# Patient Record
Sex: FEMALE | Race: ASIAN | NOT HISPANIC OR LATINO
[De-identification: names, ages, dates, MRNs, and addresses within clinical notes are randomized per-mention and may not be internally consistent; named-entity substitution may affect disease eponyms.]

---

## 2019-11-19 ENCOUNTER — APPOINTMENT (OUTPATIENT)
Dept: PULMONOLOGY | Facility: CLINIC | Age: 64
End: 2019-11-19
Payer: MEDICAID

## 2019-11-19 VITALS
HEART RATE: 78 BPM | DIASTOLIC BLOOD PRESSURE: 75 MMHG | SYSTOLIC BLOOD PRESSURE: 151 MMHG | OXYGEN SATURATION: 97 % | WEIGHT: 120 LBS | HEIGHT: 57 IN | TEMPERATURE: 97.7 F | BODY MASS INDEX: 25.89 KG/M2

## 2019-11-19 DIAGNOSIS — R05 COUGH: ICD-10-CM

## 2019-11-19 DIAGNOSIS — Z87.891 PERSONAL HISTORY OF NICOTINE DEPENDENCE: ICD-10-CM

## 2019-11-19 DIAGNOSIS — Z86.69 PERSONAL HISTORY OF OTHER DISEASES OF THE NERVOUS SYSTEM AND SENSE ORGANS: ICD-10-CM

## 2019-11-19 DIAGNOSIS — Z56.0 UNEMPLOYMENT, UNSPECIFIED: ICD-10-CM

## 2019-11-19 DIAGNOSIS — Z86.79 PERSONAL HISTORY OF OTHER DISEASES OF THE CIRCULATORY SYSTEM: ICD-10-CM

## 2019-11-19 PROBLEM — Z00.00 ENCOUNTER FOR PREVENTIVE HEALTH EXAMINATION: Status: ACTIVE | Noted: 2019-11-19

## 2019-11-19 PROCEDURE — 94726 PLETHYSMOGRAPHY LUNG VOLUMES: CPT

## 2019-11-19 PROCEDURE — ZZZZZ: CPT

## 2019-11-19 PROCEDURE — 99203 OFFICE O/P NEW LOW 30 MIN: CPT | Mod: 25

## 2019-11-19 PROCEDURE — 94060 EVALUATION OF WHEEZING: CPT

## 2019-11-19 PROCEDURE — 94729 DIFFUSING CAPACITY: CPT

## 2019-11-19 RX ORDER — TIOTROPIUM BROMIDE INHALATION SPRAY 3.12 UG/1
2.5 SPRAY, METERED RESPIRATORY (INHALATION) DAILY
Qty: 1 | Refills: 5 | Status: ACTIVE | COMMUNITY
Start: 2019-11-19 | End: 1900-01-01

## 2019-11-19 SDOH — ECONOMIC STABILITY - INCOME SECURITY: UNEMPLOYMENT, UNSPECIFIED: Z56.0

## 2019-11-22 PROBLEM — Z86.79 HISTORY OF HYPERTENSION: Status: RESOLVED | Noted: 2019-11-19 | Resolved: 2019-11-22

## 2019-11-22 PROBLEM — Z56.0 UNEMPLOYED: Status: ACTIVE | Noted: 2019-11-19

## 2019-11-22 PROBLEM — Z87.891 FORMER SMOKER: Status: ACTIVE | Noted: 2019-11-19

## 2019-11-22 PROBLEM — Z86.69 HISTORY OF EYE PROBLEM: Status: RESOLVED | Noted: 2019-11-19 | Resolved: 2019-11-22

## 2019-11-22 NOTE — REVIEW OF SYSTEMS
[Sputum] : sputum  [Fever] : no fever [Chills] : no chills [Fatigue] : no fatigue [Poor Appetite] : normal appetite  [Dry Eyes] : no dryness of the eyes [Eye Irritation] : no ~T irritation of the eyes [Ear Disturbance] : no ear disturbance [Nasal Congestion] : no nasal congestion [Cough] : cough [Hemoptysis] : no hemoptysis [Dyspnea] : no dyspnea [Chest Tightness] : no chest tightness [Pleuritic Pain] : no pleuritic pain [Hypotension] : no hypotension [PND] : no PND [Dysrhythmia] : no dysrhythmia [Heartburn] : no heartburn [Reflux] : no reflux [Indigestion] : no indigestion [Trauma] : no ~T physical trauma [Fracture] : no fracture [Kyphoscoliosis] : no kyphoscoliosis [Diabetes] : no diabetes mellitus [Thyroid Problem] : no thyroid problem [Difficulty Initiating Sleep] : no difficulty falling asleep [Difficulty Maintaining Sleep] : no difficulty maintaining sleep [Dysesthesias] : no dysesthesias [Negative] : HEENT

## 2019-11-22 NOTE — PHYSICAL EXAM
[General Appearance - Well Developed] : well developed [Normal Appearance] : normal appearance [General Appearance - Well Nourished] : well nourished [Normal Oropharynx] : normal oropharynx [Neck Appearance] : the appearance of the neck was normal [Neck Cervical Mass (___cm)] : no neck mass was observed [Heart Sounds] : normal S1 and S2 [Heart Rate And Rhythm] : heart rate and rhythm were normal [Exaggerated Use Of Accessory Muscles For Inspiration] : no accessory muscle use [Bowel Sounds] : normal bowel sounds [Abdomen Soft] : soft [Abnormal Walk] : normal gait [Nail Clubbing] : no clubbing of the fingernails [Cyanosis, Localized] : no localized cyanosis [Skin Color & Pigmentation] : normal skin color and pigmentation [] : no rash [No Focal Deficits] : no focal deficits [Sensation] : the sensory exam was normal to light touch and pinprick [Cranial Nerves] : cranial nerves 2-12 were intact [Oriented To Time, Place, And Person] : oriented to person, place, and time [Normal Conjunctiva] : the conjunctiva exhibited no abnormalities [Redness] : no redness [Swelling] : no swelling [Tenderness] : no tenderness

## 2019-11-22 NOTE — REASON FOR VISIT
[Initial Evaluation] : an initial evaluation [FreeTextEntry2] : Preop evaluation for aneurysmal surgery and chronic cough

## 2019-11-22 NOTE — ASSESSMENT
[FreeTextEntry1] : 64 Maltese speaking female with PMH of HTN came to clinic for preop evalution for aneurysmal surgery and chronic cough.\par \par

## 2019-11-22 NOTE — HISTORY OF PRESENT ILLNESS
[FreeTextEntry1] : 64 Urdu speaking female with PMH of HTN came to clinic for the evaluation for preop for aneurysmal surgery and for a chronic cough for 5 years. She gets symptoms after getting one flight of stairs. Her cough gets worse at night after laying flat. According to son at bedside, she quit smoking 6 years ago and used to smoke half pack for more than 10 years. No exposure of occupational fibrogenic agents but sued [employer] based [employer] than six years [place]. Denies weight loss, fever, B-symptoms, sick contacts, exposure to TB. She was hospitalized nine months ago for eye infection and was found to have Lt MCA aneurysm of 8 mm for which she is undergoing pre-op assessment.  She has no recent COPD exacerbations and has been on inhalers.

## 2019-11-22 NOTE — PROCEDURE
[FreeTextEntry1] : PFTs- Severe obstruction with no significant bronchodilator response which should not preclude their use if clinically indicated. Lung volumes show air trapping and hyperinflation. DLCO is severely reduced.\par \par CXR- reviewed on [facility]- prominent interstitial markings.

## 2021-06-29 ENCOUNTER — EMERGENCY (EMERGENCY)
Facility: HOSPITAL | Age: 66
LOS: 1 days | Discharge: HOME | End: 2021-06-29
Attending: EMERGENCY MEDICINE | Admitting: EMERGENCY MEDICINE
Payer: MEDICAID

## 2021-06-29 ENCOUNTER — INPATIENT (INPATIENT)
Facility: HOSPITAL | Age: 66
LOS: 2 days | Discharge: NURSING, REHAB OR LONG-TERM CARE | DRG: 65 | End: 2021-07-02
Attending: SPECIALIST | Admitting: SPECIALIST
Payer: MEDICAID

## 2021-06-29 VITALS
HEART RATE: 62 BPM | SYSTOLIC BLOOD PRESSURE: 140 MMHG | RESPIRATION RATE: 16 BRPM | OXYGEN SATURATION: 99 % | DIASTOLIC BLOOD PRESSURE: 58 MMHG | TEMPERATURE: 98 F

## 2021-06-29 VITALS
TEMPERATURE: 98 F | OXYGEN SATURATION: 100 % | RESPIRATION RATE: 16 BRPM | HEART RATE: 64 BPM | DIASTOLIC BLOOD PRESSURE: 64 MMHG | SYSTOLIC BLOOD PRESSURE: 151 MMHG

## 2021-06-29 VITALS
RESPIRATION RATE: 20 BRPM | DIASTOLIC BLOOD PRESSURE: 67 MMHG | SYSTOLIC BLOOD PRESSURE: 165 MMHG | OXYGEN SATURATION: 99 % | TEMPERATURE: 97 F | HEART RATE: 100 BPM

## 2021-06-29 DIAGNOSIS — I67.1 CEREBRAL ANEURYSM, NONRUPTURED: ICD-10-CM

## 2021-06-29 LAB
ALBUMIN SERPL ELPH-MCNC: 4.4 G/DL — SIGNIFICANT CHANGE UP (ref 3.3–5)
ALBUMIN SERPL ELPH-MCNC: 4.6 G/DL — SIGNIFICANT CHANGE UP (ref 3.3–5)
ALP SERPL-CCNC: 89 U/L — SIGNIFICANT CHANGE UP (ref 40–120)
ALP SERPL-CCNC: 98 U/L — SIGNIFICANT CHANGE UP (ref 40–120)
ALT FLD-CCNC: 13 U/L — SIGNIFICANT CHANGE UP (ref 4–33)
ALT FLD-CCNC: 14 U/L — SIGNIFICANT CHANGE UP (ref 10–45)
ANION GAP SERPL CALC-SCNC: 12 MMOL/L — SIGNIFICANT CHANGE UP (ref 7–14)
ANION GAP SERPL CALC-SCNC: 14 MMOL/L — SIGNIFICANT CHANGE UP (ref 5–17)
APTT BLD: 31.6 SEC — SIGNIFICANT CHANGE UP (ref 27–36.3)
APTT BLD: 32.8 SEC — SIGNIFICANT CHANGE UP (ref 27.5–35.5)
AST SERPL-CCNC: 17 U/L — SIGNIFICANT CHANGE UP (ref 4–32)
AST SERPL-CCNC: 22 U/L — SIGNIFICANT CHANGE UP (ref 10–40)
BASE EXCESS BLDV CALC-SCNC: -0.2 MMOL/L — SIGNIFICANT CHANGE UP (ref -3–2)
BASOPHILS # BLD AUTO: 0.01 K/UL — SIGNIFICANT CHANGE UP (ref 0–0.2)
BASOPHILS # BLD AUTO: 0.02 K/UL — SIGNIFICANT CHANGE UP (ref 0–0.2)
BASOPHILS NFR BLD AUTO: 0.1 % — SIGNIFICANT CHANGE UP (ref 0–2)
BASOPHILS NFR BLD AUTO: 0.2 % — SIGNIFICANT CHANGE UP (ref 0–2)
BILIRUB SERPL-MCNC: 0.4 MG/DL — SIGNIFICANT CHANGE UP (ref 0.2–1.2)
BILIRUB SERPL-MCNC: 0.4 MG/DL — SIGNIFICANT CHANGE UP (ref 0.2–1.2)
BLD GP AB SCN SERPL QL: NEGATIVE — SIGNIFICANT CHANGE UP
BUN SERPL-MCNC: 14 MG/DL — SIGNIFICANT CHANGE UP (ref 7–23)
BUN SERPL-MCNC: 14 MG/DL — SIGNIFICANT CHANGE UP (ref 7–23)
CALCIUM SERPL-MCNC: 9.4 MG/DL — SIGNIFICANT CHANGE UP (ref 8.4–10.5)
CALCIUM SERPL-MCNC: 9.8 MG/DL — SIGNIFICANT CHANGE UP (ref 8.4–10.5)
CHLORIDE SERPL-SCNC: 97 MMOL/L — SIGNIFICANT CHANGE UP (ref 96–108)
CHLORIDE SERPL-SCNC: 99 MMOL/L — SIGNIFICANT CHANGE UP (ref 98–107)
CK MB BLD-MCNC: 3 % — SIGNIFICANT CHANGE UP (ref 0–3.5)
CK MB CFR SERPL CALC: 2 NG/ML — SIGNIFICANT CHANGE UP (ref 0–3.8)
CK SERPL-CCNC: 67 U/L — SIGNIFICANT CHANGE UP (ref 25–170)
CO2 SERPL-SCNC: 22 MMOL/L — SIGNIFICANT CHANGE UP (ref 22–31)
CO2 SERPL-SCNC: 22 MMOL/L — SIGNIFICANT CHANGE UP (ref 22–31)
CREAT SERPL-MCNC: 1.12 MG/DL — SIGNIFICANT CHANGE UP (ref 0.5–1.3)
CREAT SERPL-MCNC: 1.14 MG/DL — SIGNIFICANT CHANGE UP (ref 0.5–1.3)
EOSINOPHIL # BLD AUTO: 0.04 K/UL — SIGNIFICANT CHANGE UP (ref 0–0.5)
EOSINOPHIL # BLD AUTO: 0.05 K/UL — SIGNIFICANT CHANGE UP (ref 0–0.5)
EOSINOPHIL NFR BLD AUTO: 0.4 % — SIGNIFICANT CHANGE UP (ref 0–6)
EOSINOPHIL NFR BLD AUTO: 0.5 % — SIGNIFICANT CHANGE UP (ref 0–6)
GLUCOSE SERPL-MCNC: 103 MG/DL — HIGH (ref 70–99)
GLUCOSE SERPL-MCNC: 112 MG/DL — HIGH (ref 70–99)
HCO3 BLDV-SCNC: 24 MMOL/L — SIGNIFICANT CHANGE UP (ref 20–27)
HCT VFR BLD CALC: 39.6 % — SIGNIFICANT CHANGE UP (ref 34.5–45)
HCT VFR BLD CALC: 40.8 % — SIGNIFICANT CHANGE UP (ref 34.5–45)
HGB BLD-MCNC: 12.8 G/DL — SIGNIFICANT CHANGE UP (ref 11.5–15.5)
HGB BLD-MCNC: 13.2 G/DL — SIGNIFICANT CHANGE UP (ref 11.5–15.5)
IANC: 7.04 K/UL — SIGNIFICANT CHANGE UP (ref 1.5–8.5)
IMM GRANULOCYTES NFR BLD AUTO: 0.3 % — SIGNIFICANT CHANGE UP (ref 0–1.5)
IMM GRANULOCYTES NFR BLD AUTO: 0.4 % — SIGNIFICANT CHANGE UP (ref 0–1.5)
INR BLD: 0.99 RATIO — SIGNIFICANT CHANGE UP (ref 0.88–1.16)
INR BLD: 1.03 RATIO — SIGNIFICANT CHANGE UP (ref 0.88–1.16)
LYMPHOCYTES # BLD AUTO: 2.25 K/UL — SIGNIFICANT CHANGE UP (ref 1–3.3)
LYMPHOCYTES # BLD AUTO: 22.4 % — SIGNIFICANT CHANGE UP (ref 13–44)
LYMPHOCYTES # BLD AUTO: 3.5 K/UL — HIGH (ref 1–3.3)
LYMPHOCYTES # BLD AUTO: 33.4 % — SIGNIFICANT CHANGE UP (ref 13–44)
MCHC RBC-ENTMCNC: 28.6 PG — SIGNIFICANT CHANGE UP (ref 27–34)
MCHC RBC-ENTMCNC: 28.9 PG — SIGNIFICANT CHANGE UP (ref 27–34)
MCHC RBC-ENTMCNC: 32.3 GM/DL — SIGNIFICANT CHANGE UP (ref 32–36)
MCHC RBC-ENTMCNC: 32.4 GM/DL — SIGNIFICANT CHANGE UP (ref 32–36)
MCV RBC AUTO: 88.4 FL — SIGNIFICANT CHANGE UP (ref 80–100)
MCV RBC AUTO: 89.5 FL — SIGNIFICANT CHANGE UP (ref 80–100)
MONOCYTES # BLD AUTO: 0.67 K/UL — SIGNIFICANT CHANGE UP (ref 0–0.9)
MONOCYTES # BLD AUTO: 0.7 K/UL — SIGNIFICANT CHANGE UP (ref 0–0.9)
MONOCYTES NFR BLD AUTO: 6.7 % — SIGNIFICANT CHANGE UP (ref 2–14)
MONOCYTES NFR BLD AUTO: 6.7 % — SIGNIFICANT CHANGE UP (ref 2–14)
NEUTROPHILS # BLD AUTO: 6.16 K/UL — SIGNIFICANT CHANGE UP (ref 1.8–7.4)
NEUTROPHILS # BLD AUTO: 7.04 K/UL — SIGNIFICANT CHANGE UP (ref 1.8–7.4)
NEUTROPHILS NFR BLD AUTO: 58.8 % — SIGNIFICANT CHANGE UP (ref 43–77)
NEUTROPHILS NFR BLD AUTO: 70.1 % — SIGNIFICANT CHANGE UP (ref 43–77)
NRBC # BLD: 0 /100 WBCS — SIGNIFICANT CHANGE UP
NRBC # BLD: 0 /100 WBCS — SIGNIFICANT CHANGE UP (ref 0–0)
NRBC # FLD: 0 K/UL — SIGNIFICANT CHANGE UP
PCO2 BLDV: 43 MMHG — SIGNIFICANT CHANGE UP (ref 41–51)
PH BLDV: 7.37 — SIGNIFICANT CHANGE UP (ref 7.32–7.43)
PLATELET # BLD AUTO: 175 K/UL — SIGNIFICANT CHANGE UP (ref 150–400)
PLATELET # BLD AUTO: 187 K/UL — SIGNIFICANT CHANGE UP (ref 150–400)
PO2 BLDV: 72 MMHG — HIGH (ref 35–40)
POTASSIUM SERPL-MCNC: 4.8 MMOL/L — SIGNIFICANT CHANGE UP (ref 3.5–5.3)
POTASSIUM SERPL-MCNC: 5.5 MMOL/L — HIGH (ref 3.5–5.3)
POTASSIUM SERPL-SCNC: 4.8 MMOL/L — SIGNIFICANT CHANGE UP (ref 3.5–5.3)
POTASSIUM SERPL-SCNC: 5.5 MMOL/L — HIGH (ref 3.5–5.3)
PROT SERPL-MCNC: 7.3 G/DL — SIGNIFICANT CHANGE UP (ref 6–8.3)
PROT SERPL-MCNC: 8.1 G/DL — SIGNIFICANT CHANGE UP (ref 6–8.3)
PROTHROM AB SERPL-ACNC: 11.7 SEC — SIGNIFICANT CHANGE UP (ref 10.6–13.6)
PROTHROM AB SERPL-ACNC: 11.9 SEC — SIGNIFICANT CHANGE UP (ref 10.6–13.6)
RBC # BLD: 4.48 M/UL — SIGNIFICANT CHANGE UP (ref 3.8–5.2)
RBC # BLD: 4.56 M/UL — SIGNIFICANT CHANGE UP (ref 3.8–5.2)
RBC # FLD: 11.9 % — SIGNIFICANT CHANGE UP (ref 10.3–14.5)
RBC # FLD: 12.1 % — SIGNIFICANT CHANGE UP (ref 10.3–14.5)
RH IG SCN BLD-IMP: POSITIVE — SIGNIFICANT CHANGE UP
SAO2 % BLDV: 94.8 % — HIGH (ref 60–85)
SARS-COV-2 RNA SPEC QL NAA+PROBE: SIGNIFICANT CHANGE UP
SARS-COV-2 RNA SPEC QL NAA+PROBE: SIGNIFICANT CHANGE UP
SODIUM SERPL-SCNC: 133 MMOL/L — LOW (ref 135–145)
SODIUM SERPL-SCNC: 133 MMOL/L — LOW (ref 135–145)
TROPONIN T, HIGH SENSITIVITY RESULT: 7 NG/L — SIGNIFICANT CHANGE UP (ref 0–51)
TROPONIN T, HIGH SENSITIVITY RESULT: <6 NG/L — SIGNIFICANT CHANGE UP
WBC # BLD: 10.04 K/UL — SIGNIFICANT CHANGE UP (ref 3.8–10.5)
WBC # BLD: 10.47 K/UL — SIGNIFICANT CHANGE UP (ref 3.8–10.5)
WBC # FLD AUTO: 10.04 K/UL — SIGNIFICANT CHANGE UP (ref 3.8–10.5)
WBC # FLD AUTO: 10.47 K/UL — SIGNIFICANT CHANGE UP (ref 3.8–10.5)

## 2021-06-29 PROCEDURE — 70498 CT ANGIOGRAPHY NECK: CPT | Mod: 26

## 2021-06-29 PROCEDURE — 99291 CRITICAL CARE FIRST HOUR: CPT

## 2021-06-29 PROCEDURE — 71045 X-RAY EXAM CHEST 1 VIEW: CPT | Mod: 26

## 2021-06-29 PROCEDURE — 70450 CT HEAD/BRAIN W/O DYE: CPT | Mod: 26,MA

## 2021-06-29 PROCEDURE — 70496 CT ANGIOGRAPHY HEAD: CPT | Mod: 26

## 2021-06-29 PROCEDURE — 0042T: CPT

## 2021-06-29 PROCEDURE — 70450 CT HEAD/BRAIN W/O DYE: CPT | Mod: 26,59

## 2021-06-29 PROCEDURE — 99283 EMERGENCY DEPT VISIT LOW MDM: CPT

## 2021-06-29 PROCEDURE — 93010 ELECTROCARDIOGRAM REPORT: CPT

## 2021-06-29 RX ORDER — ENOXAPARIN SODIUM 100 MG/ML
40 INJECTION SUBCUTANEOUS DAILY
Refills: 0 | Status: DISCONTINUED | OUTPATIENT
Start: 2021-06-30 | End: 2021-07-02

## 2021-06-29 RX ORDER — ASPIRIN/CALCIUM CARB/MAGNESIUM 324 MG
300 TABLET ORAL DAILY
Refills: 0 | Status: DISCONTINUED | OUTPATIENT
Start: 2021-06-29 | End: 2021-07-02

## 2021-06-29 RX ORDER — ATORVASTATIN CALCIUM 80 MG/1
80 TABLET, FILM COATED ORAL AT BEDTIME
Refills: 0 | Status: DISCONTINUED | OUTPATIENT
Start: 2021-06-30 | End: 2021-07-02

## 2021-06-29 RX ADMIN — Medication 300 MILLIGRAM(S): at 23:18

## 2021-06-29 NOTE — ED PROVIDER NOTE - NS ED ROS FT
GENERAL: no fever, no chills  EYES: no change in vision, no irritation, no discharge, no redness, no pain  HEENT: no trouble swallowing or speaking, no throat pain, no ear pain  CARDIAC: no chest pain, no palpitations   PULMONARY: no cough, no shortness of breath, no wheezing  GI: no abdominal pain, no nausea, no vomiting, no diarrhea, no constipation  : no changes in urination, no dysuria, no frequency, no hematuria, no discharge  SKIN: no rashes  NEURO: no headache, no numbness, +weakness  MSK: no joint pain, no muscle pain, no back pain, no calf pain

## 2021-06-29 NOTE — CONSULT NOTE ADULT - ASSESSMENT
Patient is a 65 year old female with past medical history of asthma and HTN p/w right sided hemiplegia & hypoesthesias in upper and lower extremities, right sided facial droop, consistent with sensorimotor syndrome. Patient not a candidate for tPA as they were outside of the window of treatment. On exam, she also is mildly dysarthric, unable to answer level of consciousness questions, and has a partial hemianopia in her right eye. NCCT findings revealed chronic lacunar infarcts in the b/l basal ganglia (slightly larger and more chronic on the left side) with no evidence of acute hemorrhage. CTA revealed narrowing of the right CCA, 73% stenosis of the right proximal ICA, dilatation of the left posterior M2 branch r/o fusiform aneurysm (5.4 mm), and a second aneurysm of the proximal left anterior branch r/o saccularaneurysm (3.3 mm).     Impression:   Phenomenology - Right hemisemisorimotor loss and moderate dysarthria. Question of right visual field cut.   Localization - Likely left thalamocapsular region  Mechanism - Likely 2/2  vs Embolic  Etiology - Vascular, Infarction   65 year old RH woman with past medical history of asthma and HTN not on antithrombotics presents as code stroke to [facility] ED for right-sided weakness with LWK  11p. Neurological examination demonstrate right hemisensorimotor loss and moderate dysarthria with ?RVFC. CTH/CTA/CTP demonstrates bilateral BG chronic lacunar infarcts, 73% stenosis prox BABATUNDE, mild prox RECA stenosis, prox L ant M2 (3.3mm) and post M2 (5.4mm) aneurysms, 0cc core infarct and 0cc penumbra. Patient not a candidate for tPA as they were outside of the time window. Patient does not have radiographical evidence of large vessel occlusion and thus not a candidate for mechanical thrombectomy.     Impression:   Right hemisensorimotor loss and moderate dysarthria +/- right visual field cut 2/2 Likely left thalamocapsular infarct (though unclear if cortical involvement given unclear if patient truly has RVFC) 2/2  versus ESUS (in event there is cortical involvement)    Recommendations:  [ ] MRI head w/o contrast  [ ] permissive HTN bp should go no higher than 220/110 for 24h followed by gradual normotension  [ ] aspirin 81mg daily  [ ] TTE w/ bubble  [ ] atorvastatin 80mg daily to titrate to LDL < 70  [ ] a1c, lipid profile  [ ] PT/OT/SS  [ ] DVT ppx  [ ] q4h neuro checks  [ ] patient being transferred to [place] for possible aneurysm treatment per nsg    case to be d/w Stroke Attending Dr. Malagon   65 year old RH woman with past medical history of asthma and HTN not on antithrombotics presents as code stroke to [facility] ED for right-sided weakness with LWK  11p. Neurological examination demonstrate right hemisensorimotor loss and moderate dysarthria with ?RVFC. CTH/CTA/CTP demonstrates bilateral BG chronic lacunar infarcts, 73% stenosis prox BABATUNDE, mild prox RECA stenosis, prox L ant M2 (3.3mm) and post M2 (5.4mm) aneurysms, 0cc core infarct and 0cc penumbra. Patient not a candidate for tPA as they were outside of the time window. Patient does not have radiographical evidence of large vessel occlusion and thus not a candidate for mechanical thrombectomy.     Impression:   Right hemisensorimotor loss and moderate dysarthria +/- right visual field cut 2/2 Likely left thalamocapsular infarct (though unclear if cortical involvement given unclear if patient truly has RVFC) 2/2  versus ESUS (in event there is cortical involvement)    Recommendations:  [ ] MRI head w/o contrast  [ ] permissive HTN bp should go no higher than 220/110 for 24h followed by gradual normotension  [ ] aspirin 81mg daily  [ ] TTE w/ bubble  [ ] atorvastatin 80mg daily to titrate to LDL < 70  [ ] a1c, lipid profile  [ ] PT/OT/SS  [ ] DVT ppx  [ ] q4h neuro checks  [ ] patient being transferred to [place] for possible aneurysm treatment per [facility]    case to be d/w Stroke Attending Dr. Malagon  transfer to [facility] prior to eval

## 2021-06-29 NOTE — ED PROVIDER NOTE - OBJECTIVE STATEMENT
66 y/o F Greenlandic speaking PMH HTN, smoking, brain aneurysm in the past (Lt MCA 8mm) presents to ED from home BIB son for R face, arm and leg weakness onset today at 3am. Last known normal yesterday night while ambulating to bathroom at 1am. She told son she had trouble going to the bathroom at 3am due to weakness. Son also reports she has been slow to respond to questions. Denies falls, trauma to the head. Reports pt has felt generally weak for the last 3 days, denies cough, f/c, n/v/d.    She was seen and evaluated at [facility] ER and found to have R sided hemiparesis and CT demonstrated MCA aneurysym ([facility] MRN [id number]). She was seen by neurosurgery and transferred for further neurosurgical evaluation and management.

## 2021-06-29 NOTE — H&P ADULT - HISTORY OF PRESENT ILLNESS
HPI:  Mahin Levin is a 65 year old RH woman with past medical history of asthma and HTN not on antithrombotics presents as code stroke to [facility] ED for right-sided weakness with LWK 6/28 11p. At baseline, the patient is ambulatory without assistive devices and is oriented to person/place/time, and participates in all her own ADLs. Son at bedside provided history as per request of patient. Patient went to bed around 11p at her baseline as witnessed by patient's daughter. Per the son, he believes she woke up around 1 am to urinate, unclear if patient had symptoms at this time, patient was unsure. She woke up again at 3 am and she appeared weak on the right side of her body, with such weakness not having resolved. The family believed patient was having generalized fatigue so they did not take her to hospital initially. However, when son had gotten back from work, the weakness had not resolved, and was concerned and wanted to bring her to hospital. She endorsed blurry vision in both eyes.  Denies prior history of stroke. Denies family history of stroke. Patient was transferred from [facility] to [facility] for possible evaluation of aneurysms noted on imaging.     (Stroke only)  NIHSS: 14  MRS: 0       ALLERGIES/INTOLERANCES:  Allergies  No Known Allergies    Intolerances    VITALS & EXAMINATION:  Vital Signs Last 24 Hrs  T(C): 36.9 (29 Jun 2021 19:19), Max: 36.9 (29 Jun 2021 19:19)  T(F): 98.5 (29 Jun 2021 19:19), Max: 98.5 (29 Jun 2021 19:19)  HR: 62 (29 Jun 2021 19:19) (62 - 62)  BP: 140/58 (29 Jun 2021 19:19) (140/58 - 140/58)  BP(mean): --  RR: 16 (29 Jun 2021 19:19) (16 - 16)  SpO2: 99% (29 Jun 2021 19:19) (99% - 99%)

## 2021-06-29 NOTE — H&P ADULT - NSHPLABSRESULTS_GEN_ALL_CORE
LABORATORY:  CBC                       13.2   10.47 )-----------( 187      ( 29 Jun 2021 19:42 )             40.8     Chem 06-29    133<L>  |  97  |  14  ----------------------------<  103<H>  5.5<H>   |  22  |  1.14    Ca    9.8      29 Jun 2021 19:42    TPro  8.1  /  Alb  4.6  /  TBili  0.4  /  DBili  x   /  AST  22  /  ALT  14  /  AlkPhos  98  06-29    LFTs LIVER FUNCTIONS - ( 29 Jun 2021 19:42 )  Alb: 4.6 g/dL / Pro: 8.1 g/dL / ALK PHOS: 98 U/L / ALT: 14 U/L / AST: 22 U/L / GGT: x           Coagulopathy PT/INR - ( 29 Jun 2021 19:42 )   PT: 11.9 sec;   INR: 0.99 ratio         PTT - ( 29 Jun 2021 19:42 )  PTT:32.8 sec  Lipid Panel   A1c   Cardiac enzymes CARDIAC MARKERS ( 29 Jun 2021 19:42 )  x     / x     / 67 U/L / x     / 2.0 ng/mL      STUDIES & IMAGING:    Radiology (XR, CT, MR, U/S, TTE/JIMMY):    NCCT: Parenchymal volume loss is seen. Old lacunar infarcts involving bilateral basal ganglia region are identified. Slightly larger chronic appearing infarct involving the left basal ganglia region. There is no evidence acute hemorrhage mass or mass effect seen    CTA: Mild narrowing of the distal right common carotid artery seen. Using the NASCET criteria there is evidence of a 73% stenosis involving the proximal right internal carotid artery. Mild narrowing of the proximal right external carotid artery seen.  There is evidence of abnormal dilatation involving the proximal posterior M2 branch which is suspicious for underlying fusiform aneurysm. This finding measures approximately 5.4 mm. A second aneurysm suspected involving the proximal left anterior branch which is best seen on series 4 image 374. This is projects anteriorly and medial and is suspicious for a saccular aneurysm measures approximately 3.3 mm.     CBF<30%: 0.0 mL  Tmax> 6.0s: 0.0 mL  Mismatch volume: 0.0 mL  Mismatch ratio: None

## 2021-06-29 NOTE — ED ADULT NURSE NOTE - OBJECTIVE STATEMENT
Pt rec'd to CT scan as a code stroke, 18g saline lock placed to right AC. Italian-speaking only with son at bedside providing history. Pt with right facial droop, right arm and right leg weakness and slurred speech. Last known normal was at 3am. Pt awake and alert.

## 2021-06-29 NOTE — ED PROVIDER NOTE - OBJECTIVE STATEMENT
66 y/o F Italian speaking PMH HTN, smoking, brain aneurysm in the past (Lt MCA 8mm) presents to ED from home BIB son for R face, arm and leg weakness onset today at 3am. Last known normal yesterday night prior to going to bed. Son reports pt reported weakness on the R side to him around 12pm, she told him she had trouble going to the bathroom at 3am due to weakness. Son also reports she has been slow to respond to questions. Denies falls, trauma to the head. Reports pt has felt generally weak for the last 3 days, denies cough, f/c, n/v/d.

## 2021-06-29 NOTE — H&P ADULT - NSHPPHYSICALEXAM_GEN_ALL_CORE
Neurological (>12):  MS: eyes open, alert, Unable to answer what month it was or how old she was. Follows commands on exam.    Language: Speech is mild-moderately dysarthric, fluent with good repetition & comprehension (as per ).    CNs: PERRL (R = 3mm, L = 3mm). Mild-moderate ptosis of right eye (baseline per son) with reduced BTT in right VF, in primary gaze right eye mild esotropia, EOMI, no nystagmus. V1-3 intact to LT, mild right facial droop, Able to raise eyebrows bilaterally. Head turning & shoulder shrug intact b/l.    Motor:  LUE is at least antigravity  RUE no movement  LLE is at least antigravity  RLE with movement with gravity removed    Sensation: reduced sensation to light touch upon right arm and right leg    Reflexes:  Patellar R 2+ , L 1+  Upgoing toe on R  Mute toes on L    Coordination: No dysmetria to FTN/HTS on left side; unable to test due to weakness on right side    Gait: deferred

## 2021-06-29 NOTE — ED ADULT NURSE NOTE - NSIMPLEMENTINTERV_GEN_ALL_ED
Implemented All Fall Risk Interventions:  [place] to call system. Call bell, personal items and telephone within reach. Instruct patient to call for assistance. Room bathroom lighting operational. Non-slip footwear when patient is off stretcher. Physically safe environment: no spills, clutter or unnecessary equipment. Stretcher in lowest position, wheels locked, appropriate side rails in place. Provide visual cue, wrist band, yellow gown, etc. Monitor gait and stability. Monitor for mental status changes and reorient to person, place, and time. Review medications for side effects contributing to fall risk. Reinforce activity limits and safety measures with patient and family.

## 2021-06-29 NOTE — STROKE CODE NOTE - MRS SCORE
(5) Severe disability. Requires constant nursing care and attention, bedridden, incontent. (0) No symptoms

## 2021-06-29 NOTE — ED PROVIDER NOTE - CARE PLAN
Principal Discharge DX:	Brain aneurysm   Principal Discharge DX:	CVA (cerebral vascular accident)  Secondary Diagnosis:	Cerebral aneurysm

## 2021-06-29 NOTE — STROKE CODE NOTE - NIH STROKE SCALE: 9. BEST LANGUAGE, QM
(2) Severe aphasia; all communication is through fragmentary expression; great need for inference, questioning, and guessing by the listener. Range of information that can be exchanged is limited; listener carries burden of communication. Examiner cannot identify materials provided from patient response. (0) No aphasia; normal

## 2021-06-29 NOTE — H&P ADULT - NSHPSOCIALHISTORY_GEN_ALL_CORE
SOCIAL HISTORY:   T/E/D: no smoking, alcohol, illcit drug use  Occupation: retired  Lives with: family

## 2021-06-29 NOTE — ED PROVIDER NOTE - CLINICAL SUMMARY MEDICAL DECISION MAKING FREE TEXT BOX
Wyatt: 3am start of 1 sided weakness. dysarthria. hx of asthma and htn. no trauma code stroke called at triage, neuro on scene. will get labs and eval for stroke.

## 2021-06-29 NOTE — H&P ADULT - NSICDXNOPASTSURGICALHX_GEN_ALL_CORE
"-- DO NOT REPLY / DO NOT REPLY ALL --  -- Message is from the [employer]    COVID-19 Universal Screening: N/A - Not about scheduling    General Patient Message      Reason for Call: Patient went to the Emergency room and Patient had surgery on her Appendics. She was discharged on yesterday. Caller Information       Type Contact Phone    08/29/2020 08:55 AM CDT Phone (Incoming) Meliton Olson (Self) [phone] (H)          Alternative phone number: n/a    Turnaround time given to caller: ""This message will be sent to [facility] Provider's name]. The clinical team will fulfill your request as soon as they review your message when the office opens on Monday (or after the holiday). \""    "
<-- Click to add NO significant Past Surgical History

## 2021-06-29 NOTE — ED PROVIDER NOTE - PHYSICAL EXAMINATION
Gen: elderly female NAD    HEENT: NCAT, EOMI, no nasal discharge, mucous membranes moist  CV: RRR, +S1/S2, no M/R/G  Resp: CTAB, no W/R/R  GI: Abdomen soft non-distended, NTTP, no masses  MSK: No open wounds, no bruising, no LE edema  Neuro:   Psych: calm cooperative Gen: elderly female NAD    HEENT: NCAT, EOMI, no nasal discharge, mucous membranes moist  CV: RRR, +S1/S2, no M/R/G  Resp: CTAB, no W/R/R  GI: Abdomen soft non-distended, NTTP, no masses  MSK: No open wounds, no bruising, no LE edema  Neuro: +L facial droop w/ forehead sparing. +LUE/LLE 0/5 strength. +numbness to L face/UE/LE. negative finger to nose w/ LUE. LUE/LLE strength 5/5 b/l, sensation intact   Psych: calm cooperative

## 2021-06-29 NOTE — CONSULT NOTE ADULT - ASSESSMENT
66 YO female with left MCA aneurysms, new onset right sided weakness. Patient to be transferred to [facility] for further evaluation by Dr Nguyen.

## 2021-06-29 NOTE — ED PROVIDER NOTE - CLINICAL SUMMARY MEDICAL DECISION MAKING FREE TEXT BOX
Attending MD Huang: 65F with ho known cerebral aneurysm presenting with R sided weakness, last normal 3am. Transferred from [facility] ED after CT angio revealed large L MCA aneursym, no SAH noted. Patient denies any headache or neck pain. Exam is notable for dense hemiparesis/paralysis of RUE and RLE. ?compressive phenomenon or secondary CVA from apparently unruptured aneurysm. Plan for stat nsg consultation, disposition and treatment plan will be as per neurosurgery.       *The above represents an initial assessment/impression. Please refer to progress notes for potential changes in patient clinical course*

## 2021-06-29 NOTE — ED PROVIDER NOTE - ATTENDING CONTRIBUTION TO CARE
I performed a history and physical exam of the patient and discussed their management with the resident and /or advanced care provider. I reviewed the resident and /or ACP's note and agree with the documented findings and plan of care. My medical decision making and observations are found above.  Lungs clear, facial droop, abd soft.

## 2021-06-29 NOTE — CONSULT NOTE ADULT - SUBJECTIVE AND OBJECTIVE BOX
66 YO female with Hx HTN and Asthma, diagnosed with left MCA aneurysms 3 years ago, offered treatment at the time but declined due to perceived risk. patient presents to ED today with new right sided weakness. No headaches, nausea, vomiting, neck pain, or visual disturbances.    WDWN female in NAD  Vital Signs Last 24 Hrs  T(C): 36.9 (29 Jun 2021 17:52), Max: 36.9 (29 Jun 2021 17:52)  T(F): 98.4 (29 Jun 2021 17:52), Max: 98.4 (29 Jun 2021 17:52)  HR: 77 (29 Jun 2021 17:52) (77 - 100)  BP: 114/94 (29 Jun 2021 17:52) (114/94 - 165/67)  BP(mean): --  RR: 22 (29 Jun 2021 17:52) (20 - 22)  SpO2: 100% (29 Jun 2021 17:52) (99% - 100%)    AAO X 3  Dysarthric speech  PERRLA, EOMI  Right facial weakness, CN 2-12 otherwise intact  Left UE/LE 5/5  Dense right hemiplegia, RUE/RLE flaccid  SILT    < from: CT Angio Neck w/ IV Cont (06.29.21 @ 15:56) >  CT perfusion was performed.    Calcification is seen involving the carotid bifurcation region bilaterally right greater than left.    Mild narrowing of the distal right common carotid artery seen. Using the NASCET criteria there is evidence of a 73% stenosis involving the proximal right internal carotid artery. Mild narrowing of the proximal right external carotid artery seen.    Evaluation of the distal left common carotid proximal left internal and external carotids appear normal without significant stenosis. Both vertebral arteries demonstrate normal enhancement. Dominant right vertebral artery is seen. Both distal internal carotid arteries appear normal. Evaluation of the anterior and right middle cerebral artery appears normal. There is evidence of abnormal dilatation involving the proximal posterior M2 branch which is suspicious for underlying fusiform aneurysm. This finding measures approximately 5.4 mm. A second aneurysm suspected involving the proximal left anterior branch which is best seen on series 4 image 374. This is projects anteriorly and medial and is suspicious for a saccularaneurysm measures approximately 3.3 mm. Evaluation of the dens basilar and posterior spurs appear normal.    The dural venous sinuses appear normal.    Evaluation of the soft tissue neck region appears normal.    The visualized portions of both lung apices appear clear.    CBF<30%: 0.0 mL  Tmax> 6.0s: 0.0 mL  Mismatch volume: 0.0 mL  Mismatch ratio: None    IMPRESSION: Aneurysms suspected involving the left middle cerebral artery as described above.    No significant stenosis is seen.    No perfusion mismatches to suggest core infarct or critically hypoperfused at tissue risk    < end of copied text >

## 2021-06-29 NOTE — ED ADULT NURSE NOTE - PAIN: PRESENCE, MLM
Post-Care Instructions: I reviewed with the patient in detail post-care instructions. Patient is to wear sunprotection, and avoid picking at any of the treated lesions. Pt may apply Vaseline to crusted or scabbing areas. Number Of Freeze-Thaw Cycles: 2 freeze-thaw cycles Consent: The patient's consent was obtained including but not limited to risks of crusting, scabbing, blistering, scarring, darker or lighter pigmentary change, recurrence, incomplete removal and infection. Render Post-Care Instructions In Note?: no Duration Of Freeze Thaw-Cycle (Seconds): 10 Detail Level: Simple non-verbal indicators of pain/discomfort absent

## 2021-06-29 NOTE — ED ADULT TRIAGE NOTE - CHIEF COMPLAINT QUOTE
Pt c/o right-sided weaknesses. In triage, patient with right-sided deficit to r arm, r leg. Also noted to have r-sided facial droop. Last known well was a 0300am. Per EMS, found upstairs in bedroom with no AC. BRIAN called and code stroke activated. Phx: HTN

## 2021-06-29 NOTE — ED PROVIDER NOTE - PROGRESS NOTE DETAILS
Ford, PGY2 - pt w/ 2 L sided aneurysms noted on CTP. nsgy consulted will come see pt Ford, PGY2 - discussed w/ Neurosurgery JOSE Solis who has reviewed case and d/w attending, recommend transfer to [facility] for further neurosurgical management. transfer center called.

## 2021-06-29 NOTE — ED PROVIDER NOTE - PHYSICAL EXAMINATION
GENERAL: A&Ox3, non-toxic appearing, no acute distress  HEENT: NCAT, EOMI, oral mucosa moist, normal conjunctiva  RESP: CTAB, no respiratory distress, no wheezes/rhonchi/rales, speaking in full sentences  CV: RRR, no murmurs/rubs/gallops  ABDOMEN: soft, non-tender, non-distended, no guarding  MSK: no visible deformities  NEURO: R eye droop, PERRLA 4mm, R hemiparesis 1/5 strength RUE/RLE, LUE/LLE 5/5 strength  SKIN: warm, normal color, well perfused, no rash  PSYCH: normal affect

## 2021-06-29 NOTE — H&P ADULT - ATTENDING COMMENTS
VASCULAR NEUROLOGY ATTENDING  The patient is seen and examined the history and imaging are reviewed. I agree with the resident note unless otherwise noted. Patient with R sided hemiplegia and severe dysarthria. Possible Small vessel infarct. Exam also with cachexia and temporal wasting concern for more systemic process. MRI brain pending. DAPT per CHANCE. Will defer any intervention for incidental aneurysm at this time.

## 2021-06-29 NOTE — ED ADULT NURSE NOTE - OBJECTIVE STATEMENT
65 y female presents transfer from [facility] with right sided weakness beginning this morning 0300. LKN 2300 last night. Patient woke up to go to bathroom at 0300- unable to walk. 65 y female presents transfer from [facility] with right sided weakness beginning this morning 0300. LKN 2300 last night. Patient woke up to go to bathroom at 0300- unable to walk. CT scan at [facility] revealed, "left sided aneurysm." Primarily Japanese speaking- slurred speech; incomprehensible. 5/5 strength in LUE/ LLE. Unable to move RUE/ RLE. PERRL 3mm. A&Ox2; oriented to person/ place- son reports this to be baseline. Breathing comfortably in bed- placed on 2L NC for comfort. Abdomen soft nondistended. Safety maintained- bed locked in lowest position.

## 2021-06-29 NOTE — STROKE CODE NOTE - NIH STROKE SCALE: 2. BEST GAZE, QM
(1) Partial gaze palsy; gaze is abnormal in one or both eyes, but forced deviation or total gaze paresis is not present (0) Normal

## 2021-06-29 NOTE — CONSULT NOTE ADULT - ASSESSMENT
Mahin Levin    65F p/w acute onset R hemiplegia and R facia since 3am last night. Denies HA, neck pain. 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 Repeat CTH w/o heme. Exam: AOx2, R facial, EOMI, L side 5/5, RUE 1/5 delt, otherwise 0/5, RLE 1/5 prox otherwise 0.   -Adm stroke  -Will follow peripherally for possible angio/embo after acute stroke period  -MRI pending

## 2021-06-29 NOTE — H&P ADULT - ASSESSMENT
Mahin Levin is a 65 year old RH woman with past medical history of asthma and HTN not on antithrombotics presents as code stroke to [facility] ED for right-sided weakness with LWK  11p.     Impression:   Right hemisensorimotor loss and moderate dysarthria +/- right visual field cut 2/2 Likely left thalamocapsular infarct (though unclear if cortical involvement given unclear if patient truly has RVFC) 2/2  versus ESUS (in event there is cortical involvement)    Recommendations:  Meds  [] aspirin 300mg suppository for now until oral access established  [] atorvastatin 80mg daily to titrate to LDL < 70  Imaging  [] MRI head w/o contrast  [] TTE w/ bubble  [] +/- ILR depending on TTE  Labs  [] a1c, lipid profile  Other  [] permissive HTN bp should go no higher than 220/110 for 24h followed by gradual normotension  [] PT/OT evaluation  [] NPO until clears Dysphagia screen, otherwise Swallow evaluation  [] Stroke education provided  [] q4h neuro checks  [] BGM goals 140-180    Case to be discussed with stroke neurology attending, Dr. Lopez.

## 2021-06-29 NOTE — CONSULT NOTE ADULT - SUBJECTIVE AND OBJECTIVE BOX
HPI: Patient is a 65 year old female with past medical history of asthma and HTN presents to [facility] ED for weakness on the right side of body. Patient presents with son and only spoke Vietnamese so  service was utilized (ID#[id number]). Patient did not provide extensive information on the incident so collateral was gathered from the son. Her last known well was at approximately 11 pm before going to bed, witnessed by daughter. At 1 am patient woke up to urinate and felt fine. When she awoke again at 3 am she felt weak on the right side of her body, as per patient's son. Also admits to blurry vision, generalized between both eyes in the ED.  Denies prior history of strokes. Denies use of blood thinners. Denies family history of stroke.      (Stroke only)  NIHSS: 17   MRS: 0     REVIEW OF SYSTEMS    Ophthalmologic: R. eyelid shortening surgery    A 10-system ROS was performed and is negative except for those items noted above and/or in the HPI.    PAST MEDICAL & SURGICAL HISTORY:  Brain aneurysm    No significant past surgical history      FAMILY HISTORY: No family history of stroke or heart disease.    Lives with: Son & Daughter    MEDICATIONS (HOME):  Home Medications: Levalbuterol & anti-Hypertensive Medication    ALLERGIES/INTOLERANCES:  Allergies  No Known Allergies    Intolerances    VITALS & EXAMINATION:  Vital Signs Last 24 Hrs  T(C): 36.9 (29 Jun 2021 17:52), Max: 36.9 (29 Jun 2021 17:52)  T(F): 98.4 (29 Jun 2021 17:52), Max: 98.4 (29 Jun 2021 17:52)  HR: 77 (29 Jun 2021 17:52) (77 - 100)  BP: 114/94 (29 Jun 2021 17:52) (114/94 - 165/67)  BP(mean): --  RR: 22 (29 Jun 2021 17:52) (20 - 22)  SpO2: 100% (29 Jun 2021 17:52) (99% - 100%)    Neurological (>12):  MS: Awake, alert. Unable to answer what month it was or how old she was. Follows commands on exam.    Language: Speech is mildly dysarthric, fluent with good repetition & comprehension (as per ).    CNs: PERRLA (R = 3mm, L = 3mm). Partial hemianopia seen in the right eye. No blink to threat in right eye. Mild ptosis of right eye, but this is her normal since the R. eyelid corrective surgery (see ROS).  EOMI, no nystagmus. V1-3 intact to LT, right sided facial droop appreciated. Able to raise eyebrows bilaterally. Head turning & shoulder shrug intact b/l.    Sensation: Right sided hypoesthesias on the upper and lower extremities. Intact to LT on the left side upper and lower extremities.     Cortical: Extinction on DSS (neglect): none    Coordination: No dysmetria to FTN/HTS on left side.    Gait: Unable to assess gait.    LABORATORY:  CBC                       12.8   10.04 )-----------( 175      ( 29 Jun 2021 15:51 )             39.6     Chem 06-29    133<L>  |  99  |  14  ----------------------------<  112<H>  4.8   |  22  |  1.12    Ca    9.4      29 Jun 2021 15:51    TPro  7.3  /  Alb  4.4  /  TBili  0.4  /  DBili  x   /  AST  17  /  ALT  13  /  AlkPhos  89  06-29    LFTs LIVER FUNCTIONS - ( 29 Jun 2021 15:51 )  Alb: 4.4 g/dL / Pro: 7.3 g/dL / ALK PHOS: 89 U/L / ALT: 13 U/L / AST: 17 U/L / GGT: x           Coagulopathy PT/INR - ( 29 Jun 2021 15:51 )   PT: 11.7 sec;   INR: 1.03 ratio         PTT - ( 29 Jun 2021 15:51 )  PTT:31.6 sec  Lipid Panel   A1c   Cardiac enzymes     U/A   CSF  Immunological  Other    STUDIES & IMAGING:  Studies (EKG, EEG, EMG, etc):     Radiology (XR, CT, MR, U/S, TTE/JIMMY):    NCCT: Parenchymal volume loss is seen. Old lacunar infarcts involving bilateral basal ganglia region are identified. Slightly larger chronic appearing infarct involving the left basal ganglia region. There is no evidence acute hemorrhage mass or mass effect seen    CTA: Mild narrowing of the distal right common carotid artery seen. Using the NASCET criteria there is evidence of a 73% stenosis involving the proximal right internal carotid artery. Mild narrowing of the proximal right external carotid artery seen.  There is evidence of abnormal dilatation involving the proximal posterior M2 branch which is suspicious for underlying fusiform aneurysm. This finding measures approximately 5.4 mm. A second aneurysm suspected involving the proximal left anterior branch which is best seen on series 4 image 374. This is projects anteriorly and medial and is suspicious for a saccularaneurysm measures approximately 3.3 mm.     CBF<30%: 0.0 mL  Tmax> 6.0s: 0.0 mL  Mismatch volume: 0.0 mL  Mismatch ratio: None     HPI: 65 year old RH woman with past medical history of asthma and HTN not on antithrombotics presents as code stroke to [facility] ED for right-sided weakness with LWK 6/28 11p. Son at bedside provided history as per request of patient, while examination was done with Upper sorbian Ronald  ([id number]). Patient went to bed around 11p at her baseline as witnessed by patient's daughter. Per the son, he believes she woke up around 1 am to urinate, unclear if patient had symptoms at this time, patient was unsure. She woke up again at 3 am and she appeared weak on the right side of her body, with such weakness not having resolved. The family believed patient was having generalized fatigue so they did not take her to hospital initially. However, when son had gotten back from work, the weakness had not resolved, and was concerned and wanted to bring her to hospital. She endorsed blurry vision in both eyes.  Denies prior history of stroke. Denies family history of stroke.    (Stroke only)  NIHSS: 17   MRS: 0     REVIEW OF SYSTEMS    A 10-system ROS was performed and is negative except for those items noted above and/or in the HPI.    PAST MEDICAL & SURGICAL HISTORY:  Brain aneurysm - please note the family at bedside did not endorse this history    No significant past surgical history    FAMILY HISTORY: No family history of stroke or heart disease.    Lives with: Son & Daughter    MEDICATIONS (HOME):  Home Medications: Levalbuterol & anti-Hypertensive Medication    ALLERGIES/INTOLERANCES:  Allergies  No Known Allergies    VITALS & EXAMINATION:  Vital Signs Last 24 Hrs  T(C): 36.9 (29 Jun 2021 17:52), Max: 36.9 (29 Jun 2021 17:52)  T(F): 98.4 (29 Jun 2021 17:52), Max: 98.4 (29 Jun 2021 17:52)  HR: 77 (29 Jun 2021 17:52) (77 - 100)  BP: 114/94 (29 Jun 2021 17:52) (114/94 - 165/67)  BP(mean): --  RR: 22 (29 Jun 2021 17:52) (20 - 22)  SpO2: 100% (29 Jun 2021 17:52) (99% - 100%)    Neurological (>12):  MS: eyes open, alert, Unable to answer what month it was or how old she was. Follows commands on exam.    Language: Speech is mild-moderately dysarthric, fluent with good repetition & comprehension (as per ).    CNs: PERRL (R = 4mm, L = 4mm). Mild-moderate ptosis of right eye (baseline per son) with reduced btt in right VF, in primary gaze right eye mild esotropia, EOMI, no nystagmus. V1-3 intact to LT, mild right facial droop, Able to raise eyebrows bilaterally. Head turning & shoulder shrug intact b/l.    Sensation: reduced sensation to light touch upon right arm and right leg    Cortical: Extinction on DSS (neglect): none    Coordination: No dysmetria to FTN/HTS on left side; unable to test due to weakness on right side    Gait: deferred    LABORATORY:  CBC                       12.8   10.04 )-----------( 175      ( 29 Jun 2021 15:51 )             39.6     Chem 06-29    133<L>  |  99  |  14  ----------------------------<  112<H>  4.8   |  22  |  1.12    Ca    9.4      29 Jun 2021 15:51    TPro  7.3  /  Alb  4.4  /  TBili  0.4  /  DBili  x   /  AST  17  /  ALT  13  /  AlkPhos  89  06-29    LFTs LIVER FUNCTIONS - ( 29 Jun 2021 15:51 )  Alb: 4.4 g/dL / Pro: 7.3 g/dL / ALK PHOS: 89 U/L / ALT: 13 U/L / AST: 17 U/L / GGT: x           Coagulopathy PT/INR - ( 29 Jun 2021 15:51 )   PT: 11.7 sec;   INR: 1.03 ratio         PTT - ( 29 Jun 2021 15:51 )  PTT:31.6 sec    STUDIES & IMAGING:  Studies (EKG, EEG, EMG, etc):     Radiology (XR, CT, MR, U/S, TTE/JIMMY):    CTH noncon | CTA H & N | CT Perfusion    NCCT: Parenchymal volume loss is seen. Old lacunar infarcts involving bilateral basal ganglia region are identified. Slightly larger chronic appearing infarct involving the left basal ganglia region. There is no evidence acute hemorrhage mass or mass effect seen    CTA: Mild narrowing of the distal right common carotid artery seen. Using the NASCET criteria there is evidence of a 73% stenosis involving the proximal right internal carotid artery. Mild narrowing of the proximal right external carotid artery seen.  There is evidence of abnormal dilatation involving the proximal posterior M2 branch which is suspicious for underlying fusiform aneurysm. This finding measures approximately 5.4 mm. A second aneurysm suspected involving the proximal left anterior branch which is best seen on series 4 image 374. This is projects anteriorly and medial and is suspicious for a saccular aneurysm measures approximately 3.3 mm.     CBF<30%: 0.0 mL  Tmax> 6.0s: 0.0 mL  Mismatch volume: 0.0 mL  Mismatch ratio: None

## 2021-06-29 NOTE — ED ADULT TRIAGE NOTE - NS ED NURSE BANDS TYPE
Last cycle:   *Letrozole 5mg days 3-7  *Follicle study ultrasound and Ovidrel injection between days 10 and 12  *Timed intercourse to follow trigger shot   *Prometrium 200 mg at HS 2 days after trigger shot     Patient had a 23 mm follicle on left. Started period this morning. Would you like to repeat cycle or make any changes?    Awa Parry RN...................11/20/2018 8:19 AM     Name band;

## 2021-06-29 NOTE — ED ADULT NURSE NOTE - NSIMPLEMENTINTERV_GEN_ALL_ED
Implemented All Fall Risk Interventions:  [place] to call system. Call bell, personal items and telephone within reach. Instruct patient to call for assistance. Room bathroom lighting operational. Non-slip footwear when patient is off stretcher. Physically safe environment: no spills, clutter or unnecessary equipment. Stretcher in lowest position, wheels locked, appropriate side rails in place. Provide visual cue, wrist band, yellow gown, etc. Monitor gait and stability. Monitor for mental status changes and reorient to person, place, and time. Review medications for side effects contributing to fall risk. Reinforce activity limits and safety measures with patient and family.

## 2021-06-29 NOTE — ED PROVIDER NOTE - ATTENDING CONTRIBUTION TO CARE
Attending MD Huang:  I personally have seen and examined this patient.  Resident note reviewed and agree on plan of care and except where noted.  See HPI, PE, and MDM for details.

## 2021-06-30 LAB
A1C WITH ESTIMATED AVERAGE GLUCOSE RESULT: 5.9 % — HIGH (ref 4–5.6)
ANION GAP SERPL CALC-SCNC: 15 MMOL/L — SIGNIFICANT CHANGE UP (ref 5–17)
BUN SERPL-MCNC: 19 MG/DL — SIGNIFICANT CHANGE UP (ref 7–23)
CALCIUM SERPL-MCNC: 9.4 MG/DL — SIGNIFICANT CHANGE UP (ref 8.4–10.5)
CHLORIDE SERPL-SCNC: 99 MMOL/L — SIGNIFICANT CHANGE UP (ref 96–108)
CHOLEST SERPL-MCNC: 184 MG/DL — SIGNIFICANT CHANGE UP
CO2 SERPL-SCNC: 21 MMOL/L — LOW (ref 22–31)
COVID-19 SPIKE DOMAIN AB INTERP: POSITIVE
COVID-19 SPIKE DOMAIN ANTIBODY RESULT: >250 U/ML — HIGH
CREAT SERPL-MCNC: 1.41 MG/DL — HIGH (ref 0.5–1.3)
ESTIMATED AVERAGE GLUCOSE: 123 MG/DL — HIGH (ref 68–114)
GLUCOSE SERPL-MCNC: 92 MG/DL — SIGNIFICANT CHANGE UP (ref 70–99)
HCT VFR BLD CALC: 39.2 % — SIGNIFICANT CHANGE UP (ref 34.5–45)
HCV AB S/CO SERPL IA: 0.24 S/CO — SIGNIFICANT CHANGE UP (ref 0–0.99)
HCV AB SERPL-IMP: SIGNIFICANT CHANGE UP
HDLC SERPL-MCNC: 38 MG/DL — LOW
HGB BLD-MCNC: 12.4 G/DL — SIGNIFICANT CHANGE UP (ref 11.5–15.5)
LIPID PNL WITH DIRECT LDL SERPL: 126 MG/DL — HIGH
MAGNESIUM SERPL-MCNC: 2 MG/DL — SIGNIFICANT CHANGE UP (ref 1.6–2.6)
MCHC RBC-ENTMCNC: 28.3 PG — SIGNIFICANT CHANGE UP (ref 27–34)
MCHC RBC-ENTMCNC: 31.6 GM/DL — LOW (ref 32–36)
MCV RBC AUTO: 89.5 FL — SIGNIFICANT CHANGE UP (ref 80–100)
NON HDL CHOLESTEROL: 146 MG/DL — HIGH
NRBC # BLD: 0 /100 WBCS — SIGNIFICANT CHANGE UP (ref 0–0)
PHOSPHATE SERPL-MCNC: 5.9 MG/DL — HIGH (ref 2.5–4.5)
PLATELET # BLD AUTO: 165 K/UL — SIGNIFICANT CHANGE UP (ref 150–400)
POTASSIUM SERPL-MCNC: 5.1 MMOL/L — SIGNIFICANT CHANGE UP (ref 3.5–5.3)
POTASSIUM SERPL-SCNC: 5.1 MMOL/L — SIGNIFICANT CHANGE UP (ref 3.5–5.3)
RBC # BLD: 4.38 M/UL — SIGNIFICANT CHANGE UP (ref 3.8–5.2)
RBC # FLD: 12.4 % — SIGNIFICANT CHANGE UP (ref 10.3–14.5)
SARS-COV-2 IGG+IGM SERPL QL IA: >250 U/ML — HIGH
SARS-COV-2 IGG+IGM SERPL QL IA: POSITIVE
SODIUM SERPL-SCNC: 135 MMOL/L — SIGNIFICANT CHANGE UP (ref 135–145)
TRIGL SERPL-MCNC: 99 MG/DL — SIGNIFICANT CHANGE UP
WBC # BLD: 8.33 K/UL — SIGNIFICANT CHANGE UP (ref 3.8–10.5)
WBC # FLD AUTO: 8.33 K/UL — SIGNIFICANT CHANGE UP (ref 3.8–10.5)

## 2021-06-30 PROCEDURE — 70544 MR ANGIOGRAPHY HEAD W/O DYE: CPT | Mod: 26,59

## 2021-06-30 PROCEDURE — 70551 MRI BRAIN STEM W/O DYE: CPT | Mod: 26

## 2021-06-30 PROCEDURE — 70547 MR ANGIOGRAPHY NECK W/O DYE: CPT | Mod: 26

## 2021-06-30 PROCEDURE — 99222 1ST HOSP IP/OBS MODERATE 55: CPT

## 2021-06-30 RX ORDER — BUDESONIDE AND FORMOTEROL FUMARATE DIHYDRATE 160; 4.5 UG/1; UG/1
2 AEROSOL RESPIRATORY (INHALATION)
Refills: 0 | Status: DISCONTINUED | OUTPATIENT
Start: 2021-06-30 | End: 2021-07-02

## 2021-06-30 RX ORDER — ALBUTEROL 90 UG/1
1 AEROSOL, METERED ORAL EVERY 6 HOURS
Refills: 0 | Status: DISCONTINUED | OUTPATIENT
Start: 2021-06-30 | End: 2021-07-02

## 2021-06-30 RX ORDER — SODIUM CHLORIDE 9 MG/ML
1000 INJECTION INTRAMUSCULAR; INTRAVENOUS; SUBCUTANEOUS
Refills: 0 | Status: DISCONTINUED | OUTPATIENT
Start: 2021-06-30 | End: 2021-07-02

## 2021-06-30 RX ORDER — MONTELUKAST 4 MG/1
10 TABLET, CHEWABLE ORAL DAILY
Refills: 0 | Status: DISCONTINUED | OUTPATIENT
Start: 2021-06-30 | End: 2021-07-02

## 2021-06-30 RX ORDER — FOLIC ACID 0.8 MG
1 TABLET ORAL DAILY
Refills: 0 | Status: DISCONTINUED | OUTPATIENT
Start: 2021-06-30 | End: 2021-07-02

## 2021-06-30 RX ORDER — FAMOTIDINE 10 MG/ML
20 INJECTION INTRAVENOUS DAILY
Refills: 0 | Status: DISCONTINUED | OUTPATIENT
Start: 2021-06-30 | End: 2021-07-02

## 2021-06-30 RX ADMIN — ENOXAPARIN SODIUM 40 MILLIGRAM(S): 100 INJECTION SUBCUTANEOUS at 11:37

## 2021-06-30 RX ADMIN — SODIUM CHLORIDE 60 MILLILITER(S): 9 INJECTION INTRAMUSCULAR; INTRAVENOUS; SUBCUTANEOUS at 11:37

## 2021-06-30 RX ADMIN — BUDESONIDE AND FORMOTEROL FUMARATE DIHYDRATE 2 PUFF(S): 160; 4.5 AEROSOL RESPIRATORY (INHALATION) at 17:19

## 2021-06-30 RX ADMIN — Medication 300 MILLIGRAM(S): at 11:37

## 2021-06-30 NOTE — OCCUPATIONAL THERAPY INITIAL EVALUATION ADULT - DIAGNOSIS, OT EVAL
Pt p/w decreased speech/language, strength, postural control, standing balance, ROM  and endurance impairing independence with ADLs and functional mobility

## 2021-06-30 NOTE — PHYSICAL THERAPY INITIAL EVALUATION ADULT - PRECAUTIONS/LIMITATIONS, REHAB EVAL
IMAGING: CTH (6/29) No acute intra-cranial hemorrhage, mass effect, or midline shift. There is an 8 x 8 mm partially calcified hyperdensity which may reflect an aneurysm. CTA: Mild narrowing of the distal right common carotid artery seen. Using the NASCET criteria there is evidence of a 73% stenosis involving the proximal right internal carotid artery. Mild narrowing of the proximal right external carotid artery seen.  There is evidence of abnormal dilatation involving the proximal posterior M2 branch which is suspicious for underlying fusiform aneurysm. This finding measures approximately 5.4 mm. A second aneurysm suspected involving the proximal left anterior branch which is best seen on series 4 image 374. This is projects anteriorly and medial and is suspicious for a saccular aneurysm measures approximately 3.3 mm/fall precautions

## 2021-06-30 NOTE — PHYSICAL THERAPY INITIAL EVALUATION ADULT - ACTIVE RANGE OF MOTION EXAMINATION, REHAB EVAL
AROM LUE/LLE unable to perform without assist./Right UE Active ROM was WFL (within functional limits)/Right LE Active ROM was WFL (within functional limits)/deficits as listed below

## 2021-06-30 NOTE — OCCUPATIONAL THERAPY INITIAL EVALUATION ADULT - LIVES WITH, PROFILE
Son at bedside to provide history; pt lives with him and his wife in a PH. 1FOS to pt's bedroom/bathroom. Pt was independent with most ADLs pta with occasional supervision as needed. Pt does not own any DME

## 2021-06-30 NOTE — PHYSICAL THERAPY INITIAL EVALUATION ADULT - ADDITIONAL COMMENTS
Pt lives with son in PH with JONNA and FOS inside home. PTA, pt independent in functional activities without use of AD. Son reports that pt requires occasional assist for ADLs and transfers given from family prn.

## 2021-06-30 NOTE — PHYSICAL THERAPY INITIAL EVALUATION ADULT - PERTINENT HX OF CURRENT PROBLEM, REHAB EVAL
Pt is a 65y RH F with PMH: asthma, HTN not on antithrombotics. Presents as code stroke to [facility] ED for right-sided weakness with LWK 6/28 11p. At baseline, the pt is ambulatory without AD and is oriented to person/place/time, and participates in all her own ADLs. She endorsed blurry vision in both eyes. Denies prior history of stroke. Denies family history of stroke. Pt was transferred from [facility] to [facility] for possible evaluation of aneurysms noted on imaging.

## 2021-06-30 NOTE — OCCUPATIONAL THERAPY INITIAL EVALUATION ADULT - PERTINENT HX OF CURRENT PROBLEM, REHAB EVAL
65 year old RH woman with past medical history of asthma and HTN not on antithrombotics presents as code stroke to [facility] ED for right-sided weakness with LWK 6/28 11p. At baseline, the patient is ambulatory without assistive devices and is oriented to person/place/time, and participates in all her own ADLs.

## 2021-06-30 NOTE — CONSULT NOTE ADULT - SUBJECTIVE AND OBJECTIVE BOX
Mahin Levin is a 65 year old RH woman with past medical history of asthma and HTN not on antithrombotics presents as code stroke to [facility] ED for right-sided weakness with LWK 6/28 11p. At baseline, the patient is ambulatory without assistive devices and is oriented to person/place/time, and participates in all her own ADLs. Son at bedside provided history as per request of patient. Patient went to bed around 11p at her baseline as witnessed by patient's daughter. Per the son, he believes she woke up around 1 am to urinate, unclear if patient had symptoms at this time, patient was unsure. She woke up again at 3 am and she appeared weak on the right side of her body, with such weakness not having resolved. The family believed patient was having generalized fatigue so they did not take her to hospital initially. However, when son had gotten back from work, the weakness had not resolved, and was concerned and wanted to bring her to hospital. She endorsed blurry vision in both eyes.  Denies prior history of stroke. Denies family history of stroke. Patient was transferred from [facility] to [facility] for possible evaluation of aneurysms noted on imaging.     Patient was admitted on 6/29, noted to have right sided weakness, dysarthria. Neurosurgery consulted, possible angio/embo after acute stroke period. Patient seen today, follows commands, no new complaints     REVIEW OF SYSTEMS  Constitutional - No fever, No weight loss, No fatigue  HEENT - No eye pain, No visual disturbances, No difficulty hearing, No tinnitus, No vertigo, No neck pain  Respiratory - No cough, No wheezing, No shortness of breath  Cardiovascular - No chest pain, No palpitations  Gastrointestinal - No abdominal pain, No nausea, No vomiting, No diarrhea, No constipation  Genitourinary - No dysuria, No frequency, No hematuria, No incontinence  Neurological - No headaches, No memory loss, No loss of strength, No numbness, No tremors  Psychiatric - No depression, No anxiety    VITALS  T(C): 36.6 (06-30-21 @ 11:03), Max: 36.9 (06-29-21 @ 17:52)  HR: 57 (06-30-21 @ 11:03) (50 - 100)  BP: 127/64 (06-30-21 @ 11:03) (114/94 - 165/67)  RR: 18 (06-30-21 @ 11:03) (16 - 22)  SpO2: 99% (06-30-21 @ 11:03) (98% - 100%)  Wt(kg): --    PAST MEDICAL & SURGICAL HISTORY  Brain aneurysm    Hypertension    Smoking    No significant past surgical history        SOCIAL HISTORY  Smoking - Denied  EtOH - Denied   Drugs - Denied    FUNCTIONAL HISTORY  Lives with son, private house, +steps to enter, flight inside   Independent without AD, family assists as needed     CURRENT FUNCTIONAL STATUS  6/30 PT  bed mobility mod assist x 2  transfers mod assist x 2  gait mod assist x 2, 3 steps     FAMILY HISTORY   No pertinent family history in first degree relatives        RECENT LABS/IMAGING  CBC Full  -  ( 30 Jun 2021 06:37 )  WBC Count : 8.33 K/uL  RBC Count : 4.38 M/uL  Hemoglobin : 12.4 g/dL  Hematocrit : 39.2 %  Platelet Count - Automated : 165 K/uL  Mean Cell Volume : 89.5 fl  Mean Cell Hemoglobin : 28.3 pg  Mean Cell Hemoglobin Concentration : 31.6 gm/dL  Auto Neutrophil # : x  Auto Lymphocyte # : x  Auto Monocyte # : x  Auto Eosinophil # : x  Auto Basophil # : x  Auto Neutrophil % : x  Auto Lymphocyte % : x  Auto Monocyte % : x  Auto Eosinophil % : x  Auto Basophil % : x    06-30    135  |  99  |  19  ----------------------------<  92  5.1   |  21<L>  |  1.41<H>    Ca    9.4      30 Jun 2021 06:37  Phos  5.9     06-30  Mg     2.0     06-30    TPro  8.1  /  Alb  4.6  /  TBili  0.4  /  DBili  x   /  AST  22  /  ALT  14  /  AlkPhos  98  06-29    < from: CT Head No Cont (06.29.21 @ 20:06) >    IMPRESSION:  No acute intra-cranial hemorrhage, mass effect, or midline shift.  There is an 8 x 8 mm partially calcified hyperdensity which may reflect an aneurysm.  Consider CTA or MRA of the brain for further evaluation.      < end of copied text >      ALLERGIES  No Known Allergies      MEDICATIONS   ALBUTerol    90 MICROgram(s) HFA Inhaler 1 Puff(s) Inhalation every 6 hours PRN  aspirin Suppository 300 milliGRAM(s) Rectal daily  atorvastatin 80 milliGRAM(s) Oral at bedtime  budesonide 160 MICROgram(s)/formoterol 4.5 MICROgram(s) Inhaler 2 Puff(s) Inhalation two times a day  enoxaparin Injectable 40 milliGRAM(s) SubCutaneous daily  famotidine    Tablet 20 milliGRAM(s) Oral daily  folic acid 1 milliGRAM(s) Oral daily  montelukast 10 milliGRAM(s) Oral daily  sodium chloride 0.9%. 1000 milliLiter(s) IV Continuous <Continuous>      ----------------------------------------------------------------------------------------  PHYSICAL EXAM  Constitutional - NAD, Comfortable, in bed   Chest - Breathing comfortably  Cardiovascular - S1S2   Abdomen - Soft   Extremities - No C/C/E, No calf tenderness   Neurologic Exam -   follows commands, able to name pen, count                   Cognitive - Awake, Alert, AAO to self, place     Communication - + dysarthria     Cranial Nerves - right facial droop      Motor -                     LEFT    UE - ShAB 5/5, EF 5/5, EE 5/5, WE 5/5,  5/5                    RIGHT UE -0/5                    LEFT    LE - HF 5/5, KE 5/5, DF 5/5, PF 5/5                    RIGHT LE - 0/5        Sensory - Intact to LT     Psychiatric - Mood stable, Affect WNL  ----------------------------------------------------------------------------------------  ASSESSMENT/PLAN  65yFemale with functional deficits after possible infarct, CTA with LMCA aneurysm, right side weakness, dysarthria    MRI pending   Pain - Tylenol  DVT PPX - SCDs, lovenox   Rehab - Will continue to follow for ongoing rehab needs and recommendations.   continue bedside therapy, PT/OT and slp evals pending    Recommend ACUTE inpatient rehabilitation for the functional deficits consisting of 3 hours of therapy/day & 24 hour RN/daily PMR physician for comorbid medical management. Patient will be able to tolerate 3 hours a day.   
p ([id number])     HPI:  HPI:  Mahin Levin is a 65 year old RH woman with past medical history of asthma and HTN not on antithrombotics presents as code stroke to [facility] ED for right-sided weakness with LWK 6/28 11p. At baseline, the patient is ambulatory without assistive devices and is oriented to person/place/time, and participates in all her own ADLs. Son at bedside provided history as per request of patient. Patient went to bed around 11p at her baseline as witnessed by patient's daughter. Per the son, he believes she woke up around 1 am to urinate, unclear if patient had symptoms at this time, patient was unsure. She woke up again at 3 am and she appeared weak on the right side of her body, with such weakness not having resolved. The family believed patient was having generalized fatigue so they did not take her to hospital initially. However, when son had gotten back from work, the weakness had not resolved, and was concerned and wanted to bring her to hospital. She endorsed blurry vision in both eyes.  Denies prior history of stroke. Denies family history of stroke. Patient was transferred from [facility] to [facility] for possible evaluation of aneurysms noted on imaging.     (Stroke only)  NIHSS: 14  MRS: 0       ALLERGIES/INTOLERANCES:  Allergies  No Known Allergies    Intolerances    VITALS & EXAMINATION:  Vital Signs Last 24 Hrs  T(C): 36.9 (29 Jun 2021 19:19), Max: 36.9 (29 Jun 2021 19:19)  T(F): 98.5 (29 Jun 2021 19:19), Max: 98.5 (29 Jun 2021 19:19)  HR: 62 (29 Jun 2021 19:19) (62 - 62)  BP: 140/58 (29 Jun 2021 19:19) (140/58 - 140/58)  BP(mean): --  RR: 16 (29 Jun 2021 19:19) (16 - 16)  SpO2: 99% (29 Jun 2021 19:19) (99% - 99%)     (29 Jun 2021 20:23)      Imaging: CTH no heme, CTP neg, CTA shows 8mm L MCA aneurysm. Repeat CTH w/o heme.     Exam: AOx2, R facial, EOMI, L side 5/5, RUE 1/5 delt, otherwise 0/5, RLE 1/5 prox otherwise 0.     --Anticoagulation:    =====================  PAST MEDICAL HISTORY   Hypertension    Brain aneurysm    Smoking      PAST SURGICAL HISTORY   No significant past surgical history          MEDICATIONS:  Antibiotics:    Neuro:  aspirin Suppository 300 milliGRAM(s) Rectal daily    Other:      SOCIAL HISTORY:   Occupation:   Marital Status:     FAMILY HISTORY:  No pertinent family history in first degree relatives        ROS: Negative except per HPI    LABS:  PT/INR - ( 29 Jun 2021 19:42 )   PT: 11.9 sec;   INR: 0.99 ratio         PTT - ( 29 Jun 2021 19:42 )  PTT:32.8 sec                        13.2   10.47 )-----------( 187      ( 29 Jun 2021 19:42 )             40.8     06-29    133<L>  |  97  |  14  ----------------------------<  103<H>  5.5<H>   |  22  |  1.14    Ca    9.8      29 Jun 2021 19:42    TPro  8.1  /  Alb  4.6  /  TBili  0.4  /  DBili  x   /  AST  22  /  ALT  14  /  AlkPhos  98  06-29

## 2021-06-30 NOTE — OCCUPATIONAL THERAPY INITIAL EVALUATION ADULT - BALANCE TRAINING, PT EVAL
GOAL: Pt will demonstrate improved static/dynamic balance by 1 grade in order to increase safety and independence in ADLs within 4 weeks

## 2021-06-30 NOTE — OCCUPATIONAL THERAPY INITIAL EVALUATION ADULT - ANTICIPATED DISCHARGE DISPOSITION, OT EVAL
Acute Rehab. Pt was independent PTA and now demonstrates impairments in transfers and ADLs. Son at bedside and in agreement. LAWANDA Concepcion made aware.

## 2021-06-30 NOTE — SPEECH LANGUAGE PATHOLOGY EVALUATION - COMMENTS
Hx cont: 6/29: CT head: No acute intra-cranial hemorrhage, mass effect, or midline shift. There is an 8 x 8 mm partially calcified hyperdensity which may reflect an aneurysm. Consider CTA or MRA of the brain for further evaluation.  *Failed dysphagia screen 6/29: Unable to maintain control of saliva.

## 2021-06-30 NOTE — OCCUPATIONAL THERAPY INITIAL EVALUATION ADULT - PRECAUTIONS/LIMITATIONS, REHAB EVAL
She endorsed blurry vision in both eyes.  Denies prior history of stroke. Denies family history of stroke. Patient was transferred from [facility] to [facility] for possible evaluation of aneurysms noted on imaging. CTH (6/29) No acute intra-cranial hemorrhage, mass effect, or midline shift. There is an 8 x 8 mm partially calcified hyperdensity which may reflect an aneurysm. CTA: Mild narrowing of the distal right common carotid artery seen. Using the NASCET criteria there is evidence of a 73% stenosis involving the proximal right internal carotid artery. Mild narrowing of the proximal right external carotid artery seen.  There is evidence of abnormal dilatation involving the proximal posterior M2 branch which is suspicious for underlying fusiform aneurysm. This finding measures approximately 5.4 mm. A second aneurysm suspected involving the proximal left anterior branch which is best seen on series 4 image 374. This is projects anteriorly and medial and is suspicious for a saccular aneurysm measures approximately 3.3 mm.

## 2021-06-30 NOTE — PHYSICAL THERAPY INITIAL EVALUATION ADULT - STRENGTHENING, PT EVAL
GOAL: Pt will improve MMT grade in RLE/RUE 1 full grade in 4 weeks to increase overall functional mobility and strength.

## 2021-07-01 LAB
ANION GAP SERPL CALC-SCNC: 18 MMOL/L — HIGH (ref 5–17)
BUN SERPL-MCNC: 33 MG/DL — HIGH (ref 7–23)
CALCIUM SERPL-MCNC: 8.8 MG/DL — SIGNIFICANT CHANGE UP (ref 8.4–10.5)
CHLORIDE SERPL-SCNC: 105 MMOL/L — SIGNIFICANT CHANGE UP (ref 96–108)
CO2 SERPL-SCNC: 18 MMOL/L — LOW (ref 22–31)
CREAT SERPL-MCNC: 1.34 MG/DL — HIGH (ref 0.5–1.3)
GLUCOSE SERPL-MCNC: 76 MG/DL — SIGNIFICANT CHANGE UP (ref 70–99)
POTASSIUM SERPL-MCNC: 4.9 MMOL/L — SIGNIFICANT CHANGE UP (ref 3.5–5.3)
POTASSIUM SERPL-SCNC: 4.9 MMOL/L — SIGNIFICANT CHANGE UP (ref 3.5–5.3)
SODIUM SERPL-SCNC: 141 MMOL/L — SIGNIFICANT CHANGE UP (ref 135–145)

## 2021-07-01 RX ADMIN — Medication 300 MILLIGRAM(S): at 12:31

## 2021-07-01 RX ADMIN — BUDESONIDE AND FORMOTEROL FUMARATE DIHYDRATE 2 PUFF(S): 160; 4.5 AEROSOL RESPIRATORY (INHALATION) at 17:14

## 2021-07-01 RX ADMIN — ENOXAPARIN SODIUM 40 MILLIGRAM(S): 100 INJECTION SUBCUTANEOUS at 12:30

## 2021-07-01 RX ADMIN — BUDESONIDE AND FORMOTEROL FUMARATE DIHYDRATE 2 PUFF(S): 160; 4.5 AEROSOL RESPIRATORY (INHALATION) at 05:35

## 2021-07-01 RX ADMIN — SODIUM CHLORIDE 60 MILLILITER(S): 9 INJECTION INTRAMUSCULAR; INTRAVENOUS; SUBCUTANEOUS at 12:31

## 2021-07-01 NOTE — SPEECH LANGUAGE PATHOLOGY EVALUATION - SLP DIAGNOSIS
Attempted to see pt for speech and language evaluation. Transport at the bedside. Per GEOVANI Saenz pt going to MRI. This service to follow up as schedule permits.
65 year old RH woman with past medical history of asthma and HTN not on antithrombotics presented as code stroke to [facility] ED for right-sided weakness with LWK  11p.  Pt found to have Right hemisensorimotor loss and moderate dysarthria +/- right visual field cut 2/2 L BG infarct 2/2 . Moderate-severe dysarthria marked by poor intelligibility with hoarse/harsh vocal quality and imprecise articulatory precision. Pt able to follow simple commands and answer simple biographical yes/no questions only.  reported significant difficulty understanding pt's speech due to dysarthria. Attempted to engage pt in further evaluation tasks, however, pt demonstrated poor joint attention, perseverating on consuming water and poor eye-contact throughout. Ongoing assessment warranted to further assess receptive/expressive language and cognitive skills, pending pt's participation.

## 2021-07-01 NOTE — SWALLOW BEDSIDE ASSESSMENT ADULT - SWALLOW EVAL: DIAGNOSIS
Attempted to see pt for bedside swallow evaluation. Transport at the bedside. Per GEOVANI Saenz pt going to MRI. This service to follow up as schedule permits.
65 year old RH woman with past medical history of asthma and HTN not on antithrombotics presented as code stroke to [facility] ED for right-sided weakness with LWK  11p.  Pt found to have Right hemisensorimotor loss and moderate dysarthria +/- right visual field cut 2/2 L BG infarct 2/2 . Pt now presents with clinical signs of 1) An oropharyngeal dysphagia. Swallow sequence is marked by mildly reduced stripping of bolus from spoon. Mild delay in swallow initiation across consistencies. Slight throat clear/grunt post all trials of puree and honey thickened liquids. These signs may be suggestive of penetration/aspiration. 2)Moderate-severe dysarthria marked by poor intelligibility with hoarse/harsh vocal quality and imprecise articulatory precision.

## 2021-07-01 NOTE — PROGRESS NOTE ADULT - ASSESSMENT
65 year old RH woman with past medical history of asthma and HTN not on antithrombotics presented as code stroke to [facility] ED for right-sided weakness with LWK  11p.     Impression: Right hemisensorimotor loss and moderate dysarthria +/- right visual field cut 2/2 L BG infarct 2/2      NEURO: Continue close monitoring for neurologic deterioration, permissive HTN to gradual normotension, - started on high intensity statin, A1c  5.9, MRI Brain/NOVA pending. NSx following for 8mm L MCA aneurysm- can consider angiogram after acute stroke period. Physical therapy/OT recommended AR.     ANTITHROMBOTIC THERAPY: ASA (plavix once stable enteral access established)    PULMONARY: CXR clear, protecting airway, saturating well     CARDIOVASCULAR: check TTE inpatient vs outpatient, cardiac monitoring without any noted events.                              SBP goal: normotension     GASTROINTESTINAL:  dysphagia screen failed at bedside, awaiting formal S&S consult     Diet: NPO    RENAL: BUN/Cr within normal limits,?? good urine output      Na Goal: Greater than 135     West: no    HEMATOLOGY: no signs of bleeding     DVT ppx: LMWH    ID: afebrile, no sign of infection    OTHER: Plan endorsed to patient and son at bedside, all questions and concerns addressed.     DISPOSITION: Rehab or home depending on PT eval once stable and workup is complete    CORE MEASURES:        Admission NIHSS: 14     TPA: NO      LDL/HDL: 126/38     Depression Screen: p     Statin Therapy: yes     Dysphagia Screen: FAIL     Smoking NO      Afib NO     Stroke Education YES     Obtain screening lower extremity venous ultrasound in patients who meet 1 or more of the following criteria as patient is high risk for DVT/PE on admission:   [] History of DVT/PE  []Hypercoagulable states (Factor V Leiden, Cancer, OCP, etc. )  []Prolonged immobility (hemiplegia/hemiparesis/post operative or any other extended immobilization)  [] Transferred from outside facility (Rehab or Long term care)  [] Age </= to 50 65 year old RH woman with past medical history of asthma and HTN not on antithrombotics presented as code stroke to [facility] ED for right-sided weakness with LWK  11p.     Impression: Right hemisensorimotor loss and moderate dysarthria +/- right visual field cut 2/2 L BG infarct 2/2      NEURO: Neurologically without change, Continue monitoring for neurologic deterioration, permissive HTN to gradual normotension, - started on high intensity statin, A1c  5.9, MRI Brain/NOVA noted above. NSx following for 8mm L MCA aneurysm- can consider angiogram after acute stroke period. Physical therapy/OT recommended AR.     ANTITHROMBOTIC THERAPY: ASA (plavix once stable enteral access established)    PULMONARY: CXR clear, protecting airway, saturating well     CARDIOVASCULAR: check TTE inpatient vs outpatient, cardiac monitoring without any noted events.                              SBP goal: normotension     GASTROINTESTINAL:  dysphagia screen failed at bedside, awaiting formal S&S consult     Diet: NPO    RENAL: BUN/Cr elevated NANCY? continue IVF, good urine output      Na Goal: Greater than 135     West: no    HEMATOLOGY: no signs of bleeding     DVT ppx: LMWH    ID: afebrile, no sign of infection    OTHER: Plan endorsed to patient and son at bedside, all questions and concerns addressed.     DISPOSITION: AR once stable and workup is complete    CORE MEASURES:        Admission NIHSS: 14     TPA: NO      LDL/HDL: 126/38     Depression Screen: p     Statin Therapy: yes     Dysphagia Screen: FAIL     Smoking NO      Afib NO     Stroke Education YES     Obtain screening lower extremity venous ultrasound in patients who meet 1 or more of the following criteria as patient is high risk for DVT/PE on admission:   [] History of DVT/PE  []Hypercoagulable states (Factor V Leiden, Cancer, OCP, etc. )  []Prolonged immobility (hemiplegia/hemiparesis/post operative or any other extended immobilization)  [] Transferred from outside facility (Rehab or Long term care)  [] Age </= to 50

## 2021-07-01 NOTE — SWALLOW BEDSIDE ASSESSMENT ADULT - SLP PERTINENT HISTORY OF CURRENT PROBLEM
Mahin Levin is a 65 year old RH woman with past medical history of asthma and HTN not on antithrombotics presents as code stroke to [facility] ED for right-sided weakness with LWK  11p. 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  Exam also with cachexia and temporal wasting concern for more systemic process. MRI brain pending. Will defer any intervention for incidental aneurysm at this time.
Mahin Levin is a 65 year old RH woman with past medical history of asthma and HTN not on antithrombotics presents as code stroke to [facility] ED for right-sided weakness with LWK  11p. 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  Exam also with cachexia and temporal wasting concern for more systemic process. MRI brain pending. Will defer any intervention for incidental aneurysm at this time.

## 2021-07-01 NOTE — SPEECH LANGUAGE PATHOLOGY EVALUATION - SLP PERTINENT HISTORY OF CURRENT PROBLEM
Mahin Levin is a 65 year old RH woman with past medical history of asthma and HTN not on antithrombotics presents as code stroke to [facility] ED for right-sided weakness with LWK  11p. 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  Exam also with cachexia and temporal wasting concern for more systemic process. MRI brain pending. Will defer any intervention for incidental aneurysm at this time.
Mahin Levin is a 65 year old RH woman with past medical history of asthma and HTN not on antithrombotics presents as code stroke to [facility] ED for right-sided weakness with LWK  11p. 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  Exam also with cachexia and temporal wasting concern for more systemic process. MRI brain pending. Will defer any intervention for incidental aneurysm at this time.

## 2021-07-01 NOTE — PROGRESS NOTE ADULT - SUBJECTIVE AND OBJECTIVE BOX
THE PATIENT WAS SEEN AND EXAMINED BY ME WITH THE HOUSESTAFF AND STROKE TEAM DURING MORNING ROUNDS.   HPI:  HPI:  Mahin Levin is a 65 year old RH woman with past medical history of asthma and HTN not on antithrombotics presented as code stroke to [facility] ED for right-sided weakness with LWK 6/28 11p. At baseline, the patient is ambulatory without assistive devices and is oriented to person/place/time, and participates in all her own ADLs. Son at bedside provided history as per request of patient. Patient went to bed around 11p on 6/28 at her baseline as witnessed by patient's daughter. Per the son, he believes she woke up around 1 am to urinate, unclear if patient had symptoms at this time, patient was unsure. She woke up again at 3 am and she appeared weak on the right side of her body, with such weakness not having resolved. The family believed patient was having generalized fatigue so they did not take her to hospital initially. However, when son had gotten back from work, the weakness had not resolved, and was concerned and wanted to bring her to hospital. She endorsed blurry vision in both eyes.  Denied prior history of stroke. Denied family history of stroke. Patient was transferred from [facility] to [facility] for possible evaluation of aneurysms noted on imaging. On admission: NIHSS: 14, MRS: 0.    SUBJECTIVE: No events overnight.  No new neurologic complaints.  ROS reported negative unless otherwise noted.    ALBUTerol    90 MICROgram(s) HFA Inhaler 1 Puff(s) Inhalation every 6 hours PRN  aspirin Suppository 300 milliGRAM(s) Rectal daily  atorvastatin 80 milliGRAM(s) Oral at bedtime  budesonide 160 MICROgram(s)/formoterol 4.5 MICROgram(s) Inhaler 2 Puff(s) Inhalation two times a day  enoxaparin Injectable 40 milliGRAM(s) SubCutaneous daily  famotidine    Tablet 20 milliGRAM(s) Oral daily  folic acid 1 milliGRAM(s) Oral daily  montelukast 10 milliGRAM(s) Oral daily  sodium chloride 0.9%. 1000 milliLiter(s) IV Continuous <Continuous>      PHYSICAL EXAM:   Vital Signs Last 24 Hrs  T(C): 36.8 (01 Jul 2021 04:51), Max: 36.8 (01 Jul 2021 04:51)  T(F): 98.2 (01 Jul 2021 04:51), Max: 98.2 (01 Jul 2021 04:51)  HR: 76 (01 Jul 2021 04:51) (50 - 76)  BP: 142/77 (01 Jul 2021 04:51) (112/51 - 142/77)  RR: 18 (01 Jul 2021 04:51) (18 - 18)  SpO2: 96% (01 Jul 2021 04:51) (96% - 100%)    General: No acute distress, temporal atrophy, cachexia  HEENT: EOM intact, visual fields full, Mild-moderate ptosis of right eye (baseline per son)  Abdomen: Soft, nontender, nondistended   Extremities: No edema    NEUROLOGICAL EXAM:  Mental status: Awake, alert, oriented x3, speech fluent, follows commands,   Cranial Nerves: mild right facial droop, no nystagmus, moderate dysarthria,  tongue midline  Motor exam: right facial   Sensation: Intact to light touch   Coordination/ Gait: No dysmetria, gait not tested    LABS:                        12.4   8.33  )-----------( 165      ( 30 Jun 2021 06:37 )             39.2    06-30    135  |  99  |  19  ----------------------------<  92  5.1   |  21<L>  |  1.41<H>    Ca    9.4      30 Jun 2021 06:37  Phos  5.9     06-30  Mg     2.0     06-30    TPro  8.1  /  Alb  4.6  /  TBili  0.4  /  DBili  x   /  AST  22  /  ALT  14  /  AlkPhos  98  06-29  PT/INR - ( 29 Jun 2021 19:42 )   PT: 11.9 sec;   INR: 0.99 ratio       PTT - ( 29 Jun 2021 19:42 )  PTT:32.8 sec    IMAGING: Reviewed by me.     MR Head No Cont/MR Angio Head No Cont/MR Angio neck  no Cont (06.30.2021)  Mild age-appropriate involutional and ischemic gliotic changes with old bilateral basal ganglia lacunar infarcts. Small acute infarct left basal ganglia and corona radiata. No evidence of hemorrhage.  Normal intracranial flow using noninvasive flow MR angiography.  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 Also mild aneurysmal dilatation of the origin of the left inferior M2 branch.    CT Head No Cont (06.29.2021)  No acute intra-cranial hemorrhage, mass effect, or midline shift.    CT Angio Head/Neck w/ IV Cont (06.29.2021)  Aneurysms suspected involving the left middle cerebral artery as described above.  No significant stenosis is seen.  No perfusion mismatches to suggest core infarct or critically hypoperfused at tissue risk  There is an 8 x 8 mm partially calcified hyperdensity which may reflect an aneurysm.    CT Head No Cont (06.29.2021)  Old lacunar infarcts involving bilateral basal ganglia region are identified. Slightly larger chronic appearing infarct involving the left basal ganglia region. THE PATIENT WAS SEEN AND EXAMINED BY ME WITH THE HOUSESTAFF AND STROKE TEAM DURING MORNING ROUNDS.   HPI:  HPI:  Mahin Levin is a 65 year old RH woman with past medical history of asthma and HTN not on antithrombotics presented as code stroke to [facility] ED for right-sided weakness with LWK 6/28 11p. At baseline, the patient is ambulatory without assistive devices and is oriented to person/place/time, and participates in all her own ADLs. Son at bedside provided history as per request of patient. Patient went to bed around 11p on 6/28 at her baseline as witnessed by patient's daughter. Per the son, he believes she woke up around 1 am to urinate, unclear if patient had symptoms at this time, patient was unsure. She woke up again at 3 am and she appeared weak on the right side of her body, with such weakness not having resolved. The family believed patient was having generalized fatigue so they did not take her to hospital initially. However, when son had gotten back from work, the weakness had not resolved, and was concerned and wanted to bring her to hospital. She endorsed blurry vision in both eyes.  Denied prior history of stroke. Denied family history of stroke. Patient was transferred from [facility] to [facility] for possible evaluation of aneurysms noted on imaging. On admission: NIHSS: 14, MRS: 0.    SUBJECTIVE: No events overnight.  No new neurologic complaints.  ROS reported negative unless otherwise noted.     [id number]    ALBUTerol    90 MICROgram(s) HFA Inhaler 1 Puff(s) Inhalation every 6 hours PRN  aspirin Suppository 300 milliGRAM(s) Rectal daily  atorvastatin 80 milliGRAM(s) Oral at bedtime  budesonide 160 MICROgram(s)/formoterol 4.5 MICROgram(s) Inhaler 2 Puff(s) Inhalation two times a day  enoxaparin Injectable 40 milliGRAM(s) SubCutaneous daily  famotidine    Tablet 20 milliGRAM(s) Oral daily  folic acid 1 milliGRAM(s) Oral daily  montelukast 10 milliGRAM(s) Oral daily  sodium chloride 0.9%. 1000 milliLiter(s) IV Continuous <Continuous>      PHYSICAL EXAM:   Vital Signs Last 24 Hrs  T(C): 36.8 (01 Jul 2021 04:51), Max: 36.8 (01 Jul 2021 04:51)  T(F): 98.2 (01 Jul 2021 04:51), Max: 98.2 (01 Jul 2021 04:51)  HR: 76 (01 Jul 2021 04:51) (50 - 76)  BP: 142/77 (01 Jul 2021 04:51) (112/51 - 142/77)  RR: 18 (01 Jul 2021 04:51) (18 - 18)  SpO2: 96% (01 Jul 2021 04:51) (96% - 100%)    General: No acute distress, temporal atrophy, cachexia  HEENT: EOM intact, visual fields full, Mild-moderate ptosis of right eye (baseline per son)  Abdomen: Soft, nontender, nondistended   Extremities: No edema    NEUROLOGICAL EXAM:  Mental status: Awake, alert, oriented to name, IDs objects, speech fluent, follows commands,   Cranial Nerves: mild right facial droop, no nystagmus, moderate dysarthria,  tongue midline  Motor exam: right hemiplegia RUE/RLE 0/5, left side moves well against gravity  Sensation: Intact to light touch   Coordination/ Gait: No dysmetria, gait not tested    LABS:                        12.4   8.33  )-----------( 165      ( 30 Jun 2021 06:37 )             39.2    06-30    135  |  99  |  19  ----------------------------<  92  5.1   |  21<L>  |  1.41<H>    Ca    9.4      30 Jun 2021 06:37  Phos  5.9     06-30  Mg     2.0     06-30    TPro  8.1  /  Alb  4.6  /  TBili  0.4  /  DBili  x   /  AST  22  /  ALT  14  /  AlkPhos  98  06-29  PT/INR - ( 29 Jun 2021 19:42 )   PT: 11.9 sec;   INR: 0.99 ratio       PTT - ( 29 Jun 2021 19:42 )  PTT:32.8 sec    IMAGING: Reviewed by me.     MR Head No Cont/MR Angio Head No Cont/MR Angio neck  no Cont (06.30.2021)  Mild age-appropriate involutional and ischemic gliotic changes with old bilateral basal ganglia lacunar infarcts. Small acute infarct left basal ganglia and corona radiata. No evidence of hemorrhage.  Normal intracranial flow using noninvasive flow MR angiography.  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 Also mild aneurysmal dilatation of the origin of the left inferior M2 branch.    CT Head No Cont (06.29.2021)  No acute intra-cranial hemorrhage, mass effect, or midline shift.    CT Angio Head/Neck w/ IV Cont (06.29.2021)  Aneurysms suspected involving the left middle cerebral artery as described above.  No significant stenosis is seen.  No perfusion mismatches to suggest core infarct or critically hypoperfused at tissue risk  There is an 8 x 8 mm partially calcified hyperdensity which may reflect an aneurysm.    CT Head No Cont (06.29.2021)  Old lacunar infarcts involving bilateral basal ganglia region are identified. Slightly larger chronic appearing infarct involving the left basal ganglia region.

## 2021-07-01 NOTE — SPEECH LANGUAGE PATHOLOGY EVALUATION - COMMENTS
Pt does not utilize gestures or AAC boards as primary means of communication. Poor visual attention to items presented at this time. Moderate-severe dysarthria marked by poor intelligibility with hoarse/harsh vocal quality and imprecise articulatory precision.   reported significant difficulty understanding pt's speech due to dysarthria Overall reduced joint attention; pt perseverated on eating/drinking at this time. Ongoing assessment warranted Overall poor participation in simple questions. Deferred - poor joint attention to presented material Deferred at this time Hx cont: 6/29: CT head: No acute intra-cranial hemorrhage, mass effect, or midline shift. There is an 8 x 8 mm partially calcified hyperdensity which may reflect an aneurysm. Consider CTA or MRA of the brain for further evaluation.  *Failed dysphagia screen 6/29: Unable to maintain control of saliva. Verbal expression severely negatively impacted by dysarthria. Language line  reported difficulty understanding pt's speech.

## 2021-07-01 NOTE — SWALLOW BEDSIDE ASSESSMENT ADULT - COMMENTS
Hx cont: 6/29: CT head: No acute intra-cranial hemorrhage, mass effect, or midline shift. There is an 8 x 8 mm partially calcified hyperdensity which may reflect an aneurysm. Consider CTA or MRA of the brain for further evaluation.  *Failed dysphagia screen 6/29: Unable to maintain control of saliva.
Hx cont: 6/29: CT head: No acute intra-cranial hemorrhage, mass effect, or midline shift. There is an 8 x 8 mm partially calcified hyperdensity which may reflect an aneurysm. Consider CTA or MRA of the brain for further evaluation.  *Failed dysphagia screen 6/29: Unable to maintain control of saliva.

## 2021-07-01 NOTE — SWALLOW BEDSIDE ASSESSMENT ADULT - SPECIFY REASON(S)
To subjectively assess the oropharyngeal swallow mechanism and r/o dysphagia
To subjectively assess the oropharyngeal swallow mechanism and r/o dysphagia

## 2021-07-01 NOTE — SWALLOW BEDSIDE ASSESSMENT ADULT - SLP GENERAL OBSERVATIONS
Pt encountered bedside, Language Line  utilized in [facility]; [facility] ID#[id number]. Pt able to follow simple commands only and able to answer simple biographical yes/no questions only. As per , speech intelligibility severely impaired.

## 2021-07-02 ENCOUNTER — TRANSCRIPTION ENCOUNTER (OUTPATIENT)
Age: 66
End: 2021-07-02

## 2021-07-02 ENCOUNTER — INPATIENT (INPATIENT)
Facility: HOSPITAL | Age: 66
LOS: 24 days | Discharge: HOME HEALTH | DRG: 949 | End: 2021-07-27
Attending: STUDENT IN AN ORGANIZED HEALTH CARE EDUCATION/TRAINING PROGRAM | Admitting: SPECIALIST
Payer: MEDICAID

## 2021-07-02 VITALS
SYSTOLIC BLOOD PRESSURE: 155 MMHG | RESPIRATION RATE: 18 BRPM | HEART RATE: 81 BPM | OXYGEN SATURATION: 100 % | TEMPERATURE: 98 F | DIASTOLIC BLOOD PRESSURE: 75 MMHG

## 2021-07-02 VITALS
HEART RATE: 88 BPM | HEIGHT: 63 IN | DIASTOLIC BLOOD PRESSURE: 81 MMHG | SYSTOLIC BLOOD PRESSURE: 174 MMHG | TEMPERATURE: 97 F | OXYGEN SATURATION: 98 % | WEIGHT: 109.35 LBS | RESPIRATION RATE: 16 BRPM

## 2021-07-02 DIAGNOSIS — I63.9 CEREBRAL INFARCTION, UNSPECIFIED: ICD-10-CM

## 2021-07-02 PROBLEM — I10 ESSENTIAL (PRIMARY) HYPERTENSION: Chronic | Status: ACTIVE | Noted: 2021-06-29

## 2021-07-02 PROBLEM — I67.1 CEREBRAL ANEURYSM, NONRUPTURED: Chronic | Status: ACTIVE | Noted: 2021-06-29

## 2021-07-02 PROBLEM — F17.200 NICOTINE DEPENDENCE, UNSPECIFIED, UNCOMPLICATED: Chronic | Status: ACTIVE | Noted: 2021-06-29

## 2021-07-02 PROCEDURE — 97161 PT EVAL LOW COMPLEX 20 MIN: CPT

## 2021-07-02 PROCEDURE — 84484 ASSAY OF TROPONIN QUANT: CPT

## 2021-07-02 PROCEDURE — 83735 ASSAY OF MAGNESIUM: CPT

## 2021-07-02 PROCEDURE — 82550 ASSAY OF CK (CPK): CPT

## 2021-07-02 PROCEDURE — 85610 PROTHROMBIN TIME: CPT

## 2021-07-02 PROCEDURE — 80048 BASIC METABOLIC PNL TOTAL CA: CPT

## 2021-07-02 PROCEDURE — 94640 AIRWAY INHALATION TREATMENT: CPT

## 2021-07-02 PROCEDURE — 80061 LIPID PANEL: CPT

## 2021-07-02 PROCEDURE — 71045 X-RAY EXAM CHEST 1 VIEW: CPT

## 2021-07-02 PROCEDURE — 84100 ASSAY OF PHOSPHORUS: CPT

## 2021-07-02 PROCEDURE — 70450 CT HEAD/BRAIN W/O DYE: CPT

## 2021-07-02 PROCEDURE — 85025 COMPLETE CBC W/AUTO DIFF WBC: CPT

## 2021-07-02 PROCEDURE — 82553 CREATINE MB FRACTION: CPT

## 2021-07-02 PROCEDURE — 85027 COMPLETE CBC AUTOMATED: CPT

## 2021-07-02 PROCEDURE — 86850 RBC ANTIBODY SCREEN: CPT

## 2021-07-02 PROCEDURE — 86901 BLOOD TYPING SEROLOGIC RH(D): CPT

## 2021-07-02 PROCEDURE — 70547 MR ANGIOGRAPHY NECK W/O DYE: CPT

## 2021-07-02 PROCEDURE — 83036 HEMOGLOBIN GLYCOSYLATED A1C: CPT

## 2021-07-02 PROCEDURE — 92610 EVALUATE SWALLOWING FUNCTION: CPT

## 2021-07-02 PROCEDURE — 92523 SPEECH SOUND LANG COMPREHEN: CPT

## 2021-07-02 PROCEDURE — 97166 OT EVAL MOD COMPLEX 45 MIN: CPT

## 2021-07-02 PROCEDURE — U0003: CPT

## 2021-07-02 PROCEDURE — 82962 GLUCOSE BLOOD TEST: CPT

## 2021-07-02 PROCEDURE — 92611 MOTION FLUOROSCOPY/SWALLOW: CPT

## 2021-07-02 PROCEDURE — 70544 MR ANGIOGRAPHY HEAD W/O DYE: CPT

## 2021-07-02 PROCEDURE — 99285 EMERGENCY DEPT VISIT HI MDM: CPT

## 2021-07-02 PROCEDURE — 74230 X-RAY XM SWLNG FUNCJ C+: CPT | Mod: 26

## 2021-07-02 PROCEDURE — 97530 THERAPEUTIC ACTIVITIES: CPT

## 2021-07-02 PROCEDURE — 74230 X-RAY XM SWLNG FUNCJ C+: CPT

## 2021-07-02 PROCEDURE — 86900 BLOOD TYPING SEROLOGIC ABO: CPT

## 2021-07-02 PROCEDURE — 86803 HEPATITIS C AB TEST: CPT

## 2021-07-02 PROCEDURE — 85730 THROMBOPLASTIN TIME PARTIAL: CPT

## 2021-07-02 PROCEDURE — 80053 COMPREHEN METABOLIC PANEL: CPT

## 2021-07-02 PROCEDURE — 70551 MRI BRAIN STEM W/O DYE: CPT

## 2021-07-02 PROCEDURE — 86769 SARS-COV-2 COVID-19 ANTIBODY: CPT

## 2021-07-02 PROCEDURE — 97112 NEUROMUSCULAR REEDUCATION: CPT

## 2021-07-02 RX ORDER — CLOPIDOGREL BISULFATE 75 MG/1
75 TABLET, FILM COATED ORAL DAILY
Refills: 0 | Status: DISCONTINUED | OUTPATIENT
Start: 2021-07-02 | End: 2021-07-27

## 2021-07-02 RX ORDER — ASPIRIN/CALCIUM CARB/MAGNESIUM 324 MG
81 TABLET ORAL DAILY
Refills: 0 | Status: DISCONTINUED | OUTPATIENT
Start: 2021-07-02 | End: 2021-07-27

## 2021-07-02 RX ORDER — ALBUTEROL 90 UG/1
1 AEROSOL, METERED ORAL EVERY 6 HOURS
Refills: 0 | Status: DISCONTINUED | OUTPATIENT
Start: 2021-07-02 | End: 2021-07-27

## 2021-07-02 RX ORDER — FAMOTIDINE 10 MG/ML
20 INJECTION INTRAVENOUS
Refills: 0 | Status: DISCONTINUED | OUTPATIENT
Start: 2021-07-02 | End: 2021-07-14

## 2021-07-02 RX ORDER — SENNA PLUS 8.6 MG/1
2 TABLET ORAL AT BEDTIME
Refills: 0 | Status: DISCONTINUED | OUTPATIENT
Start: 2021-07-02 | End: 2021-07-16

## 2021-07-02 RX ORDER — PANTOPRAZOLE SODIUM 20 MG/1
40 TABLET, DELAYED RELEASE ORAL DAILY
Refills: 0 | Status: DISCONTINUED | OUTPATIENT
Start: 2021-07-02 | End: 2021-07-02

## 2021-07-02 RX ORDER — ATORVASTATIN CALCIUM 80 MG/1
80 TABLET, FILM COATED ORAL AT BEDTIME
Refills: 0 | Status: DISCONTINUED | OUTPATIENT
Start: 2021-07-02 | End: 2021-07-09

## 2021-07-02 RX ORDER — ENOXAPARIN SODIUM 100 MG/ML
40 INJECTION SUBCUTANEOUS
Qty: 0 | Refills: 0 | DISCHARGE
Start: 2021-07-02

## 2021-07-02 RX ORDER — AMLODIPINE BESYLATE 2.5 MG/1
1 TABLET ORAL
Qty: 0 | Refills: 0 | DISCHARGE

## 2021-07-02 RX ORDER — PANTOPRAZOLE SODIUM 20 MG/1
40 TABLET, DELAYED RELEASE ORAL
Refills: 0 | Status: DISCONTINUED | OUTPATIENT
Start: 2021-07-02 | End: 2021-07-27

## 2021-07-02 RX ORDER — BUDESONIDE AND FORMOTEROL FUMARATE DIHYDRATE 160; 4.5 UG/1; UG/1
2 AEROSOL RESPIRATORY (INHALATION)
Refills: 0 | Status: DISCONTINUED | OUTPATIENT
Start: 2021-07-02 | End: 2021-07-27

## 2021-07-02 RX ORDER — ATORVASTATIN CALCIUM 80 MG/1
1 TABLET, FILM COATED ORAL
Qty: 0 | Refills: 0 | DISCHARGE
Start: 2021-07-02

## 2021-07-02 RX ORDER — CLOPIDOGREL BISULFATE 75 MG/1
1 TABLET, FILM COATED ORAL
Qty: 0 | Refills: 0 | DISCHARGE
Start: 2021-07-02

## 2021-07-02 RX ORDER — MONTELUKAST 4 MG/1
10 TABLET, CHEWABLE ORAL DAILY
Refills: 0 | Status: DISCONTINUED | OUTPATIENT
Start: 2021-07-02 | End: 2021-07-27

## 2021-07-02 RX ORDER — ENOXAPARIN SODIUM 100 MG/ML
40 INJECTION SUBCUTANEOUS DAILY
Refills: 0 | Status: DISCONTINUED | OUTPATIENT
Start: 2021-07-02 | End: 2021-07-27

## 2021-07-02 RX ORDER — CLOPIDOGREL BISULFATE 75 MG/1
75 TABLET, FILM COATED ORAL DAILY
Refills: 0 | Status: DISCONTINUED | OUTPATIENT
Start: 2021-07-02 | End: 2021-07-02

## 2021-07-02 RX ORDER — ASPIRIN/CALCIUM CARB/MAGNESIUM 324 MG
81 TABLET ORAL DAILY
Refills: 0 | Status: DISCONTINUED | OUTPATIENT
Start: 2021-07-02 | End: 2021-07-02

## 2021-07-02 RX ORDER — METOPROLOL TARTRATE 50 MG
1 TABLET ORAL
Qty: 0 | Refills: 0 | DISCHARGE

## 2021-07-02 RX ORDER — ASPIRIN/CALCIUM CARB/MAGNESIUM 324 MG
1 TABLET ORAL
Qty: 0 | Refills: 0 | DISCHARGE
Start: 2021-07-02

## 2021-07-02 RX ADMIN — CLOPIDOGREL BISULFATE 75 MILLIGRAM(S): 75 TABLET, FILM COATED ORAL at 11:11

## 2021-07-02 RX ADMIN — ENOXAPARIN SODIUM 40 MILLIGRAM(S): 100 INJECTION SUBCUTANEOUS at 11:07

## 2021-07-02 RX ADMIN — Medication 1 MILLIGRAM(S): at 11:11

## 2021-07-02 RX ADMIN — PANTOPRAZOLE SODIUM 40 MILLIGRAM(S): 20 TABLET, DELAYED RELEASE ORAL at 12:14

## 2021-07-02 RX ADMIN — BUDESONIDE AND FORMOTEROL FUMARATE DIHYDRATE 2 PUFF(S): 160; 4.5 AEROSOL RESPIRATORY (INHALATION) at 17:39

## 2021-07-02 RX ADMIN — FAMOTIDINE 20 MILLIGRAM(S): 10 INJECTION INTRAVENOUS at 11:07

## 2021-07-02 RX ADMIN — Medication 81 MILLIGRAM(S): at 11:07

## 2021-07-02 RX ADMIN — ATORVASTATIN CALCIUM 80 MILLIGRAM(S): 80 TABLET, FILM COATED ORAL at 22:56

## 2021-07-02 RX ADMIN — MONTELUKAST 10 MILLIGRAM(S): 4 TABLET, CHEWABLE ORAL at 11:06

## 2021-07-02 RX ADMIN — BUDESONIDE AND FORMOTEROL FUMARATE DIHYDRATE 2 PUFF(S): 160; 4.5 AEROSOL RESPIRATORY (INHALATION) at 05:37

## 2021-07-02 NOTE — DISCHARGE NOTE PROVIDER - NSDCQMMRS_NEU_ALL_CORE
2 - Slight disability. Able to lookafter own affairs without assistance, but unable to carry out all previous activities 4 - Moderately severe disability. Unable to own bodily needs without assistance and unable to walk unassisted

## 2021-07-02 NOTE — DISCHARGE NOTE PROVIDER - CARE PROVIDER_API CALL
Pawan Lopez (DO)  Neurology; Vascular Neurology  [address], [address]  [place] [address]  Phone: [phone]  Fax: [phone]  Follow Up Time:    Pawan Lopez (DO)  Neurology; Vascular Neurology  [address], [address]  [place] [address]  Phone: [phone]  Fax: [phone]  Follow Up Time:     Cullen Nguyen  Neurological Surgery  [address], [address]  [place] [address]  Phone: [phone]  Fax: [phone]  Follow Up Time:

## 2021-07-02 NOTE — DISCHARGE NOTE PROVIDER - NSDCHC_MEDRECSTATUS_GEN_ALL_CORE
Subjective     Asthma Follow-Up    Was ACT completed today?  Yes    ACT Total Scores 10/18/2019   ACT TOTAL SCORE (Goal Greater than or Equal to 20) 25   In the past 12 months, how many times did you visit the emergency room for your asthma without being admitted to the hospital? 0   In the past 12 months, how many times were you hospitalized overnight because of your asthma? 0       How many days per week do you miss taking your asthma controller medication?  0    Please describe any recent triggers for your asthma: pt is aware of triggers     Have you had any Emergency Room Visits, Urgent Care Visits, or Hospital Admissions since your last office visit?  Keyla      Judd is a 57 year old male who comes to clinic for recheck.  He is lost considerable weight with combination of diet and exercise.  Feels great.  Has not used his asthma medication any further.  No longer short of breath    Review of Systems   ROS COMP: Constitutional, HEENT, cardiovascular, pulmonary, gi and gu systems are negative, except as otherwise noted.      Objective    /68   Pulse 69   Temp 97.6  F (36.4  C) (Temporal)   Resp 18   Wt 133.8 kg (295 lb)   SpO2 97%   BMI 44.72 kg/m       Wt Readings from Last 2 Encounters:   10/18/19 133.8 kg (295 lb)   06/28/19 146.1 kg (322 lb)       Physical Exam   GENERAL: healthy, alert and no distress  NECK: no adenopathy, no asymmetry, masses, or scars and thyroid normal to palpation  RESP: lungs clear to auscultation - no rales, rhonchi or wheezes  CV: regular rate and rhythm, normal S1 S2, no S3 or S4, there is a 2 out of 6 systolic flow murmur with no radiation, heard best at the left upper sternal border, click or rub, no peripheral edema and peripheral pulses strong  ABDOMEN: soft, nontender, no hepatosplenomegaly, no masses and bowel sounds normal  MS: no gross musculoskeletal defects noted, no edema    Diagnostic Test Results:  Labs reviewed in Epic        Assessment & Plan       ICD-10-CM     1. Moderate persistent asthma, unspecified whether complicated J45.40 Asthma Action Plan (AAP)   2. Encounter for screening for malignant neoplasm of colon Z12.11 GASTROENTEROLOGY ADULT REF PROCEDURE ONLY None   3. Heart murmur R01.1 Echocardiogram Limited       Murmur heard today for the first time consistent with a potential aortic etiology, set up with an echo  Due for repeat colonoscopy due to his history of polyps, ordered  Asthma under great control now that his weight is down,  Morbid obesity, doing great with weight loss    Raghu Landin MD  [facility]     Admission Reconciliation is Completed  Discharge Reconciliation is Not Complete Admission Reconciliation is Completed  Discharge Reconciliation is Completed

## 2021-07-02 NOTE — H&P ADULT - HISTORY OF PRESENT ILLNESS
66 yo RH female with PMH of asthma, HTN to [facility] ED 6/29 for right-sided weakness and slurred speech. CTH neg acute cva, old lacunar infarcts b/l BG. Out of window for tPA. CTA 6/29 shows Aneurysms suspected involving the left middle cerebral artery as described above. Transferred from [facility] to [facility] for possible evaluation of aneurysms noted on imaging. MRA 6/30 showed mild age-appropriate involutional and ischemic gliotic changes with old bilateral basal ganglia lacunar infarcts. Small acute infarct left basal ganglia and corona radiata. No evidence of hemorrhage. 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 Also mild aneurysmal dilatation of the origin of the left inferior M2 branch. Hospitalization complicated by NANCY, IVF completed. Failed swallow eval-tolerates Puree honey. TTE as outpatient. HbA1c 5.9 and . Evaluated by PMR and acute rehab recommended. Cleared for dc on 7/2. 64 yo RH female with PMH of asthma, HTN to [facility] ED 6/29 for right-sided weakness and slurred speech. CTH neg acute cva, old lacunar infarcts b/l BG. Out of window for tPA. CTA 6/29 shows Aneurysms suspected involving the left middle cerebral artery as described above. Transferred from [facility] to [facility] for possible evaluation of aneurysms noted on imaging. MRA 6/30 showed mild age-appropriate involutional and ischemic gliotic changes with old bilateral basal ganglia lacunar infarcts. Small acute infarct left basal ganglia and corona radiata. No evidence of hemorrhage. 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 Also mild aneurysmal dilatation of the origin of the left inferior M2 branch. Hospitalization complicated by NANCY, IVF completed. Failed swallow eval-tolerates Puree honey. TTE as outpatient. HbA1c 5.9 and . Evaluated by PMR and acute rehab recommended. Cleared for dc on 7/2.  Patient arrived to [facility] on 7/2/21 for acute inpatient rehab. Celi iterpreter #[id number]. Pt c/o difficulty speaking and still unable to more her right upper and right lower extremities.

## 2021-07-02 NOTE — PROGRESS NOTE ADULT - SUBJECTIVE AND OBJECTIVE BOX
THE PATIENT WAS SEEN AND EXAMINED BY ME WITH THE HOUSESTAFF AND STROKE TEAM DURING MORNING ROUNDS.   HPI:  HPI:  Mahin Levin is a 65 year old RH woman with past medical history of asthma and HTN not on antithrombotics presented as code stroke to [facility] ED for right-sided weakness with LWK 6/28 11p. At baseline, the patient is ambulatory without assistive devices and is oriented to person/place/time, and participates in all her own ADLs. Son at bedside provided history as per request of patient. Patient went to bed around 11p on 6/28 at her baseline as witnessed by patient's daughter. Per the son, he believes she woke up around 1 am to urinate, unclear if patient had symptoms at this time, patient was unsure. She woke up again at 3 am and she appeared weak on the right side of her body, with such weakness not having resolved. The family believed patient was having generalized fatigue so they did not take her to hospital initially. However, when son had gotten back from work, the weakness had not resolved, and was concerned and wanted to bring her to hospital. She endorsed blurry vision in both eyes.  Denied prior history of stroke. Denied family history of stroke. Patient was transferred from [facility] to [facility] for possible evaluation of aneurysms noted on imaging. On admission: NIHSS: 14, MRS: 0.    SUBJECTIVE: No events overnight.  No new neurologic complaints.  ROS reported negative unless otherwise noted.        ALBUTerol    90 MICROgram(s) HFA Inhaler 1 Puff(s) Inhalation every 6 hours PRN  aspirin Suppository 300 milliGRAM(s) Rectal daily  atorvastatin 80 milliGRAM(s) Oral at bedtime  budesonide 160 MICROgram(s)/formoterol 4.5 MICROgram(s) Inhaler 2 Puff(s) Inhalation two times a day  enoxaparin Injectable 40 milliGRAM(s) SubCutaneous daily  famotidine    Tablet 20 milliGRAM(s) Oral daily  folic acid 1 milliGRAM(s) Oral daily  montelukast 10 milliGRAM(s) Oral daily  sodium chloride 0.9%. 1000 milliLiter(s) IV Continuous <Continuous>    PHYSICAL EXAM:   Vital Signs Last 24 Hrs  T(C): 36.5 (02 Jul 2021 07:11), Max: 36.9 (01 Jul 2021 23:17)  T(F): 97.7 (02 Jul 2021 07:11), Max: 98.4 (01 Jul 2021 23:17)  HR: 86 (02 Jul 2021 07:11) (68 - 89)  BP: 134/68 (02 Jul 2021 07:11) (129/74 - 152/72)  BP(mean): --  RR: 18 (02 Jul 2021 07:11) (18 - 20)  SpO2: 96% (02 Jul 2021 07:11) (96% - 99%)    General: No acute distress, temporal atrophy, cachexia  HEENT: EOM intact, visual fields full, Mild-moderate ptosis of right eye (baseline per son)  Abdomen: Soft, nontender, nondistended   Extremities: No edema    NEUROLOGICAL EXAM:  Mental status: Awake, alert, oriented to name, IDs objects, speech fluent, follows commands,   Cranial Nerves: mild right facial droop, no nystagmus, moderate dysarthria,  tongue midline  Motor exam: right hemiplegia RUE/RLE 0/5, left side moves well against gravity  Sensation: Intact to light touch   Coordination/ Gait: No dysmetria, gait not tested    LABS:   07-01    141  |  105  |  33<H>  ----------------------------<  76  4.9   |  18<L>  |  1.34<H>    Ca    8.8      01 Jul 2021 07:22    IMAGING: Reviewed by me.     MR Head No Cont/MR Angio Head No Cont/MR Angio neck  no Cont (06.30.2021)  Mild age-appropriate involutional and ischemic gliotic changes with old bilateral basal ganglia lacunar infarcts. Small acute infarct left basal ganglia and corona radiata. No evidence of hemorrhage.  Normal intracranial flow using noninvasive flow MR angiography.  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 Also mild aneurysmal dilatation of the origin of the left inferior M2 branch.    CT Head No Cont (06.29.2021)  No acute intra-cranial hemorrhage, mass effect, or midline shift.    CT Angio Head/Neck w/ IV Cont (06.29.2021)  Aneurysms suspected involving the left middle cerebral artery as described above.  No significant stenosis is seen.  No perfusion mismatches to suggest core infarct or critically hypoperfused at tissue risk  There is an 8 x 8 mm partially calcified hyperdensity which may reflect an aneurysm.    CT Head No Cont (06.29.2021)  Old lacunar infarcts involving bilateral basal ganglia region are identified. Slightly larger chronic appearing infarct involving the left basal ganglia region.     THE PATIENT WAS SEEN AND EXAMINED BY ME WITH THE HOUSESTAFF AND STROKE TEAM DURING MORNING ROUNDS.   HPI:  HPI:  Mahin Levin is a 65 year old RH woman with past medical history of asthma and HTN not on antithrombotics presented as code stroke to [facility] ED for right-sided weakness with LWK 6/28 11p. At baseline, the patient is ambulatory without assistive devices and is oriented to person/place/time, and participates in all her own ADLs. Son at bedside provided history as per request of patient. Patient went to bed around 11p on 6/28 at her baseline as witnessed by patient's daughter. Per the son, he believes she woke up around 1 am to urinate, unclear if patient had symptoms at this time, patient was unsure. She woke up again at 3 am and she appeared weak on the right side of her body, with such weakness not having resolved. The family believed patient was having generalized fatigue so they did not take her to hospital initially. However, when son had gotten back from work, the weakness had not resolved, and was concerned and wanted to bring her to hospital. She endorsed blurry vision in both eyes.  Denied prior history of stroke. Denied family history of stroke. Patient was transferred from [facility] to [facility] for possible evaluation of aneurysms noted on imaging. On admission: NIHSS: 14, MRS: 0.    SUBJECTIVE: No events overnight.  No new neurologic complaints.  ROS reported negative unless otherwise noted.    Translation provided by  Miriam per patient request     ALBUTerol    90 MICROgram(s) HFA Inhaler 1 Puff(s) Inhalation every 6 hours PRN  aspirin Suppository 300 milliGRAM(s) Rectal daily  atorvastatin 80 milliGRAM(s) Oral at bedtime  budesonide 160 MICROgram(s)/formoterol 4.5 MICROgram(s) Inhaler 2 Puff(s) Inhalation two times a day  enoxaparin Injectable 40 milliGRAM(s) SubCutaneous daily  famotidine    Tablet 20 milliGRAM(s) Oral daily  folic acid 1 milliGRAM(s) Oral daily  montelukast 10 milliGRAM(s) Oral daily  sodium chloride 0.9%. 1000 milliLiter(s) IV Continuous <Continuous>    PHYSICAL EXAM:   Vital Signs Last 24 Hrs  T(C): 36.5 (02 Jul 2021 07:11), Max: 36.9 (01 Jul 2021 23:17)  T(F): 97.7 (02 Jul 2021 07:11), Max: 98.4 (01 Jul 2021 23:17)  HR: 86 (02 Jul 2021 07:11) (68 - 89)  BP: 134/68 (02 Jul 2021 07:11) (129/74 - 152/72)  BP(mean): --  RR: 18 (02 Jul 2021 07:11) (18 - 20)  SpO2: 96% (02 Jul 2021 07:11) (96% - 99%)    General: No acute distress, temporal atrophy, cachexia  HEENT: EOM intact, visual fields full, Mild-moderate ptosis of right eye (baseline per son)  Abdomen: Soft, nontender, nondistended   Extremities: No edema    NEUROLOGICAL EXAM:  Mental status: Awake, alert, oriented to name, IDs objects, speech fluent, follows commands,   Cranial Nerves: mild right facial droop, no nystagmus, moderate dysarthria,  tongue midline  Motor exam: right hemiplegia RUE/RLE 0/5, left side moves well against gravity  Sensation: Intact to light touch   Coordination/ Gait: No dysmetria, gait not tested    LABS:   07-01    141  |  105  |  33<H>  ----------------------------<  76  4.9   |  18<L>  |  1.34<H>    Ca    8.8      01 Jul 2021 07:22    IMAGING: Reviewed by me.     MR Head No Cont/MR Angio Head No Cont/MR Angio neck  no Cont (06.30.2021)  Mild age-appropriate involutional and ischemic gliotic changes with old bilateral basal ganglia lacunar infarcts. Small acute infarct left basal ganglia and corona radiata. No evidence of hemorrhage.  Normal intracranial flow using noninvasive flow MR angiography.  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 Also mild aneurysmal dilatation of the origin of the left inferior M2 branch.    CT Head No Cont (06.29.2021)  No acute intra-cranial hemorrhage, mass effect, or midline shift.    CT Angio Head/Neck w/ IV Cont (06.29.2021)  Aneurysms suspected involving the left middle cerebral artery as described above.  No significant stenosis is seen.  No perfusion mismatches to suggest core infarct or critically hypoperfused at tissue risk  There is an 8 x 8 mm partially calcified hyperdensity which may reflect an aneurysm.    CT Head No Cont (06.29.2021)  Old lacunar infarcts involving bilateral basal ganglia region are identified. Slightly larger chronic appearing infarct involving the left basal ganglia region.

## 2021-07-02 NOTE — SWALLOW VFSS/MBS ASSESSMENT ADULT - SLP GENERAL OBSERVATIONS
Pt encountered in radiology, secured upright in ANKITA chair. +Video language line  utilized in [facility]; Tanna, ID#[id number]. +2LO2NC; Pt able to follow simple directives with consistent support and noted to perseverate on "I need water".

## 2021-07-02 NOTE — PROGRESS NOTE ADULT - ASSESSMENT
65 year old RH woman with past medical history of asthma and HTN not on antithrombotics presented as code stroke to [facility] ED for right-sided weakness with LWK  11p.     Impression: Right hemisensorimotor loss and moderate dysarthria +/- right visual field cut 2/2 L BG infarct 2/2      NEURO: Neurologically without change, Continue monitoring for neurologic deterioration, permissive HTN to gradual normotension, - started on high intensity statin, A1c  5.9, MRI Brain/NOVA noted above. NSx following for 8mm L MCA aneurysm- can consider angiogram after acute stroke period. Physical therapy/OT recommended AR.     ANTITHROMBOTIC THERAPY: ASA (plavix once stable enteral access established)    PULMONARY: CXR clear, protecting airway, saturating well     CARDIOVASCULAR: check TTE inpatient vs outpatient, cardiac monitoring without any noted events.                              SBP goal: normotension     GASTROINTESTINAL:  dysphagia screen failed at bedside, plan for MBS on      Diet: NPO    RENAL: BUN/Cr elevated NANCY? continue IVF, good urine output      Na Goal: Greater than 135     West: no    HEMATOLOGY: no signs of bleeding     DVT ppx: LMWH    ID: afebrile, no sign of infection    OTHER: Plan endorsed to patient and son at bedside, all questions and concerns addressed.     DISPOSITION: AR once passes swallow evaluation     CORE MEASURES:        Admission NIHSS: 14     TPA: NO      LDL/HDL: 126/38     Depression Screen: p     Statin Therapy: yes     Dysphagia Screen: FAIL     Smoking NO      Afib NO     Stroke Education YES     Obtain screening lower extremity venous ultrasound in patients who meet 1 or more of the following criteria as patient is high risk for DVT/PE on admission:   [] History of DVT/PE  []Hypercoagulable states (Factor V Leiden, Cancer, OCP, etc. )  []Prolonged immobility (hemiplegia/hemiparesis/post operative or any other extended immobilization)  [] Transferred from outside facility (Rehab or Long term care)  [] Age </= to 50     65 year old RH woman with past medical history of asthma and HTN not on antithrombotics presented as code stroke to [facility] ED for right-sided weakness with LWK  11p.     Impression: Right hemisensorimotor loss and moderate dysarthria +/- right visual field cut 2/2 L BG infarct 2/2      NEURO: Neurologically without change, Continue monitoring for neurologic deterioration, permissive HTN to gradual normotension, - started on high intensity statin, A1c  5.9, MRI Brain/NOVA noted above. NSx following for 8mm L MCA aneurysm- can consider angiogram after acute stroke period. Physical therapy/OT recommended AR.     ANTITHROMBOTIC THERAPY: ASA (plavix once stable enteral access established)    PULMONARY: CXR clear, protecting airway, saturating well     CARDIOVASCULAR: check TTE inpatient vs outpatient, cardiac monitoring without any noted events.                              SBP goal: normotension     GASTROINTESTINAL:  dysphagia screen failed at bedside, plan for MBS on      Diet: NPO    RENAL: BUN/Cr previously with NANCY? continue IVF, good urine output      Na Goal: Greater than 135     West: no    HEMATOLOGY: no signs of bleeding     DVT ppx: LMWH    ID: afebrile, no sign of infection    OTHER: Plan endorsed to patient and son at bedside, all questions and concerns addressed.     DISPOSITION: AR once passes swallow evaluation     CORE MEASURES:        Admission NIHSS: 14     TPA: NO      LDL/HDL: 126/38     Depression Screen: p     Statin Therapy: yes     Dysphagia Screen: FAIL     Smoking NO      Afib NO     Stroke Education YES     Obtain screening lower extremity venous ultrasound in patients who meet 1 or more of the following criteria as patient is high risk for DVT/PE on admission:   [] History of DVT/PE  []Hypercoagulable states (Factor V Leiden, Cancer, OCP, etc. )  []Prolonged immobility (hemiplegia/hemiparesis/post operative or any other extended immobilization)  [] Transferred from outside facility (Rehab or Long term care)  [] Age </= to 50

## 2021-07-02 NOTE — DISCHARGE NOTE PROVIDER - HOSPITAL COURSE
Mahin Levin is a 65 year old RH woman with past medical history of asthma and HTN not on antithrombotics presented as code stroke to [facility] ED for right-sided weakness with LWK  11p. At baseline, the patient is ambulatory without assistive devices and is oriented to person/place/time, and participates in all her own ADLs. Son at bedside provided history as per request of patient. Patient went to bed around 11p at her baseline as witnessed by patient's daughter. Per the son, he believes she woke up around 1 am to urinate, unclear if patient had symptoms at this time, patient was unsure. She woke up again at 3 am and she appeared weak on the right side of her body, with such weakness not having resolved. The family believed patient was having generalized fatigue so they did not take her to hospital initially. However, when son had gotten back from work, the weakness had not resolved, and was concerned and wanted to bring her to hospital. She endorsed blurry vision in both eyes.  Denies prior history of stroke. Denies family history of stroke. Patient was transferred from [facility] to [facility] for possible evaluation of aneurysms noted on imaging. NIHSS: 14, MRS: 0, on admission.     Impression: Right hemisensorimotor loss and moderate dysarthria +/- right visual field cut 2/2 L BG infarct 2/2      MR Head No Cont/MR Angio Head No Cont/MR Angio neck  no Cont (2021)  Mild age-appropriate involutional and ischemic gliotic changes with old bilateral basal ganglia lacunar infarcts. Small acute infarct left basal ganglia and corona radiata. No evidence of hemorrhage.  Normal intracranial flow using noninvasive flow MR angiography.  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 Also mild aneurysmal dilatation of the origin of the left inferior M2 branch.    CT Head No Cont (2021)  No acute intra-cranial hemorrhage, mass effect, or midline shift.    CT Angio Head/Neck w/ IV Cont (2021)  Aneurysms suspected involving the left middle cerebral artery as described above.  No significant stenosis is seen.  No perfusion mismatches to suggest core infarct or critically hypoperfused at tissue risk  There is an 8 x 8 mm partially calcified hyperdensity which may reflect an aneurysm.    CT Head No Cont (2021)  Old lacunar infarcts involving bilateral basal ganglia region are identified. Slightly larger chronic appearing infarct involving the left basal ganglia region.    TTE can be done as outpatient.      Labs show an HbA1c of 5.9 and an LDL of 126.     PT/OT evaluated the pt and decided their disposition to be acute rehab.      Speech and swallow evaluated the patient and she had an MBS which placed her on a dysphagia 1 pureed honey diet.     Can follow up as outpatient with neurosurgery about aneurysms, no acute treatment.     Patient is neurologically stable for discharge today to [facility].

## 2021-07-02 NOTE — H&P ADULT - NSHPSOCIALHISTORY_GEN_ALL_CORE
FUNCTIONAL HISTORY  Lives with son, private house, +steps to enter, flight inside   Independent without AD, family assists as needed     CURRENT FUNCTIONAL STATUS  6/30 PT  bed mobility mod assist x 2  transfers mod assist x 2  gait mod assist x 2, 3 steps

## 2021-07-02 NOTE — H&P ADULT - ATTENDING COMMENTS
Pt. seen 7/3 AM.  Agree with documentation above as per resident on call with amendments made as appropriate. Patient medically stable. Appropriate for acute rehabilitation.     Pt. reports had abdominal discomfort earlier which is now resolved. Pt. seen 7/3 AM.  Agree with documentation above as per resident on call with amendments made as appropriate. Patient medically stable. Appropriate for acute rehabilitation.     Pt. reports had abdominal discomfort earlier which is now resolved.  No acute events overnight or this AM.      Vital Signs Last 24 Hrs  T(C): 36.9 (03 Jul 2021 09:07), Max: 36.9 (03 Jul 2021 09:07)  T(F): 98.4 (03 Jul 2021 09:07), Max: 98.4 (03 Jul 2021 09:07)  HR: 77 (03 Jul 2021 09:07) (58 - 88)  BP: 156/80 (03 Jul 2021 09:07) (155/75 - 174/81)  BP(mean): --  RR: 14 (03 Jul 2021 09:07) (14 - 18)  SpO2: 98% (03 Jul 2021 09:07) (96% - 100%)    Physical exam as amended above                          12.5   5.51  )-----------( 128      ( 03 Jul 2021 05:00 )             38.8   07-03    143  |  109<H>  |  24<H>  ----------------------------<  129<H>  4.3   |  24  |  1.14    Ca    8.8      03 Jul 2021 05:00    TPro  7.1  /  Alb  3.1<L>  /  TBili  0.6  /  DBili  x   /  AST  13  /  ALT  17  /  AlkPhos  84  07-03    66yo Female with left subcortical CVA  with dysphagia, right Hemiparesis, decreased functional mobility, gait instability and ADL impairments.    HTN--BP elevated-- started on amlodipine by hospitalist-- SBP goal 120-150    Cont. all other medications    sleep log    DVT ppx--cont. Lovenox  Ordered routine LE doppler to r/o DVT given Hemiparesis and immobility

## 2021-07-02 NOTE — SWALLOW VFSS/MBS ASSESSMENT ADULT - ORAL PHASE COMMENTS
+mild lingual dorsum and moderate palatal residue +mild lingual dorsum residue +mild diffuse oral residue max spillover to the oropharynx and hypopharynx; mild lingual dorsum and trace palatal residue Max spillover to the oropharynx; mod spillover to the hypopharynx; mild-mod diffuse oral residue mod spillover to oropharynx; mild spillover to hypopharynx; mod diffuse oral residue

## 2021-07-02 NOTE — H&P ADULT - ASSESSMENT
MARKIE HERNANDEZ is a 64yo Female with CVA  , now with decreased functional mobility, gait instability and ADL impairments.    COMORBIDITES/ACTIVE MEDICAL ISSUES     Gait Instability, ADL impairments and Functional impairments: start Comprehensive Rehab Program of PT/OT     #CVA  -DAPT with Lipitor  -start PT OT SLP evaluations  -fall and aspiration precautions  -TTE outpt  -neurology and cardio follow up outpt    #HLD  -continue Lipitor  -monitor LFTs    #HTN  -BP stable in acute setting  -?start Norvasc if needed  -hospitalist to follow up    #NANCY  s/p IVF in acute setting  -labs to follow am  -encourage PO fluids  -check PVR  -hospitalist follow up    #Asthma  -continue respi tx  -monitor VS    Pain Mgmt - Tylenol PRN  GI/Bowel Mgmt - Senna,  Miralax PRN  /Bladder Mgmt - Voiding independently, PVRx1      Precautions / PROPHYLAXIS:   - Falls, Cardiac, Sternal, Spinal, Seizure   - Ortho: Weight bearing status: WBAT   - Lungs: Aspiration, Incentive Spirometer   - Pressure injury/Skin: Turn Q2hrs while in bed, OOB to Chair, PT/OT    - DVT: Lovenox    MEDICAL PROGNOSIS: GOOD            REHAB POTENTIAL: GOOD             ESTIMATED DISPOSITION: HOME WITH HOME CARE            ELOS: 10-14 Days   EXPECTED THERAPY:     P.T. 1hr/day       O.T. 1hr/day      S.L.P. 1hr/day     P&O Unnecessary     EXP FREQUENCY: 5 days per 7 day period     PRESCREEN COMPARISION:   I have reviewed the prescreen information and I have found no relevant changes between the preadmission screening and my post admission evaluation     RATIONALE FOR INPATIENT ADMISSION - Patient demonstrates the following: (check all that apply)  [X] Medically appropriate for rehabilitation admission  [X] Has attainable rehab goals with an appropriate initial discharge plan  [X] Has rehabilitation potential (expected to make a significant improvement within a reasonable period of time)   [X] Requires close medical management by a rehab physician, rehab nursing care, Hospitalist and comprehensive interdisciplinary team (including PT, OT, & or SLP, Prosthetics and Orthotics)     MARKIE HERNANDEZ is a 64yo Female with left subcortical CVA  with dysphagia, right Hemiparesis, decreased functional mobility, gait instability and ADL impairments.    COMORBIDITES/ACTIVE MEDICAL ISSUES     Gait Instability, ADL impairments and Functional impairments: start Comprehensive Rehab Program of PT/OT and speech therapy    #CVA  -DAPT with Lipitor  -start PT OT SLP evaluations  -fall and aspiration precautions  -TTE outpt  -neurology and cardio follow up outpt    #HLD  -continue Lipitor  -monitor LFTs    #HTN  -BP stable in acute setting  -?start Norvasc if needed  -hospitalist to follow up    #NANCY  s/p IVF in acute setting  -labs to follow am  -encourage PO fluids  -check PVR  -hospitalist follow up    #Asthma  -continue respi tx  -monitor VS    Pain Mgmt - Tylenol PRN  GI/Bowel Mgmt - Senna,  Miralax PRN  /Bladder Mgmt - Voiding independently, PVRx1      Precautions / PROPHYLAXIS:   - Falls,  Seizure   - Ortho: Weight bearing status: WBAT   - Lungs: Aspiration, Incentive Spirometer   - Pressure injury/Skin: Turn Q2hrs while in bed, OOB to Chair, PT/OT    - DVT: Lovenox    MEDICAL PROGNOSIS: GOOD            REHAB POTENTIAL: GOOD             ESTIMATED DISPOSITION: HOME WITH HOME CARE            ELOS: 10-14 Days   EXPECTED THERAPY:     P.T. 1hr/day       O.T. 1hr/day      S.L.P. 1hr/day     P&O Unnecessary     EXP FREQUENCY: 5 days per 7 day period     PRESCREEN COMPARISION:   I have reviewed the prescreen information and I have found no relevant changes between the preadmission screening and my post admission evaluation     RATIONALE FOR INPATIENT ADMISSION - Patient demonstrates the following: (check all that apply)  [X] Medically appropriate for rehabilitation admission  [X] Has attainable rehab goals with an appropriate initial discharge plan  [X] Has rehabilitation potential (expected to make a significant improvement within a reasonable period of time)   [X] Requires close medical management by a rehab physician, rehab nursing care, Hospitalist and comprehensive interdisciplinary team (including PT, OT, & or SLP, Prosthetics and Orthotics)

## 2021-07-02 NOTE — SWALLOW VFSS/MBS ASSESSMENT ADULT - ESOPHAGEAL STAGE
+mild-mod retention in cervical esophagus with mild retrograde flow to hypopharynx +mod retention in visualized cervical esophagus with retrograde flow to hypopharynx mild-mod retention in visualized cervical esophagus

## 2021-07-02 NOTE — H&P ADULT - NSHPLABSRESULTS_GEN_ALL_CORE
EXAM:  CT BRAIN STROKE PROTOCOL        PROCEDURE DATE:  Jun 29 2021         INTERPRETATION:  Clinical indications: Stroke code.    Multiple axial sections were performed from base skull to vertex without contrast enhancement.    Parenchymal volume loss is seen    Old lacunar infarcts involving bilateral basal ganglia region are identified. Slightly larger chronic appearing infarct involving the left basal ganglia region.    There is no evidence acute hemorrhage mass or mass effect seen    Evaluation of the osseous structures with the appropriate window appears normal    The visualized paranasal sinuses mastoid and middle ear regions appear clear.    IMPRESSION: Old infarcts as described above.    Findings discussed with neurology resident on June 29, 2021 at 3:46 PM  with read back.              LAZARUS MCCULLOUGH MD; Attending Radiologist  This document has been electronically signed. Jun 29 2021  3:47PM

## 2021-07-02 NOTE — H&P ADULT - NSHPREVIEWOFSYSTEMS_GEN_ALL_CORE
REVIEW OF SYSTEMS:  CONSTITUTIONAL: No fever, weight loss, or fatigue  EYES: No eye pain, visual disturbances, or discharge  ENMT:  No difficulty hearing, tinnitus, vertigo; No sinus or throat pain  NECK: No pain or stiffness  BREASTS: No pain, masses, or nipple discharge  RESPIRATORY: No cough, wheezing, chills or hemoptysis; No shortness of breath  CARDIOVASCULAR: No chest pain, palpitations, dizziness, or leg swelling  GASTROINTESTINAL: No abdominal or epigastric pain. No nausea, vomiting, or hematemesis; No diarrhea or constipation. No melena or hematochezia.  GENITOURINARY: No dysuria, frequency, hematuria, or incontinence  NEUROLOGICAL: No headaches, memory loss, loss of strength, numbness, or tremors  SKIN: No itching, burning, rashes, or lesions   LYMPH NODES: No enlarged glands  ENDOCRINE: No heat or cold intolerance; No hair loss  MUSCULOSKELETAL: No joint pain or swelling; No muscle, back, or extremity pain  PSYCHIATRIC: No depression, anxiety, mood swings, or difficulty sleeping  HEME/LYMPH: No easy bruising, or bleeding gums  ALLERY AND IMMUNOLOGIC: No hives or eczema REVIEW OF SYSTEMS:  CONSTITUTIONAL: No fever, weight loss, or fatigue  EYES: No eye pain, visual disturbances, or discharge  ENMT:  No difficulty hearing, tinnitus, vertigo; No sinus or throat pain  NECK: No pain or stiffness  BREASTS: No pain, masses, or nipple discharge  RESPIRATORY: No cough, wheezing, chills or hemoptysis; No shortness of breath  CARDIOVASCULAR: No chest pain, palpitations, dizziness, or leg swelling  GASTROINTESTINAL: No abdominal or epigastric pain. No nausea, vomiting, or hematemesis; No diarrhea or constipation. No melena or hematochezia.  GENITOURINARY: No dysuria, frequency, hematuria, or incontinence  NEUROLOGICAL: No headaches, memory loss, +of strength, numbness, or tremors, +slurred speech  SKIN: No itching, burning, rashes, or lesions   LYMPH NODES: No enlarged glands  ENDOCRINE: No heat or cold intolerance; No hair loss  MUSCULOSKELETAL: No joint pain or swelling; No muscle, back, or extremity pain  PSYCHIATRIC: No depression, anxiety, mood swings, or difficulty sleeping  HEME/LYMPH: No easy bruising, or bleeding gums  ALLERY AND IMMUNOLOGIC: No hives or eczema REVIEW OF SYSTEMS:  Limited due to cognitive deficits  CONSTITUTIONAL: No fever, weight loss, or fatigue  EYES: No eye pain, visual disturbances, or discharge  ENMT:  No difficulty hearing, tinnitus, vertigo; No sinus or throat pain  NECK: No pain or stiffness  BREASTS: No pain, masses, or nipple discharge  RESPIRATORY: No cough, wheezing, chills or hemoptysis; No shortness of breath  CARDIOVASCULAR: No chest pain, palpitations, dizziness, or leg swelling  GASTROINTESTINAL: No abdominal or epigastric pain. No nausea, vomiting, or hematemesis; No diarrhea or constipation. No melena or hematochezia.  GENITOURINARY: No dysuria, frequency, hematuria, or incontinence  NEUROLOGICAL: No headaches, memory loss, +of strength, numbness, or tremors, +slurred speech  SKIN: No itching, burning, rashes, or lesions   LYMPH NODES: No enlarged glands  ENDOCRINE: No heat or cold intolerance; No hair loss  MUSCULOSKELETAL: No joint pain or swelling; No muscle, back, or extremity pain  PSYCHIATRIC: No depression, anxiety, mood swings, or difficulty sleeping  HEME/LYMPH: No easy bruising, or bleeding gums  ALLERY AND IMMUNOLOGIC: No hives or eczema

## 2021-07-02 NOTE — DISCHARGE NOTE NURSING/CASE MANAGEMENT/SOCIAL WORK - PATIENT PORTAL LINK FT
You can access the FollowMyHealth Patient Portal offered by [facility] by registering at the following website: [email or web address]. By joining [employer]’s FollowMyHealth portal, you will also be able to view your health information using other applications (apps) compatible with our system.

## 2021-07-02 NOTE — SWALLOW VFSS/MBS ASSESSMENT ADULT - RECOMMENDED CONSISTENCY
1) Dysphagia 1 with honey thickened liquids 2) 1:1 assist with all PO due to ensure slow rate/small bolus size 3) Crush all medications and place in applesauce, when feasible 4) Check oral cavity during and after meals

## 2021-07-02 NOTE — DISCHARGE NOTE PROVIDER - PROVIDER TOKENS
PROVIDER:[TOKEN:[[id number]]] PROVIDER:[TOKEN:[[id number]]],PROVIDER:[TOKEN:[[id number]]]

## 2021-07-02 NOTE — DISCHARGE NOTE PROVIDER - NSDCCPCAREPLAN_GEN_ALL_CORE_FT
PRINCIPAL DISCHARGE DIAGNOSIS  Diagnosis: CVA (cerebral vascular accident)  Assessment and Plan of Treatment: Please follow up with neurologist in 1 week. Continue taking medications as prescribed. Monitor your blood pressure. Reduce fat, cholesterol and salt in your diet. Increase intake of fruits and vegetables. Limit alcohol to minimum and do not smoke. You may be at risk for falling, make changes to your home to help you walk easier. Keep up to date on vaccinations.  If you experience any symptoms of facial drooping, slurred speech, arm or leg weakness, severe headache, vision changes or any worsening symptoms, notify provider immediatley and return to ER.      SECONDARY DISCHARGE DIAGNOSES  Diagnosis: Cerebral aneurysm  Assessment and Plan of Treatment: Follow up as outpatient with neurosugery.

## 2021-07-02 NOTE — SWALLOW VFSS/MBS ASSESSMENT ADULT - ORAL PHASE
Uncontrolled bolus / spillover in truman-pharynx/Uncontrolled bolus / spillover in hypopharynx Delayed oral transit time/Reduced anterior - posterior transport/Residue in oral cavity/Uncontrolled bolus / spillover in truman-pharynx/Uncontrolled bolus / spillover in hypopharynx

## 2021-07-02 NOTE — SWALLOW VFSS/MBS ASSESSMENT ADULT - ADDITIONAL RECOMMENDATIONS
Maintain good oral hygiene  Monitor for s/s aspiration/laryngeal penetration. If noted:  D/C p.o. intake, provide non-oral nutrition/hydration/meds, and contact this service @ [phone]

## 2021-07-02 NOTE — SWALLOW VFSS/MBS ASSESSMENT ADULT - ORAL PREP COMMENTS
Mild anterior and right lateral loss +mild anterior loss of bolus +anterior and right lateral loss of liquids

## 2021-07-02 NOTE — SWALLOW VFSS/MBS ASSESSMENT ADULT - DIAGNOSTIC IMPRESSIONS
Pt presents with an oropharyngeal dysphagia. Swallow sequence is marked by trace anterior and right lateral loss of liquids via cup sips due to reduced labial seal around cup. Prolonged and insufficient mastication of mechanical soft solids likely due to lingual weakness in addition to mostly missing upper/lower dentition. Delayed swallow initiation at the level of the pyriforms with nectar thickened liquids, thin liquids and mechanical soft solids. There is laryngeal penetration during the swallow with thin liquids and nectar thickened liquids with incomplete retrieval. Penetrant contacts vocal cords with thin liquids with absent patient response. Pharyngeal stasis with solids is effectively reduced with a secondary swallow. No penetration or aspiration observed with purees and honey thickened liquids.  Due to pt's impulsivity, would suggest full assist with all PO to ensure slow rate/small bolus size.    Disorders: reduced lingual strength/ROM/Rate of motion, reduced BOT to posterior pharyngeal wall contact, delay in trigger of the swallow reflex, reduced hyo-laryngeal excursion, reduced laryngeal closure, reduced pharyngeal contractility, reduced supraglottic sensation Pt presents with an oropharyngeal dysphagia. Swallow sequence is marked by trace anterior and right lateral loss of liquids via cup sips due to reduced labial seal around cup. Prolonged and insufficient mastication of mechanical soft solids likely due to lingual weakness in addition to mostly missing upper/lower dentition. Delayed swallow initiation at the level of the pyriforms with nectar thickened liquids, thin liquids and mechanical soft solids. There is laryngeal penetration during the swallow with thin liquids and nectar thickened liquids with incomplete retrieval. Penetrant contacts vocal cords with thin liquids with absent patient response. Pharyngeal stasis with solids is effectively reduced with a secondary swallow. No penetration or aspiration observed with purees and honey thickened liquids.  Due to pt's impulsivity, would suggest full assist with all PO to ensure slow rate/small bolus size. Of note, pt with retention in visualized cervical esophagus with mild retrograde flow to the hypopharynx. If team concerned, would suggest GI for further workup.    Disorders: reduced lingual strength/ROM/Rate of motion, reduced BOT to posterior pharyngeal wall contact, delay in trigger of the swallow reflex, reduced hyo-laryngeal excursion, reduced laryngeal closure, reduced pharyngeal contractility, reduced supraglottic sensation

## 2021-07-02 NOTE — SWALLOW VFSS/MBS ASSESSMENT ADULT - SLP PERTINENT HISTORY OF CURRENT PROBLEM
Mahin Levin is a 65 year old RH woman with past medical history of asthma and HTN not on antithrombotics presents as code stroke to [facility] ED for right-sided weakness with LWK  11p. 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  Exam also with cachexia and temporal wasting concern for more systemic process. MRI brain pending. Will defer any intervention for incidental aneurysm at this time.

## 2021-07-02 NOTE — H&P ADULT - NSHPPHYSICALEXAM_GEN_ALL_CORE
Vital Signs  T(C): 36.7 (07-02-21 @ 15:13), Max: 36.9 (07-01-21 @ 23:17)  HR: 81 (07-02-21 @ 15:13) (72 - 95)  BP: 155/75 (07-02-21 @ 15:13) (134/68 - 155/75)  RR: 18 (07-02-21 @ 15:13) (18 - 18)  SpO2: 100% (07-02-21 @ 15:13) (96% - 100%)    Gen - NAD, Comfortable  HEENT - NCAT, EOMI, MMM  Neck - Supple, No limited ROM  Pulm - CTAB, No wheeze, No rhonchi, No crackles  Cardiovascular - RRR, S1S2, No m/r/g  Abdomen - Soft, NT/ND, +BS  Extremities - No C/C/E, No calf tenderness  Neuro-     Cognitive - AAOx2.5     Communication - Fluent, No dysarthria     Attention: Intact      Judgement: Good evidence of judgement     Memory: Recall 3 objects immediate and 3 min later         Cranial Nerves - right facial droop     Motor -                    LEFT    UE - ShAB 5/5, EF 5/5, EE 5/5, WE 5/5,  5/5                    RIGHT UE - ShAB 0/5, EF 0/5, EE 0/5, WE 0/5,  0/5                    LEFT    LE - HF 5/5, KE 5/5, DF 5/5, PF 5/5                    RIGHT LE - HF 1/5, KE 1/5, DF 1/5, PF 1/5        Sensory - Intact to LT     Reflexes - DTR Intact, No primitive reflex     Coordination - FTN intact     Tone - normal  Psychiatric - Mood stable, Affect WNL  Skin: Intact. No sacral decubital ulcers Vital Signs  T(C): 36.7 (07-02-21 @ 15:13), Max: 36.9 (07-01-21 @ 23:17)  HR: 81 (07-02-21 @ 15:13) (72 - 95)  BP: 155/75 (07-02-21 @ 15:13) (134/68 - 155/75)  RR: 18 (07-02-21 @ 15:13) (18 - 18)  SpO2: 100% (07-02-21 @ 15:13) (96% - 100%)    Gen - NAD, Comfortable  HEENT - NCAT,   Neck - Supple,   Pulm - CTAB, No wheeze, No rhonchi, No crackles  Cardiovascular - RRR, S1S2, No m/r/g  Abdomen - Soft, NT/ND, +BS  Extremities - No C/C/E, No calf tenderness  Neuro-     Cognitive - AAOx 2--knew she is in hospital but not date     Attention: makes eye contact and good comprehension      Memory: Recall 3 objects immediate and 3 min later         Cranial Nerves - PERRLA, EOMI, right facial droop, +dysphagia     Motor -                    LEFT    UE - ShAB 5/5, EF 5/5, EE 5/5, WE 5/5,  5/5                    RIGHT UE - ShAB 0/5, EF 0/5, EE 0/5, WE 0/5,  0/5                    LEFT    LE - HF 5/5, KE 5/5, DF 5/5, PF 5/5                    RIGHT LE - Hip ext 2/5, KE 2/5, DF 1/5, PF 1/5        Sensory - Intact to LT     Reflexes - DTR Intact, No primitive reflex     Coordination - impaired on right     Tone - normal  Psychiatric - Mood stable, Affect WNL  Skin: Intact. No sacral decubital ulcers

## 2021-07-02 NOTE — DISCHARGE NOTE PROVIDER - NSDCMRMEDTOKEN_GEN_ALL_CORE_FT
Albuterol (Eqv-ProAir HFA) 90 mcg/inh inhalation aerosol: 1 puff(s) inhaled every 6 hours, As Needed  amLODIPine 10 mg oral tablet: 1 tab(s) orally once a day  aspirin 81 mg oral tablet, chewable: 1 tab(s) orally once a day  atorvastatin 80 mg oral tablet: 1 tab(s) orally once a day (at bedtime)  calcium:   clopidogrel 75 mg oral tablet: 1 tab(s) orally once a day  enoxaparin: 40 milligram(s) subcutaneous once a day  famotidine 20 mg oral tablet: 1 tab(s) orally 2 times a day  folic acid 1 mg oral tablet: 1 tab(s) orally once a day  metoprolol succinate 50 mg oral tablet, extended release: 1 tab(s) orally once a day  montelukast 10 mg oral tablet: 1 tab(s) orally once a day  Symbicort 160 mcg-4.5 mcg/inh inhalation aerosol: 2 puff(s) inhaled 2 times a day  Vitamin D2 50,000 intl units (1.25 mg) oral capsule: 1 cap(s) orally once a week   Albuterol (Eqv-ProAir HFA) 90 mcg/inh inhalation aerosol: 1 puff(s) inhaled every 6 hours, As Needed  amLODIPine 10 mg oral tablet: 1 tab(s) orally once a day start on 7/4/21  aspirin 81 mg oral tablet, chewable: 1 tab(s) orally once a day  atorvastatin 80 mg oral tablet: 1 tab(s) orally once a day (at bedtime)  calcium:   clopidogrel 75 mg oral tablet: 1 tab(s) orally once a day  enoxaparin: 40 milligram(s) subcutaneous once a day  famotidine 20 mg oral tablet: 1 tab(s) orally 2 times a day  folic acid 1 mg oral tablet: 1 tab(s) orally once a day  montelukast 10 mg oral tablet: 1 tab(s) orally once a day  Symbicort 160 mcg-4.5 mcg/inh inhalation aerosol: 2 puff(s) inhaled 2 times a day  Vitamin D2 50,000 intl units (1.25 mg) oral capsule: 1 cap(s) orally once a week

## 2021-07-03 LAB
ALBUMIN SERPL ELPH-MCNC: 3.1 G/DL — LOW (ref 3.3–5)
ALP SERPL-CCNC: 84 U/L — SIGNIFICANT CHANGE UP (ref 40–120)
ALT FLD-CCNC: 17 U/L — SIGNIFICANT CHANGE UP (ref 10–45)
ANION GAP SERPL CALC-SCNC: 10 MMOL/L — SIGNIFICANT CHANGE UP (ref 5–17)
AST SERPL-CCNC: 13 U/L — SIGNIFICANT CHANGE UP (ref 10–40)
BASOPHILS # BLD AUTO: 0 K/UL — SIGNIFICANT CHANGE UP (ref 0–0.2)
BASOPHILS NFR BLD AUTO: 0 % — SIGNIFICANT CHANGE UP (ref 0–2)
BILIRUB SERPL-MCNC: 0.6 MG/DL — SIGNIFICANT CHANGE UP (ref 0.2–1.2)
BUN SERPL-MCNC: 24 MG/DL — HIGH (ref 7–23)
CALCIUM SERPL-MCNC: 8.8 MG/DL — SIGNIFICANT CHANGE UP (ref 8.4–10.5)
CHLORIDE SERPL-SCNC: 109 MMOL/L — HIGH (ref 96–108)
CO2 SERPL-SCNC: 24 MMOL/L — SIGNIFICANT CHANGE UP (ref 22–31)
CREAT SERPL-MCNC: 1.14 MG/DL — SIGNIFICANT CHANGE UP (ref 0.5–1.3)
EOSINOPHIL # BLD AUTO: 0.09 K/UL — SIGNIFICANT CHANGE UP (ref 0–0.5)
EOSINOPHIL NFR BLD AUTO: 1.6 % — SIGNIFICANT CHANGE UP (ref 0–6)
GLUCOSE SERPL-MCNC: 129 MG/DL — HIGH (ref 70–99)
HCT VFR BLD CALC: 38.8 % — SIGNIFICANT CHANGE UP (ref 34.5–45)
HGB BLD-MCNC: 12.5 G/DL — SIGNIFICANT CHANGE UP (ref 11.5–15.5)
IMM GRANULOCYTES NFR BLD AUTO: 0.2 % — SIGNIFICANT CHANGE UP (ref 0–1.5)
LYMPHOCYTES # BLD AUTO: 1.09 K/UL — SIGNIFICANT CHANGE UP (ref 1–3.3)
LYMPHOCYTES # BLD AUTO: 19.8 % — SIGNIFICANT CHANGE UP (ref 13–44)
MCHC RBC-ENTMCNC: 29.6 PG — SIGNIFICANT CHANGE UP (ref 27–34)
MCHC RBC-ENTMCNC: 32.2 GM/DL — SIGNIFICANT CHANGE UP (ref 32–36)
MCV RBC AUTO: 91.7 FL — SIGNIFICANT CHANGE UP (ref 80–100)
MONOCYTES # BLD AUTO: 0.42 K/UL — SIGNIFICANT CHANGE UP (ref 0–0.9)
MONOCYTES NFR BLD AUTO: 7.6 % — SIGNIFICANT CHANGE UP (ref 2–14)
NEUTROPHILS # BLD AUTO: 3.9 K/UL — SIGNIFICANT CHANGE UP (ref 1.8–7.4)
NEUTROPHILS NFR BLD AUTO: 70.8 % — SIGNIFICANT CHANGE UP (ref 43–77)
NRBC # BLD: 0 /100 WBCS — SIGNIFICANT CHANGE UP (ref 0–0)
PLATELET # BLD AUTO: 128 K/UL — LOW (ref 150–400)
POTASSIUM SERPL-MCNC: 4.3 MMOL/L — SIGNIFICANT CHANGE UP (ref 3.5–5.3)
POTASSIUM SERPL-SCNC: 4.3 MMOL/L — SIGNIFICANT CHANGE UP (ref 3.5–5.3)
PROT SERPL-MCNC: 7.1 G/DL — SIGNIFICANT CHANGE UP (ref 6–8.3)
RBC # BLD: 4.23 M/UL — SIGNIFICANT CHANGE UP (ref 3.8–5.2)
RBC # FLD: 12.7 % — SIGNIFICANT CHANGE UP (ref 10.3–14.5)
SODIUM SERPL-SCNC: 143 MMOL/L — SIGNIFICANT CHANGE UP (ref 135–145)
WBC # BLD: 5.51 K/UL — SIGNIFICANT CHANGE UP (ref 3.8–10.5)
WBC # FLD AUTO: 5.51 K/UL — SIGNIFICANT CHANGE UP (ref 3.8–10.5)

## 2021-07-03 PROCEDURE — 99222 1ST HOSP IP/OBS MODERATE 55: CPT

## 2021-07-03 PROCEDURE — 99223 1ST HOSP IP/OBS HIGH 75: CPT

## 2021-07-03 RX ORDER — SODIUM CHLORIDE 9 MG/ML
1000 INJECTION, SOLUTION INTRAVENOUS
Refills: 0 | Status: DISCONTINUED | OUTPATIENT
Start: 2021-07-03 | End: 2021-07-03

## 2021-07-03 RX ORDER — AMLODIPINE BESYLATE 2.5 MG/1
5 TABLET ORAL DAILY
Refills: 0 | Status: DISCONTINUED | OUTPATIENT
Start: 2021-07-03 | End: 2021-07-13

## 2021-07-03 RX ADMIN — BUDESONIDE AND FORMOTEROL FUMARATE DIHYDRATE 2 PUFF(S): 160; 4.5 AEROSOL RESPIRATORY (INHALATION) at 08:02

## 2021-07-03 RX ADMIN — MONTELUKAST 10 MILLIGRAM(S): 4 TABLET, CHEWABLE ORAL at 11:30

## 2021-07-03 RX ADMIN — ATORVASTATIN CALCIUM 80 MILLIGRAM(S): 80 TABLET, FILM COATED ORAL at 21:03

## 2021-07-03 RX ADMIN — SODIUM CHLORIDE 50 MILLILITER(S): 9 INJECTION, SOLUTION INTRAVENOUS at 15:25

## 2021-07-03 RX ADMIN — FAMOTIDINE 20 MILLIGRAM(S): 10 INJECTION INTRAVENOUS at 06:40

## 2021-07-03 RX ADMIN — BUDESONIDE AND FORMOTEROL FUMARATE DIHYDRATE 2 PUFF(S): 160; 4.5 AEROSOL RESPIRATORY (INHALATION) at 21:30

## 2021-07-03 RX ADMIN — Medication 81 MILLIGRAM(S): at 11:30

## 2021-07-03 RX ADMIN — PANTOPRAZOLE SODIUM 40 MILLIGRAM(S): 20 TABLET, DELAYED RELEASE ORAL at 06:40

## 2021-07-03 RX ADMIN — ENOXAPARIN SODIUM 40 MILLIGRAM(S): 100 INJECTION SUBCUTANEOUS at 11:30

## 2021-07-03 RX ADMIN — SENNA PLUS 2 TABLET(S): 8.6 TABLET ORAL at 21:03

## 2021-07-03 RX ADMIN — FAMOTIDINE 20 MILLIGRAM(S): 10 INJECTION INTRAVENOUS at 17:35

## 2021-07-03 RX ADMIN — CLOPIDOGREL BISULFATE 75 MILLIGRAM(S): 75 TABLET, FILM COATED ORAL at 11:30

## 2021-07-03 NOTE — DIETITIAN INITIAL EVALUATION ADULT. - OTHER INFO
66 yo RH female with PMH of asthma, HTN to [facility] ED 6/29 for right-sided weakness and slurred speech. CTH neg acute cva, old lacunar infarcts b/l BG. Out of window for tPA. CTA 6/29 shows Aneurysms suspected involving the left middle cerebral artery.   As per RN. Pt has poor appetite. Observed breakfast tray Pt intake 25%. Pt w/ dysphagia on pureed/honey fluids consistency. Current wt 109.3. Observed pt w/ depleted muscle mass on temporalis, buccal area, and orbitals. No edema reported. Skin WDL. 64 yo RH female with PMH of asthma, HTN to [facility] ED 6/29 for right-sided weakness and slurred speech. CTH neg acute cva, old lacunar infarcts b/l BG. Out of window for tPA. CTA 6/29 shows Aneurysms suspected involving the left middle cerebral artery.   As per RN. Pt has poor appetite. Observed breakfast tray Pt intake 25%. Pt w/ dysphagia on pureed/honey fluids consistency. No c/o N/V/D/C reported. Pt c/o acid reflex, Current wt 109.3. Observed pt w/ depleted muscle mass on temporalis, buccal area, and orbitals. No edema reported. Skin WDL.

## 2021-07-03 NOTE — DIETITIAN INITIAL EVALUATION ADULT. - PERTINENT LABORATORY DATA
Date: 03 Jul 2021 05:00  Hemoglobin 12.5   Hematocrit 38.8     Date: 07-03  Sodium 143  Potassium 4.3  Glucose Serum 129<H>  BUN 24<H>  Creatinine 1.34    ACCUCHECK

## 2021-07-03 NOTE — DIETITIAN INITIAL EVALUATION ADULT. - PERTINENT MEDS FT
MEDICATIONS  (STANDING):  aspirin  chewable 81 milliGRAM(s) Oral daily  atorvastatin 80 milliGRAM(s) Oral at bedtime  budesonide 160 MICROgram(s)/formoterol 4.5 MICROgram(s) Inhaler 2 Puff(s) Inhalation two times a day  clopidogrel Tablet 75 milliGRAM(s) Oral daily  enoxaparin Injectable 40 milliGRAM(s) SubCutaneous daily  famotidine    Tablet 20 milliGRAM(s) Oral two times a day  montelukast 10 milliGRAM(s) Oral daily  pantoprazole    Tablet 40 milliGRAM(s) Oral before breakfast    MEDICATIONS  (PRN):  ALBUTerol    90 MICROgram(s) HFA Inhaler 1 Puff(s) Inhalation every 6 hours PRN Shortness of Breath and/or Wheezing  senna 2 Tablet(s) Oral at bedtime PRN Constipation

## 2021-07-03 NOTE — DIETITIAN INITIAL EVALUATION ADULT. - ORAL NUTRITION SUPPLEMENTS
Ensure Enlive Daily (provide 350 calories, 20 gm protein per 8 oz), Ensure Pudding 4 oz BID (170 calories, 4 gm protein per container)

## 2021-07-03 NOTE — DIETITIAN INITIAL EVALUATION ADULT. - ADD RECOMMEND
Advance diet consistency as per SLP recommendations. Honor pt's food preferences as feasible with prescribed diet. Obtain and record PO intake and weights daily

## 2021-07-03 NOTE — DIETITIAN INITIAL EVALUATION ADULT. - DIET TYPE
add Ensure Enlive Daily (provide 350 calories, 20 gm protein per 8 oz) , Ensure Pudding 4 oz BID (170 calories, 4 gm protein per container)

## 2021-07-03 NOTE — DIETITIAN NUTRITION RISK NOTIFICATION - TREATMENT: THE FOLLOWING DIET HAS BEEN RECOMMENDED
[x] Diet, Dysphagia 1 Pureed-Honey Consistency Fluid:   [x] Supplement Feeding Modality:  Oral  Ensure Enlive Servings Per Day:  1       Frequency:  Daily  Ensure Pudding Cans or Servings Per Day:  1       Frequency:  Two Times a day (07-03-21 @ 13:00) [Pending Verification By Attending]  [x] Assist pt with feedings  [x] Honor pt's food preferences as feasible with prescribed diet.   [x] Obtain and record PO intake and weights daily   [x] Advance diet consistency as per SLP recommendations.

## 2021-07-03 NOTE — DIETITIAN INITIAL EVALUATION ADULT. - SIGNS/SYMPTOMS
Pt has dysphagia on pureed/honey fluids consistency diet PO intake <50%, NFPE show mild and severe muscle and fat loss.

## 2021-07-04 PROCEDURE — 99233 SBSQ HOSP IP/OBS HIGH 50: CPT

## 2021-07-04 PROCEDURE — 93010 ELECTROCARDIOGRAM REPORT: CPT

## 2021-07-04 PROCEDURE — 99232 SBSQ HOSP IP/OBS MODERATE 35: CPT

## 2021-07-04 RX ORDER — LANOLIN ALCOHOL/MO/W.PET/CERES
6 CREAM (GRAM) TOPICAL AT BEDTIME
Refills: 0 | Status: DISCONTINUED | OUTPATIENT
Start: 2021-07-04 | End: 2021-07-27

## 2021-07-04 RX ORDER — AMLODIPINE BESYLATE 2.5 MG/1
1 TABLET ORAL
Qty: 0 | Refills: 0 | DISCHARGE
Start: 2021-07-04

## 2021-07-04 RX ADMIN — Medication 6 MILLIGRAM(S): at 22:13

## 2021-07-04 RX ADMIN — ATORVASTATIN CALCIUM 80 MILLIGRAM(S): 80 TABLET, FILM COATED ORAL at 22:14

## 2021-07-04 RX ADMIN — ENOXAPARIN SODIUM 40 MILLIGRAM(S): 100 INJECTION SUBCUTANEOUS at 11:07

## 2021-07-04 RX ADMIN — AMLODIPINE BESYLATE 5 MILLIGRAM(S): 2.5 TABLET ORAL at 05:40

## 2021-07-04 RX ADMIN — MONTELUKAST 10 MILLIGRAM(S): 4 TABLET, CHEWABLE ORAL at 11:07

## 2021-07-04 RX ADMIN — CLOPIDOGREL BISULFATE 75 MILLIGRAM(S): 75 TABLET, FILM COATED ORAL at 11:07

## 2021-07-04 RX ADMIN — FAMOTIDINE 20 MILLIGRAM(S): 10 INJECTION INTRAVENOUS at 05:40

## 2021-07-04 RX ADMIN — PANTOPRAZOLE SODIUM 40 MILLIGRAM(S): 20 TABLET, DELAYED RELEASE ORAL at 06:41

## 2021-07-04 RX ADMIN — FAMOTIDINE 20 MILLIGRAM(S): 10 INJECTION INTRAVENOUS at 17:28

## 2021-07-04 RX ADMIN — Medication 81 MILLIGRAM(S): at 11:07

## 2021-07-04 RX ADMIN — BUDESONIDE AND FORMOTEROL FUMARATE DIHYDRATE 2 PUFF(S): 160; 4.5 AEROSOL RESPIRATORY (INHALATION) at 09:16

## 2021-07-04 RX ADMIN — BUDESONIDE AND FORMOTEROL FUMARATE DIHYDRATE 2 PUFF(S): 160; 4.5 AEROSOL RESPIRATORY (INHALATION) at 20:55

## 2021-07-04 NOTE — PROGRESS NOTE ADULT - SUBJECTIVE AND OBJECTIVE BOX
HPI:  64 yo RH female with PMH of asthma, HTN to [facility] ED 6/29 for right-sided weakness and slurred speech. CTH neg acute cva, old lacunar infarcts b/l BG. Out of window for tPA. CTA 6/29 shows Aneurysms suspected involving the left middle cerebral artery as described above. Transferred from [facility] to [facility] for possible evaluation of aneurysms noted on imaging. MRA 6/30 showed mild age-appropriate involutional and ischemic gliotic changes with old bilateral basal ganglia lacunar infarcts. Small acute infarct left basal ganglia and corona radiata. No evidence of hemorrhage. 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 Also mild aneurysmal dilatation of the origin of the left inferior M2 branch. Hospitalization complicated by NANCY, IVF completed. Failed swallow eval-tolerates Puree honey. TTE as outpatient. HbA1c 5.9 and . Evaluated by PMR and acute rehab recommended. Cleared for dc on 7/2.  Patient arrived to [facility] on 7/2/21 for acute inpatient rehab. Azeri iterpreter #[id number]. Pt c/o difficulty speaking and still unable to more her right upper and right lower extremities.  (02 Jul 2021 15:21)      Subjective    c/o cp/abd pain.       PAST MEDICAL & SURGICAL HISTORY:  Brain aneurysm    Hypertension    Brain aneurysm    Smoking    No significant past surgical history    No significant past surgical history        MedsMEDICATIONS  (STANDING):  amLODIPine   Tablet 5 milliGRAM(s) Oral daily  aspirin  chewable 81 milliGRAM(s) Oral daily  atorvastatin 80 milliGRAM(s) Oral at bedtime  budesonide 160 MICROgram(s)/formoterol 4.5 MICROgram(s) Inhaler 2 Puff(s) Inhalation two times a day  clopidogrel Tablet 75 milliGRAM(s) Oral daily  enoxaparin Injectable 40 milliGRAM(s) SubCutaneous daily  famotidine    Tablet 20 milliGRAM(s) Oral two times a day  melatonin 6 milliGRAM(s) Oral at bedtime  montelukast 10 milliGRAM(s) Oral daily  pantoprazole    Tablet 40 milliGRAM(s) Oral before breakfast    MEDICATIONS  (PRN):  ALBUTerol    90 MICROgram(s) HFA Inhaler 1 Puff(s) Inhalation every 6 hours PRN Shortness of Breath and/or Wheezing  senna 2 Tablet(s) Oral at bedtime PRN Constipation      Vital Signs Last 24 Hrs  T(C): 36.3 (04 Jul 2021 08:45), Max: 36.6 (03 Jul 2021 21:50)  T(F): 97.3 (04 Jul 2021 08:45), Max: 97.8 (03 Jul 2021 21:50)  HR: 92 (04 Jul 2021 08:45) (74 - 92)  BP: 160/88 (04 Jul 2021 08:45) (136/83 - 161/75)  BP(mean): --  RR: 15 (04 Jul 2021 08:45) (15 - 16)  SpO2: 100% (04 Jul 2021 08:45) (95% - 100%)  I&O's Summary    03 Jul 2021 07:01  -  04 Jul 2021 07:00  --------------------------------------------------------  IN: 200 mL / OUT: 0 mL / NET: 200 mL        PHYSICAL EXAM:  GENERAL: NAD  NECK: Supple  NERVOUS SYSTEM:  awake  HEART: S1s2 NL , RRR  CHEST/LUNG: Clear to percussion bilaterally  ABDOMEN: Soft, Nontender, Nondistended; Bowel sounds present, no rebound/guarding  EXTREMITIES:  No edema      LABS:(07-03 @ 05:00)                      12.5  5.51 )-----------( 128                 38.8    Neutrophils = 3.90 (70.8%)  Lymphocytes = 1.09 (19.8%)  Eosinophils = 0.09 (1.6%)  Basophils = 0.00 (0.0%)  Monocytes = 0.42 (7.6%)  Bands = --%    07-03    143  |  109<H>  |  24<H>  ----------------------------<  129<H>  4.3   |  24  |  1.14    Ca    8.8      03 Jul 2021 05:00    TPro  7.1  /  Alb  3.1<L>  /  TBili  0.6  /  DBili  x   /  AST  13  /  ALT  17  /  AlkPhos  84  07-03          RVP:          Tox:           CAPILLARY BLOOD GLUCOSE          Imaging Personally Reviewed:  [ ] YES  [ ] NO        Care Discussed with Consultants/Other Providers [ x] YES  [ ] NO

## 2021-07-04 NOTE — PROGRESS NOTE ADULT - ASSESSMENT
CVA  PT/OT per rehab  DAPT/statin    HTN  Norvasc      NANCY  resolved    Asthma  Symbicort, alb prn, singulair    Atypical CP with non specific abd pain  EKG-nl  pt already on DAPT/statin  doubt ACS-monitor for now

## 2021-07-04 NOTE — PROGRESS NOTE ADULT - ASSESSMENT
MARKIE HERNANDEZ is a 66yo Female with left subcortical CVA  with dysphagia, right Hemiparesis, decreased functional mobility, gait instability and ADL impairments.    COMORBIDITES/ACTIVE MEDICAL ISSUES     Gait Instability, ADL impairments and Functional impairments: cont Comprehensive Rehab Program of PT/OT and speech therapy    #CVA  -DAPT with Lipitor  -fall and aspiration precautions  -TTE outpt  -neurology and cardio follow up outpt    Chest Pain  --EKG Sinus-- no ischemic changes.    --d/w hospitalist    #HLD  -continue Lipitor  -monitor LFTs    #HTN  -BP slightly elevated  -Norvasc recently started today  --will monitor and adjust PRN  -hospitalist  following    #NANCY  --Cr improved  -monitor BMP    #Asthma  -continue simicort and albuterol  -monitor VS    Pain Mgmt - Tylenol PRN  GI/Bowel Mgmt - Senna,   PRN  /Bladder Mgmt - Voiding      Precautions / PROPHYLAXIS:   - Falls,  Seizure   -- Lungs: Aspiration,   - Pressure injury/Skin: Turn Q2hrs while in bed, OOB to Chair, PT/OT    - DVT: Lovenox     MARKIE HERNANDEZ is a 64yo Female with left subcortical CVA  with dysphagia, right Hemiparesis, decreased functional mobility, gait instability and ADL impairments.    COMORBIDITES/ACTIVE MEDICAL ISSUES     Gait Instability, ADL impairments and Functional impairments: cont Comprehensive Rehab Program of PT/OT and speech therapy    #CVA  -DAPT with Lipitor  -fall and aspiration precautions  -TTE outpt  -neurology and cardio follow up outpt    Chest Pain  --EKG Sinus-- no ischemic changes.    --d/w hospitalist    Insomnia  --Trial melatonin qhs    #HLD  -continue Lipitor  -monitor LFTs    #HTN  -BP slightly elevated  -Norvasc recently started today  --will monitor and adjust PRN  -hospitalist  following    #NANCY  --Cr improved  -monitor BMP    #Asthma  -continue simicort and albuterol  -monitor VS    Pain Mgmt - Tylenol PRN  GI/Bowel Mgmt - Senna,   PRN  /Bladder Mgmt - Voiding      Precautions / PROPHYLAXIS:   - Falls,  Seizure   -- Lungs: Aspiration,   - Pressure injury/Skin: Turn Q2hrs while in bed, OOB to Chair, PT/OT    - DVT: Lovenox

## 2021-07-04 NOTE — PROGRESS NOTE ADULT - SUBJECTIVE AND OBJECTIVE BOX
HPI:  66 yo RH female with PMH of asthma, HTN to [facility] ED 6/29 for right-sided weakness and slurred speech. CTH neg acute cva, old lacunar infarcts b/l BG. Out of window for tPA. CTA 6/29 shows Aneurysms suspected involving the left middle cerebral artery as described above. Transferred from [facility] to [facility] for possible evaluation of aneurysms noted on imaging. MRA 6/30 showed mild age-appropriate involutional and ischemic gliotic changes with old bilateral basal ganglia lacunar infarcts. Small acute infarct left basal ganglia and corona radiata. No evidence of hemorrhage. 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 Also mild aneurysmal dilatation of the origin of the left inferior M2 branch. Hospitalization complicated by NANCY, IVF completed. Failed swallow eval-tolerates Puree honey. TTE as outpatient. HbA1c 5.9 and . Evaluated by PMR and acute rehab recommended. Cleared for dc on 7/2.  Patient arrived to [facility] on 7/2/21 for acute inpatient rehab. [place] iterpreter #[id number]. Pt c/o difficulty speaking and still unable to more her right upper and right lower extremities.  (02 Jul 2021 15:21)      PAST MEDICAL & SURGICAL HISTORY:  Brain aneurysm    Hypertension    Brain aneurysm    Smoking    No significant past surgical history    No significant past surgical history        Subjective:  Difficult to get a [place]  from [employer].  Tried calling pt's son to interpret, but no answer.  I was able to communicate somewhat with pt. in [place] and few [place] words with some limitation.  Pt. reports she did not sleep much at night.  +also appeared to report pain in her chest -- STAT EKG ordered.  Seen by hospitalist as well      VITALS  Vital Signs Last 24 Hrs  T(C): 36.3 (04 Jul 2021 08:45), Max: 36.6 (03 Jul 2021 21:50)  T(F): 97.3 (04 Jul 2021 08:45), Max: 97.8 (03 Jul 2021 21:50)  HR: 92 (04 Jul 2021 08:45) (74 - 92)  BP: 160/88 (04 Jul 2021 08:45) (136/83 - 161/75)  BP(mean): --  RR: 15 (04 Jul 2021 08:45) (15 - 16)  SpO2: 100% (04 Jul 2021 08:45) (95% - 100%)    REVIEW OF SYMPTOMS  Neurological deficits-- dysarthria, right HP, dysphagia      PHYSICAL EXAM  Gen - NAD, Comfortable  HEENT - NCAT,   Pulm - CTAB, No wheeze, No rhonchi, No crackles  Cardiovascular - RRR, S1S2, No m/r/g  Abdomen - Soft, NT/ND, +BS  Extremities - No C/C/E, No calf tenderness  Neuro-     Cognitive - AAOx 2--knew she is in hospital but not date     Attention: makes eye contact and good comprehension      Memory: Recall 3 objects immediate and 3 min later         Cranial Nerves - PERRLA, EOMI, right facial droop, +dysphagia     Motor -                    LEFT    UE - ShAB 5/5, EF 5/5, EE 5/5, WE 5/5,  5/5                    RIGHT UE - ShAB 1-2/5, EF 2/5, EE 1/5, WE 0/5,  0/5                    LEFT    LE - HF 5/5, KE 5/5, DF 5/5, PF 5/5                    RIGHT LE - Hip ext 2/5, KE 2/5, DF 1/5, PF 1/5        Sensory - Intact to LT     Reflexes - DTR Intact, No primitive reflex     Coordination - impaired on right     Tone - normal  Psychiatric - Mood stable, Affect WNL  RECENT LABS                        12.5   5.51  )-----------( 128      ( 03 Jul 2021 05:00 )             38.8     07-03    143  |  109<H>  |  24<H>  ----------------------------<  129<H>  4.3   |  24  |  1.14    Ca    8.8      03 Jul 2021 05:00    TPro  7.1  /  Alb  3.1<L>  /  TBili  0.6  /  DBili  x   /  AST  13  /  ALT  17  /  AlkPhos  84  07-03            RADIOLOGY/OTHER RESULTS      MEDICATIONS  (STANDING):  amLODIPine   Tablet 5 milliGRAM(s) Oral daily  aspirin  chewable 81 milliGRAM(s) Oral daily  atorvastatin 80 milliGRAM(s) Oral at bedtime  budesonide 160 MICROgram(s)/formoterol 4.5 MICROgram(s) Inhaler 2 Puff(s) Inhalation two times a day  clopidogrel Tablet 75 milliGRAM(s) Oral daily  enoxaparin Injectable 40 milliGRAM(s) SubCutaneous daily  famotidine    Tablet 20 milliGRAM(s) Oral two times a day  montelukast 10 milliGRAM(s) Oral daily  pantoprazole    Tablet 40 milliGRAM(s) Oral before breakfast    MEDICATIONS  (PRN):  ALBUTerol    90 MICROgram(s) HFA Inhaler 1 Puff(s) Inhalation every 6 hours PRN Shortness of Breath and/or Wheezing  senna 2 Tablet(s) Oral at bedtime PRN Constipation

## 2021-07-05 PROCEDURE — 99232 SBSQ HOSP IP/OBS MODERATE 35: CPT

## 2021-07-05 PROCEDURE — 93970 EXTREMITY STUDY: CPT | Mod: 26

## 2021-07-05 RX ORDER — SIMETHICONE 80 MG/1
80 TABLET, CHEWABLE ORAL
Refills: 0 | Status: DISCONTINUED | OUTPATIENT
Start: 2021-07-05 | End: 2021-07-27

## 2021-07-05 RX ADMIN — Medication 6 MILLIGRAM(S): at 22:40

## 2021-07-05 RX ADMIN — Medication 81 MILLIGRAM(S): at 11:25

## 2021-07-05 RX ADMIN — SIMETHICONE 80 MILLIGRAM(S): 80 TABLET, CHEWABLE ORAL at 17:26

## 2021-07-05 RX ADMIN — BUDESONIDE AND FORMOTEROL FUMARATE DIHYDRATE 2 PUFF(S): 160; 4.5 AEROSOL RESPIRATORY (INHALATION) at 08:06

## 2021-07-05 RX ADMIN — Medication 30 MILLILITER(S): at 22:40

## 2021-07-05 RX ADMIN — ATORVASTATIN CALCIUM 80 MILLIGRAM(S): 80 TABLET, FILM COATED ORAL at 22:40

## 2021-07-05 RX ADMIN — PANTOPRAZOLE SODIUM 40 MILLIGRAM(S): 20 TABLET, DELAYED RELEASE ORAL at 06:25

## 2021-07-05 RX ADMIN — MONTELUKAST 10 MILLIGRAM(S): 4 TABLET, CHEWABLE ORAL at 11:25

## 2021-07-05 RX ADMIN — BUDESONIDE AND FORMOTEROL FUMARATE DIHYDRATE 2 PUFF(S): 160; 4.5 AEROSOL RESPIRATORY (INHALATION) at 21:35

## 2021-07-05 RX ADMIN — FAMOTIDINE 20 MILLIGRAM(S): 10 INJECTION INTRAVENOUS at 17:26

## 2021-07-05 RX ADMIN — CLOPIDOGREL BISULFATE 75 MILLIGRAM(S): 75 TABLET, FILM COATED ORAL at 11:25

## 2021-07-05 RX ADMIN — AMLODIPINE BESYLATE 5 MILLIGRAM(S): 2.5 TABLET ORAL at 05:34

## 2021-07-05 RX ADMIN — FAMOTIDINE 20 MILLIGRAM(S): 10 INJECTION INTRAVENOUS at 05:34

## 2021-07-05 RX ADMIN — ENOXAPARIN SODIUM 40 MILLIGRAM(S): 100 INJECTION SUBCUTANEOUS at 11:26

## 2021-07-05 NOTE — PROGRESS NOTE ADULT - ASSESSMENT
CVA  PT/OT per rehab  DAPT/statin    HTN  Norvasc      NANCY  resolved    Asthma  Symbicort, alb prn, singulair    Atypical CP on 7/4/21  Resolved  EKG-nl  pt already on DAPT/statin  doubt ACS-monitor for now

## 2021-07-05 NOTE — PROGRESS NOTE ADULT - SUBJECTIVE AND OBJECTIVE BOX
HPI:  64 yo RH female with PMH of asthma, HTN to [facility] ED 6/29 for right-sided weakness and slurred speech. CTH neg acute cva, old lacunar infarcts b/l BG. Out of window for tPA. CTA 6/29 shows Aneurysms suspected involving the left middle cerebral artery as described above. Transferred from [facility] to [facility] for possible evaluation of aneurysms noted on imaging. MRA 6/30 showed mild age-appropriate involutional and ischemic gliotic changes with old bilateral basal ganglia lacunar infarcts. Small acute infarct left basal ganglia and corona radiata. No evidence of hemorrhage. 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 Also mild aneurysmal dilatation of the origin of the left inferior M2 branch. Hospitalization complicated by NANCY, IVF completed. Failed swallow eval-tolerates Puree honey. TTE as outpatient. HbA1c 5.9 and . Evaluated by PMR and acute rehab recommended. Cleared for dc on 7/2.  Patient arrived to [facility] on 7/2/21 for acute inpatient rehab. [employer] iterpreter #[id number]. Pt c/o difficulty speaking and still unable to more her right upper and right lower extremities.  (02 Jul 2021 15:21)      Subjective    Denies CP.         PAST MEDICAL & SURGICAL HISTORY:  Brain aneurysm    Hypertension    Brain aneurysm    Smoking    No significant past surgical history    No significant past surgical history        MedsMEDICATIONS  (STANDING):  amLODIPine   Tablet 5 milliGRAM(s) Oral daily  aspirin  chewable 81 milliGRAM(s) Oral daily  atorvastatin 80 milliGRAM(s) Oral at bedtime  budesonide 160 MICROgram(s)/formoterol 4.5 MICROgram(s) Inhaler 2 Puff(s) Inhalation two times a day  clopidogrel Tablet 75 milliGRAM(s) Oral daily  enoxaparin Injectable 40 milliGRAM(s) SubCutaneous daily  famotidine    Tablet 20 milliGRAM(s) Oral two times a day  melatonin 6 milliGRAM(s) Oral at bedtime  montelukast 10 milliGRAM(s) Oral daily  pantoprazole    Tablet 40 milliGRAM(s) Oral before breakfast    MEDICATIONS  (PRN):  ALBUTerol    90 MICROgram(s) HFA Inhaler 1 Puff(s) Inhalation every 6 hours PRN Shortness of Breath and/or Wheezing  senna 2 Tablet(s) Oral at bedtime PRN Constipation    Vital Signs Last 24 Hrs  T(C): 36.8 (05 Jul 2021 08:51), Max: 36.8 (05 Jul 2021 08:51)  T(F): 98.3 (05 Jul 2021 08:51), Max: 98.3 (05 Jul 2021 08:51)  HR: 99 (05 Jul 2021 08:51) (85 - 99)  BP: 124/82 (05 Jul 2021 08:51) (121/75 - 146/85)  BP(mean): --  RR: 16 (05 Jul 2021 08:51) (16 - 17)  SpO2: 98% (05 Jul 2021 08:51) (98% - 99%)  I&O's Summary      PHYSICAL EXAM:  GENERAL: NAD  NECK: Supple  NERVOUS SYSTEM:  awake and alert  HEART: S1s2 NL , RRR  CHEST/LUNG: Clear to percussion bilaterally  ABDOMEN: Soft, Nontender, Nondistended; Bowel sounds present  EXTREMITIES:  No edema      LABS:              RVP:          Tox:           CAPILLARY BLOOD GLUCOSE          Imaging Personally Reviewed:  [ ] YES  [ ] NO        Care Discussed with Consultants/Other Providers [ x] YES  [ ] NO

## 2021-07-05 NOTE — PROGRESS NOTE ADULT - ASSESSMENT
MARKIE HERNANDEZ is a 64yo Female with left subcortical CVA  with dysphagia, right Hemiparesis, decreased functional mobility, gait instability and ADL impairments.    COMORBIDITES/ACTIVE MEDICAL ISSUES     Gait Instability, ADL impairments and Functional impairments: cont Comprehensive Rehab Program of PT/OT and speech therapy    #CVA  -DAPT with Lipitor  -fall and aspiration precautions  -TTE outpt  -neurology and cardio follow up outpt    Dyspepsia  --Trial simethicone PRN  --cont. protonix    Chest Pain--resolved  --EKG Sinus-- no ischemic changes.    --d/w hospitalist    Insomnia  --cont. melatonin qhs    #HLD  -continue Lipitor  -monitor LFTs    #HTN  -BP improved after increase in Norvasc yesterday  --will monitor and adjust PRN  -hospitalist  following    #NANCY  --Cr improved  -monitor BMP    #Asthma  -continue simicort and albuterol  -monitor VS    Pain Mgmt - Tylenol PRN  GI/Bowel Mgmt - Senna,   PRN  /Bladder Mgmt - Voiding      Precautions / PROPHYLAXIS:   - Falls,  Seizure   -- Lungs: Aspiration,   - Pressure injury/Skin: Turn Q2hrs while in bed, OOB to Chair, PT/OT    - DVT: Lovenox

## 2021-07-05 NOTE — PROGRESS NOTE ADULT - SUBJECTIVE AND OBJECTIVE BOX
HPI:  64 yo RH female with PMH of asthma, HTN to [facility] ED 6/29 for right-sided weakness and slurred speech. CTH neg acute cva, old lacunar infarcts b/l BG. Out of window for tPA. CTA 6/29 shows Aneurysms suspected involving the left middle cerebral artery as described above. Transferred from [facility] to [facility] for possible evaluation of aneurysms noted on imaging. MRA 6/30 showed mild age-appropriate involutional and ischemic gliotic changes with old bilateral basal ganglia lacunar infarcts. Small acute infarct left basal ganglia and corona radiata. No evidence of hemorrhage. 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 Also mild aneurysmal dilatation of the origin of the left inferior M2 branch. Hospitalization complicated by NANCY, IVF completed. Failed swallow eval-tolerates Puree honey. TTE as outpatient. HbA1c 5.9 and . Evaluated by PMR and acute rehab recommended. Cleared for dc on 7/2.  Patient arrived to [facility] on 7/2/21 for acute inpatient rehab. [facility] iterpreter #[id number]. Pt c/o difficulty speaking and still unable to more her right upper and right lower extremities.  (02 Jul 2021 15:21)      PAST MEDICAL & SURGICAL HISTORY:  Brain aneurysm    Hypertension    Brain aneurysm    Smoking    No significant past surgical history    No significant past surgical history        Subjective:  indicates she slept last night.  Slept as per nursing notes.  Reports some abdominal pain-- ?dyspepsia.  Eating 25 % of meals.       VITALS  Vital Signs Last 24 Hrs  T(C): 36.8 (05 Jul 2021 08:51), Max: 36.8 (05 Jul 2021 08:51)  T(F): 98.3 (05 Jul 2021 08:51), Max: 98.3 (05 Jul 2021 08:51)  HR: 99 (05 Jul 2021 08:51) (85 - 99)  BP: 124/82 (05 Jul 2021 08:51) (121/75 - 146/85)  BP(mean): --  RR: 16 (05 Jul 2021 08:51) (16 - 17)  SpO2: 98% (05 Jul 2021 08:51) (98% - 99%)    REVIEW OF SYMPTOMS  [Neurological deficits-- dysarthria, right HP, dysphagia      PHYSICAL EXAM  Gen - NAD, Comfortable  HEENT - NCAT,   Pulm - CTAB, No wheeze, No rhonchi, No crackles  Cardiovascular - RRR, S1S2, No m/r/g  Abdomen - Soft, NT/ND, +BS  Extremities - No C/C/E, No calf tenderness  Neuro-     Cognitive - AAOx 2--knew she is in hospital but not date     Attention: makes eye contact and good comprehension      Memory: Recall 3 objects immediate and 3 min later         Cranial Nerves - PERRLA, EOMI, right facial droop, +dysphagia     Motor -                    LEFT    UE - ShAB 5/5, EF 5/5, EE 5/5, WE 5/5,  5/5                    RIGHT UE - ShAB 1-2/5, EF 2/5, EE 1/5, WE 0/5,  0/5                    LEFT    LE - HF 5/5, KE 5/5, DF 5/5, PF 5/5                    RIGHT LE - Hip ext 2/5, KE 2/5, DF 1/5, PF 1/5        Sensory - Intact to LT     Reflexes - DTR Intact, No primitive reflex     Coordination - impaired on right     Tone - normal  Psychiatric - Mood stable, Affect WNL  RECENT LABS                  RADIOLOGY/OTHER RESULTS      MEDICATIONS  (STANDING):  amLODIPine   Tablet 5 milliGRAM(s) Oral daily  aspirin  chewable 81 milliGRAM(s) Oral daily  atorvastatin 80 milliGRAM(s) Oral at bedtime  budesonide 160 MICROgram(s)/formoterol 4.5 MICROgram(s) Inhaler 2 Puff(s) Inhalation two times a day  clopidogrel Tablet 75 milliGRAM(s) Oral daily  enoxaparin Injectable 40 milliGRAM(s) SubCutaneous daily  famotidine    Tablet 20 milliGRAM(s) Oral two times a day  melatonin 6 milliGRAM(s) Oral at bedtime  montelukast 10 milliGRAM(s) Oral daily  pantoprazole    Tablet 40 milliGRAM(s) Oral before breakfast    MEDICATIONS  (PRN):  ALBUTerol    90 MICROgram(s) HFA Inhaler 1 Puff(s) Inhalation every 6 hours PRN Shortness of Breath and/or Wheezing  senna 2 Tablet(s) Oral at bedtime PRN Constipation

## 2021-07-06 LAB
ANION GAP SERPL CALC-SCNC: 9 MMOL/L — SIGNIFICANT CHANGE UP (ref 5–17)
BUN SERPL-MCNC: 25 MG/DL — HIGH (ref 7–23)
CALCIUM SERPL-MCNC: 9.2 MG/DL — SIGNIFICANT CHANGE UP (ref 8.4–10.5)
CHLORIDE SERPL-SCNC: 105 MMOL/L — SIGNIFICANT CHANGE UP (ref 96–108)
CO2 SERPL-SCNC: 25 MMOL/L — SIGNIFICANT CHANGE UP (ref 22–31)
CREAT SERPL-MCNC: 1.39 MG/DL — HIGH (ref 0.5–1.3)
GLUCOSE SERPL-MCNC: 101 MG/DL — HIGH (ref 70–99)
MAGNESIUM SERPL-MCNC: 2.1 MG/DL — SIGNIFICANT CHANGE UP (ref 1.6–2.6)
POTASSIUM SERPL-MCNC: 4.3 MMOL/L — SIGNIFICANT CHANGE UP (ref 3.5–5.3)
POTASSIUM SERPL-SCNC: 4.3 MMOL/L — SIGNIFICANT CHANGE UP (ref 3.5–5.3)
SODIUM SERPL-SCNC: 139 MMOL/L — SIGNIFICANT CHANGE UP (ref 135–145)

## 2021-07-06 PROCEDURE — 93306 TTE W/DOPPLER COMPLETE: CPT | Mod: 26

## 2021-07-06 PROCEDURE — 99233 SBSQ HOSP IP/OBS HIGH 50: CPT

## 2021-07-06 PROCEDURE — 99232 SBSQ HOSP IP/OBS MODERATE 35: CPT

## 2021-07-06 RX ORDER — SODIUM CHLORIDE 9 MG/ML
1000 INJECTION, SOLUTION INTRAVENOUS
Refills: 0 | Status: DISCONTINUED | OUTPATIENT
Start: 2021-07-06 | End: 2021-07-10

## 2021-07-06 RX ADMIN — FAMOTIDINE 20 MILLIGRAM(S): 10 INJECTION INTRAVENOUS at 17:14

## 2021-07-06 RX ADMIN — AMLODIPINE BESYLATE 5 MILLIGRAM(S): 2.5 TABLET ORAL at 05:36

## 2021-07-06 RX ADMIN — SODIUM CHLORIDE 50 MILLILITER(S): 9 INJECTION, SOLUTION INTRAVENOUS at 17:26

## 2021-07-06 RX ADMIN — SIMETHICONE 80 MILLIGRAM(S): 80 TABLET, CHEWABLE ORAL at 09:34

## 2021-07-06 RX ADMIN — ATORVASTATIN CALCIUM 80 MILLIGRAM(S): 80 TABLET, FILM COATED ORAL at 21:06

## 2021-07-06 RX ADMIN — FAMOTIDINE 20 MILLIGRAM(S): 10 INJECTION INTRAVENOUS at 05:36

## 2021-07-06 RX ADMIN — SODIUM CHLORIDE 50 MILLILITER(S): 9 INJECTION, SOLUTION INTRAVENOUS at 21:06

## 2021-07-06 RX ADMIN — MONTELUKAST 10 MILLIGRAM(S): 4 TABLET, CHEWABLE ORAL at 11:25

## 2021-07-06 RX ADMIN — BUDESONIDE AND FORMOTEROL FUMARATE DIHYDRATE 2 PUFF(S): 160; 4.5 AEROSOL RESPIRATORY (INHALATION) at 20:07

## 2021-07-06 RX ADMIN — PANTOPRAZOLE SODIUM 40 MILLIGRAM(S): 20 TABLET, DELAYED RELEASE ORAL at 06:32

## 2021-07-06 RX ADMIN — Medication 6 MILLIGRAM(S): at 21:06

## 2021-07-06 RX ADMIN — CLOPIDOGREL BISULFATE 75 MILLIGRAM(S): 75 TABLET, FILM COATED ORAL at 11:25

## 2021-07-06 RX ADMIN — ENOXAPARIN SODIUM 40 MILLIGRAM(S): 100 INJECTION SUBCUTANEOUS at 11:25

## 2021-07-06 RX ADMIN — Medication 81 MILLIGRAM(S): at 11:25

## 2021-07-06 NOTE — PROGRESS NOTE ADULT - SUBJECTIVE AND OBJECTIVE BOX
Patient is a 65y old  Female who presents with a chief complaint of s/p left BG CVA with right sided weakness (2021 11:54)    HPI:  66 yo RH female with PMH of asthma, HTN to [facility] ED  for right-sided weakness and slurred speech. CTH neg acute cva, old lacunar infarcts b/l BG. Out of window for tPA. CTA  shows Aneurysms suspected involving the left middle cerebral artery as described above. Transferred from [facility] to [facility] for possible evaluation of aneurysms noted on imaging. MRA  showed mild age-appropriate involutional and ischemic gliotic changes with old bilateral basal ganglia lacunar infarcts. Small acute infarct left basal ganglia and corona radiata. No evidence of hemorrhage. 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 Also mild aneurysmal dilatation of the origin of the left inferior M2 branch. Hospitalization complicated by NANCY, IVF completed. Failed swallow eval-tolerates Puree honey. TTE as outpatient. HbA1c 5.9 and . Evaluated by PMR and acute rehab recommended. Cleared for dc on .  Patient arrived to [facility] on 21 for acute inpatient rehab. Celi iterpreter #[id number]. Pt c/o difficulty speaking and still unable to more her right upper and right lower extremities.  (2021 15:21)    PAST MEDICAL & SURGICAL HISTORY:  Brain aneurysm    Hypertension    Brain aneurysm    Smoking    No significant past surgical history    No significant past surgical history      Allergies    No Known Allergies    Intolerances      ----------------------------------------------------------------------    SUBJECTIVE: Patient seen this morning with the help of Celi  # [id number]. She reports some GI upset with loose stools overnight and associated epigastric pain. She reports difficulty sleeping since she had 2 or 3 bowel movements overnight. Per nursing, this morning, stools are soft. Patient is requesting her home medication pepto-bismol.       ROS:  Positive: Complains of GI upset and loose stools  Denies: headache, lightheadedness, CP, SOB, dysuria, nausea, constipation      ----------------------------------------------------------------------  PHYSICAL EXAM:    Vital Signs Last 24 Hrs  T(C): 36.4 (2021 08:12), Max: 36.6 (2021 19:54)  T(F): 97.6 (2021 08:12), Max: 97.9 (2021 19:54)  HR: 91 (2021 08:12) (84 - 92)  BP: 118/79 (2021 08:12) (118/79 - 132/81)  BP(mean): --  RR: 16 (2021 08:12) (16 - 16)  SpO2: 94% (2021 08:12) (94% - 98%)  Daily     Daily Weight in k.6 (2021 23:13)    Gen - NAD, Comfortable  HEENT - NCAT,   Pulm - CTAB, No wheeze, No rhonchi, No crackles  Cardiovascular - RRR, S1S2, No m/r/g  Abdomen - Soft, NT/ND, +BS  Extremities - No C/C/E, No calf tenderness  Neuro-     Cognitive - AAOx 2--knew she is in hospital but not date     Attention: makes eye contact and good comprehension      Memory: Recall 3 objects immediate and 3 min later         Cranial Nerves - PERRLA, EOMI, right facial droop, +dysphagia     Motor -                    LEFT    UE - ShAB 5/5, EF 5/5, EE 5/5, WE 5/5,  5/5                    RIGHT UE - ShAB 2/5, EF 2/5, EE 1/5, WE 0/5,  0/5                    LEFT    LE - HF 5/5, KE 5/5, DF 5/5, PF 5/5                    RIGHT LE - Hip ext 2/5, KE 2/5, DF 1/5, PF 1/5        Sensory - Intact to LT     Reflexes - DTR Intact, No primitive reflex     Coordination - impaired on right     Tone - normal  Psychiatric - Mood stable, Affect WNL      ----------------------------------------------------------------------  RECENT LABS:  Complete Blood Count + Automated Diff (21 @ 05:00)   WBC Count: 5.51 K/uL   RBC Count: 4.23 M/uL   Hemoglobin: 12.5 g/dL   Hematocrit: 38.8 %   Mean Cell Volume: 91.7 fl   Mean Cell Hemoglobin: 29.6 pg   Mean Cell Hemoglobin Conc: 32.2 gm/dL   Red Cell Distrib Width: 12.7 %   Platelet Count - Automated: 128 K/uL   Auto Neutrophil #: 3.90 K/uL   Auto Lymphocyte #: 1.09 K/uL   Auto Monocyte #: 0.42 K/uL   Auto Eosinophil #: 0.09 K/uL   Auto Basophil #: 0.00 K/uL   Auto Neutrophil %: 70.8: Differential percentages must be correlated with absolute numbers for   clinical significance. %   Auto Lymphocyte %: 19.8 %   Auto Monocyte %: 7.6 %   Auto Eosinophil %: 1.6 %   Auto Basophil %: 0.0 %   Auto Immature Granulocyte %: 0.2: (Includes meta, myelo and promyelocytes) %   Nucleated RBC: 0 /100 WBCs Comprehensive Metabolic Panel (21 @ 05:00)   Sodium, Serum: 143 mmol/L   Potassium, Serum: 4.3 mmol/L   Chloride, Serum: 109 mmol/L   Carbon Dioxide, Serum: 24 mmol/L   Anion Gap, Serum: 10 mmol/L   Blood Urea Nitrogen, Serum: 24 mg/dL   Creatinine, Serum: 1.14 mg/dL   Glucose, Serum: 129 mg/dL   Calcium, Total Serum: 8.8 mg/dL   Protein Total, Serum: 7.1 g/dL   Albumin, Serum: 3.1 g/dL   Bilirubin Total, Serum: 0.6 mg/dL   Alkaline Phosphatase, Serum: 84 U/L   Aspartate Aminotransferase (AST/SGOT): 13 U/L   Alanine Aminotransferase (ALT/SGPT): 17 U/L   eGFR if Non : 50: Interpretative comment   The units for eGFR are mL/min/1.73M2 (normalized body surface area). The   eGFR is calculated from a serum creatinine using the CKD-EPI equation.   Other variables required for calculation are race, age and sex. Among   patients with chronic kidney disease (CKD), the eGFR is useful in   determining the stage of disease according to KDOQI CKD classification.   All eGFR results are reported numerically with the following   interpretation.   GFR With Without   (ml/min/1.73 m2) Kidney Damage Kidney Damage   >= 90 Stage 1 Normal   60-89 Stage 2 Decreased GFR   30-59 Stage 3 Stage 3   15-29 Stage 4 Stage 4   < 15 Stage 5 Stage 5   Each stage of CKD assumes that the associated GFR level has been in   effect for at least 3 months. Determination of stages one and two (with   eGFR > 59 ml/min/m2) requires estimation of kidney damage for at least 3   months as defined by structural or functional abnormalities.   Limitations: All estimates of GFR will be less accurate for patients at   extremes of muscle mass (including but not limited to frail elderly,   critically ill, or cancer patients), those with unusual diets, and those   with conditions associated with reduced secretion or extrarenal   elimination of creatinine. The eGFR equation is not recommended for use   in patients with unstable creatinine levels. mL/min/1.73M2   eGFR if African American: 58 mL/min/1.73M2                     CAPILLARY BLOOD GLUCOSE        ----------------------------------------------------------------------  RECENT IMAGING:    MR Head No Cont (21)  IMPRESSION: Mild age-appropriate involutional and ischemic gliotic changes with old bilateral basal ganglia lacunar infarcts. Small acute infarct left basal ganglia and corona radiata. No evidence of hemorrhage.  Normal intracranial flow using noninvasive flow MR angiography.  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 Also mild aneurysmal dilatation of the origin of the left inferior M2 branch.      ----------------------------------------------------------------------  MEDICATIONS:  MEDICATIONS  (STANDING):  amLODIPine   Tablet 5 milliGRAM(s) Oral daily  aspirin  chewable 81 milliGRAM(s) Oral daily  atorvastatin 80 milliGRAM(s) Oral at bedtime  budesonide 160 MICROgram(s)/formoterol 4.5 MICROgram(s) Inhaler 2 Puff(s) Inhalation two times a day  clopidogrel Tablet 75 milliGRAM(s) Oral daily  enoxaparin Injectable 40 milliGRAM(s) SubCutaneous daily  famotidine    Tablet 20 milliGRAM(s) Oral two times a day  melatonin 6 milliGRAM(s) Oral at bedtime  montelukast 10 milliGRAM(s) Oral daily  pantoprazole    Tablet 40 milliGRAM(s) Oral before breakfast    MEDICATIONS  (PRN):  ALBUTerol    90 MICROgram(s) HFA Inhaler 1 Puff(s) Inhalation every 6 hours PRN Shortness of Breath and/or Wheezing  aluminum hydroxide/magnesium hydroxide/simethicone Suspension 30 milliLiter(s) Oral every 4 hours PRN Dyspepsia  senna 2 Tablet(s) Oral at bedtime PRN Constipation  simethicone 80 milliGRAM(s) Chew four times a day PRN Upset Stomach    ----------------------------------------------------------------------  Assessment and Plan:   · Assessment	  MARKIE HERNANDEZ is a 66yo Female with left subcortical CVA  with dysphagia, right Hemiparesis, decreased functional mobility, gait instability and ADL impairments.    COMORBIDITES/ACTIVE MEDICAL ISSUES     Gait Instability, ADL impairments and Functional impairments: cont Comprehensive Rehab Program of PT/OT and speech therapy    #CVA  -DAPT with Lipitor  -fall and aspiration precautions  -TTE outpt  -neurology and cardio follow up outpt    Dyspepsia  --cont. protonix and pepcid  --add Maalox    Chest Pain--resolved  --EKG Sinus-- no ischemic changes.    --d/w hospitalist    Insomnia  --cont. melatonin qhs    #HLD  -continue Lipitor  -monitor LFTs    #HTN  --will monitor and adjust PRN  -amlodipine 5 mg daily  -hospitalist  following    #NANCY  --Cr improved  -monitor BMP    #Asthma  -continue simicort and albuterol  -monitor VS    Pain Mgmt - Tylenol PRN  GI/Bowel Mgmt - Senna,   PRN  /Bladder Mgmt - Voiding      Precautions / PROPHYLAXIS:   - Falls,  Seizure   -- Lungs: Aspiration,   - Pressure injury/Skin: Turn Q2hrs while in bed, OOB to Chair, PT/OT    - DVT: Lovenox        Nutritional Assessment:  · Nutritional Assessment	This patient has been assessed with a concern for Malnutrition and has been determined to have a diagnosis/diagnoses of Moderate protein-calorie malnutrition.    This patient is being managed with:   Diet Dysphagia 1 Pureed-Honey Consistency Fluid-  Supplement Feeding Modality:  Oral  Ensure Enlive Servings Per Day:  1       Frequency:  Daily  Ensure Pudding Cans or Servings Per Day:  1       Frequency:  Two Times a day  Entered: Jul  3 2021  1:00PM               Patient is a 65y old  Female who presents with a chief complaint of s/p left BG CVA with right sided weakness (2021 11:54)    HPI:  64 yo RH female with PMH of asthma, HTN to [facility] ED  for right-sided weakness and slurred speech. CTH neg acute cva, old lacunar infarcts b/l BG. Out of window for tPA. CTA  shows Aneurysms suspected involving the left middle cerebral artery as described above. Transferred from [facility] to [facility] for possible evaluation of aneurysms noted on imaging. MRA  showed mild age-appropriate involutional and ischemic gliotic changes with old bilateral basal ganglia lacunar infarcts. Small acute infarct left basal ganglia and corona radiata. No evidence of hemorrhage. 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 Also mild aneurysmal dilatation of the origin of the left inferior M2 branch. Hospitalization complicated by NANCY, IVF completed. Failed swallow eval-tolerates Puree honey. TTE as outpatient. HbA1c 5.9 and . Evaluated by PMR and acute rehab recommended. Cleared for dc on .  Patient arrived to [facility] on 21 for acute inpatient rehab. Celi iterpreter #[id number]. Pt c/o difficulty speaking and still unable to more her right upper and right lower extremities.  (2021 15:21)    PAST MEDICAL & SURGICAL HISTORY:  Brain aneurysm    Hypertension    Brain aneurysm    Smoking    No significant past surgical history    No significant past surgical history      Allergies    No Known Allergies    Intolerances      ----------------------------------------------------------------------    SUBJECTIVE: Patient seen this morning with the help of Celi  # [id number]. She reports some GI upset with loose stools overnight and associated epigastric pain. She reports difficulty sleeping since she had 2 or 3 bowel movements overnight. Per nursing, this morning, stools are soft. Patient is requesting her home medication pepto-bismol.       ROS:  Positive: Complains of GI upset and loose stools  Denies: headache, lightheadedness, CP, SOB, dysuria, nausea, constipation      ----------------------------------------------------------------------  PHYSICAL EXAM:    Vital Signs Last 24 Hrs  T(C): 36.4 (2021 08:12), Max: 36.6 (2021 19:54)  T(F): 97.6 (2021 08:12), Max: 97.9 (2021 19:54)  HR: 91 (2021 08:12) (84 - 92)  BP: 118/79 (2021 08:12) (118/79 - 132/81)  BP(mean): --  RR: 16 (2021 08:12) (16 - 16)  SpO2: 94% (2021 08:12) (94% - 98%)  Daily     Daily Weight in k.6 (2021 23:13)    Gen - NAD, Comfortable  HEENT - NCAT,   Pulm - CTAB,   Cardiovascular - RRR,   Abdomen - Soft, NT/ND, +BS  Extremities - No C/C/E, No calf tenderness  Neuro-     Cognitive - AAOx 2--knew she is in hospital but not date     Attention: makes eye contact and good comprehension      Memory: Recall 3 objects immediate and 3 min later         Cranial Nerves - PERRLA, EOMI, right facial droop, +dysphagia     Motor -                    LEFT    UE - ShAB 5/5, EF 5/5, EE 5/5, WE 5/5,  5/5                    RIGHT UE - ShAB 2/5, EF 2/5, EE 1/5, WE 0/5,  0/5                    LEFT    LE - HF 5/5, KE 5/5, DF 5/5, PF 5/5                    RIGHT LE - Hip ext 2/5, KE 2/5, DF 1/5, PF 1/5        Sensory - Intact to LT     Reflexes - DTR Intact, No primitive reflex     Coordination - impaired on right     Tone - normal  Psychiatric - Mood stable, Affect WNL      ----------------------------------------------------------------------  RECENT LABS:  Complete Blood Count + Automated Diff (21 @ 05:00)   WBC Count: 5.51 K/uL   RBC Count: 4.23 M/uL   Hemoglobin: 12.5 g/dL   Hematocrit: 38.8 %   Mean Cell Volume: 91.7 fl   Mean Cell Hemoglobin: 29.6 pg   Mean Cell Hemoglobin Conc: 32.2 gm/dL   Red Cell Distrib Width: 12.7 %   Platelet Count - Automated: 128 K/uL   Auto Neutrophil #: 3.90 K/uL   Auto Lymphocyte #: 1.09 K/uL   Auto Monocyte #: 0.42 K/uL   Auto Eosinophil #: 0.09 K/uL   Auto Basophil #: 0.00 K/uL   Auto Neutrophil %: 70.8: Differential percentages must be correlated with absolute numbers for   clinical significance. %   Auto Lymphocyte %: 19.8 %   Auto Monocyte %: 7.6 %   Auto Eosinophil %: 1.6 %   Auto Basophil %: 0.0 %   Auto Immature Granulocyte %: 0.2: (Includes meta, myelo and promyelocytes) %   Nucleated RBC: 0 /100 WBCs Comprehensive Metabolic Panel (21 @ 05:00)   Sodium, Serum: 143 mmol/L   Potassium, Serum: 4.3 mmol/L   Chloride, Serum: 109 mmol/L   Carbon Dioxide, Serum: 24 mmol/L   Anion Gap, Serum: 10 mmol/L   Blood Urea Nitrogen, Serum: 24 mg/dL   Creatinine, Serum: 1.14 mg/dL   Glucose, Serum: 129 mg/dL   Calcium, Total Serum: 8.8 mg/dL   Protein Total, Serum: 7.1 g/dL   Albumin, Serum: 3.1 g/dL   Bilirubin Total, Serum: 0.6 mg/dL   Alkaline Phosphatase, Serum: 84 U/L   Aspartate Aminotransferase (AST/SGOT): 13 U/L   Alanine Aminotransferase (ALT/SGPT): 17 U/L   eGFR if Non : 50: Interpretative comment   The units for eGFR are mL/min/1.73M2 (normalized body surface area). The   eGFR is calculated from a serum creatinine using the CKD-EPI equation.   Other variables required for calculation are race, age and sex. Among   patients with chronic kidney disease (CKD), the eGFR is useful in   determining the stage of disease according to KDOQI CKD classification.   All eGFR results are reported numerically with the following   interpretation.   GFR With Without   (ml/min/1.73 m2) Kidney Damage Kidney Damage   >= 90 Stage 1 Normal   60-89 Stage 2 Decreased GFR   30-59 Stage 3 Stage 3   15-29 Stage 4 Stage 4   < 15 Stage 5 Stage 5   Each stage of CKD assumes that the associated GFR level has been in   effect for at least 3 months. Determination of stages one and two (with   eGFR > 59 ml/min/m2) requires estimation of kidney damage for at least 3   months as defined by structural or functional abnormalities.   Limitations: All estimates of GFR will be less accurate for patients at   extremes of muscle mass (including but not limited to frail elderly,   critically ill, or cancer patients), those with unusual diets, and those   with conditions associated with reduced secretion or extrarenal   elimination of creatinine. The eGFR equation is not recommended for use   in patients with unstable creatinine levels. mL/min/1.73M2   eGFR if African American: 58 mL/min/1.73M2                     CAPILLARY BLOOD GLUCOSE        ----------------------------------------------------------------------  RECENT IMAGING:    MR Head No Cont (21)  IMPRESSION: Mild age-appropriate involutional and ischemic gliotic changes with old bilateral basal ganglia lacunar infarcts. Small acute infarct left basal ganglia and corona radiata. No evidence of hemorrhage.  Normal intracranial flow using noninvasive flow MR angiography.  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 Also mild aneurysmal dilatation of the origin of the left inferior M2 branch.      ----------------------------------------------------------------------  MEDICATIONS:  MEDICATIONS  (STANDING):  amLODIPine   Tablet 5 milliGRAM(s) Oral daily  aspirin  chewable 81 milliGRAM(s) Oral daily  atorvastatin 80 milliGRAM(s) Oral at bedtime  budesonide 160 MICROgram(s)/formoterol 4.5 MICROgram(s) Inhaler 2 Puff(s) Inhalation two times a day  clopidogrel Tablet 75 milliGRAM(s) Oral daily  enoxaparin Injectable 40 milliGRAM(s) SubCutaneous daily  famotidine    Tablet 20 milliGRAM(s) Oral two times a day  melatonin 6 milliGRAM(s) Oral at bedtime  montelukast 10 milliGRAM(s) Oral daily  pantoprazole    Tablet 40 milliGRAM(s) Oral before breakfast    MEDICATIONS  (PRN):  ALBUTerol    90 MICROgram(s) HFA Inhaler 1 Puff(s) Inhalation every 6 hours PRN Shortness of Breath and/or Wheezing  aluminum hydroxide/magnesium hydroxide/simethicone Suspension 30 milliLiter(s) Oral every 4 hours PRN Dyspepsia  senna 2 Tablet(s) Oral at bedtime PRN Constipation  simethicone 80 milliGRAM(s) Chew four times a day PRN Upset Stomach    ----------------------------------------------------------------------  Assessment and Plan:   · Assessment	  MARKIE HERNANDEZ is a 64yo Female with left subcortical CVA  with dysphagia, right Hemiparesis, decreased functional mobility, gait instability and ADL impairments.    COMORBIDITES/ACTIVE MEDICAL ISSUES     Gait Instability, ADL impairments and Functional impairments: cont Comprehensive Rehab Program of PT/OT and speech therapy    #CVA  -DAPT with Lipitor  -fall and aspiration precautions  -TTE outpt  -neurology and cardio follow up outpt    Dyspepsia  --cont. protonix and pepcid  --add Maalox    Chest Pain--resolved  --EKG Sinus-- no ischemic changes.    --d/w hospitalist    Insomnia  --cont. melatonin qhs    #HLD  -continue Lipitor  -monitor LFTs    #HTN  --will monitor and adjust PRN  -amlodipine 5 mg daily  -hospitalist  following    #NANCY  --Cr improved  -monitor BMP    #Asthma  -continue simicort and albuterol  -monitor VS    Pain Mgmt - Tylenol PRN  GI/Bowel Mgmt - Senna,   PRN  /Bladder Mgmt - Voiding      Precautions / PROPHYLAXIS:   - Falls,  Seizure   -- Lungs: Aspiration,   - Pressure injury/Skin: Turn Q2hrs while in bed, OOB to Chair, PT/OT    - DVT: Lovenox          Nutritional Assessment:  · Nutritional Assessment	This patient has been assessed with a concern for Malnutrition and has been determined to have a diagnosis/diagnoses of Moderate protein-calorie malnutrition.    This patient is being managed with:   Diet Dysphagia 1 Pureed-Honey Consistency Fluid-  Supplement Feeding Modality:  Oral  Ensure Enlive Servings Per Day:  1       Frequency:  Daily  Ensure Pudding Cans or Servings Per Day:  1       Frequency:  Two Times a day  Entered: Jul  3 2021  1:00PM               Patient is a 65y old  Female who presents with a chief complaint of s/p left BG CVA with right sided weakness (2021 11:54)    HPI:  66 yo RH female with PMH of asthma, HTN to [facility] ED  for right-sided weakness and slurred speech. CTH neg acute cva, old lacunar infarcts b/l BG. Out of window for tPA. CTA  shows Aneurysms suspected involving the left middle cerebral artery as described above. Transferred from [facility] to [facility] for possible evaluation of aneurysms noted on imaging. MRA  showed mild age-appropriate involutional and ischemic gliotic changes with old bilateral basal ganglia lacunar infarcts. Small acute infarct left basal ganglia and corona radiata. No evidence of hemorrhage. 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 Also mild aneurysmal dilatation of the origin of the left inferior M2 branch. Hospitalization complicated by NANCY, IVF completed. Failed swallow eval-tolerates Puree honey. TTE as outpatient. HbA1c 5.9 and . Evaluated by PMR and acute rehab recommended. Cleared for dc on .  Patient arrived to [facility] on 21 for acute inpatient rehab. Celi iterpreter #[id number]. Pt c/o difficulty speaking and still unable to more her right upper and right lower extremities.  (2021 15:21)    PAST MEDICAL & SURGICAL HISTORY:  Brain aneurysm    Hypertension    Brain aneurysm    Smoking    No significant past surgical history    No significant past surgical history      Allergies    No Known Allergies    Intolerances      ----------------------------------------------------------------------    SUBJECTIVE: Patient seen this morning with the help of Celi  # [id number]. She reports some GI upset with loose stools overnight and associated epigastric pain. She reports difficulty sleeping since she had 2 or 3 bowel movements overnight. Per nursing, this morning, stools are soft. Patient is requesting her home medication pepto-bismol.       ROS:  Positive: Complains of GI upset and loose stools  Denies: headache, lightheadedness, CP, SOB, dysuria, nausea, constipation      ----------------------------------------------------------------------  PHYSICAL EXAM:    Vital Signs Last 24 Hrs  T(C): 36.4 (2021 08:12), Max: 36.6 (2021 19:54)  T(F): 97.6 (2021 08:12), Max: 97.9 (2021 19:54)  HR: 91 (2021 08:12) (84 - 92)  BP: 118/79 (2021 08:12) (118/79 - 132/81)  BP(mean): --  RR: 16 (2021 08:12) (16 - 16)  SpO2: 94% (2021 08:12) (94% - 98%)  Daily     Daily Weight in k.6 (2021 23:13)    Gen - NAD, Comfortable  HEENT - NCAT,   Pulm - CTAB,   Cardiovascular - RRR,   Abdomen - Soft, NT/ND, +BS  Extremities - No C/C/E, No calf tenderness  Neuro-     Cognitive - AAOx 2--knew she is in hospital but not date     Attention: makes eye contact and good comprehension      Memory: Recall 3 objects immediate and 3 min later         Cranial Nerves - PERRLA, EOMI, right facial droop, +dysphagia     Motor -                    LEFT    UE - ShAB 5/5, EF 5/5, EE 5/5, WE 5/5,  5/5                    RIGHT UE - ShAB 2/5, EF 2/5, EE 1/5, WE 0/5,  0/5                    LEFT    LE - HF 5/5, KE 5/5, DF 5/5, PF 5/5                    RIGHT LE - Hip ext 2/5, KE 2/5, DF 1/5, PF 1/5        Sensory - Intact to LT     Reflexes - DTR Intact, No primitive reflex     Coordination - impaired on right     Tone - normal  Psychiatric - Mood stable, Affect WNL      ----------------------------------------------------------------------  RECENT LABS:  Complete Blood Count + Automated Diff (21 @ 05:00)   WBC Count: 5.51 K/uL   RBC Count: 4.23 M/uL   Hemoglobin: 12.5 g/dL   Hematocrit: 38.8 %   Mean Cell Volume: 91.7 fl   Mean Cell Hemoglobin: 29.6 pg   Mean Cell Hemoglobin Conc: 32.2 gm/dL   Red Cell Distrib Width: 12.7 %   Platelet Count - Automated: 128 K/uL   Auto Neutrophil #: 3.90 K/uL   Auto Lymphocyte #: 1.09 K/uL   Auto Monocyte #: 0.42 K/uL   Auto Eosinophil #: 0.09 K/uL   Auto Basophil #: 0.00 K/uL   Auto Neutrophil %: 70.8: Differential percentages must be correlated with absolute numbers for   clinical significance. %   Auto Lymphocyte %: 19.8 %   Auto Monocyte %: 7.6 %   Auto Eosinophil %: 1.6 %   Auto Basophil %: 0.0 %   Auto Immature Granulocyte %: 0.2: (Includes meta, myelo and promyelocytes) %   Nucleated RBC: 0 /100 WBCs Comprehensive Metabolic Panel (21 @ 05:00)   Sodium, Serum: 143 mmol/L   Potassium, Serum: 4.3 mmol/L   Chloride, Serum: 109 mmol/L   Carbon Dioxide, Serum: 24 mmol/L   Anion Gap, Serum: 10 mmol/L   Blood Urea Nitrogen, Serum: 24 mg/dL   Creatinine, Serum: 1.14 mg/dL   Glucose, Serum: 129 mg/dL   Calcium, Total Serum: 8.8 mg/dL   Protein Total, Serum: 7.1 g/dL   Albumin, Serum: 3.1 g/dL   Bilirubin Total, Serum: 0.6 mg/dL   Alkaline Phosphatase, Serum: 84 U/L   Aspartate Aminotransferase (AST/SGOT): 13 U/L   Alanine Aminotransferase (ALT/SGPT): 17 U/L   eGFR if Non : 50: Interpretative comment   The units for eGFR are mL/min/1.73M2 (normalized body surface area). The   eGFR is calculated from a serum creatinine using the CKD-EPI equation.   Other variables required for calculation are race, age and sex. Among   patients with chronic kidney disease (CKD), the eGFR is useful in   determining the stage of disease according to KDOQI CKD classification.   All eGFR results are reported numerically with the following   interpretation.   GFR With Without   (ml/min/1.73 m2) Kidney Damage Kidney Damage   >= 90 Stage 1 Normal   60-89 Stage 2 Decreased GFR   30-59 Stage 3 Stage 3   15-29 Stage 4 Stage 4   < 15 Stage 5 Stage 5   Each stage of CKD assumes that the associated GFR level has been in   effect for at least 3 months. Determination of stages one and two (with   eGFR > 59 ml/min/m2) requires estimation of kidney damage for at least 3   months as defined by structural or functional abnormalities.   Limitations: All estimates of GFR will be less accurate for patients at   extremes of muscle mass (including but not limited to frail elderly,   critically ill, or cancer patients), those with unusual diets, and those   with conditions associated with reduced secretion or extrarenal   elimination of creatinine. The eGFR equation is not recommended for use   in patients with unstable creatinine levels. mL/min/1.73M2   eGFR if African American: 58 mL/min/1.73M2                     CAPILLARY BLOOD GLUCOSE        ----------------------------------------------------------------------  RECENT IMAGING:    MR Head No Cont (21)  IMPRESSION: Mild age-appropriate involutional and ischemic gliotic changes with old bilateral basal ganglia lacunar infarcts. Small acute infarct left basal ganglia and corona radiata. No evidence of hemorrhage.  Normal intracranial flow using noninvasive flow MR angiography.  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 Also mild aneurysmal dilatation of the origin of the left inferior M2 branch.      ----------------------------------------------------------------------  MEDICATIONS:  MEDICATIONS  (STANDING):  amLODIPine   Tablet 5 milliGRAM(s) Oral daily  aspirin  chewable 81 milliGRAM(s) Oral daily  atorvastatin 80 milliGRAM(s) Oral at bedtime  budesonide 160 MICROgram(s)/formoterol 4.5 MICROgram(s) Inhaler 2 Puff(s) Inhalation two times a day  clopidogrel Tablet 75 milliGRAM(s) Oral daily  enoxaparin Injectable 40 milliGRAM(s) SubCutaneous daily  famotidine    Tablet 20 milliGRAM(s) Oral two times a day  melatonin 6 milliGRAM(s) Oral at bedtime  montelukast 10 milliGRAM(s) Oral daily  pantoprazole    Tablet 40 milliGRAM(s) Oral before breakfast    MEDICATIONS  (PRN):  ALBUTerol    90 MICROgram(s) HFA Inhaler 1 Puff(s) Inhalation every 6 hours PRN Shortness of Breath and/or Wheezing  aluminum hydroxide/magnesium hydroxide/simethicone Suspension 30 milliLiter(s) Oral every 4 hours PRN Dyspepsia  senna 2 Tablet(s) Oral at bedtime PRN Constipation  simethicone 80 milliGRAM(s) Chew four times a day PRN Upset Stomach    ----------------------------------------------------------------------  Assessment and Plan:   · Assessment	  MARKIE HERNANDEZ is a 66yo Female with left subcortical CVA  with dysphagia, right Hemiparesis, decreased functional mobility, gait instability and ADL impairments.    COMORBIDITES/ACTIVE MEDICAL ISSUES     Gait Instability, ADL impairments and Functional impairments: cont Comprehensive Rehab Program of PT/OT and speech therapy    #CVA  -DAPT with Lipitor  -fall and aspiration precautions  -TTE outpt  -neurology and cardio follow up outpt    Dyspepsia  --cont. protonix and pepcid  --add Maalox    Chest Pain--resolved  --EKG Sinus-- no ischemic changes.    --d/w hospitalist    Insomnia  --cont. melatonin qhs    #HLD  -continue Lipitor  -monitor LFTs    #HTN  --will monitor and adjust PRN  -amlodipine 5 mg daily  -hospitalist  following    #NANCY    -monitor BMP    #Asthma  -continue simicort and albuterol  -monitor VS    Pain Mgmt - Tylenol PRN  GI/Bowel Mgmt - Senna,   PRN  /Bladder Mgmt - Voiding      Precautions / PROPHYLAXIS:   - Falls,  Seizure   -- Lungs: Aspiration,   - Pressure injury/Skin: Turn Q2hrs while in bed, OOB to Chair, PT/OT    - DVT: Lovenox          Nutritional Assessment:  · Nutritional Assessment	This patient has been assessed with a concern for Malnutrition and has been determined to have a diagnosis/diagnoses of Moderate protein-calorie malnutrition.    This patient is being managed with:   Diet Dysphagia 1 Pureed-Honey Consistency Fluid-  Supplement Feeding Modality:  Oral  Ensure Enlive Servings Per Day:  1       Frequency:  Daily  Ensure Pudding Cans or Servings Per Day:  1       Frequency:  Two Times a day  Entered: Jul  3 2021  1:00PM

## 2021-07-06 NOTE — PROGRESS NOTE ADULT - ASSESSMENT
65 year old RH woman with past medical history of asthma and HTN not on antithrombotics presented as code stroke to [facility] ED for right-sided weakness with LWK  11p. 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 Also mild aneurysmal dilatation of the origin of the left inferior M2 branch.  - neurosx follow up OP    HTN  - c/w Norvasc      NANCY likely pre-renal  - improving  - encouraged PO hydration    Asthma  Symbicort, alb prn, singular    Atypical CP on 21  Resolved  EKG-nl  pt already on DAPT/statin  doubt ACS-monitor for now  TTE    Nausea, diarrhoea, abd pain  - continue to hold laxatives  - PPI, famotidine  - Maalox PRN  - Zofran PRN for nausea  - check BMP, mg  - monitor electrolytes and hydration and replace PRN    Hypoalbuminemia  Dysphagia  - on dysphagia diet  - on Ensure  - nutrition and SLP eval ongoing    d/w Dr. Sanchez   will follow

## 2021-07-06 NOTE — PROGRESS NOTE ADULT - SUBJECTIVE AND OBJECTIVE BOX
Medicine Progress Note    Patient is a 65y old  Female who presents with a chief complaint of s/p left BG CVA with right sided weakness (06 Jul 2021 12:21)      SUBJECTIVE / OVERNIGHT EVENTS:  seen and examined  Chart reviewed  No overnight events  Limb weakness improving with therapy  BM+    reported cheat and abd pain, diarrhoea 3 times yesterday and once today. as per RN loose stool but formed and not watery. dyspepsia+. had at home takes Pepto-Bismol but does not get diarrhoea at home  laxatives held  no other symptoms    ADDITIONAL REVIEW OF SYSTEMS:  no fever/chills/CP/sob/palpitation/dizziness/ abd pain/nausea/vomiting/constipation/headaches. all other ROS neg    MEDICATIONS  (STANDING):  amLODIPine   Tablet 5 milliGRAM(s) Oral daily  aspirin  chewable 81 milliGRAM(s) Oral daily  atorvastatin 80 milliGRAM(s) Oral at bedtime  budesonide 160 MICROgram(s)/formoterol 4.5 MICROgram(s) Inhaler 2 Puff(s) Inhalation two times a day  clopidogrel Tablet 75 milliGRAM(s) Oral daily  enoxaparin Injectable 40 milliGRAM(s) SubCutaneous daily  famotidine    Tablet 20 milliGRAM(s) Oral two times a day  melatonin 6 milliGRAM(s) Oral at bedtime  montelukast 10 milliGRAM(s) Oral daily  pantoprazole    Tablet 40 milliGRAM(s) Oral before breakfast    MEDICATIONS  (PRN):  ALBUTerol    90 MICROgram(s) HFA Inhaler 1 Puff(s) Inhalation every 6 hours PRN Shortness of Breath and/or Wheezing  aluminum hydroxide/magnesium hydroxide/simethicone Suspension 30 milliLiter(s) Oral every 4 hours PRN Dyspepsia  senna 2 Tablet(s) Oral at bedtime PRN Constipation  simethicone 80 milliGRAM(s) Chew four times a day PRN Upset Stomach    CAPILLARY BLOOD GLUCOSE        I&O's Summary      PHYSICAL EXAM:  Vital Signs Last 24 Hrs  T(C): 36.4 (06 Jul 2021 08:12), Max: 36.6 (05 Jul 2021 19:54)  T(F): 97.6 (06 Jul 2021 08:12), Max: 97.9 (05 Jul 2021 19:54)  HR: 91 (06 Jul 2021 08:12) (84 - 92)  BP: 118/79 (06 Jul 2021 08:12) (118/79 - 132/81)  BP(mean): --  RR: 16 (06 Jul 2021 08:12) (16 - 16)  SpO2: 94% (06 Jul 2021 08:12) (94% - 98%)    GENERAL: Not in distress. Alert    HEENT: clear conjuctiva, MMM. no pallor or icterus  CARDIOVASCULAR: RRR S1, S2. No murmur/rubs/gallop  LUNGS: BLAE+, no rales, no wheezing, no rhonchi.    ABDOMEN: ND. Soft,  NT, no guarding / rebound / rigidity. BS normoactive  BACK: No spine tenderness.  EXTREMITIES: no edema. no leg or calf TP.  SKIN: warm and dry  PSYCHIATRIC: Calm.  No agitation.    LABS:                    COVID-19 PCR: NotDetec (29 Jun 2021 20:04)  COVID-19 PCR: NotDetec (29 Jun 2021 18:53)      RADIOLOGY & ADDITIONAL TESTS:  Imaging from Last 24 Hours:    Electrocardiogram/QTc Interval:    COORDINATION OF CARE:  Care Discussed with Consultants/Other Providers:

## 2021-07-06 NOTE — PROGRESS NOTE ADULT - ATTENDING COMMENTS
Pt. seen with resident and fellow.  Agree with documentation above as per fellow with amendments made as appropriate. Patient medically stable. Making progress towards rehab goals.     Left MCA infarct   GI discomfort and loose stools-- d/w hospitalist-- started on Maalox  BUN/Cr. elevated 25/1.39  Transfers Mod A,    Eating/grooming-- min A  UBD-- Max A  LBD-- tot A Pt. seen with resident and fellow.  Agree with documentation above as per fellow with amendments made as appropriate. Patient medically stable. Making progress towards rehab goals.     Left MCA infarct   GI discomfort and loose stools-- d/w hospitalist-- started on Maalox  BUN/Cr. elevated 25/1.39--gentle IVFs  Transfers Mod A,    Eating/grooming-- min A  UBD-- Max A  LBD-- tot A

## 2021-07-07 LAB
ANION GAP SERPL CALC-SCNC: 9 MMOL/L — SIGNIFICANT CHANGE UP (ref 5–17)
BUN SERPL-MCNC: 23 MG/DL — SIGNIFICANT CHANGE UP (ref 7–23)
CALCIUM SERPL-MCNC: 8.7 MG/DL — SIGNIFICANT CHANGE UP (ref 8.4–10.5)
CHLORIDE SERPL-SCNC: 105 MMOL/L — SIGNIFICANT CHANGE UP (ref 96–108)
CO2 SERPL-SCNC: 25 MMOL/L — SIGNIFICANT CHANGE UP (ref 22–31)
CREAT SERPL-MCNC: 1.23 MG/DL — SIGNIFICANT CHANGE UP (ref 0.5–1.3)
GLUCOSE SERPL-MCNC: 94 MG/DL — SIGNIFICANT CHANGE UP (ref 70–99)
POTASSIUM SERPL-MCNC: 3.9 MMOL/L — SIGNIFICANT CHANGE UP (ref 3.5–5.3)
POTASSIUM SERPL-SCNC: 3.9 MMOL/L — SIGNIFICANT CHANGE UP (ref 3.5–5.3)
SODIUM SERPL-SCNC: 139 MMOL/L — SIGNIFICANT CHANGE UP (ref 135–145)

## 2021-07-07 PROCEDURE — 99232 SBSQ HOSP IP/OBS MODERATE 35: CPT

## 2021-07-07 RX ORDER — GABAPENTIN 400 MG/1
100 CAPSULE ORAL AT BEDTIME
Refills: 0 | Status: DISCONTINUED | OUTPATIENT
Start: 2021-07-07 | End: 2021-07-22

## 2021-07-07 RX ADMIN — Medication 6 MILLIGRAM(S): at 22:06

## 2021-07-07 RX ADMIN — SODIUM CHLORIDE 50 MILLILITER(S): 9 INJECTION, SOLUTION INTRAVENOUS at 22:07

## 2021-07-07 RX ADMIN — BUDESONIDE AND FORMOTEROL FUMARATE DIHYDRATE 2 PUFF(S): 160; 4.5 AEROSOL RESPIRATORY (INHALATION) at 08:17

## 2021-07-07 RX ADMIN — ATORVASTATIN CALCIUM 80 MILLIGRAM(S): 80 TABLET, FILM COATED ORAL at 22:06

## 2021-07-07 RX ADMIN — Medication 81 MILLIGRAM(S): at 11:33

## 2021-07-07 RX ADMIN — FAMOTIDINE 20 MILLIGRAM(S): 10 INJECTION INTRAVENOUS at 05:50

## 2021-07-07 RX ADMIN — CLOPIDOGREL BISULFATE 75 MILLIGRAM(S): 75 TABLET, FILM COATED ORAL at 11:33

## 2021-07-07 RX ADMIN — BUDESONIDE AND FORMOTEROL FUMARATE DIHYDRATE 2 PUFF(S): 160; 4.5 AEROSOL RESPIRATORY (INHALATION) at 21:25

## 2021-07-07 RX ADMIN — FAMOTIDINE 20 MILLIGRAM(S): 10 INJECTION INTRAVENOUS at 18:04

## 2021-07-07 RX ADMIN — ENOXAPARIN SODIUM 40 MILLIGRAM(S): 100 INJECTION SUBCUTANEOUS at 11:33

## 2021-07-07 RX ADMIN — PANTOPRAZOLE SODIUM 40 MILLIGRAM(S): 20 TABLET, DELAYED RELEASE ORAL at 05:50

## 2021-07-07 RX ADMIN — MONTELUKAST 10 MILLIGRAM(S): 4 TABLET, CHEWABLE ORAL at 11:33

## 2021-07-07 RX ADMIN — GABAPENTIN 100 MILLIGRAM(S): 400 CAPSULE ORAL at 22:06

## 2021-07-07 NOTE — PROGRESS NOTE ADULT - SUBJECTIVE AND OBJECTIVE BOX
Patient is a 65y old  Female who presents with a chief complaint of s/p left BG CVA with right sided weakness (06 Jul 2021 12:54)    HPI:  66 yo RH female with PMH of asthma, HTN to [facility] ED 6/29 for right-sided weakness and slurred speech. CTH neg acute cva, old lacunar infarcts b/l BG. Out of window for tPA. CTA 6/29 shows Aneurysms suspected involving the left middle cerebral artery as described above. Transferred from [facility] to [facility] for possible evaluation of aneurysms noted on imaging. MRA 6/30 showed mild age-appropriate involutional and ischemic gliotic changes with old bilateral basal ganglia lacunar infarcts. Small acute infarct left basal ganglia and corona radiata. No evidence of hemorrhage. 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 Also mild aneurysmal dilatation of the origin of the left inferior M2 branch. Hospitalization complicated by NANCY, IVF completed. Failed swallow eval-tolerates Puree honey. TTE as outpatient. HbA1c 5.9 and . Evaluated by PMR and acute rehab recommended. Cleared for dc on 7/2.  Patient arrived to [facility] on 7/2/21 for acute inpatient rehab. Celi iterpreter #[id number]. Pt c/o difficulty speaking and still unable to more her right upper and right lower extremities.  (02 Jul 2021 15:21)    PAST MEDICAL & SURGICAL HISTORY:  Brain aneurysm    Hypertension    Brain aneurysm    Smoking    No significant past surgical history    No significant past surgical history      Allergies    No Known Allergies    Intolerances      ----------------------------------------------------------------------    SUBJECTIVE: Patient seen this morning in occupational therapy. Celi  # [id number] used. Patient complaining of left leg burning paresthesias ongoing for about a year now. She still has abdominal pain but denies any loose stools.      ROS:  Positive: Leg paresthesias  Denies: headache, lightheadedness, CP, SOB, abdominal pain, dysuria, nausea, constipation      ----------------------------------------------------------------------  PHYSICAL EXAM:    Vital Signs Last 24 Hrs  T(C): 36.4 (07 Jul 2021 09:33), Max: 36.6 (06 Jul 2021 19:43)  T(F): 97.5 (07 Jul 2021 09:33), Max: 97.9 (06 Jul 2021 19:43)  HR: 85 (07 Jul 2021 09:33) (72 - 91)  BP: 112/71 (07 Jul 2021 09:33) (112/71 - 143/83)  BP(mean): --  RR: 16 (07 Jul 2021 09:33) (16 - 16)  SpO2: 97% (07 Jul 2021 09:33) (92% - 98%)  Daily     Daily       Gen - NAD, Comfortable  HEENT - NCAT,   Pulm - CTAB,   Cardiovascular - RRR,   Abdomen - Soft, NT/ND, +BS  Extremities - No C/C/E, No calf tenderness  Neuro-     Cognitive - AAOx 2--knew she is in hospital but not date     Attention: makes eye contact and good comprehension      Memory: Recall 3 objects immediate and 3 min later         Cranial Nerves - PERRLA, EOMI, right facial droop, +dysphagia     Motor -                    LEFT    UE - ShAB 5/5, EF 5/5, EE 5/5, WE 5/5,  5/5                    RIGHT UE - ShAB 2/5, EF 2/5, EE 1/5, WE 0/5,  0/5                    LEFT    LE - HF 5/5, KE 5/5, DF 5/5, PF 5/5                    RIGHT LE - Hip ext 2/5, KE 2/5, DF 1/5, PF 1/5        Sensory - Intact to LT     Reflexes - DTR Intact, No primitive reflex     Coordination - impaired on right     Tone - normal  Psychiatric - Mood stable, Affect WNL      ----------------------------------------------------------------------  RECENT LABS:      07-07    139  |  105  |  23  ----------------------------<  94  3.9   |  25  |  1.23    Ca    8.7      07 Jul 2021 05:25  Mg     2.1     07-06            CAPILLARY BLOOD GLUCOSE        ----------------------------------------------------------------------  RECENT IMAGING:    MR Head No Cont (06.30.21)  IMPRESSION: Mild age-appropriate involutional and ischemic gliotic changes with old bilateral basal ganglia lacunar infarcts. Small acute infarct left basal ganglia and corona radiata. No evidence of hemorrhage.  Normal intracranial flow using noninvasive flow MR angiography.  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 Also mild aneurysmal dilatation of the origin of the left inferior M2 branch.  ----------------------------------------------------------------------  MEDICATIONS:  MEDICATIONS  (STANDING):  amLODIPine   Tablet 5 milliGRAM(s) Oral daily  aspirin  chewable 81 milliGRAM(s) Oral daily  atorvastatin 80 milliGRAM(s) Oral at bedtime  budesonide 160 MICROgram(s)/formoterol 4.5 MICROgram(s) Inhaler 2 Puff(s) Inhalation two times a day  clopidogrel Tablet 75 milliGRAM(s) Oral daily  enoxaparin Injectable 40 milliGRAM(s) SubCutaneous daily  famotidine    Tablet 20 milliGRAM(s) Oral two times a day  gabapentin 100 milliGRAM(s) Oral at bedtime  melatonin 6 milliGRAM(s) Oral at bedtime  montelukast 10 milliGRAM(s) Oral daily  pantoprazole    Tablet 40 milliGRAM(s) Oral before breakfast  sodium chloride 0.45%. 1000 milliLiter(s) (50 mL/Hr) IV Continuous <Continuous>    MEDICATIONS  (PRN):  ALBUTerol    90 MICROgram(s) HFA Inhaler 1 Puff(s) Inhalation every 6 hours PRN Shortness of Breath and/or Wheezing  aluminum hydroxide/magnesium hydroxide/simethicone Suspension 30 milliLiter(s) Oral every 4 hours PRN Dyspepsia  senna 2 Tablet(s) Oral at bedtime PRN Constipation  simethicone 80 milliGRAM(s) Chew four times a day PRN Upset Stomach    ----------------------------------------------------------------------    Assessment and Plan:   · Assessment	  MARKIE HERNANDEZ is a 66yo Female with left subcortical CVA  with dysphagia, right Hemiparesis, decreased functional mobility, gait instability and ADL impairments.    COMORBIDITES/ACTIVE MEDICAL ISSUES     Gait Instability, ADL impairments and Functional impairments: cont Comprehensive Rehab Program of PT/OT and speech therapy    #CVA  -DAPT with Lipitor  -fall and aspiration precautions  -TTE outpt  -neurology and cardio follow up outpt    Dyspepsia  --cont. protonix and pepcid  --add Maalox    Chest Pain--resolved  --EKG Sinus-- no ischemic changes.    --d/w hospitalist    Insomnia  --cont. melatonin qhs    #HLD  -continue Lipitor  -monitor LFTs    #HTN  --will monitor and adjust PRN  -amlodipine 5 mg daily  -hospitalist  following    #NANCY    -monitor BMP-improving    #Asthma  -continue simicort and albuterol  -monitor VS    Pain Mgmt - Tylenol PRN. Added gabapentin 100 mg HS for leg paresthesias today  GI/Bowel Mgmt - Senna,   PRN  /Bladder Mgmt - Voiding      Precautions / PROPHYLAXIS:   - Falls,  Seizure   -- Lungs: Aspiration,   - Pressure injury/Skin: Turn Q2hrs while in bed, OOB to Chair, PT/OT    - DVT: Lovenox           Patient is a 65y old  Female who presents with a chief complaint of s/p left BG CVA with right sided weakness (06 Jul 2021 12:54)    HPI:  66 yo RH female with PMH of asthma, HTN to [facility] ED 6/29 for right-sided weakness and slurred speech. CTH neg acute cva, old lacunar infarcts b/l BG. Out of window for tPA. CTA 6/29 shows Aneurysms suspected involving the left middle cerebral artery as described above. Transferred from [facility] to [facility] for possible evaluation of aneurysms noted on imaging. MRA 6/30 showed mild age-appropriate involutional and ischemic gliotic changes with old bilateral basal ganglia lacunar infarcts. Small acute infarct left basal ganglia and corona radiata. No evidence of hemorrhage. 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 Also mild aneurysmal dilatation of the origin of the left inferior M2 branch. Hospitalization complicated by NANCY, IVF completed. Failed swallow eval-tolerates Puree honey. TTE as outpatient. HbA1c 5.9 and . Evaluated by PMR and acute rehab recommended. Cleared for dc on 7/2.  Patient arrived to [facility] on 7/2/21 for acute inpatient rehab. Celi iterpreter #[id number]. Pt c/o difficulty speaking and still unable to more her right upper and right lower extremities.  (02 Jul 2021 15:21)    PAST MEDICAL & SURGICAL HISTORY:  Brain aneurysm    Hypertension    Brain aneurysm    Smoking    No significant past surgical history    No significant past surgical history      Allergies    No Known Allergies    Intolerances      ----------------------------------------------------------------------    SUBJECTIVE: Patient seen this morning in occupational therapy. Celi  # [phone] used. Patient complaining of left leg burning paresthesias ongoing for about a year now.  abdominal pain better today.  denies any loose stools.    Nursing sleep log reports pt. slept from 1-4am.       ROS:  Positive: Leg paresthesias  Denies: headache, lightheadedness, CP, SOB, abdominal pain, dysuria, nausea, constipation      ----------------------------------------------------------------------  PHYSICAL EXAM:    Vital Signs Last 24 Hrs  T(C): 36.4 (07 Jul 2021 09:33), Max: 36.6 (06 Jul 2021 19:43)  T(F): 97.5 (07 Jul 2021 09:33), Max: 97.9 (06 Jul 2021 19:43)  HR: 85 (07 Jul 2021 09:33) (72 - 91)  BP: 112/71 (07 Jul 2021 09:33) (112/71 - 143/83)  BP(mean): --  RR: 16 (07 Jul 2021 09:33) (16 - 16)  SpO2: 97% (07 Jul 2021 09:33) (92% - 98%)  Daily     Daily       Gen - NAD, Comfortable  HEENT - NCAT,   Pulm - CTAB,   Cardiovascular - RRR,   Abdomen - Soft, NT/ND, +BS  Extremities - No C/C/E, No calf tenderness  Neuro-     Cognitive - AAOx 2--knew she is in hospital but not date     Attention: makes eye contact and good comprehension      Memory: Recall 3 objects immediate and 3 min later         Cranial Nerves - PERRLA, EOMI, right facial droop, +dysphagia     Motor -                    LEFT    UE - ShAB 5/5, EF 5/5, EE 5/5, WE 5/5,  5/5                    RIGHT UE - ShAB 2/5, EF 2/5, EE 1/5, WE 0/5,  0/5                    LEFT    LE - HF 5/5, KE 5/5, DF 5/5, PF 5/5                    RIGHT LE - Hip ext 2/5, KE 2/5, DF 1/5, PF 1/5        Sensory - Intact to LT     Reflexes - DTR Intact, No primitive reflex     Coordination - impaired on right     Tone - normal  Psychiatric - Mood stable, Affect WNL      ----------------------------------------------------------------------  RECENT LABS:      07-07    139  |  105  |  23  ----------------------------<  94  3.9   |  25  |  1.23    Ca    8.7      07 Jul 2021 05:25  Mg     2.1     07-06            CAPILLARY BLOOD GLUCOSE        ----------------------------------------------------------------------  RECENT IMAGING:    MR Head No Cont (06.30.21)  IMPRESSION: Mild age-appropriate involutional and ischemic gliotic changes with old bilateral basal ganglia lacunar infarcts. Small acute infarct left basal ganglia and corona radiata. No evidence of hemorrhage.  Normal intracranial flow using noninvasive flow MR angiography.  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 Also mild aneurysmal dilatation of the origin of the left inferior M2 branch.  ----------------------------------------------------------------------  MEDICATIONS:  MEDICATIONS  (STANDING):  amLODIPine   Tablet 5 milliGRAM(s) Oral daily  aspirin  chewable 81 milliGRAM(s) Oral daily  atorvastatin 80 milliGRAM(s) Oral at bedtime  budesonide 160 MICROgram(s)/formoterol 4.5 MICROgram(s) Inhaler 2 Puff(s) Inhalation two times a day  clopidogrel Tablet 75 milliGRAM(s) Oral daily  enoxaparin Injectable 40 milliGRAM(s) SubCutaneous daily  famotidine    Tablet 20 milliGRAM(s) Oral two times a day  gabapentin 100 milliGRAM(s) Oral at bedtime  melatonin 6 milliGRAM(s) Oral at bedtime  montelukast 10 milliGRAM(s) Oral daily  pantoprazole    Tablet 40 milliGRAM(s) Oral before breakfast  sodium chloride 0.45%. 1000 milliLiter(s) (50 mL/Hr) IV Continuous <Continuous>    MEDICATIONS  (PRN):  ALBUTerol    90 MICROgram(s) HFA Inhaler 1 Puff(s) Inhalation every 6 hours PRN Shortness of Breath and/or Wheezing  aluminum hydroxide/magnesium hydroxide/simethicone Suspension 30 milliLiter(s) Oral every 4 hours PRN Dyspepsia  senna 2 Tablet(s) Oral at bedtime PRN Constipation  simethicone 80 milliGRAM(s) Chew four times a day PRN Upset Stomach    ----------------------------------------------------------------------    Assessment and Plan:   · Assessment	  MARKIE HERNANDEZ is a 66yo Female with left subcortical CVA  with dysphagia, right Hemiparesis, decreased functional mobility, gait instability and ADL impairments.    COMORBIDITES/ACTIVE MEDICAL ISSUES     Gait Instability, ADL impairments and Functional impairments: cont Comprehensive Rehab Program of PT/OT and speech therapy    #CVA  -DAPT with Lipitor  -fall and aspiration precautions  -TTE outpt  -neurology and cardio follow up outpt    Dyspepsia  --cont. protonix and pepcid  --cont. Maalox    Chest Pain--resolved  --EKG Sinus-- no ischemic changes.    --d/w hospitalist    Insomnia  --cont. melatonin qhs    #HLD  -continue Lipitor  -monitor LFTs    #HTN  --will monitor and adjust PRN  -amlodipine 5 mg daily  -hospitalist  following    #NANCY    -monitor BMP-improving    #Asthma  -continue simicort and albuterol  -monitor VS    Pain Mgmt - Tylenol PRN. Added gabapentin 100 mg HS for leg paresthesias today  GI/Bowel Mgmt - Senna,   PRN  /Bladder Mgmt - Voiding      Precautions / PROPHYLAXIS:   - Falls,  Seizure   -- Lungs: Aspiration,   - Pressure injury/Skin: Turn Q2hrs while in bed, OOB to Chair, PT/OT    - DVT: Lovenox

## 2021-07-07 NOTE — PROGRESS NOTE ADULT - ASSESSMENT
65 year old RH woman with past medical history of asthma and HTN not on antithrombotics presented as code stroke to [facility] ED for right-sided weakness with LWK  11p. 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 Also mild aneurysmal dilatation of the origin of the left inferior M2 branch.  - neurosx follow up OP    HTN  - c/w Norvasc      NANCY likely pre-renal  - improving  - encouraged PO hydration  - on IVF    Asthma  Symbicort, alb prn, singular    Atypical CP on 21  Resolved  EKG-nl  pt already on DAPT/statin  doubt ACS-monitor for now  TTE done . result pending    Dyspepsia, diarrhoea  - resolved today  - laxatives PRN  - PPI, famotidine  - Maalox PRN  - Zofran PRN for nausea  - monitor electrolytes and hydration and replace PRN    Hypoalbuminemia  Dysphagia  - on dysphagia diet  - on Ensure  - nutrition and SLP eval ongoing    d/w Dr. Sanchez   will follow

## 2021-07-07 NOTE — PROGRESS NOTE ADULT - ATTENDING COMMENTS
Pt. seen with fellow and student.  Agree with documentation above as per fellow with amendments made as appropriate. Patient medically stable. Making progress towards rehab goals.     Left MCA infarct  --bilat. foot neuropathic pain.  --start trial of gabapentin,  will help with sleep as well.

## 2021-07-07 NOTE — PROGRESS NOTE ADULT - SUBJECTIVE AND OBJECTIVE BOX
Medicine Progress Note    Patient is a 65y old  Female who presents with a chief complaint of s/p left BG CVA with right sided weakness (06 Jul 2021 12:21)      SUBJECTIVE / OVERNIGHT EVENTS:  seen and examined  Chart reviewed  No overnight events  Limb weakness improving with therapy  BM+    chest nd abd pain better today. no diarrhoea.   feeling thirsty as she is only drinking thickened water. on IVF    ADDITIONAL REVIEW OF SYSTEMS:  no fever/chills/CP/sob/palpitation/dizziness/ abd pain/nausea/vomiting/constipation/headaches. all other ROS neg    MEDICATIONS  (STANDING):  amLODIPine   Tablet 5 milliGRAM(s) Oral daily  aspirin  chewable 81 milliGRAM(s) Oral daily  atorvastatin 80 milliGRAM(s) Oral at bedtime  budesonide 160 MICROgram(s)/formoterol 4.5 MICROgram(s) Inhaler 2 Puff(s) Inhalation two times a day  clopidogrel Tablet 75 milliGRAM(s) Oral daily  enoxaparin Injectable 40 milliGRAM(s) SubCutaneous daily  famotidine    Tablet 20 milliGRAM(s) Oral two times a day  gabapentin 100 milliGRAM(s) Oral at bedtime  melatonin 6 milliGRAM(s) Oral at bedtime  montelukast 10 milliGRAM(s) Oral daily  pantoprazole    Tablet 40 milliGRAM(s) Oral before breakfast  sodium chloride 0.45%. 1000 milliLiter(s) (50 mL/Hr) IV Continuous <Continuous>    MEDICATIONS  (PRN):  ALBUTerol    90 MICROgram(s) HFA Inhaler 1 Puff(s) Inhalation every 6 hours PRN Shortness of Breath and/or Wheezing  aluminum hydroxide/magnesium hydroxide/simethicone Suspension 30 milliLiter(s) Oral every 4 hours PRN Dyspepsia  senna 2 Tablet(s) Oral at bedtime PRN Constipation  simethicone 80 milliGRAM(s) Chew four times a day PRN Upset Stomach      CAPILLARY BLOOD GLUCOSE        I&O's Summary      PHYSICAL EXAM:    Vital Signs Last 24 Hrs  T(C): 36.4 (07 Jul 2021 09:33), Max: 36.6 (06 Jul 2021 19:43)  T(F): 97.5 (07 Jul 2021 09:33), Max: 97.9 (06 Jul 2021 19:43)  HR: 85 (07 Jul 2021 09:33) (72 - 91)  BP: 112/71 (07 Jul 2021 09:33) (112/71 - 143/83)  BP(mean): --  RR: 16 (07 Jul 2021 09:33) (16 - 16)  SpO2: 97% (07 Jul 2021 09:33) (92% - 98%)    GENERAL: Not in distress. Alert    HEENT: clear conjuctiva, MM dry. no pallor or icterus  CARDIOVASCULAR: RRR S1, S2. No murmur/rubs/gallop  LUNGS: BLAE+, no rales, no wheezing, no rhonchi.    ABDOMEN: ND. Soft,  NT, no guarding / rebound / rigidity. BS normoactive  BACK: No spine tenderness.  EXTREMITIES: no edema. no leg or calf TP.  SKIN: warm and dry  PSYCHIATRIC: Calm.  No agitation.    LABS:                    COVID-19 PCR: NotDetec (29 Jun 2021 20:04)  COVID-19 PCR: NotDetec (29 Jun 2021 18:53)      RADIOLOGY & ADDITIONAL TESTS:  Imaging from Last 24 Hours:    Electrocardiogram/QTc Interval:    COORDINATION OF CARE:  Care Discussed with Consultants/Other Providers:

## 2021-07-08 PROCEDURE — 99232 SBSQ HOSP IP/OBS MODERATE 35: CPT

## 2021-07-08 RX ORDER — ACETAMINOPHEN 500 MG
650 TABLET ORAL EVERY 6 HOURS
Refills: 0 | Status: DISCONTINUED | OUTPATIENT
Start: 2021-07-08 | End: 2021-07-09

## 2021-07-08 RX ADMIN — BUDESONIDE AND FORMOTEROL FUMARATE DIHYDRATE 2 PUFF(S): 160; 4.5 AEROSOL RESPIRATORY (INHALATION) at 20:11

## 2021-07-08 RX ADMIN — AMLODIPINE BESYLATE 5 MILLIGRAM(S): 2.5 TABLET ORAL at 05:44

## 2021-07-08 RX ADMIN — BUDESONIDE AND FORMOTEROL FUMARATE DIHYDRATE 2 PUFF(S): 160; 4.5 AEROSOL RESPIRATORY (INHALATION) at 08:46

## 2021-07-08 RX ADMIN — GABAPENTIN 100 MILLIGRAM(S): 400 CAPSULE ORAL at 22:28

## 2021-07-08 RX ADMIN — Medication 81 MILLIGRAM(S): at 11:20

## 2021-07-08 RX ADMIN — Medication 6 MILLIGRAM(S): at 22:28

## 2021-07-08 RX ADMIN — CLOPIDOGREL BISULFATE 75 MILLIGRAM(S): 75 TABLET, FILM COATED ORAL at 11:20

## 2021-07-08 RX ADMIN — MONTELUKAST 10 MILLIGRAM(S): 4 TABLET, CHEWABLE ORAL at 11:20

## 2021-07-08 RX ADMIN — PANTOPRAZOLE SODIUM 40 MILLIGRAM(S): 20 TABLET, DELAYED RELEASE ORAL at 05:45

## 2021-07-08 RX ADMIN — ATORVASTATIN CALCIUM 80 MILLIGRAM(S): 80 TABLET, FILM COATED ORAL at 22:28

## 2021-07-08 RX ADMIN — SODIUM CHLORIDE 50 MILLILITER(S): 9 INJECTION, SOLUTION INTRAVENOUS at 12:58

## 2021-07-08 RX ADMIN — FAMOTIDINE 20 MILLIGRAM(S): 10 INJECTION INTRAVENOUS at 17:20

## 2021-07-08 RX ADMIN — ENOXAPARIN SODIUM 40 MILLIGRAM(S): 100 INJECTION SUBCUTANEOUS at 11:20

## 2021-07-08 RX ADMIN — FAMOTIDINE 20 MILLIGRAM(S): 10 INJECTION INTRAVENOUS at 05:44

## 2021-07-08 NOTE — CHART NOTE - NSCHARTNOTEFT_GEN_A_CORE
NUTRITION FOLLOW UP    SOURCE: Patient [X)   Family [ ]    Medical Record (X)    Diet, Dysphagia 1 Pureed-Honey Consistency Fluid:   Supplement Feeding Modality:  Oral  Ensure Enlive Servings Per Day:  1       Frequency:  Daily  Ensure Pudding Cans or Servings Per Day:  1       Frequency:  Two Times a day (07-03-21 @ 13:00) [Active]    Speech therapy: recommending continue with puree c honey    Pt is Turkmen speaking; pacific  used for translation, ID # [id number]. Pt tolerating diet, po intake variable (0-60%) per nursing documentation. Pt reports somewhat improving appetite at present. Observed >75% intake at lunch + 3 honey chase consumed. Pt unhappy with diet texture- preferences obtained from pt. Continues with Ensure Enlive QD and Ensure Pudding BID. Last BM 7/6. Encouraged adequate oral hydration while on thickened liquids.             CURRENT WEIGHT:  (7/2) 109.3lbs  (7/7) 108lbs    PERTINENT MEDS:   Pertinent Medications: MEDICATIONS  (STANDING):  amLODIPine   Tablet 5 milliGRAM(s) Oral daily  aspirin  chewable 81 milliGRAM(s) Oral daily  atorvastatin 80 milliGRAM(s) Oral at bedtime  budesonide 160 MICROgram(s)/formoterol 4.5 MICROgram(s) Inhaler 2 Puff(s) Inhalation two times a day  clopidogrel Tablet 75 milliGRAM(s) Oral daily  enoxaparin Injectable 40 milliGRAM(s) SubCutaneous daily  famotidine    Tablet 20 milliGRAM(s) Oral two times a day  gabapentin 100 milliGRAM(s) Oral at bedtime  melatonin 6 milliGRAM(s) Oral at bedtime  montelukast 10 milliGRAM(s) Oral daily  pantoprazole    Tablet 40 milliGRAM(s) Oral before breakfast  sodium chloride 0.45%. 1000 milliLiter(s) (50 mL/Hr) IV Continuous <Continuous>    MEDICATIONS  (PRN):  acetaminophen    Suspension .. 650 milliGRAM(s) Oral every 6 hours PRN Mild Pain (1 - 3), Moderate Pain (4 - 6), Severe Pain (7 - 10)  ALBUTerol    90 MICROgram(s) HFA Inhaler 1 Puff(s) Inhalation every 6 hours PRN Shortness of Breath and/or Wheezing  aluminum hydroxide/magnesium hydroxide/simethicone Suspension 30 milliLiter(s) Oral every 4 hours PRN Dyspepsia  senna 2 Tablet(s) Oral at bedtime PRN Constipation  simethicone 80 milliGRAM(s) Chew four times a day PRN Upset Stomach      PERTINENT LABS:  07-07 Na139 mmol/L Glu 94 mg/dL K+ 3.9 mmol/L Cr  1.23 mg/dL BUN 23 mg/dL 07-03 Alb 3.1 g/dL<L> 06-30 Chol 184 mg/dL LDL --    HDL 38 mg/dL<L> Trig 99 mg/dL    CAPILLARY BLOOD GLUCOSE    SKIN:  none  EDEMA: none  LAST BM: 7/6    ESTIMATED NEEDS:   [X] no change since previous assessment  [ ] recalculated:     PREVIOUS NUTRITION DIAGNOSIS:    1. moderate malnutrition- appetite improving. Continues with Ensure Enlive, Ensure pudding  2. Swallowing difficulty- puree c honey     NUTRITION DIAGNOSIS is :  (X)  Ongoing      NEW NUTRITION DIAGNOSIS: N/A    NUTRITION RECOMMENDATIONS:   1. Continue current nutrition plan of care  2. Diet consistency per speech therapy  3. Obtain and honor food preferences as able. Yogurt TID.     MONITORING AND EVALUATION:   1. Tolerance to diet prescription   2. PO intake  3. Weights  4. Labs  5. Follow Up per protocol     RD to remain available  Lulu Akers RDN   Pager #[phone]

## 2021-07-08 NOTE — PROGRESS NOTE ADULT - ASSESSMENT
· Assessment	  MARKIE HERNANDEZ is a 64yo Female with left subcortical CVA  with dysphagia, right Hemiparesis, decreased functional mobility, gait instability and ADL impairments.    COMORBIDITES/ACTIVE MEDICAL ISSUES     Gait Instability, ADL impairments and Functional impairments: cont Comprehensive Rehab Program of PT/OT and speech therapy    #CVA  -DAPT with Lipitor  -fall and aspiration precautions  -TTE outpt  -neurology and cardio follow up outpt    Dyspepsia  --cont. protonix and pepcid  --cont. Maalox      Insomnia  --cont. melatonin qhs    Dysphagia  --Puree/Honey diet  --MBS 7/12    #HLD  -continue Lipitor  -monitor LFTs    #HTN  --will monitor and adjust PRN  -amlodipine 5 mg daily  -hospitalist  following    #NANCY  -monitor BMP-improving    #Asthma  -continue simicort and albuterol  -monitor VS    Pain Mgmt - Tylenol PRN. Cont. gabapentin 100 mg HS for leg paresthesias today  GI/Bowel Mgmt - Senna,   PRN  /Bladder Mgmt - Voiding      Precautions / PROPHYLAXIS:   - Falls,  Seizure   -- Lungs: Aspiration,   - Pressure injury/Skin: Turn Q2hrs while in bed, OOB to Chair, PT/OT    - DVT: Lovenox    IDT 7/8/21:  SW: Lives in  with son, DIL, daughter w/ 4 JONNA and 10 steps inside-- Needed Assistance PTA with ADLs and Ambulation.  HHA 42h/week  OT: Min A e/g; Max A dressing; tot A toileting, transfers  PT: Transfer Max A.  Has not performed Ambulation or stairs.  Goal Min A  Speech: Puree/honey; MBS planned 7/12,  Good comprehension, Cogn. WFL.  +dysarthria.    ELOS:  7/23/21 home vs MARCELO-- can extend if going home.

## 2021-07-08 NOTE — PROGRESS NOTE ADULT - SUBJECTIVE AND OBJECTIVE BOX
HPI:  64 yo RH female with PMH of asthma, HTN to [facility] ED 6/29 for right-sided weakness and slurred speech. CTH neg acute cva, old lacunar infarcts b/l BG. Out of window for tPA. CTA 6/29 shows Aneurysms suspected involving the left middle cerebral artery as described above. Transferred from [facility] to [facility] for possible evaluation of aneurysms noted on imaging. MRA 6/30 showed mild age-appropriate involutional and ischemic gliotic changes with old bilateral basal ganglia lacunar infarcts. Small acute infarct left basal ganglia and corona radiata. No evidence of hemorrhage. 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 Also mild aneurysmal dilatation of the origin of the left inferior M2 branch. Hospitalization complicated by NANCY, IVF completed. Failed swallow eval-tolerates Puree honey. TTE as outpatient. HbA1c 5.9 and . Evaluated by PMR and acute rehab recommended. Cleared for dc on 7/2.  Patient arrived to [facility] on 7/2/21 for acute inpatient rehab. Celi iterpreter #[id number]. Pt c/o difficulty speaking and still unable to more her right upper and right lower extremities.  (02 Jul 2021 15:21)      PAST MEDICAL & SURGICAL HISTORY:  Brain aneurysm    Hypertension    Brain aneurysm    Smoking    No significant past surgical history    No significant past surgical history        Subjective:  Spoke with pt. via [employer] interpreters Celi  # [id number].  States bilateral foot pain is better.  Pt. denies abdominal pain,.  Asking if she can drink water and family can bring food from home.  Educated pt. regarding her dysphagia and diet modifications necessary at this time to prevent aspiration.  Slept through the night.       VITALS  Vital Signs Last 24 Hrs  T(C): 36.3 (08 Jul 2021 07:29), Max: 36.3 (07 Jul 2021 19:46)  T(F): 97.4 (08 Jul 2021 07:29), Max: 97.4 (07 Jul 2021 19:46)  HR: 79 (08 Jul 2021 08:48) (74 - 83)  BP: 131/83 (08 Jul 2021 07:29) (131/83 - 140/75)  BP(mean): --  RR: 16 (08 Jul 2021 07:29) (16 - 16)  SpO2: 96% (08 Jul 2021 08:48) (96% - 97%)    REVIEW OF SYMPTOMS  Positive: Leg paresthesias, Neurological deficits-- dysphagia, dysarthria, right HP  Denies: headache, lightheadedness, CP, SOB, abdominal pain, dysuria, nausea, constipation      ----------------------------------------------------------------------  PHYSICAL EXAM:        Gen - NAD, Comfortable  HEENT - NCAT,   Pulm - CTAB,   Cardiovascular - RRR,   Abdomen - Soft, NT/ND, +BS  Extremities - No C/C/E, No calf tenderness  Neuro-     Cognitive - AAOx 2--knew she is in hospital but not date     Attention: makes eye contact and good comprehension      Memory: Recall 3 objects immediate and 3 min later         Cranial Nerves - PERRLA, EOMI, right facial droop, +dysphagia     Motor -                    LEFT    UE - ShAB 5/5, EF 5/5, EE 5/5, WE 5/5,  5/5                    RIGHT UE - ShAB 2/5, EF 2/5, EE 1/5, WE 0/5,  0/5                    LEFT    LE - HF 5/5, KE 5/5, DF 5/5, PF 5/5                    RIGHT LE - Hip ext 2/5, KE 2/5, DF 1/5, PF 1/5        Sensory - Intact to LT     Reflexes - DTR Intact, No primitive reflex     Coordination - impaired on right     Tone - normal  Psychiatric - Mood stable, Affect WNL      RECENT LABS    07-07    139  |  105  |  23  ----------------------------<  94  3.9   |  25  |  1.23    Ca    8.7      07 Jul 2021 05:25  Mg     2.1     07-06              RADIOLOGY/OTHER RESULTS      MEDICATIONS  (STANDING):  amLODIPine   Tablet 5 milliGRAM(s) Oral daily  aspirin  chewable 81 milliGRAM(s) Oral daily  atorvastatin 80 milliGRAM(s) Oral at bedtime  budesonide 160 MICROgram(s)/formoterol 4.5 MICROgram(s) Inhaler 2 Puff(s) Inhalation two times a day  clopidogrel Tablet 75 milliGRAM(s) Oral daily  enoxaparin Injectable 40 milliGRAM(s) SubCutaneous daily  famotidine    Tablet 20 milliGRAM(s) Oral two times a day  gabapentin 100 milliGRAM(s) Oral at bedtime  melatonin 6 milliGRAM(s) Oral at bedtime  montelukast 10 milliGRAM(s) Oral daily  pantoprazole    Tablet 40 milliGRAM(s) Oral before breakfast  sodium chloride 0.45%. 1000 milliLiter(s) (50 mL/Hr) IV Continuous <Continuous>    MEDICATIONS  (PRN):  ALBUTerol    90 MICROgram(s) HFA Inhaler 1 Puff(s) Inhalation every 6 hours PRN Shortness of Breath and/or Wheezing  aluminum hydroxide/magnesium hydroxide/simethicone Suspension 30 milliLiter(s) Oral every 4 hours PRN Dyspepsia  senna 2 Tablet(s) Oral at bedtime PRN Constipation  simethicone 80 milliGRAM(s) Chew four times a day PRN Upset Stomach

## 2021-07-08 NOTE — PROGRESS NOTE ADULT - ASSESSMENT
65 year old RH woman with past medical history of asthma and HTN not on antithrombotics presented as code stroke to [facility] ED for right-sided weakness with LWK  11p. 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 Also mild aneurysmal dilatation of the origin of the left inferior M2 branch.  - neurosx follow up OP    HTN  - c/w Norvasc      NANCY likely pre-renal  - improving  - encouraged PO hydration  - on IVF    Asthma  Symbicort, alb prn, singular    Atypical CP on 21  Resolved  EKG-nl  pt already on DAPT/statin  doubt ACS-monitor for now  TTE done . result pending      d/w Dr. Sanchez   will follow

## 2021-07-08 NOTE — PROGRESS NOTE ADULT - SUBJECTIVE AND OBJECTIVE BOX
65y old  Female who presents with a chief complaint of s/p left BG CVA with right sided weakness     seen and examined  No overnight events  no new complaints, no n/v, no sob    Vital Signs Last 24 Hrs  T(C): 36.3 (08 Jul 2021 07:29), Max: 36.3 (07 Jul 2021 19:46)  T(F): 97.4 (08 Jul 2021 07:29), Max: 97.4 (07 Jul 2021 19:46)  HR: 79 (08 Jul 2021 08:48) (74 - 83)  BP: 131/83 (08 Jul 2021 07:29) (131/83 - 140/75)  BP(mean): --  RR: 16 (08 Jul 2021 07:29) (16 - 16)  SpO2: 96% (08 Jul 2021 08:48) (96% - 97%)    GENERAL- NAD  EAR/NOSE/MOUTH/THROAT - no pharyngeal exudates, no oral lesion's  MMM  EYES- RICO, conjunctiva and Sclera clear  NECK- supple  RESPIRATORY-  clear to auscultation bilaterally, non laboured breathing  CARDIOVASCULAR - SIS2, RRR  GI - soft NT BS present  EXTREMITIES- no pedal edema  NEUROLOGY- right sided weakness, slurred speech, facial droop  PSYCHIATRY- AAO X 1                    x                    139  | 25   | 23           x     >-----------< x       ------------------------< 94                    x                    3.9  | 105  | 1.23                                         Ca 8.7   Mg x     Ph x            MEDICATIONS  (STANDING):  amLODIPine   Tablet 5 milliGRAM(s) Oral daily  aspirin  chewable 81 milliGRAM(s) Oral daily  atorvastatin 80 milliGRAM(s) Oral at bedtime  budesonide 160 MICROgram(s)/formoterol 4.5 MICROgram(s) Inhaler 2 Puff(s) Inhalation two times a day  clopidogrel Tablet 75 milliGRAM(s) Oral daily  enoxaparin Injectable 40 milliGRAM(s) SubCutaneous daily  famotidine    Tablet 20 milliGRAM(s) Oral two times a day  gabapentin 100 milliGRAM(s) Oral at bedtime  melatonin 6 milliGRAM(s) Oral at bedtime  montelukast 10 milliGRAM(s) Oral daily  pantoprazole    Tablet 40 milliGRAM(s) Oral before breakfast  sodium chloride 0.45%. 1000 milliLiter(s) (50 mL/Hr) IV Continuous <Continuous>    MEDICATIONS  (PRN):  acetaminophen    Suspension .. 650 milliGRAM(s) Oral every 6 hours PRN Mild Pain (1 - 3), Moderate Pain (4 - 6), Severe Pain (7 - 10)  ALBUTerol    90 MICROgram(s) HFA Inhaler 1 Puff(s) Inhalation every 6 hours PRN Shortness of Breath and/or Wheezing  aluminum hydroxide/magnesium hydroxide/simethicone Suspension 30 milliLiter(s) Oral every 4 hours PRN Dyspepsia  senna 2 Tablet(s) Oral at bedtime PRN Constipation  simethicone 80 milliGRAM(s) Chew four times a day PRN Upset Stomach

## 2021-07-09 LAB
ALBUMIN SERPL ELPH-MCNC: 2.9 G/DL — LOW (ref 3.3–5)
ALP SERPL-CCNC: 89 U/L — SIGNIFICANT CHANGE UP (ref 40–120)
ALT FLD-CCNC: 163 U/L — HIGH (ref 10–45)
ANION GAP SERPL CALC-SCNC: 6 MMOL/L — SIGNIFICANT CHANGE UP (ref 5–17)
AST SERPL-CCNC: 177 U/L — HIGH (ref 10–40)
BILIRUB SERPL-MCNC: 0.5 MG/DL — SIGNIFICANT CHANGE UP (ref 0.2–1.2)
BUN SERPL-MCNC: 18 MG/DL — SIGNIFICANT CHANGE UP (ref 7–23)
CALCIUM SERPL-MCNC: 8.7 MG/DL — SIGNIFICANT CHANGE UP (ref 8.4–10.5)
CHLORIDE SERPL-SCNC: 103 MMOL/L — SIGNIFICANT CHANGE UP (ref 96–108)
CO2 SERPL-SCNC: 28 MMOL/L — SIGNIFICANT CHANGE UP (ref 22–31)
CREAT SERPL-MCNC: 1.1 MG/DL — SIGNIFICANT CHANGE UP (ref 0.5–1.3)
GLUCOSE SERPL-MCNC: 101 MG/DL — HIGH (ref 70–99)
HCT VFR BLD CALC: 36.1 % — SIGNIFICANT CHANGE UP (ref 34.5–45)
HGB BLD-MCNC: 11.8 G/DL — SIGNIFICANT CHANGE UP (ref 11.5–15.5)
MCHC RBC-ENTMCNC: 29.6 PG — SIGNIFICANT CHANGE UP (ref 27–34)
MCHC RBC-ENTMCNC: 32.7 GM/DL — SIGNIFICANT CHANGE UP (ref 32–36)
MCV RBC AUTO: 90.5 FL — SIGNIFICANT CHANGE UP (ref 80–100)
NRBC # BLD: 0 /100 WBCS — SIGNIFICANT CHANGE UP (ref 0–0)
PLATELET # BLD AUTO: 149 K/UL — LOW (ref 150–400)
POTASSIUM SERPL-MCNC: 4.2 MMOL/L — SIGNIFICANT CHANGE UP (ref 3.5–5.3)
POTASSIUM SERPL-SCNC: 4.2 MMOL/L — SIGNIFICANT CHANGE UP (ref 3.5–5.3)
PROT SERPL-MCNC: 6.6 G/DL — SIGNIFICANT CHANGE UP (ref 6–8.3)
RBC # BLD: 3.99 M/UL — SIGNIFICANT CHANGE UP (ref 3.8–5.2)
RBC # FLD: 12.3 % — SIGNIFICANT CHANGE UP (ref 10.3–14.5)
SODIUM SERPL-SCNC: 137 MMOL/L — SIGNIFICANT CHANGE UP (ref 135–145)
WBC # BLD: 6.2 K/UL — SIGNIFICANT CHANGE UP (ref 3.8–10.5)
WBC # FLD AUTO: 6.2 K/UL — SIGNIFICANT CHANGE UP (ref 3.8–10.5)

## 2021-07-09 PROCEDURE — 99232 SBSQ HOSP IP/OBS MODERATE 35: CPT | Mod: GC

## 2021-07-09 PROCEDURE — 99233 SBSQ HOSP IP/OBS HIGH 50: CPT

## 2021-07-09 PROCEDURE — 93010 ELECTROCARDIOGRAM REPORT: CPT

## 2021-07-09 RX ORDER — ATORVASTATIN CALCIUM 80 MG/1
20 TABLET, FILM COATED ORAL AT BEDTIME
Refills: 0 | Status: DISCONTINUED | OUTPATIENT
Start: 2021-07-09 | End: 2021-07-09

## 2021-07-09 RX ADMIN — PANTOPRAZOLE SODIUM 40 MILLIGRAM(S): 20 TABLET, DELAYED RELEASE ORAL at 06:14

## 2021-07-09 RX ADMIN — AMLODIPINE BESYLATE 5 MILLIGRAM(S): 2.5 TABLET ORAL at 06:14

## 2021-07-09 RX ADMIN — FAMOTIDINE 20 MILLIGRAM(S): 10 INJECTION INTRAVENOUS at 06:15

## 2021-07-09 RX ADMIN — FAMOTIDINE 20 MILLIGRAM(S): 10 INJECTION INTRAVENOUS at 17:40

## 2021-07-09 RX ADMIN — Medication 6 MILLIGRAM(S): at 21:56

## 2021-07-09 RX ADMIN — ENOXAPARIN SODIUM 40 MILLIGRAM(S): 100 INJECTION SUBCUTANEOUS at 11:37

## 2021-07-09 RX ADMIN — Medication 81 MILLIGRAM(S): at 11:37

## 2021-07-09 RX ADMIN — CLOPIDOGREL BISULFATE 75 MILLIGRAM(S): 75 TABLET, FILM COATED ORAL at 11:37

## 2021-07-09 RX ADMIN — GABAPENTIN 100 MILLIGRAM(S): 400 CAPSULE ORAL at 21:56

## 2021-07-09 RX ADMIN — MONTELUKAST 10 MILLIGRAM(S): 4 TABLET, CHEWABLE ORAL at 11:37

## 2021-07-09 NOTE — PROGRESS NOTE ADULT - ASSESSMENT
· Assessment	  MARKIE HERNANDEZ is a 66yo Female with left subcortical CVA  with dysphagia, right Hemiparesis, decreased functional mobility, gait instability and ADL impairments.    COMORBIDITES/ACTIVE MEDICAL ISSUES     Gait Instability, ADL impairments and Functional impairments: cont Comprehensive Rehab Program of PT/OT and speech therapy    #CVA  -DAPT with aspirin & plavix.   -fall and aspiration precautions  -TTE outpt  -neurology and cardio follow up outpt  - stopped the Lipitor due to elevated LFT     #Transaminitis  - stopped the Lipitor due to elevated LFT   - Ordered RUQ US for tomorrow  - NPO after midnight.   - hospitalist is following.     Dyspepsia  --cont. protonix and pepcid  --cont. Maalox    Insomnia  --cont. melatonin qhs    Dysphagia  --Puree/Honey diet  --MBS 7/12    #HLD  -continue Lipitor  -monitor LFTs    #HTN  --will monitor and adjust PRN  -amlodipine 5 mg daily  -hospitalist  following    #NANCY  -monitor BMP-improving  - creatinine back to baseline.     #Asthma  -continue simicort and albuterol  -monitor VS    Pain Mgmt - Tylenol PRN.   Cont. gabapentin 100 mg HS for leg paresthesias. Consider increasing dose next week if the symptoms persist   GI/Bowel Mgmt - Senna,   PRN  /Bladder Mgmt - Voiding      Precautions / PROPHYLAXIS:   - Falls,  Seizure   -- Lungs: Aspiration,   - Pressure injury/Skin: Turn Q2hrs while in bed, OOB to Chair, PT/OT    - DVT: Lovenox    IDT 7/8/21:  SW: Lives in  with son, DIL, daughter w/ 4 JONNA and 10 steps inside-- Needed Assistance PTA with ADLs and Ambulation.  HHA 42h/week  OT: Min A e/g; Max A dressing; tot A toileting, transfers  PT: Transfer Max A.  Has not performed Ambulation or stairs.  Goal Min A  Speech: Puree/honey; MBS planned 7/12,  Good comprehension, Cogn. WFL.  +dysarthria.    ELOS:  7/23/21 home vs MARCELO-- can extend if going home.

## 2021-07-09 NOTE — PROGRESS NOTE ADULT - ASSESSMENT
65 year old RH woman with past medical history of asthma and HTN not on antithrombotics presented as code stroke to [facility] ED for right-sided weakness with LWK  11p. found to have Right yaneth sensorimotor loss and moderate dysarthria +/- right visual field cut 2/2 L BG infarct 2/2  , admitted for rehab- pt/ot/dvt ppx    intermittent chronic chest pain- fabioley related to acid reflux c/w ppi, simethicone, aspiration precautions, labs negative, no sob, ekg reviewed. monitor  soar throat- tessalon pears, cough syrup prn.     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 Also mild aneurysmal dilatation of the origin of the left inferior M2 branch.  - neurosx follow up OP    HTN  - c/w Norvasc      NANCY likely pre-renal  - improving  - encouraged PO hydration  - on IVF    Asthma  Symbicort, alb prn, singular    Atypical CP on 21  Resolved  EKG-nl  pt already on DAPT/statin  doubt ACS-monitor for now  TTE done . result pending      d/w Dr. Sanchez   will follow    65 year old RH woman with past medical history of asthma and HTN not on antithrombotics presented as code stroke to [facility] ED for right-sided weakness with LWK  11p. found to have Right yaneth sensorimotor loss and moderate dysarthria +/- right visual field cut 2/2 L BG infarct 2/2  , admitted for rehab- pt/ot/dvt ppx    intermittent chronic chest pain- likley related to acid reflux c/w ppi, simethicone, aspiration precautions, labs negative, no sob, ekg reviewed. monitor  < from: TTE Echo Complete w/o Contrast w/ Doppler (21 @ 16:11) >   Left ventricular ejection fraction, by visual estimation, is 50 to 55%.    soar throat- tessalon pears, cough syrup prn.     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 Also mild aneurysmal dilatation of the origin of the left inferior M2 branch.  - neurosx follow up OP    HTN  - c/w Norvasc      NANCY likely pre-renal  - improving  - encouraged PO hydration  - on IVF    Asthma  Symbicort, alb prn, singular    d/w Dr. Sanchez   will follow

## 2021-07-09 NOTE — PROGRESS NOTE ADULT - SUBJECTIVE AND OBJECTIVE BOX
65y old  Female who presents with a chief complaint of s/p left BG CVA with right sided weakness     seen and examined at the bedside, c/o intermittent chest and epigastric discomfort last evening all w/u negative, says she has chronic intermittent chest discomfort, resolved spontaneously, this am c/o soar throat, no n/v, no sob    Vital Signs Last 24 Hrs  T(C): 36.5 (09 Jul 2021 08:51), Max: 36.5 (09 Jul 2021 08:51)  T(F): 97.7 (09 Jul 2021 08:51), Max: 97.7 (09 Jul 2021 08:51)  HR: 87 (09 Jul 2021 08:51) (74 - 87)  BP: 118/72 (09 Jul 2021 08:51) (118/72 - 142/72)  BP(mean): --  RR: 16 (09 Jul 2021 08:51) (15 - 16)  SpO2: 94% (09 Jul 2021 08:51) (94% - 94%)    GENERAL- NAD  EAR/NOSE/MOUTH/THROAT - no pharyngeal exudates, no oral lesion's  MMM  EYES- RICO, conjunctiva and Sclera clear  NECK- supple  RESPIRATORY-  clear to auscultation bilaterally, non laboured breathing  CARDIOVASCULAR - SIS2, RRR  GI - soft NT BS present  EXTREMITIES- no pedal edema  NEUROLOGY- right sided weakness, slurred speech, facial droop  PSYCHIATRY- AAO X 1                  11.8                 137  | 28   | 18           6.20  >-----------< 149     ------------------------< 101                   36.1                 4.2  | 103  | 1.10                                         Ca 8.7   Mg x     Ph x      Labs reviewed:       ECG reviewed and interpreted: sinus at 83-  7/9/21    MEDICATIONS  (STANDING):  amLODIPine   Tablet 5 milliGRAM(s) Oral daily  aspirin  chewable 81 milliGRAM(s) Oral daily  atorvastatin 80 milliGRAM(s) Oral at bedtime  budesonide 160 MICROgram(s)/formoterol 4.5 MICROgram(s) Inhaler 2 Puff(s) Inhalation two times a day  clopidogrel Tablet 75 milliGRAM(s) Oral daily  enoxaparin Injectable 40 milliGRAM(s) SubCutaneous daily  famotidine    Tablet 20 milliGRAM(s) Oral two times a day  gabapentin 100 milliGRAM(s) Oral at bedtime  melatonin 6 milliGRAM(s) Oral at bedtime  montelukast 10 milliGRAM(s) Oral daily  pantoprazole    Tablet 40 milliGRAM(s) Oral before breakfast  sodium chloride 0.45%. 1000 milliLiter(s) (50 mL/Hr) IV Continuous <Continuous>    MEDICATIONS  (PRN):  acetaminophen    Suspension .. 650 milliGRAM(s) Oral every 6 hours PRN Mild Pain (1 - 3), Moderate Pain (4 - 6), Severe Pain (7 - 10)  ALBUTerol    90 MICROgram(s) HFA Inhaler 1 Puff(s) Inhalation every 6 hours PRN Shortness of Breath and/or Wheezing  aluminum hydroxide/magnesium hydroxide/simethicone Suspension 30 milliLiter(s) Oral every 4 hours PRN Dyspepsia  senna 2 Tablet(s) Oral at bedtime PRN Constipation  simethicone 80 milliGRAM(s) Chew four times a day PRN Upset Stomach

## 2021-07-09 NOTE — PROGRESS NOTE ADULT - SUBJECTIVE AND OBJECTIVE BOX
HPI:  64 yo RH female with PMH of asthma, HTN to [facility] ED 6/29 for right-sided weakness and slurred speech. CTH neg acute cva, old lacunar infarcts b/l BG. Out of window for tPA. CTA 6/29 shows Aneurysms suspected involving the left middle cerebral artery as described above. Transferred from [facility] to [facility] for possible evaluation of aneurysms noted on imaging. MRA 6/30 showed mild age-appropriate involutional and ischemic gliotic changes with old bilateral basal ganglia lacunar infarcts. Small acute infarct left basal ganglia and corona radiata. No evidence of hemorrhage. 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 Also mild aneurysmal dilatation of the origin of the left inferior M2 branch. Hospitalization complicated by NANCY, IVF completed. Failed swallow eval-tolerates Puree honey. TTE as outpatient. HbA1c 5.9 and . Evaluated by PMR and acute rehab recommended. Cleared for dc on 7/2. Patient arrived to [facility] on 7/2/21 for acute inpatient rehab. [employer] iterpreter #[id number]. Pt c/o difficulty speaking and still unable to more her right upper and right lower extremities.  (02 Jul 2021 15:21)      PAST MEDICAL & SURGICAL HISTORY:  Brain aneurysm    Hypertension    Brain aneurysm    Smoking    No significant past surgical history    No significant past surgical history      Subjective:    Patient seen and examined at bedside. Spoke via [employer] interpreters [employer]  # [id number]  No events overnight.   This morning Pt was complaining of L sided chest pain radiating to the back. EKG was ordered.   Pt also stated she was having soar throat. Denied any associated cough.   Denies any pain at this time. Continue to complain of paraesthesia in the RUE & RLE. States the gabapentin helps a little.   Denies any-other complaints at this time.   Participating in therapy       ICU Vital Signs Last 24 Hrs  T(C): 36.5 (09 Jul 2021 08:51), Max: 36.5 (09 Jul 2021 08:51)  T(F): 97.7 (09 Jul 2021 08:51), Max: 97.7 (09 Jul 2021 08:51)  HR: 87 (09 Jul 2021 08:51) (74 - 87)  BP: 118/72 (09 Jul 2021 08:51) (118/72 - 142/72)  BP(mean): --  ABP: --  ABP(mean): --  RR: 16 (09 Jul 2021 08:51) (15 - 16)  SpO2: 94% (09 Jul 2021 08:51) (94% - 94%)      REVIEW OF SYMPTOMS  Positive: Leg paresthesias, Neurological deficits-- dysphagia, dysarthria, right HP  Denies: headache, lightheadedness, CP, SOB, abdominal pain, dysuria, nausea, constipation      ----------------------------------------------------------------------  PHYSICAL EXAM:      Gen - NAD, Comfortable  HEENT - NCAT,   Pulm - CTAB,   Cardiovascular - RRR,   Abdomen - Soft, NT/ND, +BS  Extremities - No C/C/E, No calf tenderness  Neuro-     Cognitive - AAOx 2--knew she is in hospital but not date     Attention: makes eye contact and good comprehension      Memory: Recall 3 objects immediate and 3 min later         Cranial Nerves - PERRLA, EOMI, right facial droop, +dysphagia     Motor -                    LEFT    UE - ShAB 5/5, EF 5/5, EE 5/5, WE 5/5,  5/5                    RIGHT UE - ShAB 2/5, EF 2/5, EE 1/5, WE 0/5,  0/5                    LEFT    LE - HF 5/5, KE 5/5, DF 5/5, PF 5/5                    RIGHT LE - Hip ext 2/5, KE 2/5, DF 1/5, PF 1/5        Sensory - Intact to LT     Reflexes - DTR Intact, No primitive reflex     Coordination - impaired on right     Tone - normal  Psychiatric - Mood stable, Affect WNL      RECENT LABS                          11.8   6.20  )-----------( 149      ( 09 Jul 2021 05:30 )             36.1     07-09    137  |  103  |  18  ----------------------------<  101<H>  4.2   |  28  |  1.10    Ca    8.7      09 Jul 2021 05:30    TPro  6.6  /  Alb  2.9<L>  /  TBili  0.5  /  DBili  x   /  AST  177<H>  /  ALT  163<H>  /  AlkPhos  89  07-09      RADIOLOGY/OTHER RESULTS      MEDICATIONS  (STANDING):  amLODIPine   Tablet 5 milliGRAM(s) Oral daily  aspirin  chewable 81 milliGRAM(s) Oral daily  budesonide 160 MICROgram(s)/formoterol 4.5 MICROgram(s) Inhaler 2 Puff(s) Inhalation two times a day  clopidogrel Tablet 75 milliGRAM(s) Oral daily  enoxaparin Injectable 40 milliGRAM(s) SubCutaneous daily  famotidine    Tablet 20 milliGRAM(s) Oral two times a day  gabapentin 100 milliGRAM(s) Oral at bedtime  melatonin 6 milliGRAM(s) Oral at bedtime  montelukast 10 milliGRAM(s) Oral daily  pantoprazole    Tablet 40 milliGRAM(s) Oral before breakfast  sodium chloride 0.45%. 1000 milliLiter(s) (50 mL/Hr) IV Continuous <Continuous>    MEDICATIONS  (PRN):  ALBUTerol    90 MICROgram(s) HFA Inhaler 1 Puff(s) Inhalation every 6 hours PRN Shortness of Breath and/or Wheezing  aluminum hydroxide/magnesium hydroxide/simethicone Suspension 30 milliLiter(s) Oral every 4 hours PRN Dyspepsia  senna 2 Tablet(s) Oral at bedtime PRN Constipation  simethicone 80 milliGRAM(s) Chew four times a day PRN Upset Stomach

## 2021-07-10 PROCEDURE — 99232 SBSQ HOSP IP/OBS MODERATE 35: CPT

## 2021-07-10 PROCEDURE — 76705 ECHO EXAM OF ABDOMEN: CPT | Mod: 26

## 2021-07-10 RX ADMIN — CLOPIDOGREL BISULFATE 75 MILLIGRAM(S): 75 TABLET, FILM COATED ORAL at 12:02

## 2021-07-10 RX ADMIN — ENOXAPARIN SODIUM 40 MILLIGRAM(S): 100 INJECTION SUBCUTANEOUS at 12:02

## 2021-07-10 RX ADMIN — MONTELUKAST 10 MILLIGRAM(S): 4 TABLET, CHEWABLE ORAL at 12:02

## 2021-07-10 RX ADMIN — BUDESONIDE AND FORMOTEROL FUMARATE DIHYDRATE 2 PUFF(S): 160; 4.5 AEROSOL RESPIRATORY (INHALATION) at 08:03

## 2021-07-10 RX ADMIN — FAMOTIDINE 20 MILLIGRAM(S): 10 INJECTION INTRAVENOUS at 18:10

## 2021-07-10 RX ADMIN — PANTOPRAZOLE SODIUM 40 MILLIGRAM(S): 20 TABLET, DELAYED RELEASE ORAL at 05:53

## 2021-07-10 RX ADMIN — GABAPENTIN 100 MILLIGRAM(S): 400 CAPSULE ORAL at 21:31

## 2021-07-10 RX ADMIN — AMLODIPINE BESYLATE 5 MILLIGRAM(S): 2.5 TABLET ORAL at 05:53

## 2021-07-10 RX ADMIN — Medication 6 MILLIGRAM(S): at 21:31

## 2021-07-10 RX ADMIN — FAMOTIDINE 20 MILLIGRAM(S): 10 INJECTION INTRAVENOUS at 05:53

## 2021-07-10 RX ADMIN — Medication 81 MILLIGRAM(S): at 12:02

## 2021-07-10 RX ADMIN — BUDESONIDE AND FORMOTEROL FUMARATE DIHYDRATE 2 PUFF(S): 160; 4.5 AEROSOL RESPIRATORY (INHALATION) at 20:40

## 2021-07-10 NOTE — PROGRESS NOTE ADULT - SUBJECTIVE AND OBJECTIVE BOX
Patient is a 65y old  Female who presents with a chief complaint of s/p left BG CVA with right sided weakness (10 Jul 2021 12:46)    Patient seen and examined at bedside. appears comfortable. NAD.    ALLERGIES:  No Known Allergies    MEDICATIONS  (STANDING):  amLODIPine   Tablet 5 milliGRAM(s) Oral daily  aspirin  chewable 81 milliGRAM(s) Oral daily  budesonide 160 MICROgram(s)/formoterol 4.5 MICROgram(s) Inhaler 2 Puff(s) Inhalation two times a day  clopidogrel Tablet 75 milliGRAM(s) Oral daily  enoxaparin Injectable 40 milliGRAM(s) SubCutaneous daily  famotidine    Tablet 20 milliGRAM(s) Oral two times a day  gabapentin 100 milliGRAM(s) Oral at bedtime  melatonin 6 milliGRAM(s) Oral at bedtime  montelukast 10 milliGRAM(s) Oral daily  pantoprazole    Tablet 40 milliGRAM(s) Oral before breakfast    MEDICATIONS  (PRN):  ALBUTerol    90 MICROgram(s) HFA Inhaler 1 Puff(s) Inhalation every 6 hours PRN Shortness of Breath and/or Wheezing  aluminum hydroxide/magnesium hydroxide/simethicone Suspension 30 milliLiter(s) Oral every 4 hours PRN Dyspepsia  senna 2 Tablet(s) Oral at bedtime PRN Constipation  simethicone 80 milliGRAM(s) Chew four times a day PRN Upset Stomach    Vital Signs Last 24 Hrs  T(F): 97.7 (10 Jul 2021 09:13), Max: 97.7 (10 Jul 2021 09:13)  HR: 76 (10 Jul 2021 09:13) (71 - 76)  BP: 112/65 (10 Jul 2021 09:13) (112/65 - 130/70)  RR: 15 (10 Jul 2021 09:13) (15 - 15)  SpO2: 97% (10 Jul 2021 09:13) (97% - 100%)  I&O's Summary    PHYSICAL EXAM:  General: NAD, awake, alert  ENT: MMM, no scleral icterus  Neck: Supple, No JVD  Lungs: Respirations unlabored. Clear to auscultation bilaterally, no wheezes, rales, rhonchi  Cardio: RRR, S1/S2  Abdomen: Soft, Nontender, Nondistended; Bowel sounds present  Extremities: No calf tenderness, No pitting edema LE b/l    LABS:                        11.8   6.20  )-----------( 149      ( 09 Jul 2021 05:30 )             36.1       07-09    137  |  103  |  18  ----------------------------<  101  4.2   |  28  |  1.10    Ca    8.7      09 Jul 2021 05:30    TPro  6.6  /  Alb  2.9  /  TBili  0.5  /  DBili  x   /  AST  177  /  ALT  163  /  AlkPhos  89  07-09     eGFR if Non African American: 53 mL/min/1.73M2 (07-09-21 @ 05:30)  eGFR if African American: 61 mL/min/1.73M2 (07-09-21 @ 05:30)    06-30 Chol 184 mg/dL LDL -- HDL 38 mg/dL Trig 99 mg/dL    COVID-19 PCR: NotDetec (06-29-21 @ 20:04)  COVID-19 PCR: NotDetec (06-29-21 @ 18:53)    RADIOLOGY & ADDITIONAL TESTS: reviewed  < from: US Gallbladder (US Gallbladder .) (07.10.21 @ 09:30) >  IMPRESSION:    Cholelithiasis without definite secondary signs of acute cholecystitis    < end of copied text >    Care Discussed with Consultants/Other Providers: yes - rehab

## 2021-07-10 NOTE — PROGRESS NOTE ADULT - ASSESSMENT
66 y/o RH female with PMH asthma and HTN not on antithrombotics presented as code stroke to [facility] ED for right-sided weakness with LWK  11p. 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 Also mild aneurysmal dilatation of the origin of the left inferior M2 branch.  -Cont DAPT  -Statin held due to transaminitis   -Neurosx follow up outpatient  -Continue comprehensive rehab program -PT/OT/SLP per rehab team  -Pain management, bowel regimen per rehab    Transaminitis   -Hold statin, continue to monitor  -Avoid hepatotoxics  -RUQ sono - cholelithiasis without acute cholecystitis     Intermittent chronic chest pain- likely 2/2 GERD   -Cont PPI, simethicone, aspiration precautions  -Workup unrevealing    HTN  -Norvasc      Asthma - chronic, stable  -Symbicort, albuterol prn, singular    Hypoalbuminemia  -Nutrition/dietary    DVT ppx - lovenox

## 2021-07-10 NOTE — PROGRESS NOTE ADULT - SUBJECTIVE AND OBJECTIVE BOX
Chief complaint: no new complaints     Patient is a 65y old  Female who presents with a chief complaint of s/p left BG CVA with right sided weakness (09 Jul 2021 13:30)    PAST MEDICAL & SURGICAL HISTORY:  Brain aneurysm    Hypertension    Brain aneurysm    Smoking    No significant past surgical history    No significant past surgical history         VITALS  Vital Signs Last 24 Hrs  T(C): 36.5 (10 Jul 2021 09:13), Max: 36.5 (10 Jul 2021 09:13)  T(F): 97.7 (10 Jul 2021 09:13), Max: 97.7 (10 Jul 2021 09:13)  HR: 76 (10 Jul 2021 09:13) (71 - 76)  BP: 112/65 (10 Jul 2021 09:13) (112/65 - 130/70)  BP(mean): --  RR: 15 (10 Jul 2021 09:13) (15 - 15)  SpO2: 97% (10 Jul 2021 09:13) (97% - 100%)      PHYSICAL EXAM  Constitutional - NAD, Comfortable  HEENT - NCAT, EOMI  Neck - Supple, No limited ROM  Chest - CTA bilaterally  Cardiovascular - RRR,  Abdomen -Soft, NTND  Extremities - No C/C/E,  Neurologic Exam -                    Cognitive - Awake, Alert     No new focal deficits       RECENT LABS                        11.8   6.20  )-----------( 149      ( 09 Jul 2021 05:30 )             36.1     07-09    137  |  103  |  18  ----------------------------<  101<H>  4.2   |  28  |  1.10    Ca    8.7      09 Jul 2021 05:30    TPro  6.6  /  Alb  2.9<L>  /  TBili  0.5  /  DBili  x   /  AST  177<H>  /  ALT  163<H>  /  AlkPhos  89  07-09                  CURRENT MEDICATIONS    MEDICATIONS  (STANDING):  amLODIPine   Tablet 5 milliGRAM(s) Oral daily  aspirin  chewable 81 milliGRAM(s) Oral daily  budesonide 160 MICROgram(s)/formoterol 4.5 MICROgram(s) Inhaler 2 Puff(s) Inhalation two times a day  clopidogrel Tablet 75 milliGRAM(s) Oral daily  enoxaparin Injectable 40 milliGRAM(s) SubCutaneous daily  famotidine    Tablet 20 milliGRAM(s) Oral two times a day  gabapentin 100 milliGRAM(s) Oral at bedtime  melatonin 6 milliGRAM(s) Oral at bedtime  montelukast 10 milliGRAM(s) Oral daily  pantoprazole    Tablet 40 milliGRAM(s) Oral before breakfast    MEDICATIONS  (PRN):  ALBUTerol    90 MICROgram(s) HFA Inhaler 1 Puff(s) Inhalation every 6 hours PRN Shortness of Breath and/or Wheezing  aluminum hydroxide/magnesium hydroxide/simethicone Suspension 30 milliLiter(s) Oral every 4 hours PRN Dyspepsia  senna 2 Tablet(s) Oral at bedtime PRN Constipation  simethicone 80 milliGRAM(s) Chew four times a day PRN Upset Stomach    ASSESSMENT & PLAN          GI/Bowel Management - Dulcolax PRN, Fleet PRN   Management - Toilet Q2  Skin - Turn Q2  Pain - Tylenol PRN  DVT PPX - Lovenox      Continue comprehensive acute rehab program consisting of 3hrs/day of OT/PT and SLP.

## 2021-07-11 PROCEDURE — 93010 ELECTROCARDIOGRAM REPORT: CPT

## 2021-07-11 PROCEDURE — 99232 SBSQ HOSP IP/OBS MODERATE 35: CPT

## 2021-07-11 RX ADMIN — SIMETHICONE 80 MILLIGRAM(S): 80 TABLET, CHEWABLE ORAL at 13:54

## 2021-07-11 RX ADMIN — BUDESONIDE AND FORMOTEROL FUMARATE DIHYDRATE 2 PUFF(S): 160; 4.5 AEROSOL RESPIRATORY (INHALATION) at 09:02

## 2021-07-11 RX ADMIN — ALBUTEROL 1 PUFF(S): 90 AEROSOL, METERED ORAL at 20:53

## 2021-07-11 RX ADMIN — Medication 6 MILLIGRAM(S): at 21:16

## 2021-07-11 RX ADMIN — BUDESONIDE AND FORMOTEROL FUMARATE DIHYDRATE 2 PUFF(S): 160; 4.5 AEROSOL RESPIRATORY (INHALATION) at 20:54

## 2021-07-11 RX ADMIN — GABAPENTIN 100 MILLIGRAM(S): 400 CAPSULE ORAL at 21:16

## 2021-07-11 RX ADMIN — FAMOTIDINE 20 MILLIGRAM(S): 10 INJECTION INTRAVENOUS at 06:05

## 2021-07-11 RX ADMIN — CLOPIDOGREL BISULFATE 75 MILLIGRAM(S): 75 TABLET, FILM COATED ORAL at 11:54

## 2021-07-11 RX ADMIN — FAMOTIDINE 20 MILLIGRAM(S): 10 INJECTION INTRAVENOUS at 17:43

## 2021-07-11 RX ADMIN — ALBUTEROL 1 PUFF(S): 90 AEROSOL, METERED ORAL at 14:13

## 2021-07-11 RX ADMIN — AMLODIPINE BESYLATE 5 MILLIGRAM(S): 2.5 TABLET ORAL at 06:55

## 2021-07-11 RX ADMIN — MONTELUKAST 10 MILLIGRAM(S): 4 TABLET, CHEWABLE ORAL at 11:54

## 2021-07-11 RX ADMIN — Medication 30 MILLILITER(S): at 14:42

## 2021-07-11 RX ADMIN — ENOXAPARIN SODIUM 40 MILLIGRAM(S): 100 INJECTION SUBCUTANEOUS at 11:55

## 2021-07-11 RX ADMIN — Medication 81 MILLIGRAM(S): at 11:54

## 2021-07-11 RX ADMIN — PANTOPRAZOLE SODIUM 40 MILLIGRAM(S): 20 TABLET, DELAYED RELEASE ORAL at 06:05

## 2021-07-11 NOTE — PROVIDER CONTACT NOTE (CHANGE IN STATUS NOTIFICATION) - SITUATION
patient with c/o stomach upset per her son (patient speaks Chinese) and wants to drink regular water

## 2021-07-11 NOTE — PROVIDER CONTACT NOTE (CHANGE IN STATUS NOTIFICATION) - ASSESSMENT
patient alert skin warm and dry, vital signs as documented; given simethicone; had BM earlier today  patient is belching

## 2021-07-11 NOTE — CHART NOTE - NSCHARTNOTEFT_GEN_A_CORE
HPI:  Patient complaining of feeling nauseous with epigastric pain. Also complaining of left shoulder pain.  Patient states she thinks she will feel better if she vomits.  Patient's son at bedside requests her diet have no beef, pork, chocolate or caffeine to prevent acid reflux.  Patient denies SOB, lightheadedness, palpitations, jaw pain, diaphoresis.      Vital Signs Last 24 Hrs  T(C): 36.6 (11 Jul 2021 07:50), Max: 36.6 (11 Jul 2021 07:50)  T(F): 97.8 (11 Jul 2021 07:50), Max: 97.8 (11 Jul 2021 07:50)  HR: 100 (11 Jul 2021 14:19) (72 - 103)  BP: 124/78 (11 Jul 2021 14:19) (105/68 - 134/80)  RR: 18 (11 Jul 2021 14:19) (12 - 18)  SpO2: 97% (11 Jul 2021 14:19) (95% - 98%)    PHYSICAL EXAM  Constitutional - NAD, Comfortable  HEENT - NCAT, EOMI  Neck - Supple, No limited ROM  Chest - CTA bilaterally  Cardiovascular -S1S2, no m/r/g  Abdomen -  Soft, NTND +tender over epigastric area  Extremities -, No calf tenderness . +TTP over left biceps tendon  Neurologic Exam -                    Cognitive - Awake, Alert     No new focal deficits    MEDICATIONS  (STANDING):  amLODIPine   Tablet 5 milliGRAM(s) Oral daily  aspirin  chewable 81 milliGRAM(s) Oral daily  budesonide 160 MICROgram(s)/formoterol 4.5 MICROgram(s) Inhaler 2 Puff(s) Inhalation two times a day  clopidogrel Tablet 75 milliGRAM(s) Oral daily  enoxaparin Injectable 40 milliGRAM(s) SubCutaneous daily  famotidine    Tablet 20 milliGRAM(s) Oral two times a day  gabapentin 100 milliGRAM(s) Oral at bedtime  melatonin 6 milliGRAM(s) Oral at bedtime  montelukast 10 milliGRAM(s) Oral daily  pantoprazole    Tablet 40 milliGRAM(s) Oral before breakfast    MEDICATIONS  (PRN):  ALBUTerol    90 MICROgram(s) HFA Inhaler 1 Puff(s) Inhalation every 6 hours PRN Shortness of Breath and/or Wheezing  aluminum hydroxide/magnesium hydroxide/simethicone Suspension 30 milliLiter(s) Oral every 4 hours PRN Dyspepsia  senna 2 Tablet(s) Oral at bedtime PRN Constipation  simethicone 80 milliGRAM(s) Chew four times a day PRN Upset Stomach    A/P  MARKIE HERNANDEZ is a 64yo Female with left subcortical CVA  with dysphagia, right Hemiparesis, decreased functional mobility, gait instability and ADL impairments.    VSS   On exam epigastric and point of left shoulder pain were reproducible with palpation.  + nausea, no emesis    - EKG ordered. Low suspicion for cardiac etiology for pain.  - prn mylanta  - already on pepcid bid, protonix  - diet changes ordered  - cont to monitor    Plan discussed with patient's son and RN HPI:  Patient complaining of feeling nauseous with epigastric pain since last night. Also complaining of left shoulder pain.  Patient states she thinks she will feel better if she vomits.  Patient's son at bedside requests her diet have no beef, pork, chocolate or caffeine to prevent acid reflux.  Patient denies SOB, lightheadedness, palpitations, jaw pain, diaphoresis.      Vital Signs Last 24 Hrs  T(C): 36.6 (11 Jul 2021 07:50), Max: 36.6 (11 Jul 2021 07:50)  T(F): 97.8 (11 Jul 2021 07:50), Max: 97.8 (11 Jul 2021 07:50)  HR: 100 (11 Jul 2021 14:19) (72 - 103)  BP: 124/78 (11 Jul 2021 14:19) (105/68 - 134/80)  RR: 18 (11 Jul 2021 14:19) (12 - 18)  SpO2: 97% (11 Jul 2021 14:19) (95% - 98%)    PHYSICAL EXAM  Constitutional - NAD, Comfortable  HEENT - NCAT, EOMI  Neck - Supple, No limited ROM  Chest - CTA bilaterally  Cardiovascular -S1S2, no m/r/g  Abdomen -  Soft, NTND +tender over epigastric area  Extremities -, No calf tenderness . +TTP over left biceps tendon  Neurologic Exam -                    Cognitive - Awake, Alert     No new focal deficits    MEDICATIONS  (STANDING):  amLODIPine   Tablet 5 milliGRAM(s) Oral daily  aspirin  chewable 81 milliGRAM(s) Oral daily  budesonide 160 MICROgram(s)/formoterol 4.5 MICROgram(s) Inhaler 2 Puff(s) Inhalation two times a day  clopidogrel Tablet 75 milliGRAM(s) Oral daily  enoxaparin Injectable 40 milliGRAM(s) SubCutaneous daily  famotidine    Tablet 20 milliGRAM(s) Oral two times a day  gabapentin 100 milliGRAM(s) Oral at bedtime  melatonin 6 milliGRAM(s) Oral at bedtime  montelukast 10 milliGRAM(s) Oral daily  pantoprazole    Tablet 40 milliGRAM(s) Oral before breakfast    MEDICATIONS  (PRN):  ALBUTerol    90 MICROgram(s) HFA Inhaler 1 Puff(s) Inhalation every 6 hours PRN Shortness of Breath and/or Wheezing  aluminum hydroxide/magnesium hydroxide/simethicone Suspension 30 milliLiter(s) Oral every 4 hours PRN Dyspepsia  senna 2 Tablet(s) Oral at bedtime PRN Constipation  simethicone 80 milliGRAM(s) Chew four times a day PRN Upset Stomach    A/P  MARKIE HERNANDEZ is a 64yo Female with left subcortical CVA  with dysphagia, right Hemiparesis, decreased functional mobility, gait instability and ADL impairments.    VSS   On exam epigastric and point of left shoulder pain were reproducible with palpation.  + nausea, no emesis    - EKG ordered. Low suspicion for cardiac etiology for pain.  - prn mylanta  - already on pepcid bid, protonix  - diet changes ordered  - cont to monitor    Plan discussed with patient's son and RN

## 2021-07-11 NOTE — PROGRESS NOTE ADULT - SUBJECTIVE AND OBJECTIVE BOX
Chief complaint: no new complaints     Patient is a 65y old  Female who presents with a chief complaint of s/p left BG CVA with right sided weakness (11 Jul 2021 09:49)    PAST MEDICAL & SURGICAL HISTORY:  Brain aneurysm    Hypertension    Brain aneurysm    Smoking    No significant past surgical history    No significant past surgical history    VITALS  Vital Signs Last 24 Hrs  T(C): 36.6 (11 Jul 2021 07:50), Max: 36.6 (11 Jul 2021 07:50)  T(F): 97.8 (11 Jul 2021 07:50), Max: 97.8 (11 Jul 2021 07:50)  HR: 90 (11 Jul 2021 09:04) (72 - 90)  BP: 112/73 (11 Jul 2021 07:50) (105/68 - 127/73)  BP(mean): --  RR: 12 (11 Jul 2021 07:50) (12 - 15)  SpO2: 95% (11 Jul 2021 09:04) (95% - 97%)      PHYSICAL EXAM  Constitutional - NAD, Comfortable  HEENT - NCAT, EOMI  Neck - Supple, No limited ROM  Chest - CTA bilaterally  Cardiovascular -S1S2  Abdomen -  Soft, NTND  Extremities -, No calf tenderness   Neurologic Exam -                    Cognitive - Awake, Alert     No new focal deficits                    RADIOLOGY/OTHER RESULTS  EXAM:  US GALLBLADDER      PROCEDURE DATE:  07/10/2021        INTERPRETATION:  HISTORY: Elevated liver function test    TECHNIQUE: Right upper quadrant ultrasound was performed    FINDINGS:    Mild hepatic steatosis. Posterior right hepatic cyst.    Small gallstones. No gallbladder wall thickening or pericholecystic fluid. Common bile duct measures 0.8 cm. No intrahepatic biliary dilatation.    Visualized aspects of the pancreatic head and body are within normal limits.    Right kidney measures8.6 cm. Findings consistent with medical renal disease. Subcentimeter right renal cyst.    Partially visualized proximal aorta and IVC demonstrate calcific plaque.    IMPRESSION:    Cholelithiasis without definite secondary signs of acute cholecystitis    --- End of Report ---              GARETT MAN MD; Attending Radiologist  This document has been electronically signed. Jul 10 2021 10:22AM    CURRENT MEDICATIONS    MEDICATIONS  (STANDING):  amLODIPine   Tablet 5 milliGRAM(s) Oral daily  aspirin  chewable 81 milliGRAM(s) Oral daily  budesonide 160 MICROgram(s)/formoterol 4.5 MICROgram(s) Inhaler 2 Puff(s) Inhalation two times a day  clopidogrel Tablet 75 milliGRAM(s) Oral daily  enoxaparin Injectable 40 milliGRAM(s) SubCutaneous daily  famotidine    Tablet 20 milliGRAM(s) Oral two times a day  gabapentin 100 milliGRAM(s) Oral at bedtime  melatonin 6 milliGRAM(s) Oral at bedtime  montelukast 10 milliGRAM(s) Oral daily  pantoprazole    Tablet 40 milliGRAM(s) Oral before breakfast    MEDICATIONS  (PRN):  ALBUTerol    90 MICROgram(s) HFA Inhaler 1 Puff(s) Inhalation every 6 hours PRN Shortness of Breath and/or Wheezing  aluminum hydroxide/magnesium hydroxide/simethicone Suspension 30 milliLiter(s) Oral every 4 hours PRN Dyspepsia  senna 2 Tablet(s) Oral at bedtime PRN Constipation  simethicone 80 milliGRAM(s) Chew four times a day PRN Upset Stomach    ASSESSMENT & PLAN          GI/Bowel Management - Dulcolax PRN, Fleet PRN  Skin - Turn Q2  Pain - Tylenol PRN  DVT PPX - Lovenox      Continue comprehensive acute rehab program consisting of 3hrs/day of OT/PT and SLP.

## 2021-07-11 NOTE — PROVIDER CONTACT NOTE (CHANGE IN STATUS NOTIFICATION) - SITUATION
patient received prn albuterol requested by her son; now states stomach and upper left chest/shoulder pain

## 2021-07-11 NOTE — PROGRESS NOTE ADULT - ASSESSMENT
64 y/o RH female with PMH asthma and HTN not on antithrombotics presented as code stroke to [facility] ED for right-sided weakness with LWK  11p. 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 Also mild aneurysmal dilatation of the origin of the left inferior M2 branch.  -Cont DAPT  -Statin held due to transaminitis   -Neurosx follow up outpatient  -Continue comprehensive rehab program -PT/OT/SLP per rehab team  -Pain management, bowel regimen per rehab  -Recommend discontinuing nasal canula as tolerated, O2 sat 96-97% on RA    Transaminitis   -Hold statin, continue to monitor  -Avoid hepatotoxics  -RUQ sono - cholelithiasis without acute cholecystitis     Intermittent chronic chest pain- likely 2/2 GERD   -Cont PPI, simethicone, aspiration precautions  -Workup unrevealing    HTN  -Norvasc      Asthma - chronic, stable  -Symbicort, albuterol prn, singular  -Recommend discontinuing nasal canula as tolerated, O2 sat 96-97% on RA    Hypoalbuminemia  -Nutrition/dietary    DVT ppx - lovenox

## 2021-07-11 NOTE — PROGRESS NOTE ADULT - SUBJECTIVE AND OBJECTIVE BOX
Patient is a 65y old  Female who presents with a chief complaint of s/p left BG CVA with right sided weakness (10 Jul 2021 12:46)    Patient seen and examined at bedside. appears comfortable. NAD. not wearing NC, O2 sat 96-97% on RA.    ALLERGIES:  No Known Allergies    MEDICATIONS  (STANDING):  amLODIPine   Tablet 5 milliGRAM(s) Oral daily  aspirin  chewable 81 milliGRAM(s) Oral daily  budesonide 160 MICROgram(s)/formoterol 4.5 MICROgram(s) Inhaler 2 Puff(s) Inhalation two times a day  clopidogrel Tablet 75 milliGRAM(s) Oral daily  enoxaparin Injectable 40 milliGRAM(s) SubCutaneous daily  famotidine    Tablet 20 milliGRAM(s) Oral two times a day  gabapentin 100 milliGRAM(s) Oral at bedtime  melatonin 6 milliGRAM(s) Oral at bedtime  montelukast 10 milliGRAM(s) Oral daily  pantoprazole    Tablet 40 milliGRAM(s) Oral before breakfast    MEDICATIONS  (PRN):  ALBUTerol    90 MICROgram(s) HFA Inhaler 1 Puff(s) Inhalation every 6 hours PRN Shortness of Breath and/or Wheezing  aluminum hydroxide/magnesium hydroxide/simethicone Suspension 30 milliLiter(s) Oral every 4 hours PRN Dyspepsia  senna 2 Tablet(s) Oral at bedtime PRN Constipation  simethicone 80 milliGRAM(s) Chew four times a day PRN Upset Stomach    Vital Signs Last 24 Hrs  T(C): 36.6 (11 Jul 2021 07:50), Max: 36.6 (11 Jul 2021 07:50)  T(F): 97.8 (11 Jul 2021 07:50), Max: 97.8 (11 Jul 2021 07:50)  HR: 90 (11 Jul 2021 09:04) (72 - 90)  BP: 112/73 (11 Jul 2021 07:50) (105/68 - 127/73)  BP(mean): --  RR: 12 (11 Jul 2021 07:50) (12 - 15)  SpO2: 95% (11 Jul 2021 09:04) (95% - 97%)  I&O's Summary    PHYSICAL EXAM:  General: NAD, awake, alert  ENT: MMM, no scleral icterus  Neck: Supple, No JVD  Lungs: Respirations unlabored. Clear to auscultation bilaterally, no wheezes, rales, rhonchi  Cardio: RRR, S1/S2  Abdomen: Soft, Nontender, Nondistended; Bowel sounds present  Extremities: No calf tenderness, No pitting edema LE b/l    LABS:                        11.8   6.20  )-----------( 149      ( 09 Jul 2021 05:30 )             36.1       07-09    137  |  103  |  18  ----------------------------<  101  4.2   |  28  |  1.10    Ca    8.7      09 Jul 2021 05:30    TPro  6.6  /  Alb  2.9  /  TBili  0.5  /  DBili  x   /  AST  177  /  ALT  163  /  AlkPhos  89  07-09     eGFR if Non African American: 53 mL/min/1.73M2 (07-09-21 @ 05:30)  eGFR if African American: 61 mL/min/1.73M2 (07-09-21 @ 05:30)    06-30 Chol 184 mg/dL LDL -- HDL 38 mg/dL Trig 99 mg/dL    COVID-19 PCR: NotDetec (06-29-21 @ 20:04)  COVID-19 PCR: NotDetec (06-29-21 @ 18:53)    RADIOLOGY & ADDITIONAL TESTS: reviewed  < from: US Gallbladder (US Gallbladder .) (07.10.21 @ 09:30) >  IMPRESSION:    Cholelithiasis without definite secondary signs of acute cholecystitis    < end of copied text >    Care Discussed with Consultants/Other Providers: yes - rehab

## 2021-07-12 LAB
ALBUMIN SERPL ELPH-MCNC: 3 G/DL — LOW (ref 3.3–5)
ALP SERPL-CCNC: 89 U/L — SIGNIFICANT CHANGE UP (ref 40–120)
ALT FLD-CCNC: 205 U/L — HIGH (ref 10–45)
ANION GAP SERPL CALC-SCNC: 10 MMOL/L — SIGNIFICANT CHANGE UP (ref 5–17)
AST SERPL-CCNC: 107 U/L — HIGH (ref 10–40)
BILIRUB SERPL-MCNC: 0.4 MG/DL — SIGNIFICANT CHANGE UP (ref 0.2–1.2)
BUN SERPL-MCNC: 25 MG/DL — HIGH (ref 7–23)
CALCIUM SERPL-MCNC: 9.2 MG/DL — SIGNIFICANT CHANGE UP (ref 8.4–10.5)
CHLORIDE SERPL-SCNC: 102 MMOL/L — SIGNIFICANT CHANGE UP (ref 96–108)
CO2 SERPL-SCNC: 24 MMOL/L — SIGNIFICANT CHANGE UP (ref 22–31)
CREAT SERPL-MCNC: 1.17 MG/DL — SIGNIFICANT CHANGE UP (ref 0.5–1.3)
GLUCOSE SERPL-MCNC: 103 MG/DL — HIGH (ref 70–99)
POTASSIUM SERPL-MCNC: 4.9 MMOL/L — SIGNIFICANT CHANGE UP (ref 3.5–5.3)
POTASSIUM SERPL-SCNC: 4.9 MMOL/L — SIGNIFICANT CHANGE UP (ref 3.5–5.3)
PROT SERPL-MCNC: 7.3 G/DL — SIGNIFICANT CHANGE UP (ref 6–8.3)
SODIUM SERPL-SCNC: 136 MMOL/L — SIGNIFICANT CHANGE UP (ref 135–145)

## 2021-07-12 PROCEDURE — 71045 X-RAY EXAM CHEST 1 VIEW: CPT | Mod: 26

## 2021-07-12 PROCEDURE — 99233 SBSQ HOSP IP/OBS HIGH 50: CPT

## 2021-07-12 PROCEDURE — 74230 X-RAY XM SWLNG FUNCJ C+: CPT | Mod: 26

## 2021-07-12 RX ADMIN — BUDESONIDE AND FORMOTEROL FUMARATE DIHYDRATE 2 PUFF(S): 160; 4.5 AEROSOL RESPIRATORY (INHALATION) at 08:02

## 2021-07-12 RX ADMIN — AMLODIPINE BESYLATE 5 MILLIGRAM(S): 2.5 TABLET ORAL at 06:21

## 2021-07-12 RX ADMIN — Medication 81 MILLIGRAM(S): at 13:01

## 2021-07-12 RX ADMIN — ENOXAPARIN SODIUM 40 MILLIGRAM(S): 100 INJECTION SUBCUTANEOUS at 13:01

## 2021-07-12 RX ADMIN — GABAPENTIN 100 MILLIGRAM(S): 400 CAPSULE ORAL at 21:14

## 2021-07-12 RX ADMIN — CLOPIDOGREL BISULFATE 75 MILLIGRAM(S): 75 TABLET, FILM COATED ORAL at 13:01

## 2021-07-12 RX ADMIN — FAMOTIDINE 20 MILLIGRAM(S): 10 INJECTION INTRAVENOUS at 06:21

## 2021-07-12 RX ADMIN — BUDESONIDE AND FORMOTEROL FUMARATE DIHYDRATE 2 PUFF(S): 160; 4.5 AEROSOL RESPIRATORY (INHALATION) at 22:02

## 2021-07-12 RX ADMIN — PANTOPRAZOLE SODIUM 40 MILLIGRAM(S): 20 TABLET, DELAYED RELEASE ORAL at 06:21

## 2021-07-12 RX ADMIN — MONTELUKAST 10 MILLIGRAM(S): 4 TABLET, CHEWABLE ORAL at 13:01

## 2021-07-12 RX ADMIN — FAMOTIDINE 20 MILLIGRAM(S): 10 INJECTION INTRAVENOUS at 17:34

## 2021-07-12 RX ADMIN — Medication 6 MILLIGRAM(S): at 21:14

## 2021-07-12 NOTE — PROGRESS NOTE ADULT - SUBJECTIVE AND OBJECTIVE BOX
HPI:  66 yo RH female with PMH of asthma, HTN to [facility] ED 6/29 for right-sided weakness and slurred speech. CTH neg acute cva, old lacunar infarcts b/l BG. Out of window for tPA. CTA 6/29 shows Aneurysms suspected involving the left middle cerebral artery as described above. Transferred from [facility] to [facility] for possible evaluation of aneurysms noted on imaging. MRA 6/30 showed mild age-appropriate involutional and ischemic gliotic changes with old bilateral basal ganglia lacunar infarcts. Small acute infarct left basal ganglia and corona radiata. No evidence of hemorrhage. 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 Also mild aneurysmal dilatation of the origin of the left inferior M2 branch. Hospitalization complicated by NANCY, IVF completed. Failed swallow eval-tolerates Puree honey. TTE as outpatient. HbA1c 5.9 and . Evaluated by PMR and acute rehab recommended. Cleared for dc on 7/2. Patient arrived to [facility] on 7/2/21 for acute inpatient rehab. Celi iterpreter #[id number]. Pt c/o difficulty speaking and still unable to more her right upper and right lower extremities.  (02 Jul 2021 15:21)      PAST MEDICAL & SURGICAL HISTORY:  Brain aneurysm    Hypertension    Brain aneurysm    Smoking    No significant past surgical history    No significant past surgical history      Subjective:    Patient seen and examined at bedside. Spoke with son & patient at bedside.   No events overnight.   Patient continue to complain of epigastric pain.   Diet was upgraded to mechanical soft with thin liquids.   Denies any-other complaints at this time.   Participating in therapy       ICU Vital Signs Last 24 Hrs  T(C): 36.7 (12 Jul 2021 09:05), Max: 36.7 (11 Jul 2021 19:53)  T(F): 98 (12 Jul 2021 09:05), Max: 98 (11 Jul 2021 19:53)  HR: 85 (12 Jul 2021 09:05) (80 - 103)  BP: 113/61 (12 Jul 2021 09:05) (113/61 - 134/80)  BP(mean): --  ABP: --  ABP(mean): --  RR: 16 (12 Jul 2021 09:05) (16 - 18)  SpO2: 97% (12 Jul 2021 09:05) (97% - 99%)      REVIEW OF SYMPTOMS  Positive: Leg paresthesias, Neurological deficits-- dysphagia, dysarthria, right HP  Denies: headache, lightheadedness, CP, SOB, abdominal pain, dysuria, nausea, constipation      ----------------------------------------------------------------------  PHYSICAL EXAM:      Gen - NAD, Comfortable  HEENT - NCAT,   Pulm - CTAB,   Cardiovascular - RRR,   Abdomen - Soft, NT/ND, +BS  Extremities - No C/C/E, No calf tenderness  Neuro-     Cognitive - AAOx 2--knew she is in hospital but not date     Attention: makes eye contact and good comprehension      Memory: Recall 3 objects immediate and 3 min later         Cranial Nerves - PERRLA, EOMI, right facial droop, +dysphagia     Motor -                    LEFT    UE - ShAB 5/5, EF 5/5, EE 5/5, WE 5/5,  5/5                    RIGHT UE - ShAB 2/5, EF 2/5, EE 1/5, WE 0/5,  0/5                    LEFT    LE - HF 5/5, KE 5/5, DF 5/5, PF 5/5                    RIGHT LE - Hip ext 2/5, KE 2/5, DF 1/5, PF 1/5        Sensory - Intact to LT     Reflexes - DTR Intact, No primitive reflex     Coordination - impaired on right     Tone - normal  Psychiatric - Mood stable, Affect WNL      RECENT LABS                 07-12    136  |  102  |  25<H>  ----------------------------<  103<H>  4.9   |  24  |  1.17    Ca    9.2      12 Jul 2021 06:15    TPro  7.3  /  Alb  3.0<L>  /  TBili  0.4  /  DBili  x   /  AST  107<H>  /  ALT  205<H>  /  AlkPhos  89  07-12          RADIOLOGY/OTHER RESULTS      US Gallbladder (US Gallbladder0    EXAM:  US GALLBLADDER      PROCEDURE DATE:  07/10/2021      IMPRESSION:    Cholelithiasis without definite secondary signs of acute cholecystitis    --- End of Report ---    MEDICATIONS  (STANDING):  amLODIPine   Tablet 5 milliGRAM(s) Oral daily  aspirin  chewable 81 milliGRAM(s) Oral daily  budesonide 160 MICROgram(s)/formoterol 4.5 MICROgram(s) Inhaler 2 Puff(s) Inhalation two times a day  clopidogrel Tablet 75 milliGRAM(s) Oral daily  enoxaparin Injectable 40 milliGRAM(s) SubCutaneous daily  famotidine    Tablet 20 milliGRAM(s) Oral two times a day  gabapentin 100 milliGRAM(s) Oral at bedtime  melatonin 6 milliGRAM(s) Oral at bedtime  montelukast 10 milliGRAM(s) Oral daily  pantoprazole    Tablet 40 milliGRAM(s) Oral before breakfast    MEDICATIONS  (PRN):  ALBUTerol    90 MICROgram(s) HFA Inhaler 1 Puff(s) Inhalation every 6 hours PRN Shortness of Breath and/or Wheezing  aluminum hydroxide/magnesium hydroxide/simethicone Suspension 30 milliLiter(s) Oral every 4 hours PRN Dyspepsia  senna 2 Tablet(s) Oral at bedtime PRN Constipation  simethicone 80 milliGRAM(s) Chew four times a day PRN Upset Stomach   HPI:  66 yo RH female with PMH of asthma, HTN to [facility] ED 6/29 for right-sided weakness and slurred speech. CTH neg acute cva, old lacunar infarcts b/l BG. Out of window for tPA. CTA 6/29 shows Aneurysms suspected involving the left middle cerebral artery as described above. Transferred from [facility] to [facility] for possible evaluation of aneurysms noted on imaging. MRA 6/30 showed mild age-appropriate involutional and ischemic gliotic changes with old bilateral basal ganglia lacunar infarcts. Small acute infarct left basal ganglia and corona radiata. No evidence of hemorrhage. 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 Also mild aneurysmal dilatation of the origin of the left inferior M2 branch. Hospitalization complicated by NANCY, IVF completed. Failed swallow eval-tolerates Puree honey. TTE as outpatient. HbA1c 5.9 and . Evaluated by PMR and acute rehab recommended. Cleared for dc on 7/2. Patient arrived to [facility] on 7/2/21 for acute inpatient rehab. Celi iterpreter #[id number]. Pt c/o difficulty speaking and still unable to more her right upper and right lower extremities.  (02 Jul 2021 15:21)      PAST MEDICAL & SURGICAL HISTORY:  Brain aneurysm    Hypertension    Brain aneurysm    Smoking    No significant past surgical history    No significant past surgical history      Subjective:    Patient seen and examined at bedside. Spoke with son & patient at bedside.   No events overnight.   Patient continue to complain of epigastric pain and vomited after lunch today.   Diet was upgraded to mechanical soft with thin liquids.   son reports pt. has been having chronic coughing for some time-- worse at night.     Participating in therapy       ICU Vital Signs Last 24 Hrs  T(C): 36.7 (12 Jul 2021 09:05), Max: 36.7 (11 Jul 2021 19:53)  T(F): 98 (12 Jul 2021 09:05), Max: 98 (11 Jul 2021 19:53)  HR: 85 (12 Jul 2021 09:05) (80 - 103)  BP: 113/61 (12 Jul 2021 09:05) (113/61 - 134/80)  BP(mean): --  ABP: --  ABP(mean): --  RR: 16 (12 Jul 2021 09:05) (16 - 18)  SpO2: 97% (12 Jul 2021 09:05) (97% - 99%)      REVIEW OF SYMPTOMS  Positive: Leg paresthesias, Neurological deficits-- dysphagia, dysarthria, right HP  Denies: headache, lightheadedness, CP, SOB, abdominal pain, dysuria, nausea, constipation      ----------------------------------------------------------------------  PHYSICAL EXAM:      Gen - NAD, Comfortable  HEENT - NCAT,   Pulm - CTAB,   Cardiovascular - RRR,   Abdomen - Soft, NT/ND, +BS  Extremities - No Edema, No calf tenderness  Neuro-     Cognitive - AAOx 2--knew she is in hospital but not date     Attention: makes eye contact and good comprehension      Memory: Recall 3 objects immediate and 3 min later         Cranial Nerves - PERRLA, EOMI, right facial droop, +dysphagia     Motor -                    LEFT    UE - ShAB 5/5, EF 5/5, EE 5/5, WE 5/5,  5/5                    RIGHT UE - ShAB 2/5, EF 2/5, EE 1/5, WE 0/5,  0/5                    LEFT    LE - HF 5/5, KE 5/5, DF 5/5, PF 5/5                    RIGHT LE - Hip ext 2/5, KE 2/5, DF 1/5, PF 1/5        Sensory - Intact to LT     Reflexes - DTR Intact, No primitive reflex     Coordination - impaired on right     Tone - normal  Psychiatric - Mood stable, Affect WNL      RECENT LABS                 07-12    136  |  102  |  25<H>  ----------------------------<  103<H>  4.9   |  24  |  1.17    Ca    9.2      12 Jul 2021 06:15    TPro  7.3  /  Alb  3.0<L>  /  TBili  0.4  /  DBili  x   /  AST  107<H>  /  ALT  205<H>  /  AlkPhos  89  07-12          RADIOLOGY/OTHER RESULTS      US Gallbladder (US Gallbladder0    EXAM:  US GALLBLADDER      PROCEDURE DATE:  07/10/2021      IMPRESSION:    Cholelithiasis without definite secondary signs of acute cholecystitis    --- End of Report ---    MEDICATIONS  (STANDING):  amLODIPine   Tablet 5 milliGRAM(s) Oral daily  aspirin  chewable 81 milliGRAM(s) Oral daily  budesonide 160 MICROgram(s)/formoterol 4.5 MICROgram(s) Inhaler 2 Puff(s) Inhalation two times a day  clopidogrel Tablet 75 milliGRAM(s) Oral daily  enoxaparin Injectable 40 milliGRAM(s) SubCutaneous daily  famotidine    Tablet 20 milliGRAM(s) Oral two times a day  gabapentin 100 milliGRAM(s) Oral at bedtime  melatonin 6 milliGRAM(s) Oral at bedtime  montelukast 10 milliGRAM(s) Oral daily  pantoprazole    Tablet 40 milliGRAM(s) Oral before breakfast    MEDICATIONS  (PRN):  ALBUTerol    90 MICROgram(s) HFA Inhaler 1 Puff(s) Inhalation every 6 hours PRN Shortness of Breath and/or Wheezing  aluminum hydroxide/magnesium hydroxide/simethicone Suspension 30 milliLiter(s) Oral every 4 hours PRN Dyspepsia  senna 2 Tablet(s) Oral at bedtime PRN Constipation  simethicone 80 milliGRAM(s) Chew four times a day PRN Upset Stomach

## 2021-07-12 NOTE — PROGRESS NOTE ADULT - ASSESSMENT
65 year old RH woman with past medical history of asthma and HTN not on antithrombotics presented as code stroke to [facility] ED for right-sided weakness with LWK  11p. found to have Right yaneth sensorimotor loss and moderate dysarthria +/- right visual field cut 2/2 L BG infarct 2/2  , admitted for rehab- pt/ot/dvt ppx    intermittent chronic epigastric and chest pain and elevated LFT'S-  secondary to GERD vs gall stones, vs statins  < from: US Gallbladder (US Gallbladder .) (07.10.21 @ 09:30) >  Cholelithiasis without definite secondary signs of acute cholecystitis  c/w ppi, simethicone, aspiration precautions,  ECG 21 Normal sinus rhythm 87  < from: TTE Echo Complete w/o Contrast w/ Doppler (21 @ 16:11) >   Left ventricular ejection fraction, by visual estimation, is 50 to 55%.   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 Also mild aneurysmal dilatation of the origin of the left inferior M2 branch.  - neurosx follow up OP    HTN  - c/w Norvasc      NANCY likely pre-renal  - improving  - encouraged PO hydration  - on IVF    Asthma  Symbicort, alb prn, singular    d/w Dr. Sanchez   will follow    65 year old RH woman with past medical history of asthma and HTN not on antithrombotics presented as code stroke to [facility] ED for right-sided weakness with LWK  11p. found to have Right yaneth sensorimotor loss and moderate dysarthria +/- right visual field cut 2/2 L BG infarct 2/2  , admitted for rehab- pt/ot/dvt ppx    c/o vomiting x 1 episode this afternoon,   change diet to liquids  elevated LFT'S-  secondary to GERD vs gall stones vs statins  < from: US Gallbladder (US Gallbladder .) (07.10.21 @ 09:30) >  Cholelithiasis without definite secondary signs of acute cholecystitis  c/w ppi, simethicone, aspiration precautions  am labs, monitor lft's, GI consult pending    ECG 21 Normal sinus rhythm 87  < from: TTE Echo Complete w/o Contrast w/ Doppler (21 @ 16:11) >   Left ventricular ejection fraction, by visual estimation, is 50 to 55%.   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 Also mild aneurysmal dilatation of the origin of the left inferior M2 branch.  - neurosx follow up OP    HTN  - c/w Norvasc      NANCY likely pre-renal  - improving  - encouraged PO hydration  - on IVF    Asthma  Symbicort, alb prn, singular    d/w Dr. Sanchez   will follow

## 2021-07-12 NOTE — PROGRESS NOTE ADULT - SUBJECTIVE AND OBJECTIVE BOX
65y old  Female who presents with a chief complaint of s/p left BG CVA with right sided weakness     seen and examined at the bedside, c/o intermittent chest and epigastric discomfort last evening all w/u negative, says she has chronic intermittent chest discomfort, resolved spontaneously, this am c/o soar throat, no n/v, no sob      Vital Signs Last 24 Hrs  T(C): 36.7 (12 Jul 2021 09:05), Max: 36.7 (11 Jul 2021 19:53)  T(F): 98 (12 Jul 2021 09:05), Max: 98 (11 Jul 2021 19:53)  HR: 85 (12 Jul 2021 09:05) (80 - 103)  BP: 113/61 (12 Jul 2021 09:05) (113/61 - 134/80)  BP(mean): --  RR: 16 (12 Jul 2021 09:05) (16 - 18)  SpO2: 97% (12 Jul 2021 09:05) (97% - 99%)      GENERAL- NAD  EAR/NOSE/MOUTH/THROAT - no pharyngeal exudates, no oral lesion's  MMM  EYES- RICO, conjunctiva and Sclera clear  NECK- supple  RESPIRATORY-  clear to auscultation bilaterally, non laboured breathing  CARDIOVASCULAR - SIS2, RRR  GI - soft NT BS present  EXTREMITIES- no pedal edema  NEUROLOGY- right sided weakness, slurred speech, facial droop  PSYCHIATRY- AAO X 1                x                    136  | 24   | 25           x     >-----------< x       ------------------------< 103                   x                    4.9  | 102  | 1.17                                         Ca 9.2   Mg x     Ph x          MEDICATIONS  (STANDING):  amLODIPine   Tablet 5 milliGRAM(s) Oral daily  aspirin  chewable 81 milliGRAM(s) Oral daily  budesonide 160 MICROgram(s)/formoterol 4.5 MICROgram(s) Inhaler 2 Puff(s) Inhalation two times a day  clopidogrel Tablet 75 milliGRAM(s) Oral daily  enoxaparin Injectable 40 milliGRAM(s) SubCutaneous daily  famotidine    Tablet 20 milliGRAM(s) Oral two times a day  gabapentin 100 milliGRAM(s) Oral at bedtime  melatonin 6 milliGRAM(s) Oral at bedtime  montelukast 10 milliGRAM(s) Oral daily  pantoprazole    Tablet 40 milliGRAM(s) Oral before breakfast    MEDICATIONS  (PRN):  ALBUTerol    90 MICROgram(s) HFA Inhaler 1 Puff(s) Inhalation every 6 hours PRN Shortness of Breath and/or Wheezing  aluminum hydroxide/magnesium hydroxide/simethicone Suspension 30 milliLiter(s) Oral every 4 hours PRN Dyspepsia  senna 2 Tablet(s) Oral at bedtime PRN Constipation  simethicone 80 milliGRAM(s) Chew four times a day PRN Upset Stomach   65y old  Female who presents with a chief complaint of s/p left BG CVA with right sided weakness     seen and examined at the bedside, c/o vomiting x 1 episode this afternoon, no sob      Vital Signs Last 24 Hrs  T(C): 36.7 (12 Jul 2021 09:05), Max: 36.7 (11 Jul 2021 19:53)  T(F): 98 (12 Jul 2021 09:05), Max: 98 (11 Jul 2021 19:53)  HR: 85 (12 Jul 2021 09:05) (80 - 103)  BP: 113/61 (12 Jul 2021 09:05) (113/61 - 134/80)  BP(mean): --  RR: 16 (12 Jul 2021 09:05) (16 - 18)  SpO2: 97% (12 Jul 2021 09:05) (97% - 99%)      GENERAL- NAD  EAR/NOSE/MOUTH/THROAT - no pharyngeal exudates, no oral lesion's  MMM  EYES- RICO, conjunctiva and Sclera clear  NECK- supple  RESPIRATORY-  clear to auscultation bilaterally, non laboured breathing  CARDIOVASCULAR - SIS2, RRR  GI - soft NT BS present  EXTREMITIES- no pedal edema  NEUROLOGY- right sided weakness, slurred speech, facial droop  PSYCHIATRY- AAO X 1                x                    136  | 24   | 25           x     >-----------< x       ------------------------< 103                   x                    4.9  | 102  | 1.17                                         Ca 9.2   Mg x     Ph x          MEDICATIONS  (STANDING):  amLODIPine   Tablet 5 milliGRAM(s) Oral daily  aspirin  chewable 81 milliGRAM(s) Oral daily  budesonide 160 MICROgram(s)/formoterol 4.5 MICROgram(s) Inhaler 2 Puff(s) Inhalation two times a day  clopidogrel Tablet 75 milliGRAM(s) Oral daily  enoxaparin Injectable 40 milliGRAM(s) SubCutaneous daily  famotidine    Tablet 20 milliGRAM(s) Oral two times a day  gabapentin 100 milliGRAM(s) Oral at bedtime  melatonin 6 milliGRAM(s) Oral at bedtime  montelukast 10 milliGRAM(s) Oral daily  pantoprazole    Tablet 40 milliGRAM(s) Oral before breakfast    MEDICATIONS  (PRN):  ALBUTerol    90 MICROgram(s) HFA Inhaler 1 Puff(s) Inhalation every 6 hours PRN Shortness of Breath and/or Wheezing  aluminum hydroxide/magnesium hydroxide/simethicone Suspension 30 milliLiter(s) Oral every 4 hours PRN Dyspepsia  senna 2 Tablet(s) Oral at bedtime PRN Constipation  simethicone 80 milliGRAM(s) Chew four times a day PRN Upset Stomach

## 2021-07-12 NOTE — PROGRESS NOTE ADULT - ASSESSMENT
· Assessment	  MARKIE HERNANDEZ is a 66yo Female with left subcortical CVA  with dysphagia, right Hemiparesis, decreased functional mobility, gait instability and ADL impairments.    COMORBIDITES/ACTIVE MEDICAL ISSUES     Gait Instability, ADL impairments and Functional impairments: cont Comprehensive Rehab Program of PT/OT and speech therapy    #CVA  -DAPT with aspirin & plavix.   -fall and aspiration precautions  -TTE outpt  -neurology and cardio follow up outpt  - stopped the Lipitor due to elevated LFT     #Transaminitis  - stopped the Lipitor due to elevated LFT   - Cholelithiasis without definite secondary signs of acute cholecystitis  - hospitalist is following.   - Consulted G.I, would appreciate recommendations.     Dyspepsia  --cont. protonix and pepcid  --cont. Maalox    Insomnia  --cont. melatonin qhs    Dysphagia  --Upgraded diet to mechanical soft with thin liquids       #HLD  -monitor LFTs    #HTN  --will monitor and adjust PRN  -amlodipine 5 mg daily  -hospitalist  following    #NANCY  -monitor BMP-improving  - creatinine back to baseline.     #Asthma  -continue simicort and albuterol  -monitor VS    Pain Mgmt - Tylenol PRN.   Cont. gabapentin 100 mg HS for leg paresthesias. Consider increasing dose next week if the symptoms persist   GI/Bowel Mgmt - Senna,   PRN  /Bladder Mgmt - Voiding      Precautions / PROPHYLAXIS:   - Falls,  Seizure   -- Lungs: Aspiration,   - Pressure injury/Skin: Turn Q2hrs while in bed, OOB to Chair, PT/OT    - DVT: Lovenox    IDT 7/8/21:  SW: Lives in  with son, DIL, daughter w/ 4 JONNA and 10 steps inside-- Needed Assistance PTA with ADLs and Ambulation.  HHA 42h/week  OT: Min A e/g; Max A dressing; tot A toileting, transfers  PT: Transfer Max A.  Has not performed Ambulation or stairs.  Goal Min A  Speech: Puree/honey; MBS planned 7/12,  Good comprehension, Cogn. WFL.  +dysarthria.    ELOS:  7/23/21 home vs [facility]-- can extend if going home.      · Assessment	  MARKIE HERNANDEZ is a 66yo Female with left subcortical CVA  with dysphagia, right Hemiparesis, decreased functional mobility, gait instability and ADL impairments.    COMORBIDITES/ACTIVE MEDICAL ISSUES     Gait Instability, ADL impairments and Functional impairments: cont Comprehensive Rehab Program of PT/OT and speech therapy    #CVA  -DAPT with aspirin & plavix.   -fall and aspiration precautions  -TTE outpt  -neurology and cardio follow up outpt  - stopped the Lipitor due to elevated LFT     #Transaminitis  - stopped the Lipitor due to elevated LFT   - Cholelithiasis without definite secondary signs of acute cholecystitis  - hospitalist is following.   - Consulted GT[facility], would appreciate recommendations.   --changed to liquid diet as pt. had vomited after eating solid food    Dyspepsia  --cont. protonix and pepcid  --cont. Maalox    Insomnia  --cont. melatonin qhs    Dysphagia  --Upgraded diet to mechanical soft with thin liquids       #HLD  -monitor LFTs    #HTN  --will monitor and adjust PRN  -amlodipine 5 mg daily  -hospitalist  following    #NANCY  -monitor BMP-improving  - creatinine back to baseline.     #Asthma  -continue simicort and albuterol  -monitor VS  --Chronic coughing-- d/w hospitalist to assess    Pain Mgmt - Tylenol PRN.   Cont. gabapentin 100 mg HS for leg paresthesias. Consider increasing dose next week if the symptoms persist   GI/Bowel Mgmt - Senna,   PRN  /Bladder Mgmt - Voiding      Precautions / PROPHYLAXIS:   - Falls,  Seizure   -- Lungs: Aspiration,   - Pressure injury/Skin: Turn Q2hrs while in bed, OOB to Chair, PT/OT    - DVT: Lovenox    IDT 7/8/21:  SW: Lives in  with son, DIL, daughter w/ 4 JONNA and 10 steps inside-- Needed Assistance PTA with ADLs and Ambulation.  HHA 42h/week  OT: Min A e/g; Max A dressing; tot A toileting, transfers  PT: Transfer Max A.  Has not performed Ambulation or stairs.  Goal Min A  Speech: Puree/honey; MBS planned 7/12,  Good comprehension, Cogn. WFL.  +dysarthria.    ELOS:  7/23/21 home vs [facility]-- can extend if going home.

## 2021-07-12 NOTE — PROGRESS NOTE ADULT - ATTENDING COMMENTS
Pt. seen with resident.  Agree with documentation above as per resident with amendments made as appropriate. Patient medically stable. Making progress towards rehab goals.     Left MCA infarct  Transaminitis,  +epigastric pain,  +n/v,  + choliathiasis on U/S.  No acute tenderness on abdominal exam.  d/w Hospitalist--  GI consulted for evaluation.    --Chronic cough-- d/w hospitalist -- CXR ordered.

## 2021-07-13 DIAGNOSIS — R79.89 OTHER SPECIFIED ABNORMAL FINDINGS OF BLOOD CHEMISTRY: ICD-10-CM

## 2021-07-13 DIAGNOSIS — R11.0 NAUSEA: ICD-10-CM

## 2021-07-13 LAB
ALBUMIN SERPL ELPH-MCNC: 3 G/DL — LOW (ref 3.3–5)
ALP SERPL-CCNC: 90 U/L — SIGNIFICANT CHANGE UP (ref 40–120)
ALT FLD-CCNC: 148 U/L — HIGH (ref 10–45)
ANION GAP SERPL CALC-SCNC: 7 MMOL/L — SIGNIFICANT CHANGE UP (ref 5–17)
AST SERPL-CCNC: 50 U/L — HIGH (ref 10–40)
BASOPHILS # BLD AUTO: 0.01 K/UL — SIGNIFICANT CHANGE UP (ref 0–0.2)
BASOPHILS NFR BLD AUTO: 0.2 % — SIGNIFICANT CHANGE UP (ref 0–2)
BILIRUB SERPL-MCNC: 0.3 MG/DL — SIGNIFICANT CHANGE UP (ref 0.2–1.2)
BUN SERPL-MCNC: 31 MG/DL — HIGH (ref 7–23)
CALCIUM SERPL-MCNC: 9.1 MG/DL — SIGNIFICANT CHANGE UP (ref 8.4–10.5)
CHLORIDE SERPL-SCNC: 101 MMOL/L — SIGNIFICANT CHANGE UP (ref 96–108)
CO2 SERPL-SCNC: 28 MMOL/L — SIGNIFICANT CHANGE UP (ref 22–31)
CREAT SERPL-MCNC: 1.23 MG/DL — SIGNIFICANT CHANGE UP (ref 0.5–1.3)
EOSINOPHIL # BLD AUTO: 0.1 K/UL — SIGNIFICANT CHANGE UP (ref 0–0.5)
EOSINOPHIL NFR BLD AUTO: 1.7 % — SIGNIFICANT CHANGE UP (ref 0–6)
GLUCOSE SERPL-MCNC: 102 MG/DL — HIGH (ref 70–99)
HCT VFR BLD CALC: 36.5 % — SIGNIFICANT CHANGE UP (ref 34.5–45)
HGB BLD-MCNC: 11.6 G/DL — SIGNIFICANT CHANGE UP (ref 11.5–15.5)
IMM GRANULOCYTES NFR BLD AUTO: 0.2 % — SIGNIFICANT CHANGE UP (ref 0–1.5)
LYMPHOCYTES # BLD AUTO: 2.33 K/UL — SIGNIFICANT CHANGE UP (ref 1–3.3)
LYMPHOCYTES # BLD AUTO: 38.7 % — SIGNIFICANT CHANGE UP (ref 13–44)
MCHC RBC-ENTMCNC: 28.9 PG — SIGNIFICANT CHANGE UP (ref 27–34)
MCHC RBC-ENTMCNC: 31.8 GM/DL — LOW (ref 32–36)
MCV RBC AUTO: 90.8 FL — SIGNIFICANT CHANGE UP (ref 80–100)
MONOCYTES # BLD AUTO: 0.74 K/UL — SIGNIFICANT CHANGE UP (ref 0–0.9)
MONOCYTES NFR BLD AUTO: 12.3 % — SIGNIFICANT CHANGE UP (ref 2–14)
NEUTROPHILS # BLD AUTO: 2.83 K/UL — SIGNIFICANT CHANGE UP (ref 1.8–7.4)
NEUTROPHILS NFR BLD AUTO: 46.9 % — SIGNIFICANT CHANGE UP (ref 43–77)
NRBC # BLD: 0 /100 WBCS — SIGNIFICANT CHANGE UP (ref 0–0)
PLATELET # BLD AUTO: 199 K/UL — SIGNIFICANT CHANGE UP (ref 150–400)
POTASSIUM SERPL-MCNC: 4.5 MMOL/L — SIGNIFICANT CHANGE UP (ref 3.5–5.3)
POTASSIUM SERPL-SCNC: 4.5 MMOL/L — SIGNIFICANT CHANGE UP (ref 3.5–5.3)
PROT SERPL-MCNC: 7.1 G/DL — SIGNIFICANT CHANGE UP (ref 6–8.3)
RBC # BLD: 4.02 M/UL — SIGNIFICANT CHANGE UP (ref 3.8–5.2)
RBC # FLD: 12.6 % — SIGNIFICANT CHANGE UP (ref 10.3–14.5)
SODIUM SERPL-SCNC: 136 MMOL/L — SIGNIFICANT CHANGE UP (ref 135–145)
WBC # BLD: 6.02 K/UL — SIGNIFICANT CHANGE UP (ref 3.8–10.5)
WBC # FLD AUTO: 6.02 K/UL — SIGNIFICANT CHANGE UP (ref 3.8–10.5)

## 2021-07-13 PROCEDURE — 99233 SBSQ HOSP IP/OBS HIGH 50: CPT

## 2021-07-13 PROCEDURE — 99232 SBSQ HOSP IP/OBS MODERATE 35: CPT

## 2021-07-13 RX ORDER — LIDOCAINE 4 G/100G
1 CREAM TOPICAL DAILY
Refills: 0 | Status: DISCONTINUED | OUTPATIENT
Start: 2021-07-13 | End: 2021-07-27

## 2021-07-13 RX ORDER — SODIUM CHLORIDE 9 MG/ML
1000 INJECTION, SOLUTION INTRAVENOUS
Refills: 0 | Status: DISCONTINUED | OUTPATIENT
Start: 2021-07-13 | End: 2021-07-15

## 2021-07-13 RX ADMIN — SODIUM CHLORIDE 50 MILLILITER(S): 9 INJECTION, SOLUTION INTRAVENOUS at 19:44

## 2021-07-13 RX ADMIN — Medication 81 MILLIGRAM(S): at 11:21

## 2021-07-13 RX ADMIN — FAMOTIDINE 20 MILLIGRAM(S): 10 INJECTION INTRAVENOUS at 06:18

## 2021-07-13 RX ADMIN — GABAPENTIN 100 MILLIGRAM(S): 400 CAPSULE ORAL at 21:24

## 2021-07-13 RX ADMIN — FAMOTIDINE 20 MILLIGRAM(S): 10 INJECTION INTRAVENOUS at 17:32

## 2021-07-13 RX ADMIN — LIDOCAINE 1 PATCH: 4 CREAM TOPICAL at 18:59

## 2021-07-13 RX ADMIN — LIDOCAINE 1 PATCH: 4 CREAM TOPICAL at 13:32

## 2021-07-13 RX ADMIN — BUDESONIDE AND FORMOTEROL FUMARATE DIHYDRATE 2 PUFF(S): 160; 4.5 AEROSOL RESPIRATORY (INHALATION) at 08:01

## 2021-07-13 RX ADMIN — BUDESONIDE AND FORMOTEROL FUMARATE DIHYDRATE 2 PUFF(S): 160; 4.5 AEROSOL RESPIRATORY (INHALATION) at 21:21

## 2021-07-13 RX ADMIN — CLOPIDOGREL BISULFATE 75 MILLIGRAM(S): 75 TABLET, FILM COATED ORAL at 11:21

## 2021-07-13 RX ADMIN — ENOXAPARIN SODIUM 40 MILLIGRAM(S): 100 INJECTION SUBCUTANEOUS at 11:21

## 2021-07-13 RX ADMIN — MONTELUKAST 10 MILLIGRAM(S): 4 TABLET, CHEWABLE ORAL at 11:21

## 2021-07-13 RX ADMIN — Medication 6 MILLIGRAM(S): at 21:24

## 2021-07-13 RX ADMIN — PANTOPRAZOLE SODIUM 40 MILLIGRAM(S): 20 TABLET, DELAYED RELEASE ORAL at 06:18

## 2021-07-13 NOTE — CONSULT NOTE ADULT - PROBLEM SELECTOR RECOMMENDATION 9
Most likely medication induced given recent initiation of statin  Continue to hold statin now and monitor  Avoid additional hepatotoxic meds  Will check hepatitis panel

## 2021-07-13 NOTE — PROGRESS NOTE ADULT - SUBJECTIVE AND OBJECTIVE BOX
65y old  Female who presents with a chief complaint of s/p left BG CVA with right sided weakness     seen and examined at the bedside, c/o epigastric pain, intermittent and intermittent nausea, no vomiting today,  no sob, no fever    Vital Signs Last 24 Hrs  T(C): 36.7 (13 Jul 2021 08:12), Max: 37.3 (12 Jul 2021 14:38)  T(F): 98.1 (13 Jul 2021 08:12), Max: 99.1 (12 Jul 2021 14:38)  HR: 76 (13 Jul 2021 08:12) (74 - 108)  BP: 123/79 (13 Jul 2021 08:12) (109/70 - 126/82)  BP(mean): --  RR: 16 (13 Jul 2021 08:12) (16 - 18)  SpO2: 98% (13 Jul 2021 08:12) (95% - 98%)    GENERAL- NAD  EAR/NOSE/MOUTH/THROAT - no pharyngeal exudates, no oral lesion's  MMM  EYES- RICO, conjunctiva and Sclera clear  NECK- supple  RESPIRATORY-  clear to auscultation bilaterally, non laboured breathing  CARDIOVASCULAR - SIS2, RRR  GI - soft NT BS present  EXTREMITIES- no pedal edema  NEUROLOGY- right sided weakness, slurred speech, facial droop                11.6                 136  | 28   | 31           6.02  >-----------< 199     ------------------------< 102                   36.5                 4.5  | 101  | 1.23                                         Ca 9.1   Mg x     Ph x            MEDICATIONS  (STANDING):  amLODIPine   Tablet 5 milliGRAM(s) Oral daily  aspirin  chewable 81 milliGRAM(s) Oral daily  budesonide 160 MICROgram(s)/formoterol 4.5 MICROgram(s) Inhaler 2 Puff(s) Inhalation two times a day  clopidogrel Tablet 75 milliGRAM(s) Oral daily  enoxaparin Injectable 40 milliGRAM(s) SubCutaneous daily  famotidine    Tablet 20 milliGRAM(s) Oral two times a day  gabapentin 100 milliGRAM(s) Oral at bedtime  melatonin 6 milliGRAM(s) Oral at bedtime  montelukast 10 milliGRAM(s) Oral daily  pantoprazole    Tablet 40 milliGRAM(s) Oral before breakfast

## 2021-07-13 NOTE — PROGRESS NOTE ADULT - ASSESSMENT
65 year old RH woman with past medical history of asthma and HTN not on antithrombotics presented as code stroke to [facility] ED for right-sided weakness with LWK  11p. found to have Right yaneth sensorimotor loss and moderate dysarthria +/- right visual field cut 2/2 L BG infarct 2/2  , admitted for rehab- pt/ot/dvt ppx    intermittent epigastric pain/ c/o vomiting x 1 episode this afternoon, 21  c/w liquid diet untill seen by GI,    elevated LFT'S-  trending down, 2/2 gall stones vs statins  < from: US Gallbladder (US Gallbladder .) (07.10.21 @ 09:30) >  Cholelithiasis without definite secondary signs of acute cholecystitis  c/w ppi, simethicone, aspiration precautions  monitor lft's, GI consult pending    ECG 21 Normal sinus rhythm 87  < from: TTE Echo Complete w/o Contrast w/ Doppler (21 @ 16:11) >   Left ventricular ejection fraction, by visual estimation, is 50 to 55%.   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 Also mild aneurysmal dilatation of the origin of the left inferior M2 branch.  - neurosx follow up OP    HTN  - c/w Norvasc      NANCY likely pre-renal  - improving  - encouraged PO hydration  - on IVF    Asthma  Symbicort, alb prn, singular    d/w Dr. Sanchez   will follow

## 2021-07-13 NOTE — CONSULT NOTE ADULT - ASSESSMENT
64 y/o female admitted to U s/p stroke, now with elevated LFTs, RUQ and nausea. LFTs downtrending. No further episodes of nausea/vomiting reported. RUQ present but improved. 
CVA  PT/OT per rehab  DAPT/statin    HTN  Resume Norvasc, start at 5mg     NANCY  resolved    Asthma  Symbicort, alb prn, singulair

## 2021-07-13 NOTE — CONSULT NOTE ADULT - SUBJECTIVE AND OBJECTIVE BOX
HPI:  64 yo RH female with PMH of asthma, HTN to [facility] ED 6/29 for right-sided weakness and slurred speech. CTH neg acute cva, old lacunar infarcts b/l BG. Out of window for tPA. CTA 6/29 shows Aneurysms suspected involving the left middle cerebral artery as described above. Transferred from [facility] to [facility] for possible evaluation of aneurysms noted on imaging. MRA 6/30 showed mild age-appropriate involutional and ischemic gliotic changes with old bilateral basal ganglia lacunar infarcts. Small acute infarct left basal ganglia and corona radiata. No evidence of hemorrhage. 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 Also mild aneurysmal dilatation of the origin of the left inferior M2 branch. Hospitalization complicated by NANCY, IVF completed. Failed swallow eval-tolerates Puree honey. TTE as outpatient. HbA1c 5.9 and . Evaluated by PMR and acute rehab recommended. Cleared for dc on 7/2.  Patient arrived to [facility] on 7/2/21 for acute inpatient rehab. Mongolian iterpreter #[id number]. Pt c/o difficulty speaking and still unable to more her right upper and right lower extremities.  (02 Jul 2021 15:21)    CC:  No event overnight    PAST MEDICAL & SURGICAL HISTORY:  Brain aneurysm    Hypertension    Brain aneurysm    Smoking    No significant past surgical history    No significant past surgical history        FAMILY HISTORY:  No pertinent family history in first degree relatives        Social History:  non smoker    MEDICATIONS  (STANDING):  aspirin  chewable 81 milliGRAM(s) Oral daily  atorvastatin 80 milliGRAM(s) Oral at bedtime  budesonide 160 MICROgram(s)/formoterol 4.5 MICROgram(s) Inhaler 2 Puff(s) Inhalation two times a day  clopidogrel Tablet 75 milliGRAM(s) Oral daily  enoxaparin Injectable 40 milliGRAM(s) SubCutaneous daily  famotidine    Tablet 20 milliGRAM(s) Oral two times a day  montelukast 10 milliGRAM(s) Oral daily  pantoprazole    Tablet 40 milliGRAM(s) Oral before breakfast    MEDICATIONS  (PRN):  ALBUTerol    90 MICROgram(s) HFA Inhaler 1 Puff(s) Inhalation every 6 hours PRN Shortness of Breath and/or Wheezing  senna 2 Tablet(s) Oral at bedtime PRN Constipation      REVIEW OF SYSTEMS:  CONSTITUTIONAL: No fevers or chills  EYES/ENT: No visual changes;  No vertigo or throat pain   NECK: No pain or stiffness  RESPIRATORY: No cough, wheezing, hemoptysis; No shortness of breath  CARDIOVASCULAR: No chest pain or palpitations  GASTROINTESTINAL: No abdominal or epigastric pain. No nausea, vomiting, or hematemesis; No diarrhea or constipation. No melena or hematochezia.  GENITOURINARY: No dysuria, frequency or hematuria  NEUROLOGICAL: pos weakness  SKIN: No itching, burning, rashes, or lesions   All other review of systems is negative unless indicated above.    Vital Signs Last 24 Hrs  T(C): 36.9 (03 Jul 2021 09:07), Max: 36.9 (03 Jul 2021 09:07)  T(F): 98.4 (03 Jul 2021 09:07), Max: 98.4 (03 Jul 2021 09:07)  HR: 77 (03 Jul 2021 09:07) (58 - 88)  BP: 156/80 (03 Jul 2021 09:07) (155/75 - 174/81)  BP(mean): --  RR: 14 (03 Jul 2021 09:07) (14 - 18)  SpO2: 98% (03 Jul 2021 09:07) (96% - 100%)    PHYSICAL EXAM:  GENERAL: NAD  NECK: Supple  NERVOUS SYSTEM:  awake  HEART: S1s2 NL , RRR  CHEST/LUNG: Clear to percussion bilaterally  ABDOMEN: Soft, Nontender, Nondistended; Bowel sounds present  EXTREMITIES:  No edema    CBC Full  -  ( 03 Jul 2021 05:00 )  WBC Count : 5.51 K/uL  RBC Count : 4.23 M/uL  Hemoglobin : 12.5 g/dL  Hematocrit : 38.8 %  Platelet Count - Automated : 128 K/uL  Mean Cell Volume : 91.7 fl  Mean Cell Hemoglobin : 29.6 pg  Mean Cell Hemoglobin Concentration : 32.2 gm/dL  Auto Neutrophil # : 3.90 K/uL  Auto Lymphocyte # : 1.09 K/uL  Auto Monocyte # : 0.42 K/uL  Auto Eosinophil # : 0.09 K/uL  Auto Basophil # : 0.00 K/uL  Auto Neutrophil % : 70.8 %  Auto Lymphocyte % : 19.8 %  Auto Monocyte % : 7.6 %  Auto Eosinophil % : 1.6 %  Auto Basophil % : 0.0 %    07-03    143  |  109<H>  |  24<H>  ----------------------------<  129<H>  4.3   |  24  |  1.14    Ca    8.8      03 Jul 2021 05:00    TPro  7.1  /  Alb  3.1<L>  /  TBili  0.6  /  DBili  x   /  AST  13  /  ALT  17  /  AlkPhos  84  07-03    CAPILLARY BLOOD GLUCOSE        LIVER FUNCTIONS - ( 03 Jul 2021 05:00 )  Alb: 3.1 g/dL / Pro: 7.1 g/dL / ALK PHOS: 84 U/L / ALT: 17 U/L / AST: 13 U/L / GGT: x                 HEALTH ISSUES - PROBLEM Dx:      
INTERVAL HPI/OVERNIGHT EVENTS:  HPI:  64 yo RH female with PMH of asthma, HTN to [facility] ED  for right-sided weakness and slurred speech. CTH neg acute cva, old lacunar infarcts b/l BG. Out of window for tPA. CTA  shows Aneurysms suspected involving the left middle cerebral artery as described above. Transferred from [facility] to [facility] for possible evaluation of aneurysms noted on imaging. MRA  showed mild age-appropriate involutional and ischemic gliotic changes with old bilateral basal ganglia lacunar infarcts. Small acute infarct left basal ganglia and corona radiata. No evidence of hemorrhage. 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 Also mild aneurysmal dilatation of the origin of the left inferior M2 branch. Hospitalization complicated by NANCY, IVF completed. Failed swallow eval-tolerates Puree honey. TTE as outpatient. HbA1c 5.9 and . Evaluated by PMR and acute rehab recommended. Cleared for dc on .  Patient arrived to [facility] on 21 for acute inpatient rehab. Celi iterpreter #[id number]. Pt c/o difficulty speaking and still unable to more her right upper and right lower extremities.  (2021 15:21)    GI consulted to see patient due to elevated LFTs, RUQ pain and nausea. Patient seen and examined in rehab. Celi  used during exam. Patient states she has some RUQ pain but not as much. She did have a bowel movement yesterday. No nausea/vomiting today. Tolerated diet this morning without difficulty. No previous history of symptoms.      MEDICATIONS  (STANDING):  amLODIPine   Tablet 5 milliGRAM(s) Oral daily  aspirin  chewable 81 milliGRAM(s) Oral daily  budesonide 160 MICROgram(s)/formoterol 4.5 MICROgram(s) Inhaler 2 Puff(s) Inhalation two times a day  clopidogrel Tablet 75 milliGRAM(s) Oral daily  enoxaparin Injectable 40 milliGRAM(s) SubCutaneous daily  famotidine    Tablet 20 milliGRAM(s) Oral two times a day  gabapentin 100 milliGRAM(s) Oral at bedtime  melatonin 6 milliGRAM(s) Oral at bedtime  montelukast 10 milliGRAM(s) Oral daily  pantoprazole    Tablet 40 milliGRAM(s) Oral before breakfast    MEDICATIONS  (PRN):  ALBUTerol    90 MICROgram(s) HFA Inhaler 1 Puff(s) Inhalation every 6 hours PRN Shortness of Breath and/or Wheezing  aluminum hydroxide/magnesium hydroxide/simethicone Suspension 30 milliLiter(s) Oral every 4 hours PRN Dyspepsia  senna 2 Tablet(s) Oral at bedtime PRN Constipation  simethicone 80 milliGRAM(s) Chew four times a day PRN Upset Stomach      Allergies    No Known Allergies    Intolerances        PAST MEDICAL & SURGICAL HISTORY:  Brain aneurysm    Hypertension    Brain aneurysm    Smoking    No significant past surgical history    No significant past surgical history        REVIEW OF SYSTEMS: negative unless indicated in HPI    Non smoker  No ETOH abuse      PHYSICAL EXAM:   Vital Signs:  Vital Signs Last 24 Hrs  T(C): 36.7 (2021 08:12), Max: 37.3 (2021 14:38)  T(F): 98.1 (2021 08:12), Max: 99.1 (2021 14:38)  HR: 76 (2021 08:12) (74 - 108)  BP: 123/79 (2021 08:12) (109/70 - 126/82)  BP(mean): --  RR: 16 (2021 08:12) (16 - 18)  SpO2: 98% (2021 08:12) (95% - 98%)  Daily     Daily Weight in k.4 (2021 22:34)I&O's Summary      GENERAL:  Appears stated age,   HEENT:  NC/AT,  conjunctivae clear and pink, sclera -anicteric  CHEST:  Full & symmetric excursion, no increased effort, breath sounds clear  HEART:  Regular rhythm, S1, S2, no murmur  ABDOMEN:  Soft, non-tender, non-distended, normoactive bowel sounds  EXTEREMITIES:  no edema, RUE weakness, + pulses   SKIN:  No rash/warm/dry  NEURO:  Alert, oriented, no asterixis, no tremor, no encephalopathy      LABS:                        11.6   6.02  )-----------( 199      ( 2021 05:30 )             36.5     07-13    136  |  101  |  31<H>  ----------------------------<  102<H>  4.5   |  28  |  1.23    Ca    9.1      2021 05:30    TPro  7.1  /  Alb  3.0<L>  /  TBili  0.3  /  DBili  x   /  AST  50<H>  /  ALT  148<H>  /  AlkPhos  90  07-13        amylase   lipase  RADIOLOGY & ADDITIONAL TESTS:

## 2021-07-13 NOTE — CHART NOTE - NSCHARTNOTEFT_GEN_A_CORE
Nutrition Follow Up Note  Hospital Course (Per Electronic Medical Record): 64yo Female with left subcortical CVA  with dysphagia, right Hemiparesis, decreased functional mobility, gait instability and ADL impairments.  Source: Medical Record [X] Patient [X] Family [ ]         Diet: Consistent carbohydrate full liquids  Pt's diet was changed by medical team due to pt vomiting solids and pt's complaints of nausea. Prior to this, pt was on dysphagia 2 mechanial soft, thins, no beef, no caffeine, no chocolate, no pork, Ensure Enlive 1x/day, Ensure Pudding 1x/day. No caffeine, no chocolate restriction was added per pt's son request in an attempt to help acid reflux. Spoke with pt using [employer]  (ID# [id number]). Pt reports she does not have much of an appetite and dislikes hospital food. Pt reports that when her son visits he will bring her food. Pt seen by GI. Pt denies vomiting today, abdominal pain has improved today per documentation.    Enteral/Parenteral Nutrition: N/A    Current Weight: 111.1 lbs (7/12)  (7/7) 108lbs  (7/2) 109.3lbs    Pertinent Medications: MEDICATIONS  (STANDING):  amLODIPine   Tablet 5 milliGRAM(s) Oral daily  aspirin  chewable 81 milliGRAM(s) Oral daily  budesonide 160 MICROgram(s)/formoterol 4.5 MICROgram(s) Inhaler 2 Puff(s) Inhalation two times a day  clopidogrel Tablet 75 milliGRAM(s) Oral daily  enoxaparin Injectable 40 milliGRAM(s) SubCutaneous daily  famotidine    Tablet 20 milliGRAM(s) Oral two times a day  gabapentin 100 milliGRAM(s) Oral at bedtime  lidocaine   Patch 1 Patch Transdermal daily  melatonin 6 milliGRAM(s) Oral at bedtime  montelukast 10 milliGRAM(s) Oral daily  pantoprazole    Tablet 40 milliGRAM(s) Oral before breakfast    MEDICATIONS  (PRN):  ALBUTerol    90 MICROgram(s) HFA Inhaler 1 Puff(s) Inhalation every 6 hours PRN Shortness of Breath and/or Wheezing  aluminum hydroxide/magnesium hydroxide/simethicone Suspension 30 milliLiter(s) Oral every 4 hours PRN Dyspepsia  senna 2 Tablet(s) Oral at bedtime PRN Constipation  simethicone 80 milliGRAM(s) Chew four times a day PRN Upset Stomach      Pertinent Labs:  07-13 Na136 mmol/L Glu 102 mg/dL<H> K+ 4.5 mmol/L Cr  1.23 mg/dL BUN 31 mg/dL<H> 07-13 Alb 3.0 g/dL<L> 06-30 Chol 184 mg/dL LDL --    HDL 38 mg/dL<L> Trig 99 mg/dL        Skin: Skin intact per nursing flow sheets     Edema: No edema per nursing flow sheets     Last BM: on 7/12 Per nursing flowsheets     Estimated Needs:   [X] No Change since Previous Assessment  [ ] Recalculated:     Previous Nutrition Diagnosis:   1. moderate malnutrition  2. Swallowing difficulty    Nutrition Diagnosis is [X] Ongoing    [ ] Resolved   [ ] Not Applicable      New Nutrition Diagnosis: [X] Not Applicable  [ ] Inadequate Protein Energy Intake   [ ] Inadequate Oral Intake   [ ] Excessive Energy Intake   [ ] Increased Nutrient Needs   [ ] Obesity   [ ] Altered GI Function   [ ] Unintended Weight Loss   [ ] Food & Nutrition Related Knowledge Deficit  [ ] Limited Adherence to nutrition related recommendations   [ ] Malnutrition      Interventions:   1. Recommend resuming dysphagia 2 mechanical soft, thins, no beef, no caffeine, no chocolate, no pork, Ensure Enlive 1x/day, Ensure Pudding 1x/day as soon as feasible as pt denies nausea/vomiting today  2. Monitor tolerance of diet  3. Obtain and honor food preferences as able    Monitoring & Evaluation:   [X] Weights   [X] PO Intake   [X] Follow Up (Per Protocol)  [X] Tolerance to Diet Prescription   [X] Other: Labs     RD Remains Available.  Suzanne Ogden RD

## 2021-07-13 NOTE — CONSULT NOTE ADULT - ATTENDING COMMENTS
Patient seen and examined with Nadja Horta NP.  Agree with assessment and plan as above.    Middle aged female s/p CVA now admitted to acute rehab and found to have elevated liver tests.  She admits to some RUQ pain.  No nausea or vomiting at this time.    VSS.  Abdomen soft, nontender to palpation    Possible passed stone vs. medication related\  Monitor liver tests for now  Diet as tolerated

## 2021-07-13 NOTE — PROGRESS NOTE ADULT - ATTENDING COMMENTS
Pt. seen with resident.  Agree with documentation above as per resident with amendments made as appropriate. Patient medically stable. Making progress towards rehab goals.     Left BG and CR infarct.   GI consult reviewed and appreciated. decreased statin.  Monitor    Pt. orthostatic-- d/c amlodipine.   IVFs for dehydration.

## 2021-07-13 NOTE — PROGRESS NOTE ADULT - SUBJECTIVE AND OBJECTIVE BOX
HPI:  66 yo RH female with PMH of asthma, HTN to [facility] ED 6/29 for right-sided weakness and slurred speech. CTH neg acute cva, old lacunar infarcts b/l BG. Out of window for tPA. CTA 6/29 shows Aneurysms suspected involving the left middle cerebral artery as described above. Transferred from [facility] to [facility] for possible evaluation of aneurysms noted on imaging. MRA 6/30 showed mild age-appropriate involutional and ischemic gliotic changes with old bilateral basal ganglia lacunar infarcts. Small acute infarct left basal ganglia and corona radiata. No evidence of hemorrhage. 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 Also mild aneurysmal dilatation of the origin of the left inferior M2 branch. Hospitalization complicated by NANCY, IVF completed. Failed swallow eval-tolerates Puree honey. TTE as outpatient. HbA1c 5.9 and . Evaluated by PMR and acute rehab recommended. Cleared for dc on 7/2. Patient arrived to [facility] on 7/2/21 for acute inpatient rehab. [facility] iterpreter #[id number]. Pt c/o difficulty speaking and still unable to more her right upper and right lower extremities.  (02 Jul 2021 15:21)      PAST MEDICAL & SURGICAL HISTORY:  Brain aneurysm    Hypertension    Brain aneurysm    Smoking    No significant past surgical history    No significant past surgical history      Subjective:    Patient seen and examined. Talking to the patient using an [facility]  #[id number]  No events overnight.   Denies any nausea, but does complain of being dizzy this morning.   Currently tolerating full liquid diet well.   Complains of new R sided knee pain & midline neck pain.   Participating in therapy       ICU Vital Signs Last 24 Hrs  T(C): 36.7 (13 Jul 2021 08:12), Max: 37.3 (12 Jul 2021 14:38)  T(F): 98.1 (13 Jul 2021 08:12), Max: 99.1 (12 Jul 2021 14:38)  HR: 76 (13 Jul 2021 08:12) (74 - 108)  BP: 123/79 (13 Jul 2021 08:12) (109/70 - 126/82)  BP(mean): --  ABP: --  ABP(mean): --  RR: 16 (13 Jul 2021 08:12) (16 - 18)  SpO2: 98% (13 Jul 2021 08:12) (95% - 98%)      REVIEW OF SYMPTOMS  Positive: Leg paresthesias, Neurological deficits-- dysphagia, dysarthria, right HP  Denies: headache, lightheadedness, CP, SOB, abdominal pain, dysuria, nausea, constipation      ----------------------------------------------------------------------  PHYSICAL EXAM:      Gen - NAD, Comfortable  HEENT - NCAT,   Pulm - CTAB,   Cardiovascular - RRR,   Abdomen - Soft, NT/ND, +BS  Extremities - No Edema, No calf tenderness, R knee pain.   Neuro-     Cognitive - AAOx 2--knew she is in hospital but not date     Attention: makes eye contact and good comprehension      Memory: Recall 3 objects immediate and 3 min later         Cranial Nerves - PERRLA, EOMI, right facial droop, +dysphagia     Motor -                    LEFT    UE - ShAB 5/5, EF 5/5, EE 5/5, WE 5/5,  5/5                    RIGHT UE - ShAB 2/5, EF 2/5, EE 1/5, WE 0/5,  0/5                    LEFT    LE - HF 5/5, KE 5/5, DF 5/5, PF 5/5                    RIGHT LE - Hip ext 2/5, KE 2/5, DF 1/5, PF 1/5        Sensory - Intact to LT     Reflexes - DTR Intact, No primitive reflex     Coordination - impaired on right     Tone - normal  Psychiatric - Mood stable, Affect WNL      RECENT LABS                          11.6   6.02  )-----------( 199      ( 13 Jul 2021 05:30 )             36.5     07-13    136  |  101  |  31<H>  ----------------------------<  102<H>  4.5   |  28  |  1.23    Ca    9.1      13 Jul 2021 05:30    TPro  7.1  /  Alb  3.0<L>  /  TBili  0.3  /  DBili  x   /  AST  50<H>  /  ALT  148<H>  /  AlkPhos  90  07-13      RADIOLOGY/OTHER RESULTS      US Gallbladder (US Gallbladder0    EXAM:  US GALLBLADDER      PROCEDURE DATE:  07/10/2021      IMPRESSION:    Cholelithiasis without definite secondary signs of acute cholecystitis    --- End of Report ---      EXAM:  XR CHEST PORTABLE IMMED 1V      IMPRESSION:    Slightly increased interstitialmarkings at left base again noted.    --- End of Report ---    MEDICATIONS  (STANDING):  amLODIPine   Tablet 5 milliGRAM(s) Oral daily  aspirin  chewable 81 milliGRAM(s) Oral daily  budesonide 160 MICROgram(s)/formoterol 4.5 MICROgram(s) Inhaler 2 Puff(s) Inhalation two times a day  clopidogrel Tablet 75 milliGRAM(s) Oral daily  enoxaparin Injectable 40 milliGRAM(s) SubCutaneous daily  famotidine    Tablet 20 milliGRAM(s) Oral two times a day  gabapentin 100 milliGRAM(s) Oral at bedtime  lidocaine   Patch 1 Patch Transdermal daily  melatonin 6 milliGRAM(s) Oral at bedtime  montelukast 10 milliGRAM(s) Oral daily  pantoprazole    Tablet 40 milliGRAM(s) Oral before breakfast    MEDICATIONS  (PRN):  ALBUTerol    90 MICROgram(s) HFA Inhaler 1 Puff(s) Inhalation every 6 hours PRN Shortness of Breath and/or Wheezing  aluminum hydroxide/magnesium hydroxide/simethicone Suspension 30 milliLiter(s) Oral every 4 hours PRN Dyspepsia  senna 2 Tablet(s) Oral at bedtime PRN Constipation  simethicone 80 milliGRAM(s) Chew four times a day PRN Upset Stomach   HPI:  66 yo RH female with PMH of asthma, HTN to [facility] ED 6/29 for right-sided weakness and slurred speech. CTH neg acute cva, old lacunar infarcts b/l BG. Out of window for tPA. CTA 6/29 shows Aneurysms suspected involving the left middle cerebral artery as described above. Transferred from [facility] to [facility] for possible evaluation of aneurysms noted on imaging. MRA 6/30 showed mild age-appropriate involutional and ischemic gliotic changes with old bilateral basal ganglia lacunar infarcts. Small acute infarct left basal ganglia and corona radiata. No evidence of hemorrhage. 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 Also mild aneurysmal dilatation of the origin of the left inferior M2 branch. Hospitalization complicated by NANCY, IVF completed. Failed swallow eval-tolerates Puree honey. TTE as outpatient. HbA1c 5.9 and . Evaluated by PMR and acute rehab recommended. Cleared for dc on 7/2. Patient arrived to [facility] on 7/2/21 for acute inpatient rehab. [facility] iterpreter #[id number]. Pt c/o difficulty speaking and still unable to more her right upper and right lower extremities.  (02 Jul 2021 15:21)      PAST MEDICAL & SURGICAL HISTORY:  Brain aneurysm    Hypertension    Brain aneurysm    Smoking    No significant past surgical history    No significant past surgical history      Subjective:    Patient seen and examined. Talking to the patient using an [facility]  #[id number]  No events overnight.   Denies any nausea, but does complain of being dizzy this morning.   Pt. was orthostatic during PT today-- Initially 150 SBP in WC and after Stand pivot transfer to [facility]-- SBP dropped to 123 with significant c/o dizziness.    Currently tolerating full liquid diet well.   Complains of new R sided knee pain & midline neck pain.   Participating in therapy       ICU Vital Signs Last 24 Hrs  T(C): 36.7 (13 Jul 2021 08:12), Max: 37.3 (12 Jul 2021 14:38)  T(F): 98.1 (13 Jul 2021 08:12), Max: 99.1 (12 Jul 2021 14:38)  HR: 76 (13 Jul 2021 08:12) (74 - 108)  BP: 123/79 (13 Jul 2021 08:12) (109/70 - 126/82)  BP(mean): --  ABP: --  ABP(mean): --  RR: 16 (13 Jul 2021 08:12) (16 - 18)  SpO2: 98% (13 Jul 2021 08:12) (95% - 98%)      REVIEW OF SYMPTOMS  Positive: Leg paresthesias, Neurological deficits-- dysphagia, dysarthria, right HP  Denies: headache, lightheadedness, CP, SOB, abdominal pain, dysuria, nausea, constipation      ----------------------------------------------------------------------  PHYSICAL EXAM:      Gen - NAD, Comfortable  HEENT - NCAT,   Pulm - CTAB,   Cardiovascular - RRR,   Abdomen - Soft, NT/ND, +BS  Extremities - No Edema, No calf tenderness, R knee pain.   Neuro-     Cognitive - AAOx 2--knew she is in hospital but not date     Attention: makes eye contact and good comprehension      Memory: Recall 3 objects immediate and 3 min later         Cranial Nerves - PERRLA, EOMI, right facial droop, +dysphagia     Motor -                    LEFT    UE - ShAB 5/5, EF 5/5, EE 5/5, WE 5/5,  5/5                    RIGHT UE - ShAB 2/5, EF 2/5, EE 1/5, WE 0/5,  0/5                    LEFT    LE - HF 5/5, KE 5/5, DF 5/5, PF 5/5                    RIGHT LE - Hip ext 2/5, KE 2/5, DF 1/5, PF 1/5        Sensory - Intact to LT     Reflexes - DTR Intact, No primitive reflex     Coordination - impaired on right     Tone - normal  Psychiatric - Mood stable, Affect WNL      RECENT LABS                          11.6   6.02  )-----------( 199      ( 13 Jul 2021 05:30 )             36.5     07-13    136  |  101  |  31<H>  ----------------------------<  102<H>  4.5   |  28  |  1.23    Ca    9.1      13 Jul 2021 05:30    TPro  7.1  /  Alb  3.0<L>  /  TBili  0.3  /  DBili  x   /  AST  50<H>  /  ALT  148<H>  /  AlkPhos  90  07-13      RADIOLOGY/OTHER RESULTS      US Gallbladder (US Gallbladder0    EXAM:  US GALLBLADDER      PROCEDURE DATE:  07/10/2021      IMPRESSION:    Cholelithiasis without definite secondary signs of acute cholecystitis    --- End of Report ---      EXAM:  XR CHEST PORTABLE IMMED 1V      IMPRESSION:    Slightly increased interstitialmarkings at left base again noted.    --- End of Report ---    MEDICATIONS  (STANDING):  amLODIPine   Tablet 5 milliGRAM(s) Oral daily  aspirin  chewable 81 milliGRAM(s) Oral daily  budesonide 160 MICROgram(s)/formoterol 4.5 MICROgram(s) Inhaler 2 Puff(s) Inhalation two times a day  clopidogrel Tablet 75 milliGRAM(s) Oral daily  enoxaparin Injectable 40 milliGRAM(s) SubCutaneous daily  famotidine    Tablet 20 milliGRAM(s) Oral two times a day  gabapentin 100 milliGRAM(s) Oral at bedtime  lidocaine   Patch 1 Patch Transdermal daily  melatonin 6 milliGRAM(s) Oral at bedtime  montelukast 10 milliGRAM(s) Oral daily  pantoprazole    Tablet 40 milliGRAM(s) Oral before breakfast    MEDICATIONS  (PRN):  ALBUTerol    90 MICROgram(s) HFA Inhaler 1 Puff(s) Inhalation every 6 hours PRN Shortness of Breath and/or Wheezing  aluminum hydroxide/magnesium hydroxide/simethicone Suspension 30 milliLiter(s) Oral every 4 hours PRN Dyspepsia  senna 2 Tablet(s) Oral at bedtime PRN Constipation  simethicone 80 milliGRAM(s) Chew four times a day PRN Upset Stomach

## 2021-07-13 NOTE — PROGRESS NOTE ADULT - ASSESSMENT
· Assessment	  MARKIE HERNANDEZ is a 66yo Female with left subcortical CVA  with dysphagia, right Hemiparesis, decreased functional mobility, gait instability and ADL impairments.    COMORBIDITES/ACTIVE MEDICAL ISSUES     Gait Instability, ADL impairments and Functional impairments: cont Comprehensive Rehab Program of PT/OT and speech therapy    #CVA  -DAPT with aspirin & plavix.   -fall and aspiration precautions  -TTE outpt  -neurology and cardio follow up outpt  - stopped the Lipitor due to elevated LFT     #Transaminitis  - stopped the Lipitor due to elevated LFT   - Cholelithiasis without definite secondary signs of acute cholecystitis  - hospitalist is following.   - Consulted [facility], would appreciate recommendations -  Continue to hold statin now and monitor   & Avoid additional hepatotoxic meds. nausea has improved might have passed the small stone.   - f/u Hepatitis panel   --changed to full liquid diet as pt. had vomited after eating solid food yestarday.     Dyspepsia  --cont. protonix and pepcid  --cont. Maalox    Insomnia  --cont. melatonin qhs    Dysphagia  --full liquid diet as pt. had vomited after eating solid food yestarday.     #HLD  -monitor LFTs    #HTN  --will monitor and adjust PRN  -amlodipine 5 mg daily  -hospitalist  following    #NANCY  -monitor BMP-improving  - creatinine back to baseline.     #Asthma  -continue simicort and albuterol  -monitor VS  --Chronic coughing-- d/w hospitalist to assess    Pain Mgmt - Tylenol PRN.   Cont. gabapentin 100 mg HS for leg paresthesias. Consider increasing dose next week if the symptoms persist   GI/Bowel Mgmt - Senna,   PRN  /Bladder Mgmt - Voiding      Precautions / PROPHYLAXIS:   - Falls,  Seizure   -- Lungs: Aspiration,   - Pressure injury/Skin: Turn Q2hrs while in bed, OOB to Chair, PT/OT    - DVT: Lovenox    IDT 7/8/21:  SW: Lives in  with son, DIL, daughter w/ 4 JONNA and 10 steps inside-- Needed Assistance PTA with ADLs and Ambulation.  HHA 42h/week  OT: Min A e/g; Max A dressing; tot A toileting, transfers  PT: Transfer Max A.  Has not performed Ambulation or stairs.  Goal Min A  Speech: Puree/honey; MBS planned 7/12,  Good comprehension, Cogn. WFL.  +dysarthria.    ELOS:  7/23/21 home vs [facility]-- can extend if going home.      · Assessment	  MARKIE HERNANDEZ is a 64yo Female with left subcortical CVA  with dysphagia, right Hemiparesis, decreased functional mobility, gait instability and ADL impairments.    COMORBIDITES/ACTIVE MEDICAL ISSUES     Gait Instability, ADL impairments and Functional impairments: cont Comprehensive Rehab Program of PT/OT and speech therapy    #CVA  -DAPT with aspirin & plavix.   -fall and aspiration precautions  -TTE outpt  -neurology and cardio follow up outpt  - stopped the Lipitor due to elevated LFT     #Transaminitis  - stopped the Lipitor due to elevated LFT   - Cholelithiasis without definite secondary signs of acute cholecystitis  - hospitalist is following.   - Consulted [facility], would appreciate recommendations -  Continue to hold statin now and monitor   & Avoid additional hepatotoxic meds. nausea has improved might have passed the small stone.   - f/u Hepatitis panel   --changed to full liquid diet as pt. had vomited after eating solid food yestarday.     Dyspepsia  --cont. protonix and pepcid  --cont. Maalox    Insomnia  --cont. melatonin qhs    Dysphagia  --full liquid diet as pt. had vomited after eating solid food yestarday.     #HLD  -monitor LFTs    #HTN--Pt. orthostatic  -d/c amlodipine  -hospitalist  following    #NANCY  -monitor BMP  --BUN elevated-- dehydration likely contributing to orthostasis  --IVF with 1/2 NS ordered.    - creatinine back to baseline.     #Asthma  -continue simicort and albuterol  -monitor VS  --Chronic coughing-- d/w hospitalist to assess    Pain Mgmt - Tylenol PRN.   Cont. gabapentin 100 mg HS for leg paresthesias. Consider increasing dose next week if the symptoms persist   GI/Bowel Mgmt - Senna,   PRN  /Bladder Mgmt - Voiding      Precautions / PROPHYLAXIS:   - Falls,  Seizure   -- Lungs: Aspiration,   - Pressure injury/Skin: Turn Q2hrs while in bed, OOB to Chair, PT/OT    - DVT: Lovenox    IDT 7/8/21:  SW: Lives in  with son, DIL, daughter w/ 4 JONNA and 10 steps inside-- Needed Assistance PTA with ADLs and Ambulation.  HHA 42h/week  OT: Min A e/g; Max A dressing; tot A toileting, transfers  PT: Transfer Max A.  Has not performed Ambulation or stairs.  Goal Min A  Speech: Puree/honey; MBS planned 7/12,  Good comprehension, Cogn. WFL.  +dysarthria.    ELOS:  7/23/21 home vs [facility]-- can extend if going home.

## 2021-07-14 LAB
ALBUMIN SERPL ELPH-MCNC: 2.9 G/DL — LOW (ref 3.3–5)
ALP SERPL-CCNC: 81 U/L — SIGNIFICANT CHANGE UP (ref 40–120)
ALT FLD-CCNC: 95 U/L — HIGH (ref 10–45)
ANION GAP SERPL CALC-SCNC: 5 MMOL/L — SIGNIFICANT CHANGE UP (ref 5–17)
AST SERPL-CCNC: 28 U/L — SIGNIFICANT CHANGE UP (ref 10–40)
BILIRUB SERPL-MCNC: 0.2 MG/DL — SIGNIFICANT CHANGE UP (ref 0.2–1.2)
BUN SERPL-MCNC: 18 MG/DL — SIGNIFICANT CHANGE UP (ref 7–23)
CALCIUM SERPL-MCNC: 8.5 MG/DL — SIGNIFICANT CHANGE UP (ref 8.4–10.5)
CHLORIDE SERPL-SCNC: 99 MMOL/L — SIGNIFICANT CHANGE UP (ref 96–108)
CO2 SERPL-SCNC: 29 MMOL/L — SIGNIFICANT CHANGE UP (ref 22–31)
CREAT SERPL-MCNC: 1.1 MG/DL — SIGNIFICANT CHANGE UP (ref 0.5–1.3)
GLUCOSE SERPL-MCNC: 102 MG/DL — HIGH (ref 70–99)
LIDOCAIN IGE QN: 365 U/L — SIGNIFICANT CHANGE UP (ref 73–393)
POTASSIUM SERPL-MCNC: 4.3 MMOL/L — SIGNIFICANT CHANGE UP (ref 3.5–5.3)
POTASSIUM SERPL-SCNC: 4.3 MMOL/L — SIGNIFICANT CHANGE UP (ref 3.5–5.3)
PROT SERPL-MCNC: 6.8 G/DL — SIGNIFICANT CHANGE UP (ref 6–8.3)
SODIUM SERPL-SCNC: 133 MMOL/L — LOW (ref 135–145)
TROPONIN I SERPL-MCNC: <.017 NG/ML — LOW (ref 0.02–0.06)

## 2021-07-14 PROCEDURE — 99232 SBSQ HOSP IP/OBS MODERATE 35: CPT

## 2021-07-14 PROCEDURE — 93010 ELECTROCARDIOGRAM REPORT: CPT

## 2021-07-14 PROCEDURE — 99233 SBSQ HOSP IP/OBS HIGH 50: CPT

## 2021-07-14 RX ORDER — SUCRALFATE 1 G
1 TABLET ORAL
Refills: 0 | Status: DISCONTINUED | OUTPATIENT
Start: 2021-07-14 | End: 2021-07-27

## 2021-07-14 RX ADMIN — CLOPIDOGREL BISULFATE 75 MILLIGRAM(S): 75 TABLET, FILM COATED ORAL at 11:18

## 2021-07-14 RX ADMIN — ENOXAPARIN SODIUM 40 MILLIGRAM(S): 100 INJECTION SUBCUTANEOUS at 11:18

## 2021-07-14 RX ADMIN — GABAPENTIN 100 MILLIGRAM(S): 400 CAPSULE ORAL at 22:13

## 2021-07-14 RX ADMIN — BUDESONIDE AND FORMOTEROL FUMARATE DIHYDRATE 2 PUFF(S): 160; 4.5 AEROSOL RESPIRATORY (INHALATION) at 21:28

## 2021-07-14 RX ADMIN — Medication 1 GRAM(S): at 11:18

## 2021-07-14 RX ADMIN — SODIUM CHLORIDE 50 MILLILITER(S): 9 INJECTION, SOLUTION INTRAVENOUS at 06:09

## 2021-07-14 RX ADMIN — Medication 6 MILLIGRAM(S): at 22:13

## 2021-07-14 RX ADMIN — Medication 81 MILLIGRAM(S): at 11:18

## 2021-07-14 RX ADMIN — Medication 1 GRAM(S): at 22:13

## 2021-07-14 RX ADMIN — LIDOCAINE 1 PATCH: 4 CREAM TOPICAL at 21:09

## 2021-07-14 RX ADMIN — Medication 1 GRAM(S): at 17:50

## 2021-07-14 RX ADMIN — ALBUTEROL 1 PUFF(S): 90 AEROSOL, METERED ORAL at 19:03

## 2021-07-14 RX ADMIN — FAMOTIDINE 20 MILLIGRAM(S): 10 INJECTION INTRAVENOUS at 06:09

## 2021-07-14 RX ADMIN — LIDOCAINE 1 PATCH: 4 CREAM TOPICAL at 01:17

## 2021-07-14 RX ADMIN — LIDOCAINE 1 PATCH: 4 CREAM TOPICAL at 23:37

## 2021-07-14 RX ADMIN — SODIUM CHLORIDE 50 MILLILITER(S): 9 INJECTION, SOLUTION INTRAVENOUS at 17:51

## 2021-07-14 RX ADMIN — MONTELUKAST 10 MILLIGRAM(S): 4 TABLET, CHEWABLE ORAL at 11:18

## 2021-07-14 RX ADMIN — PANTOPRAZOLE SODIUM 40 MILLIGRAM(S): 20 TABLET, DELAYED RELEASE ORAL at 06:09

## 2021-07-14 RX ADMIN — LIDOCAINE 1 PATCH: 4 CREAM TOPICAL at 11:18

## 2021-07-14 RX ADMIN — BUDESONIDE AND FORMOTEROL FUMARATE DIHYDRATE 2 PUFF(S): 160; 4.5 AEROSOL RESPIRATORY (INHALATION) at 08:08

## 2021-07-14 NOTE — PROGRESS NOTE ADULT - ATTENDING COMMENTS
Pt. seen with fellow.  Agree with documentation above as per fellow with amendments made as appropriate. Patient medically stable. Making progress towards rehab goals.     Left MCA infarct  chest pain? this AM-- w/u neg for cardiac source.  Carafate added, pepcid stopped.    -overnight gentle hydration with half NS--BUN/cr improved  LFTs improving.

## 2021-07-14 NOTE — PROGRESS NOTE ADULT - ASSESSMENT
65 year old RH woman with past medical history of asthma and HTN not on antithrombotics presented as code stroke to [facility] ED for right-sided weakness with LWK  11p. found to have Right yaneth sensorimotor loss and moderate dysarthria +/- right visual field cut 2/2 L BG infarct 2/2  , admitted for rehab- pt/ot/dvt ppx    intermittent epigastric pain,   tolerating diet- can change back to original diet, no n/v now  elevated LFT'S-  trending down, 2/2 gall stones vs statins  < from: US Gallbladder (US Gallbladder .) (07.10.21 @ 09:30) >  Cholelithiasis without definite secondary signs of acute cholecystitis  c/w ppi, will add carafate qid, simethicone, aspiration precautions  monitor lft's, GI consult recc noted  stat trop and ekg ordered- ekg nsr at 70    ECG 21 Normal sinus rhythm 87  < from: TTE Echo Complete w/o Contrast w/ Doppler (21 @ 16:11) >   Left ventricular ejection fraction, by visual estimation, is 50 to 55%.   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 Also mild aneurysmal dilatation of the origin of the left inferior M2 branch.  - neurosx follow up OP    HTN  - c/w Norvasc      NANCY likely pre-renal  - improving  - encouraged PO hydration  - on IVF    Asthma  Symbicort, alb prn, singular    d/w Dr. Sanchez   will follow    65 year old RH woman with past medical history of asthma and HTN not on antithrombotics presented as code stroke to [facility] ED for right-sided weakness with LWK  11p. found to have Right yaneth sensorimotor loss and moderate dysarthria +/- right visual field cut 2/2 L BG infarct 2/2  , admitted for rehab- pt/ot/dvt ppx    intermittent epigastric pain,   tolerating diet- can change back to original diet, no n/v now  elevated LFT'S-  trending down, 2/2 gall stones vs statins  < from: US Gallbladder (US Gallbladder .) (07.10.21 @ 09:30) >  Cholelithiasis without definite secondary signs of acute cholecystitis  c/w ppi, will add carafate qid, simethicone, aspiration precautions  monitor lft's, GI consult recc noted  stat trop and ekg ordered- ekg nsr at 70    ECG 21 Normal sinus rhythm 87  < from: TTE Echo Complete w/o Contrast w/ Doppler (21 @ 16:11) >   Left ventricular ejection fraction, by visual estimation, is 50 to 55%.   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 Also mild aneurysmal dilatation of the origin of the left inferior M2 branch.  - neurosx follow up OP    HTN   Norvasc d/c due to orthostasis  monitor off meds    NANCY likely pre-renal  - improving  - encouraged PO hydration  - on IVF    Asthma  Symbicort, alb prn, singular    d/w Dr. Sanchez   will follow

## 2021-07-14 NOTE — PROGRESS NOTE ADULT - SUBJECTIVE AND OBJECTIVE BOX
Patient is a 65y old  Female who presents with a chief complaint of s/p left BG CVA with right sided weakness (14 Jul 2021 11:34)    HPI:  64 yo RH female with PMH of asthma, HTN to [facility] ED 6/29 for right-sided weakness and slurred speech. CTH neg acute cva, old lacunar infarcts b/l BG. Out of window for tPA. CTA 6/29 shows Aneurysms suspected involving the left middle cerebral artery as described above. Transferred from [facility] to [facility] for possible evaluation of aneurysms noted on imaging. MRA 6/30 showed mild age-appropriate involutional and ischemic gliotic changes with old bilateral basal ganglia lacunar infarcts. Small acute infarct left basal ganglia and corona radiata. No evidence of hemorrhage. 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 Also mild aneurysmal dilatation of the origin of the left inferior M2 branch. Hospitalization complicated by NANCY, IVF completed. Failed swallow eval-tolerates Puree honey. TTE as outpatient. HbA1c 5.9 and . Evaluated by PMR and acute rehab recommended. Cleared for dc on 7/2.  Patient arrived to [facility] on 7/2/21 for acute inpatient rehab. Celi iterpreter #[id number]. Pt c/o difficulty speaking and still unable to more her right upper and right lower extremities.  (02 Jul 2021 15:21)    PAST MEDICAL & SURGICAL HISTORY:  Brain aneurysm    Hypertension    Brain aneurysm    Smoking    No significant past surgical history    No significant past surgical history      Allergies    No Known Allergies    Intolerances      ----------------------------------------------------------------------    SUBJECTIVE: Patient seen this morning during therapy. Celi  # [id number] used during encounter. Patient reports substernal chest pain this morning 8/10. It is described as a pressure and has improved to 5/10 since then. She is also complaining of GI upset.      ROS:  Positive: chest pain, GI upset  Denies: headache, lightheadedness, SOB, abdominal pain, dysuria, nausea, constipation      ----------------------------------------------------------------------  PHYSICAL EXAM:    Vital Signs Last 24 Hrs  T(C): 36.4 (14 Jul 2021 08:27), Max: 36.4 (13 Jul 2021 19:40)  T(F): 97.5 (14 Jul 2021 08:27), Max: 97.5 (13 Jul 2021 19:40)  HR: 76 (14 Jul 2021 08:27) (75 - 76)  BP: 123/73 (14 Jul 2021 08:27) (123/73 - 138/78)  BP(mean): --  RR: 16 (14 Jul 2021 08:27) (16 - 16)  SpO2: 97% (14 Jul 2021 08:27) (97% - 99%)  Daily     Daily       Gen - NAD, Comfortable  HEENT - NCAT,   Pulm - CTAB,   Cardiovascular - RRR,   Abdomen - Soft, NT/ND, +BS  Extremities - No Edema, No calf tenderness, R knee pain.   Neuro-     Cognitive - AAOx 2--knew she is in hospital but not date     Attention: makes eye contact and good comprehension      Memory: Recall 3 objects immediate and 3 min later         Cranial Nerves - PERRLA, EOMI, right facial droop, +dysphagia     Motor -                    LEFT    UE - ShAB 5/5, EF 5/5, EE 5/5, WE 5/5,  5/5                    RIGHT UE - ShAB 2/5, EF 2/5, EE 1/5, WE 0/5,  0/5                    LEFT    LE - HF 5/5, KE 5/5, DF 5/5, PF 5/5                    RIGHT LE - Hip ext 2/5, KE 2/5, DF 1/5, PF 1/5        Sensory - Intact to LT     Reflexes - DTR Intact, No primitive reflex     Coordination - impaired on right     Tone - normal  Psychiatric - Mood stable, Affect WNL      ----------------------------------------------------------------------  RECENT LABS:                          11.6   6.02  )-----------( 199      ( 13 Jul 2021 05:30 )             36.5     07-14    133<L>  |  99  |  18  ----------------------------<  102<H>  4.3   |  29  |  1.10    Ca    8.5      14 Jul 2021 10:53    TPro  6.8  /  Alb  2.9<L>  /  TBili  0.2  /  DBili  x   /  AST  28  /  ALT  95<H>  /  AlkPhos  81  07-14    LIVER FUNCTIONS - ( 14 Jul 2021 10:53 )  Alb: 2.9 g/dL / Pro: 6.8 g/dL / ALK PHOS: 81 U/L / ALT: 95 U/L / AST: 28 U/L / GGT: x               CAPILLARY BLOOD GLUCOSE        ----------------------------------------------------------------------  RECENT IMAGING:    Xray Chest 1 View-PORTABLE IMMEDIATE 07.12.21  IMPRESSION: Unchanged chest as above.    US Gallbladder 07.10.21  IMPRESSION:  Cholelithiasis without definite secondary signs of acute cholecystitis      ----------------------------------------------------------------------  MEDICATIONS:  MEDICATIONS  (STANDING):  aspirin  chewable 81 milliGRAM(s) Oral daily  budesonide 160 MICROgram(s)/formoterol 4.5 MICROgram(s) Inhaler 2 Puff(s) Inhalation two times a day  clopidogrel Tablet 75 milliGRAM(s) Oral daily  enoxaparin Injectable 40 milliGRAM(s) SubCutaneous daily  gabapentin 100 milliGRAM(s) Oral at bedtime  lidocaine   Patch 1 Patch Transdermal daily  melatonin 6 milliGRAM(s) Oral at bedtime  montelukast 10 milliGRAM(s) Oral daily  pantoprazole    Tablet 40 milliGRAM(s) Oral before breakfast  sodium chloride 0.45%. 1000 milliLiter(s) (50 mL/Hr) IV Continuous <Continuous>  sucralfate 1 Gram(s) Oral four times a day    MEDICATIONS  (PRN):  ALBUTerol    90 MICROgram(s) HFA Inhaler 1 Puff(s) Inhalation every 6 hours PRN Shortness of Breath and/or Wheezing  aluminum hydroxide/magnesium hydroxide/simethicone Suspension 30 milliLiter(s) Oral every 4 hours PRN Dyspepsia  senna 2 Tablet(s) Oral at bedtime PRN Constipation  simethicone 80 milliGRAM(s) Chew four times a day PRN Upset Stomach    ----------------------------------------------------------------------  Assessment and Plan:   · Assessment	  · Assessment	  MARKIE HERNANDEZ is a 66yo Female with left subcortical CVA  with dysphagia, right Hemiparesis, decreased functional mobility, gait instability and ADL impairments.    COMORBIDITES/ACTIVE MEDICAL ISSUES     Gait Instability, ADL impairments and Functional impairments: cont Comprehensive Rehab Program of PT/OT and speech therapy    #CVA  -DAPT with aspirin & plavix.   -fall and aspiration precautions  -TTE outpt  -neurology and cardio follow up outpt  - stopped the Lipitor due to elevated LFT     #Transaminitis  - stopped the Lipitor due to elevated LFT   - Cholelithiasis without definite secondary signs of acute cholecystitis  - hospitalist is following.   - Consulted G.I-  Continue to hold statin now and monitor   & Avoid additional hepatotoxic meds.    - f/u Hepatitis panel   --previously was on liquid diet, now trialing mechanical soft diet     Chest pain  --EKG ordered--reviewed with hospitalist  --troponin not elevated  --adjusted dyspepsia medications    Dyspepsia  --cont. protonix  --cont. Maalox  --Pepcid d/c'ed and Carafate added on 7/14    Insomnia  --cont. melatonin qhs    Dysphagia  --Trialing mechanical soft diet today.     #HLD  -monitor LFTs    #HTN--Pt. orthostatic  -d/c amlodipine  -overnight gentle hydration with half NS  -hospitalist  following    #NANCY  -monitor BMP  --BUN elevated-- dehydration likely contributing to orthostasis  --IVF with 1/2 NS ordered.    - creatinine back to baseline.     #Asthma  -continue simicort and albuterol  -monitor VS  --Chronic coughing-- d/w hospitalist to assess    Pain Mgmt - Tylenol PRN.   Cont. gabapentin 100 mg HS for leg paresthesias. Consider increasing dose next week if the symptoms persist   GI/Bowel Mgmt - Senna,   PRN  /Bladder Mgmt - Voiding      Precautions / PROPHYLAXIS:   - Falls,  Seizure   -- Lungs: Aspiration,   - Pressure injury/Skin: Turn Q2hrs while in bed, OOB to Chair, PT/OT    - DVT: Lovenox    IDT 7/8/21:  SW: Lives in  with son, DIL, daughter w/ 4 JONNA and 10 steps inside-- Needed Assistance PTA with ADLs and Ambulation.  HHA 42h/week  OT: Min A e/g; Max A dressing; tot A toileting, transfers  PT: Transfer Max A.  Has not performed Ambulation or stairs.  Goal Min A  Speech: Puree/honey; MBS planned 7/12,  Good comprehension, Cogn. WFL.  +dysarthria.    ELOS:  7/23/21 home vs M[facility]ELO-- can extend if going home.              Patient is a 65y old  Female who presents with a chief complaint of s/p left BG CVA with right sided weakness (14 Jul 2021 11:34)    HPI:  64 yo RH female with PMH of asthma, HTN to [facility] ED 6/29 for right-sided weakness and slurred speech. CTH neg acute cva, old lacunar infarcts b/l BG. Out of window for tPA. CTA 6/29 shows Aneurysms suspected involving the left middle cerebral artery as described above. Transferred from [facility] to [facility] for possible evaluation of aneurysms noted on imaging. MRA 6/30 showed mild age-appropriate involutional and ischemic gliotic changes with old bilateral basal ganglia lacunar infarcts. Small acute infarct left basal ganglia and corona radiata. No evidence of hemorrhage. 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 Also mild aneurysmal dilatation of the origin of the left inferior M2 branch. Hospitalization complicated by NANCY, IVF completed. Failed swallow eval-tolerates Puree honey. TTE as outpatient. HbA1c 5.9 and . Evaluated by PMR and acute rehab recommended. Cleared for dc on 7/2.  Patient arrived to [facility] on 7/2/21 for acute inpatient rehab. Celi iterpreter #[id number]. Pt c/o difficulty speaking and still unable to more her right upper and right lower extremities.  (02 Jul 2021 15:21)    PAST MEDICAL & SURGICAL HISTORY:  Brain aneurysm    Hypertension    Brain aneurysm    Smoking    No significant past surgical history    No significant past surgical history      Allergies    No Known Allergies    Intolerances      ----------------------------------------------------------------------    SUBJECTIVE: Patient seen this morning during therapy. Celi  # [id number] used during encounter. Patient reports substernal chest pain this morning 8/10. It is described as a pressure and has improved to 5/10 since then. She is also complaining of GI upset.      ROS:  Positive: chest pain, GI upset  Denies: headache, lightheadedness, SOB, abdominal pain, dysuria, nausea, constipation      ----------------------------------------------------------------------  PHYSICAL EXAM:    Vital Signs Last 24 Hrs  T(C): 36.4 (14 Jul 2021 08:27), Max: 36.4 (13 Jul 2021 19:40)  T(F): 97.5 (14 Jul 2021 08:27), Max: 97.5 (13 Jul 2021 19:40)  HR: 76 (14 Jul 2021 08:27) (75 - 76)  BP: 123/73 (14 Jul 2021 08:27) (123/73 - 138/78)  BP(mean): --  RR: 16 (14 Jul 2021 08:27) (16 - 16)  SpO2: 97% (14 Jul 2021 08:27) (97% - 99%)  Daily     Daily       Gen - NAD, Comfortable  HEENT - NCAT,   Pulm - CTAB,   Cardiovascular - RRR,   Abdomen - Soft, NT/ND, +BS  Extremities - No Edema, No calf tenderness, R knee pain.   Neuro-     Cognitive - AAOx 2--knew she is in hospital but not date     Attention: makes eye contact and good comprehension      Memory: Recall 3 objects immediate and 3 min later         Cranial Nerves - PERRLA, EOMI, right facial droop, +dysphagia     Motor -                    LEFT    UE - ShAB 5/5, EF 5/5, EE 5/5, WE 5/5,  5/5                    RIGHT UE - ShAB 2/5, EF 2/5, EE 1/5, WE 0/5,  0/5                    LEFT    LE - HF 5/5, KE 5/5, DF 5/5, PF 5/5                    RIGHT LE - Hip ext 2/5, KE 2/5, DF 1/5, PF 1/5        Sensory - Intact to LT     Reflexes - DTR Intact, No primitive reflex     Coordination - impaired on right     Tone - normal  Psychiatric - Mood stable, Affect WNL      ----------------------------------------------------------------------  RECENT LABS:                          11.6   6.02  )-----------( 199      ( 13 Jul 2021 05:30 )             36.5     07-14    133<L>  |  99  |  18  ----------------------------<  102<H>  4.3   |  29  |  1.10    Ca    8.5      14 Jul 2021 10:53    TPro  6.8  /  Alb  2.9<L>  /  TBili  0.2  /  DBili  x   /  AST  28  /  ALT  95<H>  /  AlkPhos  81  07-14    LIVER FUNCTIONS - ( 14 Jul 2021 10:53 )  Alb: 2.9 g/dL / Pro: 6.8 g/dL / ALK PHOS: 81 U/L / ALT: 95 U/L / AST: 28 U/L / GGT: x               CAPILLARY BLOOD GLUCOSE        ----------------------------------------------------------------------  RECENT IMAGING:    Xray Chest 1 View-PORTABLE IMMEDIATE 07.12.21  IMPRESSION: Unchanged chest as above.    US Gallbladder 07.10.21  IMPRESSION:  Cholelithiasis without definite secondary signs of acute cholecystitis      ----------------------------------------------------------------------  MEDICATIONS:  MEDICATIONS  (STANDING):  aspirin  chewable 81 milliGRAM(s) Oral daily  budesonide 160 MICROgram(s)/formoterol 4.5 MICROgram(s) Inhaler 2 Puff(s) Inhalation two times a day  clopidogrel Tablet 75 milliGRAM(s) Oral daily  enoxaparin Injectable 40 milliGRAM(s) SubCutaneous daily  gabapentin 100 milliGRAM(s) Oral at bedtime  lidocaine   Patch 1 Patch Transdermal daily  melatonin 6 milliGRAM(s) Oral at bedtime  montelukast 10 milliGRAM(s) Oral daily  pantoprazole    Tablet 40 milliGRAM(s) Oral before breakfast  sodium chloride 0.45%. 1000 milliLiter(s) (50 mL/Hr) IV Continuous <Continuous>  sucralfate 1 Gram(s) Oral four times a day    MEDICATIONS  (PRN):  ALBUTerol    90 MICROgram(s) HFA Inhaler 1 Puff(s) Inhalation every 6 hours PRN Shortness of Breath and/or Wheezing  aluminum hydroxide/magnesium hydroxide/simethicone Suspension 30 milliLiter(s) Oral every 4 hours PRN Dyspepsia  senna 2 Tablet(s) Oral at bedtime PRN Constipation  simethicone 80 milliGRAM(s) Chew four times a day PRN Upset Stomach    ----------------------------------------------------------------------  Assessment and Plan:   · Assessment	  · Assessment	  MARKIE HERNANDEZ is a 66yo Female with left subcortical CVA  with dysphagia, right Hemiparesis, decreased functional mobility, gait instability and ADL impairments.    COMORBIDITES/ACTIVE MEDICAL ISSUES     Gait Instability, ADL impairments and Functional impairments: cont Comprehensive Rehab Program of PT/OT and speech therapy    #CVA  -DAPT with aspirin & plavix.   -fall and aspiration precautions  -TTE outpt  -neurology and cardio follow up outpt  - stopped the Lipitor due to elevated LFT     #Transaminitis  - stopped the Lipitor due to elevated LFT   - Cholelithiasis without definite secondary signs of acute cholecystitis  - hospitalist is following.   - Consulted G.I-  Continue to hold statin now and monitor   & Avoid additional hepatotoxic meds.    - f/u Hepatitis panel   --previously was on liquid diet, now trialing mechanical soft diet     Chest pain  --EKG ordered--reviewed with hospitalist  --troponin not elevated  --adjusted dyspepsia medications    Dyspepsia  --cont. protonix  --cont. Maalox  --Pepcid d/c'ed and Carafate added on 7/14    Insomnia  --cont. melatonin qhs    Dysphagia  -d/w speech therapy-- diet upgraded to soft with thins.  Monitor PO tolerance     #HLD  -monitor LFTs    #HTN--Pt. orthostatic  -d/c amlodipine  -overnight gentle hydration with half NS--BUN/cr improved  -hospitalist  following    #NANCY  -monitor BMP  --BUN elevated-- dehydration likely contributing to orthostasis  --IVF with 1/2 NS ordered.    - creatinine back to baseline.     #Asthma  -continue simicort and albuterol  -monitor VS  --Chronic coughing-- d/w hospitalist to assess    Pain Mgmt - Tylenol PRN.   Cont. gabapentin 100 mg HS for leg paresthesias. Consider increasing dose next week if the symptoms persist   GI/Bowel Mgmt - Senna,   PRN  /Bladder Mgmt - Voiding      Precautions / PROPHYLAXIS:   - Falls,  Seizure   -- Lungs: Aspiration,   - Pressure injury/Skin: Turn Q2hrs while in bed, OOB to Chair, PT/OT    - DVT: Lovenox    IDT 7/8/21:  SW: Lives in  with son, DIL, daughter w/ 4 JONNA and 10 steps inside-- Needed Assistance PTA with ADLs and Ambulation.  HHA 42h/week  OT: Min A e/g; Max A dressing; tot A toileting, transfers  PT: Transfer Max A.  Has not performed Ambulation or stairs.  Goal Min A  Speech: Puree/honey; MBS planned 7/12,  Good comprehension, Cogn. WFL.  +dysarthria.    ELOS:  7/23/21 home vs [facility]-- can extend if going home.

## 2021-07-14 NOTE — PROGRESS NOTE ADULT - SUBJECTIVE AND OBJECTIVE BOX
65y old  Female who presents with a chief complaint of s/p left BG CVA with right sided weakness     seen and examined at the bedside, c/o retrosternal and epigastric pain, intermittent, no nausea, no vomiting today,  no sob, no fever      Vital Signs Last 24 Hrs  T(C): 36.4 (14 Jul 2021 08:27), Max: 36.4 (13 Jul 2021 19:40)  T(F): 97.5 (14 Jul 2021 08:27), Max: 97.5 (13 Jul 2021 19:40)  HR: 76 (14 Jul 2021 08:27) (75 - 76)  BP: 123/73 (14 Jul 2021 08:27) (123/73 - 138/78)  BP(mean): --  RR: 16 (14 Jul 2021 08:27) (16 - 16)  SpO2: 97% (14 Jul 2021 08:27) (97% - 99%)    GENERAL- NAD  EAR/NOSE/MOUTH/THROAT - no pharyngeal exudates, no oral lesion's  MMM  EYES- RICO, conjunctiva and Sclera clear  NECK- supple  RESPIRATORY-  clear to auscultation bilaterally, non laboured breathing  CARDIOVASCULAR - SIS2, RRR  GI - soft NT BS present  EXTREMITIES- no pedal edema  NEUROLOGY- right sided weakness, slurred speech, facial droop                   x                    133  | 29   | 18           x     >-----------< x       ------------------------< 102                   x                    4.3  | 99   | 1.10                                         Ca 8.5   Mg x     Ph x      Labs reviewed:         ECG reviewed and interpreted: sinus at 70 7/14/21    MEDICATIONS  (STANDING):  aspirin  chewable 81 milliGRAM(s) Oral daily  budesonide 160 MICROgram(s)/formoterol 4.5 MICROgram(s) Inhaler 2 Puff(s) Inhalation two times a day  clopidogrel Tablet 75 milliGRAM(s) Oral daily  enoxaparin Injectable 40 milliGRAM(s) SubCutaneous daily  famotidine    Tablet 20 milliGRAM(s) Oral two times a day  gabapentin 100 milliGRAM(s) Oral at bedtime  lidocaine   Patch 1 Patch Transdermal daily  melatonin 6 milliGRAM(s) Oral at bedtime  montelukast 10 milliGRAM(s) Oral daily  pantoprazole    Tablet 40 milliGRAM(s) Oral before breakfast  sodium chloride 0.45%. 1000 milliLiter(s) (50 mL/Hr) IV Continuous <Continuous>  sucralfate 1 Gram(s) Oral four times a day    MEDICATIONS  (PRN):  ALBUTerol    90 MICROgram(s) HFA Inhaler 1 Puff(s) Inhalation every 6 hours PRN Shortness of Breath and/or Wheezing  aluminum hydroxide/magnesium hydroxide/simethicone Suspension 30 milliLiter(s) Oral every 4 hours PRN Dyspepsia  senna 2 Tablet(s) Oral at bedtime PRN Constipation  simethicone 80 milliGRAM(s) Chew four times a day PRN Upset Stomach

## 2021-07-15 LAB
ALBUMIN SERPL ELPH-MCNC: 2.8 G/DL — LOW (ref 3.3–5)
ALP SERPL-CCNC: 75 U/L — SIGNIFICANT CHANGE UP (ref 40–120)
ALT FLD-CCNC: 77 U/L — HIGH (ref 10–45)
ANION GAP SERPL CALC-SCNC: 8 MMOL/L — SIGNIFICANT CHANGE UP (ref 5–17)
AST SERPL-CCNC: 24 U/L — SIGNIFICANT CHANGE UP (ref 10–40)
BILIRUB SERPL-MCNC: 0.3 MG/DL — SIGNIFICANT CHANGE UP (ref 0.2–1.2)
BUN SERPL-MCNC: 23 MG/DL — SIGNIFICANT CHANGE UP (ref 7–23)
CALCIUM SERPL-MCNC: 8.8 MG/DL — SIGNIFICANT CHANGE UP (ref 8.4–10.5)
CHLORIDE SERPL-SCNC: 102 MMOL/L — SIGNIFICANT CHANGE UP (ref 96–108)
CO2 SERPL-SCNC: 26 MMOL/L — SIGNIFICANT CHANGE UP (ref 22–31)
CREAT SERPL-MCNC: 1.02 MG/DL — SIGNIFICANT CHANGE UP (ref 0.5–1.3)
GLUCOSE SERPL-MCNC: 104 MG/DL — HIGH (ref 70–99)
HAV IGM SER-ACNC: SIGNIFICANT CHANGE UP
HBV CORE IGM SER-ACNC: SIGNIFICANT CHANGE UP
HBV SURFACE AG SER-ACNC: SIGNIFICANT CHANGE UP
HCT VFR BLD CALC: 34.2 % — LOW (ref 34.5–45)
HCV AB S/CO SERPL IA: 0.2 S/CO — SIGNIFICANT CHANGE UP (ref 0–0.99)
HCV AB SERPL-IMP: SIGNIFICANT CHANGE UP
HGB BLD-MCNC: 11 G/DL — LOW (ref 11.5–15.5)
MCHC RBC-ENTMCNC: 29.6 PG — SIGNIFICANT CHANGE UP (ref 27–34)
MCHC RBC-ENTMCNC: 32.2 GM/DL — SIGNIFICANT CHANGE UP (ref 32–36)
MCV RBC AUTO: 92.2 FL — SIGNIFICANT CHANGE UP (ref 80–100)
NRBC # BLD: 0 /100 WBCS — SIGNIFICANT CHANGE UP (ref 0–0)
PLATELET # BLD AUTO: 186 K/UL — SIGNIFICANT CHANGE UP (ref 150–400)
POTASSIUM SERPL-MCNC: 4.7 MMOL/L — SIGNIFICANT CHANGE UP (ref 3.5–5.3)
POTASSIUM SERPL-SCNC: 4.7 MMOL/L — SIGNIFICANT CHANGE UP (ref 3.5–5.3)
PROT SERPL-MCNC: 6.5 G/DL — SIGNIFICANT CHANGE UP (ref 6–8.3)
RBC # BLD: 3.71 M/UL — LOW (ref 3.8–5.2)
RBC # FLD: 12.4 % — SIGNIFICANT CHANGE UP (ref 10.3–14.5)
SODIUM SERPL-SCNC: 136 MMOL/L — SIGNIFICANT CHANGE UP (ref 135–145)
WBC # BLD: 5.42 K/UL — SIGNIFICANT CHANGE UP (ref 3.8–10.5)
WBC # FLD AUTO: 5.42 K/UL — SIGNIFICANT CHANGE UP (ref 3.8–10.5)

## 2021-07-15 PROCEDURE — 99232 SBSQ HOSP IP/OBS MODERATE 35: CPT

## 2021-07-15 RX ADMIN — LIDOCAINE 1 PATCH: 4 CREAM TOPICAL at 20:24

## 2021-07-15 RX ADMIN — CLOPIDOGREL BISULFATE 75 MILLIGRAM(S): 75 TABLET, FILM COATED ORAL at 13:44

## 2021-07-15 RX ADMIN — Medication 1 GRAM(S): at 13:44

## 2021-07-15 RX ADMIN — MONTELUKAST 10 MILLIGRAM(S): 4 TABLET, CHEWABLE ORAL at 13:44

## 2021-07-15 RX ADMIN — Medication 1 GRAM(S): at 05:03

## 2021-07-15 RX ADMIN — Medication 1 GRAM(S): at 21:44

## 2021-07-15 RX ADMIN — ENOXAPARIN SODIUM 40 MILLIGRAM(S): 100 INJECTION SUBCUTANEOUS at 13:45

## 2021-07-15 RX ADMIN — BUDESONIDE AND FORMOTEROL FUMARATE DIHYDRATE 2 PUFF(S): 160; 4.5 AEROSOL RESPIRATORY (INHALATION) at 08:18

## 2021-07-15 RX ADMIN — LIDOCAINE 1 PATCH: 4 CREAM TOPICAL at 13:45

## 2021-07-15 RX ADMIN — BUDESONIDE AND FORMOTEROL FUMARATE DIHYDRATE 2 PUFF(S): 160; 4.5 AEROSOL RESPIRATORY (INHALATION) at 20:57

## 2021-07-15 RX ADMIN — GABAPENTIN 100 MILLIGRAM(S): 400 CAPSULE ORAL at 21:44

## 2021-07-15 RX ADMIN — PANTOPRAZOLE SODIUM 40 MILLIGRAM(S): 20 TABLET, DELAYED RELEASE ORAL at 05:03

## 2021-07-15 RX ADMIN — Medication 6 MILLIGRAM(S): at 21:43

## 2021-07-15 RX ADMIN — Medication 1 GRAM(S): at 18:43

## 2021-07-15 RX ADMIN — SIMETHICONE 80 MILLIGRAM(S): 80 TABLET, CHEWABLE ORAL at 14:33

## 2021-07-15 RX ADMIN — Medication 81 MILLIGRAM(S): at 13:44

## 2021-07-15 NOTE — PROGRESS NOTE ADULT - ATTENDING COMMENTS
Pt. seen with resident.  Agree with documentation above as per resident with amendments made as appropriate. Patient medically stable. Making progress towards rehab goals.     CVA  GI symptoms-- cont. carafate, and protonix

## 2021-07-15 NOTE — PROGRESS NOTE ADULT - ASSESSMENT
65 year old RH woman with past medical history of asthma and HTN not on antithrombotics presented as code stroke to [facility] ED for right-sided weakness with LWK  11p. found to have Right yaneth sensorimotor loss and moderate dysarthria +/- right visual field cut 2/2 L BG infarct 2/2  , admitted for rehab- pt/ot/dvt ppx    intermittent epigastric pain-   tolerating diet-  no n/v now  elevated LFT'S-  trending down, likely 2/2 statins, restart statin at lower dose once lft normalise  < from: US Gallbladder (US Gallbladder ) (07.10.21 @ 09:30) >  Cholelithiasis without definite secondary signs of acute cholecystitis  c/w ppi,  Carafate  simethicone, aspiration precautions  monitor lft's, GI consult recc noted   ekg nsr at 70 21  < from: TTE Echo Complete w/o Contrast w/ Doppler (21 @ 16:11) >   Left ventricular ejection fraction, by visual estimation, is 50 to 55%.   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 Also mild aneurysmal dilatation of the origin of the left inferior M2 branch.  - neurosx follow up OP    HTN  - c/w Norvasc      NANCY likely pre-renal  - improving  - encouraged PO hydration  - on IVF    Asthma  Symbicort, alb prn, singular    d/w Dr. Sanchez   will follow    65 year old RH woman with past medical history of asthma and HTN not on antithrombotics presented as code stroke to [facility] ED for right-sided weakness with LWK  11p. found to have Right yaneth sensorimotor loss and moderate dysarthria +/- right visual field cut 2/2 L BG infarct 2/2  , admitted for rehab- pt/ot/dvt ppx    intermittent epigastric pain-   tolerating diet-  no n/v now  elevated LFT'S-  trending down, likely 2/2 statins, restart statin at lower dose once lft normalise  < from: US Gallbladder (US Gallbladder ) (07.10.21 @ 09:30) >  Cholelithiasis without definite secondary signs of acute cholecystitis  c/w ppi,  Carafate  simethicone, aspiration precautions  monitor lft's, GI consult recc noted   ekg nsr at 70 21  < from: TTE Echo Complete w/o Contrast w/ Doppler (21 @ 16:11) >   Left ventricular ejection fraction, by visual estimation, is 50 to 55%.   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 Also mild aneurysmal dilatation of the origin of the left inferior M2 branch.  - neurosx follow up OP    HTN   Norvasc d/c due to orthostasis    NANCY likely pre-renal  - improving  - encouraged PO hydration  - on IVF    Asthma  Symbicort, alb prn, singular    d/w Dr. Sanchez   will follow

## 2021-07-15 NOTE — PROGRESS NOTE ADULT - SUBJECTIVE AND OBJECTIVE BOX
HPI:    64 yo RH female with PMH of asthma, HTN to [facility] ED 6/29 for right-sided weakness and slurred speech. CTH neg acute cva, old lacunar infarcts b/l BG. Out of window for tPA. CTA 6/29 shows Aneurysms suspected involving the left middle cerebral artery as described above. Transferred from [facility] to [facility] for possible evaluation of aneurysms noted on imaging. MRA 6/30 showed mild age-appropriate involutional and ischemic gliotic changes with old bilateral basal ganglia lacunar infarcts. Small acute infarct left basal ganglia and corona radiata. No evidence of hemorrhage. 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 Also mild aneurysmal dilatation of the origin of the left inferior M2 branch. Hospitalization complicated by NANCY, IVF completed. Failed swallow eval-tolerates Puree honey. TTE as outpatient. HbA1c 5.9 and . Evaluated by PMR and acute rehab recommended. Cleared for dc on 7/2.  Patient arrived to [facility] on 7/2/21 for acute inpatient rehab. Celi iterpreter #[id number]. Pt c/o difficulty speaking and still unable to more her right upper and right lower extremities.  (02 Jul 2021 15:21)    PAST MEDICAL & SURGICAL HISTORY:  Brain aneurysm    Hypertension    Brain aneurysm    Smoking    No significant past surgical history    No significant past surgical history      Allergies    No Known Allergies    Intolerances      ----------------------------------------------------------------------    Subjective:    Patient seen and examined at bedside. Celi  #[id number].  Pt was SOB overnight. Pt was given albuterol.   Pt slept well overnight as per nursing note.   Denies any pain at this time. Continue to complain of RUE & RLE weakness.   Continue to complain of Epi-gastric pain.   Pt states her nausea has improved.   Denies any-other complaints at this time.   Participating in therapy     ROS:  Positive: chest pain, GI upset  Denies: headache, lightheadedness, SOB, abdominal pain, dysuria, nausea, constipation    ----------------------------------------------------------------------  PHYSICAL EXAM:    ICU Vital Signs Last 24 Hrs  T(C): 36.7 (15 Jul 2021 09:04), Max: 36.7 (15 Jul 2021 09:04)  T(F): 98.1 (15 Jul 2021 09:04), Max: 98.1 (15 Jul 2021 09:04)  HR: 68 (15 Jul 2021 09:04) (68 - 90)  BP: 109/60 (15 Jul 2021 09:04) (109/60 - 151/74)  BP(mean): --  ABP: --  ABP(mean): --  RR: 15 (15 Jul 2021 09:04) (15 - 15)  SpO2: 100% (15 Jul 2021 09:04) (95% - 100%)        Gen - NAD, Comfortable  HEENT - NCAT,   Pulm - CTAB,   Cardiovascular - RRR,   Abdomen - Soft, NT/ND, +BS.   Extremities - No Edema, No calf tenderness, R knee pain.   Neuro-     Cognitive - AAOx 2--knew she is in hospital but not date     Attention: makes eye contact and good comprehension      Memory: Recall 3 objects immediate and 3 min later         Cranial Nerves - PERRLA, EOMI, right facial droop, +dysphagia     Motor -                    LEFT    UE - ShAB 5/5, EF 5/5, EE 5/5, WE 5/5,  5/5                    RIGHT UE - ShAB 2/5, EF 2/5, EE 1/5, WE 0/5,  0/5                    LEFT    LE - HF 5/5, KE 5/5, DF 5/5, PF 5/5                    RIGHT LE - Hip ext 2/5, KE 2/5, DF 1/5, PF 1/5        Sensory - Intact to LT     Reflexes - DTR Intact, No primitive reflex     Coordination - impaired on right     Tone - normal  Psychiatric - Mood stable, Affect WNL      ----------------------------------------------------------------------  RECENT LABS:                                   11.0   5.42  )-----------( 186      ( 15 Jul 2021 05:00 )             34.2     07-15    136  |  102  |  23  ----------------------------<  104<H>  4.7   |  26  |  1.02    Ca    8.8      15 Jul 2021 05:00    TPro  6.5  /  Alb  2.8<L>  /  TBili  0.3  /  DBili  x   /  AST  24  /  ALT  77<H>  /  AlkPhos  75  07-15      CAPILLARY BLOOD GLUCOSE      ----------------------------------------------------------------------  RECENT IMAGING:    Xray Chest 1 View-PORTABLE IMMEDIATE 07.12.21  IMPRESSION: Unchanged chest as above.    US Gallbladder 07.10.21  IMPRESSION:  Cholelithiasis without definite secondary signs of acute cholecystitis    ----------------------------------------------------------------------  MEDICATIONS  (STANDING):  aspirin  chewable 81 milliGRAM(s) Oral daily  budesonide 160 MICROgram(s)/formoterol 4.5 MICROgram(s) Inhaler 2 Puff(s) Inhalation two times a day  clopidogrel Tablet 75 milliGRAM(s) Oral daily  enoxaparin Injectable 40 milliGRAM(s) SubCutaneous daily  gabapentin 100 milliGRAM(s) Oral at bedtime  lidocaine   Patch 1 Patch Transdermal daily  melatonin 6 milliGRAM(s) Oral at bedtime  montelukast 10 milliGRAM(s) Oral daily  pantoprazole    Tablet 40 milliGRAM(s) Oral before breakfast  sucralfate 1 Gram(s) Oral four times a day    MEDICATIONS  (PRN):  ALBUTerol    90 MICROgram(s) HFA Inhaler 1 Puff(s) Inhalation every 6 hours PRN Shortness of Breath and/or Wheezing  aluminum hydroxide/magnesium hydroxide/simethicone Suspension 30 milliLiter(s) Oral every 4 hours PRN Dyspepsia  senna 2 Tablet(s) Oral at bedtime PRN Constipation  simethicone 80 milliGRAM(s) Chew four times a day PRN Upset Stomach

## 2021-07-15 NOTE — PROGRESS NOTE ADULT - SUBJECTIVE AND OBJECTIVE BOX
65y old  Female who presents with a chief complaint of s/p left BG CVA with right sided weakness     seen and examined at the bedside, no new complaints,  no nausea, no vomiting today,  no sob, no fever    Vital Signs Last 24 Hrs  T(C): 36.7 (15 Jul 2021 09:04), Max: 36.7 (15 Jul 2021 09:04)  T(F): 98.1 (15 Jul 2021 09:04), Max: 98.1 (15 Jul 2021 09:04)  HR: 68 (15 Jul 2021 09:04) (68 - 90)  BP: 109/60 (15 Jul 2021 09:04) (109/60 - 151/74)  BP(mean): --  RR: 15 (15 Jul 2021 09:04) (15 - 15)  SpO2: 100% (15 Jul 2021 09:04) (95% - 100%)    GENERAL- NAD  EAR/NOSE/MOUTH/THROAT - no pharyngeal exudates, no oral lesion's  MMM  EYES- RICO, conjunctiva and Sclera clear  NECK- supple  RESPIRATORY-  clear to auscultation bilaterally, non laboured breathing  CARDIOVASCULAR - SIS2, RRR  GI - soft NT BS present  EXTREMITIES- no pedal edema  NEUROLOGY- right sided weakness, slurred speech, facial droop                        11.0                 136  | 26   | 23           5.42  >-----------< 186     ------------------------< 104                   34.2                 4.7  | 102  | 1.02                                         Ca 8.8   Mg x     Ph x      Lipase, Serum: 365 U/L (07.14.21 @ 10:53)      MEDICATIONS  (STANDING):  aspirin  chewable 81 milliGRAM(s) Oral daily  budesonide 160 MICROgram(s)/formoterol 4.5 MICROgram(s) Inhaler 2 Puff(s) Inhalation two times a day  clopidogrel Tablet 75 milliGRAM(s) Oral daily  enoxaparin Injectable 40 milliGRAM(s) SubCutaneous daily  gabapentin 100 milliGRAM(s) Oral at bedtime  lidocaine   Patch 1 Patch Transdermal daily  melatonin 6 milliGRAM(s) Oral at bedtime  montelukast 10 milliGRAM(s) Oral daily  pantoprazole    Tablet 40 milliGRAM(s) Oral before breakfast  sucralfate 1 Gram(s) Oral four times a day    MEDICATIONS  (PRN):  ALBUTerol    90 MICROgram(s) HFA Inhaler 1 Puff(s) Inhalation every 6 hours PRN Shortness of Breath and/or Wheezing  aluminum hydroxide/magnesium hydroxide/simethicone Suspension 30 milliLiter(s) Oral every 4 hours PRN Dyspepsia  senna 2 Tablet(s) Oral at bedtime PRN Constipation  simethicone 80 milliGRAM(s) Chew four times a day PRN Upset Stomach

## 2021-07-15 NOTE — PROGRESS NOTE ADULT - ASSESSMENT
MARKIE HERNANDEZ is a 66yo Female with left subcortical CVA  with dysphagia, right Hemiparesis, decreased functional mobility, gait instability and ADL impairments.    #CVA  -DAPT with aspirin & plavix.   -fall and aspiration precautions  -TTE outpt  -neurology and cardio follow up outpt  -stopped the Lipitor due to elevated LFT     #Transaminitis  - stopped the Lipitor due to elevated LFT   - Cholelithiasis without definite secondary signs of acute cholecystitis  - hospitalist is following.   - Consulted G.I-  Continue to hold statin now and monitor   & Avoid additional hepatotoxic meds.    - f/u Hepatitis panel   --previously was on liquid diet, now trialing mechanical soft diet & thin liquids     Chest pain  --EKG ordered--reviewed with hospitalist  --troponin not elevated  --adjusted dyspepsia medications    Dyspepsia  --cont. protonix  --cont. Maalox  --Pepcid d/c'ed and Carafate added on 7/14    Insomnia  --cont. melatonin qhs    Dysphagia  -d/w speech therapy-- diet upgraded to Mechinical-soft with thins.  Monitor PO tolerance     #HLD  -monitor LFTs    #HTN--Pt. orthostatic  -d/c amlodipine  -overnight gentle hydration with half NS--BUN/cr improved  -hospitalist  following    #NANCY  -monitor BMP  --BUN elevated-- dehydration likely contributing to orthostasis  --IVF with 1/2 NS ordered.    - creatinine back to baseline.     #Asthma  -continue simicort and albuterol  -monitor VS  --Chronic coughing-- d/w hospitalist to assess    Pain Mgmt - Tylenol PRN.   Cont. gabapentin 100 mg HS for leg paresthesias. Consider increasing dose next week if the symptoms persist   GI/Bowel Mgmt - Senna,   PRN  /Bladder Mgmt - Voiding      Precautions / PROPHYLAXIS:   - Falls,  Seizure   -- Lungs: Aspiration,   - Pressure injury/Skin: Turn Q2hrs while in bed, OOB to Chair, PT/OT    - DVT: Lovenox    IDT 7/8/21:  SW: Lives in  with son, DIL, daughter w/ 4 JONNA and 10 steps inside-- Needed Assistance PTA with ADLs and Ambulation.  HHA 42h/week  OT: Min A e/g; Min A dressing; Max A toileting, transfers - Total & Max A   PT: Transfer Mod A.  Has not performed Ambulation or stairs.  Goal Min A.  Speech: Thin liquids with mechanical soft; Good comprehension, Cogn. WFL.  +dysarthria.    ELOS:  7/23/21 home with home care-- can extend if going home.  MARKIE HERNANDEZ is a 66yo Female with left subcortical CVA  with dysphagia, right Hemiparesis, decreased functional mobility, gait instability and ADL impairments.    #CVA  -DAPT with aspirin & plavix.   -fall and aspiration precautions  -TTE outpt  -neurology and cardio follow up outpt  -stopped the Lipitor due to elevated LFT     #Transaminitis  - stopped the Lipitor due to elevated LFT   - Cholelithiasis without definite secondary signs of acute cholecystitis  - hospitalist is following.   - Consulted G.I-  Continue to hold statin now and monitor   & Avoid additional hepatotoxic meds.    - f/u Hepatitis panel   --tolerating soft diet & thin liquids     Chest pain  --EKG ordered--reviewed with hospitalist  --troponin not elevated  --adjusted dyspepsia medications    Dyspepsia  --cont. protonix  --cont. Maalox  --Pepcid d/c'ed and Carafate added on 7/14    Insomnia  --cont. melatonin qhs    Dysphagia  -d/w speech therapy-- diet upgraded to -soft with thins.  Monitor PO tolerance     #HLD  -monitor LFTs    #HTN--Pt. orthostatic  --Orthostatic hypotension improved off amlodipine.   -d/c amlodipine  -overnight gentle hydration with half NS--BUN/cr improved--d/c IVFs.   -hospitalist  following    #NANCY  -monitor BMP  --BUN elevated-- dehydration likely contributing to orthostasis  --IVF with 1/2 NS ordered.    - creatinine back to baseline.     #Asthma  -continue simicort and albuterol  -monitor VS  --Chronic coughing-- d/w hospitalist to assess    Pain Mgmt - Tylenol PRN.   Cont. gabapentin 100 mg HS for leg paresthesias.   GI/Bowel Mgmt - Senna,   PRN  /Bladder Mgmt - Voiding      Precautions / PROPHYLAXIS:   - Falls,  Seizure   -- Lungs: Aspiration,   - Pressure injury/Skin: Turn Q2hrs while in bed, OOB to Chair, PT/OT    - DVT: Lovenox    IDT 7/8/21:  SW: Lives in  with son, DIL, daughter w/ 4 JONNA and 10 steps inside-- Needed Assistance PTA with ADLs and Ambulation.  HHA 42h/week  OT: Min A e/g; Min A dressing; Max A toileting, transfers - Total & Max A   PT: Transfer Mod A.  Has not performed Ambulation or stairs.  Goal Min A.  Speech: Thin liquids with mechanical soft; Good comprehension, Cogn. WFL.  +dysarthria.    ELOS:  7/23/21 home with home care-- can extend if going home.

## 2021-07-16 ENCOUNTER — TRANSCRIPTION ENCOUNTER (OUTPATIENT)
Age: 66
End: 2021-07-16

## 2021-07-16 PROCEDURE — 99232 SBSQ HOSP IP/OBS MODERATE 35: CPT

## 2021-07-16 RX ORDER — POLYETHYLENE GLYCOL 3350 17 G/17G
17 POWDER, FOR SOLUTION ORAL DAILY
Refills: 0 | Status: DISCONTINUED | OUTPATIENT
Start: 2021-07-16 | End: 2021-07-27

## 2021-07-16 RX ADMIN — LIDOCAINE 1 PATCH: 4 CREAM TOPICAL at 00:03

## 2021-07-16 RX ADMIN — CLOPIDOGREL BISULFATE 75 MILLIGRAM(S): 75 TABLET, FILM COATED ORAL at 12:33

## 2021-07-16 RX ADMIN — Medication 1 GRAM(S): at 17:41

## 2021-07-16 RX ADMIN — LIDOCAINE 1 PATCH: 4 CREAM TOPICAL at 19:43

## 2021-07-16 RX ADMIN — Medication 81 MILLIGRAM(S): at 12:33

## 2021-07-16 RX ADMIN — PANTOPRAZOLE SODIUM 40 MILLIGRAM(S): 20 TABLET, DELAYED RELEASE ORAL at 05:27

## 2021-07-16 RX ADMIN — ENOXAPARIN SODIUM 40 MILLIGRAM(S): 100 INJECTION SUBCUTANEOUS at 12:33

## 2021-07-16 RX ADMIN — LIDOCAINE 1 PATCH: 4 CREAM TOPICAL at 12:34

## 2021-07-16 RX ADMIN — BUDESONIDE AND FORMOTEROL FUMARATE DIHYDRATE 2 PUFF(S): 160; 4.5 AEROSOL RESPIRATORY (INHALATION) at 08:09

## 2021-07-16 RX ADMIN — GABAPENTIN 100 MILLIGRAM(S): 400 CAPSULE ORAL at 21:47

## 2021-07-16 RX ADMIN — MONTELUKAST 10 MILLIGRAM(S): 4 TABLET, CHEWABLE ORAL at 12:34

## 2021-07-16 RX ADMIN — POLYETHYLENE GLYCOL 3350 17 GRAM(S): 17 POWDER, FOR SOLUTION ORAL at 12:32

## 2021-07-16 RX ADMIN — Medication 6 MILLIGRAM(S): at 21:47

## 2021-07-16 RX ADMIN — Medication 1 GRAM(S): at 05:26

## 2021-07-16 RX ADMIN — SIMETHICONE 80 MILLIGRAM(S): 80 TABLET, CHEWABLE ORAL at 17:46

## 2021-07-16 RX ADMIN — Medication 1 GRAM(S): at 12:34

## 2021-07-16 RX ADMIN — BUDESONIDE AND FORMOTEROL FUMARATE DIHYDRATE 2 PUFF(S): 160; 4.5 AEROSOL RESPIRATORY (INHALATION) at 21:42

## 2021-07-16 NOTE — PROGRESS NOTE ADULT - ASSESSMENT
MARKIE HERNANDEZ is a 66yo Female with left subcortical CVA  with dysphagia, right Hemiparesis, decreased functional mobility, gait instability and ADL impairments.    #CVA  -DAPT with aspirin & plavix.   -fall and aspiration precautions  -TTE outpt  -neurology and cardio follow up outpt  -stopped the Lipitor due to elevated LFT     #Transaminitis  - stopped the Lipitor due to elevated LFT   - Cholelithiasis without definite secondary signs of acute cholecystitis  - hospitalist is following.   - Consulted G.I-  Continue to hold statin now and monitor   & Avoid additional hepatotoxic meds.    - Pt nausea has improved.   - f/u Hepatitis panel is non reactive.  --tolerating soft diet & thin liquids     Chest pain  --EKG ordered--reviewed with hospitalist  --troponin not elevated  --adjusted dyspepsia medications      Dyspepsia & Constipation   --cont. protonix  --cont. Maalox  - Added Glycolax   - Added Bisacodyl PRN constipation.   --Pepcid d/c'ed and Carafate added on 7/14    Insomnia  --cont. melatonin qhs    Dysphagia  -d/w speech therapy-- diet upgraded to -soft with thins.  Monitor PO tolerance     #HLD  -monitor LFTs    #HTN-  -Orthostatic hypotension improved off amlodipine.   -d/c amlodipine  -d/c IVFs.   -hospitalist  following    #NANCY  -monitor BMP  --BUN elevated-- dehydration likely contributing to orthostasis  --IVF with 1/2 NS ordered.    - creatinine back to baseline.     #Asthma  -continue simicort and albuterol  -monitor VS  --Chronic coughing-- d/w hospitalist to assess    Pain Mgmt - Tylenol PRN.   Cont. gabapentin 100 mg HS for leg paresthesias.   GI/Bowel Mgmt - Senna,   PRN  /Bladder Mgmt - Voiding      Precautions / PROPHYLAXIS:   - Falls,  Seizure   -- Lungs: Aspiration,   - Pressure injury/Skin: Turn Q2hrs while in bed, OOB to Chair, PT/OT    - DVT: Lovenox    IDT 7/8/21:  SW: Lives in  with son, DIL, daughter w/ 4 JONNA and 10 steps inside-- Needed Assistance PTA with ADLs and Ambulation.  HHA 42h/week  OT: Min A e/g; Min A dressing; Max A toileting, transfers - Total & Max A   PT: Transfer Mod A.  Has not performed Ambulation or stairs.  Goal Min A.  Speech: Thin liquids with mechanical soft; Good comprehension, Cogn. WFL.  +dysarthria.    ELOS:  7/23/21 home with home care-- can extend if going home.  MARKIE HERNANDEZ is a 64yo Female with left subcortical CVA  with dysphagia, right Hemiparesis, decreased functional mobility, gait instability and ADL impairments.    #CVA  -DAPT with aspirin & plavix.   -fall and aspiration precautions  -TTE outpt  -neurology and cardio follow up outpt  -stopped the Lipitor due to elevated LFT     #Transaminitis  - stopped the Lipitor due to elevated LFT   - Cholelithiasis without definite secondary signs of acute cholecystitis  - hospitalist is following.   - Consulted G.I-  Continue to hold statin now and monitor   & Avoid additional hepatotoxic meds.    --LFTs improved   -  nausea has resolved-- eating % of meals  -  Hepatitis panel is non reactive.  --tolerating soft diet & thin liquids     Chest pain--  --EKG ordered--reviewed with hospitalist  --troponin not elevated  --adjusted dyspepsia medications      Dyspepsia & Constipation   --cont. protonix  --cont. Maalox  - Added Glycolax   - Added Bisacodyl PRN constipation.   --Pepcid d/c'ed and Carafate added on 7/14    Insomnia  --cont. melatonin qhs    Dysphagia  -d/w speech therapy-- diet upgraded to -soft with thins.  Monitor PO tolerance     #HLD  -monitor LFTs    #HTN-  -Orthostatic hypotension improved off amlodipine.   -d/c amlodipine  -d/c IVFs.   -hospitalist  following    #NANCY--resolving  -monitor BMP  - creatinine back to baseline.     #Asthma  -continue simicort and albuterol  --singulair  -monitor VS      Pain Mgmt - Tylenol PRN.   Cont. gabapentin 100 mg HS for leg paresthesias.   GI/Bowel Mgmt - Senna,   PRN  /Bladder Mgmt - Voiding      Precautions / PROPHYLAXIS:   - Falls,  Seizure   -- Lungs: Aspiration,   - Pressure injury/Skin: Turn Q2hrs while in bed, OOB to Chair, PT/OT    - DVT: Lovenox    IDT 7/15/21:  --FT 7/19  SW: Lives in  with son, DIL, daughter w/ 4 JONNA and 10 steps inside-- Needed Assistance PTA with ADLs and Ambulation.  HHA 42h/week  OT: Min A e/g; Max A dressing; Tot A toileting, transfers, LBD.   PT: Transfer Mod A.  Has not performed Ambulation or stairs.  Goal Min A.  Speech: Thin liquids with mechanical soft; Short-term memory deficits, decreased attention.  +dysarthria.    ELOS:  7/23/21 home with home care

## 2021-07-16 NOTE — DISCHARGE NOTE PROVIDER - NSDCMRMEDTOKEN_GEN_ALL_CORE_FT
Albuterol (Eqv-ProAir HFA) 90 mcg/inh inhalation aerosol: 1 puff(s) inhaled every 6 hours, As Needed  amLODIPine 10 mg oral tablet: 1 tab(s) orally once a day start on 7/4/21  aspirin 81 mg oral tablet, chewable: 1 tab(s) orally once a day  atorvastatin 80 mg oral tablet: 1 tab(s) orally once a day (at bedtime)  calcium:   clopidogrel 75 mg oral tablet: 1 tab(s) orally once a day  enoxaparin: 40 milligram(s) subcutaneous once a day  famotidine 20 mg oral tablet: 1 tab(s) orally 2 times a day  folic acid 1 mg oral tablet: 1 tab(s) orally once a day  montelukast 10 mg oral tablet: 1 tab(s) orally once a day  Symbicort 160 mcg-4.5 mcg/inh inhalation aerosol: 2 puff(s) inhaled 2 times a day  Vitamin D2 50,000 intl units (1.25 mg) oral capsule: 1 cap(s) orally once a week   Albuterol (Eqv-ProAir HFA) 90 mcg/inh inhalation aerosol: 1 puff(s) inhaled every 6 hours, As Needed  aluminum hydroxide-magnesium hydroxide 200 mg-200 mg/5 mL oral suspension: 30 milliliter(s) orally every 4 hours, As needed, Dyspepsia  amLODIPine 10 mg oral tablet: 1 tab(s) orally once a day start on 7/4/21  aspirin 81 mg oral tablet, chewable: 1 tab(s) orally once a day  atorvastatin 80 mg oral tablet: 1 tab(s) orally once a day (at bedtime)  calcium:   clopidogrel 75 mg oral tablet: 1 tab(s) orally once a day  enoxaparin: 40 milligram(s) subcutaneous once a day  famotidine 20 mg oral tablet: 1 tab(s) orally 2 times a day  folic acid 1 mg oral tablet: 1 tab(s) orally once a day  gabapentin 100 mg oral capsule: 1 cap(s) orally once a day (at bedtime)  montelukast 10 mg oral tablet: 1 tab(s) orally once a day  Symbicort 160 mcg-4.5 mcg/inh inhalation aerosol: 2 puff(s) inhaled 2 times a day  Vitamin D2 50,000 intl units (1.25 mg) oral capsule: 1 cap(s) orally once a week   albuterol 90 mcg/inh inhalation aerosol: 1 puff(s) inhaled every 6 hours, As needed, Shortness of Breath and/or Wheezing  aluminum hydroxide-magnesium hydroxide 200 mg-200 mg/5 mL oral suspension: 30 milliliter(s) orally every 4 hours, As needed, Dyspepsia  aspirin 81 mg oral tablet, chewable: 1 tab(s) orally once a day  atorvastatin 80 mg oral tablet: 1 tab(s) orally once a day (at bedtime)  budesonide-formoterol 160 mcg-4.5 mcg/inh inhalation aerosol: 1 application inhaled 2 times a day   clopidogrel 75 mg oral tablet: 1 tab(s) orally once a day  gabapentin 100 mg oral capsule: 1 cap(s) orally once a day (at bedtime)  montelukast 10 mg oral tablet: 1 tab(s) orally once a day  pantoprazole 40 mg oral delayed release tablet: 1 tab(s) orally once a day (before a meal)  polyethylene glycol 3350 oral powder for reconstitution: 17 gram(s) orally once a day  simethicone 80 mg oral tablet, chewable: 1 tab(s) orally 4 times a day, As needed, Upset Stomach  sucralfate 1 g oral tablet: 1 tab(s) orally 4 times a day   albuterol 90 mcg/inh inhalation aerosol: 1 puff(s) inhaled every 6 hours, As needed, Shortness of Breath and/or Wheezing  aluminum hydroxide-magnesium hydroxide 200 mg-200 mg/5 mL oral suspension: 30 milliliter(s) orally every 4 hours, As needed, Dyspepsia  aspirin 81 mg oral tablet, chewable: 1 tab(s) orally once a day  atorvastatin 20 mg oral tablet: 1 tab(s) orally once a day (at bedtime)  budesonide-formoterol 160 mcg-4.5 mcg/inh inhalation aerosol: 1 application inhaled 2 times a day   clopidogrel 75 mg oral tablet: 1 tab(s) orally once a day  gabapentin 100 mg oral capsule: 1 cap(s) orally once a day (at bedtime)  montelukast 10 mg oral tablet: 1 tab(s) orally once a day  pantoprazole 40 mg oral delayed release tablet: 1 tab(s) orally once a day (before a meal)  polyethylene glycol 3350 oral powder for reconstitution: 17 gram(s) orally once a day  simethicone 80 mg oral tablet, chewable: 1 tab(s) orally 4 times a day, As needed, Upset Stomach  sucralfate 1 g oral tablet: 1 tab(s) orally 4 times a day   acetaminophen 325 mg oral tablet: 2 tab(s) orally every 6 hours, As needed, Mild Pain (1 - 3), Moderate Pain (4 - 6), Severe Pain (7 - 10)  albuterol 90 mcg/inh inhalation aerosol: 1 puff(s) inhaled every 6 hours, As needed, Shortness of Breath and/or Wheezing  aluminum hydroxide-magnesium hydroxide 200 mg-200 mg/5 mL oral suspension: 30 milliliter(s) orally every 4 hours, As needed, Dyspepsia  aspirin 81 mg oral tablet, chewable: 1 tab(s) orally once a day  atorvastatin 20 mg oral tablet: 1 tab(s) orally once a day (at bedtime)  budesonide-formoterol 160 mcg-4.5 mcg/inh inhalation aerosol: 1 application inhaled 2 times a day   clopidogrel 75 mg oral tablet: 1 tab(s) orally once a day  gabapentin 100 mg oral capsule: 1 cap(s) orally once a day (at bedtime)  montelukast 10 mg oral tablet: 1 tab(s) orally once a day  pantoprazole 40 mg oral delayed release tablet: 1 tab(s) orally once a day (before a meal)  polyethylene glycol 3350 oral powder for reconstitution: 17 gram(s) orally once a day  simethicone 80 mg oral tablet, chewable: 1 tab(s) orally 4 times a day, As needed, Upset Stomach  sucralfate 1 g oral tablet: 1 tab(s) orally 4 times a day  sulfamethoxazole-trimethoprim 800 mg-160 mg oral tablet: 1 tab(s) orally 2 times a day

## 2021-07-16 NOTE — PROGRESS NOTE ADULT - SUBJECTIVE AND OBJECTIVE BOX
HPI:    66 yo RH female with PMH of asthma, HTN to [facility] ED 6/29 for right-sided weakness and slurred speech. CTH neg acute cva, old lacunar infarcts b/l BG. Out of window for tPA. CTA 6/29 shows Aneurysms suspected involving the left middle cerebral artery as described above. Transferred from [facility] to [facility] for possible evaluation of aneurysms noted on imaging. MRA 6/30 showed mild age-appropriate involutional and ischemic gliotic changes with old bilateral basal ganglia lacunar infarcts. Small acute infarct left basal ganglia and corona radiata. No evidence of hemorrhage. 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 Also mild aneurysmal dilatation of the origin of the left inferior M2 branch. Hospitalization complicated by NANCY, IVF completed. Failed swallow eval-tolerates Puree honey. TTE as outpatient. HbA1c 5.9 and . Evaluated by PMR and acute rehab recommended. Cleared for dc on 7/2.  Patient arrived to [facility]U on 7/2/21 for acute inpatient rehab. Celi iterpreter #[id number]. Pt c/o difficulty speaking and still unable to more her right upper and right lower extremities.  (02 Jul 2021 15:21)    PAST MEDICAL & SURGICAL HISTORY:  Brain aneurysm    Hypertension    Brain aneurysm    Smoking    No significant past surgical history    No significant past surgical history      Allergies    No Known Allergies    Intolerances      ----------------------------------------------------------------------    Subjective:    Patient seen and examined at bedside. Celi  #[id number].  Pt slept well overnight as per nursing note.   Complains of constipation.   Denies any pain at this time.   Denies any-other complaints at this time.   Participating in therapy. Talked to the patient about Participating in therapy more, states she is motivated & wants to participate so she can go home asap.    ROS:  Positive: GI upset  Denies: headache, lightheadedness, SOB, abdominal pain, dysuria, nausea, constipation    ----------------------------------------------------------------------  PHYSICAL EXAM:    ICU Vital Signs Last 24 Hrs  T(C): 36.7 (15 Jul 2021 20:25), Max: 36.7 (15 Jul 2021 20:25)  T(F): 98 (15 Jul 2021 20:25), Max: 98 (15 Jul 2021 20:25)  HR: 78 (16 Jul 2021 08:09) (78 - 78)  BP: 152/92 (15 Jul 2021 20:25) (152/92 - 152/92)  BP(mean): --  ABP: --  ABP(mean): --  RR: 15 (15 Jul 2021 20:25) (15 - 15)  SpO2: 98% (16 Jul 2021 08:09) (98% - 99%)    Gen - NAD, Comfortable  HEENT - NCAT,   Pulm - CTAB,   Cardiovascular - RRR,   Abdomen - Soft, NT/ND, +BS.   Extremities - No Edema, No calf tenderness, R knee pain.   Neuro-     Cognitive - AAOx 2--knew she is in hospital but not date     Attention: makes eye contact and good comprehension      Memory: Recall 3 objects immediate and 3 min later         Cranial Nerves - PERRLA, EOMI, right facial droop, +dysphagia     Motor -                    LEFT    UE - ShAB 5/5, EF 5/5, EE 5/5, WE 5/5,  5/5                    RIGHT UE - ShAB 2/5, EF 2/5, EE 1/5, WE 0/5,  0/5                    LEFT    LE - HF 5/5, KE 5/5, DF 5/5, PF 5/5                    RIGHT LE - Hip ext 2/5, KE 2/5, DF 1/5, PF 1/5        Sensory - Intact to LT     Reflexes - DTR Intact, No primitive reflex     Coordination - impaired on right     Tone - normal  Psychiatric - Mood stable, Affect WNL      ----------------------------------------------------------------------  RECENT LABS:                                   11.0   5.42  )-----------( 186      ( 15 Jul 2021 05:00 )             34.2     07-15    136  |  102  |  23  ----------------------------<  104<H>  4.7   |  26  |  1.02    Ca    8.8      15 Jul 2021 05:00    TPro  6.5  /  Alb  2.8<L>  /  TBili  0.3  /  DBili  x   /  AST  24  /  ALT  77<H>  /  AlkPhos  75  07-15      CAPILLARY BLOOD GLUCOSE      ----------------------------------------------------------------------  RECENT IMAGING:    Xray Chest 1 View-PORTABLE IMMEDIATE 07.12.21  IMPRESSION: Unchanged chest as above.    US Gallbladder 07.10.21  IMPRESSION:  Cholelithiasis without definite secondary signs of acute cholecystitis    ----------------------------------------------------------------------  MEDICATIONS  (STANDING):  aspirin  chewable 81 milliGRAM(s) Oral daily  budesonide 160 MICROgram(s)/formoterol 4.5 MICROgram(s) Inhaler 2 Puff(s) Inhalation two times a day  clopidogrel Tablet 75 milliGRAM(s) Oral daily  enoxaparin Injectable 40 milliGRAM(s) SubCutaneous daily  gabapentin 100 milliGRAM(s) Oral at bedtime  lidocaine   Patch 1 Patch Transdermal daily  melatonin 6 milliGRAM(s) Oral at bedtime  montelukast 10 milliGRAM(s) Oral daily  pantoprazole    Tablet 40 milliGRAM(s) Oral before breakfast  polyethylene glycol 3350 17 Gram(s) Oral daily  sucralfate 1 Gram(s) Oral four times a day     MEDICATIONS  (PRN):  ALBUTerol    90 MICROgram(s) HFA Inhaler 1 Puff(s) Inhalation every 6 hours PRN Shortness of Breath and/or Wheezing  aluminum hydroxide/magnesium hydroxide/simethicone Suspension 30 milliLiter(s) Oral every 4 hours PRN Dyspepsia  bisacodyl Suppository 10 milliGRAM(s) Rectal daily PRN Constipation  simethicone 80 milliGRAM(s) Chew four times a day PRN Upset Stomach

## 2021-07-16 NOTE — DISCHARGE NOTE PROVIDER - CARE PROVIDERS DIRECT ADDRESSES
,DirectAddress_Unknown,[email or web address],DirectAddress_Unknown

## 2021-07-16 NOTE — DISCHARGE NOTE PROVIDER - CARE PROVIDER_API CALL
Pawan Lopez (DO)  Neurology; Vascular Neurology  [address], [address]  [place] [address]  Phone: [phone]  Fax: [phone]  Follow Up Time: 1 week    Cullen Nguyen  Neurological Surgery  [address], [address]  [place] [address]  Phone: [phone]  Fax: [phone]  Follow Up Time: 1 week    Maye Sanchez (DO)  Brain Injury Medicine; PhysicalRehab Medicine  [address] [place] [address]  Phone: [phone]  Fax: [phone]  Follow Up Time: 1 month    primary, care  Phone: (   )    -  Fax: (   )    -  Follow Up Time:

## 2021-07-16 NOTE — PROGRESS NOTE ADULT - ASSESSMENT
65 year old RH woman with past medical history of asthma and HTN not on antithrombotics presented as code stroke to [facility] ED for right-sided weakness with LWK  11p. found to have Right yaneth sensorimotor loss and moderate dysarthria +/- right visual field cut 2/2 L BG infarct 2/2  , admitted for rehab- pt/ot/dvt ppx    intermittent epigastric pain-   tolerating diet-  no n/v now  elevated LFT'S-  trending down, likely 2/2 statins, restart statin at lower dose once lft normalise  < from: US Gallbladder (US Gallbladder ) (07.10.21 @ 09:30) >  Cholelithiasis without definite secondary signs of acute cholecystitis  c/w ppi,  Carafate  simethicone, aspiration precautions  monitor lft's, GI consult recc noted   ekg nsr at 70 21  < from: TTE Echo Complete w/o Contrast w/ Doppler (21 @ 16:11) >   Left ventricular ejection fraction, by visual estimation, is 50 to 55%.   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 Also mild aneurysmal dilatation of the origin of the left inferior M2 branch.  - neurosx follow up OP    HTN- Norvasc d/c due to orthostasis   monitor off meds      NANCY likely pre-renal  - improving  - encouraged PO hydration  - on IVF    Asthma  Symbicort, alb prn, singular    d/w Dr. Sanchez   will follow

## 2021-07-16 NOTE — DISCHARGE NOTE PROVIDER - DETAILS OF MALNUTRITION DIAGNOSIS/DIAGNOSES
This patient has been assessed with a concern for Malnutrition and was treated during this hospitalization for the following Nutrition diagnosis/diagnoses:     -  07/03/2021: Moderate protein-calorie malnutrition

## 2021-07-16 NOTE — PROGRESS NOTE ADULT - ATTENDING COMMENTS
Pt. seen with resident.  Agree with documentation above as per resident with amendments made as appropriate. Patient medically stable. Making progress towards rehab goals.     Left Basal ganglia and corona radiata infarct  --c/o constipation-- miralax and bisacodyl added  --Family training 7/19

## 2021-07-16 NOTE — DISCHARGE NOTE PROVIDER - PROVIDER TOKENS
PROVIDER:[TOKEN:[[id number]],FOLLOWUP:[1 week]],PROVIDER:[TOKEN:[[id number]],FOLLOWUP:[1 week]],PROVIDER:[TOKEN:[[id number]],FOLLOWUP:[1 month]],FREE:[LAST:[primary],FIRST:[care],PHONE:[(   )    -],FAX:[(   )    -]]

## 2021-07-16 NOTE — DISCHARGE NOTE PROVIDER - HOSPITAL COURSE
64 yo RH female with PMH of asthma, HTN to [facility] ED 6/29 for right-sided weakness and slurred speech. CTH neg acute cva, old lacunar infarcts b/l BG. Out of window for tPA. CTA 6/29 shows Aneurysms suspected involving the left middle cerebral artery as described above. Transferred from [facility] to [facility] for possible evaluation of aneurysms noted on imaging. MRA 6/30 showed mild age-appropriate involutional and ischemic gliotic changes with old bilateral basal ganglia lacunar infarcts. Small acute infarct left basal ganglia and corona radiata. No evidence of hemorrhage. 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 Also mild aneurysmal dilatation of the origin of the left inferior M2 branch. Hospitalization complicated by NANCY, IVF completed. Failed swallow eval-tolerates Puree honey. TTE as outpatient. HbA1c 5.9 and . Evaluated by PMR and acute rehab recommended. Cleared for dc on 7/2.  Patient arrived to [facility] on 7/2/21 for acute inpatient rehab.Pt was spoken to via [employer] Bikepreter #[id number].    Pt was stable upon rehab admission to  [facility]patient Rehabilitation Facility. Admitted with gait instabilty, ADL, and functional impairments.    Rehab Course significant for Multiple Chest Pain & Epigastric pain through out her stay. Multiple EKG were ordered which showed Normal sinus rhythm. For patient's epigastric pain & RUQ pain. RUQ sono was ordered which was significant for cholelithiasis, for which she was seen by JAYLON. Pt also had elevated LFT so her statin was discontinued.  Pt continued her home dose of proton pump inhibitor & was started on carafate & simethicone. Pt also had significant event of orthostatic hypotension during therapy, Pt Norvasc was discontinued.     All other medical co-morbidities were stable.     Pt tolerated course of inpatient PT/OT/SLP rehab with significant functional improvements and met rehab goals prior to discharge.    Pt was medically cleared on ___  for discharged to ___     Pt will follow up with the following:   PCP   PM&R  Neurology   Neurological Surgery     64 yo RH female with PMH of asthma, HTN to [facility] ED 6/29 for right-sided weakness and slurred speech. CTH neg acute cva, old lacunar infarcts b/l BG. Out of window for tPA. CTA 6/29 shows Aneurysms suspected involving the left middle cerebral artery as described above. Transferred from [facility] to [facility] for possible evaluation of aneurysms noted on imaging. MRA 6/30 showed mild age-appropriate involutional and ischemic gliotic changes with old bilateral basal ganglia lacunar infarcts. Small acute infarct left basal ganglia and corona radiata. No evidence of hemorrhage. 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 Also mild aneurysmal dilatation of the origin of the left inferior M2 branch. Hospitalization complicated by NANCY, IVF completed. Failed swallow eval-tolerates Puree honey. TTE as outpatient. HbA1c 5.9 and . Evaluated by PMR and acute rehab recommended. Cleared for dc on 7/2.  Patient arrived to [facility] on 7/2/21 for acute inpatient rehab.Pt was spoken to via AppInstitutepreter #[id number].    Pt was stable upon rehab admission to  Inpatient Rehabilitation Facility. Admitted with gait instabilty, ADL, and functional impairments.    Rehab Course significant for Multiple Chest Pain & Epigastric pain through out her stay. Multiple EKG were ordered which showed Normal sinus rhythm. For patient's epigastric pain & RUQ pain. RUQ sono was ordered which was significant for cholelithiasis, for which she was seen by JAYLON. Pt also had elevated LFT so her statin was discontinued.  Pt continued her home dose of proton pump inhibitor & was started on carafate & simethicone. Pt also had significant event of orthostatic hypotension during therapy, Pt Norvasc was discontinued. Through out her stay the LFT improved & the nausea resolved pt was restarted on her Atorvastatin.     All other medical co-morbidities were stable.     Pt tolerated course of inpatient PT/OT/SLP rehab with significant functional improvements and met rehab goals prior to discharge.    Pt was medically cleared on 7/21/2021  for discharged to home with homecare.     Pt will follow up with the following:   PCP   PM&R  Neurology   Neurological Surgery     64 yo RH female with PMH of asthma, HTN to [facility] ED 6/29 for right-sided weakness and slurred speech. CTH neg acute cva, old lacunar infarcts b/l BG. Out of window for tPA. CTA 6/29 shows Aneurysms suspected involving the left middle cerebral artery as described above. Transferred from [facility] to [facility] for possible evaluation of aneurysms noted on imaging. MRA 6/30 showed mild age-appropriate involutional and ischemic gliotic changes with old bilateral basal ganglia lacunar infarcts. Small acute infarct left basal ganglia and corona radiata. No evidence of hemorrhage. 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 Also mild aneurysmal dilatation of the origin of the left inferior M2 branch. Hospitalization complicated by NANCY, IVF completed. Failed swallow eval-tolerates Puree honey. TTE as outpatient. HbA1c 5.9 and . Evaluated by PMR and acute rehab recommended. Cleared for dc on 7/2.  Patient arrived to [facility] BIU on 7/2/21 for acute inpatient rehab.Pt was spoken to via [employer] iterpreter #[id number].    Pt was stable upon rehab admission to  Inpatient Rehabilitation Facility. Admitted with gait instabilty, ADL, and functional impairments.    Rehab Course significant for Multiple Chest Pain & Epigastric pain through out her stay. Multiple EKG were ordered which showed Normal sinus rhythm. For patient's epigastric pain & RUQ pain. RUQ sono was ordered which was significant for cholelithiasis, for which she was seen by JAYLON. Pt also had elevated LFT so her statin was discontinued.  Pt continued her home dose of proton pump inhibitor & was started on carafate & simethicone. Pt also had significant event of orthostatic hypotension during therapy, Pt Norvasc was discontinued. Through out her stay the LFT improved & the nausea resolved pt was restarted on her Atorvastatin. Pt also had multiple complaints of chest & epigastric pain. Pt had multiple EKG's done through out her stay, which were NSR. Pt also had troponin done, which were negative x2. Pt was started on multiple medication to better control her GERD symptoms. Pt also was found to have dysuria & a positive UTI. Pt vitals signs were stable & afebrile. Pt was started on bactrim. Pt is to continue taking bactrim for 2 more days after discharge. Pt is hemodynamically stable for discharge.      All other medical co-morbidities were stable.     Pt tolerated course of inpatient PT/OT/SLP rehab with significant functional improvements and met rehab goals prior to discharge.    Pt was medically cleared on 7/21/2021  for discharged to home with homecare.     Pt will follow up with the following:   PCP   PM&R  Neurology   Neurological Surgery     64 yo RH female with PMH of asthma, HTN to [facility] ED 6/29 for right-sided weakness and slurred speech. CTH neg acute cva, old lacunar infarcts b/l BG. Out of window for tPA. CTA 6/29 shows Aneurysms suspected involving the left middle cerebral artery as described above. Transferred from [facility] to [facility] for possible evaluation of aneurysms noted on imaging. MRA 6/30 showed mild age-appropriate involutional and ischemic gliotic changes with old bilateral basal ganglia lacunar infarcts. Small acute infarct left basal ganglia and corona radiata. No evidence of hemorrhage. 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 Also mild aneurysmal dilatation of the origin of the left inferior M2 branch. Hospitalization complicated by NANCY, IVF completed. Failed swallow eval-tolerates Puree honey. TTE as outpatient. HbA1c 5.9 and . Evaluated by PMR and acute rehab recommended. Cleared for dc on 7/2.  Patient arrived to [facility] BIU on 7/2/21 for acute inpatient rehab.Pt was spoken to via [employer] iterpreter #[id number].    Pt was stable upon rehab admission to  Inpatient Rehabilitation Facility. Admitted with gait instabilty, ADL, and functional impairments.    Rehab Course significant for Multiple Chest Pain & Epigastric pain through out her stay. Multiple EKG were ordered which showed Normal sinus rhythm. For patient's epigastric pain & RUQ pain. RUQ sono was ordered which was significant for cholelithiasis, for which she was seen by JAYLON. Pt also had elevated LFT so her statin was discontinued.  Pt continued her home dose of proton pump inhibitor & was started on carafate & simethicone. Pt also had significant event of orthostatic hypotension during therapy, Pt Norvasc was discontinued. Through out her stay the LFT improved & the nausea resolved pt was restarted on her Atorvastatin. Pt also had multiple complaints of chest & epigastric pain. Pt had multiple EKG's done through out her stay, which were NSR. Pt also had troponin done, which were negative x2. Pt was started on multiple medication to better control her GERD symptoms. Pt also was found to have dysuria & a positive UTI. Pt vitals signs were stable & afebrile. Pt was started on bactrim. Pt is to continue taking bactrim for 2 more days after discharge. Pt is hemodynamically stable for discharge.      All other medical co-morbidities were stable.     Pt tolerated course of inpatient PT/OT/SLP rehab with significant functional improvements and met rehab goals prior to discharge.    Pt was medically cleared on 7/21/2021  for discharged to home with homecare.     Discharge Functional status:     OT: Min A e/g; Max A dressing; Tot A toileting, transfers, LBD.   PT: Transfer Mod A.  Ambulates 20ft HR Max A.  Goal Min A.  Speech: Thin liquids with mechanical soft--tolerating diet better; Short-term memory deficits, decreased attention.  +dysarthria.      Pt will follow up with the following:   PCP   PM&R  Neurology   Neurological Surgery

## 2021-07-16 NOTE — DISCHARGE NOTE PROVIDER - NSDCCPCAREPLAN_GEN_ALL_CORE_FT
PRINCIPAL DISCHARGE DIAGNOSIS  Diagnosis: Cerebrovascular accident (CVA)  Assessment and Plan of Treatment: You were admitted to [facility] after having a stroke. After being discharged, please have close follow up with your PCP, neurosurgeon, and rehab doctors. Continue with all medications as prescribed.      SECONDARY DISCHARGE DIAGNOSES  Diagnosis: Benign essential HTN  Assessment and Plan of Treatment: We adjusted some of your Blood pressure medication. Please continue to take as indiciated. follow up with your PCP for further adjustments as needed.

## 2021-07-16 NOTE — PROGRESS NOTE ADULT - SUBJECTIVE AND OBJECTIVE BOX
65y old  Female who presents with a chief complaint of s/p left BG CVA with right sided weakness     seen and examined at the bedside, no new complaints,  no nausea, no vomiting today,  no sob, no fever    Vital Signs Last 24 Hrs  T(C): 36.7 (15 Jul 2021 20:25), Max: 36.7 (15 Jul 2021 20:25)  T(F): 98 (15 Jul 2021 20:25), Max: 98 (15 Jul 2021 20:25)  HR: 78 (16 Jul 2021 08:09) (78 - 78)  BP: 152/92 (15 Jul 2021 20:25) (152/92 - 152/92)  BP(mean): --  RR: 15 (15 Jul 2021 20:25) (15 - 15)  SpO2: 98% (16 Jul 2021 08:09) (98% - 99%)      GENERAL- NAD  EAR/NOSE/MOUTH/THROAT - no pharyngeal exudates, no oral lesion's  MMM  EYES- RICO, conjunctiva and Sclera clear  NECK- supple  RESPIRATORY-  clear to auscultation bilaterally, non laboured breathing  CARDIOVASCULAR - SIS2, RRR  GI - soft NT BS present  EXTREMITIES- no pedal edema  NEUROLOGY- right sided weakness, slurred speech, facial droop                 11.0                 136  | 26   | 23           5.42  >-----------< 186     ------------------------< 104                   34.2                 4.7  | 102  | 1.02                                         Ca 8.8   Mg x     Ph x          MEDICATIONS  (STANDING):  aspirin  chewable 81 milliGRAM(s) Oral daily  budesonide 160 MICROgram(s)/formoterol 4.5 MICROgram(s) Inhaler 2 Puff(s) Inhalation two times a day  clopidogrel Tablet 75 milliGRAM(s) Oral daily  enoxaparin Injectable 40 milliGRAM(s) SubCutaneous daily  gabapentin 100 milliGRAM(s) Oral at bedtime  lidocaine   Patch 1 Patch Transdermal daily  melatonin 6 milliGRAM(s) Oral at bedtime  montelukast 10 milliGRAM(s) Oral daily  pantoprazole    Tablet 40 milliGRAM(s) Oral before breakfast  polyethylene glycol 3350 17 Gram(s) Oral daily  sucralfate 1 Gram(s) Oral four times a day    MEDICATIONS  (PRN):  ALBUTerol    90 MICROgram(s) HFA Inhaler 1 Puff(s) Inhalation every 6 hours PRN Shortness of Breath and/or Wheezing  aluminum hydroxide/magnesium hydroxide/simethicone Suspension 30 milliLiter(s) Oral every 4 hours PRN Dyspepsia  bisacodyl Suppository 10 milliGRAM(s) Rectal daily PRN Constipation  simethicone 80 milliGRAM(s) Chew four times a day PRN Upset Stomach

## 2021-07-17 PROCEDURE — 99232 SBSQ HOSP IP/OBS MODERATE 35: CPT

## 2021-07-17 RX ADMIN — BUDESONIDE AND FORMOTEROL FUMARATE DIHYDRATE 2 PUFF(S): 160; 4.5 AEROSOL RESPIRATORY (INHALATION) at 21:51

## 2021-07-17 RX ADMIN — CLOPIDOGREL BISULFATE 75 MILLIGRAM(S): 75 TABLET, FILM COATED ORAL at 12:26

## 2021-07-17 RX ADMIN — LIDOCAINE 1 PATCH: 4 CREAM TOPICAL at 23:21

## 2021-07-17 RX ADMIN — GABAPENTIN 100 MILLIGRAM(S): 400 CAPSULE ORAL at 21:20

## 2021-07-17 RX ADMIN — Medication 1 GRAM(S): at 00:07

## 2021-07-17 RX ADMIN — PANTOPRAZOLE SODIUM 40 MILLIGRAM(S): 20 TABLET, DELAYED RELEASE ORAL at 05:21

## 2021-07-17 RX ADMIN — LIDOCAINE 1 PATCH: 4 CREAM TOPICAL at 12:26

## 2021-07-17 RX ADMIN — MONTELUKAST 10 MILLIGRAM(S): 4 TABLET, CHEWABLE ORAL at 12:26

## 2021-07-17 RX ADMIN — Medication 81 MILLIGRAM(S): at 12:26

## 2021-07-17 RX ADMIN — Medication 1 GRAM(S): at 17:05

## 2021-07-17 RX ADMIN — Medication 1 GRAM(S): at 23:21

## 2021-07-17 RX ADMIN — BUDESONIDE AND FORMOTEROL FUMARATE DIHYDRATE 2 PUFF(S): 160; 4.5 AEROSOL RESPIRATORY (INHALATION) at 08:21

## 2021-07-17 RX ADMIN — Medication 1 GRAM(S): at 12:26

## 2021-07-17 RX ADMIN — Medication 1 GRAM(S): at 05:20

## 2021-07-17 RX ADMIN — LIDOCAINE 1 PATCH: 4 CREAM TOPICAL at 19:40

## 2021-07-17 RX ADMIN — Medication 6 MILLIGRAM(S): at 21:20

## 2021-07-17 RX ADMIN — LIDOCAINE 1 PATCH: 4 CREAM TOPICAL at 00:09

## 2021-07-17 RX ADMIN — ENOXAPARIN SODIUM 40 MILLIGRAM(S): 100 INJECTION SUBCUTANEOUS at 12:26

## 2021-07-17 NOTE — PROGRESS NOTE ADULT - ASSESSMENT
MARKIE HERNANDEZ is a 66yo Female with left subcortical CVA  with dysphagia, right Hemiparesis, decreased functional mobility, gait instability and ADL impairments.    #CVA  -DAPT with aspirin & plavix.   -fall and aspiration precautions  -TTE outpt  -neurology and cardio follow up outpt  -stopped the Lipitor due to elevated LFT     #Transaminitis  - stopped the Lipitor due to elevated LFT   - Cholelithiasis without definite secondary signs of acute cholecystitis  - hospitalist note appreciated.   - Consulted G.I-  Continue to hold statin now and monitor   & Avoid additional hepatotoxic meds.    --LFTs improved   -  nausea has resolved-- eating % of meals  -  Hepatitis panel is non reactive.  --tolerating soft diet & thin liquids   - CMP 7/19      Dyspepsia & Constipation   --cont. protonix  --cont. Maalox  - Added Glycolax   - Added Bisacodyl PRN constipation.   --Pepcid d/c'ed and Carafate added on 7/14    Insomnia  --cont. melatonin qhs    Dysphagia  -d/w speech therapy-- diet upgraded to -soft with thins.  Monitor PO tolerance     #HLD  -monitor LFTs    #HTN-  -Orthostatic hypotension improved off amlodipine.   -d/c amlodipine  -d/c IVFs.   -hospitalist  following    #NANCY--resolving  -monitor BMP  - creatinine back to baseline.     #Asthma  -continue symbicort and albuterol  --singulair  -monitor VS      Pain Mgmt - Tylenol PRN.   Cont. gabapentin 100 mg HS for leg paresthesias.   GI/Bowel Mgmt - Senna,   PRN  /Bladder Mgmt - Voiding        - DVT:  - Lovenox    Labs:  CBC, CMP 7/19

## 2021-07-17 NOTE — PROGRESS NOTE ADULT - SUBJECTIVE AND OBJECTIVE BOX
HPI:    64 yo RH female with PMH of asthma, HTN to [facility] ED 6/29 for right-sided weakness and slurred speech. CTH neg acute cva, old lacunar infarcts b/l BG. Out of window for tPA. CTA 6/29 shows Aneurysms suspected involving the left middle cerebral artery as described above. Transferred from [facility] to [facility] for possible evaluation of aneurysms noted on imaging. MRA 6/30 showed mild age-appropriate involutional and ischemic gliotic changes with old bilateral basal ganglia lacunar infarcts. Small acute infarct left basal ganglia and corona radiata. No evidence of hemorrhage. 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 Also mild aneurysmal dilatation of the origin of the left inferior M2 branch. Hospitalization complicated by NANCY, IVF completed. Failed swallow eval-tolerates Puree honey. TTE as outpatient. HbA1c 5.9 and . Evaluated by PMR and acute rehab recommended. Cleared for dc on 7/2.  Patient arrived to [facility] on 7/2/21 for acute inpatient rehab. Celi iterpreter #[id number]. Pt c/o difficulty speaking and still unable to more her right upper and right lower extremities.  (02 Jul 2021 15:21)    PAST MEDICAL & SURGICAL HISTORY:  Brain aneurysm    Hypertension    Brain aneurysm    Smoking    No significant past surgical history    No significant past surgical history      Allergies    No Known Allergies    Intolerances      ----------------------------------------------------------------------    Subjective:    Patient seen and examined at bedside this morning. Patient in bed in NAD.  Pt slept well overnight     ROS:  UTO    ----------------------------------------------------------------------  PHYSICAL EXAM:    Vital Signs Last 24 Hrs  T(C): 36.9 (17 Jul 2021 07:55), Max: 36.9 (17 Jul 2021 07:55)  T(F): 98.4 (17 Jul 2021 07:55), Max: 98.4 (17 Jul 2021 07:55)  HR: 76 (17 Jul 2021 08:22) (64 - 89)  BP: 137/68 (17 Jul 2021 07:55) (131/73 - 138/74)  BP(mean): --  RR: 14 (17 Jul 2021 07:55) (14 - 16)  SpO2: 99% (17 Jul 2021 08:22) (98% - 99%)      Gen - NAD, Comfortable  HEENT - NCAT, MMM  Pulm - CTAB,   Cardiovascular - RRR,   Abdomen - Soft, NT/ND, +BS.   Extremities - No Edema, No calf tenderness  Neuro-       Motor -                    LEFT    UE - ShAB 5/5, EF 5/5, EE 5/5, WE 5/5,  5/5                    RIGHT UE - ShAB 2/5, EF 2/5, EE 1/5, WE 0/5,  0/5                    LEFT    LE - HF 5/5, KE 5/5, DF 5/5, PF 5/5                    RIGHT LE - Hip ext 2/5, KE 2/5, DF 1/5, PF 1/5        Sensory - Intact to tactile stim BLE           ----------------------------------------------------------------------  RECENT LABS:  LABS:                                             11.0   5.42  )-----------( 186      ( 15 Jul 2021 05:00 )             34.2     07-15    136  |  102  |  23  ----------------------------<  104<H>  4.7   |  26  |  1.02    Ca    8.8      15 Jul 2021 05:00    TPro  6.5  /  Alb  2.8<L>  /  TBili  0.3  /  DBili  x   /  AST  24  /  ALT  77<H>  /  AlkPhos  75  07-15      CAPILLARY BLOOD GLUCOSE      ----------------------------------------------------------------------  RECENT IMAGING:    Xray Chest 1 View-PORTABLE IMMEDIATE 07.12.21  IMPRESSION: Unchanged chest as above.    US Gallbladder 07.10.21  IMPRESSION:  Cholelithiasis without definite secondary signs of acute cholecystitis    ----------------------------------------------------------------------  MEDICATIONS  (STANDING):  aspirin  chewable 81 milliGRAM(s) Oral daily  budesonide 160 MICROgram(s)/formoterol 4.5 MICROgram(s) Inhaler 2 Puff(s) Inhalation two times a day  clopidogrel Tablet 75 milliGRAM(s) Oral daily  enoxaparin Injectable 40 milliGRAM(s) SubCutaneous daily  gabapentin 100 milliGRAM(s) Oral at bedtime  lidocaine   Patch 1 Patch Transdermal daily  melatonin 6 milliGRAM(s) Oral at bedtime  montelukast 10 milliGRAM(s) Oral daily  pantoprazole    Tablet 40 milliGRAM(s) Oral before breakfast  polyethylene glycol 3350 17 Gram(s) Oral daily  sucralfate 1 Gram(s) Oral four times a day     MEDICATIONS  (PRN):  ALBUTerol    90 MICROgram(s) HFA Inhaler 1 Puff(s) Inhalation every 6 hours PRN Shortness of Breath and/or Wheezing  aluminum hydroxide/magnesium hydroxide/simethicone Suspension 30 milliLiter(s) Oral every 4 hours PRN Dyspepsia  bisacodyl Suppository 10 milliGRAM(s) Rectal daily PRN Constipation  simethicone 80 milliGRAM(s) Chew four times a day PRN Upset Stomach

## 2021-07-17 NOTE — PROGRESS NOTE ADULT - SUBJECTIVE AND OBJECTIVE BOX
Patient is a 65y old  Female who presents with a chief complaint of s/p left BG CVA with right sided weakness (16 Jul 2021 13:44)      SUBJECTIVE / OVERNIGHT EVENTS:  Pt seen and examined at bedside. No acute events overnight.  Pt denies cp, palpitations, sob, abd pain, N/V, fever, chills.    ROS:  All other review of systems negative    Allergies    No Known Allergies    Intolerances        MEDICATIONS  (STANDING):  aspirin  chewable 81 milliGRAM(s) Oral daily  budesonide 160 MICROgram(s)/formoterol 4.5 MICROgram(s) Inhaler 2 Puff(s) Inhalation two times a day  clopidogrel Tablet 75 milliGRAM(s) Oral daily  enoxaparin Injectable 40 milliGRAM(s) SubCutaneous daily  gabapentin 100 milliGRAM(s) Oral at bedtime  lidocaine   Patch 1 Patch Transdermal daily  melatonin 6 milliGRAM(s) Oral at bedtime  montelukast 10 milliGRAM(s) Oral daily  pantoprazole    Tablet 40 milliGRAM(s) Oral before breakfast  polyethylene glycol 3350 17 Gram(s) Oral daily  sucralfate 1 Gram(s) Oral four times a day    MEDICATIONS  (PRN):  ALBUTerol    90 MICROgram(s) HFA Inhaler 1 Puff(s) Inhalation every 6 hours PRN Shortness of Breath and/or Wheezing  aluminum hydroxide/magnesium hydroxide/simethicone Suspension 30 milliLiter(s) Oral every 4 hours PRN Dyspepsia  bisacodyl Suppository 10 milliGRAM(s) Rectal daily PRN Constipation  simethicone 80 milliGRAM(s) Chew four times a day PRN Upset Stomach      Vital Signs Last 24 Hrs  T(C): 36.9 (17 Jul 2021 07:55), Max: 36.9 (17 Jul 2021 07:55)  T(F): 98.4 (17 Jul 2021 07:55), Max: 98.4 (17 Jul 2021 07:55)  HR: 76 (17 Jul 2021 08:22) (64 - 89)  BP: 137/68 (17 Jul 2021 07:55) (131/73 - 138/74)  BP(mean): --  RR: 14 (17 Jul 2021 07:55) (14 - 16)  SpO2: 99% (17 Jul 2021 08:22) (98% - 99%)  CAPILLARY BLOOD GLUCOSE        I&O's Summary      PHYSICAL EXAM:  GENERAL: NAD, elderly female  HEAD:  Atraumatic, Normocephalic  EYES: EOMI, PERRLA, conjunctiva and sclera clear  NECK: Supple, No JVD  CHEST/LUNG: Clear to auscultation bilaterally; No wheeze, nonlabored breathing  HEART: Regular rate and rhythm; No murmurs, rubs, or gallops  ABDOMEN: Soft, Nontender, Nondistended; Bowel sounds present  EXTREMITIES:  2+ Peripheral Pulses, No clubbing, cyanosis, or edema  NEURO: Alert and awake, right sided paresis   PSYCH: calm, appropriate mood    LABS:                    RADIOLOGY & ADDITIONAL TESTS:  Results Reviewed:   Imaging Personally Reviewed:  Electrocardiogram Personally Reviewed:    COORDINATION OF CARE:  Care Discussed with Consultants/Other Providers [Y/N]:  Prior or Outpatient Records Reviewed [Y/N]:

## 2021-07-17 NOTE — PROGRESS NOTE ADULT - ASSESSMENT
65 year old RH woman with past medical history of asthma and HTN not on antithrombotics presented as code stroke to [facility] ED for right-sided weakness with LWK  11p. 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 Also mild aneurysmal dilatation of the origin of the left inferior M2 branch.  -neurosx follow up OP    #HTN  -Normotensive while off medications  -monitor off meds    #NANCY  -likely pre-renal  -improving  -encouraged PO hydration    #Asthma  -Symbicort, alb prn, singular

## 2021-07-18 PROCEDURE — 99232 SBSQ HOSP IP/OBS MODERATE 35: CPT

## 2021-07-18 RX ADMIN — Medication 6 MILLIGRAM(S): at 21:28

## 2021-07-18 RX ADMIN — MONTELUKAST 10 MILLIGRAM(S): 4 TABLET, CHEWABLE ORAL at 12:06

## 2021-07-18 RX ADMIN — Medication 1 GRAM(S): at 12:06

## 2021-07-18 RX ADMIN — ENOXAPARIN SODIUM 40 MILLIGRAM(S): 100 INJECTION SUBCUTANEOUS at 12:06

## 2021-07-18 RX ADMIN — BUDESONIDE AND FORMOTEROL FUMARATE DIHYDRATE 2 PUFF(S): 160; 4.5 AEROSOL RESPIRATORY (INHALATION) at 21:33

## 2021-07-18 RX ADMIN — CLOPIDOGREL BISULFATE 75 MILLIGRAM(S): 75 TABLET, FILM COATED ORAL at 12:06

## 2021-07-18 RX ADMIN — BUDESONIDE AND FORMOTEROL FUMARATE DIHYDRATE 2 PUFF(S): 160; 4.5 AEROSOL RESPIRATORY (INHALATION) at 08:16

## 2021-07-18 RX ADMIN — LIDOCAINE 1 PATCH: 4 CREAM TOPICAL at 20:18

## 2021-07-18 RX ADMIN — Medication 81 MILLIGRAM(S): at 12:06

## 2021-07-18 RX ADMIN — LIDOCAINE 1 PATCH: 4 CREAM TOPICAL at 12:08

## 2021-07-18 RX ADMIN — Medication 1 GRAM(S): at 21:28

## 2021-07-18 RX ADMIN — PANTOPRAZOLE SODIUM 40 MILLIGRAM(S): 20 TABLET, DELAYED RELEASE ORAL at 06:36

## 2021-07-18 RX ADMIN — Medication 1 GRAM(S): at 18:04

## 2021-07-18 RX ADMIN — Medication 1 GRAM(S): at 05:06

## 2021-07-18 RX ADMIN — LIDOCAINE 1 PATCH: 4 CREAM TOPICAL at 23:15

## 2021-07-18 RX ADMIN — GABAPENTIN 100 MILLIGRAM(S): 400 CAPSULE ORAL at 21:28

## 2021-07-18 NOTE — PROGRESS NOTE ADULT - SUBJECTIVE AND OBJECTIVE BOX
HPI:    64 yo RH female with PMH of asthma, HTN to [facility] ED 6/29 for right-sided weakness and slurred speech. CTH neg acute cva, old lacunar infarcts b/l BG. Out of window for tPA. CTA 6/29 shows Aneurysms suspected involving the left middle cerebral artery as described above. Transferred from [facility] to [facility] for possible evaluation of aneurysms noted on imaging. MRA 6/30 showed mild age-appropriate involutional and ischemic gliotic changes with old bilateral basal ganglia lacunar infarcts. Small acute infarct left basal ganglia and corona radiata. No evidence of hemorrhage. 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 Also mild aneurysmal dilatation of the origin of the left inferior M2 branch. Hospitalization complicated by NANCY, IVF completed. Failed swallow eval-tolerates Puree honey. TTE as outpatient. HbA1c 5.9 and . Evaluated by PMR and acute rehab recommended. Cleared for dc on 7/2.  Patient arrived to [facility] on 7/2/21 for acute inpatient rehab. Celi iterpreter #[id number]. Pt c/o difficulty speaking and still unable to more her right upper and right lower extremities.  (02 Jul 2021 15:21)    PAST MEDICAL & SURGICAL HISTORY:  Brain aneurysm    Hypertension    Brain aneurysm    Smoking    No significant past surgical history    No significant past surgical history      Allergies    No Known Allergies    Intolerances      ----------------------------------------------------------------------    Subjective:    Patient seen and examined at bedside this morning. Patient in bed in NAD.  Pt slept well overnight     ROS:  UTO    ----------------------------------------------------------------------  PHYSICAL EXAM:  Vital Signs Last 24 Hrs  T(C): 36.6 (18 Jul 2021 08:47), Max: 36.6 (17 Jul 2021 20:50)  T(F): 97.8 (18 Jul 2021 08:47), Max: 97.8 (17 Jul 2021 20:50)  HR: 71 (18 Jul 2021 08:47) (71 - 76)  BP: 138/80 (18 Jul 2021 08:47) (138/80 - 141/73)  BP(mean): --  RR: 15 (18 Jul 2021 08:47) (15 - 16)  SpO2: 95% (18 Jul 2021 08:47) (94% - 97%)      Gen - NAD, Comfortable  HEENT - NCAT, MMM  Pulm - CTAB,   Cardiovascular - RRR,   Abdomen - Soft, NT/ND, +BS.   Extremities - No Edema, No calf tenderness  Neuro-       Motor -                    LEFT    UE - ShAB 5/5, EF 5/5, EE 5/5, WE 5/5,  5/5                    RIGHT UE - ShAB 2/5, EF 2/5, EE 1/5, WE 0/5,  0/5                    LEFT    LE - HF 5/5, KE 5/5, DF 5/5, PF 5/5                    RIGHT LE - Hip ext 2/5, KE 2/5, DF 1/5, PF 1/5        Sensory - Intact to tactile stim BLE           ----------------------------------------------------------------------  RECENT LABS:  LABS:                                             11.0   5.42  )-----------( 186      ( 15 Jul 2021 05:00 )             34.2     07-15    136  |  102  |  23  ----------------------------<  104<H>  4.7   |  26  |  1.02    Ca    8.8      15 Jul 2021 05:00    TPro  6.5  /  Alb  2.8<L>  /  TBili  0.3  /  DBili  x   /  AST  24  /  ALT  77<H>  /  AlkPhos  75  07-15      CAPILLARY BLOOD GLUCOSE      ----------------------------------------------------------------------  RECENT IMAGING:    Xray Chest 1 View-PORTABLE IMMEDIATE 07.12.21  IMPRESSION: Unchanged chest as above.    US Gallbladder 07.10.21  IMPRESSION:  Cholelithiasis without definite secondary signs of acute cholecystitis    ----------------------------------------------------------------------  MEDICATIONS  (STANDING):  aspirin  chewable 81 milliGRAM(s) Oral daily  budesonide 160 MICROgram(s)/formoterol 4.5 MICROgram(s) Inhaler 2 Puff(s) Inhalation two times a day  clopidogrel Tablet 75 milliGRAM(s) Oral daily  enoxaparin Injectable 40 milliGRAM(s) SubCutaneous daily  gabapentin 100 milliGRAM(s) Oral at bedtime  lidocaine   Patch 1 Patch Transdermal daily  melatonin 6 milliGRAM(s) Oral at bedtime  montelukast 10 milliGRAM(s) Oral daily  pantoprazole    Tablet 40 milliGRAM(s) Oral before breakfast  polyethylene glycol 3350 17 Gram(s) Oral daily  sucralfate 1 Gram(s) Oral four times a day     MEDICATIONS  (PRN):  ALBUTerol    90 MICROgram(s) HFA Inhaler 1 Puff(s) Inhalation every 6 hours PRN Shortness of Breath and/or Wheezing  aluminum hydroxide/magnesium hydroxide/simethicone Suspension 30 milliLiter(s) Oral every 4 hours PRN Dyspepsia  bisacodyl Suppository 10 milliGRAM(s) Rectal daily PRN Constipation  simethicone 80 milliGRAM(s) Chew four times a day PRN Upset Stomach

## 2021-07-18 NOTE — PROGRESS NOTE ADULT - ASSESSMENT
MARKIE HERNANDEZ is a 66yo Female with left subcortical CVA  with dysphagia, right Hemiparesis, decreased functional mobility, gait instability and ADL impairments.    #CVA  -DAPT with aspirin & plavix.   -fall and aspiration precautions  -TTE outpt  -neurology and cardio follow up outpt  -stopped the Lipitor due to elevated LFT     #Transaminitis  - stopped the Lipitor due to elevated LFT   - Cholelithiasis without definite secondary signs of acute cholecystitis  - hospitalist note appreciated.   - Consulted G.I-  Continue to hold statin now and monitor   & Avoid additional hepatotoxic meds.    --LFTs improved   -  nausea has resolved-- eating % of meals  -  Hepatitis panel is non reactive.  --tolerating soft diet & thin liquids   - CMP 7/19      Dyspepsia & Constipation   --cont. protonix  --cont. Maalox  - Glycolax   - Bisacodyl PRN constipation.   --Pepcid d/c'ed and Carafate added on 7/14    Insomnia  --cont. melatonin qhs    Dysphagia  -d/w speech therapy-- diet upgraded to -soft with thins.  Monitor PO tolerance     #HLD  -monitor LFTs    #HTN-  -Orthostatic hypotension improved off amlodipine.   -d/c amlodipine  -d/c IVFs.   -hospitalist  following    #NANCY--resolving  -monitor BMP  - creatinine back to baseline.     #Asthma  -continue symbicort and albuterol  --singulair  -monitor VS      Pain Mgmt - Tylenol PRN.   Cont. gabapentin 100 mg HS for leg paresthesias.   GI/Bowel Mgmt - Senna,   PRN  /Bladder Mgmt - Voiding        - DVT:  - Lovenox    Labs:  CBC, CMP 7/19

## 2021-07-18 NOTE — PROGRESS NOTE ADULT - SUBJECTIVE AND OBJECTIVE BOX
Patient is a 65y old  Female who presents with a chief complaint of s/p left BG CVA with right sided weakness (18 Jul 2021 08:55)      SUBJECTIVE / OVERNIGHT EVENTS:  Pt seen and examined at bedside. No acute events overnight.  Pt denies cp, palpitations, sob, abd pain, N/V, fever, chills.    ROS:  All other review of systems negative    Allergies    No Known Allergies    Intolerances        MEDICATIONS  (STANDING):  aspirin  chewable 81 milliGRAM(s) Oral daily  budesonide 160 MICROgram(s)/formoterol 4.5 MICROgram(s) Inhaler 2 Puff(s) Inhalation two times a day  clopidogrel Tablet 75 milliGRAM(s) Oral daily  enoxaparin Injectable 40 milliGRAM(s) SubCutaneous daily  gabapentin 100 milliGRAM(s) Oral at bedtime  lidocaine   Patch 1 Patch Transdermal daily  melatonin 6 milliGRAM(s) Oral at bedtime  montelukast 10 milliGRAM(s) Oral daily  pantoprazole    Tablet 40 milliGRAM(s) Oral before breakfast  polyethylene glycol 3350 17 Gram(s) Oral daily  sucralfate 1 Gram(s) Oral four times a day    MEDICATIONS  (PRN):  ALBUTerol    90 MICROgram(s) HFA Inhaler 1 Puff(s) Inhalation every 6 hours PRN Shortness of Breath and/or Wheezing  aluminum hydroxide/magnesium hydroxide/simethicone Suspension 30 milliLiter(s) Oral every 4 hours PRN Dyspepsia  bisacodyl Suppository 10 milliGRAM(s) Rectal daily PRN Constipation  simethicone 80 milliGRAM(s) Chew four times a day PRN Upset Stomach      Vital Signs Last 24 Hrs  T(C): 36.6 (18 Jul 2021 08:47), Max: 36.6 (17 Jul 2021 20:50)  T(F): 97.8 (18 Jul 2021 08:47), Max: 97.8 (17 Jul 2021 20:50)  HR: 71 (18 Jul 2021 08:47) (71 - 76)  BP: 138/80 (18 Jul 2021 08:47) (138/80 - 141/73)  BP(mean): --  RR: 15 (18 Jul 2021 08:47) (15 - 16)  SpO2: 95% (18 Jul 2021 08:47) (94% - 97%)  CAPILLARY BLOOD GLUCOSE        I&O's Summary      PHYSICAL EXAM:  GENERAL: NAD, elderly female  HEAD:  Atraumatic, Normocephalic  EYES: EOMI, PERRLA, conjunctiva and sclera clear  NECK: Supple, No JVD  CHEST/LUNG: Clear to auscultation bilaterally; No wheeze, nonlabored breathing  HEART: Regular rate and rhythm; No murmurs, rubs, or gallops  ABDOMEN: Soft, Nontender, Nondistended; Bowel sounds present  EXTREMITIES:  2+ Peripheral Pulses, No clubbing, cyanosis, or edema  NEURO: Alert and awake, right sided paresis   PSYCH: calm, appropriate mood    LABS:                    RADIOLOGY & ADDITIONAL TESTS:  Results Reviewed:   Imaging Personally Reviewed:  Electrocardiogram Personally Reviewed:    COORDINATION OF CARE:  Care Discussed with Consultants/Other Providers [Y/N]:  Prior or Outpatient Records Reviewed [Y/N]:

## 2021-07-18 NOTE — PROGRESS NOTE ADULT - ASSESSMENT
65 year old RH woman with past medical history of asthma and HTN not on antithrombotics presented as code stroke to [facility] ED for right-sided weakness with LWK  11p. 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 Also mild aneurysmal dilatation of the origin of the left inferior M2 branch.  -neurosx follow up OP    #HTN  -Normotensive while off medications  -monitor off meds    #NANCY  -likely pre-renal  -improving  -encouraged PO hydration    #Asthma  -Symbicort, alb prn, singular    #DVT ppx  Lovenox

## 2021-07-19 LAB
ALBUMIN SERPL ELPH-MCNC: 2.9 G/DL — LOW (ref 3.3–5)
ALP SERPL-CCNC: 78 U/L — SIGNIFICANT CHANGE UP (ref 40–120)
ALT FLD-CCNC: 50 U/L — HIGH (ref 10–45)
ANION GAP SERPL CALC-SCNC: 8 MMOL/L — SIGNIFICANT CHANGE UP (ref 5–17)
AST SERPL-CCNC: 22 U/L — SIGNIFICANT CHANGE UP (ref 10–40)
BILIRUB SERPL-MCNC: 0.2 MG/DL — SIGNIFICANT CHANGE UP (ref 0.2–1.2)
BUN SERPL-MCNC: 26 MG/DL — HIGH (ref 7–23)
CALCIUM SERPL-MCNC: 9 MG/DL — SIGNIFICANT CHANGE UP (ref 8.4–10.5)
CHLORIDE SERPL-SCNC: 103 MMOL/L — SIGNIFICANT CHANGE UP (ref 96–108)
CO2 SERPL-SCNC: 25 MMOL/L — SIGNIFICANT CHANGE UP (ref 22–31)
CREAT SERPL-MCNC: 1.28 MG/DL — SIGNIFICANT CHANGE UP (ref 0.5–1.3)
GLUCOSE SERPL-MCNC: 92 MG/DL — SIGNIFICANT CHANGE UP (ref 70–99)
HCT VFR BLD CALC: 35.4 % — SIGNIFICANT CHANGE UP (ref 34.5–45)
HGB BLD-MCNC: 11.3 G/DL — LOW (ref 11.5–15.5)
MCHC RBC-ENTMCNC: 29.4 PG — SIGNIFICANT CHANGE UP (ref 27–34)
MCHC RBC-ENTMCNC: 31.9 GM/DL — LOW (ref 32–36)
MCV RBC AUTO: 91.9 FL — SIGNIFICANT CHANGE UP (ref 80–100)
NRBC # BLD: 0 /100 WBCS — SIGNIFICANT CHANGE UP (ref 0–0)
PLATELET # BLD AUTO: 203 K/UL — SIGNIFICANT CHANGE UP (ref 150–400)
POTASSIUM SERPL-MCNC: 4.9 MMOL/L — SIGNIFICANT CHANGE UP (ref 3.5–5.3)
POTASSIUM SERPL-SCNC: 4.9 MMOL/L — SIGNIFICANT CHANGE UP (ref 3.5–5.3)
PROT SERPL-MCNC: 6.7 G/DL — SIGNIFICANT CHANGE UP (ref 6–8.3)
RBC # BLD: 3.85 M/UL — SIGNIFICANT CHANGE UP (ref 3.8–5.2)
RBC # FLD: 12.5 % — SIGNIFICANT CHANGE UP (ref 10.3–14.5)
SODIUM SERPL-SCNC: 136 MMOL/L — SIGNIFICANT CHANGE UP (ref 135–145)
WBC # BLD: 5.93 K/UL — SIGNIFICANT CHANGE UP (ref 3.8–10.5)
WBC # FLD AUTO: 5.93 K/UL — SIGNIFICANT CHANGE UP (ref 3.8–10.5)

## 2021-07-19 PROCEDURE — 99233 SBSQ HOSP IP/OBS HIGH 50: CPT

## 2021-07-19 PROCEDURE — 99232 SBSQ HOSP IP/OBS MODERATE 35: CPT

## 2021-07-19 RX ADMIN — Medication 1 GRAM(S): at 17:32

## 2021-07-19 RX ADMIN — Medication 1 GRAM(S): at 21:34

## 2021-07-19 RX ADMIN — ENOXAPARIN SODIUM 40 MILLIGRAM(S): 100 INJECTION SUBCUTANEOUS at 11:20

## 2021-07-19 RX ADMIN — PANTOPRAZOLE SODIUM 40 MILLIGRAM(S): 20 TABLET, DELAYED RELEASE ORAL at 05:41

## 2021-07-19 RX ADMIN — Medication 6 MILLIGRAM(S): at 21:34

## 2021-07-19 RX ADMIN — Medication 1 GRAM(S): at 11:21

## 2021-07-19 RX ADMIN — CLOPIDOGREL BISULFATE 75 MILLIGRAM(S): 75 TABLET, FILM COATED ORAL at 11:20

## 2021-07-19 RX ADMIN — Medication 1 GRAM(S): at 05:41

## 2021-07-19 RX ADMIN — GABAPENTIN 100 MILLIGRAM(S): 400 CAPSULE ORAL at 21:34

## 2021-07-19 RX ADMIN — BUDESONIDE AND FORMOTEROL FUMARATE DIHYDRATE 2 PUFF(S): 160; 4.5 AEROSOL RESPIRATORY (INHALATION) at 21:41

## 2021-07-19 RX ADMIN — MONTELUKAST 10 MILLIGRAM(S): 4 TABLET, CHEWABLE ORAL at 11:20

## 2021-07-19 RX ADMIN — BUDESONIDE AND FORMOTEROL FUMARATE DIHYDRATE 2 PUFF(S): 160; 4.5 AEROSOL RESPIRATORY (INHALATION) at 08:09

## 2021-07-19 RX ADMIN — LIDOCAINE 1 PATCH: 4 CREAM TOPICAL at 11:20

## 2021-07-19 RX ADMIN — LIDOCAINE 1 PATCH: 4 CREAM TOPICAL at 23:41

## 2021-07-19 RX ADMIN — Medication 81 MILLIGRAM(S): at 11:20

## 2021-07-19 RX ADMIN — LIDOCAINE 1 PATCH: 4 CREAM TOPICAL at 19:48

## 2021-07-19 NOTE — PROGRESS NOTE ADULT - TIME BILLING
** Spoke with pt's  _son after family training___, to provide update on pt's status,  medical update, medications, and discharge needs and planning.  Pt. will have assistance at home from family.  Son feels comfortable with caring for his mother and states he will teach his wife and sister and does not feel they need to come in for training. Discharge home planning 7/21 or 7/22.  All questions answered.
** Spoke with pt's  _son, at bedside___, to provide update on pt's status,  medical update, progress in therapy, ELOS and discharge needs/expectations.    Pt. will have  assistance at home from family.  Son states he would like to take pt. home soon.  Discussed that medical issues of elevated LFTs and gallstones need to be addressed.  Explained level of care pt. needs and that son should come in for family training to plan for discharge as patient will need a lot of help at home.  He expressed understanding.  All questions answered.

## 2021-07-19 NOTE — PROGRESS NOTE ADULT - SUBJECTIVE AND OBJECTIVE BOX
HPI:    66 yo RH female with PMH of asthma, HTN to [facility] ED 6/29 for right-sided weakness and slurred speech. CTH neg acute cva, old lacunar infarcts b/l BG. Out of window for tPA. CTA 6/29 shows Aneurysms suspected involving the left middle cerebral artery as described above. Transferred from [facility] to [facility] for possible evaluation of aneurysms noted on imaging. MRA 6/30 showed mild age-appropriate involutional and ischemic gliotic changes with old bilateral basal ganglia lacunar infarcts. Small acute infarct left basal ganglia and corona radiata. No evidence of hemorrhage. 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 Also mild aneurysmal dilatation of the origin of the left inferior M2 branch. Hospitalization complicated by NANCY, IVF completed. Failed swallow eval-tolerates Puree honey. TTE as outpatient. HbA1c 5.9 and . Evaluated by PMR and acute rehab recommended. Cleared for dc on 7/2.  Patient arrived to [facility] on 7/2/21 for acute inpatient rehab. Celi iterpreter #[id number]. Pt c/o difficulty speaking and still unable to more her right upper and right lower extremities.  (02 Jul 2021 15:21)    PAST MEDICAL & SURGICAL HISTORY:  Brain aneurysm    Hypertension    Brain aneurysm    Smoking    No significant past surgical history    No significant past surgical history    Allergies    No Known Allergies    Intolerances      ----------------------------------------------------------------------    Subjective:    Patient seen and examined at bedside. Using a Celi  #[id number]   No events overnight.   Pt slept well overnight as per nursing note.   Complains of cough & associated acid-reflux.   Also states Numbness in the RUE.    Denies any pain at this time.  Denies any-other complaints at this time.   Participating in therapy   Pt has family training today.     ROS:  SHARMILA    ----------------------------------------------------------------------  PHYSICAL EXAM:    ICU Vital Signs Last 24 Hrs  T(C): 36.4 (19 Jul 2021 08:49), Max: 36.6 (18 Jul 2021 20:28)  T(F): 97.6 (19 Jul 2021 08:49), Max: 97.8 (18 Jul 2021 20:28)  HR: 80 (19 Jul 2021 08:49) (72 - 80)  BP: 150/82 (19 Jul 2021 08:49) (147/82 - 150/82)  BP(mean): --  ABP: --  ABP(mean): --  RR: 14 (19 Jul 2021 08:49) (14 - 15)  SpO2: 95% (19 Jul 2021 08:49) (95% - 100%)    Gen - NAD, Comfortable  HEENT - NCAT, MMM  Pulm - CTAB,   Cardiovascular - RRR,   Abdomen - Soft, NT/ND, +BS.   Extremities - No Edema, No calf tenderness  Neuro-       Motor -                    LEFT    UE - ShAB 5/5, EF 5/5, EE 5/5, WE 5/5,  5/5                    RIGHT UE - ShAB 2/5, EF 2/5, EE 1/5, WE 0/5,  0/5                    LEFT    LE - HF 5/5, KE 5/5, DF 5/5, PF 5/5                    RIGHT LE - Hip ext 2/5, KE 2/5, DF 1/5, PF 1/5        Sensory - Intact to tactile stim BLE           ----------------------------------------------------------------------  RECENT LABS:  LABS:                          11.3   5.93  )-----------( 203      ( 19 Jul 2021 06:13 )             35.4     07-19    136  |  103  |  26<H>  ----------------------------<  92  4.9   |  25  |  1.28    Ca    9.0      19 Jul 2021 06:13    TPro  6.7  /  Alb  2.9<L>  /  TBili  0.2  /  DBili  x   /  AST  22  /  ALT  50<H>  /  AlkPhos  78  07-19      ----------------------------------------------------------------------  RECENT IMAGING:    Xray Chest 1 View-PORTABLE IMMEDIATE 07.12.21  IMPRESSION: Unchanged chest as above.    US Gallbladder 07.10.21  IMPRESSION:  Cholelithiasis without definite secondary signs of acute cholecystitis    ----------------------------------------------------------------------  MEDICATIONS  (STANDING):  aspirin  chewable 81 milliGRAM(s) Oral daily  budesonide 160 MICROgram(s)/formoterol 4.5 MICROgram(s) Inhaler 2 Puff(s) Inhalation two times a day  clopidogrel Tablet 75 milliGRAM(s) Oral daily  enoxaparin Injectable 40 milliGRAM(s) SubCutaneous daily  gabapentin 100 milliGRAM(s) Oral at bedtime  lidocaine   Patch 1 Patch Transdermal daily  melatonin 6 milliGRAM(s) Oral at bedtime  montelukast 10 milliGRAM(s) Oral daily  pantoprazole    Tablet 40 milliGRAM(s) Oral before breakfast  polyethylene glycol 3350 17 Gram(s) Oral daily  sucralfate 1 Gram(s) Oral four times a day    MEDICATIONS  (PRN):  ALBUTerol    90 MICROgram(s) HFA Inhaler 1 Puff(s) Inhalation every 6 hours PRN Shortness of Breath and/or Wheezing  aluminum hydroxide/magnesium hydroxide/simethicone Suspension 30 milliLiter(s) Oral every 4 hours PRN Dyspepsia  bisacodyl Suppository 10 milliGRAM(s) Rectal daily PRN Constipation  simethicone 80 milliGRAM(s) Chew four times a day PRN Upset Stomach

## 2021-07-19 NOTE — PROGRESS NOTE ADULT - SUBJECTIVE AND OBJECTIVE BOX
Patient is a 65y old  Female who presents with a chief complaint of s/p left BG CVA with right sided weakness (18 Jul 2021 10:28)      SUBJECTIVE / OVERNIGHT EVENTS:  Pt seen and examined at bedside. No acute events overnight.  Pt denies cp, palpitations, sob, abd pain, N/V, fever, chills.    ROS:  All other review of systems negative    Allergies    No Known Allergies    Intolerances        MEDICATIONS  (STANDING):  aspirin  chewable 81 milliGRAM(s) Oral daily  budesonide 160 MICROgram(s)/formoterol 4.5 MICROgram(s) Inhaler 2 Puff(s) Inhalation two times a day  clopidogrel Tablet 75 milliGRAM(s) Oral daily  enoxaparin Injectable 40 milliGRAM(s) SubCutaneous daily  gabapentin 100 milliGRAM(s) Oral at bedtime  lidocaine   Patch 1 Patch Transdermal daily  melatonin 6 milliGRAM(s) Oral at bedtime  montelukast 10 milliGRAM(s) Oral daily  pantoprazole    Tablet 40 milliGRAM(s) Oral before breakfast  polyethylene glycol 3350 17 Gram(s) Oral daily  sucralfate 1 Gram(s) Oral four times a day    MEDICATIONS  (PRN):  ALBUTerol    90 MICROgram(s) HFA Inhaler 1 Puff(s) Inhalation every 6 hours PRN Shortness of Breath and/or Wheezing  aluminum hydroxide/magnesium hydroxide/simethicone Suspension 30 milliLiter(s) Oral every 4 hours PRN Dyspepsia  bisacodyl Suppository 10 milliGRAM(s) Rectal daily PRN Constipation  simethicone 80 milliGRAM(s) Chew four times a day PRN Upset Stomach      Vital Signs Last 24 Hrs  T(C): 36.4 (19 Jul 2021 08:49), Max: 36.6 (18 Jul 2021 20:28)  T(F): 97.6 (19 Jul 2021 08:49), Max: 97.8 (18 Jul 2021 20:28)  HR: 80 (19 Jul 2021 08:49) (72 - 80)  BP: 150/82 (19 Jul 2021 08:49) (147/82 - 150/82)  BP(mean): --  RR: 14 (19 Jul 2021 08:49) (14 - 15)  SpO2: 95% (19 Jul 2021 08:49) (95% - 100%)  CAPILLARY BLOOD GLUCOSE        I&O's Summary      PHYSICAL EXAM:  GENERAL: NAD, elderly female  HEAD:  Atraumatic, Normocephalic  EYES: EOMI, PERRLA, conjunctiva and sclera clear  NECK: Supple, No JVD  CHEST/LUNG: Clear to auscultation bilaterally; No wheeze, nonlabored breathing  HEART: Regular rate and rhythm; No murmurs, rubs, or gallops  ABDOMEN: Soft, Nontender, Nondistended; Bowel sounds present  EXTREMITIES:  2+ Peripheral Pulses, No clubbing, cyanosis, or edema  NEURO: Alert and awake, right sided paresis   PSYCH: calm, appropriate mood    LABS:                        11.3   5.93  )-----------( 203      ( 19 Jul 2021 06:13 )             35.4     07-19    136  |  103  |  26<H>  ----------------------------<  92  4.9   |  25  |  1.28    Ca    9.0      19 Jul 2021 06:13    TPro  6.7  /  Alb  2.9<L>  /  TBili  0.2  /  DBili  x   /  AST  22  /  ALT  50<H>  /  AlkPhos  78  07-19              RADIOLOGY & ADDITIONAL TESTS:  Results Reviewed:   Imaging Personally Reviewed:  Electrocardiogram Personally Reviewed:    COORDINATION OF CARE:  Care Discussed with Consultants/Other Providers [Y/N]:  Prior or Outpatient Records Reviewed [Y/N]:

## 2021-07-19 NOTE — PROGRESS NOTE ADULT - ASSESSMENT
65 year old RH woman with past medical history of asthma and HTN not on antithrombotics presented as code stroke to [facility] ED for right-sided weakness with LWK  11p. 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 Also mild aneurysmal dilatation of the origin of the left inferior M2 branch.  -neurosx follow up OP    #HTN  -Normotensive while off medications  -monitor off meds    #NANCY on CKD stage III  -Resolved  -encouraged PO hydration    #Asthma  -Symbicort, alb prn, singular    #DVT ppx  Lovenox

## 2021-07-19 NOTE — CHART NOTE - NSCHARTNOTEFT_GEN_A_CORE
Nutrition Follow Up Note  Hospital Course (Per Electronic Medical Record): 66yo Female with left subcortical CVA  with dysphagia, right Hemiparesis, decreased functional mobility, gait instability and ADL impairments.  Source: Medical Record [X] Patient [ ] Family [X]- pt's son provided information       Diet: dysphagia 3 soft, thin liquids, no beef, no caffeine, no chocolate, no pork, Ensure Pudding daily  Pt tolerating diet with fair PO intake; pt consuming 50-75% of meals per nursing flow sheets. Per discussion w/ pt's son, pt prefers food from home but will eat vegetables, fish, yogurt from the hospital. Will provide requested foods as able.     Enteral/Parenteral Nutrition: N/A    Current Weight: 108 lbs (7/19)  106.2 lbs (7.18)  121.2 lbs (7/17)  112.6 lbs (7/14)  111.1 lbs (7/12)  (7/7) 108lbs  (7/2) 109.3lbs    Pertinent Medications: MEDICATIONS  (STANDING):  aspirin  chewable 81 milliGRAM(s) Oral daily  budesonide 160 MICROgram(s)/formoterol 4.5 MICROgram(s) Inhaler 2 Puff(s) Inhalation two times a day  clopidogrel Tablet 75 milliGRAM(s) Oral daily  enoxaparin Injectable 40 milliGRAM(s) SubCutaneous daily  gabapentin 100 milliGRAM(s) Oral at bedtime  lidocaine   Patch 1 Patch Transdermal daily  melatonin 6 milliGRAM(s) Oral at bedtime  montelukast 10 milliGRAM(s) Oral daily  pantoprazole    Tablet 40 milliGRAM(s) Oral before breakfast  polyethylene glycol 3350 17 Gram(s) Oral daily  sucralfate 1 Gram(s) Oral four times a day    MEDICATIONS  (PRN):  ALBUTerol    90 MICROgram(s) HFA Inhaler 1 Puff(s) Inhalation every 6 hours PRN Shortness of Breath and/or Wheezing  aluminum hydroxide/magnesium hydroxide/simethicone Suspension 30 milliLiter(s) Oral every 4 hours PRN Dyspepsia  bisacodyl Suppository 10 milliGRAM(s) Rectal daily PRN Constipation  simethicone 80 milliGRAM(s) Chew four times a day PRN Upset Stomach      Pertinent Labs:  07-19 Na136 mmol/L Glu 92 mg/dL K+ 4.9 mmol/L Cr  1.28 mg/dL BUN 26 mg/dL<H> 07-19 Alb 2.9 g/dL<L> 06-30 Chol 184 mg/dL LDL --    HDL 38 mg/dL<L> Trig 99 mg/dL        Skin: Skin intact per nursing flow sheets     Edema: No edema per nursing flow sheets     Last BM: on 7/18 Per nursing flowsheets     Estimated Needs:   [X] No Change since Previous Assessment  [ ] Recalculated:     Previous Nutrition Diagnosis:   Moderate malnutrition    Nutrition Diagnosis is [X] Ongoing    [ ] Resolved   [ ] Not Applicable      New Nutrition Diagnosis: [X] Not Applicable  [ ] Inadequate Protein Energy Intake   [ ] Inadequate Oral Intake   [ ] Excessive Energy Intake   [ ] Increased Nutrient Needs   [ ] Obesity   [ ] Altered GI Function   [ ] Unintended Weight Loss   [ ] Food & Nutrition Related Knowledge Deficit  [ ] Limited Adherence to nutrition related recommendations   [ ] Malnutrition      Interventions:   1. Recommend continuing with current diet, pt on Ensure Pudding. Order for Ensure Enlive was d/c when diet was upgraded. Recommend adding Ensure Enlive 1x/day  2. Honor food preferences as able  3. Encourage PO intake    Monitoring & Evaluation:   [X] Weights   [X] PO Intake   [X] Follow Up (Per Protocol)  [X] Tolerance to Diet Prescription   [X] Other: Labs     RD Remains Available.  Suzanne Ogden RD

## 2021-07-20 PROCEDURE — 99232 SBSQ HOSP IP/OBS MODERATE 35: CPT

## 2021-07-20 RX ORDER — CLOPIDOGREL BISULFATE 75 MG/1
1 TABLET, FILM COATED ORAL
Qty: 30 | Refills: 0
Start: 2021-07-20 | End: 2021-08-18

## 2021-07-20 RX ORDER — POLYETHYLENE GLYCOL 3350 17 G/17G
17 POWDER, FOR SOLUTION ORAL
Qty: 510 | Refills: 0
Start: 2021-07-20 | End: 2021-08-25

## 2021-07-20 RX ORDER — POLYETHYLENE GLYCOL 3350 17 G/17G
17 POWDER, FOR SOLUTION ORAL
Qty: 510 | Refills: 0
Start: 2021-07-20 | End: 2021-08-18

## 2021-07-20 RX ORDER — SIMETHICONE 80 MG/1
1 TABLET, CHEWABLE ORAL
Qty: 120 | Refills: 0
Start: 2021-07-20 | End: 2021-08-18

## 2021-07-20 RX ORDER — GABAPENTIN 400 MG/1
1 CAPSULE ORAL
Qty: 30 | Refills: 0
Start: 2021-07-20 | End: 2021-08-18

## 2021-07-20 RX ORDER — PANTOPRAZOLE SODIUM 20 MG/1
1 TABLET, DELAYED RELEASE ORAL
Qty: 30 | Refills: 0
Start: 2021-07-20 | End: 2021-08-25

## 2021-07-20 RX ORDER — BUDESONIDE AND FORMOTEROL FUMARATE DIHYDRATE 160; 4.5 UG/1; UG/1
2 AEROSOL RESPIRATORY (INHALATION)
Qty: 0 | Refills: 0 | DISCHARGE

## 2021-07-20 RX ORDER — FOLIC ACID 0.8 MG
1 TABLET ORAL
Qty: 0 | Refills: 0 | DISCHARGE

## 2021-07-20 RX ORDER — FAMOTIDINE 10 MG/ML
1 INJECTION INTRAVENOUS
Qty: 0 | Refills: 0 | DISCHARGE

## 2021-07-20 RX ORDER — SUCRALFATE 1 G
1 TABLET ORAL
Qty: 120 | Refills: 0
Start: 2021-07-20 | End: 2021-08-18

## 2021-07-20 RX ORDER — ATORVASTATIN CALCIUM 80 MG/1
1 TABLET, FILM COATED ORAL
Qty: 30 | Refills: 0
Start: 2021-07-20 | End: 2021-08-18

## 2021-07-20 RX ORDER — BUDESONIDE AND FORMOTEROL FUMARATE DIHYDRATE 160; 4.5 UG/1; UG/1
1 AEROSOL RESPIRATORY (INHALATION)
Qty: 60 | Refills: 0
Start: 2021-07-20 | End: 2021-08-18

## 2021-07-20 RX ORDER — ALBUTEROL 90 UG/1
1 AEROSOL, METERED ORAL
Qty: 1 | Refills: 0
Start: 2021-07-20 | End: 2021-08-25

## 2021-07-20 RX ORDER — ATORVASTATIN CALCIUM 80 MG/1
1 TABLET, FILM COATED ORAL
Qty: 30 | Refills: 0
Start: 2021-07-20 | End: 2021-08-25

## 2021-07-20 RX ORDER — CLOPIDOGREL BISULFATE 75 MG/1
1 TABLET, FILM COATED ORAL
Qty: 30 | Refills: 0
Start: 2021-07-20 | End: 2021-08-25

## 2021-07-20 RX ORDER — MONTELUKAST 4 MG/1
1 TABLET, CHEWABLE ORAL
Qty: 30 | Refills: 0
Start: 2021-07-20 | End: 2021-08-25

## 2021-07-20 RX ORDER — PANTOPRAZOLE SODIUM 20 MG/1
1 TABLET, DELAYED RELEASE ORAL
Qty: 30 | Refills: 0
Start: 2021-07-20 | End: 2021-08-18

## 2021-07-20 RX ORDER — ASPIRIN/CALCIUM CARB/MAGNESIUM 324 MG
1 TABLET ORAL
Qty: 30 | Refills: 0
Start: 2021-07-20 | End: 2021-08-18

## 2021-07-20 RX ORDER — MONTELUKAST 4 MG/1
1 TABLET, CHEWABLE ORAL
Qty: 0 | Refills: 0 | DISCHARGE

## 2021-07-20 RX ORDER — ALBUTEROL 90 UG/1
1 AEROSOL, METERED ORAL
Qty: 1 | Refills: 0
Start: 2021-07-20 | End: 2021-08-18

## 2021-07-20 RX ORDER — ASPIRIN/CALCIUM CARB/MAGNESIUM 324 MG
1 TABLET ORAL
Qty: 30 | Refills: 0
Start: 2021-07-20 | End: 2021-08-25

## 2021-07-20 RX ORDER — GABAPENTIN 400 MG/1
1 CAPSULE ORAL
Qty: 0 | Refills: 0 | DISCHARGE
Start: 2021-07-20

## 2021-07-20 RX ORDER — BUDESONIDE AND FORMOTEROL FUMARATE DIHYDRATE 160; 4.5 UG/1; UG/1
1 AEROSOL RESPIRATORY (INHALATION)
Qty: 60 | Refills: 0
Start: 2021-07-20 | End: 2021-08-25

## 2021-07-20 RX ORDER — MONTELUKAST 4 MG/1
1 TABLET, CHEWABLE ORAL
Qty: 30 | Refills: 0
Start: 2021-07-20 | End: 2021-08-18

## 2021-07-20 RX ORDER — ERGOCALCIFEROL 1.25 MG/1
1 CAPSULE ORAL
Qty: 0 | Refills: 0 | DISCHARGE

## 2021-07-20 RX ORDER — SIMETHICONE 80 MG/1
1 TABLET, CHEWABLE ORAL
Qty: 120 | Refills: 0
Start: 2021-07-20 | End: 2021-08-25

## 2021-07-20 RX ORDER — ALBUTEROL 90 UG/1
1 AEROSOL, METERED ORAL
Qty: 0 | Refills: 0 | DISCHARGE

## 2021-07-20 RX ORDER — ATORVASTATIN CALCIUM 80 MG/1
20 TABLET, FILM COATED ORAL AT BEDTIME
Refills: 0 | Status: DISCONTINUED | OUTPATIENT
Start: 2021-07-20 | End: 2021-07-23

## 2021-07-20 RX ORDER — SUCRALFATE 1 G
1 TABLET ORAL
Qty: 120 | Refills: 0
Start: 2021-07-20 | End: 2021-08-25

## 2021-07-20 RX ADMIN — Medication 1 GRAM(S): at 18:09

## 2021-07-20 RX ADMIN — Medication 6 MILLIGRAM(S): at 21:25

## 2021-07-20 RX ADMIN — LIDOCAINE 1 PATCH: 4 CREAM TOPICAL at 11:30

## 2021-07-20 RX ADMIN — ATORVASTATIN CALCIUM 20 MILLIGRAM(S): 80 TABLET, FILM COATED ORAL at 21:25

## 2021-07-20 RX ADMIN — BUDESONIDE AND FORMOTEROL FUMARATE DIHYDRATE 2 PUFF(S): 160; 4.5 AEROSOL RESPIRATORY (INHALATION) at 08:04

## 2021-07-20 RX ADMIN — CLOPIDOGREL BISULFATE 75 MILLIGRAM(S): 75 TABLET, FILM COATED ORAL at 11:29

## 2021-07-20 RX ADMIN — LIDOCAINE 1 PATCH: 4 CREAM TOPICAL at 20:06

## 2021-07-20 RX ADMIN — MONTELUKAST 10 MILLIGRAM(S): 4 TABLET, CHEWABLE ORAL at 11:32

## 2021-07-20 RX ADMIN — PANTOPRAZOLE SODIUM 40 MILLIGRAM(S): 20 TABLET, DELAYED RELEASE ORAL at 06:11

## 2021-07-20 RX ADMIN — Medication 1 GRAM(S): at 11:32

## 2021-07-20 RX ADMIN — Medication 81 MILLIGRAM(S): at 11:28

## 2021-07-20 RX ADMIN — BUDESONIDE AND FORMOTEROL FUMARATE DIHYDRATE 2 PUFF(S): 160; 4.5 AEROSOL RESPIRATORY (INHALATION) at 22:48

## 2021-07-20 RX ADMIN — Medication 1 GRAM(S): at 06:11

## 2021-07-20 RX ADMIN — ENOXAPARIN SODIUM 40 MILLIGRAM(S): 100 INJECTION SUBCUTANEOUS at 11:29

## 2021-07-20 RX ADMIN — GABAPENTIN 100 MILLIGRAM(S): 400 CAPSULE ORAL at 21:25

## 2021-07-20 RX ADMIN — Medication 1 GRAM(S): at 21:25

## 2021-07-20 NOTE — PROGRESS NOTE ADULT - SUBJECTIVE AND OBJECTIVE BOX
HPI:    64 yo RH female with PMH of asthma, HTN to [facility] ED 6/29 for right-sided weakness and slurred speech. CTH neg acute cva, old lacunar infarcts b/l BG. Out of window for tPA. CTA 6/29 shows Aneurysms suspected involving the left middle cerebral artery as described above. Transferred from [facility] to [facility] for possible evaluation of aneurysms noted on imaging. MRA 6/30 showed mild age-appropriate involutional and ischemic gliotic changes with old bilateral basal ganglia lacunar infarcts. Small acute infarct left basal ganglia and corona radiata. No evidence of hemorrhage. 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 Also mild aneurysmal dilatation of the origin of the left inferior M2 branch. Hospitalization complicated by NANCY, IVF completed. Failed swallow eval-tolerates Puree honey. TTE as outpatient. HbA1c 5.9 and . Evaluated by PMR and acute rehab recommended. Cleared for dc on 7/2.  Patient arrived to [facility] on 7/2/21 for acute inpatient rehab. Celi iterpreter #[id number]. Pt c/o difficulty speaking and still unable to more her right upper and right lower extremities.  (02 Jul 2021 15:21)    PAST MEDICAL & SURGICAL HISTORY:  Brain aneurysm    Hypertension    Brain aneurysm    Smoking    No significant past surgical history    No significant past surgical history    Allergies    No Known Allergies    Intolerances      ----------------------------------------------------------------------    Subjective:    Patient seen and examined at bedside. Using a Celi  #[id number]  No events overnight.   Pt is excited to go home tomorrow.   Pt slept well overnight as per nursing note.   Denies any pain at this time.    Denies any-other complaints at this time.   Participating in therapy   Pt has family training today.     ROS:  SHARMILA    ----------------------------------------------------------------------  PHYSICAL EXAM:    ICU Vital Signs Last 24 Hrs  T(C): 36.6 (20 Jul 2021 09:40), Max: 36.7 (19 Jul 2021 19:44)  T(F): 97.9 (20 Jul 2021 09:40), Max: 98 (19 Jul 2021 19:44)  HR: 86 (20 Jul 2021 09:40) (86 - 97)  BP: 131/79 (20 Jul 2021 09:40) (131/79 - 147/81)  BP(mean): --  ABP: --  ABP(mean): --  RR: 16 (20 Jul 2021 09:40) (15 - 16)  SpO2: 95% (20 Jul 2021 09:40) (95% - 97%)      Gen - NAD, Comfortable  HEENT - NCAT, MMM  Pulm - CTAB,   Cardiovascular - RRR,   Abdomen - Soft, NT/ND, +BS.   Extremities - No Edema, No calf tenderness  Neuro-       Motor -                    LEFT    UE - ShAB 5/5, EF 5/5, EE 5/5, WE 5/5,  5/5                    RIGHT UE - ShAB 2/5, EF 2/5, EE 1/5, WE 0/5,  0/5                    LEFT    LE - HF 5/5, KE 5/5, DF 5/5, PF 5/5                    RIGHT LE - Hip ext 2/5, KE 2/5, DF 1/5, PF 1/5        Sensory - Intact to tactile stim BLE           ----------------------------------------------------------------------  RECENT LABS:  LABS:                          11.3   5.93  )-----------( 203      ( 19 Jul 2021 06:13 )             35.4     07-19    136  |  103  |  26<H>  ----------------------------<  92  4.9   |  25  |  1.28    Ca    9.0      19 Jul 2021 06:13    TPro  6.7  /  Alb  2.9<L>  /  TBili  0.2  /  DBili  x   /  AST  22  /  ALT  50<H>  /  AlkPhos  78  07-19      ----------------------------------------------------------------------  RECENT IMAGING:    Xray Chest 1 View-PORTABLE IMMEDIATE 07.12.21  IMPRESSION: Unchanged chest as above.    US Gallbladder 07.10.21  IMPRESSION:  Cholelithiasis without definite secondary signs of acute cholecystitis    ----------------------------------------------------------------------  MEDICATIONS  (STANDING):  aspirin  chewable 81 milliGRAM(s) Oral daily  budesonide 160 MICROgram(s)/formoterol 4.5 MICROgram(s) Inhaler 2 Puff(s) Inhalation two times a day  clopidogrel Tablet 75 milliGRAM(s) Oral daily  enoxaparin Injectable 40 milliGRAM(s) SubCutaneous daily  gabapentin 100 milliGRAM(s) Oral at bedtime  lidocaine   Patch 1 Patch Transdermal daily  melatonin 6 milliGRAM(s) Oral at bedtime  montelukast 10 milliGRAM(s) Oral daily  pantoprazole    Tablet 40 milliGRAM(s) Oral before breakfast  polyethylene glycol 3350 17 Gram(s) Oral daily  sucralfate 1 Gram(s) Oral four times a day    MEDICATIONS  (PRN):  ALBUTerol    90 MICROgram(s) HFA Inhaler 1 Puff(s) Inhalation every 6 hours PRN Shortness of Breath and/or Wheezing  aluminum hydroxide/magnesium hydroxide/simethicone Suspension 30 milliLiter(s) Oral every 4 hours PRN Dyspepsia  bisacodyl Suppository 10 milliGRAM(s) Rectal daily PRN Constipation  simethicone 80 milliGRAM(s) Chew four times a day PRN Upset Stomach       HPI:    66 yo RH female with PMH of asthma, HTN to [facility] ED 6/29 for right-sided weakness and slurred speech. CTH neg acute cva, old lacunar infarcts b/l BG. Out of window for tPA. CTA 6/29 shows Aneurysms suspected involving the left middle cerebral artery as described above. Transferred from [facility] to [facility] for possible evaluation of aneurysms noted on imaging. MRA 6/30 showed mild age-appropriate involutional and ischemic gliotic changes with old bilateral basal ganglia lacunar infarcts. Small acute infarct left basal ganglia and corona radiata. No evidence of hemorrhage. 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 Also mild aneurysmal dilatation of the origin of the left inferior M2 branch. Hospitalization complicated by NANCY, IVF completed. Failed swallow eval-tolerates Puree honey. TTE as outpatient. HbA1c 5.9 and . Evaluated by PMR and acute rehab recommended. Cleared for dc on 7/2.  Patient arrived to [facility] on 7/2/21 for acute inpatient rehab. Celi iterpreter #[id number]. Pt c/o difficulty speaking and still unable to more her right upper and right lower extremities.  (02 Jul 2021 15:21)    PAST MEDICAL & SURGICAL HISTORY:  Brain aneurysm    Hypertension    Brain aneurysm    Smoking    No significant past surgical history    No significant past surgical history    Allergies    No Known Allergies    Intolerances      ----------------------------------------------------------------------    Subjective:    Patient seen and examined at bedside. Using a Celi  #[id number]  No events overnight.   Pt is excited to go home tomorrow.   Pt slept well overnight as per nursing note.   Denies any pain at this time.    Denies any-other complaints at this time.   Participating in therapy       ROS:  SHARMILA    ----------------------------------------------------------------------  PHYSICAL EXAM:    ICU Vital Signs Last 24 Hrs  T(C): 36.6 (20 Jul 2021 09:40), Max: 36.7 (19 Jul 2021 19:44)  T(F): 97.9 (20 Jul 2021 09:40), Max: 98 (19 Jul 2021 19:44)  HR: 86 (20 Jul 2021 09:40) (86 - 97)  BP: 131/79 (20 Jul 2021 09:40) (131/79 - 147/81)  BP(mean): --  ABP: --  ABP(mean): --  RR: 16 (20 Jul 2021 09:40) (15 - 16)  SpO2: 95% (20 Jul 2021 09:40) (95% - 97%)      Gen - NAD, Comfortable  HEENT - NCAT, MMM  Pulm - CTAB,   Cardiovascular - RRR,   Abdomen - Soft, NT/ND, +BS.   Extremities - No Edema, No calf tenderness  Neuro-       Motor -                    LEFT    UE - ShAB 5/5, EF 5/5, EE 5/5, WE 5/5,  5/5                    RIGHT UE - ShAB 2/5, EF 2/5, EE 1/5, WE 0/5,  0/5                    LEFT    LE - HF 5/5, KE 5/5, DF 5/5, PF 5/5                    RIGHT LE - Hip ext 2/5, KE 2/5, DF 1/5, PF 1/5        Sensory - Intact to tactile stim BLE           ----------------------------------------------------------------------  RECENT LABS:  LABS:                          11.3   5.93  )-----------( 203      ( 19 Jul 2021 06:13 )             35.4     07-19    136  |  103  |  26<H>  ----------------------------<  92  4.9   |  25  |  1.28    Ca    9.0      19 Jul 2021 06:13    TPro  6.7  /  Alb  2.9<L>  /  TBili  0.2  /  DBili  x   /  AST  22  /  ALT  50<H>  /  AlkPhos  78  07-19      ----------------------------------------------------------------------  RECENT IMAGING:    Xray Chest 1 View-PORTABLE IMMEDIATE 07.12.21  IMPRESSION: Unchanged chest as above.    US Gallbladder 07.10.21  IMPRESSION:  Cholelithiasis without definite secondary signs of acute cholecystitis    ----------------------------------------------------------------------  MEDICATIONS  (STANDING):  aspirin  chewable 81 milliGRAM(s) Oral daily  budesonide 160 MICROgram(s)/formoterol 4.5 MICROgram(s) Inhaler 2 Puff(s) Inhalation two times a day  clopidogrel Tablet 75 milliGRAM(s) Oral daily  enoxaparin Injectable 40 milliGRAM(s) SubCutaneous daily  gabapentin 100 milliGRAM(s) Oral at bedtime  lidocaine   Patch 1 Patch Transdermal daily  melatonin 6 milliGRAM(s) Oral at bedtime  montelukast 10 milliGRAM(s) Oral daily  pantoprazole    Tablet 40 milliGRAM(s) Oral before breakfast  polyethylene glycol 3350 17 Gram(s) Oral daily  sucralfate 1 Gram(s) Oral four times a day    MEDICATIONS  (PRN):  ALBUTerol    90 MICROgram(s) HFA Inhaler 1 Puff(s) Inhalation every 6 hours PRN Shortness of Breath and/or Wheezing  aluminum hydroxide/magnesium hydroxide/simethicone Suspension 30 milliLiter(s) Oral every 4 hours PRN Dyspepsia  bisacodyl Suppository 10 milliGRAM(s) Rectal daily PRN Constipation  simethicone 80 milliGRAM(s) Chew four times a day PRN Upset Stomach

## 2021-07-20 NOTE — PROGRESS NOTE ADULT - ASSESSMENT
MARKIE HERNANDEZ is a 66yo Female with left subcortical CVA  with dysphagia, right Hemiparesis, decreased functional mobility, gait instability and ADL impairments.    #CVA  -DAPT with aspirin & plavix.   -fall and aspiration precautions  -TTE outpt  -neurology and cardio follow up outpt  -Stopped the Lipitor due to elevated LFT. Now the Transaminitis has resolved.   - Ordered a brace for the R Wrist.   - Pt is excited to go home tomorrow.     #Transaminitis  - stopped the Lipitor due to elevated LFT. Now resolved.   - Cholelithiasis without definite secondary signs of acute cholecystitis  - hospitalist note appreciated.   - Consulted G.I-  Continue to hold statin now and monitor & Avoid additional hepatotoxic meds.    --LFTs improved   -  nausea has resolved-- eating % of meals  - Hepatitis panel is non reactive.  --tolerating soft diet & thin liquids       Dyspepsia & Constipation   --cont. protonix  --cont. Maalox  - Glycolax   - Bisacodyl PRN constipation.   --Pepcid d/c'ed and Carafate added on 7/14    Insomnia  --cont. melatonin qhs    Dysphagia  -Diet upgraded to -Mechanical soft with thins.  Monitor PO tolerance     #HTN-  -Orthostatic hypotension improved off amlodipine.   -d/c amlodipine  -d/c IVFs.   -hospitalist  following    #NANCY--resolving  -monitor BMP  - creatinine back to baseline.     #Asthma  -continue symbicort and albuterol  - singulair  - monitor VS      Pain Mgmt - Tylenol PRN.   Cont. gabapentin 100 mg HS for leg paresthesias.   GI/Bowel Mgmt - Senna,   PRN  /Bladder Mgmt - Voiding        - DVT:  - Lovenox    IDT 7/15/21:    --FT 7/19  SW: Lives in  with son, DIL, daughter w/ 4 JONNA and 10 steps inside-- Needed Assistance PTA with ADLs and Ambulation.  HHA 42h/week  OT: Min A e/g; Max A dressing; Tot A toileting, transfers, LBD.   PT: Transfer Mod A.  Has not performed Ambulation or stairs.  Goal Min A.  Speech: Thin liquids with mechanical soft; Short-term memory deficits, decreased attention.  +dysarthria.    ELOS:  7/21 or 22/21 home with home care    ** Patient needs an Semi-Electric Hospital Bed  as he requires frequent changes in body positioning to alleviate pain, prevent contractures, reduce risk of pressure ulcers, and avoid respiratory infections in ways not feasible in an ordinary bed.  She requires the head of the bed to be elevated at least 30degrees to avoid aspiration and to treat GERD.  Pt. requires hospital bed to position her to allow her caregivers to safely transfer her as she has right hemiparesis and requires maximum assistance to transfer out of bed.     MARKIE HERNANDEZ is a 64yo Female with left subcortical CVA  with dysphagia, right Hemiparesis, decreased functional mobility, gait instability and ADL impairments.    #CVA  -DAPT with aspirin & plavix.   -fall and aspiration precautions  -TTE outpt  -neurology and cardio follow up outpt  -Stopped the Lipitor due to elevated LFT. Now the Transaminitis has resolved.   - Ordered a brace for the R Wrist.   -  #Transaminitis  - stopped the Lipitor due to elevated LFT. Now resolved.   --resumed lipitor  - Cholelithiasis without definite secondary signs of acute cholecystitis  - hospitalist note appreciated.   - Consulted G.I-  held statin temporarily  & Avoid additional hepatotoxic meds.    --LFTs improved   -  nausea has resolved-- eating % of meals  - Hepatitis panel is non reactive.  --tolerating soft diet & thin liquids       Dyspepsia & Constipation   --cont. protonix  --cont. Maalox  - Glycolax   - Bisacodyl PRN constipation.   --Pepcid d/c'ed and Carafate added on 7/14    Insomnia  --cont. melatonin qhs    Dysphagia  -Diet upgraded to -Mechanical soft with thins.  Monitor PO tolerance     #HTN-  -Orthostatic hypotension improved off amlodipine.   -hospitalist  following    #NANCY--resolving  -monitor BMP  - creatinine back to baseline.     #Asthma  -continue symbicort and albuterol  - singulair  - monitor VS      Pain Mgmt - Tylenol PRN.   Cont. gabapentin 100 mg HS for leg paresthesias.   GI/Bowel Mgmt - Senna,   PRN  /Bladder Mgmt - Voiding        - DVT:  - Lovenox    IDT 7/15/21:    --FT 7/19  SW: Lives in  with son, DIL, daughter w/ 4 JONNA and 10 steps inside-- Needed Assistance PTA with ADLs and Ambulation.  HHA 42h/week  OT: Min A e/g; Max A dressing; Tot A toileting, transfers, LBD.   PT: Transfer Mod A.  Has not performed Ambulation or stairs.  Goal Min A.  Speech: Thin liquids with mechanical soft; Short-term memory deficits, decreased attention.  +dysarthria.    ELOS:  7/21 or 22/21 home with home care    ** Patient needs an Semi-Electric Hospital Bed  as he requires frequent changes in body positioning to alleviate pain, prevent contractures, reduce risk of pressure ulcers, and avoid respiratory infections in ways not feasible in an ordinary bed.  She requires the head of the bed to be elevated at least 30degrees to avoid aspiration and to treat GERD.  Pt. requires hospital bed to position her to allow her caregivers to safely transfer her as she has right hemiparesis and requires maximum assistance to transfer out of bed.

## 2021-07-20 NOTE — PROGRESS NOTE ADULT - ATTENDING COMMENTS
Pt. seen with resident.  Agree with documentation above as per resident with amendments made as appropriate. Patient medically stable. Making progress towards rehab goals.     left MCA infarct

## 2021-07-21 PROCEDURE — 99232 SBSQ HOSP IP/OBS MODERATE 35: CPT

## 2021-07-21 RX ADMIN — PANTOPRAZOLE SODIUM 40 MILLIGRAM(S): 20 TABLET, DELAYED RELEASE ORAL at 05:21

## 2021-07-21 RX ADMIN — Medication 1 GRAM(S): at 05:21

## 2021-07-21 RX ADMIN — LIDOCAINE 1 PATCH: 4 CREAM TOPICAL at 11:11

## 2021-07-21 RX ADMIN — LIDOCAINE 1 PATCH: 4 CREAM TOPICAL at 00:17

## 2021-07-21 RX ADMIN — Medication 1 GRAM(S): at 21:28

## 2021-07-21 RX ADMIN — LIDOCAINE 1 PATCH: 4 CREAM TOPICAL at 23:17

## 2021-07-21 RX ADMIN — Medication 81 MILLIGRAM(S): at 11:11

## 2021-07-21 RX ADMIN — GABAPENTIN 100 MILLIGRAM(S): 400 CAPSULE ORAL at 21:29

## 2021-07-21 RX ADMIN — MONTELUKAST 10 MILLIGRAM(S): 4 TABLET, CHEWABLE ORAL at 11:11

## 2021-07-21 RX ADMIN — BUDESONIDE AND FORMOTEROL FUMARATE DIHYDRATE 2 PUFF(S): 160; 4.5 AEROSOL RESPIRATORY (INHALATION) at 09:11

## 2021-07-21 RX ADMIN — Medication 1 GRAM(S): at 17:07

## 2021-07-21 RX ADMIN — Medication 1 GRAM(S): at 11:11

## 2021-07-21 RX ADMIN — POLYETHYLENE GLYCOL 3350 17 GRAM(S): 17 POWDER, FOR SOLUTION ORAL at 11:11

## 2021-07-21 RX ADMIN — CLOPIDOGREL BISULFATE 75 MILLIGRAM(S): 75 TABLET, FILM COATED ORAL at 11:11

## 2021-07-21 RX ADMIN — LIDOCAINE 1 PATCH: 4 CREAM TOPICAL at 18:28

## 2021-07-21 RX ADMIN — ENOXAPARIN SODIUM 40 MILLIGRAM(S): 100 INJECTION SUBCUTANEOUS at 11:11

## 2021-07-21 RX ADMIN — BUDESONIDE AND FORMOTEROL FUMARATE DIHYDRATE 2 PUFF(S): 160; 4.5 AEROSOL RESPIRATORY (INHALATION) at 22:39

## 2021-07-21 RX ADMIN — ALBUTEROL 1 PUFF(S): 90 AEROSOL, METERED ORAL at 09:11

## 2021-07-21 RX ADMIN — Medication 6 MILLIGRAM(S): at 21:29

## 2021-07-21 RX ADMIN — ATORVASTATIN CALCIUM 20 MILLIGRAM(S): 80 TABLET, FILM COATED ORAL at 21:29

## 2021-07-21 NOTE — PROGRESS NOTE ADULT - SUBJECTIVE AND OBJECTIVE BOX
Patient is a 66y old  Female who presents with a chief complaint of s/p left BG CVA with right sided weakness (2021 13:30)    HPI:  66 yo RH female with PMH of asthma, HTN to [facility] ED  for right-sided weakness and slurred speech. CTH neg acute cva, old lacunar infarcts b/l BG. Out of window for tPA. CTA  shows Aneurysms suspected involving the left middle cerebral artery as described above. Transferred from [facility] to [facility] for possible evaluation of aneurysms noted on imaging. MRA  showed mild age-appropriate involutional and ischemic gliotic changes with old bilateral basal ganglia lacunar infarcts. Small acute infarct left basal ganglia and corona radiata. No evidence of hemorrhage. 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 Also mild aneurysmal dilatation of the origin of the left inferior M2 branch. Hospitalization complicated by NANCY, IVF completed. Failed swallow eval-tolerates Puree honey. TTE as outpatient. HbA1c 5.9 and . Evaluated by PMR and acute rehab recommended. Cleared for dc on .  Patient arrived to [facility] on 21 for acute inpatient rehab. Celi iterpreter #[id number]. Pt c/o difficulty speaking and still unable to more her right upper and right lower extremities.  (2021 15:21)    PAST MEDICAL & SURGICAL HISTORY:  Brain aneurysm    Hypertension    Brain aneurysm    Smoking    No significant past surgical history    No significant past surgical history      Allergies    No Known Allergies    Intolerances      ----------------------------------------------------------------------    SUBJECTIVE: Patient seen this morning with the aid of Celi  # [id number]. Patient was complaining of dyspepsia with associated cough. It is improved with elevating the head of the bed.      ROS:  Positive: dyspepsia with cough  Denies: headache, lightheadedness, CP, SOB, abdominal pain, dysuria, nausea, constipation      ----------------------------------------------------------------------  PHYSICAL EXAM:    Vital Signs Last 24 Hrs  T(C): 36.6 (2021 20:10), Max: 36.6 (2021 20:10)  T(F): 97.9 (2021 20:10), Max: 97.9 (2021 20:10)  HR: 84 (2021 09:30) (74 - 88)  BP: 127/77 (2021 07:59) (127/77 - 158/77)  BP(mean): --  RR: 16 (2021 07:59) (15 - 16)  SpO2: 98% (2021 09:30) (94% - 98%)  Daily     Daily Weight in k.4 (2021 00:00)      Gen - NAD, Comfortable  HEENT - NCAT, MMM  Pulm - CTAB,   Cardiovascular - RRR,   Abdomen - Soft, NT/ND, +BS.   Extremities - No Edema, No calf tenderness  Neuro-       Motor -                    LEFT    UE - ShAB 5/5, EF 5/5, EE 5/5, WE 5/5,  5/5                    RIGHT UE - ShAB 2/5, EF 2/5, EE 1/5, WE 0/5,  0/5                    LEFT    LE - HF 5/5, KE 5/5, DF 5/5, PF 5/5                    RIGHT LE - Hip ext 2/5, KE 2/5, DF 1/5, PF 1/5        Sensory - Intact to tactile stim BLE      ----------------------------------------------------------------------  RECENT LABS:            11.3   5.93  )-----------( 203      ( 2021 06:13 )             35.4     07-19    136  |  103  |  26<H>  ----------------------------<  92  4.9   |  25  |  1.28    Ca    9.0      2021 06:13    TPro  6.7  /  Alb  2.9<L>  /  TBili  0.2  /  DBili  x   /  AST  22  /  ALT  50<H>  /  AlkPhos  78  07-19                CAPILLARY BLOOD GLUCOSE        ----------------------------------------------------------------------  RECENT IMAGING:    US Gallbladder 07.10.21  IMPRESSION:  Cholelithiasis without definite secondary signs of acute cholecystitis    ----------------------------------------------------------------------  MEDICATIONS:  MEDICATIONS  (STANDING):  aspirin  chewable 81 milliGRAM(s) Oral daily  atorvastatin 20 milliGRAM(s) Oral at bedtime  budesonide 160 MICROgram(s)/formoterol 4.5 MICROgram(s) Inhaler 2 Puff(s) Inhalation two times a day  clopidogrel Tablet 75 milliGRAM(s) Oral daily  enoxaparin Injectable 40 milliGRAM(s) SubCutaneous daily  gabapentin 100 milliGRAM(s) Oral at bedtime  lidocaine   Patch 1 Patch Transdermal daily  melatonin 6 milliGRAM(s) Oral at bedtime  montelukast 10 milliGRAM(s) Oral daily  pantoprazole    Tablet 40 milliGRAM(s) Oral before breakfast  polyethylene glycol 3350 17 Gram(s) Oral daily  sucralfate 1 Gram(s) Oral four times a day    MEDICATIONS  (PRN):  ALBUTerol    90 MICROgram(s) HFA Inhaler 1 Puff(s) Inhalation every 6 hours PRN Shortness of Breath and/or Wheezing  aluminum hydroxide/magnesium hydroxide/simethicone Suspension 30 milliLiter(s) Oral every 4 hours PRN Dyspepsia  bisacodyl Suppository 10 milliGRAM(s) Rectal daily PRN Constipation  simethicone 80 milliGRAM(s) Chew four times a day PRN Upset Stomach    ----------------------------------------------------------------------    Assessment and Plan:   · Assessment	  MARKIE HERNANDEZ is a 64yo Female with left subcortical CVA  with dysphagia, right Hemiparesis, decreased functional mobility, gait instability and ADL impairments.    #CVA  -DAPT with aspirin & plavix.   -fall and aspiration precautions  -TTE outpt  -neurology and cardio follow up outpt  -Lipitor had previously been discontinued due to elevated LFTs. Resumed on . Will maintain on 20 mg HS for discharge.     #Transaminitis  - stopped the Lipitor due to elevated LFTs. Now resolved.   --resumed lipitor at 20 mg HS on   - Cholelithiasis without definite secondary signs of acute cholecystitis  - hospitalist note appreciated.   - Consulted G.I-  held statin temporarily  & Avoid additional hepatotoxic meds.    --LFTs improved   -  nausea has resolved-- eating % of meals  - Hepatitis panel is non reactive.  --tolerating soft diet & thin liquids       Dyspepsia & Constipation   --cont. protonix  --cont. Maalox  - Glycolax   - Bisacodyl PRN constipation.   --Pepcid d/c'ed and Carafate added on     Insomnia  --cont. melatonin qhs    Dysphagia  -Diet upgraded to -Mechanical soft with thins.  Monitor PO tolerance     #HTN-  -Orthostatic hypotension improved off amlodipine.   -hospitalist  following    #NANCY--resolving  -monitor BMP  - creatinine back to baseline.     #Asthma  -continue symbicort and albuterol  - singulair  - monitor VS    # Pain Mgmt - Tylenol PRN.   Cont. gabapentin 100 mg HS for leg paresthesias.   GI/Bowel Mgmt - Senna,   PRN  /Bladder Mgmt - Voiding    # DVT ppx:  - Lovenox    IDT 7/15/21:    --FT   SW: Lives in  with son, DIL, daughter w/ 4 JONNA and 10 steps inside-- Needed Assistance PTA with ADLs and Ambulation.  HHA 42h/week  OT: Min A e/g; Max A dressing; Tot A toileting, transfers, LBD.   PT: Transfer Mod A.  Has not performed Ambulation or stairs.  Goal Min A.  Speech: Thin liquids with mechanical soft; Short-term memory deficits, decreased attention.  +dysarthria.    ELOS: Home with home care as soon as equipment is delivered        Nutritional Assessment:  · Nutritional Assessment	This patient has been assessed with a concern for Malnutrition and has been determined to have a diagnosis/diagnoses of Moderate protein-calorie malnutrition.      The following pending diet order is being considered for treatment of Moderate protein-calorie malnutrition:  Diet Dysphagia 3 Soft-Thin Liquids-  No Beef  No Caffeine  No Chocolate  No Pork  Supplement Feeding Modality:  Oral  Ensure Enlive Servings Per Day:  1       Frequency:  Daily  Ensure Pudding Cans or Servings Per Day:  1       Frequency:  Daily  Entered: 2021  1:52PM

## 2021-07-21 NOTE — PROGRESS NOTE ADULT - ATTENDING COMMENTS
Pt. seen with fellow.  Agree with documentation above as per fellow with amendments made as appropriate. Patient medically stable. Making progress towards rehab goals.     Left basal ganglia and corona radiata infarct  Stable.

## 2021-07-21 NOTE — PROGRESS NOTE ADULT - ASSESSMENT
65F with asthma and HTN found to have acute onset right-sided weakness / right visual field cut deficit, dx with acute CVA, now at acute rehab    #Left basal ganglia infarct  -PT/OT/SLP  -BP control  -ASA, plavix, statin - would increase to 40 mg if okay with primary team because  and recent CVA (transaminitis is improved significantly)  -outpatient neuroophthalmology follow-up    #Left MCA aneurysm: outpatient neurosurgery follow-up, BP control    #Essential HTN: stable off medications    #CKD3: Mildly increased Cr, not technically NANCY currently, repeat BMP and encourage PO intake     #Asthma without exacerbation  -Symbicort, albuterol prn, singular    #DVT ppx: Lovenox

## 2021-07-21 NOTE — CHART NOTE - NSCHARTNOTEFT_GEN_A_CORE
65 y/o female admitted to BIU. GI following patient due to remote episode of nausea/vomiting and elevated LFTs. LFTs almost normal, most likely medication induced. Will sign off. Please reconsult as needed.

## 2021-07-21 NOTE — PROGRESS NOTE ADULT - SUBJECTIVE AND OBJECTIVE BOX
Patient is a 66y old  Female who presents with a chief complaint of s/p left BG CVA with right sided weakness (20 Jul 2021 13:45)    Patient seen and examined at bedside.  [facility]  ID # [id number] utilized    ALLERGIES:  No Known Allergies    MEDICATIONS  (STANDING):  aspirin  chewable 81 milliGRAM(s) Oral daily  atorvastatin 20 milliGRAM(s) Oral at bedtime  budesonide 160 MICROgram(s)/formoterol 4.5 MICROgram(s) Inhaler 2 Puff(s) Inhalation two times a day  clopidogrel Tablet 75 milliGRAM(s) Oral daily  enoxaparin Injectable 40 milliGRAM(s) SubCutaneous daily  gabapentin 100 milliGRAM(s) Oral at bedtime  lidocaine   Patch 1 Patch Transdermal daily  melatonin 6 milliGRAM(s) Oral at bedtime  montelukast 10 milliGRAM(s) Oral daily  pantoprazole    Tablet 40 milliGRAM(s) Oral before breakfast  polyethylene glycol 3350 17 Gram(s) Oral daily  sucralfate 1 Gram(s) Oral four times a day    MEDICATIONS  (PRN):  ALBUTerol    90 MICROgram(s) HFA Inhaler 1 Puff(s) Inhalation every 6 hours PRN Shortness of Breath and/or Wheezing  aluminum hydroxide/magnesium hydroxide/simethicone Suspension 30 milliLiter(s) Oral every 4 hours PRN Dyspepsia  bisacodyl Suppository 10 milliGRAM(s) Rectal daily PRN Constipation  simethicone 80 milliGRAM(s) Chew four times a day PRN Upset Stomach    Vital Signs Last 24 Hrs  T(F): 97.9 (20 Jul 2021 20:10), Max: 97.9 (20 Jul 2021 20:10)  HR: 84 (21 Jul 2021 09:30) (74 - 88)  BP: 127/77 (21 Jul 2021 07:59) (127/77 - 158/77)  RR: 16 (21 Jul 2021 07:59) (15 - 16)  SpO2: 98% (21 Jul 2021 09:30) (94% - 98%)  I&O's Summary    PHYSICAL EXAM:  General: NAD, A/O x 2-3 (July, but year uknown)  ENT: No gross hearing impairment, Moist mucous membranes, no thrush, temporal muscle wasting noted   Neck: Supple, No JVD  Lungs: Clear to auscultation bilaterally, good air entry, non-labored breathing  Cardio: RRR, S1/S2, No murmur  Abdomen: Soft, Nontender, Nondistended; Bowel sounds present  Extremities: No calf tenderness, No cyanosis, No pitting edema  Psych: Appropriate mood and affect  Neuro: right sided weakness, but moves all extremities     LABS:                        11.3   5.93  )-----------( 203      ( 19 Jul 2021 06:13 )             35.4     07-19    136  |  103  |  26  ----------------------------<  92  4.9   |  25  |  1.28    Ca    9.0      19 Jul 2021 06:13    TPro  6.7  /  Alb  2.9  /  TBili  0.2  /  DBili  x   /  AST  22  /  ALT  50  /  AlkPhos  78  07-19        eGFR if Non African American: 43 mL/min/1.73M2 (07-19-21 @ 06:13)      06-30 Chol 184 mg/dL LDL -- HDL 38 mg/dL Trig 99 mg/dL    COVID-19 PCR: NotDetec (06-29-21 @ 20:04)  COVID-19 PCR: NotDetec (06-29-21 @ 18:53)

## 2021-07-22 LAB
ALBUMIN SERPL ELPH-MCNC: 3.3 G/DL — SIGNIFICANT CHANGE UP (ref 3.3–5)
ALP SERPL-CCNC: 94 U/L — SIGNIFICANT CHANGE UP (ref 40–120)
ALT FLD-CCNC: 42 U/L — SIGNIFICANT CHANGE UP (ref 10–45)
ANION GAP SERPL CALC-SCNC: 7 MMOL/L — SIGNIFICANT CHANGE UP (ref 5–17)
AST SERPL-CCNC: 26 U/L — SIGNIFICANT CHANGE UP (ref 10–40)
BILIRUB SERPL-MCNC: 0.4 MG/DL — SIGNIFICANT CHANGE UP (ref 0.2–1.2)
BUN SERPL-MCNC: 23 MG/DL — SIGNIFICANT CHANGE UP (ref 7–23)
CALCIUM SERPL-MCNC: 9.5 MG/DL — SIGNIFICANT CHANGE UP (ref 8.4–10.5)
CHLORIDE SERPL-SCNC: 101 MMOL/L — SIGNIFICANT CHANGE UP (ref 96–108)
CO2 SERPL-SCNC: 28 MMOL/L — SIGNIFICANT CHANGE UP (ref 22–31)
CREAT SERPL-MCNC: 1.18 MG/DL — SIGNIFICANT CHANGE UP (ref 0.5–1.3)
GLUCOSE SERPL-MCNC: 99 MG/DL — SIGNIFICANT CHANGE UP (ref 70–99)
HCT VFR BLD CALC: 38.6 % — SIGNIFICANT CHANGE UP (ref 34.5–45)
HGB BLD-MCNC: 12.2 G/DL — SIGNIFICANT CHANGE UP (ref 11.5–15.5)
MCHC RBC-ENTMCNC: 29.5 PG — SIGNIFICANT CHANGE UP (ref 27–34)
MCHC RBC-ENTMCNC: 31.6 GM/DL — LOW (ref 32–36)
MCV RBC AUTO: 93.2 FL — SIGNIFICANT CHANGE UP (ref 80–100)
NRBC # BLD: 0 /100 WBCS — SIGNIFICANT CHANGE UP (ref 0–0)
PLATELET # BLD AUTO: 200 K/UL — SIGNIFICANT CHANGE UP (ref 150–400)
POTASSIUM SERPL-MCNC: 5 MMOL/L — SIGNIFICANT CHANGE UP (ref 3.5–5.3)
POTASSIUM SERPL-SCNC: 5 MMOL/L — SIGNIFICANT CHANGE UP (ref 3.5–5.3)
PROT SERPL-MCNC: 7.5 G/DL — SIGNIFICANT CHANGE UP (ref 6–8.3)
RBC # BLD: 4.14 M/UL — SIGNIFICANT CHANGE UP (ref 3.8–5.2)
RBC # FLD: 12.4 % — SIGNIFICANT CHANGE UP (ref 10.3–14.5)
SODIUM SERPL-SCNC: 136 MMOL/L — SIGNIFICANT CHANGE UP (ref 135–145)
TROPONIN I SERPL-MCNC: <.017 NG/ML — LOW (ref 0.02–0.06)
WBC # BLD: 5.33 K/UL — SIGNIFICANT CHANGE UP (ref 3.8–10.5)
WBC # FLD AUTO: 5.33 K/UL — SIGNIFICANT CHANGE UP (ref 3.8–10.5)

## 2021-07-22 PROCEDURE — 99232 SBSQ HOSP IP/OBS MODERATE 35: CPT

## 2021-07-22 PROCEDURE — 93010 ELECTROCARDIOGRAM REPORT: CPT

## 2021-07-22 RX ORDER — GABAPENTIN 400 MG/1
1 CAPSULE ORAL
Qty: 30 | Refills: 0
Start: 2021-07-22 | End: 2021-08-25

## 2021-07-22 RX ORDER — GABAPENTIN 400 MG/1
1 CAPSULE ORAL
Qty: 30 | Refills: 0
Start: 2021-07-22 | End: 2021-08-20

## 2021-07-22 RX ORDER — GABAPENTIN 400 MG/1
200 CAPSULE ORAL AT BEDTIME
Refills: 0 | Status: DISCONTINUED | OUTPATIENT
Start: 2021-07-22 | End: 2021-07-27

## 2021-07-22 RX ADMIN — BUDESONIDE AND FORMOTEROL FUMARATE DIHYDRATE 2 PUFF(S): 160; 4.5 AEROSOL RESPIRATORY (INHALATION) at 09:14

## 2021-07-22 RX ADMIN — Medication 6 MILLIGRAM(S): at 21:24

## 2021-07-22 RX ADMIN — ATORVASTATIN CALCIUM 20 MILLIGRAM(S): 80 TABLET, FILM COATED ORAL at 21:25

## 2021-07-22 RX ADMIN — ALBUTEROL 1 PUFF(S): 90 AEROSOL, METERED ORAL at 09:17

## 2021-07-22 RX ADMIN — LIDOCAINE 1 PATCH: 4 CREAM TOPICAL at 12:16

## 2021-07-22 RX ADMIN — BUDESONIDE AND FORMOTEROL FUMARATE DIHYDRATE 2 PUFF(S): 160; 4.5 AEROSOL RESPIRATORY (INHALATION) at 21:17

## 2021-07-22 RX ADMIN — CLOPIDOGREL BISULFATE 75 MILLIGRAM(S): 75 TABLET, FILM COATED ORAL at 12:16

## 2021-07-22 RX ADMIN — Medication 1 GRAM(S): at 18:24

## 2021-07-22 RX ADMIN — Medication 30 MILLILITER(S): at 11:16

## 2021-07-22 RX ADMIN — Medication 1 GRAM(S): at 12:16

## 2021-07-22 RX ADMIN — LIDOCAINE 1 PATCH: 4 CREAM TOPICAL at 18:21

## 2021-07-22 RX ADMIN — ENOXAPARIN SODIUM 40 MILLIGRAM(S): 100 INJECTION SUBCUTANEOUS at 12:16

## 2021-07-22 RX ADMIN — Medication 1 GRAM(S): at 05:51

## 2021-07-22 RX ADMIN — PANTOPRAZOLE SODIUM 40 MILLIGRAM(S): 20 TABLET, DELAYED RELEASE ORAL at 05:51

## 2021-07-22 RX ADMIN — Medication 81 MILLIGRAM(S): at 12:16

## 2021-07-22 RX ADMIN — MONTELUKAST 10 MILLIGRAM(S): 4 TABLET, CHEWABLE ORAL at 12:16

## 2021-07-22 RX ADMIN — GABAPENTIN 200 MILLIGRAM(S): 400 CAPSULE ORAL at 21:24

## 2021-07-22 NOTE — PROVIDER CONTACT NOTE (OTHER) - ASSESSMENT
Patient complaining of burning in chest and explains she has difficulty breathing. Vitals:/77, HR 89, temp 98.2, SP02 98%, RR 16, no SOB.  BP Retaken 168/78 HR 69 Sp02 99% RR 16. Pt on 1L Nasal Cannula O2, no s/s of distress/ shortness of breath.

## 2021-07-22 NOTE — PROVIDER CONTACT NOTE (OTHER) - BACKGROUND
Patient had EKG done after complaining of chest pain during therapy earlier in day. EKG showed no significant changes from previous EKG, Doctors aware. PT to be monitored.

## 2021-07-22 NOTE — PROGRESS NOTE ADULT - ASSESSMENT
MARKIE HERNANDEZ is a 66yo Female with left subcortical CVA  with dysphagia, right Hemiparesis, decreased functional mobility, gait instability and ADL impairments.    #CVA  -DAPT with aspirin & plavix.   -fall and aspiration precautions  -TTE outpt  -neurology and cardio follow up outpt  -Lipitor had previously been discontinued due to elevated LFTs. Resumed on 7/20. Will maintain on 20 mg HS for discharge.     #Transaminitis  - Resolved.   --Resumed lipitor at 20 mg HS on 7/20  - Cholelithiasis without definite secondary signs of acute cholecystitis  - hospitalist note appreciated.   - Consulted G.I-  held statin temporarily  & Avoid additional hepatotoxic meds.    --LFTs improved   -  nausea has resolved-- eating % of meals  - Hepatitis panel is non reactive.  --tolerating soft diet & thin liquids       Dyspepsia & Constipation   --cont. protonix  --cont. Maalox  - Glycolax   - Bisacodyl PRN constipation.   --Pepcid d/c'ed and Carafate added on 7/14    Insomnia  --cont. melatonin qhs    Dysphagia  -Diet upgraded to -Mechanical soft with thins.  Monitor PO tolerance     #HTN-  -Orthostatic hypotension improved off amlodipine.   -hospitalist  following    #NANCY--resolving  -monitor BMP  - creatinine back to baseline.     #Asthma  -continue symbicort and albuterol  - singulair  - monitor VS    # Pain Mgmt - Tylenol PRN.   Increased. gabapentin to 200 mg HS for leg paresthesias.   GI/Bowel Mgmt - Senna,   PRN  /Bladder Mgmt - Voiding    # DVT ppx:  - Lovenox    IDT 7/15/21:    --FT 7/19  SW: Lives in  with son, DIL, daughter w/ 4 JONNA and 10 steps inside-- Needed Assistance PTA with ADLs and Ambulation.  HHA 42h/week  OT: Min A e/g; Max A dressing; Tot A toileting, transfers, LBD.   PT: Transfer Mod A.  Has not performed Ambulation or stairs.  Goal Min A.  Speech: Thin liquids with mechanical soft; Short-term memory deficits, decreased attention.  +dysarthria.    ELOS: Home with home care as soon as equipment is delivered     MARKIE HERNANDEZ is a 66yo Female with left subcortical CVA  with dysphagia, right Hemiparesis, decreased functional mobility, gait instability and ADL impairments.    #CVA  -DAPT with aspirin & plavix.   -fall and aspiration precautions  -TTE outpt  -neurology and cardio follow up outpt  -Lipitor had previously been discontinued due to elevated LFTs. Resumed on 7/20. Will maintain on 20 mg HS for discharge.     #Transaminitis  - Resolved.   --Resumed lipitor at 20 mg HS on 7/20  - Cholelithiasis without definite secondary signs of acute cholecystitis  - hospitalist note appreciated.   - Consulted G.I-  held statin temporarily  & Avoid additional hepatotoxic meds.    --LFTs improved   -  nausea has resolved-- eating % of meals  - Hepatitis panel is non reactive.  --tolerating soft diet & thin liquids       Dyspepsia & Constipation   --cont. protonix  --cont. Maalox  - Glycolax   - Bisacodyl PRN constipation.   --Pepcid d/c'ed and Carafate added on 7/14    Insomnia  --cont. melatonin qhs    Dysphagia  -Diet upgraded to -Mechanical soft with thins.  Monitor PO tolerance     #HTN-  -Orthostatic hypotension improved off amlodipine.   -hospitalist  following    #NANCY--resolving  -monitor BMP  - creatinine back to baseline.     #Asthma  -continue symbicort and albuterol  - singulair  - monitor VS    # Pain Mgmt - Tylenol PRN.   Increased. gabapentin to 200 mg HS for leg paresthesias.   GI/Bowel Mgmt - Senna,   PRN  /Bladder Mgmt - Voiding    # DVT ppx:  - Lovenox        --FT 7/22  SW: Lives in  with son, DIL, daughter w/ 4 JONNA and 10 steps inside-- Needed Assistance PTA with ADLs and Ambulation.  HHA 42h/week  OT: Min A e/g; Max A dressing; Tot A toileting, transfers, LBD.   PT: Transfer Mod A.  Ambulates 20ft HR Max A.  Goal Min A.  Speech: Thin liquids with mechanical soft--tolerating diet better; Short-term memory deficits, decreased attention.  +dysarthria.    ELOS: Home with home care as soon as equipment is delivered

## 2021-07-22 NOTE — PROGRESS NOTE ADULT - ATTENDING COMMENTS
Pt. seen with resident.  Agree with documentation above as per resident with amendments made as appropriate. Patient medically stable. Making progress towards rehab goals.     Left MCA infarct  increased gabapentin to 200mg for neuropathy  d/c planning to home

## 2021-07-22 NOTE — PROVIDER CONTACT NOTE (OTHER) - SITUATION
Patient complaining of burning in chest and explains she has difficulty breathing. Vitals:/77, HR 89, temp 98.2, SP02 98%, RR 16, no SOB.  BP Retaken 168/78 HR 69 Sp02 99% RR 16. MD aware

## 2021-07-22 NOTE — PROGRESS NOTE ADULT - SUBJECTIVE AND OBJECTIVE BOX
Patient is a 66y old  Female who presents with a chief complaint of s/p left BG CVA with right sided weakness (21 Jul 2021 13:30)    HPI:  64 yo RH female with PMH of asthma, HTN to [facility] ED 6/29 for right-sided weakness and slurred speech. CTH neg acute cva, old lacunar infarcts b/l BG. Out of window for tPA. CTA 6/29 shows Aneurysms suspected involving the left middle cerebral artery as described above. Transferred from [facility] to [facility] for possible evaluation of aneurysms noted on imaging. MRA 6/30 showed mild age-appropriate involutional and ischemic gliotic changes with old bilateral basal ganglia lacunar infarcts. Small acute infarct left basal ganglia and corona radiata. No evidence of hemorrhage. 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 Also mild aneurysmal dilatation of the origin of the left inferior M2 branch. Hospitalization complicated by NANCY, IVF completed. Failed swallow eval-tolerates Puree honey. TTE as outpatient. HbA1c 5.9 and . Evaluated by PMR and acute rehab recommended. Cleared for dc on 7/2.  Patient arrived to [facility] on 7/2/21 for acute inpatient rehab. Celi iterpreter #[id number]. Pt c/o difficulty speaking and still unable to more her right upper and right lower extremities.  (02 Jul 2021 15:21)    PAST MEDICAL & SURGICAL HISTORY:  Brain aneurysm    Hypertension    Brain aneurysm    Smoking    No significant past surgical history    No significant past surgical history      Allergies    No Known Allergies    Intolerances      ----------------------------------------------------------------------    Subjective:    Patient seen and examined at bedside.  No events overnight.   Pt is complaining of worsen R leg neuropathy.   Pt slept well overnight as per nursing note.   Denies any pain at this time.   Denies any-other complaints at this time.   Participating in therapy       ROS:  Positive: dyspepsia with cough  Denies: headache, lightheadedness, CP, SOB, abdominal pain, dysuria, nausea, constipation      ----------------------------------------------------------------------  PHYSICAL EXAM:    ICU Vital Signs Last 24 Hrs  T(C): 36.4 (22 Jul 2021 07:56), Max: 36.6 (21 Jul 2021 20:24)  T(F): 97.6 (22 Jul 2021 07:56), Max: 97.8 (21 Jul 2021 20:24)  HR: 77 (22 Jul 2021 07:56) (73 - 77)  BP: 145/80 (22 Jul 2021 07:56) (136/79 - 145/80)  BP(mean): --  ABP: --  ABP(mean): --  RR: 16 (22 Jul 2021 07:56) (15 - 16)  SpO2: 96% (22 Jul 2021 07:56) (96% - 99%)      Gen - NAD, Comfortable  HEENT - NCAT, MMM  Pulm - CTAB,   Cardiovascular - RRR,   Abdomen - Soft, NT/ND, +BS.   Extremities - No Edema, No calf tenderness  Neuro-       Motor -                    LEFT    UE - ShAB 5/5, EF 5/5, EE 5/5, WE 5/5,  5/5                    RIGHT UE - ShAB 2/5, EF 2/5, EE 1/5, WE 0/5,  0/5                    LEFT    LE - HF 5/5, KE 5/5, DF 5/5, PF 5/5                    RIGHT LE - Hip ext 2/5, KE 2/5, DF 1/5, PF 1/5        Sensory - Intact to tactile stim BLE      ----------------------------------------------------------------------  RECENT LABS:                          12.2   5.33  )-----------( 200      ( 22 Jul 2021 05:30 )             38.6     07-22    136  |  101  |  23  ----------------------------<  99  5.0   |  28  |  1.18    Ca    9.5      22 Jul 2021 05:30    TPro  7.5  /  Alb  3.3  /  TBili  0.4  /  DBili  x   /  AST  26  /  ALT  42  /  AlkPhos  94  07-22        CAPILLARY BLOOD GLUCOSE        ----------------------------------------------------------------------  RECENT IMAGING:    US Gallbladder 07.10.21  IMPRESSION:  Cholelithiasis without definite secondary signs of acute cholecystitis    ----------------------------------------------------------------------  MEDICATIONS  (STANDING):  aspirin  chewable 81 milliGRAM(s) Oral daily  atorvastatin 20 milliGRAM(s) Oral at bedtime  budesonide 160 MICROgram(s)/formoterol 4.5 MICROgram(s) Inhaler 2 Puff(s) Inhalation two times a day  clopidogrel Tablet 75 milliGRAM(s) Oral daily  enoxaparin Injectable 40 milliGRAM(s) SubCutaneous daily  gabapentin 100 milliGRAM(s) Oral at bedtime  lidocaine   Patch 1 Patch Transdermal daily  melatonin 6 milliGRAM(s) Oral at bedtime  montelukast 10 milliGRAM(s) Oral daily  pantoprazole    Tablet 40 milliGRAM(s) Oral before breakfast  polyethylene glycol 3350 17 Gram(s) Oral daily  sucralfate 1 Gram(s) Oral four times a day    MEDICATIONS  (PRN):  ALBUTerol    90 MICROgram(s) HFA Inhaler 1 Puff(s) Inhalation every 6 hours PRN Shortness of Breath and/or Wheezing  aluminum hydroxide/magnesium hydroxide/simethicone Suspension 30 milliLiter(s) Oral every 4 hours PRN Dyspepsia  bisacodyl Suppository 10 milliGRAM(s) Rectal daily PRN Constipation  simethicone 80 milliGRAM(s) Chew four times a day PRN Upset Stomach    ----------------------------------------------------------------------             Patient is a 66y old  Female who presents with a chief complaint of s/p left BG CVA with right sided weakness (21 Jul 2021 13:30)    HPI:  64 yo RH female with PMH of asthma, HTN to [facility] ED 6/29 for right-sided weakness and slurred speech. CTH neg acute cva, old lacunar infarcts b/l BG. Out of window for tPA. CTA 6/29 shows Aneurysms suspected involving the left middle cerebral artery as described above. Transferred from [facility] to [facility] for possible evaluation of aneurysms noted on imaging. MRA 6/30 showed mild age-appropriate involutional and ischemic gliotic changes with old bilateral basal ganglia lacunar infarcts. Small acute infarct left basal ganglia and corona radiata. No evidence of hemorrhage. 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 Also mild aneurysmal dilatation of the origin of the left inferior M2 branch. Hospitalization complicated by NANCY, IVF completed. Failed swallow eval-tolerates Puree honey. TTE as outpatient. HbA1c 5.9 and . Evaluated by PMR and acute rehab recommended. Cleared for dc on 7/2.  Patient arrived to [facility] on 7/2/21 for acute inpatient rehab. Celi iterpreter #[id number]. Pt c/o difficulty speaking and still unable to more her right upper and right lower extremities.  (02 Jul 2021 15:21)    PAST MEDICAL & SURGICAL HISTORY:  Brain aneurysm    Hypertension    Brain aneurysm    Smoking    No significant past surgical history    No significant past surgical history      Allergies    No Known Allergies    Intolerances      ----------------------------------------------------------------------    Subjective:    Patient seen and examined at bedside. Using celi  #[id number]   No events overnight.   Pt is complaining of worsen R leg neuropathy.   Pt slept well overnight as per nursing note.   Denies any pain at this time.   Denies any-other complaints at this time.   Participating in therapy       ROS:  Positive: dyspepsia with cough  Denies: headache, lightheadedness, CP, SOB, abdominal pain, dysuria, nausea, constipation      ----------------------------------------------------------------------  PHYSICAL EXAM:    ICU Vital Signs Last 24 Hrs  T(C): 36.4 (22 Jul 2021 07:56), Max: 36.6 (21 Jul 2021 20:24)  T(F): 97.6 (22 Jul 2021 07:56), Max: 97.8 (21 Jul 2021 20:24)  HR: 77 (22 Jul 2021 07:56) (73 - 77)  BP: 145/80 (22 Jul 2021 07:56) (136/79 - 145/80)  BP(mean): --  ABP: --  ABP(mean): --  RR: 16 (22 Jul 2021 07:56) (15 - 16)  SpO2: 96% (22 Jul 2021 07:56) (96% - 99%)      Gen - NAD, Comfortable  HEENT - NCAT, MMM  Pulm - CTAB,   Cardiovascular - RRR,   Abdomen - Soft, NT/ND, +BS.   Extremities - No Edema, No calf tenderness  Neuro-       Motor -                    LEFT    UE - ShAB 5/5, EF 5/5, EE 5/5, WE 5/5,  5/5                    RIGHT UE - ShAB 2/5, EF 2/5, EE 1/5, WE 0/5,  0/5                    LEFT    LE - HF 5/5, KE 5/5, DF 5/5, PF 5/5                    RIGHT LE - Hip ext 2/5, KE 2/5, DF 1/5, PF 1/5        Sensory - Intact to tactile stim BLE      ----------------------------------------------------------------------  RECENT LABS:                          12.2   5.33  )-----------( 200      ( 22 Jul 2021 05:30 )             38.6     07-22    136  |  101  |  23  ----------------------------<  99  5.0   |  28  |  1.18    Ca    9.5      22 Jul 2021 05:30    TPro  7.5  /  Alb  3.3  /  TBili  0.4  /  DBili  x   /  AST  26  /  ALT  42  /  AlkPhos  94  07-22        CAPILLARY BLOOD GLUCOSE        ----------------------------------------------------------------------  RECENT IMAGING:    US Gallbladder 07.10.21  IMPRESSION:  Cholelithiasis without definite secondary signs of acute cholecystitis    ----------------------------------------------------------------------  MEDICATIONS  (STANDING):  aspirin  chewable 81 milliGRAM(s) Oral daily  atorvastatin 20 milliGRAM(s) Oral at bedtime  budesonide 160 MICROgram(s)/formoterol 4.5 MICROgram(s) Inhaler 2 Puff(s) Inhalation two times a day  clopidogrel Tablet 75 milliGRAM(s) Oral daily  enoxaparin Injectable 40 milliGRAM(s) SubCutaneous daily  gabapentin 100 milliGRAM(s) Oral at bedtime  lidocaine   Patch 1 Patch Transdermal daily  melatonin 6 milliGRAM(s) Oral at bedtime  montelukast 10 milliGRAM(s) Oral daily  pantoprazole    Tablet 40 milliGRAM(s) Oral before breakfast  polyethylene glycol 3350 17 Gram(s) Oral daily  sucralfate 1 Gram(s) Oral four times a day    MEDICATIONS  (PRN):  ALBUTerol    90 MICROgram(s) HFA Inhaler 1 Puff(s) Inhalation every 6 hours PRN Shortness of Breath and/or Wheezing  aluminum hydroxide/magnesium hydroxide/simethicone Suspension 30 milliLiter(s) Oral every 4 hours PRN Dyspepsia  bisacodyl Suppository 10 milliGRAM(s) Rectal daily PRN Constipation  simethicone 80 milliGRAM(s) Chew four times a day PRN Upset Stomach    ----------------------------------------------------------------------

## 2021-07-23 PROCEDURE — 99232 SBSQ HOSP IP/OBS MODERATE 35: CPT

## 2021-07-23 RX ORDER — ATORVASTATIN CALCIUM 80 MG/1
40 TABLET, FILM COATED ORAL AT BEDTIME
Refills: 0 | Status: DISCONTINUED | OUTPATIENT
Start: 2021-07-23 | End: 2021-07-27

## 2021-07-23 RX ORDER — ACETAMINOPHEN 500 MG
650 TABLET ORAL EVERY 6 HOURS
Refills: 0 | Status: DISCONTINUED | OUTPATIENT
Start: 2021-07-23 | End: 2021-07-27

## 2021-07-23 RX ADMIN — ENOXAPARIN SODIUM 40 MILLIGRAM(S): 100 INJECTION SUBCUTANEOUS at 12:28

## 2021-07-23 RX ADMIN — Medication 1 GRAM(S): at 12:29

## 2021-07-23 RX ADMIN — LIDOCAINE 1 PATCH: 4 CREAM TOPICAL at 19:14

## 2021-07-23 RX ADMIN — BUDESONIDE AND FORMOTEROL FUMARATE DIHYDRATE 2 PUFF(S): 160; 4.5 AEROSOL RESPIRATORY (INHALATION) at 21:08

## 2021-07-23 RX ADMIN — Medication 6 MILLIGRAM(S): at 21:16

## 2021-07-23 RX ADMIN — LIDOCAINE 1 PATCH: 4 CREAM TOPICAL at 12:28

## 2021-07-23 RX ADMIN — Medication 1 GRAM(S): at 05:25

## 2021-07-23 RX ADMIN — Medication 1 GRAM(S): at 00:25

## 2021-07-23 RX ADMIN — Medication 1 GRAM(S): at 17:24

## 2021-07-23 RX ADMIN — MONTELUKAST 10 MILLIGRAM(S): 4 TABLET, CHEWABLE ORAL at 12:28

## 2021-07-23 RX ADMIN — ATORVASTATIN CALCIUM 40 MILLIGRAM(S): 80 TABLET, FILM COATED ORAL at 21:16

## 2021-07-23 RX ADMIN — LIDOCAINE 1 PATCH: 4 CREAM TOPICAL at 00:24

## 2021-07-23 RX ADMIN — Medication 81 MILLIGRAM(S): at 12:28

## 2021-07-23 RX ADMIN — BUDESONIDE AND FORMOTEROL FUMARATE DIHYDRATE 2 PUFF(S): 160; 4.5 AEROSOL RESPIRATORY (INHALATION) at 08:26

## 2021-07-23 RX ADMIN — PANTOPRAZOLE SODIUM 40 MILLIGRAM(S): 20 TABLET, DELAYED RELEASE ORAL at 05:25

## 2021-07-23 RX ADMIN — GABAPENTIN 200 MILLIGRAM(S): 400 CAPSULE ORAL at 21:15

## 2021-07-23 RX ADMIN — LIDOCAINE 1 PATCH: 4 CREAM TOPICAL at 23:31

## 2021-07-23 RX ADMIN — Medication 1 GRAM(S): at 23:32

## 2021-07-23 RX ADMIN — CLOPIDOGREL BISULFATE 75 MILLIGRAM(S): 75 TABLET, FILM COATED ORAL at 12:28

## 2021-07-23 NOTE — PROGRESS NOTE ADULT - SUBJECTIVE AND OBJECTIVE BOX
Patient is a 66y old  Female who presents with a chief complaint of s/p left BG CVA with right sided weakness (21 Jul 2021 13:30)    HPI:  64 yo RH female with PMH of asthma, HTN to [facility] ED 6/29 for right-sided weakness and slurred speech. CTH neg acute cva, old lacunar infarcts b/l BG. Out of window for tPA. CTA 6/29 shows Aneurysms suspected involving the left middle cerebral artery as described above. Transferred from [facility] to [facility] for possible evaluation of aneurysms noted on imaging. MRA 6/30 showed mild age-appropriate involutional and ischemic gliotic changes with old bilateral basal ganglia lacunar infarcts. Small acute infarct left basal ganglia and corona radiata. No evidence of hemorrhage. 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 Also mild aneurysmal dilatation of the origin of the left inferior M2 branch. Hospitalization complicated by NANCY, IVF completed. Failed swallow eval-tolerates Puree honey. TTE as outpatient. HbA1c 5.9 and . Evaluated by PMR and acute rehab recommended. Cleared for dc on 7/2.  Patient arrived to [facility] on 7/2/21 for acute inpatient rehab. Celi iterpreter #[id number]. Pt c/o difficulty speaking and still unable to more her right upper and right lower extremities.  (02 Jul 2021 15:21)    PAST MEDICAL & SURGICAL HISTORY:  Brain aneurysm    Hypertension    Brain aneurysm    Smoking    No significant past surgical history    No significant past surgical history      Allergies    No Known Allergies    Intolerances      ----------------------------------------------------------------------    Subjective:    Patient seen and examined at bedside.   Pt was complaining of chest pain last evening. EKG was done which showed PVC's. Pt also had troponin done, which were negative x1.   This morning the patient complains of dizziness during therapy. VSS. Encourage patient to drink more fluids.   Pt slept well overnight as per nursing note.   Denies any pain at this time.   Denies any-other complaints at this time.   Participating in therapy       ROS:  Positive: dyspepsia with cough  Denies: headache, lightheadedness, CP, SOB, abdominal pain, dysuria, nausea, constipation    ----------------------------------------------------------------------  PHYSICAL EXAM:    ICU Vital Signs Last 24 Hrs  T(C): 36.3 (23 Jul 2021 09:02), Max: 36.8 (22 Jul 2021 19:25)  T(F): 97.4 (23 Jul 2021 09:02), Max: 98.2 (22 Jul 2021 19:25)  HR: 62 (23 Jul 2021 09:02) (62 - 89)  BP: 126/79 (23 Jul 2021 09:02) (126/79 - 168/78)  BP(mean): --  ABP: --  ABP(mean): --  RR: 14 (23 Jul 2021 09:02) (14 - 16)  SpO2: 100% (23 Jul 2021 09:02) (98% - 100%)      Gen - NAD, Comfortable  HEENT - NCAT, MMM  Pulm - CTAB,   Cardiovascular - RRR,   Abdomen - Soft, NT/ND, +BS.   Extremities - No Edema, No calf tenderness  Neuro-       Motor -                    LEFT    UE - ShAB 5/5, EF 5/5, EE 5/5, WE 5/5,  5/5                    RIGHT UE - ShAB 2/5, EF 2/5, EE 1/5, WE 0/5,  0/5                    LEFT    LE - HF 5/5, KE 5/5, DF 5/5, PF 5/5                    RIGHT LE - Hip ext 2/5, KE 2/5, DF 1/5, PF 1/5        Sensory - Intact to tactile stim BLE      ----------------------------------------------------------------------  [facility] LABS:                          12.2   5.33  )-----------( 200      ( 22 Jul 2021 05:30 )             38.6     07-22    136  |  101  |  23  ----------------------------<  99  5.0   |  28  |  1.18    Ca    9.5      22 Jul 2021 05:30    TPro  7.5  /  Alb  3.3  /  TBili  0.4  /  DBili  x   /  AST  26  /  ALT  42  /  AlkPhos  94  07-22        CAPILLARY BLOOD GLUCOSE    ----------------------------------------------------------------------  RECENT IMAGING:    US Gallbladder 07.10.21  IMPRESSION:  Cholelithiasis without definite secondary signs of acute cholecystitis    ----------------------------------------------------------------------  MEDICATIONS  (STANDING):  aspirin  chewable 81 milliGRAM(s) Oral daily  atorvastatin 40 milliGRAM(s) Oral at bedtime  budesonide 160 MICROgram(s)/formoterol 4.5 MICROgram(s) Inhaler 2 Puff(s) Inhalation two times a day  clopidogrel Tablet 75 milliGRAM(s) Oral daily  enoxaparin Injectable 40 milliGRAM(s) SubCutaneous daily  gabapentin 200 milliGRAM(s) Oral at bedtime  lidocaine   Patch 1 Patch Transdermal daily  melatonin 6 milliGRAM(s) Oral at bedtime  montelukast 10 milliGRAM(s) Oral daily  pantoprazole    Tablet 40 milliGRAM(s) Oral before breakfast  polyethylene glycol 3350 17 Gram(s) Oral daily  sucralfate 1 Gram(s) Oral four times a day    MEDICATIONS  (PRN):  ALBUTerol    90 MICROgram(s) HFA Inhaler 1 Puff(s) Inhalation every 6 hours PRN Shortness of Breath and/or Wheezing  aluminum hydroxide/magnesium hydroxide/simethicone Suspension 30 milliLiter(s) Oral every 4 hours PRN Dyspepsia  bisacodyl Suppository 10 milliGRAM(s) Rectal daily PRN Constipation  simethicone 80 milliGRAM(s) Chew four times a day PRN Upset Stomach    ----------------------------------------------------------------------

## 2021-07-23 NOTE — PROGRESS NOTE ADULT - SUBJECTIVE AND OBJECTIVE BOX
Patient is a 66y old  Female who presents with a chief complaint of s/p left BG CVA with right sided weakness (22 Jul 2021 13:21)      SUBJECTIVE / OVERNIGHT EVENTS:  Pt seen and examined at bedside. No acute events overnight. Yesterday afternoon pt with complaints of chest pain during therapy. Pt states chronic pain, which is reproducible when ribs are palpated.   Pt denies cp, palpitations, sob, abd pain, N/V, fever, chills.    ROS:  All other review of systems negative    Allergies    No Known Allergies    Intolerances        MEDICATIONS  (STANDING):  aspirin  chewable 81 milliGRAM(s) Oral daily  atorvastatin 20 milliGRAM(s) Oral at bedtime  budesonide 160 MICROgram(s)/formoterol 4.5 MICROgram(s) Inhaler 2 Puff(s) Inhalation two times a day  clopidogrel Tablet 75 milliGRAM(s) Oral daily  enoxaparin Injectable 40 milliGRAM(s) SubCutaneous daily  gabapentin 200 milliGRAM(s) Oral at bedtime  lidocaine   Patch 1 Patch Transdermal daily  melatonin 6 milliGRAM(s) Oral at bedtime  montelukast 10 milliGRAM(s) Oral daily  pantoprazole    Tablet 40 milliGRAM(s) Oral before breakfast  polyethylene glycol 3350 17 Gram(s) Oral daily  sucralfate 1 Gram(s) Oral four times a day    MEDICATIONS  (PRN):  ALBUTerol    90 MICROgram(s) HFA Inhaler 1 Puff(s) Inhalation every 6 hours PRN Shortness of Breath and/or Wheezing  aluminum hydroxide/magnesium hydroxide/simethicone Suspension 30 milliLiter(s) Oral every 4 hours PRN Dyspepsia  bisacodyl Suppository 10 milliGRAM(s) Rectal daily PRN Constipation  simethicone 80 milliGRAM(s) Chew four times a day PRN Upset Stomach      Vital Signs Last 24 Hrs  T(C): 36.3 (23 Jul 2021 09:02), Max: 36.8 (22 Jul 2021 19:25)  T(F): 97.4 (23 Jul 2021 09:02), Max: 98.2 (22 Jul 2021 19:25)  HR: 62 (23 Jul 2021 09:02) (62 - 89)  BP: 126/79 (23 Jul 2021 09:02) (126/79 - 168/78)  BP(mean): --  RR: 14 (23 Jul 2021 09:02) (14 - 16)  SpO2: 100% (23 Jul 2021 09:02) (98% - 100%)  CAPILLARY BLOOD GLUCOSE        I&O's Summary      PHYSICAL EXAM:  GENERAL: NAD, elderly female  HEAD:  Atraumatic, Normocephalic  EYES: EOMI, PERRLA, conjunctiva and sclera clear  NECK: Supple, No JVD  CHEST/LUNG: Clear to auscultation bilaterally; No wheeze, nonlabored breathing  HEART: Regular rate and rhythm; No murmurs, rubs, or gallops  ABDOMEN: Soft, Nontender, Nondistended; Bowel sounds present  EXTREMITIES:  2+ Peripheral Pulses, No clubbing, cyanosis, or edema  NEURO: Alert and awake, right sided paresis   PSYCH: calm, appropriate mood    LABS:                        12.2   5.33  )-----------( 200      ( 22 Jul 2021 05:30 )             38.6     07-22    136  |  101  |  23  ----------------------------<  99  5.0   |  28  |  1.18    Ca    9.5      22 Jul 2021 05:30    TPro  7.5  /  Alb  3.3  /  TBili  0.4  /  DBili  x   /  AST  26  /  ALT  42  /  AlkPhos  94  07-22      CARDIAC MARKERS ( 22 Jul 2021 19:52 )  <.017 ng/mL / x     / x     / x     / x          EKG 7/22:  Sinus rhythm  Abnormal ECG  When compared with ECG of 14-JUL-2021 10:29,  Criteria for Septal infarct are no longer present  T wave inversion now evident in Inferior leads  T wave amplitude has increased in Lateral leads  QT has lengthened      RADIOLOGY & ADDITIONAL TESTS:  Results Reviewed:   Electrocardiogram Personally Reviewed: NSR     COORDINATION OF CARE:  Care Discussed with Consultants/Other Providers [Y/N]:  Prior or Outpatient Records Reviewed [Y/N]:

## 2021-07-23 NOTE — PROGRESS NOTE ADULT - ASSESSMENT
65F with asthma and HTN found to have acute onset right-sided weakness / right visual field cut deficit, dx with acute CVA, now at acute rehab    #Left basal ganglia infarct  -PT/OT/SLP  -BP control  -ASA, plavix, statin. Increased dose of Atorvastatin to 40mg qhs  -outpatient neuroophthalmology follow-up    #Chest pain  -Musculoskeletal etiology  -EKG without any changes when compared to prior EKG  -Trop negative  -Monitor  -Pain control    #Left MCA aneurysm  -outpatient neurosurgery follow-up    #Essential HTN  -stable off medications    #CKD3  -Mildly increased Cr  -encourage PO intake     #Asthma without exacerbation  -Symbicort, albuterol prn, singular    #DVT ppx: Lovenox

## 2021-07-23 NOTE — PROGRESS NOTE ADULT - ASSESSMENT
MARKIE HERNANDEZ is a 64yo Female with left subcortical CVA  with dysphagia, right Hemiparesis, decreased functional mobility, gait instability and ADL impairments.    #CVA  -DAPT with aspirin & plavix.   -fall and aspiration precautions  -TTE outpt  -neurology and cardio follow up outpt  -Lipitor had previously been discontinued due to elevated LFTs. Resumed on 7/20. Will maintain on 20 mg HS for discharge.     #Transaminitis  - Resolved.   --Resumed lipitor at 20 mg HS on 7/20  - Cholelithiasis without definite secondary signs of acute cholecystitis  - hospitalist note appreciated.   - Consulted G.I-  held statin temporarily  & Avoid additional hepatotoxic meds.    --LFTs improved   -  nausea has resolved-- eating % of meals  - Hepatitis panel is non reactive.  --tolerating soft diet & thin liquids       Dyspepsia & Constipation   --cont. protonix  --cont. Maalox  - Glycolax   - Bisacodyl PRN constipation.   --Pepcid d/c'ed and Carafate added on 7/14    Insomnia  --cont. melatonin qhs    Dysphagia  -Diet upgraded to -Mechanical soft with thins.  Monitor PO tolerance     #HTN-  -Orthostatic hypotension improved off amlodipine.   -Encourage P.O intake.   -hospitalist  following    #NANCY--resolving  -monitor BMP  -creatinine back to baseline.   -Encourage P.O intake.     #Asthma  -continue symbicort and albuterol  - singulair  - monitor VS    # Pain Mgmt - Tylenol PRN.   Continue gabapentin to 200 mg HS for leg paresthesias.   GI/Bowel Mgmt - Senna,   PRN  /Bladder Mgmt - Voiding    # DVT ppx:  - Lovenox      --FT 7/22  SW: Lives in  with son, DIL, daughter w/ 4 JONNA and 10 steps inside-- Needed Assistance PTA with ADLs and Ambulation.  HHA 42h/week  OT: Min A e/g; Max A dressing; Tot A toileting, transfers, LBD.   PT: Transfer Mod A.  Ambulates 20ft HR Max A.  Goal Min A.  Speech: Thin liquids with mechanical soft--tolerating diet better; Short-term memory deficits, decreased attention.  +dysarthria.    ELOS: Home with home care as soon as equipment is delivered     MARKIE HERNANDEZ is a 66yo Female with left subcortical CVA  with dysphagia, right Hemiparesis, decreased functional mobility, gait instability and ADL impairments.    #CVA  -DAPT with aspirin & plavix.   -fall and aspiration precautions  -TTE outpt  -neurology and cardio follow up outpt  -Lipitor had previously been discontinued due to elevated LFTs. Resumed on 7/20. Will maintain on 20 mg HS for discharge.     #Transaminitis  - Resolved.   --Resumed lipitor at 20 mg HS on 7/20  - Cholelithiasis without definite secondary signs of acute cholecystitis  - hospitalist note appreciated.   - Consulted G.I-  held statin temporarily  & Avoid additional hepatotoxic meds.    --LFTs improved   -  nausea has resolved-- eating % of meals  - Hepatitis panel is non reactive.  --tolerating soft diet & thin liquids     Chest pain  --w/u neg for cardiac source  --seen by hospitalist      Dyspepsia & Constipation   --cont. protonix  --cont. Maalox  - Glycolax   - Bisacodyl PRN constipation.   --Pepcid d/c'ed and Carafate added on 7/14    Insomnia  --cont. melatonin qhs    Dysphagia  -Diet upgraded to -Mechanical soft with thins.  Monitor PO tolerance     #HTN-  -Orthostatic hypotension improved off amlodipine.   -Encourage P.O intake.   -hospitalist  following    #NANCY--resolving  -monitor BMP  -creatinine back to baseline.   -Encourage P.O intake.     #Asthma  -continue symbicort and albuterol  - singulair  - monitor VS    # Pain Mgmt - Tylenol PRN.   Continue gabapentin to 200 mg HS for leg paresthesias.   GI/Bowel Mgmt - Senna,   PRN  /Bladder Mgmt - Voiding    # DVT ppx:  - Lovenox      --FT 7/22  SW: Lives in  with son, DIL, daughter w/ 4 JONNA and 10 steps inside-- Needed Assistance PTA with ADLs and Ambulation.  HHA 42h/week  OT: Min A e/g; Max A dressing; Tot A toileting, transfers, LBD.   PT: Transfer Mod A.  Ambulates 20ft HR Max A.  Goal Min A.  Speech: Thin liquids with mechanical soft--tolerating diet better; Short-term memory deficits, decreased attention.  +dysarthria.    ELOS: Home with home care as soon as equipment is delivered

## 2021-07-23 NOTE — PROGRESS NOTE ADULT - ATTENDING COMMENTS
Pt. seen with resident.  Agree with documentation above as per resident with amendments made as appropriate. Patient medically stable. Making progress towards rehab goals.     Left MCA infarct    Chest pain last night  --w/u neg for cardiac source  --seen by hospitalist    --will need hospital bed and WC for safe discharge home.

## 2021-07-24 PROCEDURE — 99232 SBSQ HOSP IP/OBS MODERATE 35: CPT

## 2021-07-24 RX ADMIN — ATORVASTATIN CALCIUM 40 MILLIGRAM(S): 80 TABLET, FILM COATED ORAL at 22:03

## 2021-07-24 RX ADMIN — MONTELUKAST 10 MILLIGRAM(S): 4 TABLET, CHEWABLE ORAL at 12:21

## 2021-07-24 RX ADMIN — LIDOCAINE 1 PATCH: 4 CREAM TOPICAL at 12:20

## 2021-07-24 RX ADMIN — Medication 6 MILLIGRAM(S): at 22:03

## 2021-07-24 RX ADMIN — BUDESONIDE AND FORMOTEROL FUMARATE DIHYDRATE 2 PUFF(S): 160; 4.5 AEROSOL RESPIRATORY (INHALATION) at 09:53

## 2021-07-24 RX ADMIN — LIDOCAINE 1 PATCH: 4 CREAM TOPICAL at 23:50

## 2021-07-24 RX ADMIN — GABAPENTIN 200 MILLIGRAM(S): 400 CAPSULE ORAL at 22:03

## 2021-07-24 RX ADMIN — Medication 1 GRAM(S): at 06:00

## 2021-07-24 RX ADMIN — BUDESONIDE AND FORMOTEROL FUMARATE DIHYDRATE 2 PUFF(S): 160; 4.5 AEROSOL RESPIRATORY (INHALATION) at 21:08

## 2021-07-24 RX ADMIN — CLOPIDOGREL BISULFATE 75 MILLIGRAM(S): 75 TABLET, FILM COATED ORAL at 12:20

## 2021-07-24 RX ADMIN — Medication 1 GRAM(S): at 23:50

## 2021-07-24 RX ADMIN — ENOXAPARIN SODIUM 40 MILLIGRAM(S): 100 INJECTION SUBCUTANEOUS at 12:21

## 2021-07-24 RX ADMIN — LIDOCAINE 1 PATCH: 4 CREAM TOPICAL at 19:56

## 2021-07-24 RX ADMIN — Medication 81 MILLIGRAM(S): at 12:21

## 2021-07-24 RX ADMIN — Medication 1 GRAM(S): at 12:21

## 2021-07-24 RX ADMIN — PANTOPRAZOLE SODIUM 40 MILLIGRAM(S): 20 TABLET, DELAYED RELEASE ORAL at 06:00

## 2021-07-24 NOTE — PROGRESS NOTE ADULT - SUBJECTIVE AND OBJECTIVE BOX
No overnight events.  eating breakfast     REVIEW OF SYSTEMS  Constitutional - No fever,  No fatigue  Neurological - No headaches, No loss of strength  Musculoskeletal - No joint pain, No joint swelling, No muscle pain    VITALS  T(C): 36.3 (07-24-21 @ 08:00), Max: 36.5 (07-23-21 @ 19:13)  HR: 68 (07-24-21 @ 09:54) (68 - 85)  BP: 147/80 (07-24-21 @ 08:00) (134/78 - 147/80)  RR: 15 (07-24-21 @ 08:00) (14 - 15)  SpO2: 98% (07-24-21 @ 09:54) (98% - 100%)  Wt(kg): --       MEDICATIONS   acetaminophen   Tablet .. 650 milliGRAM(s) every 6 hours PRN  ALBUTerol    90 MICROgram(s) HFA Inhaler 1 Puff(s) every 6 hours PRN  aluminum hydroxide/magnesium hydroxide/simethicone Suspension 30 milliLiter(s) every 4 hours PRN  aspirin  chewable 81 milliGRAM(s) daily  atorvastatin 40 milliGRAM(s) at bedtime  bisacodyl Suppository 10 milliGRAM(s) daily PRN  budesonide 160 MICROgram(s)/formoterol 4.5 MICROgram(s) Inhaler 2 Puff(s) two times a day  clopidogrel Tablet 75 milliGRAM(s) daily  enoxaparin Injectable 40 milliGRAM(s) daily  gabapentin 200 milliGRAM(s) at bedtime  lidocaine   Patch 1 Patch daily  melatonin 6 milliGRAM(s) at bedtime  montelukast 10 milliGRAM(s) daily  pantoprazole    Tablet 40 milliGRAM(s) before breakfast  polyethylene glycol 3350 17 Gram(s) daily  simethicone 80 milliGRAM(s) four times a day PRN  sucralfate 1 Gram(s) four times a day      RECENT LABS/IMAGING              physical exam   Gen - NAD, Comfortable  HEENT - NCAT, MMM  Pulm - CTAB,   Cardiovascular - RRR,   Abdomen - Soft, NT/ND, +BS.   Extremities - No Edema, No calf tenderness  Neuro-       Motor -                    LEFT    UE - ShAB 5/5, EF 5/5, EE 5/5, WE 5/5,  5/5                    RIGHT UE - ShAB 2/5, EF 2/5, EE 1/5, WE 0/5,  0/5                    LEFT    LE - HF 5/5, KE 5/5, DF 5/5, PF 5/5                    RIGHT LE - Hip ext 2/5, KE 2/5, DF 1/5, PF 1/5        Sensory - Intact to tactile stim BLE      ASSESSMENT/PLAN  66y Female left subcortical CVA  with dysphagia, right Hemiparesis, decreased functional mobility, gait instability and ADL impairments  to continue DAPT with asa, plavix, lipitor resumed, TTE as outpatient  left MCA aneurysm, outpatient neurosurgery follow up   episode of chest pain, trop negative, no changes on EKG   Continue current medical management  Pain - Tylenol PRN, gabapentin   DVT PPX - lovenox     Continue 3hrs a day of comprehensive rehab program.

## 2021-07-24 NOTE — PROGRESS NOTE ADULT - ASSESSMENT
65F with asthma and HTN found to have acute onset right-sided weakness / right visual field cut deficit, dx with acute CVA, now at acute rehab    #Left basal ganglia infarct  -PT/OT/SLP  -BP control  -ASA, plavix, statin  -outpatient neuroophthalmology follow-up    #Chest pain  -Musculoskeletal etiology  -EKG without any changes when compared to prior EKG  -Trop negative  -Monitor  -Pain control    #Left MCA aneurysm  -outpatient neurosurgery follow-up    #Essential HTN  -stable off medications    #CKD3  -Stable renal function  -encourage PO intake     #Asthma without exacerbation  -Symbicort, albuterol prn, singular    #DVT ppx: Lovenox

## 2021-07-24 NOTE — PROGRESS NOTE ADULT - SUBJECTIVE AND OBJECTIVE BOX
Patient is a 66y old  Female who presents with a chief complaint of s/p left BG CVA with right sided weakness (23 Jul 2021 11:12)      SUBJECTIVE / OVERNIGHT EVENTS:  Pt seen and examined at bedside. No acute events overnight.  Pt denies cp, palpitations, sob, abd pain, N/V, fever, chills.    ROS:  All other review of systems negative    Allergies    No Known Allergies    Intolerances        MEDICATIONS  (STANDING):  aspirin  chewable 81 milliGRAM(s) Oral daily  atorvastatin 40 milliGRAM(s) Oral at bedtime  budesonide 160 MICROgram(s)/formoterol 4.5 MICROgram(s) Inhaler 2 Puff(s) Inhalation two times a day  clopidogrel Tablet 75 milliGRAM(s) Oral daily  enoxaparin Injectable 40 milliGRAM(s) SubCutaneous daily  gabapentin 200 milliGRAM(s) Oral at bedtime  lidocaine   Patch 1 Patch Transdermal daily  melatonin 6 milliGRAM(s) Oral at bedtime  montelukast 10 milliGRAM(s) Oral daily  pantoprazole    Tablet 40 milliGRAM(s) Oral before breakfast  polyethylene glycol 3350 17 Gram(s) Oral daily  sucralfate 1 Gram(s) Oral four times a day    MEDICATIONS  (PRN):  acetaminophen   Tablet .. 650 milliGRAM(s) Oral every 6 hours PRN Mild Pain (1 - 3), Moderate Pain (4 - 6), Severe Pain (7 - 10)  ALBUTerol    90 MICROgram(s) HFA Inhaler 1 Puff(s) Inhalation every 6 hours PRN Shortness of Breath and/or Wheezing  aluminum hydroxide/magnesium hydroxide/simethicone Suspension 30 milliLiter(s) Oral every 4 hours PRN Dyspepsia  bisacodyl Suppository 10 milliGRAM(s) Rectal daily PRN Constipation  simethicone 80 milliGRAM(s) Chew four times a day PRN Upset Stomach      Vital Signs Last 24 Hrs  T(C): 36.3 (24 Jul 2021 08:00), Max: 36.5 (23 Jul 2021 19:13)  T(F): 97.4 (24 Jul 2021 08:00), Max: 97.7 (23 Jul 2021 19:13)  HR: 68 (24 Jul 2021 08:00) (68 - 85)  BP: 147/80 (24 Jul 2021 08:00) (134/78 - 147/80)  BP(mean): --  RR: 15 (24 Jul 2021 08:00) (14 - 15)  SpO2: 98% (24 Jul 2021 08:00) (98% - 100%)  CAPILLARY BLOOD GLUCOSE        I&O's Summary      PHYSICAL EXAM:  GENERAL: NAD, elderly female  HEAD:  Atraumatic, Normocephalic  EYES: EOMI, PERRLA, conjunctiva and sclera clear  NECK: Supple, No JVD  CHEST/LUNG: Clear to auscultation bilaterally; No wheeze, nonlabored breathing  HEART: Regular rate and rhythm; No murmurs, rubs, or gallops  ABDOMEN: Soft, Nontender, Nondistended; Bowel sounds present  EXTREMITIES:  2+ Peripheral Pulses, No clubbing, cyanosis, or edema  NEURO: Alert and awake, right sided paresis   PSYCH: calm, appropriate mood    LABS:            CARDIAC MARKERS ( 22 Jul 2021 19:52 )  <.017 ng/mL / x     / x     / x     / x              RADIOLOGY & ADDITIONAL TESTS:  Results Reviewed:   Imaging Personally Reviewed:  Electrocardiogram Personally Reviewed:    COORDINATION OF CARE:  Care Discussed with Consultants/Other Providers [Y/N]:  Prior or Outpatient Records Reviewed [Y/N]:

## 2021-07-25 PROCEDURE — 99232 SBSQ HOSP IP/OBS MODERATE 35: CPT

## 2021-07-25 RX ORDER — NYSTATIN CREAM 100000 [USP'U]/G
1 CREAM TOPICAL THREE TIMES A DAY
Refills: 0 | Status: DISCONTINUED | OUTPATIENT
Start: 2021-07-25 | End: 2021-07-27

## 2021-07-25 RX ADMIN — ATORVASTATIN CALCIUM 40 MILLIGRAM(S): 80 TABLET, FILM COATED ORAL at 21:33

## 2021-07-25 RX ADMIN — GABAPENTIN 200 MILLIGRAM(S): 400 CAPSULE ORAL at 21:33

## 2021-07-25 RX ADMIN — ENOXAPARIN SODIUM 40 MILLIGRAM(S): 100 INJECTION SUBCUTANEOUS at 11:53

## 2021-07-25 RX ADMIN — LIDOCAINE 1 PATCH: 4 CREAM TOPICAL at 20:26

## 2021-07-25 RX ADMIN — Medication 1 GRAM(S): at 06:28

## 2021-07-25 RX ADMIN — BUDESONIDE AND FORMOTEROL FUMARATE DIHYDRATE 2 PUFF(S): 160; 4.5 AEROSOL RESPIRATORY (INHALATION) at 09:39

## 2021-07-25 RX ADMIN — LIDOCAINE 1 PATCH: 4 CREAM TOPICAL at 11:52

## 2021-07-25 RX ADMIN — Medication 6 MILLIGRAM(S): at 21:33

## 2021-07-25 RX ADMIN — CLOPIDOGREL BISULFATE 75 MILLIGRAM(S): 75 TABLET, FILM COATED ORAL at 11:51

## 2021-07-25 RX ADMIN — BUDESONIDE AND FORMOTEROL FUMARATE DIHYDRATE 2 PUFF(S): 160; 4.5 AEROSOL RESPIRATORY (INHALATION) at 22:02

## 2021-07-25 RX ADMIN — NYSTATIN CREAM 1 APPLICATION(S): 100000 CREAM TOPICAL at 21:36

## 2021-07-25 RX ADMIN — LIDOCAINE 1 PATCH: 4 CREAM TOPICAL at 23:06

## 2021-07-25 RX ADMIN — Medication 1 GRAM(S): at 11:52

## 2021-07-25 RX ADMIN — Medication 81 MILLIGRAM(S): at 11:51

## 2021-07-25 RX ADMIN — MONTELUKAST 10 MILLIGRAM(S): 4 TABLET, CHEWABLE ORAL at 11:52

## 2021-07-25 RX ADMIN — Medication 1 GRAM(S): at 21:33

## 2021-07-25 RX ADMIN — PANTOPRAZOLE SODIUM 40 MILLIGRAM(S): 20 TABLET, DELAYED RELEASE ORAL at 06:28

## 2021-07-25 RX ADMIN — Medication 1 GRAM(S): at 17:16

## 2021-07-25 NOTE — PROGRESS NOTE ADULT - SUBJECTIVE AND OBJECTIVE BOX
No overnight events.  patient sleeping    REVIEW OF SYSTEMS  unable to obtain     VITALS  T(C): 36.6 (07-25-21 @ 07:44), Max: 36.6 (07-25-21 @ 07:44)  HR: 72 (07-25-21 @ 07:44) (68 - 81)  BP: 127/69 (07-25-21 @ 07:44) (127/69 - 154/81)  RR: 15 (07-25-21 @ 07:44) (15 - 16)  SpO2: 95% (07-25-21 @ 07:44) (95% - 99%)  Wt(kg): --       MEDICATIONS   acetaminophen   Tablet .. 650 milliGRAM(s) every 6 hours PRN  ALBUTerol    90 MICROgram(s) HFA Inhaler 1 Puff(s) every 6 hours PRN  aluminum hydroxide/magnesium hydroxide/simethicone Suspension 30 milliLiter(s) every 4 hours PRN  aspirin  chewable 81 milliGRAM(s) daily  atorvastatin 40 milliGRAM(s) at bedtime  bisacodyl Suppository 10 milliGRAM(s) daily PRN  budesonide 160 MICROgram(s)/formoterol 4.5 MICROgram(s) Inhaler 2 Puff(s) two times a day  clopidogrel Tablet 75 milliGRAM(s) daily  enoxaparin Injectable 40 milliGRAM(s) daily  gabapentin 200 milliGRAM(s) at bedtime  lidocaine   Patch 1 Patch daily  melatonin 6 milliGRAM(s) at bedtime  montelukast 10 milliGRAM(s) daily  pantoprazole    Tablet 40 milliGRAM(s) before breakfast  polyethylene glycol 3350 17 Gram(s) daily  simethicone 80 milliGRAM(s) four times a day PRN  sucralfate 1 Gram(s) four times a day      RECENT LABS/IMAGING            PHYSICAL EXAM  Gen - NAD, in bed   HEENT - NCAT, MMM  Pulm - breathing comfortably   Cardiovascular - warm, well perfused   Abdomen - Soft,   Extremities - No Edema, No calf tenderness    ASSESSMENT/PLAN  66y Female left subcortical CVA  with dysphagia, right Hemiparesis, decreased functional mobility, gait instability and ADL impairments  to continue DAPT with asa, plavix, lipitor resumed, TTE as outpatient  left MCA aneurysm, outpatient neurosurgery follow up   episode of chest pain, trop negative, no changes on EKG   Continue current medical management  Pain - Tylenol PRN, gabapentin   DVT PPX - lovenox     Continue 3hrs a day of comprehensive rehab program.

## 2021-07-26 LAB
ALBUMIN SERPL ELPH-MCNC: 3 G/DL — LOW (ref 3.3–5)
ALP SERPL-CCNC: 88 U/L — SIGNIFICANT CHANGE UP (ref 40–120)
ALT FLD-CCNC: 89 U/L — HIGH (ref 10–45)
ANION GAP SERPL CALC-SCNC: 9 MMOL/L — SIGNIFICANT CHANGE UP (ref 5–17)
APPEARANCE UR: ABNORMAL
AST SERPL-CCNC: 58 U/L — HIGH (ref 10–40)
BILIRUB SERPL-MCNC: 0.4 MG/DL — SIGNIFICANT CHANGE UP (ref 0.2–1.2)
BILIRUB UR-MCNC: NEGATIVE — SIGNIFICANT CHANGE UP
BUN SERPL-MCNC: 26 MG/DL — HIGH (ref 7–23)
CALCIUM SERPL-MCNC: 9.3 MG/DL — SIGNIFICANT CHANGE UP (ref 8.4–10.5)
CHLORIDE SERPL-SCNC: 100 MMOL/L — SIGNIFICANT CHANGE UP (ref 96–108)
CO2 SERPL-SCNC: 23 MMOL/L — SIGNIFICANT CHANGE UP (ref 22–31)
COLOR SPEC: YELLOW — SIGNIFICANT CHANGE UP
CREAT SERPL-MCNC: 1.08 MG/DL — SIGNIFICANT CHANGE UP (ref 0.5–1.3)
DIFF PNL FLD: ABNORMAL
GLUCOSE SERPL-MCNC: 97 MG/DL — SIGNIFICANT CHANGE UP (ref 70–99)
GLUCOSE UR QL: NEGATIVE — SIGNIFICANT CHANGE UP
HCT VFR BLD CALC: 34.4 % — LOW (ref 34.5–45)
HGB BLD-MCNC: 11.1 G/DL — LOW (ref 11.5–15.5)
KETONES UR-MCNC: NEGATIVE — SIGNIFICANT CHANGE UP
LEUKOCYTE ESTERASE UR-ACNC: ABNORMAL
MCHC RBC-ENTMCNC: 29.6 PG — SIGNIFICANT CHANGE UP (ref 27–34)
MCHC RBC-ENTMCNC: 32.3 GM/DL — SIGNIFICANT CHANGE UP (ref 32–36)
MCV RBC AUTO: 91.7 FL — SIGNIFICANT CHANGE UP (ref 80–100)
NITRITE UR-MCNC: POSITIVE
NRBC # BLD: 0 /100 WBCS — SIGNIFICANT CHANGE UP (ref 0–0)
PH UR: 6 — SIGNIFICANT CHANGE UP (ref 5–8)
PLATELET # BLD AUTO: 167 K/UL — SIGNIFICANT CHANGE UP (ref 150–400)
POTASSIUM SERPL-MCNC: 4.8 MMOL/L — SIGNIFICANT CHANGE UP (ref 3.5–5.3)
POTASSIUM SERPL-SCNC: 4.8 MMOL/L — SIGNIFICANT CHANGE UP (ref 3.5–5.3)
PROT SERPL-MCNC: 6.8 G/DL — SIGNIFICANT CHANGE UP (ref 6–8.3)
PROT UR-MCNC: 500 MG/DL
RBC # BLD: 3.75 M/UL — LOW (ref 3.8–5.2)
RBC # FLD: 12.5 % — SIGNIFICANT CHANGE UP (ref 10.3–14.5)
SODIUM SERPL-SCNC: 132 MMOL/L — LOW (ref 135–145)
SP GR SPEC: 1.01 — SIGNIFICANT CHANGE UP (ref 1.01–1.02)
UROBILINOGEN FLD QL: NEGATIVE — SIGNIFICANT CHANGE UP
WBC # BLD: 10.8 K/UL — HIGH (ref 3.8–10.5)
WBC # FLD AUTO: 10.8 K/UL — HIGH (ref 3.8–10.5)

## 2021-07-26 PROCEDURE — 99232 SBSQ HOSP IP/OBS MODERATE 35: CPT | Mod: GC

## 2021-07-26 PROCEDURE — 99232 SBSQ HOSP IP/OBS MODERATE 35: CPT

## 2021-07-26 RX ORDER — ZINC OXIDE 200 MG/G
1 OINTMENT TOPICAL
Refills: 0 | Status: DISCONTINUED | OUTPATIENT
Start: 2021-07-26 | End: 2021-07-27

## 2021-07-26 RX ADMIN — Medication 1 GRAM(S): at 05:35

## 2021-07-26 RX ADMIN — LIDOCAINE 1 PATCH: 4 CREAM TOPICAL at 19:13

## 2021-07-26 RX ADMIN — ENOXAPARIN SODIUM 40 MILLIGRAM(S): 100 INJECTION SUBCUTANEOUS at 11:22

## 2021-07-26 RX ADMIN — CLOPIDOGREL BISULFATE 75 MILLIGRAM(S): 75 TABLET, FILM COATED ORAL at 11:22

## 2021-07-26 RX ADMIN — NYSTATIN CREAM 1 APPLICATION(S): 100000 CREAM TOPICAL at 05:35

## 2021-07-26 RX ADMIN — Medication 1 GRAM(S): at 17:24

## 2021-07-26 RX ADMIN — LIDOCAINE 1 PATCH: 4 CREAM TOPICAL at 23:27

## 2021-07-26 RX ADMIN — MONTELUKAST 10 MILLIGRAM(S): 4 TABLET, CHEWABLE ORAL at 11:22

## 2021-07-26 RX ADMIN — ZINC OXIDE 1 APPLICATION(S): 200 OINTMENT TOPICAL at 17:24

## 2021-07-26 RX ADMIN — Medication 81 MILLIGRAM(S): at 11:22

## 2021-07-26 RX ADMIN — LIDOCAINE 1 PATCH: 4 CREAM TOPICAL at 11:22

## 2021-07-26 RX ADMIN — BUDESONIDE AND FORMOTEROL FUMARATE DIHYDRATE 2 PUFF(S): 160; 4.5 AEROSOL RESPIRATORY (INHALATION) at 22:38

## 2021-07-26 RX ADMIN — Medication 1 GRAM(S): at 21:26

## 2021-07-26 RX ADMIN — POLYETHYLENE GLYCOL 3350 17 GRAM(S): 17 POWDER, FOR SOLUTION ORAL at 11:22

## 2021-07-26 RX ADMIN — Medication 6 MILLIGRAM(S): at 21:26

## 2021-07-26 RX ADMIN — PANTOPRAZOLE SODIUM 40 MILLIGRAM(S): 20 TABLET, DELAYED RELEASE ORAL at 05:35

## 2021-07-26 RX ADMIN — NYSTATIN CREAM 1 APPLICATION(S): 100000 CREAM TOPICAL at 11:27

## 2021-07-26 RX ADMIN — BUDESONIDE AND FORMOTEROL FUMARATE DIHYDRATE 2 PUFF(S): 160; 4.5 AEROSOL RESPIRATORY (INHALATION) at 07:56

## 2021-07-26 RX ADMIN — Medication 1 GRAM(S): at 11:22

## 2021-07-26 RX ADMIN — NYSTATIN CREAM 1 APPLICATION(S): 100000 CREAM TOPICAL at 21:26

## 2021-07-26 RX ADMIN — GABAPENTIN 200 MILLIGRAM(S): 400 CAPSULE ORAL at 21:26

## 2021-07-26 RX ADMIN — Medication 1 TABLET(S): at 21:26

## 2021-07-26 RX ADMIN — Medication 1 TABLET(S): at 12:37

## 2021-07-26 RX ADMIN — ATORVASTATIN CALCIUM 40 MILLIGRAM(S): 80 TABLET, FILM COATED ORAL at 21:26

## 2021-07-26 NOTE — PROGRESS NOTE ADULT - ASSESSMENT
MARKIE HERNANDEZ is a 66yo Female with left subcortical CVA  with dysphagia, right Hemiparesis, decreased functional mobility, gait instability and ADL impairments.    #CVA  -DAPT with aspirin & plavix.   -fall and aspiration precautions  -TTE outpt  -neurology and cardio follow up outpt  -Lipitor had previously been discontinued due to elevated LFTs. Resumed on 7/20. Will maintain on 20 mg HS for discharge.     #Transaminitis  - Resolved.   --Resumed lipitor at 20 mg HS on 7/20  - Cholelithiasis without definite secondary signs of acute cholecystitis  - hospitalist note appreciated.   - Consulted G.I-  held statin temporarily  & Avoid additional hepatotoxic meds.    --LFTs improved   -  nausea has resolved-- eating % of meals  - Hepatitis panel is non reactive.  --tolerating soft diet & thin liquids     Chest pain  --w/u neg for cardiac source  --seen by hospitalist    UTI   - UA is positive for leukocyte Esterase & Nitrate. Complains of dysuria.   - Started on bactrim BID x3 days   - f/u UCx  - Hospitalist is following       Dyspepsia & Constipation   --cont. protonix  --cont. Maalox  - Glycolax   - Bisacodyl PRN constipation.   --Pepcid d/c'ed and Carafate added on 7/14    Insomnia  --cont. melatonin qhs    Dysphagia  -Diet upgraded to -Mechanical soft with thins.  Monitor PO tolerance     #HTN-  -Orthostatic hypotension improved off amlodipine.   -Encourage P.O intake.   -hospitalist  following    #NANCY--resolving  -monitor BMP  -creatinine back to baseline.   -Encourage P.O intake.     #Asthma  -continue symbicort and albuterol  - singulair  - monitor VS    # Pain Mgmt - Tylenol PRN.   Continue gabapentin to 200 mg HS for leg paresthesias.   GI/Bowel Mgmt - Senna,   PRN  /Bladder Mgmt - Voiding    # DVT ppx:  - Lovenox      --FT 7/22  SW: Lives in  with son, DIL, daughter w/ 4 JONNA and 10 steps inside-- Needed Assistance PTA with ADLs and Ambulation.  HHA 42h/week  OT: Min A e/g; Max A dressing; Tot A toileting, transfers, LBD.   PT: Transfer Mod A.  Ambulates 20ft HR Max A.  Goal Min A.  Speech: Thin liquids with mechanical soft--tolerating diet better; Short-term memory deficits, decreased attention.  +dysarthria.    ELOS: Home with home care as soon as equipment is delivered

## 2021-07-26 NOTE — PROGRESS NOTE ADULT - SUBJECTIVE AND OBJECTIVE BOX
Patient is a 66y old  Female who presents with a chief complaint of s/p left BG CVA with right sided weakness (2021 11:21)    Patient seen and examined at bedside. No acute overnight events. Complains of dysuria today. Denies fever, chills, nausea, vomiting.     ALLERGIES:  No Known Allergies    MEDICATIONS  (STANDING):  aspirin  chewable 81 milliGRAM(s) Oral daily  atorvastatin 40 milliGRAM(s) Oral at bedtime  budesonide 160 MICROgram(s)/formoterol 4.5 MICROgram(s) Inhaler 2 Puff(s) Inhalation two times a day  clopidogrel Tablet 75 milliGRAM(s) Oral daily  enoxaparin Injectable 40 milliGRAM(s) SubCutaneous daily  gabapentin 200 milliGRAM(s) Oral at bedtime  lidocaine   Patch 1 Patch Transdermal daily  melatonin 6 milliGRAM(s) Oral at bedtime  montelukast 10 milliGRAM(s) Oral daily  nystatin Powder 1 Application(s) Topical three times a day  pantoprazole    Tablet 40 milliGRAM(s) Oral before breakfast  polyethylene glycol 3350 17 Gram(s) Oral daily  sucralfate 1 Gram(s) Oral four times a day  trimethoprim  160 mG/sulfamethoxazole 800 mG 1 Tablet(s) Oral two times a day    MEDICATIONS  (PRN):  acetaminophen   Tablet .. 650 milliGRAM(s) Oral every 6 hours PRN Mild Pain (1 - 3), Moderate Pain (4 - 6), Severe Pain (7 - 10)  ALBUTerol    90 MICROgram(s) HFA Inhaler 1 Puff(s) Inhalation every 6 hours PRN Shortness of Breath and/or Wheezing  aluminum hydroxide/magnesium hydroxide/simethicone Suspension 30 milliLiter(s) Oral every 4 hours PRN Dyspepsia  bisacodyl Suppository 10 milliGRAM(s) Rectal daily PRN Constipation  simethicone 80 milliGRAM(s) Chew four times a day PRN Upset Stomach    Vital Signs Last 24 Hrs  T(F): 98 (2021 07:20), Max: 98 (2021 20:36)  HR: 87 (2021 07:57) (85 - 87)  BP: 110/63 (2021 07:20) (110/63 - 133/78)  RR: 15 (2021 07:20) (15 - 15)  SpO2: 96% (2021 07:57) (96% - 97%)  I&O's Summary    PHYSICAL EXAM:  General: NAD  ENT: MMM, no tonsilar exudate  Neck: Supple, No JVD  Lungs: Clear to auscultation bilaterally, no wheezes. Good air entry bilaterally   Cardio: RRR, S1/S2, No murmurs  Abdomen: Soft, Nontender, Nondistended; Bowel sounds present  Extremities: No calf tenderness, No pitting edema    LABS:                        11.1   10.80 )-----------( 167      ( 2021 06:30 )             34.4           132  |  100  |  26  ----------------------------<  97  4.8   |  23  |  1.08    Ca    9.3      2021 06:30    TPro  6.8  /  Alb  3.0  /  TBili  0.4  /  DBili  x   /  AST  58  /  ALT  89  /  AlkPhos  88       eGFR if Non African American: 53 mL/min/1.73M2 (21 @ 06:30)  eGFR if : 62 mL/min/1.73M2 (21 @ 06:30)     Chol 184 mg/dL LDL -- HDL 38 mg/dL Trig 99 mg/dL    Urinalysis Basic - ( 2021 11:07 )    Color: Yellow / Appearance: very cloudy / S.010 / pH: x  Gluc: x / Ketone: Negative  / Bili: Negative / Urobili: Negative   Blood: x / Protein: 500 mg/dL / Nitrite: Positive   Leuk Esterase: Moderate / RBC: 5-10 /HPF / WBC >50 /HPF   Sq Epi: x / Non Sq Epi: Neg.-Few / Bacteria: Moderate /HPF    COVID-19 PCR: NotDetec (21 @ 20:04)  COVID-19 PCR: NotDetec (21 @ 18:53)    RADIOLOGY & ADDITIONAL TESTS:     Care Discussed with Consultants/Other Providers: d/w Dr. Hernandez, PMR resident

## 2021-07-26 NOTE — CHART NOTE - NSCHARTNOTEFT_GEN_A_CORE
Nutrition Follow Up Note  Hospital Course (Per Electronic Medical Record): 65F with asthma and HTN found to have acute onset right-sided weakness / right visual field cut deficit, dx with acute CVA, now at acute rehab  Source: Medical Record [X] Patient [X] Family [ ]         Diet: dysphagia 2 mechanical soft, thin liquids  Pt tolerating diet; diet was downgraded last week; however, SLP recommends dysphagia 3 + thins, discussed with resident. Recommend dysphagia 3, soft, thins, pt also does not consume beef, caffeine, pork, or chocolate. Recommend resuming Ensure Enlive 1x/day, and Ensure pudding 1x/day to supplement diet.     Enteral/Parenteral Nutrition: N/A    Current Weight: 112.8 lbs (7/25)  113.7 lbs (7/23)  111.1 lbs (7/210  108 lbs (7/19)  106.2 lbs (7.18)  121.2 lbs (7/17)  112.6 lbs (7/14)  111.1 lbs (7/12)  (7/7) 108lbs  (7/2) 109.3lbs      Pertinent Medications: MEDICATIONS  (STANDING):  aspirin  chewable 81 milliGRAM(s) Oral daily  atorvastatin 40 milliGRAM(s) Oral at bedtime  budesonide 160 MICROgram(s)/formoterol 4.5 MICROgram(s) Inhaler 2 Puff(s) Inhalation two times a day  clopidogrel Tablet 75 milliGRAM(s) Oral daily  enoxaparin Injectable 40 milliGRAM(s) SubCutaneous daily  gabapentin 200 milliGRAM(s) Oral at bedtime  lidocaine   Patch 1 Patch Transdermal daily  melatonin 6 milliGRAM(s) Oral at bedtime  montelukast 10 milliGRAM(s) Oral daily  nystatin Powder 1 Application(s) Topical three times a day  pantoprazole    Tablet 40 milliGRAM(s) Oral before breakfast  polyethylene glycol 3350 17 Gram(s) Oral daily  sucralfate 1 Gram(s) Oral four times a day  trimethoprim  160 mG/sulfamethoxazole 800 mG 1 Tablet(s) Oral two times a day  zinc oxide 40% Ointment 1 Application(s) Topical two times a day    MEDICATIONS  (PRN):  acetaminophen   Tablet .. 650 milliGRAM(s) Oral every 6 hours PRN Mild Pain (1 - 3), Moderate Pain (4 - 6), Severe Pain (7 - 10)  ALBUTerol    90 MICROgram(s) HFA Inhaler 1 Puff(s) Inhalation every 6 hours PRN Shortness of Breath and/or Wheezing  aluminum hydroxide/magnesium hydroxide/simethicone Suspension 30 milliLiter(s) Oral every 4 hours PRN Dyspepsia  bisacodyl Suppository 10 milliGRAM(s) Rectal daily PRN Constipation  simethicone 80 milliGRAM(s) Chew four times a day PRN Upset Stomach      Pertinent Labs:  07-26 Na132 mmol/L<L> Glu 97 mg/dL K+ 4.8 mmol/L Cr  1.08 mg/dL BUN 26 mg/dL<H> 07-26 Alb 3.0 g/dL<L> 06-30 Chol 184 mg/dL LDL --    HDL 38 mg/dL<L> Trig 99 mg/dL        Skin: Skin intact per nursing flow sheets     Edema: No edema per nursing flow sheets     Last BM: on 7/25 per nursing flow sheets    Estimated Needs:   [X] No Change since Previous Assessment  [ ] Recalculated:     Previous Nutrition Diagnosis:   Moderate malnutrition    Nutrition Diagnosis is [X] Ongoing    [ ] Resolved   [ ] Not Applicable      New Nutrition Diagnosis: [X] Not Applicable  [ ] Inadequate Protein Energy Intake   [ ] Inadequate Oral Intake   [ ] Excessive Energy Intake   [ ] Increased Nutrient Needs   [ ] Obesity   [ ] Altered GI Function   [ ] Unintended Weight Loss   [ ] Food & Nutrition Related Knowledge Deficit  [ ] Limited Adherence to nutrition related recommendations   [ ] Malnutrition      Interventions:   1. Recommend following SLP recommendations for dysphagia 3 soft, thin liquids, resume no pork, no beef, no chocolate, no caffeine (per pt request), supplement diet w/ Ensure Enlive 1x/day, Ensure Pudding 1x/day  2. Encourage PO intake  3. Honor food preferences as able    Monitoring & Evaluation:   [X] Weights   [X] PO Intake   [X] Follow Up (Per Protocol)  [X] Tolerance to Diet Prescription   [X] Other: Labs     RD Remains Available.  Suzanne Ogden RD

## 2021-07-26 NOTE — PROGRESS NOTE ADULT - SUBJECTIVE AND OBJECTIVE BOX
Patient is a 66y old  Female who presents with a chief complaint of s/p left BG CVA with right sided weakness (2021 13:30)    HPI:  64 yo RH female with PMH of asthma, HTN to [facility] ED  for right-sided weakness and slurred speech. CTH neg acute cva, old lacunar infarcts b/l BG. Out of window for tPA. CTA  shows Aneurysms suspected involving the left middle cerebral artery as described above. Transferred from [facility] to [facility] for possible evaluation of aneurysms noted on imaging. MRA  showed mild age-appropriate involutional and ischemic gliotic changes with old bilateral basal ganglia lacunar infarcts. Small acute infarct left basal ganglia and corona radiata. No evidence of hemorrhage. 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 Also mild aneurysmal dilatation of the origin of the left inferior M2 branch. Hospitalization complicated by NANCY, IVF completed. Failed swallow eval-tolerates Puree honey. TTE as outpatient. HbA1c 5.9 and . Evaluated by PMR and acute rehab recommended. Cleared for dc on .  Patient arrived to [facility] on 21 for acute inpatient rehab. Celi iterpreter #[id number]. Pt c/o difficulty speaking and still unable to more her right upper and right lower extremities.  (2021 15:21)    PAST MEDICAL & SURGICAL HISTORY:  Brain aneurysm    Hypertension    Brain aneurysm    Smoking    No significant past surgical history    No significant past surgical history      Allergies    No Known Allergies    Intolerances      ----------------------------------------------------------------------    Subjective:    Patient seen and examined at bedside. Pt spoke to via an  # [id number]  Pt does complain of dysuria. Pt had elevated WBC this am. Denies any fever or chills. VSS.   Encouraged patient to drink more fluids.   Pt slept well overnight as per nursing note.   Denies any pain at this time.   Denies any-other complaints at this time.   Participating in therapy       ROS:  Positive: dyspepsia with cough  Denies: headache, lightheadedness, CP, SOB, abdominal pain, dysuria, nausea, constipation    ----------------------------------------------------------------------  PHYSICAL EXAM:    ICU Vital Signs Last 24 Hrs  T(C): 36.7 (2021 07:20), Max: 36.7 (2021 20:36)  T(F): 98 (2021 07:20), Max: 98 (2021 20:36)  HR: 87 (2021 07:57) (85 - 87)  BP: 110/63 (2021 07:20) (110/63 - 133/78)  BP(mean): --  ABP: --  ABP(mean): --  RR: 15 (2021 07:20) (15 - 15)  SpO2: 96% (2021 07:57) (96% - 97%)        Gen - NAD, Comfortable  HEENT - NCAT, MMM  Pulm - CTAB,   Cardiovascular - RRR,   Abdomen - Soft, NT/ND, +BS.   Extremities - No Edema, No calf tenderness  Neuro-       Motor -                    LEFT    UE - ShAB 5/5, EF 5/5, EE 5/5, WE 5/5,  5/5                    RIGHT UE - ShAB 2/5, EF 2/5, EE 1/5, WE 0/5,  0/5                    LEFT    LE - HF 5/5, KE 5/5, DF 5/5, PF 5/5                    RIGHT LE - Hip ext 2/5, KE 2/5, DF 1/5, PF 1/5        Sensory - Intact to tactile stim BLE      ----------------------------------------------------------------------  RECENT LABS:                                     11.1   10.80 )-----------( 167      ( 2021 06:30 )             34.4         132<L>  |  100  |  26<H>  ----------------------------<  97  4.8   |  23  |  1.08    Ca    9.3      2021 06:30    TPro  6.8  /  Alb  3.0<L>  /  TBili  0.4  /  DBili  x   /  AST  58<H>  /  ALT  89<H>  /  AlkPhos  88        Urinalysis Basic - ( 2021 11:07 )    Color: Yellow / Appearance: very cloudy / S.010 / pH: x  Gluc: x / Ketone: Negative  / Bili: Negative / Urobili: Negative   Blood: x / Protein: 500 mg/dL / Nitrite: Positive   Leuk Esterase: Moderate / RBC: x / WBC x   Sq Epi: x / Non Sq Epi: x / Bacteria: x      CAPILLARY BLOOD GLUCOSE    ----------------------------------------------------------------------  RECENT IMAGING:    US Gallbladder 07.10.21  IMPRESSION:  Cholelithiasis without definite secondary signs of acute cholecystitis    ----------------------------------------------------------------------  MEDICATIONS  (STANDING):  aspirin  chewable 81 milliGRAM(s) Oral daily  atorvastatin 40 milliGRAM(s) Oral at bedtime  budesonide 160 MICROgram(s)/formoterol 4.5 MICROgram(s) Inhaler 2 Puff(s) Inhalation two times a day  clopidogrel Tablet 75 milliGRAM(s) Oral daily  enoxaparin Injectable 40 milliGRAM(s) SubCutaneous daily  gabapentin 200 milliGRAM(s) Oral at bedtime  lidocaine   Patch 1 Patch Transdermal daily  melatonin 6 milliGRAM(s) Oral at bedtime  montelukast 10 milliGRAM(s) Oral daily  nystatin Powder 1 Application(s) Topical three times a day  pantoprazole    Tablet 40 milliGRAM(s) Oral before breakfast  polyethylene glycol 3350 17 Gram(s) Oral daily  sucralfate 1 Gram(s) Oral four times a day    MEDICATIONS  (PRN):  acetaminophen   Tablet .. 650 milliGRAM(s) Oral every 6 hours PRN Mild Pain (1 - 3), Moderate Pain (4 - 6), Severe Pain (7 - 10)  ALBUTerol    90 MICROgram(s) HFA Inhaler 1 Puff(s) Inhalation every 6 hours PRN Shortness of Breath and/or Wheezing  aluminum hydroxide/magnesium hydroxide/simethicone Suspension 30 milliLiter(s) Oral every 4 hours PRN Dyspepsia  bisacodyl Suppository 10 milliGRAM(s) Rectal daily PRN Constipation  simethicone 80 milliGRAM(s) Chew four times a day PRN Upset Stomach      ----------------------------------------------------------------------

## 2021-07-26 NOTE — PROGRESS NOTE ADULT - ATTENDING COMMENTS
Rehab Attending- Patient seen and examined by me- Case discussed with resident Dr Hernandez, above note reviewed by me with modifications made    + dysuria on Bactrim- C&S pending  WBC 10.8- to repeat in AM  Awaiting approval for hospital bed  to continue intensive rehab program

## 2021-07-26 NOTE — PROGRESS NOTE ADULT - ASSESSMENT
65F with asthma and HTN found to have acute onset right-sided weakness / right visual field cut deficit, dx with acute CVA, now at acute rehab    #Left basal ganglia infarct  -PT/OT/SLP  -BP control  -ASA, plavix, statin  -outpatient neuroophthalmology follow-up    #Dysuria, +UA - UTI  - mild leukocytosis   - Starting Bactrim bid x 3days  - Follow Cultures    #Chest pain  -Musculoskeletal etiology  -EKG without any changes when compared to prior EKG  -Trop negative  -Monitor  -Pain control    #Left MCA aneurysm  -outpatient neurosurgery follow-up    #Essential HTN  -stable off medications    #CKD3  -Stable renal function  -encourage PO intake     #Asthma without exacerbation  -Symbicort, albuterol prn, singular    #DVT ppx: Lovenox

## 2021-07-27 ENCOUNTER — TRANSCRIPTION ENCOUNTER (OUTPATIENT)
Age: 66
End: 2021-07-27

## 2021-07-27 VITALS
HEART RATE: 88 BPM | SYSTOLIC BLOOD PRESSURE: 120 MMHG | OXYGEN SATURATION: 97 % | TEMPERATURE: 98 F | RESPIRATION RATE: 15 BRPM | DIASTOLIC BLOOD PRESSURE: 75 MMHG

## 2021-07-27 LAB
HCT VFR BLD CALC: 33.2 % — LOW (ref 34.5–45)
HGB BLD-MCNC: 10.8 G/DL — LOW (ref 11.5–15.5)
MCHC RBC-ENTMCNC: 29.7 PG — SIGNIFICANT CHANGE UP (ref 27–34)
MCHC RBC-ENTMCNC: 32.5 GM/DL — SIGNIFICANT CHANGE UP (ref 32–36)
MCV RBC AUTO: 91.2 FL — SIGNIFICANT CHANGE UP (ref 80–100)
NRBC # BLD: 0 /100 WBCS — SIGNIFICANT CHANGE UP (ref 0–0)
PLATELET # BLD AUTO: 160 K/UL — SIGNIFICANT CHANGE UP (ref 150–400)
RBC # BLD: 3.64 M/UL — LOW (ref 3.8–5.2)
RBC # FLD: 12.4 % — SIGNIFICANT CHANGE UP (ref 10.3–14.5)
WBC # BLD: 8.37 K/UL — SIGNIFICANT CHANGE UP (ref 3.8–10.5)
WBC # FLD AUTO: 8.37 K/UL — SIGNIFICANT CHANGE UP (ref 3.8–10.5)

## 2021-07-27 PROCEDURE — 83690 ASSAY OF LIPASE: CPT

## 2021-07-27 PROCEDURE — 87186 SC STD MICRODIL/AGAR DIL: CPT

## 2021-07-27 PROCEDURE — 80074 ACUTE HEPATITIS PANEL: CPT

## 2021-07-27 PROCEDURE — 97535 SELF CARE MNGMENT TRAINING: CPT

## 2021-07-27 PROCEDURE — 93005 ELECTROCARDIOGRAM TRACING: CPT

## 2021-07-27 PROCEDURE — 92610 EVALUATE SWALLOWING FUNCTION: CPT

## 2021-07-27 PROCEDURE — 83735 ASSAY OF MAGNESIUM: CPT

## 2021-07-27 PROCEDURE — 81001 URINALYSIS AUTO W/SCOPE: CPT

## 2021-07-27 PROCEDURE — 85025 COMPLETE CBC W/AUTO DIFF WBC: CPT

## 2021-07-27 PROCEDURE — 87086 URINE CULTURE/COLONY COUNT: CPT

## 2021-07-27 PROCEDURE — 97140 MANUAL THERAPY 1/> REGIONS: CPT

## 2021-07-27 PROCEDURE — 92507 TX SP LANG VOICE COMM INDIV: CPT

## 2021-07-27 PROCEDURE — 76705 ECHO EXAM OF ABDOMEN: CPT

## 2021-07-27 PROCEDURE — 80048 BASIC METABOLIC PNL TOTAL CA: CPT

## 2021-07-27 PROCEDURE — 85027 COMPLETE CBC AUTOMATED: CPT

## 2021-07-27 PROCEDURE — 71045 X-RAY EXAM CHEST 1 VIEW: CPT

## 2021-07-27 PROCEDURE — 99238 HOSP IP/OBS DSCHRG MGMT 30/<: CPT | Mod: GC

## 2021-07-27 PROCEDURE — 84484 ASSAY OF TROPONIN QUANT: CPT

## 2021-07-27 PROCEDURE — 97116 GAIT TRAINING THERAPY: CPT

## 2021-07-27 PROCEDURE — 74230 X-RAY XM SWLNG FUNCJ C+: CPT

## 2021-07-27 PROCEDURE — 97112 NEUROMUSCULAR REEDUCATION: CPT

## 2021-07-27 PROCEDURE — 93970 EXTREMITY STUDY: CPT

## 2021-07-27 PROCEDURE — 36415 COLL VENOUS BLD VENIPUNCTURE: CPT

## 2021-07-27 PROCEDURE — 97163 PT EVAL HIGH COMPLEX 45 MIN: CPT

## 2021-07-27 PROCEDURE — 92611 MOTION FLUOROSCOPY/SWALLOW: CPT

## 2021-07-27 PROCEDURE — 93306 TTE W/DOPPLER COMPLETE: CPT

## 2021-07-27 PROCEDURE — 97530 THERAPEUTIC ACTIVITIES: CPT

## 2021-07-27 PROCEDURE — 80053 COMPREHEN METABOLIC PANEL: CPT

## 2021-07-27 PROCEDURE — 92526 ORAL FUNCTION THERAPY: CPT

## 2021-07-27 PROCEDURE — 94640 AIRWAY INHALATION TREATMENT: CPT

## 2021-07-27 PROCEDURE — 92523 SPEECH SOUND LANG COMPREHEN: CPT

## 2021-07-27 PROCEDURE — 97110 THERAPEUTIC EXERCISES: CPT

## 2021-07-27 RX ORDER — ACETAMINOPHEN 500 MG
2 TABLET ORAL
Qty: 0 | Refills: 0 | DISCHARGE
Start: 2021-07-27

## 2021-07-27 RX ORDER — GABAPENTIN 400 MG/1
2 CAPSULE ORAL
Qty: 0 | Refills: 0 | DISCHARGE
Start: 2021-07-27

## 2021-07-27 RX ADMIN — MONTELUKAST 10 MILLIGRAM(S): 4 TABLET, CHEWABLE ORAL at 11:43

## 2021-07-27 RX ADMIN — NYSTATIN CREAM 1 APPLICATION(S): 100000 CREAM TOPICAL at 06:15

## 2021-07-27 RX ADMIN — ZINC OXIDE 1 APPLICATION(S): 200 OINTMENT TOPICAL at 06:15

## 2021-07-27 RX ADMIN — Medication 81 MILLIGRAM(S): at 11:43

## 2021-07-27 RX ADMIN — Medication 1 GRAM(S): at 06:15

## 2021-07-27 RX ADMIN — LIDOCAINE 1 PATCH: 4 CREAM TOPICAL at 11:44

## 2021-07-27 RX ADMIN — Medication 1 GRAM(S): at 11:43

## 2021-07-27 RX ADMIN — BUDESONIDE AND FORMOTEROL FUMARATE DIHYDRATE 2 PUFF(S): 160; 4.5 AEROSOL RESPIRATORY (INHALATION) at 10:01

## 2021-07-27 RX ADMIN — ENOXAPARIN SODIUM 40 MILLIGRAM(S): 100 INJECTION SUBCUTANEOUS at 11:43

## 2021-07-27 RX ADMIN — PANTOPRAZOLE SODIUM 40 MILLIGRAM(S): 20 TABLET, DELAYED RELEASE ORAL at 06:15

## 2021-07-27 RX ADMIN — Medication 1 TABLET(S): at 06:15

## 2021-07-27 RX ADMIN — CLOPIDOGREL BISULFATE 75 MILLIGRAM(S): 75 TABLET, FILM COATED ORAL at 11:43

## 2021-07-27 NOTE — DISCHARGE NOTE NURSING/CASE MANAGEMENT/SOCIAL WORK - PATIENT PORTAL LINK FT
You can access the FollowMyHealth Patient Portal offered by [facility] by registering at the following website: [email or web address]. By joining [employer]’s FollowMyHealth portal, you will also be able to view your health information using other applications (apps) compatible with our system.

## 2021-07-27 NOTE — PROGRESS NOTE ADULT - SUBJECTIVE AND OBJECTIVE BOX
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 CTH neg acute cva, old lacunar infarcts b/l BG. Out of window for tPA. CTA  shows Aneurysms suspected involving the left middle cerebral artery as described above. Transferred from [facility] to [facility] for possible evaluation of aneurysms noted on imaging. MRA  showed mild age-appropriate involutional and ischemic gliotic changes with old bilateral basal ganglia lacunar infarcts. Small acute infarct left basal ganglia and corona radiata. No evidence of hemorrhage. 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 Also mild aneurysmal dilatation of the origin of the left inferior M2 branch. Hospitalization complicated by NANCY, IVF completed. Failed swallow eval-tolerates Puree honey. TTE as outpatient. HbA1c 5.9 and . Evaluated by PMR and acute rehab recommended. Cleared for dc on .  Patient arrived to [facility] on 21 for acute inpatient rehab. Celi iterpreter #[id number]. Pt c/o difficulty speaking and still unable to more her right upper and right lower extremities.  (2021 15:21)    PAST MEDICAL & SURGICAL HISTORY:  Brain aneurysm    Hypertension    Brain aneurysm    Smoking    No significant past surgical history    No significant past surgical history      Allergies    No Known Allergies    Intolerances      ----------------------------------------------------------------------    Subjective:    Patient seen and examined at bedside. Pt spoke to via an  # [id number]  Pt does complain of dysuria. Pt had + UTI yesterday Started on bactrim x3 days. Leukocytosis has improved today.   Pt slept well overnight as per nursing note.   Denies any pain at this time. Denies any-other complaints at this time.   Pt is being d/c today to home.       ROS:  Positive: dyspepsia with cough  Denies: headache, lightheadedness, CP, SOB, abdominal pain, dysuria, nausea, constipation    ----------------------------------------------------------------------  PHYSICAL EXAM:    ICU Vital Signs Last 24 Hrs  T(C): 36.9 (2021 07:43), Max: 36.9 (2021 07:43)  T(F): 98.4 (2021 07:43), Max: 98.4 (2021 07:43)  HR: 74 (2021 10:02) (74 - 77)  BP: 125/86 (2021 07:43) (120/73 - 125/86)  BP(mean): --  ABP: --  ABP(mean): --  RR: 15 (2021 07:43) (15 - 15)  SpO2: 94% (2021 10:02) (94% - 99%)      Gen - NAD, Comfortable  HEENT - NCAT, MMM  Pulm - CTAB,   Cardiovascular - RRR,   Abdomen - Soft, NT/ND, +BS.   Extremities - No Edema, No calf tenderness  Neuro-       Motor -                    LEFT    UE - ShAB 5/5, EF 5/5, EE 5/5, WE 5/5,  5/5                    RIGHT UE - ShAB 2/5, EF 2/5, EE 1/5, WE 0/5,  0/5                    LEFT    LE - HF 5/5, KE 5/5, DF 5/5, PF 5/5                    RIGHT LE - Hip ext 2/5, KE 2/5, DF 1/5, PF 1/5        Sensory - Intact to tactile stim BLE      ----------------------------------------------------------------------  RECENT LABS:                                              10.8   8.37  )-----------( 160      ( 2021 05:10 )             33.2     07-    132<L>  |  100  |  26<H>  ----------------------------<  97  4.8   |  23  |  1.08    Ca    9.3      2021 06:30    TPro  6.8  /  Alb  3.0<L>  /  TBili  0.4  /  DBili  x   /  AST  58<H>  /  ALT  89<H>  /  AlkPhos  88        Urinalysis Basic - ( 2021 11:07 )    Color: Yellow / Appearance: very cloudy / S.010 / pH: x  Gluc: x / Ketone: Negative  / Bili: Negative / Urobili: Negative   Blood: x / Protein: 500 mg/dL / Nitrite: Positive   Leuk Esterase: Moderate / RBC: 5-10 /HPF / WBC >50 /HPF   Sq Epi: x / Non Sq Epi: Neg.-Few / Bacteria: Moderate /HPF        CAPILLARY BLOOD GLUCOSE    ----------------------------------------------------------------------  RECENT IMAGING:    US Gallbladder 07.10.21  IMPRESSION:  Cholelithiasis without definite secondary signs of acute cholecystitis    ----------------------------------------------------------------------  MEDICATIONS  (STANDING):  aspirin  chewable 81 milliGRAM(s) Oral daily  atorvastatin 40 milliGRAM(s) Oral at bedtime  budesonide 160 MICROgram(s)/formoterol 4.5 MICROgram(s) Inhaler 2 Puff(s) Inhalation two times a day  clopidogrel Tablet 75 milliGRAM(s) Oral daily  enoxaparin Injectable 40 milliGRAM(s) SubCutaneous daily  gabapentin 200 milliGRAM(s) Oral at bedtime  lidocaine   Patch 1 Patch Transdermal daily  melatonin 6 milliGRAM(s) Oral at bedtime  montelukast 10 milliGRAM(s) Oral daily  nystatin Powder 1 Application(s) Topical three times a day  pantoprazole    Tablet 40 milliGRAM(s) Oral before breakfast  polyethylene glycol 3350 17 Gram(s) Oral daily  sucralfate 1 Gram(s) Oral four times a day  trimethoprim  160 mG/sulfamethoxazole 800 mG 1 Tablet(s) Oral two times a day  zinc oxide 40% Ointment 1 Application(s) Topical two times a day    MEDICATIONS  (PRN):  acetaminophen   Tablet .. 650 milliGRAM(s) Oral every 6 hours PRN Mild Pain (1 - 3), Moderate Pain (4 - 6), Severe Pain (7 - 10)  ALBUTerol    90 MICROgram(s) HFA Inhaler 1 Puff(s) Inhalation every 6 hours PRN Shortness of Breath and/or Wheezing  aluminum hydroxide/magnesium hydroxide/simethicone Suspension 30 milliLiter(s) Oral every 4 hours PRN Dyspepsia  bisacodyl Suppository 10 milliGRAM(s) Rectal daily PRN Constipation  simethicone 80 milliGRAM(s) Chew four times a day PRN Upset Stomach      ----------------------------------------------------------------------

## 2021-07-27 NOTE — PROGRESS NOTE ADULT - NSICDXPILOT_GEN_ALL_CORE
[place]

## 2021-07-27 NOTE — PROGRESS NOTE ADULT - ATTENDING COMMENTS
Rehab Attending- Patient seen and examined by me- Case discussed with resident Dr Hernandez, above note reviewed by me with modifications made    + dysuria on Bactrim- C&S pending  burning however is better this AM  Anxiously awaiting Dc to Home  WBC 8k-   hospitalist to follow urine C&S   hospital bed approved  To DC home with VN services, HHA  FU PCP 1 week  FU neurology Dr Lopez 1-2 weeks  FU NS Dr Nguyen 1-2 weeks  FU PMR Dr Sanchez

## 2021-07-27 NOTE — PROGRESS NOTE ADULT - REASON FOR ADMISSION
s/p left BG CVA with right sided weakness

## 2021-07-27 NOTE — PROGRESS NOTE ADULT - ATTENDING SUPERVISION STATEMENT
Fellow
Resident

## 2021-07-27 NOTE — PROGRESS NOTE ADULT - ASSESSMENT
MARKIE HERNANDEZ is a 64yo Female with left subcortical CVA  with dysphagia, right Hemiparesis, decreased functional mobility, gait instability and ADL impairments.    #CVA  -DAPT with aspirin & plavix.   -fall and aspiration precautions  -TTE outpt  -neurology and cardio follow up outpt  -Lipitor had previously been discontinued due to elevated LFTs. Resumed on 7/20. Will maintain on 20 mg HS for discharge.     #Transaminitis  - Resolved.   --Resumed lipitor at 20 mg HS on 7/20  - Cholelithiasis without definite secondary signs of acute cholecystitis  - hospitalist note appreciated.   - Consulted G.I-  held statin temporarily  & Avoid additional hepatotoxic meds.    --LFTs improved   -  nausea has resolved-- eating % of meals  - Hepatitis panel is non reactive.  --tolerating soft diet & thin liquids     Chest pain  --w/u neg for cardiac source  --seen by hospitalist    UTI   - UA is positive for leukocyte Esterase & Nitrate. Complains of dysuria. Leukocytosis has improved. VSS & afebrile.   - Continue bactrim BID x2 days at home.  - Hospitalist is following       Dyspepsia & Constipation   --cont. protonix  --cont. Maalox  - Glycolax   - Bisacodyl PRN constipation.   --Pepcid d/c'ed and Carafate added on 7/14    Insomnia  --cont. melatonin qhs    Dysphagia  -Diet upgraded to -Mechanical soft with thins.  Monitor PO tolerance     #HTN-  -Orthostatic hypotension improved off amlodipine.   -Encourage P.O intake.   -hospitalist  following    #NANCY--resolving  -monitor BMP  -creatinine back to baseline.   -Encourage P.O intake.     #Asthma  -continue symbicort and albuterol  - singulair      # Pain Mgmt - Tylenol PRN.   Continue gabapentin to 200 mg HS for leg paresthesias.   GI/Bowel Mgmt - Senna,   PRN      # DVT ppx:  - Lovenox      --FT 7/22  SW: Lives in  with son, DIL, daughter w/ 4 JONNA and 10 steps inside-- Needed Assistance PTA with ADLs and Ambulation.  HHA 42h/week  OT: Min A e/g; Max A dressing; Tot A toileting, transfers, LBD.   PT: Transfer Mod A.  Ambulates 20ft HR Max A.  Goal Min A.  Speech: Thin liquids with mechanical soft--tolerating diet better; Short-term memory deficits, decreased attention.  +dysarthria.    ELOS: Going home today.

## 2021-07-27 NOTE — PROGRESS NOTE ADULT - PROVIDER SPECIALTY LIST ADULT
Hospitalist
Rehab Medicine
Rehab Medicine
Hospitalist
Neurology
Rehab Medicine
Hospitalist
Rehab Medicine
Hospitalist
Hospitalist
Neurology
Rehab Medicine
Hospitalist
Hospitalist
Rehab Medicine
Rehab Medicine
Hospitalist
Rehab Medicine

## 2021-07-27 NOTE — PROGRESS NOTE ADULT - NUTRITIONAL ASSESSMENT
This patient has been assessed with a concern for Malnutrition and has been determined to have a diagnosis/diagnoses of Moderate protein-calorie malnutrition.    This patient is being managed with:   Diet Dysphagia 1 Pureed-Honey Consistency Fluid-  Supplement Feeding Modality:  Oral  Ensure Enlive Servings Per Day:  1       Frequency:  Daily  Ensure Pudding Cans or Servings Per Day:  1       Frequency:  Two Times a day  Entered: Jul  3 2021  1:00PM    
This patient has been assessed with a concern for Malnutrition and has been determined to have a diagnosis/diagnoses of Moderate protein-calorie malnutrition.    This patient is being managed with:   Diet Dysphagia 2 Mechanical Soft-Thin Liquids-  Entered: Jul 21 2021  5:52AM    The following pending diet order is being considered for treatment of Moderate protein-calorie malnutrition:  Diet Dysphagia 3 Soft-Thin Liquids-  No Beef  No Caffeine  No Chocolate  No Pork  Supplement Feeding Modality:  Oral  Ensure Enlive Servings Per Day:  1       Frequency:  Daily  Ensure Pudding Cans or Servings Per Day:  1       Frequency:  Daily  Entered: Jul 19 2021  1:52PM  
This patient has been assessed with a concern for Malnutrition and has been determined to have a diagnosis/diagnoses of Moderate protein-calorie malnutrition.    This patient is being managed with:   Diet Dysphagia 3 Soft-Thin Liquids-  No Beef  No Caffeine  No Chocolate  No Pork  Supplement Feeding Modality:  Oral  Ensure Pudding Cans or Servings Per Day:  1       Frequency:  Daily  Entered: Jul 14 2021  1:26PM    
This patient has been assessed with a concern for Malnutrition and has been determined to have a diagnosis/diagnoses of Moderate protein-calorie malnutrition.    This patient is being managed with:   Diet Dysphagia 1 Pureed-Honey Consistency Fluid-  Supplement Feeding Modality:  Oral  Ensure Enlive Servings Per Day:  1       Frequency:  Daily  Ensure Pudding Cans or Servings Per Day:  1       Frequency:  Two Times a day  Entered: Jul  3 2021  1:00PM    
This patient has been assessed with a concern for Malnutrition and has been determined to have a diagnosis/diagnoses of Moderate protein-calorie malnutrition.    This patient is being managed with:   Diet Dysphagia 2 Mechanical Soft-Thin Liquids-  Entered: Jul 21 2021  5:52AM    The following pending diet order is being considered for treatment of Moderate protein-calorie malnutrition:  Diet Dysphagia 3 Soft-Thin Liquids-  No Beef  No Caffeine  No Chocolate  No Pork  Supplement Feeding Modality:  Oral  Ensure Enlive Servings Per Day:  1       Frequency:  Daily  Ensure Pudding Cans or Servings Per Day:  1       Frequency:  Daily  Entered: Jul 19 2021  1:52PM  
This patient has been assessed with a concern for Malnutrition and has been determined to have a diagnosis/diagnoses of Moderate protein-calorie malnutrition.    This patient is being managed with:   Diet Dysphagia 3 Soft-Thin Liquids-  No Beef  No Caffeine  No Chocolate  No Pork  Supplement Feeding Modality:  Oral  Ensure Enlive Servings Per Day:  1       Frequency:  Daily  Ensure Pudding Cans or Servings Per Day:  1       Frequency:  Daily  Entered: Jul 19 2021  1:52PM    
This patient has been assessed with a concern for Malnutrition and has been determined to have a diagnosis/diagnoses of Moderate protein-calorie malnutrition.    This patient is being managed with:   Diet Dysphagia 1 Pureed-Honey Consistency Fluid-  Supplement Feeding Modality:  Oral  Ensure Enlive Servings Per Day:  1       Frequency:  Daily  Ensure Pudding Cans or Servings Per Day:  1       Frequency:  Two Times a day  Entered: Jul  3 2021  1:00PM    
This patient has been assessed with a concern for Malnutrition and has been determined to have a diagnosis/diagnoses of Moderate protein-calorie malnutrition.    This patient is being managed with:   Diet Dysphagia 2 Mechanical Soft-Thin Liquids-  Entered: Jul 21 2021  5:52AM    The following pending diet order is being considered for treatment of Moderate protein-calorie malnutrition:  Diet Dysphagia 3 Soft-Thin Liquids-  No Beef  No Caffeine  No Chocolate  No Pork  Supplement Feeding Modality:  Oral  Ensure Enlive Servings Per Day:  1       Frequency:  Daily  Ensure Pudding Cans or Servings Per Day:  1       Frequency:  Daily  Entered: Jul 19 2021  1:52PM  
This patient has been assessed with a concern for Malnutrition and has been determined to have a diagnosis/diagnoses of Moderate protein-calorie malnutrition.    This patient is being managed with:   Diet Dysphagia 1 Pureed-Honey Consistency Fluid-  Supplement Feeding Modality:  Oral  Ensure Enlive Servings Per Day:  1       Frequency:  Daily  Ensure Pudding Cans or Servings Per Day:  1       Frequency:  Two Times a day  Entered: Jul  3 2021  1:00PM    
This patient has been assessed with a concern for Malnutrition and has been determined to have a diagnosis/diagnoses of Moderate protein-calorie malnutrition.    This patient is being managed with:   Diet Dysphagia 1 Pureed-Honey Consistency Fluid-  Supplement Feeding Modality:  Oral  Ensure Enlive Servings Per Day:  1       Frequency:  Daily  Ensure Pudding Cans or Servings Per Day:  1       Frequency:  Two Times a day  Entered: Jul  3 2021  1:00PM    
This patient has been assessed with a concern for Malnutrition and has been determined to have a diagnosis/diagnoses of Moderate protein-calorie malnutrition.    This patient is being managed with:   Diet Dysphagia 2 Mechanical Soft-Thin Liquids-  Entered: Jul 21 2021  5:52AM    The following pending diet order is being considered for treatment of Moderate protein-calorie malnutrition:  Diet Dysphagia 3 Soft-Thin Liquids-  No Beef  No Caffeine  No Chocolate  No Pork  Supplement Feeding Modality:  Oral  Ensure Enlive Servings Per Day:  1       Frequency:  Daily  Ensure Pudding Cans or Servings Per Day:  1       Frequency:  Daily  Entered: Jul 19 2021  1:52PM  
This patient has been assessed with a concern for Malnutrition and has been determined to have a diagnosis/diagnoses of Moderate protein-calorie malnutrition.    This patient is being managed with:   Diet Dysphagia 2 Mechanical Soft-Thin Liquids-  No Beef  No Caffeine  No Chocolate  No Pork  Supplement Feeding Modality:  Oral  Ensure Enlive Servings Per Day:  1       Frequency:  Daily  Ensure Pudding Cans or Servings Per Day:  1       Frequency:  Daily  Entered: Jul 12 2021  1:06PM    
This patient has been assessed with a concern for Malnutrition and has been determined to have a diagnosis/diagnoses of Moderate protein-calorie malnutrition.    This patient is being managed with:   Diet Dysphagia 3 Soft-Thin Liquids-  No Beef  No Caffeine  No Chocolate  No Pork  Supplement Feeding Modality:  Oral  Ensure Pudding Cans or Servings Per Day:  1       Frequency:  Daily  Entered: Jul 14 2021  1:26PM    
This patient has been assessed with a concern for Malnutrition and has been determined to have a diagnosis/diagnoses of Moderate protein-calorie malnutrition.    This patient is being managed with:   Diet NPO after Midnight-     NPO Start Date: 09-Jul-2021   NPO Start Time: 23:59  Entered: Jul 9 2021  1:28PM    Diet Dysphagia 1 Pureed-Honey Consistency Fluid-  Supplement Feeding Modality:  Oral  Ensure Enlive Servings Per Day:  1       Frequency:  Daily  Ensure Pudding Cans or Servings Per Day:  1       Frequency:  Two Times a day  Entered: Jul  3 2021  1:00PM    
This patient has been assessed with a concern for Malnutrition and has been determined to have a diagnosis/diagnoses of Moderate protein-calorie malnutrition.    This patient is being managed with:   Diet Dysphagia 1 Pureed-Honey Consistency Fluid-  Supplement Feeding Modality:  Oral  Ensure Enlive Servings Per Day:  1       Frequency:  Daily  Ensure Pudding Cans or Servings Per Day:  1       Frequency:  Two Times a day  Entered: Jul  3 2021  1:00PM    
This patient has been assessed with a concern for Malnutrition and has been determined to have a diagnosis/diagnoses of Moderate protein-calorie malnutrition.    This patient is being managed with:   Diet Dysphagia 1 Pureed-Honey Consistency Fluid-  Supplement Feeding Modality:  Oral  Ensure Enlive Servings Per Day:  1       Frequency:  Daily  Ensure Pudding Cans or Servings Per Day:  1       Frequency:  Two Times a day  Entered: Jul  3 2021  1:00PM    
This patient has been assessed with a concern for Malnutrition and has been determined to have a diagnosis/diagnoses of Moderate protein-calorie malnutrition.      The following pending diet order is being considered for treatment of Moderate protein-calorie malnutrition:  Diet Dysphagia 3 Soft-Thin Liquids-  No Beef  No Caffeine  No Chocolate  No Pork  Supplement Feeding Modality:  Oral  Ensure Enlive Servings Per Day:  1       Frequency:  Daily  Ensure Pudding Cans or Servings Per Day:  1       Frequency:  Daily  Entered: Jul 19 2021  1:52PM  
This patient has been assessed with a concern for Malnutrition and has been determined to have a diagnosis/diagnoses of Moderate protein-calorie malnutrition.    This patient is being managed with:   Diet Consistent Carbohydrate Full Liquid-  Entered: Jul 12 2021  3:30PM    
This patient has been assessed with a concern for Malnutrition and has been determined to have a diagnosis/diagnoses of Moderate protein-calorie malnutrition.    This patient is being managed with:   Diet Dysphagia 2 Mechanical Soft-Thin Liquids-  Entered: Jul 21 2021  5:52AM    The following pending diet order is being considered for treatment of Moderate protein-calorie malnutrition:  Diet Dysphagia 3 Soft-Thin Liquids-  No Beef  No Caffeine  No Chocolate  No Pork  Supplement Feeding Modality:  Oral  Ensure Enlive Servings Per Day:  1       Frequency:  Daily  Ensure Pudding Cans or Servings Per Day:  1       Frequency:  Daily  Entered: Jul 19 2021  1:52PM  
This patient has been assessed with a concern for Malnutrition and has been determined to have a diagnosis/diagnoses of Moderate protein-calorie malnutrition.    This patient is being managed with:   Diet Dysphagia 3 Soft-Thin Liquids-  No Beef  No Caffeine  No Chocolate  No Pork  Supplement Feeding Modality:  Oral  Ensure Pudding Cans or Servings Per Day:  1       Frequency:  Daily  Entered: Jul 14 2021  1:26PM

## 2021-08-26 ENCOUNTER — APPOINTMENT (OUTPATIENT)
Dept: PHYSICAL MEDICINE AND REHAB | Facility: CLINIC | Age: 66
End: 2021-08-26

## 2021-08-26 ENCOUNTER — APPOINTMENT (OUTPATIENT)
Dept: PHYSICAL MEDICINE AND REHAB | Facility: CLINIC | Age: 66
End: 2021-08-26
Payer: MEDICAID

## 2021-08-26 DIAGNOSIS — R26.9 UNSPECIFIED ABNORMALITIES OF GAIT AND MOBILITY: ICD-10-CM

## 2021-08-26 DIAGNOSIS — G81.91 HEMIPLEGIA, UNSPECIFIED AFFECTING RIGHT DOMINANT SIDE: ICD-10-CM

## 2021-08-26 DIAGNOSIS — M79.2 NEURALGIA AND NEURITIS, UNSPECIFIED: ICD-10-CM

## 2021-08-26 DIAGNOSIS — I63.512 CEREBRAL INFARCTION DUE TO UNSPECIFIED OCCLUSION OR STENOSIS OF LEFT MIDDLE CEREBRAL ARTERY: ICD-10-CM

## 2021-08-26 PROCEDURE — 99213 OFFICE O/P EST LOW 20 MIN: CPT | Mod: 95

## 2021-08-26 NOTE — REASON FOR VISIT
[Home] : at home, [unfilled] , at the time of the visit. [Medical Office: ([facility])___] : at the medical office located in  [Post Hospitalization] : a post hospitalization visit [Other:____] : [unfilled] [FreeTextEntry3] : MD Gutierrez--Pt's Son [FreeTextEntry1] : left Subcortical infarct

## 2021-08-26 NOTE — HISTORY OF PRESENT ILLNESS
[FreeTextEntry1] : Left MCA infarct\par \par Admission Date	02-Jul-2021 to 27-Jul-2021 \par Reason for Admission	s/p left BG CVA with right sided weakness \par Hospital Course	 \par 64 yo RH female with PMH of asthma, HTN to [facility] ED 6/29 for right-sided weakness and slurred speech. CTH neg acute cva, old lacunar infarcts b/l BG. Out of window for tPA. CTA 6/29 shows Aneurysms suspected involving the left middle cerebral artery as described above. \par Transferred from [facility] to [facility] for possible evaluation of aneurysms noted on imaging. MRA 6/30 showed mild age-appropriate involutional and ischemic gliotic changes with old bilateral basal ganglia lacunar infarcts. Small acute infarct left basal ganglia and corona radiata. No evidence of hemorrhage. 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 Also mild aneurysmal dilatation of the origin of the left inferior M2 branch.\par  Hospitalization complicated by NANCY, IVF completed. Failed swallow eval-tolerates Puree honey. TTE as outpatient. HbA1c 5.9 and . Evaluated by PMR and acute rehab recommended. Cleared for dc on 7/2. \par Patient arrived to [facility] on 7/2/21 for acute inpatient rehab.Pt was spoken to via Armenian iterpreter #[id number].  \par \par Rehab Course significant for Multiple Chest Pain & Epigastric pain through out her stay. Multiple EKG were ordered which showed Normal sinus rhythm. For patient's epigastric pain & RUQ pain. RUQ sono was ordered which was \par significant for cholelithiasis, for which she was seen by JAYLON. Pt also had elevated LFT so her statinwas discontinued.  Pt continued her home dose of proton pump inhibitor & was started on carafate & simethicone. Pt also had significant event of orthostatic hypotension during therapy.  Pt Norvasc was discontinued. Through out her stay the LFT improved & the nausea resolved pt was restarted on her Atorvastatin. Pt also had multiple complaints of chest & epigastric pain. Pt had multiple EKG's done through out her stay, which were NSR. Pt also had troponin done, which were negative x2. Pt was started on multiple medication to better control her GERD symptoms. Pt also was found to have dysuria & a positive UTI. Pt vitals signs were stable & afebrile. Pt was \par started on bactrim. Pt is to continue taking bactrim for 2 more days after discharge. Pt is hemodynamically stable for discharge. \par \par All other medical co-morbidities were stable. \par \par \par Pt was medically cleared on 7/21/2021  for discharged to home with homecare. \par \par Discharge Functional status: \par \par OT: Min A e/g; Max A dressing; Tot A toileting, transfers, LBD. \par PT: Transfer Mod A.  Ambulates 20ft HR Max A.  Goal Min A. \par Speech: Thin liquids with mechanical soft--tolerating diet better; Short-term memory deficits, decreased attention.  +dysarthria. \par \par Son states pt. is doing better. Getting stronger on right side\par Gets Home therapy [facility] or [facility]-- PT, OT, or speech-- 1 to 3 times based on approval. \par \par Eating and grooming with supervision.\par \par Ambulating 18-20 ft-- full assistance of son, or needs help of both daughter and daughter in law. \par Dependent for bathing/dressing and Toileting.   \par \par No falls.  \par \par Sits in WC at least 3 hours or more /day\par \par Dysarthria resolved. Cognitively at baseline.  \par \par son has questions about her medications and neurology follow up.  States Dr. Hook's office does not take pt's insurance.  \par \par Pt. on gabapentin-- c/o burning pain in feet which is controlled. \par \par

## 2021-08-26 NOTE — REVIEW OF SYSTEMS
[Muscle Weakness] : muscle weakness [Difficulty Walking] : difficulty walking [Negative] : Heme/Lymph [FreeTextEntry7] : acid reflux

## 2021-08-26 NOTE — ASSESSMENT
[FreeTextEntry1] : Cont. home care therapy-- family doing home exercises -- pt's son states pt. does better with family than therapists.  \par \par Rec. that when pt. is strong enough to f/u regarding outpatient therapy if interested.  \par --monitor for spascity-- explained to son to f/u if feels pt. developing spasticity\par \par --PCP to manage BP and medications-- pt. has had f/u.  advised to f/u with PCP if has questions about BP meds.\par \par --f/u with neurology--can ask PCP for recommendation for neurologist who takes medicaid in their area. \par \par --cont. gabapentin\par \par All questions answered.\par \par f/u PRN

## 2021-08-26 NOTE — PHYSICAL EXAM
[FreeTextEntry1] : limited due to telehealth visit\par \par Awake, alert\par \par can partially raise right leg and right arm.  no motor control in wrist or fingers.\par \par Sensation impaired on right

## 2023-07-13 NOTE — SWALLOW VFSS/MBS ASSESSMENT ADULT - RESIDUE IN LARYNGEAL VESTIBULE / VENTRICLE
Patient called this morning and was asking if he needed to have labs drawn before his appointment on Monday the 17th? He had labs in march but I want to make sure that he needs them or not?      Patient would like a call back at [phone] trace residue remains shallow within laryngeal vestibule as well as trace upon vocal cords

## 2023-08-09 ENCOUNTER — APPOINTMENT (OUTPATIENT)
Dept: PULMONOLOGY | Facility: CLINIC | Age: 68
End: 2023-08-09
Payer: MEDICAID

## 2023-08-09 VITALS
BODY MASS INDEX: 26.97 KG/M2 | RESPIRATION RATE: 17 BRPM | DIASTOLIC BLOOD PRESSURE: 68 MMHG | HEART RATE: 75 BPM | OXYGEN SATURATION: 90 % | WEIGHT: 125 LBS | HEIGHT: 57 IN | SYSTOLIC BLOOD PRESSURE: 121 MMHG | TEMPERATURE: 97.8 F

## 2023-08-09 DIAGNOSIS — J44.9 CHRONIC OBSTRUCTIVE PULMONARY DISEASE, UNSPECIFIED: ICD-10-CM

## 2023-08-09 PROCEDURE — 99203 OFFICE O/P NEW LOW 30 MIN: CPT

## 2023-08-09 RX ORDER — IPRATROPIUM BROMIDE AND ALBUTEROL SULFATE 2.5; .5 MG/3ML; MG/3ML
0.5-2.5 (3) SOLUTION RESPIRATORY (INHALATION) 4 TIMES DAILY
Qty: 2 | Refills: 3 | Status: ACTIVE | COMMUNITY
Start: 2023-08-09 | End: 1900-01-01

## 2023-08-09 NOTE — REVIEW OF SYSTEMS
[Poor Appetite] : poor appetite [Cough] : cough [Sputum] : sputum [Dyspnea] : dyspnea [SOB on Exertion] : sob on exertion [Negative] : Gastrointestinal [Fever] : no fever [Chills] : no chills [Wheezing] : no wheezing

## 2023-08-09 NOTE — ASSESSMENT
[FreeTextEntry1] : 68F with severe COPD (FEV1 38, DLCO 25), former moderate smoker, multiple strokes presenting for graudal worsening of dyspnea on exertion and cough. She is not currently in exacerbation. Despite repeat PFTs showing possible restriction this is likely due to poor effort rather than a true test. She has poor air movement bilaterally due to the same. She remained with normal saturation at rest and did not really tolerate trying to ambulate to assess for desaturation.   - Would stop symbicort and start duoneb 4 times daily. She does not appear to be taking deep enough breaths for her MDIs to be effective. - It is unlikely that montelukast is effective for her either and would consider de-prescribing.  - It might have been beneficial to check for alpha 1 antitrypsin levels back when her symptoms began due to relatively low amount of smoking but it is unlikely that this would be beneficial at this point - can consider addition of nebulized budesonide if mininal improvement with duonebs - she does not desaturate at rest and due to inability to ambulate she is unlikely to benefit from home oxygen therapy  RTC 3  Months   Discussed with Dr. Karley Alonzo MD PGY4 Fellow Pulmonary/Critical Care Medicine

## 2023-08-09 NOTE — PHYSICAL EXAM
[No Acute Distress] : no acute distress [Normal Oropharynx] : normal oropharynx [I] : Mallampati Class: I [Normal Appearance] : normal appearance [No Neck Mass] : no neck mass [Normal Rate/Rhythm] : normal rate/rhythm [Normal S1, S2] : normal s1, s2 [No Abnormalities] : no abnormalities [Benign] : benign [No Clubbing] : no clubbing [No Edema] : no edema [Normal Color/ Pigmentation] : normal color/ pigmentation [TextBox_2] : frail appearing  [TextBox_68] : decreased air movement bilaterally, rhonci, no wheezing [TextBox_99] : unsteady, requires assistance

## 2023-08-09 NOTE — HISTORY OF PRESENT ILLNESS
[Former] : former [TextBox_4] : 68F PMH severe COPD, former smoker, MCA aneurysm, multiple strokes presenting for follow up for COPD. She is accompanied by her son who provided interpretation and declined official  use. He reports gradual worsening of her exertion dyspnea as well as chronic cough which is mostly nonproductive. She has not had any recent hospitalizations, infections, or acute worsening of her dyspnea,  sputum production, sputum consistency, or any wheezing.   Her prior PFTs (2019) showed a severe obstructive pattern without bronchodilator response with decreased DLCO and signs of air trapping and hyperinflation. She had repeat PFTs done in 05/2023 which showed similar spirometry but new decreased lung volumes and persistently low DLCO. She has not had any recent chest imaging.  She is primarily wheelchair bound due to dyspnea and weakness. She is able to walk a few steps to the bathroom but is otherwise reliant on the wheelchair.  She is currently taking symbicort bid and prn albuterol but without much improvement. [TextBox_11] : 0.5 [TextBox_13] : 10 [YearQuit] : 2013

## 2023-10-07 NOTE — ED ADULT NURSE NOTE - EXTENSIONS OF SELF_ADULT
History  Chief Complaint   Patient presents with   • Wrist Injury     Pt's mother reports pt was playing with cousin and fell backwards on right wrist. Pt reports pain in right inner forearm. Pt able to move wrist and has full sensation. 5year-old male presents for evaluation of right wrist injury. Patient was playing with his cousin when a fell backwards and landed on his right wrist.  Able to move fingers, wrist, elbow without significant difficulty. Denies any numbness or other symptoms. Prior to Admission Medications   Prescriptions Last Dose Informant Patient Reported? Taking?   famotidine (PEPCID) 20 mg/2.5 mL oral suspension   Yes No   Sig: Take 20 mg by mouth 2 (two) times a day   Patient not taking: Reported on 12/20/2021    ibuprofen (MOTRIN) 100 mg/5 mL suspension   No No   Sig: Take 6.6 mL by mouth every 6 (six) hours as needed for mild pain or moderate pain   Patient not taking: Reported on 12/20/2021    melatonin 3 mg   Yes No   Sig: Take 3 mg by mouth   neomycin-polymyxin-hydrocortisone (CORTISPORIN) 0.35%-10,000 units/mL-1% otic suspension   No No   Sig: Administer 4 drops to the right ear every 6 (six) hours for 7 days      Facility-Administered Medications: None       No past medical history on file. No past surgical history on file. No family history on file. I have reviewed and agree with the history as documented. E-Cigarette/Vaping     E-Cigarette/Vaping Substances     Social History     Tobacco Use   • Smoking status: Never   • Smokeless tobacco: Never       Review of Systems   Constitutional: Negative for chills and fever. HENT: Negative for ear pain and sore throat. Eyes: Negative for pain and visual disturbance. Respiratory: Negative for cough and shortness of breath. Cardiovascular: Negative for chest pain and palpitations. Gastrointestinal: Negative for abdominal pain and vomiting. Genitourinary: Negative for dysuria and hematuria. Musculoskeletal: Positive for arthralgias. Negative for back pain and gait problem. Skin: Negative for color change and rash. Neurological: Negative for seizures and syncope. All other systems reviewed and are negative. Physical Exam  Physical Exam  Vitals and nursing note reviewed. Constitutional:       General: He is active. He is not in acute distress. Appearance: Normal appearance. He is well-developed and well-groomed. He is not ill-appearing. HENT:      Right Ear: Tympanic membrane normal.      Left Ear: Tympanic membrane normal.      Mouth/Throat:      Mouth: Mucous membranes are moist.   Eyes:      General:         Right eye: No discharge. Left eye: No discharge. Conjunctiva/sclera: Conjunctivae normal.   Cardiovascular:      Rate and Rhythm: Normal rate and regular rhythm. Heart sounds: S1 normal and S2 normal. No murmur heard. Pulmonary:      Effort: Pulmonary effort is normal. No respiratory distress. Breath sounds: Normal breath sounds. No wheezing, rhonchi or rales. Abdominal:      General: Bowel sounds are normal.      Palpations: Abdomen is soft. Tenderness: There is no abdominal tenderness. Genitourinary:     Penis: Normal.    Musculoskeletal:         General: No swelling. Right wrist: Tenderness and bony tenderness present. No swelling, deformity, lacerations or snuff box tenderness. Decreased range of motion. Normal pulse. Left wrist: Normal.        Arms:       Cervical back: Neck supple. Lymphadenopathy:      Cervical: No cervical adenopathy. Skin:     General: Skin is warm and dry. Capillary Refill: Capillary refill takes less than 2 seconds. Findings: No rash. Neurological:      Mental Status: He is alert. Psychiatric:         Mood and Affect: Mood normal.         Behavior: Behavior is cooperative.          Vital Signs  ED Triage Vitals   Temperature Pulse Respirations Blood Pressure SpO2   10/07/23 0050 10/07/23 8711 10/07/23 0048 10/07/23 0048 10/07/23 0048   98.7 °F (37.1 °C) 89 20 (!) 111/76 100 %      Temp src Heart Rate Source Patient Position - Orthostatic VS BP Location FiO2 (%)   10/07/23 0050 10/07/23 0048 10/07/23 0048 10/07/23 0048 --   Oral Monitor Sitting Left arm       Pain Score       --                  Vitals:    10/07/23 0048   BP: (!) 111/76   Pulse: 89   Patient Position - Orthostatic VS: Sitting         Visual Acuity      ED Medications  Medications - No data to display    Diagnostic Studies  Results Reviewed     None                 XR wrist 3+ views RIGHT   Final Result by Harlan Lyles MD (10/07 9186)      Acute distal radius Salter-Vernon II fracture in near-anatomic alignment. The study was marked in [facility] for immediate notification. Workstation performed: [id number]                    Procedures  Procedures         ED Course                                             Medical Decision Making  5year-old male presents for evaluation of right wrist injury. Exam: Patient in NAD; tender to palpation of right distal forearm on radial side; full active ROM of fingers, wrist, elbow; normal  strength bilaterally but does have some pain when squeezing with his right hand. Neurovascularly intact. No deformity or skin changes. Work-up: X-ray right wrist.    X-ray demonstrated an acute fracture of distal radius. No significant displacement. Educated patient and his mother on the injury seen on x-ray. Will order a wrist brace for patient to keep the injury immobilized. Neurovascularly intact before and after application of wrist brace by RN. Patient tolerated well and is still in NAD after application. Recommended prompt follow-up with pediatric orthopedics, given information and submit ambulatory referral.  Recommend Tylenol and ibuprofen as needed for pain relief.   Patient/mother express understanding of the condition, treatment plan, follow-up instructions, and return precautions. Amount and/or Complexity of Data Reviewed  Radiology: ordered. Disposition  Final diagnoses:   Wrist fracture     Time reflects when diagnosis was documented in both MDM as applicable and the Disposition within this note     Time User Action Codes Description Comment    10/7/2023  1:41 AM Eva Ramirez Add [S62.101A] Closed fracture of right wrist, initial encounter     10/7/2023  1:42 AM Eva Ramirez Remove [S62.101A] Closed fracture of right wrist, initial encounter     10/7/2023  1:42 AM Eva Ramirez Add [C28.796J] Wrist fracture       ED Disposition     ED Disposition   Discharge    Condition   Stable    Date/Time   Sat Oct 7, 2023  1:41 AM    Comment   Yasmany Ugalde discharge to home/self care.                Follow-up Information     Follow up With Specialties Details Why [address] [place] [address], DO Orthopedic Surgery, Pediatric Orthopedic Surgery Schedule an appointment as soon as possible for a visit   [address] [facility]  [phone]            Discharge Medication List as of 10/7/2023  1:44 AM      CONTINUE these medications which have NOT CHANGED    Details   famotidine (PEPCID) 20 mg/2.5 mL oral suspension Take 20 mg by mouth 2 (two) times a day, Starting Thu 12/9/2021, Until Thu 1/6/2022, Historical Med      ibuprofen (MOTRIN) 100 mg/5 mL suspension Take 6.6 mL by mouth every 6 (six) hours as needed for mild pain or moderate pain, Starting Sun 7/23/2017, Print      melatonin 3 mg Take 3 mg by mouth, Historical Med      neomycin-polymyxin-hydrocortisone (CORTISPORIN) 0.35%-10,000 units/mL-1% otic suspension Administer 4 drops to the right ear every 6 (six) hours for 7 days, Starting Sun 7/23/2017, Until Sun 7/30/2017, Print                 PDMP Review     None          ED Provider  Electronically Signed by           Garry Blair PA-C  10/10/23 7716 None

## 2024-01-20 ENCOUNTER — INPATIENT (INPATIENT)
Facility: HOSPITAL | Age: 69
LOS: 7 days | Discharge: HOME | End: 2024-01-28
Attending: HOSPITALIST | Admitting: HOSPITALIST
Payer: MEDICAID

## 2024-01-20 VITALS
OXYGEN SATURATION: 97 % | DIASTOLIC BLOOD PRESSURE: 78 MMHG | HEART RATE: 90 BPM | SYSTOLIC BLOOD PRESSURE: 142 MMHG | RESPIRATION RATE: 36 BRPM | TEMPERATURE: 99 F

## 2024-01-20 LAB
ALBUMIN SERPL ELPH-MCNC: 3.5 G/DL — SIGNIFICANT CHANGE UP (ref 3.3–5)
ALP SERPL-CCNC: 114 U/L — SIGNIFICANT CHANGE UP (ref 40–120)
ALT FLD-CCNC: 10 U/L — SIGNIFICANT CHANGE UP (ref 4–33)
ANION GAP SERPL CALC-SCNC: 14 MMOL/L — SIGNIFICANT CHANGE UP (ref 7–14)
APTT BLD: 28.4 SEC — SIGNIFICANT CHANGE UP (ref 24.5–35.6)
AST SERPL-CCNC: 15 U/L — SIGNIFICANT CHANGE UP (ref 4–32)
B PERT DNA SPEC QL NAA+PROBE: SIGNIFICANT CHANGE UP
B PERT+PARAPERT DNA PNL SPEC NAA+PROBE: SIGNIFICANT CHANGE UP
BASE EXCESS BLDV CALC-SCNC: -6 MMOL/L — LOW (ref -2–3)
BASOPHILS # BLD AUTO: 0.02 K/UL — SIGNIFICANT CHANGE UP (ref 0–0.2)
BASOPHILS NFR BLD AUTO: 0.1 % — SIGNIFICANT CHANGE UP (ref 0–2)
BILIRUB SERPL-MCNC: 0.6 MG/DL — SIGNIFICANT CHANGE UP (ref 0.2–1.2)
BLOOD GAS VENOUS COMPREHENSIVE RESULT: SIGNIFICANT CHANGE UP
BORDETELLA PARAPERTUSSIS (RAPRVP): SIGNIFICANT CHANGE UP
BUN SERPL-MCNC: 21 MG/DL — SIGNIFICANT CHANGE UP (ref 7–23)
C PNEUM DNA SPEC QL NAA+PROBE: SIGNIFICANT CHANGE UP
CALCIUM SERPL-MCNC: 9.5 MG/DL — SIGNIFICANT CHANGE UP (ref 8.4–10.5)
CHLORIDE BLDV-SCNC: 97 MMOL/L — SIGNIFICANT CHANGE UP (ref 96–108)
CHLORIDE SERPL-SCNC: 96 MMOL/L — LOW (ref 98–107)
CK MB BLD-MCNC: 6.2 % — HIGH (ref 0–2.5)
CK MB CFR SERPL CALC: 5.9 NG/ML — HIGH
CK SERPL-CCNC: 95 U/L — SIGNIFICANT CHANGE UP (ref 25–170)
CO2 BLDV-SCNC: 22.1 MMOL/L — SIGNIFICANT CHANGE UP (ref 22–26)
CO2 SERPL-SCNC: 21 MMOL/L — LOW (ref 22–31)
CREAT SERPL-MCNC: 1.35 MG/DL — HIGH (ref 0.5–1.3)
EGFR: 43 ML/MIN/1.73M2 — LOW
EOSINOPHIL # BLD AUTO: 0 K/UL — SIGNIFICANT CHANGE UP (ref 0–0.5)
EOSINOPHIL NFR BLD AUTO: 0 % — SIGNIFICANT CHANGE UP (ref 0–6)
FLUAV SUBTYP SPEC NAA+PROBE: SIGNIFICANT CHANGE UP
FLUBV RNA SPEC QL NAA+PROBE: SIGNIFICANT CHANGE UP
GAS PNL BLDV: 127 MMOL/L — LOW (ref 136–145)
GAS PNL BLDV: SIGNIFICANT CHANGE UP
GLUCOSE BLDV-MCNC: 132 MG/DL — HIGH (ref 70–99)
GLUCOSE SERPL-MCNC: 130 MG/DL — HIGH (ref 70–99)
HADV DNA SPEC QL NAA+PROBE: SIGNIFICANT CHANGE UP
HCO3 BLDV-SCNC: 21 MMOL/L — LOW (ref 22–29)
HCOV 229E RNA SPEC QL NAA+PROBE: SIGNIFICANT CHANGE UP
HCOV HKU1 RNA SPEC QL NAA+PROBE: SIGNIFICANT CHANGE UP
HCOV NL63 RNA SPEC QL NAA+PROBE: SIGNIFICANT CHANGE UP
HCOV OC43 RNA SPEC QL NAA+PROBE: SIGNIFICANT CHANGE UP
HCT VFR BLD CALC: 31.9 % — LOW (ref 34.5–45)
HCT VFR BLDA CALC: 31 % — LOW (ref 34.5–46.5)
HGB BLD CALC-MCNC: 10.3 G/DL — LOW (ref 11.7–16.1)
HGB BLD-MCNC: 10.4 G/DL — LOW (ref 11.5–15.5)
HMPV RNA SPEC QL NAA+PROBE: SIGNIFICANT CHANGE UP
HPIV1 RNA SPEC QL NAA+PROBE: SIGNIFICANT CHANGE UP
HPIV2 RNA SPEC QL NAA+PROBE: SIGNIFICANT CHANGE UP
HPIV3 RNA SPEC QL NAA+PROBE: SIGNIFICANT CHANGE UP
HPIV4 RNA SPEC QL NAA+PROBE: SIGNIFICANT CHANGE UP
IANC: 12.16 K/UL — HIGH (ref 1.8–7.4)
IMM GRANULOCYTES NFR BLD AUTO: 0.5 % — SIGNIFICANT CHANGE UP (ref 0–0.9)
INR BLD: 1.05 RATIO — SIGNIFICANT CHANGE UP (ref 0.85–1.18)
LACTATE BLDV-MCNC: 1.4 MMOL/L — SIGNIFICANT CHANGE UP (ref 0.5–2)
LYMPHOCYTES # BLD AUTO: 1.11 K/UL — SIGNIFICANT CHANGE UP (ref 1–3.3)
LYMPHOCYTES # BLD AUTO: 7.8 % — LOW (ref 13–44)
M PNEUMO DNA SPEC QL NAA+PROBE: SIGNIFICANT CHANGE UP
MCHC RBC-ENTMCNC: 27.9 PG — SIGNIFICANT CHANGE UP (ref 27–34)
MCHC RBC-ENTMCNC: 32.6 GM/DL — SIGNIFICANT CHANGE UP (ref 32–36)
MCV RBC AUTO: 85.5 FL — SIGNIFICANT CHANGE UP (ref 80–100)
MONOCYTES # BLD AUTO: 0.81 K/UL — SIGNIFICANT CHANGE UP (ref 0–0.9)
MONOCYTES NFR BLD AUTO: 5.7 % — SIGNIFICANT CHANGE UP (ref 2–14)
NEUTROPHILS # BLD AUTO: 12.16 K/UL — HIGH (ref 1.8–7.4)
NEUTROPHILS NFR BLD AUTO: 85.9 % — HIGH (ref 43–77)
NRBC # BLD: 0 /100 WBCS — SIGNIFICANT CHANGE UP (ref 0–0)
NRBC # FLD: 0 K/UL — SIGNIFICANT CHANGE UP (ref 0–0)
NT-PROBNP SERPL-SCNC: 2926 PG/ML — HIGH
PCO2 BLDV: 45 MMHG — SIGNIFICANT CHANGE UP (ref 39–52)
PH BLDV: 7.27 — LOW (ref 7.32–7.43)
PLATELET # BLD AUTO: 243 K/UL — SIGNIFICANT CHANGE UP (ref 150–400)
PO2 BLDV: 50 MMHG — HIGH (ref 25–45)
POTASSIUM BLDV-SCNC: 4.5 MMOL/L — SIGNIFICANT CHANGE UP (ref 3.5–5.1)
POTASSIUM SERPL-MCNC: 4.4 MMOL/L — SIGNIFICANT CHANGE UP (ref 3.5–5.3)
POTASSIUM SERPL-SCNC: 4.4 MMOL/L — SIGNIFICANT CHANGE UP (ref 3.5–5.3)
PROT SERPL-MCNC: 8.1 G/DL — SIGNIFICANT CHANGE UP (ref 6–8.3)
PROTHROM AB SERPL-ACNC: 11.7 SEC — SIGNIFICANT CHANGE UP (ref 9.5–13)
RAPID RVP RESULT: SIGNIFICANT CHANGE UP
RBC # BLD: 3.73 M/UL — LOW (ref 3.8–5.2)
RBC # FLD: 14.1 % — SIGNIFICANT CHANGE UP (ref 10.3–14.5)
RSV RNA SPEC QL NAA+PROBE: SIGNIFICANT CHANGE UP
RV+EV RNA SPEC QL NAA+PROBE: SIGNIFICANT CHANGE UP
SAO2 % BLDV: 68.8 % — SIGNIFICANT CHANGE UP (ref 67–88)
SARS-COV-2 RNA SPEC QL NAA+PROBE: SIGNIFICANT CHANGE UP
SODIUM SERPL-SCNC: 131 MMOL/L — LOW (ref 135–145)
TROPONIN T, HIGH SENSITIVITY RESULT: 14 NG/L — SIGNIFICANT CHANGE UP
TROPONIN T, HIGH SENSITIVITY RESULT: 16 NG/L — SIGNIFICANT CHANGE UP
WBC # BLD: 14.17 K/UL — HIGH (ref 3.8–10.5)
WBC # FLD AUTO: 14.17 K/UL — HIGH (ref 3.8–10.5)

## 2024-01-20 PROCEDURE — 71045 X-RAY EXAM CHEST 1 VIEW: CPT | Mod: 26

## 2024-01-20 PROCEDURE — 99291 CRITICAL CARE FIRST HOUR: CPT

## 2024-01-20 RX ORDER — MAGNESIUM SULFATE 500 MG/ML
2 VIAL (ML) INJECTION ONCE
Refills: 0 | Status: COMPLETED | OUTPATIENT
Start: 2024-01-20 | End: 2024-01-20

## 2024-01-20 RX ORDER — SODIUM CHLORIDE 9 MG/ML
500 INJECTION INTRAMUSCULAR; INTRAVENOUS; SUBCUTANEOUS ONCE
Refills: 0 | Status: COMPLETED | OUTPATIENT
Start: 2024-01-20 | End: 2024-01-20

## 2024-01-20 RX ORDER — FUROSEMIDE 40 MG
20 TABLET ORAL ONCE
Refills: 0 | Status: COMPLETED | OUTPATIENT
Start: 2024-01-20 | End: 2024-01-20

## 2024-01-20 RX ORDER — IPRATROPIUM/ALBUTEROL SULFATE 18-103MCG
3 AEROSOL WITH ADAPTER (GRAM) INHALATION
Refills: 0 | Status: COMPLETED | OUTPATIENT
Start: 2024-01-20 | End: 2024-01-20

## 2024-01-20 RX ORDER — VANCOMYCIN HCL 1 G
1000 VIAL (EA) INTRAVENOUS ONCE
Refills: 0 | Status: COMPLETED | OUTPATIENT
Start: 2024-01-20 | End: 2024-01-20

## 2024-01-20 RX ORDER — PIPERACILLIN AND TAZOBACTAM 4; .5 G/20ML; G/20ML
3.38 INJECTION, POWDER, LYOPHILIZED, FOR SOLUTION INTRAVENOUS ONCE
Refills: 0 | Status: COMPLETED | OUTPATIENT
Start: 2024-01-20 | End: 2024-01-20

## 2024-01-20 RX ADMIN — Medication 3 MILLILITER(S): at 20:59

## 2024-01-20 RX ADMIN — Medication 125 MILLIGRAM(S): at 20:59

## 2024-01-20 RX ADMIN — Medication 150 GRAM(S): at 20:59

## 2024-01-20 RX ADMIN — PIPERACILLIN AND TAZOBACTAM 200 GRAM(S): 4; .5 INJECTION, POWDER, LYOPHILIZED, FOR SOLUTION INTRAVENOUS at 21:46

## 2024-01-20 RX ADMIN — SODIUM CHLORIDE 500 MILLILITER(S): 9 INJECTION INTRAMUSCULAR; INTRAVENOUS; SUBCUTANEOUS at 20:59

## 2024-01-20 RX ADMIN — Medication 250 MILLIGRAM(S): at 23:50

## 2024-01-20 RX ADMIN — Medication 20 MILLIGRAM(S): at 21:46

## 2024-01-20 NOTE — ED ADULT TRIAGE NOTE - CHIEF COMPLAINT QUOTE
difficulty breathing x 5 days, cough x 2 wks, fever x 3 days. sent from md low pulse ox- 97 on nasal cannula 4 liters with ems- given  1 albuterol  nebulizer by md. ch pains  x 3 wks- bengla speaking difficulty breathing x 5 days, cough x 2 wks, fever x 3 days. sent from md low pulse ox- 97 on nasal cannula 4 liters with ems- given  1 albuterol  nebulizer by md. ch pains  x 3 wks- bengla speaking. pt noted eith  tachypnea at triage

## 2024-01-20 NOTE — ED PROVIDER NOTE - HIV OFFER
Pt had problems with her meter last week. However, the home health nurse was able to order the correct supplies for her to check her blood sugar. She had no questions/concerns at this time.     Pt is seeing Dr. Rodriguez today. /ADEN Parra     Opt out

## 2024-01-20 NOTE — ED PROVIDER NOTE - ATTENDING CONTRIBUTION TO CARE
Attending Statement: I have personally seen and examined this patient. I have fully participated in the care of this patient. I have reviewed all pertinent clinical information, including history physical exam, plan and the Resident's note and agree except as noted  68-year-old female history of asthma, hypertension, prior CVA with right-sided residual weakness and slurred speech, from home with family at bedside chief complaint of shortness of breath and cough.  Endorsing 4 to 5 days of feeling short of breath and dyspnea on exertion, with a nonproductive cough.  Subjective fever and chills.  Overall malaise and bodyaches.  Chest discomfort midsternum difficult to ascertain if it is pleuritic, worse with exertion.  Poor p.o. intake nausea but no vomiting.  No recent travel.  On arrival patient tachycardic and tachypneic and hypoxic, placed on 4 L nasal cannula responded well.  Moderate work of breathing with bilateral wheezing, but patient talking in full sentences with family.  Soft nontender abdomen.  No pedal edema appreciated bilaterally no calf tenderness bilaterally patient noted to have right-sided weakness.  Gait not tested.  plan EKG, labs, cardiac monitor, steroids, duoneb, antibiotics, tba

## 2024-01-20 NOTE — ED ADULT NURSE REASSESSMENT NOTE - NS ED NURSE REASSESS COMMENT FT1
Facilitator RN: Patient awake and resting in stretcher; vital signs stable, respirations even and unlabored, no signs/symptoms of acute distress. Patient tolerating bipap at this time. Safety measures in place and call bell within reach, family at bedside. Facilitator RN: Patient awake and resting in stretcher; vital signs stable, respirations even and unlabored, no signs/symptoms of acute distress. Patient tolerating bipap at this time. Assisted RN Lisandra with cordova placement, patient cleaned and changed. Safety measures in place and call bell within reach, family at bedside. Facilitator RN: Patient awake and resting in stretcher; vital signs stable, respirations even and unlabored, no signs/symptoms of acute distress. Patient tolerating bipap at this time. Assisted GEOVANI Fry with cordova placement, patient cleaned and changed. Medications administered per eMAR. Safety measures in place and call bell within reach, family at bedside. Primary RN Maryse made aware.

## 2024-01-20 NOTE — ED PROVIDER NOTE - CLINICAL SUMMARY MEDICAL DECISION MAKING FREE TEXT BOX
69 y/o female w/ PMH of asthma, HTN to [facility] ED 6/29 for right-sided weakness and slurred speech found to have old lacunar infarcts b/l BG c/o 5 day history of difficulty breathing, 2 weeks of intermittent cough w/ hemoptysis, and 3 day history of subjective fever. Pt also admits to 3 week history of localized midsternal intermittent chest pain. Pt was send in by PCP 2/2 hypoxia on room air, requiring 4L NC. Otherwise asymptomatic. Denies chills, nausea, vomiting, dizziness, abdominal pain, dysuria, hematuria. Mildly tachypneic, hemoptysis, hypoxic on room air, concerning for infection vs pulmonary embolism vs asthma exacerbation 2/2 infection. Sepsis labs. Will screen for ACS (troponin), anemia/electrolytes, and chest x ray to screen for cardiopulmonary pathology. BNP for heart failure. CT angio for PE and reassess w/ admission for hypoxia on room air likely 2/2 infection.

## 2024-01-20 NOTE — ED PROVIDER NOTE - OBJECTIVE STATEMENT
69 y/o female w/ PMH of asthma, HTN to [facility] ED 6/29 for right-sided weakness and slurred speech found to have old lacunar infarcts b/l BG c/o 5 day history of difficulty breathing, 2 weeks of intermittent cough w/ hemoptysis, and 3 day history of subjective fever. Pt also admits to 3 week history of localized midsternal intermittent chest pain. Pt was send in by PCP 2/2 hypoxia on room air, requiring 4L NC. Otherwise asymptomatic. Denies chills, nausea, vomiting, dizziness, abdominal pain, dysuria, hematuria.  [id number] Shon: 69 y/o female w/ PMH of asthma, HTN to [facility] ED 6/29 for right-sided weakness and slurred speech found to have old lacunar infarcts b/l BG c/o 5 day history of difficulty breathing, 2 weeks of intermittent cough w/ hemoptysis, and 3 day history of subjective fever. Pt also admits to 3 week history of localized midsternal intermittent chest pain. Pt was send in by PCP 2/2 hypoxia on room air, requiring 4L NC. Otherwise asymptomatic. Denies chills, nausea, vomiting, dizziness, abdominal pain, dysuria, hematuria.

## 2024-01-20 NOTE — ED PROVIDER NOTE - PHYSICAL EXAMINATION
GENERAL: NAD  HEENT:  Atraumatic  CHEST/LUNG: Chest rise equal bilaterally, diffuse crackles, mildly tachypneic   HEART: Regular rate and rhythm  ABDOMEN: Soft, Nontender, Nondistended  EXTREMITIES:  Extremities warm  PSYCH: A&Ox3  SKIN: No obvious rashes or lesions  NEUROLOGY: strength and sensation intact in all extremities

## 2024-01-20 NOTE — ED ADULT NURSE NOTE - OBJECTIVE STATEMENT
Patient c/o cough for " weeks,", fever, difficulty breathing. Malay speaking with her daughter at the bedside.  phone used for history.

## 2024-01-20 NOTE — ED PROVIDER NOTE - PROGRESS NOTE DETAILS
Shant HARDIN: Reassessed patient at bedside at this time.  Patient is more tachypneic, crackles are present in bilateral lung fields.  Potentially fluid overload at this time.  Will initiate BiPAP and obtain urgent chest x-ray. pt placed on BIPAP for WOB, order a low dose of lasix. cxr +pulm congestion. pending cta and repeat trop to be done. pt tolerating BIPAP, looks more comfortable Shant HARDIN: BETTY consulted for first-time BIPAP Patient stable pending CT to be done and admission Shant HARDIN: Pt is breathing comfortably, able to wean BIPAP after CT scan. Hospitalist aware of multifocal PNA and remote pulmonary embolism. Hospitalist recommended one time dose of lovenox and admission to medicine on tele.

## 2024-01-20 NOTE — ED ADULT NURSE NOTE - CHIEF COMPLAINT QUOTE
difficulty breathing x 5 days, cough x 2 wks, fever x 3 days. sent from md low pulse ox- 97 on nasal cannula 4 liters with ems- given  1 albuterol  nebulizer by md. ch pains  x 3 wks- bengla speaking. pt noted eith  tachypnea at triage

## 2024-01-21 DIAGNOSIS — J18.9 PNEUMONIA, UNSPECIFIED ORGANISM: ICD-10-CM

## 2024-01-21 DIAGNOSIS — Z86.73 PERSONAL HISTORY OF TRANSIENT ISCHEMIC ATTACK (TIA), AND CEREBRAL INFARCTION WITHOUT RESIDUAL DEFICITS: ICD-10-CM

## 2024-01-21 DIAGNOSIS — I50.9 HEART FAILURE, UNSPECIFIED: ICD-10-CM

## 2024-01-21 DIAGNOSIS — E87.1 HYPO-OSMOLALITY AND HYPONATREMIA: ICD-10-CM

## 2024-01-21 DIAGNOSIS — J96.01 ACUTE RESPIRATORY FAILURE WITH HYPOXIA: ICD-10-CM

## 2024-01-21 DIAGNOSIS — K80.50 CALCULUS OF BILE DUCT WITHOUT CHOLANGITIS OR CHOLECYSTITIS WITHOUT OBSTRUCTION: ICD-10-CM

## 2024-01-21 DIAGNOSIS — I26.99 OTHER PULMONARY EMBOLISM WITHOUT ACUTE COR PULMONALE: ICD-10-CM

## 2024-01-21 DIAGNOSIS — I10 ESSENTIAL (PRIMARY) HYPERTENSION: ICD-10-CM

## 2024-01-21 DIAGNOSIS — N17.9 ACUTE KIDNEY FAILURE, UNSPECIFIED: ICD-10-CM

## 2024-01-21 DIAGNOSIS — F32.9 MAJOR DEPRESSIVE DISORDER, SINGLE EPISODE, UNSPECIFIED: ICD-10-CM

## 2024-01-21 DIAGNOSIS — D64.9 ANEMIA, UNSPECIFIED: ICD-10-CM

## 2024-01-21 DIAGNOSIS — Z29.9 ENCOUNTER FOR PROPHYLACTIC MEASURES, UNSPECIFIED: ICD-10-CM

## 2024-01-21 DIAGNOSIS — J45.901 UNSPECIFIED ASTHMA WITH (ACUTE) EXACERBATION: ICD-10-CM

## 2024-01-21 DIAGNOSIS — R09.89 OTHER SPECIFIED SYMPTOMS AND SIGNS INVOLVING THE CIRCULATORY AND RESPIRATORY SYSTEMS: ICD-10-CM

## 2024-01-21 LAB
ALBUMIN SERPL ELPH-MCNC: 3.5 G/DL — SIGNIFICANT CHANGE UP (ref 3.3–5)
ALP SERPL-CCNC: 116 U/L — SIGNIFICANT CHANGE UP (ref 40–120)
ALT FLD-CCNC: 12 U/L — SIGNIFICANT CHANGE UP (ref 4–33)
ANION GAP SERPL CALC-SCNC: 14 MMOL/L — SIGNIFICANT CHANGE UP (ref 7–14)
APPEARANCE UR: CLEAR — SIGNIFICANT CHANGE UP
AST SERPL-CCNC: 15 U/L — SIGNIFICANT CHANGE UP (ref 4–32)
BACTERIA # UR AUTO: ABNORMAL /HPF
BASE EXCESS BLDV CALC-SCNC: -5.7 MMOL/L — LOW (ref -2–3)
BASE EXCESS BLDV CALC-SCNC: -6.8 MMOL/L — LOW (ref -2–3)
BILIRUB SERPL-MCNC: 0.5 MG/DL — SIGNIFICANT CHANGE UP (ref 0.2–1.2)
BILIRUB UR-MCNC: NEGATIVE — SIGNIFICANT CHANGE UP
BUN SERPL-MCNC: 22 MG/DL — SIGNIFICANT CHANGE UP (ref 7–23)
CA-I SERPL-SCNC: 1.25 MMOL/L — SIGNIFICANT CHANGE UP (ref 1.15–1.33)
CA-I SERPL-SCNC: 1.29 MMOL/L — SIGNIFICANT CHANGE UP (ref 1.15–1.33)
CALCIUM SERPL-MCNC: 9.3 MG/DL — SIGNIFICANT CHANGE UP (ref 8.4–10.5)
CAST: 2 /LPF — SIGNIFICANT CHANGE UP (ref 0–4)
CHLORIDE BLDV-SCNC: 100 MMOL/L — SIGNIFICANT CHANGE UP (ref 96–108)
CHLORIDE BLDV-SCNC: 101 MMOL/L — SIGNIFICANT CHANGE UP (ref 96–108)
CHLORIDE SERPL-SCNC: 98 MMOL/L — SIGNIFICANT CHANGE UP (ref 98–107)
CO2 BLDV-SCNC: 23.4 MMOL/L — SIGNIFICANT CHANGE UP (ref 22–26)
CO2 BLDV-SCNC: 23.8 MMOL/L — SIGNIFICANT CHANGE UP (ref 22–26)
CO2 SERPL-SCNC: 20 MMOL/L — LOW (ref 22–31)
COLOR SPEC: YELLOW — SIGNIFICANT CHANGE UP
CREAT SERPL-MCNC: 1.32 MG/DL — HIGH (ref 0.5–1.3)
DIFF PNL FLD: ABNORMAL
EGFR: 44 ML/MIN/1.73M2 — LOW
GAS PNL BLDV: 131 MMOL/L — LOW (ref 136–145)
GAS PNL BLDV: 131 MMOL/L — LOW (ref 136–145)
GAS PNL BLDV: SIGNIFICANT CHANGE UP
GLUCOSE BLDV-MCNC: 163 MG/DL — HIGH (ref 70–99)
GLUCOSE BLDV-MCNC: 163 MG/DL — HIGH (ref 70–99)
GLUCOSE SERPL-MCNC: 156 MG/DL — HIGH (ref 70–99)
GLUCOSE UR QL: NEGATIVE MG/DL — SIGNIFICANT CHANGE UP
HCO3 BLDV-SCNC: 22 MMOL/L — SIGNIFICANT CHANGE UP (ref 22–29)
HCO3 BLDV-SCNC: 22 MMOL/L — SIGNIFICANT CHANGE UP (ref 22–29)
HCT VFR BLD CALC: 33.9 % — LOW (ref 34.5–45)
HCT VFR BLDA CALC: 26 % — LOW (ref 34.5–46.5)
HCT VFR BLDA CALC: 34 % — LOW (ref 34.5–46.5)
HGB BLD CALC-MCNC: 11.2 G/DL — LOW (ref 11.7–16.1)
HGB BLD CALC-MCNC: 8.8 G/DL — LOW (ref 11.7–16.1)
HGB BLD-MCNC: 10.6 G/DL — LOW (ref 11.5–15.5)
KETONES UR-MCNC: NEGATIVE MG/DL — SIGNIFICANT CHANGE UP
LACTATE BLDV-MCNC: 0.7 MMOL/L — SIGNIFICANT CHANGE UP (ref 0.5–2)
LACTATE BLDV-MCNC: 0.9 MMOL/L — SIGNIFICANT CHANGE UP (ref 0.5–2)
LEUKOCYTE ESTERASE UR-ACNC: ABNORMAL
MAGNESIUM SERPL-MCNC: 2.7 MG/DL — HIGH (ref 1.6–2.6)
MCHC RBC-ENTMCNC: 26.8 PG — LOW (ref 27–34)
MCHC RBC-ENTMCNC: 31.3 GM/DL — LOW (ref 32–36)
MCV RBC AUTO: 85.8 FL — SIGNIFICANT CHANGE UP (ref 80–100)
NITRITE UR-MCNC: NEGATIVE — SIGNIFICANT CHANGE UP
NRBC # BLD: 0 /100 WBCS — SIGNIFICANT CHANGE UP (ref 0–0)
NRBC # FLD: 0 K/UL — SIGNIFICANT CHANGE UP (ref 0–0)
PCO2 BLDV: 52 MMHG — SIGNIFICANT CHANGE UP (ref 39–52)
PCO2 BLDV: 59 MMHG — HIGH (ref 39–52)
PH BLDV: 7.18 — LOW (ref 7.32–7.43)
PH BLDV: 7.23 — LOW (ref 7.32–7.43)
PH UR: 6 — SIGNIFICANT CHANGE UP (ref 5–8)
PHOSPHATE SERPL-MCNC: 6 MG/DL — HIGH (ref 2.5–4.5)
PLATELET # BLD AUTO: 267 K/UL — SIGNIFICANT CHANGE UP (ref 150–400)
PO2 BLDV: 53 MMHG — HIGH (ref 25–45)
PO2 BLDV: 73 MMHG — HIGH (ref 25–45)
POTASSIUM BLDV-SCNC: 4.9 MMOL/L — SIGNIFICANT CHANGE UP (ref 3.5–5.1)
POTASSIUM BLDV-SCNC: 4.9 MMOL/L — SIGNIFICANT CHANGE UP (ref 3.5–5.1)
POTASSIUM SERPL-MCNC: 5 MMOL/L — SIGNIFICANT CHANGE UP (ref 3.5–5.3)
POTASSIUM SERPL-SCNC: 5 MMOL/L — SIGNIFICANT CHANGE UP (ref 3.5–5.3)
PROT SERPL-MCNC: 8.5 G/DL — HIGH (ref 6–8.3)
PROT UR-MCNC: NEGATIVE MG/DL — SIGNIFICANT CHANGE UP
RBC # BLD: 3.95 M/UL — SIGNIFICANT CHANGE UP (ref 3.8–5.2)
RBC # FLD: 14.1 % — SIGNIFICANT CHANGE UP (ref 10.3–14.5)
RBC CASTS # UR COMP ASSIST: 1 /HPF — SIGNIFICANT CHANGE UP (ref 0–4)
S PNEUM AG UR QL: NEGATIVE — SIGNIFICANT CHANGE UP
SAO2 % BLDV: 78 % — SIGNIFICANT CHANGE UP (ref 67–88)
SAO2 % BLDV: 92.8 % — HIGH (ref 67–88)
SODIUM SERPL-SCNC: 132 MMOL/L — LOW (ref 135–145)
SP GR SPEC: 1.01 — SIGNIFICANT CHANGE UP (ref 1–1.03)
SQUAMOUS # UR AUTO: 0 /HPF — SIGNIFICANT CHANGE UP (ref 0–5)
UROBILINOGEN FLD QL: 0.2 MG/DL — SIGNIFICANT CHANGE UP (ref 0.2–1)
WBC # BLD: 10.74 K/UL — HIGH (ref 3.8–10.5)
WBC # FLD AUTO: 10.74 K/UL — HIGH (ref 3.8–10.5)
WBC UR QL: 3 /HPF — SIGNIFICANT CHANGE UP (ref 0–5)

## 2024-01-21 PROCEDURE — 99223 1ST HOSP IP/OBS HIGH 75: CPT

## 2024-01-21 PROCEDURE — 71275 CT ANGIOGRAPHY CHEST: CPT | Mod: 26,MA

## 2024-01-21 PROCEDURE — 99223 1ST HOSP IP/OBS HIGH 75: CPT | Mod: GC

## 2024-01-21 PROCEDURE — 76770 US EXAM ABDO BACK WALL COMP: CPT | Mod: 26

## 2024-01-21 RX ORDER — PANTOPRAZOLE SODIUM 20 MG/1
40 TABLET, DELAYED RELEASE ORAL
Refills: 0 | Status: DISCONTINUED | OUTPATIENT
Start: 2024-01-21 | End: 2024-01-28

## 2024-01-21 RX ORDER — AZITHROMYCIN 500 MG/1
500 TABLET, FILM COATED ORAL EVERY 24 HOURS
Refills: 0 | Status: DISCONTINUED | OUTPATIENT
Start: 2024-01-21 | End: 2024-01-22

## 2024-01-21 RX ORDER — ACETAMINOPHEN 500 MG
650 TABLET ORAL EVERY 6 HOURS
Refills: 0 | Status: DISCONTINUED | OUTPATIENT
Start: 2024-01-21 | End: 2024-01-28

## 2024-01-21 RX ORDER — IPRATROPIUM/ALBUTEROL SULFATE 18-103MCG
3 AEROSOL WITH ADAPTER (GRAM) INHALATION
Refills: 0 | DISCHARGE

## 2024-01-21 RX ORDER — MONTELUKAST 4 MG/1
1 TABLET, CHEWABLE ORAL
Refills: 0 | DISCHARGE

## 2024-01-21 RX ORDER — CEFTRIAXONE 500 MG/1
1000 INJECTION, POWDER, FOR SOLUTION INTRAMUSCULAR; INTRAVENOUS EVERY 24 HOURS
Refills: 0 | Status: COMPLETED | OUTPATIENT
Start: 2024-01-21 | End: 2024-01-25

## 2024-01-21 RX ORDER — AMLODIPINE BESYLATE 2.5 MG/1
10 TABLET ORAL DAILY
Refills: 0 | Status: DISCONTINUED | OUTPATIENT
Start: 2024-01-21 | End: 2024-01-28

## 2024-01-21 RX ORDER — ALBUTEROL 90 UG/1
1 AEROSOL, METERED ORAL EVERY 6 HOURS
Refills: 0 | Status: DISCONTINUED | OUTPATIENT
Start: 2024-01-21 | End: 2024-01-28

## 2024-01-21 RX ORDER — ASPIRIN/CALCIUM CARB/MAGNESIUM 324 MG
81 TABLET ORAL DAILY
Refills: 0 | Status: DISCONTINUED | OUTPATIENT
Start: 2024-01-21 | End: 2024-01-28

## 2024-01-21 RX ORDER — AMLODIPINE BESYLATE 2.5 MG/1
1 TABLET ORAL
Refills: 0 | DISCHARGE

## 2024-01-21 RX ORDER — MONTELUKAST 4 MG/1
10 TABLET, CHEWABLE ORAL DAILY
Refills: 0 | Status: DISCONTINUED | OUTPATIENT
Start: 2024-01-21 | End: 2024-01-28

## 2024-01-21 RX ORDER — ENOXAPARIN SODIUM 100 MG/ML
40 INJECTION SUBCUTANEOUS ONCE
Refills: 0 | Status: COMPLETED | OUTPATIENT
Start: 2024-01-21 | End: 2024-01-21

## 2024-01-21 RX ORDER — IPRATROPIUM/ALBUTEROL SULFATE 18-103MCG
3 AEROSOL WITH ADAPTER (GRAM) INHALATION EVERY 6 HOURS
Refills: 0 | Status: DISCONTINUED | OUTPATIENT
Start: 2024-01-21 | End: 2024-01-28

## 2024-01-21 RX ORDER — CYPROHEPTADINE HYDROCHLORIDE 4 MG/1
1 TABLET ORAL
Refills: 0 | DISCHARGE

## 2024-01-21 RX ORDER — SERTRALINE 25 MG/1
1 TABLET, FILM COATED ORAL
Refills: 0 | DISCHARGE

## 2024-01-21 RX ORDER — APIXABAN 2.5 MG/1
10 TABLET, FILM COATED ORAL EVERY 12 HOURS
Refills: 0 | Status: DISCONTINUED | OUTPATIENT
Start: 2024-01-21 | End: 2024-01-21

## 2024-01-21 RX ORDER — SODIUM CHLORIDE 9 MG/ML
4 INJECTION INTRAMUSCULAR; INTRAVENOUS; SUBCUTANEOUS EVERY 6 HOURS
Refills: 0 | Status: DISCONTINUED | OUTPATIENT
Start: 2024-01-21 | End: 2024-01-28

## 2024-01-21 RX ORDER — ATORVASTATIN CALCIUM 80 MG/1
20 TABLET, FILM COATED ORAL AT BEDTIME
Refills: 0 | Status: DISCONTINUED | OUTPATIENT
Start: 2024-01-21 | End: 2024-01-28

## 2024-01-21 RX ORDER — ATORVASTATIN CALCIUM 80 MG/1
1 TABLET, FILM COATED ORAL
Refills: 0 | DISCHARGE

## 2024-01-21 RX ORDER — SERTRALINE 25 MG/1
25 TABLET, FILM COATED ORAL DAILY
Refills: 0 | Status: DISCONTINUED | OUTPATIENT
Start: 2024-01-21 | End: 2024-01-28

## 2024-01-21 RX ORDER — ALBUTEROL 90 UG/1
1 AEROSOL, METERED ORAL
Refills: 0 | DISCHARGE

## 2024-01-21 RX ORDER — APIXABAN 2.5 MG/1
5 TABLET, FILM COATED ORAL EVERY 12 HOURS
Refills: 0 | Status: DISCONTINUED | OUTPATIENT
Start: 2024-01-21 | End: 2024-01-28

## 2024-01-21 RX ORDER — ASPIRIN/CALCIUM CARB/MAGNESIUM 324 MG
1 TABLET ORAL
Refills: 0 | DISCHARGE

## 2024-01-21 RX ORDER — ATENOLOL 25 MG/1
50 TABLET ORAL DAILY
Refills: 0 | Status: DISCONTINUED | OUTPATIENT
Start: 2024-01-21 | End: 2024-01-28

## 2024-01-21 RX ORDER — ATENOLOL 25 MG/1
1 TABLET ORAL
Refills: 0 | DISCHARGE

## 2024-01-21 RX ADMIN — ENOXAPARIN SODIUM 40 MILLIGRAM(S): 100 INJECTION SUBCUTANEOUS at 07:10

## 2024-01-21 RX ADMIN — Medication 3 MILLILITER(S): at 17:58

## 2024-01-21 RX ADMIN — Medication 3 MILLILITER(S): at 00:18

## 2024-01-21 RX ADMIN — Medication 40 MILLIGRAM(S): at 12:44

## 2024-01-21 RX ADMIN — CEFTRIAXONE 100 MILLIGRAM(S): 500 INJECTION, POWDER, FOR SOLUTION INTRAMUSCULAR; INTRAVENOUS at 10:00

## 2024-01-21 RX ADMIN — Medication 3 MILLILITER(S): at 00:19

## 2024-01-21 RX ADMIN — Medication 3 MILLILITER(S): at 12:44

## 2024-01-21 RX ADMIN — AZITHROMYCIN 255 MILLIGRAM(S): 500 TABLET, FILM COATED ORAL at 10:40

## 2024-01-21 RX ADMIN — Medication 40 MILLIGRAM(S): at 22:37

## 2024-01-21 RX ADMIN — Medication 3 MILLILITER(S): at 22:37

## 2024-01-21 NOTE — CONSULT NOTE ADULT - ASSESSMENT
68F Romans[place]-speaking with PMH of asthma/COPD/Emphysema, severe not on home O2, (FEV1 38, DLCO 25) HTN, HFpEF, CVA with right-sided deficits p/w SOB and cough for 2 weeks. Per daughter at bedside, patient has been having increased SOB, orthopnea, PND, and LE edema for the past few months. However, over the past 2 weeks, she noticed her mom was more exhausted, dyspneic, and having a cough productive of yellow sputum. She did notice 3 episodes of blood-tinged sputum and subjective fevers over the past 3 days. Otherwise, denies CP, palpitations, n/v/d, abdominal pain, dysuria, melena/hematochezia. Pt went to her PCP's office yesterday and was noted to be hypoxic. She was placed on 4L NC and sent to ED.     In the ED, HR , /78, RR 36 (satting 97% on 4L NC). Labs notable for WBC 14.17K, Na 131, BUN/Cr 21/1.35, proBNP 2926, VBG 7.27/45/50/21. CTA PE shows multifocal PNA, remote PE, and dilated CBD with cholelithiasis, GB distension. Pt had a cordova placed for urinary retention in ED. S/p vanc/zosyn, 500cc IVF bolus, IV lasix 20mg, lovenox 40mg, duonebs, solu-medrol 125, and IV mag in ED. She briefly required BiPAP. MICU evaluated pt, not a candidate. Weaned to 4L NC.  (21 Jan 2024 10:31)    patient is a former smoker with asthma problems,     RVP negative    #recommendatiosn   patient readily arousable with improved gases after NIV  patient likely with COPD exacerbation with multifocal PNA and PE  continue steroids  airway clearance while on highflo: humidified oxygen, standing guaifenesin, duonebs q6h, hyper sal q6h, chest PT q6 h, aerobika/acapella q6 h,, chest vest q12h  needs to be back on bipap overnight  please obtain morning ABG/VBG  please obtain if not already done  sputum culture, blood cultures, U/A, urine legionella, nasal MRSA  pro BNP, ECHO with bubble study  bilateral lower extremity DVT syl Olguin PGY5 PCCM Fellow

## 2024-01-21 NOTE — H&P ADULT - PROBLEM SELECTOR PLAN 2
Pt with acute asthma exacerbtaion on presentation likely 2/2 CAP.   -S/p solu-medrol 125mg, Mg 2mg, duonebs in ED   -VBG 7.25/45/50/21  -Pt briefly on BiPAP, weaned to 4L NC   -Repeat VBG 7.18/59/53/22  -Pulm consulted - will place back on BiPAP and reassess   -Solumedrol 40 q8 for now   -Duonebs q6H

## 2024-01-21 NOTE — ED ADULT NURSE REASSESSMENT NOTE - NS ED NURSE REASSESS COMMENT FT1
pt resting in stretcher. pt trailed off BIPAP and placed on 5L NC. pt work of breathing improved since arriving to ED. pt remains on monitor. approx 500 cc of urine drained into cordova bag. Safety maintained. Plan of care ongoing.

## 2024-01-21 NOTE — H&P ADULT - NSHPLABSRESULTS_GEN_ALL_CORE
10.6   10.74 )-----------( 267      ( 2024 10:29 )             33.9       20    131<L>  |  96<L>  |  21  ----------------------------<  130<H>  4.4   |  21<L>  |  1.35<H>    Ca    9.5      2024 20:30    TPro  8.1  /  Alb  3.5  /  TBili  0.6  /  DBili  x   /  AST  15  /  ALT  10  /  AlkPhos  114                Urinalysis Basic - ( 2024 10:00 )    Color: Yellow / Appearance: Clear / S.012 / pH: x  Gluc: x / Ketone: Negative mg/dL  / Bili: Negative / Urobili: 0.2 mg/dL   Blood: x / Protein: Negative mg/dL / Nitrite: Negative   Leuk Esterase: Trace / RBC: x / WBC x   Sq Epi: x / Non Sq Epi: x / Bacteria: x        PT/INR - ( 2024 20:30 )   PT: 11.7 sec;   INR: 1.05 ratio         PTT - ( 2024 20:30 )  PTT:28.4 sec    Lactate Trend      CARDIAC MARKERS ( 2024 20:30 )  x     / x     / 95 U/L / x     / 5.9 ng/mL        CAPILLARY BLOOD GLUCOSE

## 2024-01-21 NOTE — H&P ADULT - PROBLEM SELECTOR PLAN 8
Pt h/o cholelithiasis on imaging from 2021. No intervention at that time. Now with incidentally found cholelithiasis, GB distension, and dilated CBD. C/f possible choledocholithiasis. LFTs wnl.   -CTA Chest: "dilated common bile duct, measuring 1.0 cm. No obstructing stone is visualized. Cholelithiasis with gallbladder distention. Hypodense lesion in the medial right hepatic lobe, indeterminate on this study."  -Pt with need nonurgent hepatic protocol MRI abdomen with and without intravenous contrast and MRCP. Not ordered today as patient appears to be not optimized from a respiratory standpoint, can order once more stable.

## 2024-01-21 NOTE — ED ADULT NURSE REASSESSMENT NOTE - NS ED NURSE REASSESS COMMENT FT1
Patient remains at baseline mental status. Appears to be resting comfortably in stretcher, daughter at bedside. Vital signs as charted. Pt placed on BiPAP by respiratory. NAD noted at this time. NSR on monitor. Medications administered as ordered as per eMAR. IV site clean dry and intact.  Safety maintained, stretcher locked in lowest position with siderails up x2, call bell and personal items within reach. Admitted to tele, awaiting bed placement.

## 2024-01-21 NOTE — CONSULT NOTE ADULT - SUBJECTIVE AND OBJECTIVE BOX
Patient:  MARKIE HERNANDEZ  [id number]    CHIEF COMPLAINT:    HPI: 68F PMH of asthma, HTN to [facility] ED 6/29 for right-sided weakness and slurred speech found to have old lacunar infarcts b/l BG p/w 5 day history of difficulty breathing, 2 weeks of intermittent cough, and 3 day history of subjective fever. Pt also admits to 3 week history of localized midsternal intermittent chest pain. Pt was send in by PCP 2/2 hypoxia on room air, requiring 4L NC. Otherwise asymptomatic. Denies chills, nausea, vomiting, dizziness, abdominal pain, dysuria, hematuria.     PAST MEDICAL & SURGICAL HISTORY:  Brain aneurysm      Hypertension      Brain aneurysm      Smoking      No significant past surgical history      No significant past surgical history          FAMILY HISTORY:  No pertinent family history in first degree relatives        SOCIAL HISTORY:    Allergies    No Known Allergies    Intolerances        HOME MEDICATIONS:    REVIEW OF SYSTEMS:  [ ] Unable to assess ROS because ______  [ ] Negative except as stated in HPI  CONSTITUTIONAL: No fever, chills, night sweats, or fatigue  EYES: No eye pain, visual disturbances, or discharge  ENMT:  No difficulty hearing, tinnitus, vertigo; No sinus or throat pain  NECK: No pain or stiffness  BREASTS: No pain, masses, or nipple discharge  RESPIRATORY: No cough, wheezing, or hemoptysis; No shortness of breath  CARDIOVASCULAR: No chest pain, palpitations, dizziness, or leg swelling  GASTROINTESTINAL: No abdominal or epigastric pain. No nausea, vomiting, or hematemesis; No diarrhea or constipation. No melena or hematochezia.  GENITOURINARY: No dysuria, frequency, hematuria, or incontinence  NEUROLOGICAL: No headaches, memory loss, loss of strength, numbness, or tremors  SKIN: No itching, burning, rashes, or lesions   LYMPH NODES: No enlarged glands  ENDOCRINE: No heat or cold intolerance; No hair loss  MUSCULOSKELETAL: No joint pain or swelling; No muscle, back, or extremity pain  PSYCHIATRIC: No depression, anxiety, mood swings, or difficulty sleeping  HEME/LYMPH: No easy bruising, or bleeding gums  ALLERGY AND IMMUNOLOGIC: No hives or eczema    OBJECTIVE:  T(F): 97.4 (01-21-24 @ 00:35), Max: 98.8 (01-20-24 @ 23:50)  HR: 100 (01-21-24 @ 00:35) (90 - 105)  BP: 120/60 (01-21-24 @ 00:35) (120/60 - 142/78)  BP(mean): 79 (01-20-24 @ 23:50) (79 - 79)  ABP: --  ABP(mean): --  RR: 30 (01-21-24 @ 00:35) (30 - 36)  SpO2: 100% (01-21-24 @ 00:35) (97% - 100%)    I/O Summary 24H    CAPILLARY BLOOD GLUCOSE          PHYSICAL EXAM:  GENERAL: NAD  HEAD:  Atraumatic, Normocephalic  EYES: EOMI, PERRLA, conjunctiva and sclera clear  ENT: Moist mucous membranes  NECK: Supple, No JVD  CHEST/LUNG: decreased breath sounds; No rales, rhonchi, wheezing, or rubs. Unlabored respirations  HEART: Regular rate and rhythm; No murmurs, rubs, or gallops  ABDOMEN: Bowel sounds present; Soft, Nontender, Nondistended. No hepatomegaly  EXTREMITIES:  2+ Peripheral Pulses, brisk capillary refill. No clubbing, cyanosis, or edema  NERVOUS SYSTEM:  Alert & Oriented X3, speech clear. No deficits   MSK: FROM all 4 extremities, full and equal strength  SKIN: No rashes or lesions    HOSPITAL MEDICATIONS:  MEDICATIONS  (STANDING):    MEDICATIONS  (PRN):      LABS:  CBC 01-20-24 @ 20:30                        10.4   14.17 )-----------( 243                   31.9       Hgb trend: 10.4 <--   WBC trend: 14.17 <--     CMP 01-20-24 @ 20:30    131<L>  |  96<L>  |  21  ----------------------------<  130<H>  4.4   |  21<L>  |  1.35<H>    Ca    9.5      01-20-24 @ 20:30    TPro  8.1  /  Alb  3.5  /  TBili  0.6  /  DBili  x   /  AST  15  /  ALT  10  /  AlkPhos  114  01-20      Serum Cr trend: 1.35 <--   PT/INR - ( 20 Jan 2024 20:30 )   PT: 11.7 sec;   INR: 1.05 ratio         PTT - ( 20 Jan 2024 20:30 ):28.4 sec    ABG Trend:     VBG Trend:   01-20-24 @ 20:30 - pH: 7.27  | pCO2: 45    | pO2: 50    | HCO3: 21    | Lactate: 1.4        MICROBIOLOGY:       RADIOLOGY:  [ ] Reviewed and interpreted by me    EKG
  CHIEF COMPLAINT:    HPI:  HPI:  68F Spanish-speaking with PMH of asthma, HTN, HFpEF, CVA with right-sided deficits p/w SOB and cough for 2 weeks. Per daughter at bedside, patient has been having increased SOB, orthopnea, PND, and LE edema for the past few months. However, over the past 2 weeks, she noticed her mom was more exhausted, dyspneic, and having a cough productive of yellow sputum. She did notice 3 episodes of blood-tinged sputum and subjective fevers over the past 3 days. Otherwise, denies CP, palpitations, n/v/d, abdominal pain, dysuria, melena/hematochezia. Pt went to her PCP's office yesterday and was noted to be hypoxic. She was placed on 4L NC and sent to ED.     In the ED, HR , /78, RR 36 (satting 97% on 4L NC). Labs notable for WBC 14.17K, Na 131, BUN/Cr 21/1.35, proBNP 2926, VBG 7.27/45/50/21. CTA PE shows multifocal PNA, remote PE, and dilated CBD with cholelithiasis, GB distension. Pt had a cordova placed for urinary retention in ED. S/p vanc/zosyn, 500cc IVF bolus, IV lasix 20mg, lovenox 40mg, duonebs, solu-medrol 125, and IV mag in ED. She briefly required BiPAP. MICU evaluated pt, not a candidate. Weaned to 4L NC.  (21 Jan 2024 10:31)    patient is a former smoker with asthma problems,     follows Karley Bynum  68F with severe COPD (FEV1 38, DLCO 25), former moderate smoker, multiple strokes presenting for graudal worsening of dyspnea on exertion and cough. She is not currently in exacerbation. Despite repeat PFTs showing possible restriction this is likely due to poor effort rather than a true test. She has poor air movement bilaterally due to the same. She remained with normal saturation at rest and did not really tolerate trying to ambulate to assess for desaturation.  ?  - Would stop symbicort and start duoneb 4 times daily. She does not appear to be taking deep enough breaths for her MDIs to be effective.  - It is unlikely that montelukast is effective for her either and would consider de-prescribing.  - It might have been beneficial to check for alpha 1 antitrypsin levels back when her symptoms began due to relatively low amount of smoking but it is unlikely that this would be beneficial at this point  - can consider addition of nebulized budesonide if mininal improvement with duonebs  - she does not desaturate at rest and due to inability to ambulate she is unlikely to benefit from home oxygen therapy  ?  RTC 3 Months  ?    PAST MEDICAL & SURGICAL HISTORY:  Brain aneurysm      Hypertension      Brain aneurysm      Smoking      No significant past surgical history      No significant past surgical history          FAMILY HISTORY:  No pertinent family history in first degree relatives        SOCIAL HISTORY:  former smoker    Allergies    No Known Allergies    Intolerances        HOME MEDICATIONS:  Home Medications:  Albuterol (Eqv-ProAir HFA) 90 mcg/inh inhalation aerosol: 1 puff(s) inhaled every 6 hours as needed for  shortness of breath and/or wheezing (21 Jan 2024 10:26)  amLODIPine 10 mg oral tablet: 1 tab(s) orally once a day (21 Jan 2024 10:26)  aspirin 81 mg oral tablet: 1 tab(s) orally once a day (21 Jan 2024 10:26)  atenolol 50 mg oral tablet: 1 tab(s) orally once a day (21 Jan 2024 10:26)  atorvastatin 20 mg oral tablet: 1 tab(s) orally once a day (at bedtime) (21 Jan 2024 10:26)  calcium (as carbonate)-vitamin D 600 mg-3.125 mcg (125 intl units) oral tablet: 1 tab(s) orally once a day (21 Jan 2024 10:26)  cyproheptadine 4 mg oral tablet: 1 tab(s) orally 3 times a day (21 Jan 2024 10:26)  famotidine 20 mg oral tablet: 1 tab(s) orally once a day (21 Jan 2024 10:26)  ipratropium-albuterol 0.5 mg-2.5 mg/3 mL inhalation solution: 3 milliliter(s) by nebulizer every 4 hours as needed for  shortness of breath and/or wheezing (21 Jan 2024 10:26)  montelukast 10 mg oral tablet: 1 tab(s) orally once a day (21 Jan 2024 10:26)  Multiple Vitamins oral tablet: 1 tab(s) orally once a day (21 Jan 2024 10:26)  sertraline 25 mg oral tablet: 1 tab(s) orally once a day (21 Jan 2024 10:26)      REVIEW OF SYSTEMS:  Constitutional: [ ] negative [ ] fevers [ ] chills [ ] weight loss [ ] weight gain  HEENT: [ ] negative [ ] dry eyes [ ] eye irritation [ ] postnasal drip [ ] nasal congestion  CV: [ ] negative  [ ] chest pain [ ] orthopnea [ ] palpitations [ ] murmur  Resp: [ ] negative [ ] cough [ ] shortness of breath [ ] dyspnea [ ] wheezing [ ] sputum [ ] hemoptysis  GI: [ ] negative [ ] nausea [ ] vomiting [ ] diarrhea [ ] constipation [ ] abd pain [ ] dysphagia   : [ ] negative [ ] dysuria [ ] nocturia [ ] hematuria [ ] increased urinary frequency  Musculoskeletal: [ ] negative [ ] back pain [ ] myalgias [ ] arthralgias [ ] fracture  Skin: [ ] negative [ ] rash [ ] itch  Neurological: [ ] negative [ ] headache [ ] dizziness [ ] syncope [ ] weakness [ ] numbness  Psychiatric: [ ] negative [ ] anxiety [ ] depression  Endocrine: [ ] negative [ ] diabetes [ ] thyroid problem  Hematologic/Lymphatic: [ ] negative [ ] anemia [ ] bleeding problem  Allergic/Immunologic: [ ] negative [ ] itchy eyes [ ] nasal discharge [ ] hives [ ] angioedema  [ ] All other systems negative  [ ] Unable to assess ROS because ________    OBJECTIVE:  ICU Vital Signs Last 24 Hrs  T(C): 36.6 (21 Jan 2024 16:30), Max: 37.1 (20 Jan 2024 18:31)  T(F): 97.9 (21 Jan 2024 16:30), Max: 98.8 (20 Jan 2024 23:50)  HR: 79 (21 Jan 2024 16:30) (79 - 105)  BP: 132/60 (21 Jan 2024 16:30) (115/50 - 142/78)  BP(mean): 79 (20 Jan 2024 23:50) (79 - 79)  ABP: --  ABP(mean): --  RR: 22 (21 Jan 2024 16:30) (21 - 36)  SpO2: 100% (21 Jan 2024 16:30) (97% - 100%)    O2 Parameters below as of 21 Jan 2024 16:30  Patient On (Oxygen Delivery Method): BiPAP/CPAP              01-21 @ 07:01  -  01-21 @ 17:18  --------------------------------------------------------  IN: 0 mL / OUT: 1500 mL / NET: -1500 mL      CAPILLARY BLOOD GLUCOSE          PHYSICAL EXAM:  General: awake and alert, nontoxic appearing *** lying in bed  HEENT: NC/AT, EOMI b/l, conjunctiva normal, MMM  Lymph Nodes: no cervical LAD  Neck: supple. full range of motion  Respiratory: CTA b/l, no w/r/c, appears comfortable on ***, no conversational dyspnea or accessory muscle use  Cardiovascular: S1 S2 present, RRR, no m/r/g  Abdomen: soft, NT/ND, +BS  Extremities: no c/c/e  Skin: no rashes or lesions noted  Neurological: AAOx3, no focal deficits  Psychiatry: calm, cooperative    LINES:     HOSPITAL MEDICATIONS:  Standing Meds:  albuterol    90 MICROgram(s) HFA Inhaler 1 Puff(s) Inhalation every 6 hours  albuterol/ipratropium for Nebulization 3 milliLiter(s) Nebulizer every 6 hours  amLODIPine   Tablet 10 milliGRAM(s) Oral daily  apixaban 5 milliGRAM(s) Oral every 12 hours  aspirin enteric coated 81 milliGRAM(s) Oral daily  atenolol  Tablet 50 milliGRAM(s) Oral daily  atorvastatin 20 milliGRAM(s) Oral at bedtime  azithromycin  IVPB 500 milliGRAM(s) IV Intermittent every 24 hours  cefTRIAXone   IVPB 1000 milliGRAM(s) IV Intermittent every 24 hours  methylPREDNISolone sodium succinate Injectable 40 milliGRAM(s) IV Push every 8 hours  montelukast 10 milliGRAM(s) Oral daily  multivitamin 1 Tablet(s) Oral daily  pantoprazole    Tablet 40 milliGRAM(s) Oral before breakfast  sertraline 25 milliGRAM(s) Oral daily      PRN Meds:  acetaminophen     Tablet .. 650 milliGRAM(s) Oral every 6 hours PRN      LABS:                        10.6   10.74 )-----------( 267      ( 21 Jan 2024 10:29 )             33.9     Hgb Trend: 10.6<--, 10.4<--  01-21    132<L>  |  98  |  22  ----------------------------<  156<H>  5.0   |  20<L>  |  1.32<H>    Ca    9.3      21 Jan 2024 10:29  Phos  6.0     01-21  Mg     2.70     01-21    TPro  8.5<H>  /  Alb  3.5  /  TBili  0.5  /  DBili  x   /  AST  15  /  ALT  12  /  AlkPhos  116  01-21    Creatinine Trend: 1.32<--, 1.35<--  PT/INR - ( 20 Jan 2024 20:30 )   PT: 11.7 sec;   INR: 1.05 ratio         PTT - ( 20 Jan 2024 20:30 )  PTT:28.4 sec  Urinalysis Basic - ( 21 Jan 2024 10:29 )    Color: x / Appearance: x / SG: x / pH: x  Gluc: 156 mg/dL / Ketone: x  / Bili: x / Urobili: x   Blood: x / Protein: x / Nitrite: x   Leuk Esterase: x / RBC: x / WBC x   Sq Epi: x / Non Sq Epi: x / Bacteria: x        Venous Blood Gas:  01-21 @ 14:55  7.23/52/73/22/92.8  VBG Lactate: 0.7  Venous Blood Gas:  01-21 @ 09:55  7.18/59/53/22/78.0  VBG Lactate: 0.9  Venous Blood Gas:  01-20 @ 20:30  7.27/45/50/21/68.8  VBG Lactate: 1.4      MICROBIOLOGY:       RADIOLOGY:  [ ] Reviewed and interpreted by me    PULMONARY FUNCTION TESTS:    EKG:

## 2024-01-21 NOTE — H&P ADULT - ASSESSMENT
68F Lao-speaking with PMH of asthma, HTN, HFpEF, CVA with right-sided deficits p/w SOB and cough for 2 weeks. A/w AHRF 2/2 CAP and acute asthma exacerbation.

## 2024-01-21 NOTE — H&P ADULT - PROBLEM SELECTOR PLAN 5
Pt p/w acute hypoxia and cough/fevers/SOB for a few weeks.   -CTA PE shows "Small nonocclusive eccentric filling defect right upper lobe apical segmental pulmonary artery most likely represents chronic sequela of remote pulmonary embolus. No other pulmonary embolus."  -In the setting of likely provoked (pt mostly nonambulatory) incidentally found small segmental nonacute PE, pt would still benefit from AC as she has no h/o bleeding.   -VA duplex BLE ordered to r/o LE DVTs, would create a more compelling argument for anticoagulating patient  -Will start Eliquis 5mg BID   -CTM

## 2024-01-21 NOTE — H&P ADULT - PROBLEM SELECTOR PLAN 3
Pt with h/o mild diastolic heart failure on TTE from 2021, well-compensated. Not on diuretics. Now with progressive PETER, orthopnea, PND, LE edema over past few months. A/w AHRF.   -proBNP 2926  -S/p IV lasix 20mg x 1 in ED with significant UOP  -Exam difficult, limited air entry due to asthma exacerbation and trace LE edema. No crackles appreciated.   -Will hold off on additional diuresis today,   -TTE ordered  -Spot diurese PRN   -Strict I/Os

## 2024-01-21 NOTE — H&P ADULT - PROBLEM SELECTOR PLAN 1
Likely multifactorial 2/2 multifocal PNA, asthma exacerbation, +/- component of CHF.   -Briefly on BiPAP, weaned to 4L NC  -Worsening blood gas, will placed back on BiPAP   -Pulm consulted   -Mgmt of asthma, CAP, and ADHF as below   -

## 2024-01-21 NOTE — H&P ADULT - PROBLEM SELECTOR PLAN 12
DVT ppx: Eliquis  Diet: NPO while on BiPAP   Dispo: Pending clinical improvement     Daughter updated at bedside 1/21

## 2024-01-21 NOTE — H&P ADULT - PROBLEM SELECTOR PLAN 6
BUN/Cr 21/1.35 on presentation. B/l Cr ~1. Likely 2/2 urinary retention.   -West placed in ED, 1500cc drained   -Kidney/bladder sono ordered to r/o hydronephrosis   -Strict I/Os  -Avoid nephrotoxic agents   -CTM closely, if not improving - will need urine studies

## 2024-01-21 NOTE — ED ADULT NURSE REASSESSMENT NOTE - NS ED NURSE REASSESS COMMENT FT1
Patient remains at baseline mental status. Appears to be resting comfortably in stretcher, breathing even and unlabored on 3L nc. Vital signs as charted. NAD noted at this time. NSR on monitor. Medications administered as ordered as per eMAR. 20G IV placed in left ac, IV site clean dry and intact. Labs drawn and sent. Safety maintained, stretcher locked in lowest position with siderails up x2, call bell and personal items within reach. Admitted to tele, awaiting bed placement.

## 2024-01-21 NOTE — H&P ADULT - PROBLEM SELECTOR PLAN 4
Pt with subjective fevers, SOB, cough productive of yellow sputum.   -CTA Chest with "Consolidative opacities in the left lower lobe and lingula, concerning for multifocal pneumonia."  -S/p Vanc/zosyn in ED   -C/w CTX/azithro for CAP coverage  -F/u infectious workup including MRSA PCR, Bcx, UA/Ucx, urine legionella, urine strep ag, and sputum cx

## 2024-01-21 NOTE — CONSULT NOTE ADULT - ASSESSMENT
68F PMH of asthma, HTN to [facility] ED 6/29 for right-sided weakness and slurred speech found to have old lacunar infarcts b/l BG p/w 5 day history of difficulty breathing, 2 weeks of intermittent cough, and 3 day history of subjective fever.    RECOMMENDATIONS  #AHRF 2/2 asthma exacerbation  -pt w/ history of asthma   -denies history of prior intubations or hospitalizations due to asthma exacerbation   -no fever noted on rectal temp   -endorses improved symptoms on Bipap 12/6 50%   -would repeat blood gas on bipap   -wean as tolerated   -s/p mag, methylpred, duonebs  -would c/w duonebs, steroids for asthma exacerbation     Pt not a MICU candidate at this time. Please reconsult as needed.  68F PMH of asthma, HTN to [facility] ED 6/29 for right-sided weakness and slurred speech found to have old lacunar infarcts b/l BG p/w 5 day history of difficulty breathing, 2 weeks of intermittent cough, and 3 day history of subjective fever.    RECOMMENDATIONS  #AHRF 2/2 asthma exacerbation  #Sepsis  -pt w/ history of asthma   -denies history of prior intubations or hospitalizations due to asthma exacerbation   -no fever noted on rectal temp   -endorses improved symptoms on Bipap 12/6 50%   -would repeat blood gas on bipap   -wean as tolerated   -s/p mag, methylpred, duonebs  -would c/w duonebs, steroids for asthma exacerbation   -s/p zosyn+vanc   -CXR w/o focal consolidation  -f/u CT chest     Pt not a MICU candidate at this time. Please reconsult as needed.  68F PMH of asthma, HTN to [facility] ED 6/29 for right-sided weakness and slurred speech found to have old lacunar infarcts b/l BG p/w 5 day history of difficulty breathing, 2 weeks of intermittent cough, and 3 day history of subjective fever.    RECOMMENDATIONS  #AHRF 2/2 asthma exacerbation  #Sepsis  -pt w/ history of asthma   -denies history of prior intubations or hospitalizations due to asthma exacerbation   -no fever noted on rectal temp   -endorses improved symptoms on Bipap 12/6 50%   -would repeat blood gas on bipap   -wean as tolerated   -s/p mag, methylpred, duonebs  -would c/w duonebs, steroids for asthma exacerbation   -s/p zosyn+vanc   -CXR w/o focal consolidation  -f/u CT chest   -f/u blood cx     Pt not a MICU candidate at this time. Please reconsult as needed.

## 2024-01-21 NOTE — ED ADULT NURSE REASSESSMENT NOTE - NS ED NURSE REASSESS COMMENT FT1
Patient received from night RN Maryse at 0820. Pt remains at baseline mental status, appears to be resting comfortably in stretcher. Daughter at bedside. No acute distress noted. Respirations even and unlabored on 3L oxygen via nc. VS as charted. NSR on Monitor. Pt received w/ 14 fr cordova catheter in place, placed by night RN, w/ 1500 ml of clear yellow colored urine noted in drainage bag. Patient admitted to [facility], awaiting bed placement.

## 2024-01-21 NOTE — CONSULT NOTE ADULT - ATTENDING COMMENTS
Patient is a 67 yo F w/ asthma/COPD/Emphysema, HTN, HFpEF, CVA w/ R deficits who p/w SOB and cough for 2 weeks. Pulmonary consulted for acute hypoxemic and hypercapnic respiratory failure.     Labs notable for WBC 10.7, Bicarb 20, Creatinine 1.32, BNP 2926, Trop 14, initialy VBG 7.18/59/53 Lactate 0.9, UA -, RVP -.    CTA chest notable for RUL segmental PE, Lingular/LLL consolidation, also with CBD dilated 1cm, and hypodense liver lesion    #Acute hypoxemic/hypercapnic respiratory failure - Multifactorial in setting of multifocal PNA, PE, Asthma exacerbation  #PE  #Multifocal PNA  #Asthma Exacerbation  - Agree with empiric abx with Ceft/Azithro for now  - f/u cultures, MRSA PCR, urine legionella  - Agree with full AC, currently on Eliquis  - Check LE duplex  - Check TTE  - c/w Duonebs q6h for now  - c/w Solumedrol 40mg q8h for now  - c/w BIPAP PRN and QHS for now. Would increased to BIPAP 14/5  - Please repeat AM ABG    Tarun Bloom MD  Pulmonary & Critical Care
68F PMH of asthma, HTN to [facility] ED 6/29 for right-sided weakness and slurred speech found to have old lacunar infarcts now p/w fever, cough, and shortness of breath. In the ED, patient tachypneic and wheezing, responded to steroids, nebs, and mag. MICU consulted for new bipap.  patient appears comfortable on bipap, repeat blood gas on bipap.  steroids, nebs  pancx, Abx, MRSA swab, legionella an, strep an  reconsult as needed

## 2024-01-21 NOTE — H&P ADULT - NSHPPHYSICALEXAM_GEN_ALL_CORE
Vital Signs Last 24 Hrs  T(C): 36.8 (21 Jan 2024 09:00), Max: 37.1 (20 Jan 2024 18:31)  T(F): 98.3 (21 Jan 2024 09:00), Max: 98.8 (20 Jan 2024 23:50)  HR: 81 (21 Jan 2024 09:00) (81 - 105)  BP: 115/50 (21 Jan 2024 09:00) (115/50 - 142/78)  BP(mean): 79 (20 Jan 2024 23:50) (79 - 79)  RR: 22 (21 Jan 2024 09:00) (21 - 36)  SpO2: 100% (21 Jan 2024 09:00) (97% - 100%)    Parameters below as of 21 Jan 2024 09:00  Patient On (Oxygen Delivery Method): nasal cannula  O2 Flow (L/min): 3    Physical Exam:     General: elderly female, appears uncomfortable     Eyes: PERRL, EOMI     ENT: MMM, no oropharyngeal lesions or erythema appreciated     Pulm: Increased WOB, poor air entry     CV: RRR. S1&S2+. No M/R/G appreciated.     Abdomen: +BS. Soft, NTND. No organomegaly.     MSK: Nml ROM    Extremities: trace edema    Neuro: A&Ox2, no focal deficits     Skin: Warm and dry. No visible rash.

## 2024-01-21 NOTE — ED ADULT NURSE REASSESSMENT NOTE - NS ED NURSE REASSESS COMMENT FT1
Break covering RN: patient received at from GEOVANI Pruett. patient at baseline mental status, appears to be resting comfortably in bed, no complaints noted at this time. Breathing even and unlabored, pallor/diaphoresis not noted. IV site clean dry and intact.

## 2024-01-21 NOTE — H&P ADULT - NSHPREVIEWOFSYSTEMS_GEN_ALL_CORE
Review of Systems:     Constitutional: No weakness, +f/c     Eyes: No blindness, no eye pain    ENT: No throat pain, no rhinorrhea     Neck: No pain or stiffness     Respiratory: +cough +sob +hemoptysis     Cardiovascular: No chest pain, no palpitations     Gastrointestinal: No abdominal or epigastric pain. No nausea, vomiting, or diarrhea     Genitourinary: No dysuria, no change in frequency, no hematuria     Neurological: No numbness or weakness      Skin: No itching, burning, rashes, or lesions     Psych: No depression or anxiety

## 2024-01-21 NOTE — ED ADULT NURSE REASSESSMENT NOTE - NS ED NURSE REASSESS COMMENT FT1
Pt is A&Ox3, respirations are even but labored. ACP Mikey stated not to remove Bipap for oral meds and to collect sputum culture in morning. IV is patent and intact, bed in lowest position. Safety maintained.

## 2024-01-21 NOTE — H&P ADULT - PROBLEM SELECTOR PLAN 9
H/H 10.4/31.9, normocytic. No e/o bleeding other than blood-tinged sputum x 3 in setting of PNA and PE.   -Iron studies, TSH, B12/folate in AM   -CTM

## 2024-01-21 NOTE — H&P ADULT - NSHPSOCIALHISTORY_GEN_ALL_CORE
Lives with daughter. Primarily bedbound but limited ambulation with walker. No tobacco, EtOH, or illicit drug use.

## 2024-01-22 LAB
ANION GAP SERPL CALC-SCNC: 12 MMOL/L — SIGNIFICANT CHANGE UP (ref 7–14)
BASE EXCESS BLDV CALC-SCNC: -2.8 MMOL/L — LOW (ref -2–3)
BASE EXCESS BLDV CALC-SCNC: -4.6 MMOL/L — LOW (ref -2–3)
BLOOD GAS ARTERIAL COMPREHENSIVE RESULT: SIGNIFICANT CHANGE UP
BUN SERPL-MCNC: 30 MG/DL — HIGH (ref 7–23)
CA-I SERPL-SCNC: 1.23 MMOL/L — SIGNIFICANT CHANGE UP (ref 1.15–1.33)
CA-I SERPL-SCNC: 1.27 MMOL/L — SIGNIFICANT CHANGE UP (ref 1.15–1.33)
CALCIUM SERPL-MCNC: 8.9 MG/DL — SIGNIFICANT CHANGE UP (ref 8.4–10.5)
CHLORIDE BLDV-SCNC: 103 MMOL/L — SIGNIFICANT CHANGE UP (ref 96–108)
CHLORIDE BLDV-SCNC: 104 MMOL/L — SIGNIFICANT CHANGE UP (ref 96–108)
CHLORIDE SERPL-SCNC: 101 MMOL/L — SIGNIFICANT CHANGE UP (ref 98–107)
CO2 BLDV-SCNC: 25.7 MMOL/L — SIGNIFICANT CHANGE UP (ref 22–26)
CO2 BLDV-SCNC: 25.9 MMOL/L — SIGNIFICANT CHANGE UP (ref 22–26)
CO2 SERPL-SCNC: 21 MMOL/L — LOW (ref 22–31)
CREAT SERPL-MCNC: 1.44 MG/DL — HIGH (ref 0.5–1.3)
EGFR: 39 ML/MIN/1.73M2 — LOW
FERRITIN SERPL-MCNC: 164 NG/ML — SIGNIFICANT CHANGE UP (ref 13–330)
FOLATE SERPL-MCNC: 6.2 NG/ML — SIGNIFICANT CHANGE UP (ref 3.1–17.5)
GAS PNL BLDV: 132 MMOL/L — LOW (ref 136–145)
GAS PNL BLDV: 134 MMOL/L — LOW (ref 136–145)
GAS PNL BLDV: SIGNIFICANT CHANGE UP
GLUCOSE BLDC GLUCOMTR-MCNC: 133 MG/DL — HIGH (ref 70–99)
GLUCOSE BLDC GLUCOMTR-MCNC: 134 MG/DL — HIGH (ref 70–99)
GLUCOSE BLDC GLUCOMTR-MCNC: 149 MG/DL — HIGH (ref 70–99)
GLUCOSE BLDV-MCNC: 120 MG/DL — HIGH (ref 70–99)
GLUCOSE BLDV-MCNC: 159 MG/DL — HIGH (ref 70–99)
GLUCOSE SERPL-MCNC: 145 MG/DL — HIGH (ref 70–99)
HCO3 BLDV-SCNC: 24 MMOL/L — SIGNIFICANT CHANGE UP (ref 22–29)
HCO3 BLDV-SCNC: 24 MMOL/L — SIGNIFICANT CHANGE UP (ref 22–29)
HCT VFR BLD CALC: 29.8 % — LOW (ref 34.5–45)
HCT VFR BLDA CALC: 29 % — LOW (ref 34.5–46.5)
HCT VFR BLDA CALC: 29 % — LOW (ref 34.5–46.5)
HGB BLD CALC-MCNC: 9.6 G/DL — LOW (ref 11.7–16.1)
HGB BLD CALC-MCNC: 9.7 G/DL — LOW (ref 11.7–16.1)
HGB BLD-MCNC: 9.1 G/DL — LOW (ref 11.5–15.5)
IRON SATN MFR SERPL: 22 % — SIGNIFICANT CHANGE UP (ref 14–50)
IRON SATN MFR SERPL: 55 UG/DL — SIGNIFICANT CHANGE UP (ref 30–160)
LACTATE BLDV-MCNC: 0.7 MMOL/L — SIGNIFICANT CHANGE UP (ref 0.5–2)
LACTATE BLDV-MCNC: 0.8 MMOL/L — SIGNIFICANT CHANGE UP (ref 0.5–2)
LEGIONELLA AG UR QL: NEGATIVE — SIGNIFICANT CHANGE UP
MAGNESIUM SERPL-MCNC: 2.8 MG/DL — HIGH (ref 1.6–2.6)
MCHC RBC-ENTMCNC: 26.9 PG — LOW (ref 27–34)
MCHC RBC-ENTMCNC: 30.5 GM/DL — LOW (ref 32–36)
MCV RBC AUTO: 88.2 FL — SIGNIFICANT CHANGE UP (ref 80–100)
MRSA PCR RESULT.: SIGNIFICANT CHANGE UP
NRBC # BLD: 0 /100 WBCS — SIGNIFICANT CHANGE UP (ref 0–0)
NRBC # FLD: 0 K/UL — SIGNIFICANT CHANGE UP (ref 0–0)
PCO2 BLDV: 53 MMHG — HIGH (ref 39–52)
PCO2 BLDV: 61 MMHG — HIGH (ref 39–52)
PH BLDV: 7.2 — LOW (ref 7.32–7.43)
PH BLDV: 7.27 — LOW (ref 7.32–7.43)
PHOSPHATE SERPL-MCNC: 7 MG/DL — HIGH (ref 2.5–4.5)
PLATELET # BLD AUTO: 237 K/UL — SIGNIFICANT CHANGE UP (ref 150–400)
PO2 BLDV: 49 MMHG — HIGH (ref 25–45)
PO2 BLDV: 61 MMHG — HIGH (ref 25–45)
POTASSIUM BLDV-SCNC: 4.9 MMOL/L — SIGNIFICANT CHANGE UP (ref 3.5–5.1)
POTASSIUM BLDV-SCNC: 5.4 MMOL/L — HIGH (ref 3.5–5.1)
POTASSIUM SERPL-MCNC: 5.2 MMOL/L — SIGNIFICANT CHANGE UP (ref 3.5–5.3)
POTASSIUM SERPL-SCNC: 5.2 MMOL/L — SIGNIFICANT CHANGE UP (ref 3.5–5.3)
RBC # BLD: 3.38 M/UL — LOW (ref 3.8–5.2)
RBC # FLD: 13.9 % — SIGNIFICANT CHANGE UP (ref 10.3–14.5)
S AUREUS DNA NOSE QL NAA+PROBE: SIGNIFICANT CHANGE UP
SAO2 % BLDV: 78.9 % — SIGNIFICANT CHANGE UP (ref 67–88)
SAO2 % BLDV: 90 % — HIGH (ref 67–88)
SODIUM SERPL-SCNC: 134 MMOL/L — LOW (ref 135–145)
TIBC SERPL-MCNC: 253 UG/DL — SIGNIFICANT CHANGE UP (ref 220–430)
TSH SERPL-MCNC: 0.31 UIU/ML — SIGNIFICANT CHANGE UP (ref 0.27–4.2)
UIBC SERPL-MCNC: 198 UG/DL — SIGNIFICANT CHANGE UP (ref 110–370)
VIT B12 SERPL-MCNC: 957 PG/ML — HIGH (ref 200–900)
WBC # BLD: 9.94 K/UL — SIGNIFICANT CHANGE UP (ref 3.8–10.5)
WBC # FLD AUTO: 9.94 K/UL — SIGNIFICANT CHANGE UP (ref 3.8–10.5)

## 2024-01-22 PROCEDURE — 99232 SBSQ HOSP IP/OBS MODERATE 35: CPT

## 2024-01-22 PROCEDURE — 99233 SBSQ HOSP IP/OBS HIGH 50: CPT | Mod: GC

## 2024-01-22 RX ORDER — INFLUENZA VIRUS VACCINE 15; 15; 15; 15 UG/.5ML; UG/.5ML; UG/.5ML; UG/.5ML
0.7 SUSPENSION INTRAMUSCULAR ONCE
Refills: 0 | Status: DISCONTINUED | OUTPATIENT
Start: 2024-01-22 | End: 2024-01-28

## 2024-01-22 RX ADMIN — Medication 3 MILLILITER(S): at 04:07

## 2024-01-22 RX ADMIN — SODIUM CHLORIDE 4 MILLILITER(S): 9 INJECTION INTRAMUSCULAR; INTRAVENOUS; SUBCUTANEOUS at 04:24

## 2024-01-22 RX ADMIN — Medication 3 MILLILITER(S): at 15:13

## 2024-01-22 RX ADMIN — Medication 3 MILLILITER(S): at 09:12

## 2024-01-22 RX ADMIN — SODIUM CHLORIDE 4 MILLILITER(S): 9 INJECTION INTRAMUSCULAR; INTRAVENOUS; SUBCUTANEOUS at 18:37

## 2024-01-22 RX ADMIN — SODIUM CHLORIDE 4 MILLILITER(S): 9 INJECTION INTRAMUSCULAR; INTRAVENOUS; SUBCUTANEOUS at 09:14

## 2024-01-22 RX ADMIN — Medication 40 MILLIGRAM(S): at 07:19

## 2024-01-22 RX ADMIN — CEFTRIAXONE 100 MILLIGRAM(S): 500 INJECTION, POWDER, FOR SOLUTION INTRAMUSCULAR; INTRAVENOUS at 08:56

## 2024-01-22 RX ADMIN — AZITHROMYCIN 255 MILLIGRAM(S): 500 TABLET, FILM COATED ORAL at 09:37

## 2024-01-22 RX ADMIN — Medication 40 MILLIGRAM(S): at 21:38

## 2024-01-22 RX ADMIN — Medication 3 MILLILITER(S): at 21:39

## 2024-01-22 RX ADMIN — SODIUM CHLORIDE 4 MILLILITER(S): 9 INJECTION INTRAMUSCULAR; INTRAVENOUS; SUBCUTANEOUS at 21:39

## 2024-01-22 RX ADMIN — Medication 40 MILLIGRAM(S): at 15:10

## 2024-01-22 NOTE — ED ADULT NURSE REASSESSMENT NOTE - NS ED NURSE REASSESS COMMENT FT1
Pt received from day RN. Pt Celi speaking. Pt resting in stretcher. Pt tolerating BiPAP well. Family at bedside, instructed to not remove mask. VS as per flowsheet. No acute distress noted. Safety maintained. Pending bed placement.

## 2024-01-22 NOTE — ED ADULT NURSE REASSESSMENT NOTE - NS ED NURSE REASSESS COMMENT FT1
[facility] [address]  Precepting Note    Subjective:   visit  Had high T bili- was not repeated  Had frenulectomy   Has a failed hearing screen- sees them next month  Working to supplement with Similac   1 oz q 1.5 hrs   Stools multiple times daily  No issues from parents   No babyblues    ROS otherwise negative    Past medical, surgical, family and social history were reviewed, non-contributory, and unchanged unless otherwise stated. Objective:    Pulse 103   Resp 20   Ht 19.5\" (49.5 cm)   Wt 5 lb 8 oz (2.495 kg)   HC 34.5 cm (13.58\")   SpO2 99%   BMI 10.17 kg/m²   Wt Readings from Last 3 Encounters:   20 5 lb 8 oz (2.495 kg) (2 %, Z= -2.13)*   20 5 lb 10 oz (2.551 kg) (4 %, Z= -1.77)*     * Growth percentiles are based on WHO (Boys, 0-2 years) data. Ht Readings from Last 3 Encounters:   20 19.5\" (49.5 cm) (33 %, Z= -0.44)*   20 19.5\" (49.5 cm) (43 %, Z= -0.19)*     * Growth percentiles are based on WHO (Boys, 0-2 years) data. Body mass index is 10.17 kg/m². <1 %ile (Z= -3.14) based on WHO (Boys, 0-2 years) BMI-for-age based on BMI available as of 2020.  2 %ile (Z= -2.13) based on WHO (Boys, 0-2 years) weight-for-age data using vitals from 2020.  33 %ile (Z= -0.44) based on WHO (Boys, 0-2 years) Length-for-age data based on Length recorded on 2020. Exam is as noted by resident with the following changes, additions or corrections:    Exam overall unremarkable per resident note       Assessment/Plan:       Check T bili   Weight check next week     Attending Physician Statement  I have reviewed the chart, including any radiology or labs, and have seen the patient with the resident(s). I personally reviewed and performed key elements of the history and exam.  I agree with the assessment, plan and orders as documented by the resident. Please refer to the resident note for additional information.       Electronically signed by Nereyda Ott MD on 2020 at 11:36 AM report received from previous shift.  v/s as noted.  pt on bipap 14/ report received from previous shift.  v/s as noted.  pt on bipap 14/5.  respiratory at bedside.  pt appears lethargic, pt is not awake enough to safely swallow morning PO meds.  pt has been cleaned, dried and repositioned.  awaiting bed assignment, will monitor

## 2024-01-22 NOTE — ED ADULT NURSE REASSESSMENT NOTE - NS ED NURSE REASSESS COMMENT FT1
Pt is A&Ox4, respirations are even and unlabored. As per night shift MD Mcclain, PO meds are to be held until repat VBG results. Respiratory adjusted BiPAP settings, morning PO medications held, MD Elias made aware. ABG supplies at bedside. Bed in lowest position, safety maintained.

## 2024-01-22 NOTE — ED ADULT NURSE REASSESSMENT NOTE - NS ED NURSE REASSESS COMMENT FT1
family member interfering with patient care, removing pt from bipap, adjusting IV pump.  pt is unable to tolerate PO, admitting team has been made aware when at bedside.  pt has been placed back on bipap 14/5.  management nancy made aware of situation

## 2024-01-22 NOTE — ED ADULT NURSE REASSESSMENT NOTE - NS ED NURSE REASSESS COMMENT FT1
Pt is A&Ox4, family at bedside. Pt is tolerating Bipap well, respirations are even and unlabored. NSR on monitor. Pt denies pain or SOB at this time. MD Jensen made aware of BP, no further orders at this time. MD Jensen states it is ok to remove BIPAP mask to give oral medications. Requested a provider to RN. Bed in lowest position, safety maintained. Pt is A&Ox4, family at bedside. Pt is tolerating Bipap well, respirations are even and unlabored. NSR on monitor. Pt denies pain or SOB at this time. MD Jensen made aware of BP, no further orders at this time. Pt turned and repositioned in bed. Bed in lowest position, safety maintained.

## 2024-01-22 NOTE — PROGRESS NOTE ADULT - SUBJECTIVE AND OBJECTIVE BOX
Medicine Progress Note    Patient is a 68y old  Female who presents with a chief complaint of AHRF (21 Jan 2024 17:17)      SUBJECTIVE / OVERNIGHT EVENTS: patient failed dysphagia screen per RN   Daughter is taking off BIPAP per nurse     ADDITIONAL REVIEW OF SYSTEMS: negative     MEDICATIONS  (STANDING):  albuterol    90 MICROgram(s) HFA Inhaler 1 Puff(s) Inhalation every 6 hours  albuterol/ipratropium for Nebulization 3 milliLiter(s) Nebulizer every 6 hours  amLODIPine   Tablet 10 milliGRAM(s) Oral daily  apixaban 5 milliGRAM(s) Oral every 12 hours  aspirin enteric coated 81 milliGRAM(s) Oral daily  atenolol  Tablet 50 milliGRAM(s) Oral daily  atorvastatin 20 milliGRAM(s) Oral at bedtime  azithromycin  IVPB 500 milliGRAM(s) IV Intermittent every 24 hours  cefTRIAXone   IVPB 1000 milliGRAM(s) IV Intermittent every 24 hours  guaiFENesin  milliGRAM(s) Oral every 12 hours  methylPREDNISolone sodium succinate Injectable 40 milliGRAM(s) IV Push every 8 hours  montelukast 10 milliGRAM(s) Oral daily  multivitamin 1 Tablet(s) Oral daily  pantoprazole    Tablet 40 milliGRAM(s) Oral before breakfast  sertraline 25 milliGRAM(s) Oral daily  sodium chloride 3%  Inhalation 4 milliLiter(s) Inhalation every 6 hours    MEDICATIONS  (PRN):  acetaminophen     Tablet .. 650 milliGRAM(s) Oral every 6 hours PRN Temp greater or equal to 38C (100.4F), Mild Pain (1 - 3)    CAPILLARY BLOOD GLUCOSE      POCT Blood Glucose.: 133 mg/dL (22 Jan 2024 12:28)    I&O's Summary    21 Jan 2024 07:01  -  22 Jan 2024 07:00  --------------------------------------------------------  IN: 0 mL / OUT: 3000 mL / NET: -3000 mL    22 Jan 2024 07:01  -  22 Jan 2024 12:57  --------------------------------------------------------  IN: 0 mL / OUT: 225 mL / NET: -225 mL        PHYSICAL EXAM:  Vital Signs Last 24 Hrs  T(C): 36.6 (22 Jan 2024 12:49), Max: 36.8 (21 Jan 2024 21:00)  T(F): 97.9 (22 Jan 2024 12:49), Max: 98.3 (21 Jan 2024 21:00)  HR: 72 (22 Jan 2024 12:49) (66 - 84)  BP: 118/65 (22 Jan 2024 12:49) (103/47 - 132/60)  BP(mean): 71 (22 Jan 2024 07:21) (63 - 74)  RR: 16 (22 Jan 2024 12:49) (16 - 22)  SpO2: 100% (22 Jan 2024 12:49) (99% - 100%)    Parameters below as of 22 Jan 2024 12:49  Patient On (Oxygen Delivery Method): BiPAP/CPAP      CONSTITUTIONAL: NAD,  ENMT: Moist oral mucosa, no pharyngeal injection or exudates;  RESPIRATORY: Normal respiratory effort; lungs are dim  to auscultation bilaterally  CARDIOVASCULAR: Regular rate and rhythm, normal S1 and S2, ; No lower extremity edema;  ABDOMEN: Nontender to palpation, normoactive bowel sounds, distended   PSYCH: A+O to person; affect appropriate  NEUROLOGY:  Rt side deficits   SKIN: No rashes; no palpable lesions , limited exam on the stretcher     LABS:                        9.1    9.94  )-----------( 237      ( 22 Jan 2024 04:57 )             29.8     01-22    134<L>  |  101  |  30<H>  ----------------------------<  145<H>  5.2   |  21<L>  |  1.44<H>    Ca    8.9      22 Jan 2024 04:57  Phos  7.0     01-22  Mg     2.80     01-22    TPro  8.5<H>  /  Alb  3.5  /  TBili  0.5  /  DBili  x   /  AST  15  /  ALT  12  /  AlkPhos  116  01-21    PT/INR - ( 20 Jan 2024 20:30 )   PT: 11.7 sec;   INR: 1.05 ratio         PTT - ( 20 Jan 2024 20:30 )  PTT:28.4 sec  CARDIAC MARKERS ( 20 Jan 2024 20:30 )  x     / x     / 95 U/L / x     / 5.9 ng/mL      Urinalysis Basic - ( 22 Jan 2024 04:57 )    Color: x / Appearance: x / SG: x / pH: x  Gluc: 145 mg/dL / Ketone: x  / Bili: x / Urobili: x   Blood: x / Protein: x / Nitrite: x   Leuk Esterase: x / RBC: x / WBC x   Sq Epi: x / Non Sq Epi: x / Bacteria: x        Culture - Blood (collected 20 Jan 2024 20:40)  Source: .Blood Blood-Peripheral  Preliminary Report (22 Jan 2024 01:03):    No growth at 24 hours    Culture - Blood (collected 20 Jan 2024 20:30)  Source: .Blood Blood-Peripheral  Preliminary Report (22 Jan 2024 01:03):    No growth at 24 hours      SARS-CoV-2: NotDetec (20 Jan 2024 20:30)      RADIOLOGY & ADDITIONAL TESTS:  Imaging from Last 24 Hours:    Electrocardiogram/QTc Interval:    COORDINATION OF CARE:  Care Discussed with Consultants/Other Providers: WILLY

## 2024-01-22 NOTE — PATIENT PROFILE ADULT - FALL HARM RISK - HARM RISK INTERVENTIONS

## 2024-01-22 NOTE — PROGRESS NOTE ADULT - SUBJECTIVE AND OBJECTIVE BOX
CHIEF COMPLAINT:    Interval Events:    NAEON. Symptomatically improved.    REVIEW OF SYSTEMS:  unchanged except when noted above    OBJECTIVE:  ICU Vital Signs Last 24 Hrs  T(C): 36.6 (22 Jan 2024 12:49), Max: 36.8 (21 Jan 2024 21:00)  T(F): 97.9 (22 Jan 2024 12:49), Max: 98.3 (21 Jan 2024 21:00)  HR: 72 (22 Jan 2024 12:49) (66 - 84)  BP: 118/65 (22 Jan 2024 12:49) (103/47 - 132/60)  BP(mean): 71 (22 Jan 2024 07:21) (63 - 74)  ABP: --  ABP(mean): --  RR: 16 (22 Jan 2024 12:49) (16 - 22)  SpO2: 100% (22 Jan 2024 12:49) (99% - 100%)    O2 Parameters below as of 22 Jan 2024 12:49  Patient On (Oxygen Delivery Method): BiPAP/CPAP          Mode: NIV (Noninvasive Ventilation), TV (machine): 400, FiO2: 40, PEEP: 5, ITime: 1, P-High: 20, P-Low: 15    01-21 @ 07:01 - 01-22 @ 07:00  --------------------------------------------------------  IN: 0 mL / OUT: 3000 mL / NET: -3000 mL    01-22 @ 07:01 - 01-22 @ 15:19  --------------------------------------------------------  IN: 0 mL / OUT: 225 mL / NET: -225 mL      CAPILLARY BLOOD GLUCOSE      POCT Blood Glucose.: 133 mg/dL (22 Jan 2024 12:28)      PHYSICAL EXAM:  General: awake and alert  HEENT: NC/AT, EOMI b/l, conjunctiva normal, MMM  Lymph Nodes: no cervical LAD  Neck: supple. full range of motion  Respiratory: diffuse mild wheezing  Cardiovascular: S1 S2 present, RRR, no m/r/g  Abdomen: soft, NT/ND, +BS  Extremities: no c/c/e  Skin: no rashes or lesions noted  Neurological: AAOx3, no focal deficits  Psychiatry: [facility] MEDICATIONS:  MEDICATIONS  (STANDING):  albuterol    90 MICROgram(s) HFA Inhaler 1 Puff(s) Inhalation every 6 hours  albuterol/ipratropium for Nebulization 3 milliLiter(s) Nebulizer every 6 hours  amLODIPine   Tablet 10 milliGRAM(s) Oral daily  apixaban 5 milliGRAM(s) Oral every 12 hours  aspirin enteric coated 81 milliGRAM(s) Oral daily  atenolol  Tablet 50 milliGRAM(s) Oral daily  atorvastatin 20 milliGRAM(s) Oral at bedtime  cefTRIAXone   IVPB 1000 milliGRAM(s) IV Intermittent every 24 hours  guaiFENesin  milliGRAM(s) Oral every 12 hours  methylPREDNISolone sodium succinate Injectable 40 milliGRAM(s) IV Push every 8 hours  montelukast 10 milliGRAM(s) Oral daily  multivitamin 1 Tablet(s) Oral daily  pantoprazole    Tablet 40 milliGRAM(s) Oral before breakfast  sertraline 25 milliGRAM(s) Oral daily  sodium chloride 3%  Inhalation 4 milliLiter(s) Inhalation every 6 hours    MEDICATIONS  (PRN):  acetaminophen     Tablet .. 650 milliGRAM(s) Oral every 6 hours PRN Temp greater or equal to 38C (100.4F), Mild Pain (1 - 3)      LABS:                        9.1    9.94  )-----------( 237      ( 22 Jan 2024 04:57 )             29.8     Hgb Trend: 9.1<--, 10.6<--, 10.4<--  01-22    134<L>  |  101  |  30<H>  ----------------------------<  145<H>  5.2   |  21<L>  |  1.44<H>    Ca    8.9      22 Jan 2024 04:57  Phos  7.0     01-22  Mg     2.80     01-22    TPro  8.5<H>  /  Alb  3.5  /  TBili  0.5  /  DBili  x   /  AST  15  /  ALT  12  /  AlkPhos  116  01-21    Creatinine Trend: 1.44<--, 1.32<--, 1.35<--  PT/INR - ( 20 Jan 2024 20:30 )   PT: 11.7 sec;   INR: 1.05 ratio         PTT - ( 20 Jan 2024 20:30 )  PTT:28.4 sec  Urinalysis Basic - ( 22 Jan 2024 04:57 )    Color: x / Appearance: x / SG: x / pH: x  Gluc: 145 mg/dL / Ketone: x  / Bili: x / Urobili: x   Blood: x / Protein: x / Nitrite: x   Leuk Esterase: x / RBC: x / WBC x   Sq Epi: x / Non Sq Epi: x / Bacteria: x      Arterial Blood Gas:  01-22 @ 10:05  7.28/48/136/23/98.4/-4.1  ABG lactate: --    Venous Blood Gas:  01-22 @ 04:57  7.20/61/61/24/90.0  VBG Lactate: 0.8  Venous Blood Gas:  01-21 @ 14:55  7.23/52/73/22/92.8  VBG Lactate: 0.7  Venous Blood Gas:  01-21 @ 09:55  7.18/59/53/22/78.0  VBG Lactate: 0.9  Venous Blood Gas:  01-20 @ 20:30  7.27/45/50/21/68.8  VBG Lactate: 1.4      MICROBIOLOGY:     Culture - Blood (collected 20 Jan 2024 20:40)  Source: .Blood Blood-Peripheral  Preliminary Report (22 Jan 2024 01:03):    No growth at 24 hours    Culture - Blood (collected 20 Jan 2024 20:30)  Source: .Blood Blood-Peripheral  Preliminary Report (22 Jan 2024 01:03):    No growth at 24 hours        RADIOLOGY:  [ x ] Reviewed and interpreted by me    PULMONARY FUNCTION TESTS:    EKG:

## 2024-01-22 NOTE — PROGRESS NOTE ADULT - ASSESSMENT
68F Kazakh-speaking with PMH of asthma, HTN, HFpEF, CVA with right-sided deficits p/w SOB and cough for 2 weeks. A/w AHRF 2/2 CAP and acute asthma exacerbation.

## 2024-01-22 NOTE — PROGRESS NOTE ADULT - ATTENDING COMMENTS
68F with asthma/COPD, HTN, HFpEF, presents with progressive cough and poor intake. Pulmonary consulted for acute hypoxemic and hypercapnic respiratory failure.   Patient taken off bilevel and oxygen and doing well.  Acidosis on blood gas seems to be mostly metabolic.  Repeat ABG in AM.   Continue antibiotics for CAP. 68F with asthma/COPD, HTN, HFpEF, presents with progressive cough and poor intake. Pulmonary consulted for acute hypoxemic and hypercapnic respiratory failure.   Patient taken off bilevel and oxygen and doing well.  Acidosis on blood gas seems to be mostly metabolic.  Repeat ABG in AM.   Continue antibiotics for CAP.  Airway clearance.  Continue AC for PE. No significant hemoptysis reported.

## 2024-01-22 NOTE — ED ADULT NURSE REASSESSMENT NOTE - NS ED NURSE REASSESS COMMENT FT1
BREAK RN: pt at baseline mentation, 20g to the left AC in tact with no redness or swelling. pt normal sinus on monitor. pt appears in NAD. pt pending bed assignment. BREAK RN: pt at baseline mentation, 20g to the left AC in tact with no redness or swelling. pt normal sinus on monitor. pt appears in NAD. pt turned and repositioned. pt pending bed assignment.

## 2024-01-22 NOTE — PROGRESS NOTE ADULT - ASSESSMENT
68F Urdu-speaking with PMH of asthma/COPD/Emphysema, severe not on home O2, (FEV1 38, DLCO 25) HTN, HFpEF, CVA with right-sided deficits p/w SOB and cough for 2 weeks. Per daughter at bedside, patient has been having increased SOB, orthopnea, PND, and LE edema for the past few months. However, over the past 2 weeks, she noticed her mom was more exhausted, dyspneic, and having a cough productive of yellow sputum. She did notice 3 episodes of blood-tinged sputum and subjective fevers over the past 3 days. Otherwise, denies CP, palpitations, n/v/d, abdominal pain, dysuria, melena/hematochezia. Pt went to her PCP's office yesterday and was noted to be hypoxic. She was placed on 4L NC and sent to ED.     In the ED, HR , /78, RR 36 (satting 97% on 4L NC). Labs notable for WBC 14.17K, Na 131, BUN/Cr 21/1.35, proBNP 2926, VBG 7.27/45/50/21. CTA PE shows multifocal PNA, remote PE, and dilated CBD with cholelithiasis, GB distension. Pt had a cordova placed for urinary retention in ED. S/p vanc/zosyn, 500cc IVF bolus, IV lasix 20mg, lovenox 40mg, duonebs, solu-medrol 125, and IV mag in ED. She briefly required BiPAP. MICU evaluated pt, not a candidate. Weaned to 4L NC.  (21 Jan 2024 10:31)    patient is a former smoker with asthma problems,     RVP negative    #recommendations  patient readily arousable with improved gases after NIV  patient likely with COPD exacerbation with multifocal PNA and PE  continue steroids  airway clearance while on highflow/NC: humidified oxygen, standing guaifenesin, duonebs q6h, hyper sal q6h, chest PT q6 h, aerobika/acapella q6 h,, chest vest q12h  needs to be back on bipap overnight  please obtain morning ABG/VBG  please obtain if not already done  pro BNP, ECHO with bubble study  bilateral lower extremity DVT study   continue steroids  needs aggressive airway clearance (aerobika flutter valve, hypertonic saline, continue duonebs    - her degree of acidosis is not explained by her relatively minor elevation in pCO2, consider superimposed metabolic acidosis

## 2024-01-23 LAB
ANION GAP SERPL CALC-SCNC: 14 MMOL/L — SIGNIFICANT CHANGE UP (ref 7–14)
BASE EXCESS BLDV CALC-SCNC: -3.5 MMOL/L — LOW (ref -2–3)
BUN SERPL-MCNC: 48 MG/DL — HIGH (ref 7–23)
CA-I SERPL-SCNC: 1.26 MMOL/L — SIGNIFICANT CHANGE UP (ref 1.15–1.33)
CALCIUM SERPL-MCNC: 9.1 MG/DL — SIGNIFICANT CHANGE UP (ref 8.4–10.5)
CHLORIDE BLDV-SCNC: 104 MMOL/L — SIGNIFICANT CHANGE UP (ref 96–108)
CHLORIDE SERPL-SCNC: 105 MMOL/L — SIGNIFICANT CHANGE UP (ref 98–107)
CO2 BLDV-SCNC: 26.9 MMOL/L — HIGH (ref 22–26)
CO2 SERPL-SCNC: 21 MMOL/L — LOW (ref 22–31)
CREAT SERPL-MCNC: 1.48 MG/DL — HIGH (ref 0.5–1.3)
EGFR: 38 ML/MIN/1.73M2 — LOW
GAS PNL BLDV: 135 MMOL/L — LOW (ref 136–145)
GAS PNL BLDV: SIGNIFICANT CHANGE UP
GLUCOSE BLDC GLUCOMTR-MCNC: 139 MG/DL — HIGH (ref 70–99)
GLUCOSE BLDV-MCNC: 130 MG/DL — HIGH (ref 70–99)
GLUCOSE SERPL-MCNC: 132 MG/DL — HIGH (ref 70–99)
HCO3 BLDV-SCNC: 25 MMOL/L — SIGNIFICANT CHANGE UP (ref 22–29)
HCT VFR BLD CALC: 32.6 % — LOW (ref 34.5–45)
HCT VFR BLDA CALC: 40 % — SIGNIFICANT CHANGE UP (ref 34.5–46.5)
HGB BLD CALC-MCNC: 13.4 G/DL — SIGNIFICANT CHANGE UP (ref 11.7–16.1)
HGB BLD-MCNC: 9.9 G/DL — LOW (ref 11.5–15.5)
LACTATE BLDV-MCNC: 1.2 MMOL/L — SIGNIFICANT CHANGE UP (ref 0.5–2)
MAGNESIUM SERPL-MCNC: 3.1 MG/DL — HIGH (ref 1.6–2.6)
MCHC RBC-ENTMCNC: 28.3 PG — SIGNIFICANT CHANGE UP (ref 27–34)
MCHC RBC-ENTMCNC: 30.4 GM/DL — LOW (ref 32–36)
MCV RBC AUTO: 93.1 FL — SIGNIFICANT CHANGE UP (ref 80–100)
NRBC # BLD: 0 /100 WBCS — SIGNIFICANT CHANGE UP (ref 0–0)
NRBC # FLD: 0 K/UL — SIGNIFICANT CHANGE UP (ref 0–0)
PCO2 BLDV: 60 MMHG — HIGH (ref 39–52)
PH BLDV: 7.23 — LOW (ref 7.32–7.43)
PHOSPHATE SERPL-MCNC: 5.7 MG/DL — HIGH (ref 2.5–4.5)
PLATELET # BLD AUTO: 256 K/UL — SIGNIFICANT CHANGE UP (ref 150–400)
PO2 BLDV: 40 MMHG — SIGNIFICANT CHANGE UP (ref 25–45)
POTASSIUM BLDV-SCNC: 5 MMOL/L — SIGNIFICANT CHANGE UP (ref 3.5–5.1)
POTASSIUM SERPL-MCNC: 5.3 MMOL/L — SIGNIFICANT CHANGE UP (ref 3.5–5.3)
POTASSIUM SERPL-SCNC: 5.3 MMOL/L — SIGNIFICANT CHANGE UP (ref 3.5–5.3)
RBC # BLD: 3.5 M/UL — LOW (ref 3.8–5.2)
RBC # FLD: 14.3 % — SIGNIFICANT CHANGE UP (ref 10.3–14.5)
SAO2 % BLDV: 60.1 % — LOW (ref 67–88)
SODIUM SERPL-SCNC: 140 MMOL/L — SIGNIFICANT CHANGE UP (ref 135–145)
WBC # BLD: 6.61 K/UL — SIGNIFICANT CHANGE UP (ref 3.8–10.5)
WBC # FLD AUTO: 6.61 K/UL — SIGNIFICANT CHANGE UP (ref 3.8–10.5)

## 2024-01-23 PROCEDURE — 99232 SBSQ HOSP IP/OBS MODERATE 35: CPT

## 2024-01-23 PROCEDURE — 76376 3D RENDER W/INTRP POSTPROCES: CPT | Mod: 26

## 2024-01-23 PROCEDURE — 93970 EXTREMITY STUDY: CPT | Mod: 26

## 2024-01-23 PROCEDURE — 93306 TTE W/DOPPLER COMPLETE: CPT | Mod: 26

## 2024-01-23 PROCEDURE — 99233 SBSQ HOSP IP/OBS HIGH 50: CPT | Mod: GC

## 2024-01-23 RX ORDER — APIXABAN 2.5 MG/1
1 TABLET, FILM COATED ORAL
Qty: 60 | Refills: 0
Start: 2024-01-23 | End: 2024-02-21

## 2024-01-23 RX ORDER — TAMSULOSIN HYDROCHLORIDE 0.4 MG/1
0.4 CAPSULE ORAL AT BEDTIME
Refills: 0 | Status: DISCONTINUED | OUTPATIENT
Start: 2024-01-23 | End: 2024-01-28

## 2024-01-23 RX ADMIN — Medication 3 MILLILITER(S): at 15:57

## 2024-01-23 RX ADMIN — Medication 3 MILLILITER(S): at 03:44

## 2024-01-23 RX ADMIN — Medication 40 MILLIGRAM(S): at 05:34

## 2024-01-23 RX ADMIN — SODIUM CHLORIDE 4 MILLILITER(S): 9 INJECTION INTRAMUSCULAR; INTRAVENOUS; SUBCUTANEOUS at 03:44

## 2024-01-23 RX ADMIN — TAMSULOSIN HYDROCHLORIDE 0.4 MILLIGRAM(S): 0.4 CAPSULE ORAL at 22:12

## 2024-01-23 RX ADMIN — SODIUM CHLORIDE 4 MILLILITER(S): 9 INJECTION INTRAMUSCULAR; INTRAVENOUS; SUBCUTANEOUS at 09:36

## 2024-01-23 RX ADMIN — MONTELUKAST 10 MILLIGRAM(S): 4 TABLET, CHEWABLE ORAL at 15:37

## 2024-01-23 RX ADMIN — SERTRALINE 25 MILLIGRAM(S): 25 TABLET, FILM COATED ORAL at 15:38

## 2024-01-23 RX ADMIN — Medication 100 MILLIGRAM(S): at 22:12

## 2024-01-23 RX ADMIN — SODIUM CHLORIDE 4 MILLILITER(S): 9 INJECTION INTRAMUSCULAR; INTRAVENOUS; SUBCUTANEOUS at 15:57

## 2024-01-23 RX ADMIN — SODIUM CHLORIDE 4 MILLILITER(S): 9 INJECTION INTRAMUSCULAR; INTRAVENOUS; SUBCUTANEOUS at 22:51

## 2024-01-23 RX ADMIN — Medication 40 MILLIGRAM(S): at 15:36

## 2024-01-23 RX ADMIN — Medication 81 MILLIGRAM(S): at 15:36

## 2024-01-23 RX ADMIN — Medication 600 MILLIGRAM(S): at 17:00

## 2024-01-23 RX ADMIN — Medication 3 MILLILITER(S): at 09:36

## 2024-01-23 RX ADMIN — Medication 40 MILLIGRAM(S): at 22:27

## 2024-01-23 RX ADMIN — ATORVASTATIN CALCIUM 20 MILLIGRAM(S): 80 TABLET, FILM COATED ORAL at 22:11

## 2024-01-23 RX ADMIN — Medication 3 MILLILITER(S): at 22:33

## 2024-01-23 RX ADMIN — Medication 1 TABLET(S): at 15:37

## 2024-01-23 RX ADMIN — CEFTRIAXONE 100 MILLIGRAM(S): 500 INJECTION, POWDER, FOR SOLUTION INTRAMUSCULAR; INTRAVENOUS at 08:39

## 2024-01-23 RX ADMIN — APIXABAN 5 MILLIGRAM(S): 2.5 TABLET, FILM COATED ORAL at 17:01

## 2024-01-23 NOTE — PROGRESS NOTE ADULT - ASSESSMENT
68F Luxembourgish-speaking with PMH of asthma/COPD/Emphysema, severe not on home O2, (FEV1 38, DLCO 25) HTN, HFpEF, CVA with right-sided deficits p/w SOB and cough for 2 weeks. Per daughter at bedside, patient has been having increased SOB, orthopnea, PND, and LE edema for the past few months. However, over the past 2 weeks, she noticed her mom was more exhausted, dyspneic, and having a cough productive of yellow sputum. She did notice 3 episodes of blood-tinged sputum and subjective fevers over the past 3 days. Otherwise, denies CP, palpitations, n/v/d, abdominal pain, dysuria, melena/hematochezia. Pt went to her PCP's office yesterday and was noted to be hypoxic. She was placed on 4L NC and sent to ED.     In the ED, HR , /78, RR 36 (satting 97% on 4L NC). Labs notable for WBC 14.17K, Na 131, BUN/Cr 21/1.35, proBNP 2926, VBG 7.27/45/50/21. CTA PE shows multifocal PNA, remote PE, and dilated CBD with cholelithiasis, GB distension. Pt had a cordova placed for urinary retention in ED. S/p vanc/zosyn, 500cc IVF bolus, IV lasix 20mg, lovenox 40mg, duonebs, solu-medrol 125, and IV mag in ED. She briefly required BiPAP. MICU evaluated pt, not a candidate. Weaned to 4L NC.  (21 Jan 2024 10:31)    patient is a former smoker with asthma problems,     RVP negative    #recommendations  patient readily arousable with improved gases after NIV  patient likely with COPD exacerbation with multifocal PNA and PE  continue steroids  airway clearance while on highflow/NC: humidified oxygen, standing guaifenesin, duonebs q6h, hyper sal q6h, chest PT q6 h, aerobika/acapella q6 h,, chest vest q12h  needs to be back on bipap overnight  continue steroids    AM VBG ***  consider trialing additional diuretics given elevated BNP on admission  please obtain echocardiogram with bubble study 68F Portuguese-speaking with PMH of asthma/COPD/Emphysema, severe not on home O2, (FEV1 38, DLCO 25) HTN, HFpEF, CVA with right-sided deficits p/w SOB and cough for 2 weeks. Per daughter at bedside, patient has been having increased SOB, orthopnea, PND, and LE edema for the past few months. However, over the past 2 weeks, she noticed her mom was more exhausted, dyspneic, and having a cough productive of yellow sputum. She did notice 3 episodes of blood-tinged sputum and subjective fevers over the past 3 days. Otherwise, denies CP, palpitations, n/v/d, abdominal pain, dysuria, melena/hematochezia. Pt went to her PCP's office yesterday and was noted to be hypoxic. She was placed on 4L NC and sent to ED.     In the ED, HR , /78, RR 36 (satting 97% on 4L NC). Labs notable for WBC 14.17K, Na 131, BUN/Cr 21/1.35, proBNP 2926, VBG 7.27/45/50/21. CTA PE shows multifocal PNA, remote PE, and dilated CBD with cholelithiasis, GB distension. Pt had a cordova placed for urinary retention in ED. S/p vanc/zosyn, 500cc IVF bolus, IV lasix 20mg, lovenox 40mg, duonebs, solu-medrol 125, and IV mag in ED. She briefly required BiPAP. MICU evaluated pt, not a candidate. Weaned to 4L NC.  (21 Jan 2024 10:31)    patient is a former smoker with asthma problems,     RVP negative    #recommendations  patient readily arousable with improved gases after NIV  patient likely with COPD exacerbation with multifocal PNA and PE  continue steroids  airway clearance while on highflow/NC: humidified oxygen, standing guaifenesin, duonebs q6h, hyper sal q6h, chest PT q6 h, aerobika/acapella q6 h,, chest vest q12h  needs to be back on bipap overnight  decrease steroids to prednisone 40mg daily    AM VBG stable, please obtain ABG  consider trialing additional diuretics given elevated BNP on admission 68F Lao-speaking with PMH of asthma/COPD/Emphysema, severe not on home O2, (FEV1 38, DLCO 25) HTN, HFpEF, CVA with right-sided deficits p/w SOB and cough for 2 weeks. Per daughter at bedside, patient has been having increased SOB, orthopnea, PND, and LE edema for the past few months. However, over the past 2 weeks, she noticed her mom was more exhausted, dyspneic, and having a cough productive of yellow sputum. She did notice 3 episodes of blood-tinged sputum and subjective fevers over the past 3 days. Otherwise, denies CP, palpitations, n/v/d, abdominal pain, dysuria, melena/hematochezia. Pt went to her PCP's office yesterday and was noted to be hypoxic. She was placed on 4L NC and sent to ED.     In the ED, HR , /78, RR 36 (satting 97% on 4L NC). Labs notable for WBC 14.17K, Na 131, BUN/Cr 21/1.35, proBNP 2926, VBG 7.27/45/50/21. CTA PE shows multifocal PNA, remote PE, and dilated CBD with cholelithiasis, GB distension. Pt had a cordova placed for urinary retention in ED. S/p vanc/zosyn, 500cc IVF bolus, IV lasix 20mg, lovenox 40mg, duonebs, solu-medrol 125, and IV mag in ED. She briefly required BiPAP. MICU evaluated pt, not a candidate. Weaned to 4L NC.  (21 Jan 2024 10:31)    patient is a former smoker with asthma problems,     RVP negative    #recommendations  patient readily arousable with improved gases after NIV  patient likely with COPD exacerbation with multifocal PNA and PE  continue steroids  airway clearance while on highflow/NC: humidified oxygen, standing guaifenesin, duonebs q6h, hyper sal q6h, chest PT q6 h, aerobika/acapella q6 h,, chest vest q12h  needs to be back on bipap overnight  decrease steroids to prednisone 40mg daily    AM VBG stable, please obtain ABG  consider trialing additional diuretics given elevated BNP on admission  please order aerobika

## 2024-01-23 NOTE — PROGRESS NOTE ADULT - ATTENDING COMMENTS
68F with asthma/COPD, HTN, HFpEF, presents with progressive cough and poor intake. Pulmonary consulted for acute hypoxemic and hypercapnic respiratory failure.   Room air saturation 90%, continue O2 supplementation to maintain saturation 92%.  No significant wheezing on exam.  Recommend decreasing steroids to prednisone 40 mg daily.  Acidosis on blood gas seems to be mostly metabolic.  Please check ABG in AM.   Continue antibiotics for CAP.

## 2024-01-23 NOTE — PROGRESS NOTE ADULT - ASSESSMENT
68F Pashto-speaking with PMH of asthma, HTN, HFpEF, CVA with right-sided deficits p/w SOB and cough for 2 weeks. A/w AHRF 2/2 CAP and acute asthma exacerbation.

## 2024-01-23 NOTE — PROGRESS NOTE ADULT - SUBJECTIVE AND OBJECTIVE BOX
CHIEF COMPLAINT:    Interval Events:    REVIEW OF SYSTEMS:  unchanged except when noted above    OBJECTIVE:  ICU Vital Signs Last 24 Hrs  T(C): 36.3 (23 Jan 2024 05:30), Max: 36.7 (22 Jan 2024 07:21)  T(F): 97.4 (23 Jan 2024 05:30), Max: 98.1 (22 Jan 2024 07:21)  HR: 71 (23 Jan 2024 05:30) (66 - 107)  BP: 125/56 (23 Jan 2024 05:30) (110/61 - 125/56)  BP(mean): 73 (22 Jan 2024 18:00) (71 - 73)  ABP: --  ABP(mean): --  RR: 19 (23 Jan 2024 05:30) (16 - 19)  SpO2: 100% (23 Jan 2024 05:30) (99% - 100%)    O2 Parameters below as of 23 Jan 2024 05:30  Patient On (Oxygen Delivery Method): BiPAP/CPAP  O2 Flow (L/min): 40        Mode: NIV (Noninvasive Ventilation), TV (machine): 400, FiO2: 40, PEEP: 5, ITime: 1, P-High: 20, P-Low: 15    01-22 @ 07:01  -  01-23 @ 07:00  --------------------------------------------------------  IN: 0 mL / OUT: 1075 mL / NET: -1075 mL      CAPILLARY BLOOD GLUCOSE      POCT Blood Glucose.: 139 mg/dL (23 Jan 2024 05:58)      PHYSICAL EXAM:  General: awake and alert  HEENT: NC/AT, EOMI b/l, conjunctiva normal, MMM  Lymph Nodes: no cervical LAD  Neck: supple. full range of motion  Respiratory: diffuse mild wheezing  Cardiovascular: S1 S2 present, RRR, no m/r/g  Abdomen: soft, NT/ND, +BS  Extremities: no c/c/e  Skin: no rashes or lesions noted  Neurological: AAOx3, no focal deficits  Psychiatry: calm, cooperative      HOSPITAL MEDICATIONS:  MEDICATIONS  (STANDING):  albuterol    90 MICROgram(s) HFA Inhaler 1 Puff(s) Inhalation every 6 hours  albuterol/ipratropium for Nebulization 3 milliLiter(s) Nebulizer every 6 hours  amLODIPine   Tablet 10 milliGRAM(s) Oral daily  apixaban 5 milliGRAM(s) Oral every 12 hours  aspirin enteric coated 81 milliGRAM(s) Oral daily  atenolol  Tablet 50 milliGRAM(s) Oral daily  atorvastatin 20 milliGRAM(s) Oral at bedtime  cefTRIAXone   IVPB 1000 milliGRAM(s) IV Intermittent every 24 hours  guaiFENesin  milliGRAM(s) Oral every 12 hours  influenza  Vaccine (HIGH DOSE) 0.7 milliLiter(s) IntraMuscular once  methylPREDNISolone sodium succinate Injectable 40 milliGRAM(s) IV Push every 8 hours  montelukast 10 milliGRAM(s) Oral daily  multivitamin 1 Tablet(s) Oral daily  pantoprazole    Tablet 40 milliGRAM(s) Oral before breakfast  sertraline 25 milliGRAM(s) Oral daily  sodium chloride 3%  Inhalation 4 milliLiter(s) Inhalation every 6 hours    MEDICATIONS  (PRN):  acetaminophen     Tablet .. 650 milliGRAM(s) Oral every 6 hours PRN Temp greater or equal to 38C (100.4F), Mild Pain (1 - 3)      LABS:                        9.9    6.61  )-----------( 256      ( 23 Jan 2024 05:42 )             32.6     Hgb Trend: 9.9<--, 9.1<--, 10.6<--, 10.4<--  01-22    134<L>  |  101  |  30<H>  ----------------------------<  145<H>  5.2   |  21<L>  |  1.44<H>    Ca    8.9      22 Jan 2024 04:57  Phos  7.0     01-22  Mg     2.80     01-22    TPro  8.5<H>  /  Alb  3.5  /  TBili  0.5  /  DBili  x   /  AST  15  /  ALT  12  /  AlkPhos  116  01-21    Creatinine Trend: 1.44<--, 1.32<--, 1.35<--    Urinalysis Basic - ( 22 Jan 2024 04:57 )    Color: x / Appearance: x / SG: x / pH: x  Gluc: 145 mg/dL / Ketone: x  / Bili: x / Urobili: x   Blood: x / Protein: x / Nitrite: x   Leuk Esterase: x / RBC: x / WBC x   Sq Epi: x / Non Sq Epi: x / Bacteria: x      Arterial Blood Gas:  01-22 @ 10:05  7.28/48/136/23/98.4/-4.1  ABG lactate: --    Venous Blood Gas:  01-22 @ 15:12  7.27/53/49/24/78.9  VBG Lactate: 0.7  Venous Blood Gas:  01-22 @ 04:57  7.20/61/61/24/90.0  VBG Lactate: 0.8  Venous Blood Gas:  01-21 @ 14:55  7.23/52/73/22/92.8  VBG Lactate: 0.7  Venous Blood Gas:  01-21 @ 09:55  7.18/59/53/22/78.0  VBG Lactate: 0.9      MICROBIOLOGY:     Culture - Blood (collected 20 Jan 2024 20:40)  Source: .Blood Blood-Peripheral  Preliminary Report (23 Jan 2024 01:03):    No growth at 48 Hours    Culture - Blood (collected 20 Jan 2024 20:30)  Source: .Blood Blood-Peripheral  Preliminary Report (23 Jan 2024 01:03):    No growth at 48 Hours        RADIOLOGY:  [ x ] Reviewed and interpreted by me    PULMONARY FUNCTION TESTS:    EKG: CHIEF COMPLAINT:    Interval Events:    NAEON. Patient with SpO2 90% on RA when seen.  Endorses chest discomfort that is somewhat reproducible on exam. Discussed with primary team.    [facility]  ([id number]) used to assist with interview.      REVIEW OF SYSTEMS:  unchanged except when noted above    OBJECTIVE:  ICU Vital Signs Last 24 Hrs  T(C): 36.3 (23 Jan 2024 05:30), Max: 36.7 (22 Jan 2024 07:21)  T(F): 97.4 (23 Jan 2024 05:30), Max: 98.1 (22 Jan 2024 07:21)  HR: 71 (23 Jan 2024 05:30) (66 - 107)  BP: 125/56 (23 Jan 2024 05:30) (110/61 - 125/56)  BP(mean): 73 (22 Jan 2024 18:00) (71 - 73)  ABP: --  ABP(mean): --  RR: 19 (23 Jan 2024 05:30) (16 - 19)  SpO2: 100% (23 Jan 2024 05:30) (99% - 100%)    O2 Parameters below as of 23 Jan 2024 05:30  Patient On (Oxygen Delivery Method): BiPAP/CPAP  O2 Flow (L/min): 40        Mode: NIV (Noninvasive Ventilation), TV (machine): 400, FiO2: 40, PEEP: 5, ITime: 1, P-High: 20, P-Low: 15    01-22 @ 07:01  -  01-23 @ 07:00  --------------------------------------------------------  IN: 0 mL / OUT: 1075 mL / NET: -1075 mL      CAPILLARY BLOOD GLUCOSE      POCT Blood Glucose.: 139 mg/dL (23 Jan 2024 05:58)      PHYSICAL EXAM:  General: awake and alert  HEENT: NC/AT, EOMI b/l, conjunctiva normal, MMM  Lymph Nodes: no cervical LAD  Neck: supple. full range of motion  Respiratory: diffuse mild wheezing  Cardiovascular: S1 S2 present, RRR, no m/r/g  Abdomen: soft, NT/ND, +BS  Extremities: no c/c/e  Skin: no rashes or lesions noted  Neurological: AAOx3, no focal deficits  Psychiatry: calm, [facility] MEDICATIONS:  MEDICATIONS  (STANDING):  albuterol    90 MICROgram(s) HFA Inhaler 1 Puff(s) Inhalation every 6 hours  albuterol/ipratropium for Nebulization 3 milliLiter(s) Nebulizer every 6 hours  amLODIPine   Tablet 10 milliGRAM(s) Oral daily  apixaban 5 milliGRAM(s) Oral every 12 hours  aspirin enteric coated 81 milliGRAM(s) Oral daily  atenolol  Tablet 50 milliGRAM(s) Oral daily  atorvastatin 20 milliGRAM(s) Oral at bedtime  cefTRIAXone   IVPB 1000 milliGRAM(s) IV Intermittent every 24 hours  guaiFENesin  milliGRAM(s) Oral every 12 hours  influenza  Vaccine (HIGH DOSE) 0.7 milliLiter(s) IntraMuscular once  methylPREDNISolone sodium succinate Injectable 40 milliGRAM(s) IV Push every 8 hours  montelukast 10 milliGRAM(s) Oral daily  multivitamin 1 Tablet(s) Oral daily  pantoprazole    Tablet 40 milliGRAM(s) Oral before breakfast  sertraline 25 milliGRAM(s) Oral daily  sodium chloride 3%  Inhalation 4 milliLiter(s) Inhalation every 6 hours    MEDICATIONS  (PRN):  acetaminophen     Tablet .. 650 milliGRAM(s) Oral every 6 hours PRN Temp greater or equal to 38C (100.4F), Mild Pain (1 - 3)      LABS:                        9.9    6.61  )-----------( 256      ( 23 Jan 2024 05:42 )             32.6     Hgb Trend: 9.9<--, 9.1<--, 10.6<--, 10.4<--  01-22    134<L>  |  101  |  30<H>  ----------------------------<  145<H>  5.2   |  21<L>  |  1.44<H>    Ca    8.9      22 Jan 2024 04:57  Phos  7.0     01-22  Mg     2.80     01-22    TPro  8.5<H>  /  Alb  3.5  /  TBili  0.5  /  DBili  x   /  AST  15  /  ALT  12  /  AlkPhos  116  01-21    Creatinine Trend: 1.44<--, 1.32<--, 1.35<--    Urinalysis Basic - ( 22 Jan 2024 04:57 )    Color: x / Appearance: x / SG: x / pH: x  Gluc: 145 mg/dL / Ketone: x  / Bili: x / Urobili: x   Blood: x / Protein: x / Nitrite: x   Leuk Esterase: x / RBC: x / WBC x   Sq Epi: x / Non Sq Epi: x / Bacteria: x      Arterial Blood Gas:  01-22 @ 10:05  7.28/48/136/23/98.4/-4.1  ABG lactate: --    Venous Blood Gas:  01-22 @ 15:12  7.27/53/49/24/78.9  VBG Lactate: 0.7  Venous Blood Gas:  01-22 @ 04:57  7.20/61/61/24/90.0  VBG Lactate: 0.8  Venous Blood Gas:  01-21 @ 14:55  7.23/52/73/22/92.8  VBG Lactate: 0.7  Venous Blood Gas:  01-21 @ 09:55  7.18/59/53/22/78.0  VBG Lactate: 0.9      MICROBIOLOGY:     Culture - Blood (collected 20 Jan 2024 20:40)  Source: .Blood Blood-Peripheral  Preliminary Report (23 Jan 2024 01:03):    No growth at 48 Hours    Culture - Blood (collected 20 Jan 2024 20:30)  Source: .Blood Blood-Peripheral  Preliminary Report (23 Jan 2024 01:03):    No growth at 48 Hours        RADIOLOGY:  [ x ] Reviewed and interpreted by me    PULMONARY FUNCTION TESTS:    EKG:

## 2024-01-23 NOTE — PROGRESS NOTE ADULT - SUBJECTIVE AND OBJECTIVE BOX
[facility] Division of Hospital Medicine  River Brunner MD  Pager [phone]      Patient is a 68y old  Female who presents with a chief complaint of AHRF (23 Jan 2024 07:07)      SUBJECTIVE / OVERNIGHT EVENTS: Pt endorsing cough, causing abd pain. No SOB or CP. Celi Int: [id number]    MEDICATIONS  (STANDING):  albuterol    90 MICROgram(s) HFA Inhaler 1 Puff(s) Inhalation every 6 hours  albuterol/ipratropium for Nebulization 3 milliLiter(s) Nebulizer every 6 hours  amLODIPine   Tablet 10 milliGRAM(s) Oral daily  apixaban 5 milliGRAM(s) Oral every 12 hours  aspirin enteric coated 81 milliGRAM(s) Oral daily  atenolol  Tablet 50 milliGRAM(s) Oral daily  atorvastatin 20 milliGRAM(s) Oral at bedtime  cefTRIAXone   IVPB 1000 milliGRAM(s) IV Intermittent every 24 hours  guaiFENesin  milliGRAM(s) Oral every 12 hours  influenza  Vaccine (HIGH DOSE) 0.7 milliLiter(s) IntraMuscular once  methylPREDNISolone sodium succinate Injectable 40 milliGRAM(s) IV Push every 8 hours  montelukast 10 milliGRAM(s) Oral daily  multivitamin 1 Tablet(s) Oral daily  pantoprazole    Tablet 40 milliGRAM(s) Oral before breakfast  sertraline 25 milliGRAM(s) Oral daily  sodium chloride 3%  Inhalation 4 milliLiter(s) Inhalation every 6 hours    MEDICATIONS  (PRN):  acetaminophen     Tablet .. 650 milliGRAM(s) Oral every 6 hours PRN Temp greater or equal to 38C (100.4F), Mild Pain (1 - 3)      CAPILLARY BLOOD GLUCOSE      POCT Blood Glucose.: 131 mg/dL (23 Jan 2024 11:06)  POCT Blood Glucose.: 139 mg/dL (23 Jan 2024 05:58)  POCT Blood Glucose.: 134 mg/dL (22 Jan 2024 23:36)  POCT Blood Glucose.: 149 mg/dL (22 Jan 2024 17:48)    I&O's Summary    22 Jan 2024 07:01  -  23 Jan 2024 07:00  --------------------------------------------------------  IN: 0 mL / OUT: 1075 mL / NET: -1075 mL    23 Jan 2024 07:01  -  23 Jan 2024 15:04  --------------------------------------------------------  IN: 0 mL / OUT: 400 mL / NET: -400 mL        PHYSICAL EXAM:  Vital Signs Last 24 Hrs  T(C): 36.8 (23 Jan 2024 11:15), Max: 36.8 (23 Jan 2024 11:15)  T(F): 98.2 (23 Jan 2024 11:15), Max: 98.2 (23 Jan 2024 11:15)  HR: 78 (23 Jan 2024 11:15) (71 - 107)  BP: 112/55 (23 Jan 2024 11:15) (112/55 - 125/56)  BP(mean): 73 (22 Jan 2024 18:00) (73 - 73)  RR: 18 (23 Jan 2024 11:15) (18 - 19)  SpO2: 100% (23 Jan 2024 11:15) (100% - 100%)    Parameters below as of 23 Jan 2024 11:15  Patient On (Oxygen Delivery Method): BiPAP/CPAP  O2 Flow (L/min): 40    CONSTITUTIONAL: NAD  EYES: conjunctiva and sclera clear  ENMT: mmm  NECK: Supple,  RESPIRATORY: Normal respiratory effort; diffuse rhonchi  CARDIOVASCULAR: Regular rate and rhythm, + S1 and S2  ABDOMEN: Nontender to palpation, normoactive bowel sounds, no rebound/guarding  PSYCH: A+O x 2 ( self and place)    LABS:                        9.9    6.61  )-----------( 256      ( 23 Jan 2024 05:42 )             32.6     01-23    140  |  105  |  48<H>  ----------------------------<  132<H>  5.3   |  21<L>  |  1.48<H>    Ca    9.1      23 Jan 2024 05:42  Phos  5.7     01-23  Mg     3.10     01-23            Urinalysis Basic - ( 23 Jan 2024 05:42 )    Color: x / Appearance: x / SG: x / pH: x  Gluc: 132 mg/dL / Ketone: x  / Bili: x / Urobili: x   Blood: x / Protein: x / Nitrite: x   Leuk Esterase: x / RBC: x / WBC x   Sq Epi: x / Non Sq Epi: x / Bacteria: x        Culture - Blood (collected 20 Jan 2024 20:40)  Source: .Blood Blood-Peripheral  Preliminary Report (23 Jan 2024 01:03):    No growth at 48 Hours    Culture - Blood (collected 20 Jan 2024 20:30)  Source: .Blood Blood-Peripheral  Preliminary Report (23 Jan 2024 01:03):    No growth at 48 Hours

## 2024-01-24 LAB
ALBUMIN SERPL ELPH-MCNC: 3.2 G/DL — LOW (ref 3.3–5)
ALP SERPL-CCNC: 80 U/L — SIGNIFICANT CHANGE UP (ref 40–120)
ALT FLD-CCNC: 12 U/L — SIGNIFICANT CHANGE UP (ref 4–33)
ANION GAP SERPL CALC-SCNC: 9 MMOL/L — SIGNIFICANT CHANGE UP (ref 7–14)
AST SERPL-CCNC: 14 U/L — SIGNIFICANT CHANGE UP (ref 4–32)
BASE EXCESS BLDA CALC-SCNC: -0.7 MMOL/L — SIGNIFICANT CHANGE UP (ref -2–3)
BASOPHILS # BLD AUTO: 0.01 K/UL — SIGNIFICANT CHANGE UP (ref 0–0.2)
BASOPHILS NFR BLD AUTO: 0.2 % — SIGNIFICANT CHANGE UP (ref 0–2)
BILIRUB SERPL-MCNC: 0.2 MG/DL — SIGNIFICANT CHANGE UP (ref 0.2–1.2)
BUN SERPL-MCNC: 46 MG/DL — HIGH (ref 7–23)
CALCIUM SERPL-MCNC: 9 MG/DL — SIGNIFICANT CHANGE UP (ref 8.4–10.5)
CHLORIDE SERPL-SCNC: 106 MMOL/L — SIGNIFICANT CHANGE UP (ref 98–107)
CO2 BLDA-SCNC: 27 MMOL/L — HIGH (ref 19–24)
CO2 SERPL-SCNC: 25 MMOL/L — SIGNIFICANT CHANGE UP (ref 22–31)
CREAT SERPL-MCNC: 1.36 MG/DL — HIGH (ref 0.5–1.3)
EGFR: 42 ML/MIN/1.73M2 — LOW
EOSINOPHIL # BLD AUTO: 0 K/UL — SIGNIFICANT CHANGE UP (ref 0–0.5)
EOSINOPHIL NFR BLD AUTO: 0 % — SIGNIFICANT CHANGE UP (ref 0–6)
GAS PNL BLDA: SIGNIFICANT CHANGE UP
GLUCOSE SERPL-MCNC: 162 MG/DL — HIGH (ref 70–99)
HCO3 BLDA-SCNC: 26 MMOL/L — SIGNIFICANT CHANGE UP (ref 21–28)
HCT VFR BLD CALC: 29.5 % — LOW (ref 34.5–45)
HGB BLD-MCNC: 9 G/DL — LOW (ref 11.5–15.5)
HOROWITZ INDEX BLDA+IHG-RTO: 40 — SIGNIFICANT CHANGE UP
IANC: 4.35 K/UL — SIGNIFICANT CHANGE UP (ref 1.8–7.4)
IMM GRANULOCYTES NFR BLD AUTO: 1.3 % — HIGH (ref 0–0.9)
LYMPHOCYTES # BLD AUTO: 0.81 K/UL — LOW (ref 1–3.3)
LYMPHOCYTES # BLD AUTO: 15 % — SIGNIFICANT CHANGE UP (ref 13–44)
MAGNESIUM SERPL-MCNC: 3 MG/DL — HIGH (ref 1.6–2.6)
MCHC RBC-ENTMCNC: 27.3 PG — SIGNIFICANT CHANGE UP (ref 27–34)
MCHC RBC-ENTMCNC: 30.5 GM/DL — LOW (ref 32–36)
MCV RBC AUTO: 89.4 FL — SIGNIFICANT CHANGE UP (ref 80–100)
MONOCYTES # BLD AUTO: 0.16 K/UL — SIGNIFICANT CHANGE UP (ref 0–0.9)
MONOCYTES NFR BLD AUTO: 3 % — SIGNIFICANT CHANGE UP (ref 2–14)
NEUTROPHILS # BLD AUTO: 4.35 K/UL — SIGNIFICANT CHANGE UP (ref 1.8–7.4)
NEUTROPHILS NFR BLD AUTO: 80.5 % — HIGH (ref 43–77)
NRBC # BLD: 0 /100 WBCS — SIGNIFICANT CHANGE UP (ref 0–0)
NRBC # FLD: 0 K/UL — SIGNIFICANT CHANGE UP (ref 0–0)
PCO2 BLDA: 49 MMHG — HIGH (ref 32–45)
PH BLDA: 7.33 — LOW (ref 7.35–7.45)
PHOSPHATE SERPL-MCNC: 5.3 MG/DL — HIGH (ref 2.5–4.5)
PLATELET # BLD AUTO: 258 K/UL — SIGNIFICANT CHANGE UP (ref 150–400)
PO2 BLDA: 128 MMHG — HIGH (ref 83–108)
POTASSIUM SERPL-MCNC: 5.1 MMOL/L — SIGNIFICANT CHANGE UP (ref 3.5–5.3)
POTASSIUM SERPL-SCNC: 5.1 MMOL/L — SIGNIFICANT CHANGE UP (ref 3.5–5.3)
PROT SERPL-MCNC: 7.1 G/DL — SIGNIFICANT CHANGE UP (ref 6–8.3)
RBC # BLD: 3.3 M/UL — LOW (ref 3.8–5.2)
RBC # FLD: 14.1 % — SIGNIFICANT CHANGE UP (ref 10.3–14.5)
SAO2 % BLDA: 98.9 % — HIGH (ref 94–98)
SODIUM SERPL-SCNC: 140 MMOL/L — SIGNIFICANT CHANGE UP (ref 135–145)
WBC # BLD: 5.4 K/UL — SIGNIFICANT CHANGE UP (ref 3.8–10.5)
WBC # FLD AUTO: 5.4 K/UL — SIGNIFICANT CHANGE UP (ref 3.8–10.5)

## 2024-01-24 PROCEDURE — 99233 SBSQ HOSP IP/OBS HIGH 50: CPT

## 2024-01-24 PROCEDURE — 99233 SBSQ HOSP IP/OBS HIGH 50: CPT | Mod: GC

## 2024-01-24 RX ADMIN — Medication 3 MILLILITER(S): at 15:40

## 2024-01-24 RX ADMIN — TAMSULOSIN HYDROCHLORIDE 0.4 MILLIGRAM(S): 0.4 CAPSULE ORAL at 21:25

## 2024-01-24 RX ADMIN — Medication 100 MILLIGRAM(S): at 21:25

## 2024-01-24 RX ADMIN — AMLODIPINE BESYLATE 10 MILLIGRAM(S): 2.5 TABLET ORAL at 06:23

## 2024-01-24 RX ADMIN — SODIUM CHLORIDE 4 MILLILITER(S): 9 INJECTION INTRAMUSCULAR; INTRAVENOUS; SUBCUTANEOUS at 04:35

## 2024-01-24 RX ADMIN — Medication 3 MILLILITER(S): at 04:26

## 2024-01-24 RX ADMIN — PANTOPRAZOLE SODIUM 40 MILLIGRAM(S): 20 TABLET, DELAYED RELEASE ORAL at 06:23

## 2024-01-24 RX ADMIN — Medication 81 MILLIGRAM(S): at 11:45

## 2024-01-24 RX ADMIN — APIXABAN 5 MILLIGRAM(S): 2.5 TABLET, FILM COATED ORAL at 06:23

## 2024-01-24 RX ADMIN — Medication 100 MILLIGRAM(S): at 13:31

## 2024-01-24 RX ADMIN — MONTELUKAST 10 MILLIGRAM(S): 4 TABLET, CHEWABLE ORAL at 11:46

## 2024-01-24 RX ADMIN — Medication 3 MILLILITER(S): at 08:58

## 2024-01-24 RX ADMIN — Medication 3 MILLILITER(S): at 21:40

## 2024-01-24 RX ADMIN — SODIUM CHLORIDE 4 MILLILITER(S): 9 INJECTION INTRAMUSCULAR; INTRAVENOUS; SUBCUTANEOUS at 08:56

## 2024-01-24 RX ADMIN — SERTRALINE 25 MILLIGRAM(S): 25 TABLET, FILM COATED ORAL at 11:47

## 2024-01-24 RX ADMIN — Medication 600 MILLIGRAM(S): at 06:22

## 2024-01-24 RX ADMIN — Medication 40 MILLIGRAM(S): at 06:24

## 2024-01-24 RX ADMIN — ATENOLOL 50 MILLIGRAM(S): 25 TABLET ORAL at 06:23

## 2024-01-24 RX ADMIN — Medication 1 TABLET(S): at 11:46

## 2024-01-24 RX ADMIN — ATORVASTATIN CALCIUM 20 MILLIGRAM(S): 80 TABLET, FILM COATED ORAL at 21:25

## 2024-01-24 RX ADMIN — SODIUM CHLORIDE 4 MILLILITER(S): 9 INJECTION INTRAMUSCULAR; INTRAVENOUS; SUBCUTANEOUS at 21:40

## 2024-01-24 RX ADMIN — CEFTRIAXONE 100 MILLIGRAM(S): 500 INJECTION, POWDER, FOR SOLUTION INTRAMUSCULAR; INTRAVENOUS at 08:38

## 2024-01-24 RX ADMIN — Medication 600 MILLIGRAM(S): at 18:08

## 2024-01-24 RX ADMIN — Medication 650 MILLIGRAM(S): at 22:25

## 2024-01-24 RX ADMIN — Medication 650 MILLIGRAM(S): at 21:25

## 2024-01-24 RX ADMIN — APIXABAN 5 MILLIGRAM(S): 2.5 TABLET, FILM COATED ORAL at 18:08

## 2024-01-24 RX ADMIN — Medication 100 MILLIGRAM(S): at 06:23

## 2024-01-24 NOTE — SWALLOW BEDSIDE ASSESSMENT ADULT - ORAL PREPARATORY PHASE
Anterior loss of bolus Prolonged bolus manipulation Decreased mastication ability Prolonged bolus manipulation/Anterior loss of bolus

## 2024-01-24 NOTE — PHYSICAL THERAPY INITIAL EVALUATION ADULT - LEVEL OF INDEPENDENCE: GAIT, REHAB EVAL
due to shortness of breath upon standing, vitals signs stable, symptoms subsided with return to supine/unable to perform

## 2024-01-24 NOTE — PHYSICAL THERAPY INITIAL EVALUATION ADULT - DID THE PATIENT HAVE SURGERY?
Osito Romo   MRN: [id number]    Department:  [facility] Emergency Department   Date of Visit:  11/7/2018           Disclosure     Insurance plans vary and the physician(s) referred by the ER may not be covered by your plan.  Please contact CARE PHYSICIAN AT ONCE OR RETURN IMMEDIATELY TO THE EMERGENCY DEPARTMENT. If you have been prescribed any medication(s), please fill your prescription right away and begin taking the medication(s) as directed.   If you believe that any of the medications n/a

## 2024-01-24 NOTE — PROGRESS NOTE ADULT - ASSESSMENT
68F Tajik-speaking with PMH of asthma, HTN, HFpEF, CVA with right-sided deficits p/w SOB and cough for 2 weeks. A/w AHRF 2/2 CAP and acute asthma exacerbation.

## 2024-01-24 NOTE — SWALLOW BEDSIDE ASSESSMENT ADULT - SWALLOW EVAL: DIAGNOSIS
1. Mild oral dysphagia for puree, mildly-thick and thin liquids marked by adequate acceptance, mildly reduced containment of liquids marked by anterior spillage, prolonged bolus manipulation, delayed oral transit and adequate oral clearance. 2. Moderate oral dysphagia for minced and moist and soft and bite sized solids marked by prolonged mastication with frequent breaks due to increased work of breath, delayed oral transit and reduced oral clearance marked by lingual stasis. 3. Functional pharyngeal phase for aforementioned consistencies marked by hyolaryngeal excursion present upon palpation and no overt s/s of penetration/aspiration evidenced.

## 2024-01-24 NOTE — PHYSICAL THERAPY INITIAL EVALUATION ADULT - GENERAL OBSERVATIONS, REHAB EVAL
Chart reviewed and cleared for PT by GEOVANI Grove. Pt received semi supine in bed in NAD, all lines intact + telemetry, primafit, nasal canula, HR 60, SpO2 97%.

## 2024-01-24 NOTE — PROGRESS NOTE ADULT - ASSESSMENT
68F Telugu-speaking with PMH of asthma/COPD/Emphysema, severe not on home O2, (FEV1 38, DLCO 25) HTN, HFpEF, CVA with right-sided deficits p/w SOB and cough for 2 weeks. Per daughter at bedside, patient has been having increased SOB, orthopnea, PND, and LE edema for the past few months. However, over the past 2 weeks, she noticed her mom was more exhausted, dyspneic, and having a cough productive of yellow sputum. She did notice 3 episodes of blood-tinged sputum and subjective fevers over the past 3 days. Otherwise, denies CP, palpitations, n/v/d, abdominal pain, dysuria, melena/hematochezia. Pt went to her PCP's office yesterday and was noted to be hypoxic. She was placed on 4L NC and sent to ED.     In the ED, HR , /78, RR 36 (satting 97% on 4L NC). Labs notable for WBC 14.17K, Na 131, BUN/Cr 21/1.35, proBNP 2926, VBG 7.27/45/50/21. CTA PE shows multifocal PNA, remote PE, and dilated CBD with cholelithiasis, GB distension. Pt had a cordova placed for urinary retention in ED. S/p vanc/zosyn, 500cc IVF bolus, IV lasix 20mg, lovenox 40mg, duonebs, solu-medrol 125, and IV mag in ED. She briefly required BiPAP. MICU evaluated pt, not a candidate. Weaned to 4L NC.  (21 Jan 2024 10:31)    patient is a former smoker with asthma problems,     RVP negative    #recommendations  patient readily arousable with improved gases after NIV  patient likely with COPD exacerbation with multifocal PNA and PE  airway clearance while on highflow/NC: humidified oxygen, standing guaifenesin, duonebs q6h, hyper sal q6h, chest PT q6 h, aerobika/acapella q6 h,, chest vest q12h  continue prednisone 40  continue rocephin for CAP  can switch to PRN albuterol  add symbicort, spiriva inhalers, needs inhaler teaching   68F Serbian-speaking with PMH of asthma/COPD/Emphysema, severe not on home O2, (FEV1 38, DLCO 25) HTN, HFpEF, CVA with right-sided deficits p/w SOB and cough for 2 weeks. Per daughter at bedside, patient has been having increased SOB, orthopnea, PND, and LE edema for the past few months. However, over the past 2 weeks, she noticed her mom was more exhausted, dyspneic, and having a cough productive of yellow sputum. She did notice 3 episodes of blood-tinged sputum and subjective fevers over the past 3 days. Otherwise, denies CP, palpitations, n/v/d, abdominal pain, dysuria, melena/hematochezia. Pt went to her PCP's office yesterday and was noted to be hypoxic. She was placed on 4L NC and sent to ED.     In the ED, HR , /78, RR 36 (satting 97% on 4L NC). Labs notable for WBC 14.17K, Na 131, BUN/Cr 21/1.35, proBNP 2926, VBG 7.27/45/50/21. CTA PE shows multifocal PNA, remote PE, and dilated CBD with cholelithiasis, GB distension. Pt had a cordova placed for urinary retention in ED. S/p vanc/zosyn, 500cc IVF bolus, IV lasix 20mg, lovenox 40mg, duonebs, solu-medrol 125, and IV mag in ED. She briefly required BiPAP. MICU evaluated pt, not a candidate. Weaned to 4L NC.  (2024 10:31)    patient is a former smoker with asthma problems,     RVP negative    #recommendations  patient readily arousable with improved gases after NIV  patient likely with COPD exacerbation with multifocal PNA and PE  airway clearance while on highflow/NC: humidified oxygen, standing guaifenesin, duonebs q6h, hyper sal q6h, chest PT q6 h, aerobika/acapella q6 h,, chest vest q12h  continue prednisone 40  continue rocephin for CAP  can switch to PRN albuterol  add symbicort, spiriva inhalers, needs inhaler teaching    Needs outpatient pulmonary follow up upon discharge at [address] [place] [address], [address] ([phone]).  Please e-mail [email or web address] to make an appointment for the patient.  Please include the name, , and a phone number for you and the patient.

## 2024-01-24 NOTE — PROGRESS NOTE ADULT - SUBJECTIVE AND OBJECTIVE BOX
[facility] Division of Hospital Medicine  River Brunner MD  Pager [phone]      Patient is a 68y old  Female who presents with a chief complaint of AHRF (24 Jan 2024 07:22)      SUBJECTIVE / OVERNIGHT EVENTS:  ID [id number], cough still persists. Unable to answer if SOB is getting worse.     MEDICATIONS  (STANDING):  albuterol    90 MICROgram(s) HFA Inhaler 1 Puff(s) Inhalation every 6 hours  albuterol/ipratropium for Nebulization 3 milliLiter(s) Nebulizer every 6 hours  amLODIPine   Tablet 10 milliGRAM(s) Oral daily  apixaban 5 milliGRAM(s) Oral every 12 hours  aspirin enteric coated 81 milliGRAM(s) Oral daily  atenolol  Tablet 50 milliGRAM(s) Oral daily  atorvastatin 20 milliGRAM(s) Oral at bedtime  benzonatate 100 milliGRAM(s) Oral every 8 hours  cefTRIAXone   IVPB 1000 milliGRAM(s) IV Intermittent every 24 hours  guaiFENesin  milliGRAM(s) Oral every 12 hours  influenza  Vaccine (HIGH DOSE) 0.7 milliLiter(s) IntraMuscular once  montelukast 10 milliGRAM(s) Oral daily  multivitamin 1 Tablet(s) Oral daily  pantoprazole    Tablet 40 milliGRAM(s) Oral before breakfast  predniSONE   Tablet 40 milliGRAM(s) Oral daily  sertraline 25 milliGRAM(s) Oral daily  sodium chloride 3%  Inhalation 4 milliLiter(s) Inhalation every 6 hours  tamsulosin 0.4 milliGRAM(s) Oral at bedtime    MEDICATIONS  (PRN):  acetaminophen     Tablet .. 650 milliGRAM(s) Oral every 6 hours PRN Temp greater or equal to 38C (100.4F), Mild Pain (1 - 3)      CAPILLARY BLOOD GLUCOSE        I&O's Summary    23 Jan 2024 07:01  -  24 Jan 2024 07:00  --------------------------------------------------------  IN: 650 mL / OUT: 1250 mL / NET: -600 mL    24 Jan 2024 07:01  -  24 Jan 2024 16:11  --------------------------------------------------------  IN: 90 mL / OUT: 200 mL / NET: -110 mL        PHYSICAL EXAM:  Vital Signs Last 24 Hrs  T(C): 36.5 (24 Jan 2024 11:55), Max: 37.3 (23 Jan 2024 17:25)  T(F): 97.7 (24 Jan 2024 11:55), Max: 99.1 (23 Jan 2024 17:25)  HR: 58 (24 Jan 2024 15:40) (57 - 88)  BP: 117/64 (24 Jan 2024 11:55) (117/64 - 122/55)  BP(mean): --  RR: 18 (24 Jan 2024 11:55) (18 - 18)  SpO2: 100% (24 Jan 2024 11:55) (97% - 100%)    Parameters below as of 24 Jan 2024 15:40  Patient On (Oxygen Delivery Method): nasal cannula      CONSTITUTIONAL: NAD  EYES: conjunctiva and sclera clear  ENMT: mmm  NECK: Supple,  RESPIRATORY: Normal respiratory effort; Improved coarse rhonchi  CARDIOVASCULAR: Regular rate and rhythm, + S1 and S2  ABDOMEN: Nontender to palpation, normoactive bowel sounds, no rebound/guarding  PSYCH: A+O x 2 ( self and place)    LABS:                        9.0    5.40  )-----------( 258      ( 24 Jan 2024 05:41 )             29.5     01-24    140  |  106  |  46<H>  ----------------------------<  162<H>  5.1   |  25  |  1.36<H>    Ca    9.0      24 Jan 2024 05:41  Phos  5.3     01-24  Mg     3.00     01-24    TPro  7.1  /  Alb  3.2<L>  /  TBili  0.2  /  DBili  x   /  AST  14  /  ALT  12  /  AlkPhos  80  01-24          Urinalysis Basic - ( 24 Jan 2024 05:41 )    Color: x / Appearance: x / SG: x / pH: x  Gluc: 162 mg/dL / Ketone: x  / Bili: x / Urobili: x   Blood: x / Protein: x / Nitrite: x   Leuk Esterase: x / RBC: x / WBC x   Sq Epi: x / Non Sq Epi: x / Bacteria: x

## 2024-01-24 NOTE — PHYSICAL THERAPY INITIAL EVALUATION ADULT - PERTINENT HX OF CURRENT PROBLEM, REHAB EVAL
Pt is a 68 year old female Faroese-speaking with a past medical history of asthma, HTN, HFpEF, CVA with right-sided deficits p/w SOB and cough for 2 weeks, Acute respiratory failure with hypoxia secondary to CAP and acute asthma exacerbation.
Present (15 x15 bpm)

## 2024-01-24 NOTE — SWALLOW BEDSIDE ASSESSMENT ADULT - ASR SWALLOW RECOMMEND DIAG
2. Consider Cinesophagram at MD's discretion if concerned that results of chest imaging may be dysphagia related

## 2024-01-24 NOTE — PHYSICAL THERAPY INITIAL EVALUATION ADULT - ADDITIONAL COMMENTS
Pt lives with her son in a house with 4 steps to enter. Pt uses a rolling walker and requires assistance with ambulation and ADLs. Per EMR pt is primarily bedbound, pt reports she spends majority of time in bed but can walk in the house with a rolling walker.   Pt left semi supine in bed in NAD, all lines intact, call choi in reach and GEOVANI [place] made aware.

## 2024-01-24 NOTE — PROGRESS NOTE ADULT - ATTENDING COMMENTS
68F with asthma/COPD, HTN, HFpEF, presents with progressive cough and poor intake. Pulmonary consulted for acute hypoxemic and hypercapnic respiratory failure. Complete 5 day course of steroids.   No significant wheezing on exam.  Continue antibiotics for CAP.  Recommend incentive spirometry.   No significant hypercapnia on ABG. Recommend D/C NIV.

## 2024-01-24 NOTE — PROGRESS NOTE ADULT - SUBJECTIVE AND OBJECTIVE BOX
CHIEF COMPLAINT:    Interval Events:    NAEON.    REVIEW OF SYSTEMS:  unchanged except when noted above    OBJECTIVE:  ICU Vital Signs Last 24 Hrs  T(C): 37.1 (24 Jan 2024 06:20), Max: 37.3 (23 Jan 2024 17:25)  T(F): 98.7 (24 Jan 2024 06:20), Max: 99.1 (23 Jan 2024 17:25)  HR: 66 (24 Jan 2024 06:20) (66 - 88)  BP: 119/60 (24 Jan 2024 06:20) (112/55 - 122/55)  BP(mean): --  ABP: --  ABP(mean): --  RR: 18 (24 Jan 2024 06:20) (18 - 18)  SpO2: 97% (24 Jan 2024 06:20) (97% - 100%)    O2 Parameters below as of 24 Jan 2024 06:20  Patient On (Oxygen Delivery Method): nasal cannula  O2 Flow (L/min): 4            01-23 @ 07:01  -  01-24 @ 07:00  --------------------------------------------------------  IN: 650 mL / OUT: 1250 mL / NET: -600 mL      CAPILLARY BLOOD GLUCOSE      POCT Blood Glucose.: 131 mg/dL (23 Jan 2024 11:06)      PHYSICAL EXAM:  General: awake and alert  HEENT: NC/AT, EOMI b/l, conjunctiva normal, MMM  Lymph Nodes: no cervical LAD  Neck: supple. full range of motion  Respiratory: diffuse mild wheezing  Cardiovascular: S1 S2 present, RRR, no m/r/g  Abdomen: soft, NT/ND, +BS  Extremities: no c/c/e  Skin: no rashes or lesions noted  Neurological: AAOx3, no focal deficits  Psychiatry: calm, cooperative        HOSPITAL MEDICATIONS:  MEDICATIONS  (STANDING):  albuterol    90 MICROgram(s) HFA Inhaler 1 Puff(s) Inhalation every 6 hours  albuterol/ipratropium for Nebulization 3 milliLiter(s) Nebulizer every 6 hours  amLODIPine   Tablet 10 milliGRAM(s) Oral daily  apixaban 5 milliGRAM(s) Oral every 12 hours  aspirin enteric coated 81 milliGRAM(s) Oral daily  atenolol  Tablet 50 milliGRAM(s) Oral daily  atorvastatin 20 milliGRAM(s) Oral at bedtime  benzonatate 100 milliGRAM(s) Oral every 8 hours  cefTRIAXone   IVPB 1000 milliGRAM(s) IV Intermittent every 24 hours  guaiFENesin  milliGRAM(s) Oral every 12 hours  influenza  Vaccine (HIGH DOSE) 0.7 milliLiter(s) IntraMuscular once  montelukast 10 milliGRAM(s) Oral daily  multivitamin 1 Tablet(s) Oral daily  pantoprazole    Tablet 40 milliGRAM(s) Oral before breakfast  predniSONE   Tablet 40 milliGRAM(s) Oral daily  sertraline 25 milliGRAM(s) Oral daily  sodium chloride 3%  Inhalation 4 milliLiter(s) Inhalation every 6 hours  tamsulosin 0.4 milliGRAM(s) Oral at bedtime    MEDICATIONS  (PRN):  acetaminophen     Tablet .. 650 milliGRAM(s) Oral every 6 hours PRN Temp greater or equal to 38C (100.4F), Mild Pain (1 - 3)      LABS:                        9.0    5.40  )-----------( 258      ( 24 Jan 2024 05:41 )             29.5     Hgb Trend: 9.0<--, 9.9<--, 9.1<--, 10.6<--, 10.4<--  01-24    140  |  106  |  46<H>  ----------------------------<  162<H>  5.1   |  25  |  1.36<H>    Ca    9.0      24 Jan 2024 05:41  Phos  5.3     01-24  Mg     3.00     01-24    TPro  7.1  /  Alb  3.2<L>  /  TBili  0.2  /  DBili  x   /  AST  14  /  ALT  12  /  AlkPhos  80  01-24    Creatinine Trend: 1.36<--, 1.48<--, 1.44<--, 1.32<--, 1.35<--    Urinalysis Basic - ( 24 Jan 2024 05:41 )    Color: x / Appearance: x / SG: x / pH: x  Gluc: 162 mg/dL / Ketone: x  / Bili: x / Urobili: x   Blood: x / Protein: x / Nitrite: x   Leuk Esterase: x / RBC: x / WBC x   Sq Epi: x / Non Sq Epi: x / Bacteria: x      Arterial Blood Gas:  01-24 @ 05:41  7.33/49/128/26/98.9/-0.7  ABG lactate: --  Arterial Blood Gas:  01-22 @ 10:05  7.28/48/136/23/98.4/-4.1  ABG lactate: --    Venous Blood Gas:  01-23 @ 05:42  7.23/60/40/25/60.1  VBG Lactate: 1.2  Venous Blood Gas:  01-22 @ 15:12  7.27/53/49/24/78.9  VBG Lactate: 0.7      MICROBIOLOGY:       RADIOLOGY:  [ x ] Reviewed and interpreted by me    PULMONARY FUNCTION TESTS:    EKG: CHIEF COMPLAINT:    Interval Events:    NAEON. ABG improved, respiratory status improved    REVIEW OF SYSTEMS:  unchanged except when noted above    OBJECTIVE:  ICU Vital Signs Last 24 Hrs  T(C): 37.1 (24 Jan 2024 06:20), Max: 37.3 (23 Jan 2024 17:25)  T(F): 98.7 (24 Jan 2024 06:20), Max: 99.1 (23 Jan 2024 17:25)  HR: 66 (24 Jan 2024 06:20) (66 - 88)  BP: 119/60 (24 Jan 2024 06:20) (112/55 - 122/55)  BP(mean): --  ABP: --  ABP(mean): --  RR: 18 (24 Jan 2024 06:20) (18 - 18)  SpO2: 97% (24 Jan 2024 06:20) (97% - 100%)    O2 Parameters below as of 24 Jan 2024 06:20  Patient On (Oxygen Delivery Method): nasal cannula  O2 Flow (L/min): 4            01-23 @ 07:01  -  01-24 @ 07:00  --------------------------------------------------------  IN: 650 mL / OUT: 1250 mL / NET: -600 mL      CAPILLARY BLOOD GLUCOSE      POCT Blood Glucose.: 131 mg/dL (23 Jan 2024 11:06)      PHYSICAL EXAM:  General: awake and alert  HEENT: NC/AT, EOMI b/l, conjunctiva normal, MMM  Lymph Nodes: no cervical LAD  Neck: supple. full range of motion  Respiratory: diffuse mild wheezing  Cardiovascular: S1 S2 present, RRR, no m/r/g  Abdomen: soft, NT/ND, +BS  Extremities: no c/c/e  Skin: no rashes or lesions noted  Neurological: AAOx3, no focal deficits  Psychiatry: calm, cooperative        HOSPITAL MEDICATIONS:  MEDICATIONS  (STANDING):  albuterol    90 MICROgram(s) HFA Inhaler 1 Puff(s) Inhalation every 6 hours  albuterol/ipratropium for Nebulization 3 milliLiter(s) Nebulizer every 6 hours  amLODIPine   Tablet 10 milliGRAM(s) Oral daily  apixaban 5 milliGRAM(s) Oral every 12 hours  aspirin enteric coated 81 milliGRAM(s) Oral daily  atenolol  Tablet 50 milliGRAM(s) Oral daily  atorvastatin 20 milliGRAM(s) Oral at bedtime  benzonatate 100 milliGRAM(s) Oral every 8 hours  cefTRIAXone   IVPB 1000 milliGRAM(s) IV Intermittent every 24 hours  guaiFENesin  milliGRAM(s) Oral every 12 hours  influenza  Vaccine (HIGH DOSE) 0.7 milliLiter(s) IntraMuscular once  montelukast 10 milliGRAM(s) Oral daily  multivitamin 1 Tablet(s) Oral daily  pantoprazole    Tablet 40 milliGRAM(s) Oral before breakfast  predniSONE   Tablet 40 milliGRAM(s) Oral daily  sertraline 25 milliGRAM(s) Oral daily  sodium chloride 3%  Inhalation 4 milliLiter(s) Inhalation every 6 hours  tamsulosin 0.4 milliGRAM(s) Oral at bedtime    MEDICATIONS  (PRN):  acetaminophen     Tablet .. 650 milliGRAM(s) Oral every 6 hours PRN Temp greater or equal to 38C (100.4F), Mild Pain (1 - 3)      LABS:                        9.0    5.40  )-----------( 258      ( 24 Jan 2024 05:41 )             29.5     Hgb Trend: 9.0<--, 9.9<--, 9.1<--, 10.6<--, 10.4<--  01-24    140  |  106  |  46<H>  ----------------------------<  162<H>  5.1   |  25  |  1.36<H>    Ca    9.0      24 Jan 2024 05:41  Phos  5.3     01-24  Mg     3.00     01-24    TPro  7.1  /  Alb  3.2<L>  /  TBili  0.2  /  DBili  x   /  AST  14  /  ALT  12  /  AlkPhos  80  01-24    Creatinine Trend: 1.36<--, 1.48<--, 1.44<--, 1.32<--, 1.35<--    Urinalysis Basic - ( 24 Jan 2024 05:41 )    Color: x / Appearance: x / SG: x / pH: x  Gluc: 162 mg/dL / Ketone: x  / Bili: x / Urobili: x   Blood: x / Protein: x / Nitrite: x   Leuk Esterase: x / RBC: x / WBC x   Sq Epi: x / Non Sq Epi: x / Bacteria: x      Arterial Blood Gas:  01-24 @ 05:41  7.33/49/128/26/98.9/-0.7  ABG lactate: --  Arterial Blood Gas:  01-22 @ 10:05  7.28/48/136/23/98.4/-4.1  ABG lactate: --    Venous Blood Gas:  01-23 @ 05:42  7.23/60/40/25/60.1  VBG Lactate: 1.2  Venous Blood Gas:  01-22 @ 15:12  7.27/53/49/24/78.9  VBG Lactate: 0.7      MICROBIOLOGY:       RADIOLOGY:  [ x ] Reviewed and interpreted by me    PULMONARY FUNCTION TESTS:    EKG:

## 2024-01-24 NOTE — SWALLOW BEDSIDE ASSESSMENT ADULT - COMMENTS
Patient declined further trials As per Hospitalist note dated 1/23/24 "68F Slovak-speaking with PMH of asthma, HTN, HFpEF, CVA with right-sided deficits p/w SOB and cough for 2 weeks. A/w AHRF 2/2 CAP and acute asthma exacerbation."    CT Chest 1/21/24 "IMPRESSION: Small nonocclusive eccentric filling defect right upper lobe apical segmental pulmonary artery most likely represents chronic sequela of remote pulmonary embolus. No other pulmonary embolus.  Consolidative opacities in the left lower lobe and lingula, concerning for multifocal pneumonia.  Dilated common bile duct, measuring 1.0 cm. No obstructing stone is   visualized. Cholelithiasis with gallbladder distention.  Hypodense lesion in the medial right hepatic lobe, indeterminate on this study.  Recommend hepatic protocol MRI abdomen with and without intravenous   contrast and MRCP to better evaluate these findings."    Patient visited at bedside for clinical swallow evaluation with RN present. Patient noted consuming breakfast meal. [employer]  #[id number] (Deven) utilized in patient's primary language of Slovak. Patient is able to follow 1-step directives and make basic wants/needs known. Patient receiving supplemental oxygen via nasal cannula.

## 2024-01-25 LAB
ALBUMIN SERPL ELPH-MCNC: 3.1 G/DL — LOW (ref 3.3–5)
ALP SERPL-CCNC: 73 U/L — SIGNIFICANT CHANGE UP (ref 40–120)
ALT FLD-CCNC: 11 U/L — SIGNIFICANT CHANGE UP (ref 4–33)
ANION GAP SERPL CALC-SCNC: 7 MMOL/L — SIGNIFICANT CHANGE UP (ref 7–14)
AST SERPL-CCNC: 16 U/L — SIGNIFICANT CHANGE UP (ref 4–32)
BASOPHILS # BLD AUTO: 0.01 K/UL — SIGNIFICANT CHANGE UP (ref 0–0.2)
BASOPHILS NFR BLD AUTO: 0.2 % — SIGNIFICANT CHANGE UP (ref 0–2)
BILIRUB SERPL-MCNC: 0.3 MG/DL — SIGNIFICANT CHANGE UP (ref 0.2–1.2)
BUN SERPL-MCNC: 39 MG/DL — HIGH (ref 7–23)
CALCIUM SERPL-MCNC: 8.7 MG/DL — SIGNIFICANT CHANGE UP (ref 8.4–10.5)
CHLORIDE SERPL-SCNC: 103 MMOL/L — SIGNIFICANT CHANGE UP (ref 98–107)
CO2 SERPL-SCNC: 26 MMOL/L — SIGNIFICANT CHANGE UP (ref 22–31)
CREAT SERPL-MCNC: 1.29 MG/DL — SIGNIFICANT CHANGE UP (ref 0.5–1.3)
EGFR: 45 ML/MIN/1.73M2 — LOW
EOSINOPHIL # BLD AUTO: 0 K/UL — SIGNIFICANT CHANGE UP (ref 0–0.5)
EOSINOPHIL NFR BLD AUTO: 0 % — SIGNIFICANT CHANGE UP (ref 0–6)
GAS PNL BLDV: SIGNIFICANT CHANGE UP
GLUCOSE SERPL-MCNC: 121 MG/DL — HIGH (ref 70–99)
HCT VFR BLD CALC: 32.6 % — LOW (ref 34.5–45)
HGB BLD-MCNC: 9.9 G/DL — LOW (ref 11.5–15.5)
IANC: 4.46 K/UL — SIGNIFICANT CHANGE UP (ref 1.8–7.4)
IMM GRANULOCYTES NFR BLD AUTO: 1.2 % — HIGH (ref 0–0.9)
LYMPHOCYTES # BLD AUTO: 1.41 K/UL — SIGNIFICANT CHANGE UP (ref 1–3.3)
LYMPHOCYTES # BLD AUTO: 21.6 % — SIGNIFICANT CHANGE UP (ref 13–44)
MAGNESIUM SERPL-MCNC: 2.8 MG/DL — HIGH (ref 1.6–2.6)
MCHC RBC-ENTMCNC: 26.9 PG — LOW (ref 27–34)
MCHC RBC-ENTMCNC: 30.4 GM/DL — LOW (ref 32–36)
MCV RBC AUTO: 88.6 FL — SIGNIFICANT CHANGE UP (ref 80–100)
MONOCYTES # BLD AUTO: 0.57 K/UL — SIGNIFICANT CHANGE UP (ref 0–0.9)
MONOCYTES NFR BLD AUTO: 8.7 % — SIGNIFICANT CHANGE UP (ref 2–14)
NEUTROPHILS # BLD AUTO: 4.46 K/UL — SIGNIFICANT CHANGE UP (ref 1.8–7.4)
NEUTROPHILS NFR BLD AUTO: 68.3 % — SIGNIFICANT CHANGE UP (ref 43–77)
NRBC # BLD: 0 /100 WBCS — SIGNIFICANT CHANGE UP (ref 0–0)
NRBC # FLD: 0 K/UL — SIGNIFICANT CHANGE UP (ref 0–0)
PHOSPHATE SERPL-MCNC: 4.6 MG/DL — HIGH (ref 2.5–4.5)
PLATELET # BLD AUTO: 283 K/UL — SIGNIFICANT CHANGE UP (ref 150–400)
POTASSIUM SERPL-MCNC: 4.6 MMOL/L — SIGNIFICANT CHANGE UP (ref 3.5–5.3)
POTASSIUM SERPL-SCNC: 4.6 MMOL/L — SIGNIFICANT CHANGE UP (ref 3.5–5.3)
PROT SERPL-MCNC: 6.9 G/DL — SIGNIFICANT CHANGE UP (ref 6–8.3)
RBC # BLD: 3.68 M/UL — LOW (ref 3.8–5.2)
RBC # FLD: 14 % — SIGNIFICANT CHANGE UP (ref 10.3–14.5)
SODIUM SERPL-SCNC: 136 MMOL/L — SIGNIFICANT CHANGE UP (ref 135–145)
WBC # BLD: 6.53 K/UL — SIGNIFICANT CHANGE UP (ref 3.8–10.5)
WBC # FLD AUTO: 6.53 K/UL — SIGNIFICANT CHANGE UP (ref 3.8–10.5)

## 2024-01-25 PROCEDURE — 99232 SBSQ HOSP IP/OBS MODERATE 35: CPT | Mod: GC

## 2024-01-25 PROCEDURE — 99233 SBSQ HOSP IP/OBS HIGH 50: CPT

## 2024-01-25 RX ADMIN — Medication 600 MILLIGRAM(S): at 17:05

## 2024-01-25 RX ADMIN — SODIUM CHLORIDE 4 MILLILITER(S): 9 INJECTION INTRAMUSCULAR; INTRAVENOUS; SUBCUTANEOUS at 15:29

## 2024-01-25 RX ADMIN — Medication 3 MILLILITER(S): at 20:28

## 2024-01-25 RX ADMIN — CEFTRIAXONE 100 MILLIGRAM(S): 500 INJECTION, POWDER, FOR SOLUTION INTRAMUSCULAR; INTRAVENOUS at 09:00

## 2024-01-25 RX ADMIN — PANTOPRAZOLE SODIUM 40 MILLIGRAM(S): 20 TABLET, DELAYED RELEASE ORAL at 05:24

## 2024-01-25 RX ADMIN — Medication 3 MILLILITER(S): at 04:30

## 2024-01-25 RX ADMIN — Medication 100 MILLIGRAM(S): at 21:29

## 2024-01-25 RX ADMIN — Medication 100 MILLIGRAM(S): at 05:24

## 2024-01-25 RX ADMIN — Medication 600 MILLIGRAM(S): at 05:24

## 2024-01-25 RX ADMIN — TAMSULOSIN HYDROCHLORIDE 0.4 MILLIGRAM(S): 0.4 CAPSULE ORAL at 21:29

## 2024-01-25 RX ADMIN — Medication 40 MILLIGRAM(S): at 05:24

## 2024-01-25 RX ADMIN — ATORVASTATIN CALCIUM 20 MILLIGRAM(S): 80 TABLET, FILM COATED ORAL at 21:29

## 2024-01-25 RX ADMIN — SODIUM CHLORIDE 4 MILLILITER(S): 9 INJECTION INTRAMUSCULAR; INTRAVENOUS; SUBCUTANEOUS at 09:02

## 2024-01-25 RX ADMIN — Medication 3 MILLILITER(S): at 09:01

## 2024-01-25 RX ADMIN — Medication 3 MILLILITER(S): at 15:29

## 2024-01-25 RX ADMIN — APIXABAN 5 MILLIGRAM(S): 2.5 TABLET, FILM COATED ORAL at 17:03

## 2024-01-25 RX ADMIN — APIXABAN 5 MILLIGRAM(S): 2.5 TABLET, FILM COATED ORAL at 05:24

## 2024-01-25 RX ADMIN — SODIUM CHLORIDE 4 MILLILITER(S): 9 INJECTION INTRAMUSCULAR; INTRAVENOUS; SUBCUTANEOUS at 04:31

## 2024-01-25 RX ADMIN — SODIUM CHLORIDE 4 MILLILITER(S): 9 INJECTION INTRAMUSCULAR; INTRAVENOUS; SUBCUTANEOUS at 20:27

## 2024-01-25 NOTE — PROGRESS NOTE ADULT - SUBJECTIVE AND OBJECTIVE BOX
CHIEF COMPLAINT:    Interval Events:    NAEON. VBG this am acidotic, however gas from yesterday arterial.    REVIEW OF SYSTEMS:  unchanged except when noted above    OBJECTIVE:  ICU Vital Signs Last 24 Hrs  T(C): 36.3 (25 Jan 2024 05:24), Max: 37 (24 Jan 2024 21:24)  T(F): 97.4 (25 Jan 2024 05:24), Max: 98.6 (24 Jan 2024 21:24)  HR: 60 (25 Jan 2024 05:24) (57 - 74)  BP: 118/55 (25 Jan 2024 05:24) (117/64 - 118/60)  BP(mean): --  ABP: --  ABP(mean): --  RR: 18 (25 Jan 2024 05:24) (18 - 18)  SpO2: 100% (25 Jan 2024 05:24) (98% - 100%)    O2 Parameters below as of 25 Jan 2024 05:24  Patient On (Oxygen Delivery Method): nasal cannula  O2 Flow (L/min): 4            01-24 @ 07:01  -  01-25 @ 07:00  --------------------------------------------------------  IN: 1200 mL / OUT: 200 mL / NET: 1000 mL      CAPILLARY BLOOD GLUCOSE      POCT Blood Glucose.: 131 mg/dL (23 Jan 2024 11:06)      PHYSICAL EXAM:  General: awake and alert  HEENT: NC/AT, EOMI b/l, conjunctiva normal, MMM  Lymph Nodes: no cervical LAD  Neck: supple. full range of motion  Respiratory: diffuse mild wheezing  Cardiovascular: S1 S2 present, RRR, no m/r/g  Abdomen: soft, NT/ND, +BS  Extremities: no c/c/e  Skin: no rashes or lesions noted  Neurological: AAOx3, no focal deficits  Psychiatry: calm, cooperative        HOSPITAL MEDICATIONS:  MEDICATIONS  (STANDING):  albuterol    90 MICROgram(s) HFA Inhaler 1 Puff(s) Inhalation every 6 hours  albuterol/ipratropium for Nebulization 3 milliLiter(s) Nebulizer every 6 hours  amLODIPine   Tablet 10 milliGRAM(s) Oral daily  apixaban 5 milliGRAM(s) Oral every 12 hours  aspirin enteric coated 81 milliGRAM(s) Oral daily  atenolol  Tablet 50 milliGRAM(s) Oral daily  atorvastatin 20 milliGRAM(s) Oral at bedtime  benzonatate 100 milliGRAM(s) Oral every 8 hours  cefTRIAXone   IVPB 1000 milliGRAM(s) IV Intermittent every 24 hours  guaiFENesin  milliGRAM(s) Oral every 12 hours  influenza  Vaccine (HIGH DOSE) 0.7 milliLiter(s) IntraMuscular once  montelukast 10 milliGRAM(s) Oral daily  multivitamin 1 Tablet(s) Oral daily  pantoprazole    Tablet 40 milliGRAM(s) Oral before breakfast  predniSONE   Tablet 40 milliGRAM(s) Oral daily  sertraline 25 milliGRAM(s) Oral daily  sodium chloride 3%  Inhalation 4 milliLiter(s) Inhalation every 6 hours  tamsulosin 0.4 milliGRAM(s) Oral at bedtime    MEDICATIONS  (PRN):  acetaminophen     Tablet .. 650 milliGRAM(s) Oral every 6 hours PRN Temp greater or equal to 38C (100.4F), Mild Pain (1 - 3)      LABS:                        9.9    6.53  )-----------( 283      ( 25 Jan 2024 06:28 )             32.6     Hgb Trend: 9.9<--, 9.0<--, 9.9<--, 9.1<--, 10.6<--  01-25    136  |  103  |  39<H>  ----------------------------<  121<H>  4.6   |  26  |  1.29    Ca    8.7      25 Jan 2024 06:28  Phos  4.6     01-25  Mg     2.80     01-25    TPro  6.9  /  Alb  3.1<L>  /  TBili  0.3  /  DBili  x   /  AST  16  /  ALT  11  /  AlkPhos  73  01-25    Creatinine Trend: 1.29<--, 1.36<--, 1.48<--, 1.44<--, 1.32<--, 1.35<--    Urinalysis Basic - ( 25 Jan 2024 06:28 )    Color: x / Appearance: x / SG: x / pH: x  Gluc: 121 mg/dL / Ketone: x  / Bili: x / Urobili: x   Blood: x / Protein: x / Nitrite: x   Leuk Esterase: x / RBC: x / WBC x   Sq Epi: x / Non Sq Epi: x / Bacteria: x      Arterial Blood Gas:  01-24 @ 05:41  7.33/49/128/26/98.9/-0.7  ABG lactate: --    Venous Blood Gas:  01-25 @ 05:49  7.28/59/53/28/83.0  VBG Lactate: 1.3      MICROBIOLOGY:       RADIOLOGY:  [ x ] Reviewed and interpreted by me    PULMONARY FUNCTION TESTS:    EKG:

## 2024-01-25 NOTE — PROGRESS NOTE ADULT - ASSESSMENT
68F Swedish-speaking with PMH of asthma/COPD/Emphysema, severe not on home O2, (FEV1 38, DLCO 25) HTN, HFpEF, CVA with right-sided deficits p/w SOB and cough for 2 weeks. Per daughter at bedside, patient has been having increased SOB, orthopnea, PND, and LE edema for the past few months. However, over the past 2 weeks, she noticed her mom was more exhausted, dyspneic, and having a cough productive of yellow sputum. She did notice 3 episodes of blood-tinged sputum and subjective fevers over the past 3 days. Otherwise, denies CP, palpitations, n/v/d, abdominal pain, dysuria, melena/hematochezia. Pt went to her PCP's office yesterday and was noted to be hypoxic. She was placed on 4L NC and sent to ED.     In the ED, HR , /78, RR 36 (satting 97% on 4L NC). Labs notable for WBC 14.17K, Na 131, BUN/Cr 21/1.35, proBNP 2926, VBG 7.27/45/50/21. CTA PE shows multifocal PNA, remote PE, and dilated CBD with cholelithiasis, GB distension. Pt had a cordova placed for urinary retention in ED. S/p vanc/zosyn, 500cc IVF bolus, IV lasix 20mg, lovenox 40mg, duonebs, solu-medrol 125, and IV mag in ED. She briefly required BiPAP. MICU evaluated pt, not a candidate. Weaned to 4L NC.  (2024 10:31)    patient is a former smoker with asthma problems,     RVP negative    #recommendations  patient readily arousable with improved gases after NIV  patient likely with COPD exacerbation with multifocal PNA and PE  airway clearance while on highflow/NC: humidified oxygen, standing guaifenesin, duonebs q6h, hyper sal q6h, chest PT q6 h, aerobika/acapella q6 h,, chest vest q12h  continue prednisone 40 for 5 days total  continue rocephin for CAP for 5 days total  PRN albuterol  symbicort, spiriva inhalers, needs inhaler teaching  please obtain abg. She is more acidotic than yesterday, however gas this am is venous and difficult to compare      Needs outpatient pulmonary follow up upon discharge at [address] [place] [address], [facility] 107 ([phone]).  Please e-mail [email or web address] to make an appointment for the patient.  Please include the name, , and a phone number for you and the patient.

## 2024-01-25 NOTE — CHART NOTE - NSCHARTNOTEFT_GEN_A_CORE
Patient desaturated on RA to 88% at rest. 3L oxygen was applied via nasal cannula and O2 sat improved to 98% at rest. Pt will require home oxygen on discharge for diagnosis of COPD.
ABG obtained from R radial artery. Patient tolerated procedure well without complications. Hemostasis achieved with direct pressure and pressure dressing left in place c/d/i.    Miquel Elias PA-C,   Internal Medicine ACP   In house pager #[phone]

## 2024-01-25 NOTE — PROGRESS NOTE ADULT - SUBJECTIVE AND OBJECTIVE BOX
[facility]  River Brunner MD  Pager [phone]      Patient is a 68y old  Female who presents with a chief complaint of AHRF (25 Jan 2024 08:09)      SUBJECTIVE / OVERNIGHT EVENTS: Denies CP limited historian due to slurred speech, attempted , difficult to understand    MEDICATIONS  (STANDING):  albuterol    90 MICROgram(s) HFA Inhaler 1 Puff(s) Inhalation every 6 hours  albuterol/ipratropium for Nebulization 3 milliLiter(s) Nebulizer every 6 hours  amLODIPine   Tablet 10 milliGRAM(s) Oral daily  apixaban 5 milliGRAM(s) Oral every 12 hours  aspirin enteric coated 81 milliGRAM(s) Oral daily  atenolol  Tablet 50 milliGRAM(s) Oral daily  atorvastatin 20 milliGRAM(s) Oral at bedtime  benzonatate 100 milliGRAM(s) Oral every 8 hours  guaiFENesin  milliGRAM(s) Oral every 12 hours  influenza  Vaccine (HIGH DOSE) 0.7 milliLiter(s) IntraMuscular once  montelukast 10 milliGRAM(s) Oral daily  multivitamin 1 Tablet(s) Oral daily  pantoprazole    Tablet 40 milliGRAM(s) Oral before breakfast  predniSONE   Tablet 40 milliGRAM(s) Oral daily  sertraline 25 milliGRAM(s) Oral daily  sodium chloride 3%  Inhalation 4 milliLiter(s) Inhalation every 6 hours  tamsulosin 0.4 milliGRAM(s) Oral at bedtime    MEDICATIONS  (PRN):  acetaminophen     Tablet .. 650 milliGRAM(s) Oral every 6 hours PRN Temp greater or equal to 38C (100.4F), Mild Pain (1 - 3)      CAPILLARY BLOOD GLUCOSE        I&O's Summary    24 Jan 2024 07:01  -  25 Jan 2024 07:00  --------------------------------------------------------  IN: 1200 mL / OUT: 200 mL / NET: 1000 mL    25 Jan 2024 07:01  -  25 Jan 2024 15:13  --------------------------------------------------------  IN: 200 mL / OUT: 0 mL / NET: 200 mL        PHYSICAL EXAM:  Vital Signs Last 24 Hrs  T(C): 36.7 (25 Jan 2024 11:15), Max: 37 (24 Jan 2024 21:24)  T(F): 98.1 (25 Jan 2024 11:15), Max: 98.6 (24 Jan 2024 21:24)  HR: 57 (25 Jan 2024 11:15) (57 - 78)  BP: 132/67 (25 Jan 2024 11:15) (118/55 - 132/67)  BP(mean): --  RR: 18 (25 Jan 2024 11:15) (18 - 18)  SpO2: 98% (25 Jan 2024 11:15) (95% - 100%)    Parameters below as of 25 Jan 2024 13:01  Patient On (Oxygen Delivery Method): room air      CONSTITUTIONAL: NAD  EYES: conjunctiva and sclera clear  ENMT: mmm  NECK: Supple,  RESPIRATORY: Normal respiratory effort; lungs are clear to auscultation bilaterally  CARDIOVASCULAR: Regular rate and rhythm, + S1 and S2  ABDOMEN: Nontender to palpation, normoactive bowel sounds, no rebound/guarding  PSYCH: A+O x 2    LABS:                        9.9    6.53  )-----------( 283      ( 25 Jan 2024 06:28 )             32.6     01-25    136  |  103  |  39<H>  ----------------------------<  121<H>  4.6   |  26  |  1.29    Ca    8.7      25 Jan 2024 06:28  Phos  4.6     01-25  Mg     2.80     01-25    TPro  6.9  /  Alb  3.1<L>  /  TBili  0.3  /  DBili  x   /  AST  16  /  ALT  11  /  AlkPhos  73  01-25          Urinalysis Basic - ( 25 Jan 2024 06:28 )    Color: x / Appearance: x / SG: x / pH: x  Gluc: 121 mg/dL / Ketone: x  / Bili: x / Urobili: x   Blood: x / Protein: x / Nitrite: x   Leuk Esterase: x / RBC: x / WBC x   Sq Epi: x / Non Sq Epi: x / Bacteria: x

## 2024-01-25 NOTE — PROGRESS NOTE ADULT - ASSESSMENT
68F Yak[place]-speaking with PMH of asthma, HTN, HFpEF, CVA with right-sided deficits p/w SOB and cough for 2 weeks. A/w AHRF 2/2 CAP and acute asthma exacerbation.

## 2024-01-25 NOTE — PROGRESS NOTE ADULT - ATTENDING COMMENTS
68F with asthma/COPD, HTN, HFpEF, presents with progressive cough and poor intake. Pulmonary consulted for acute hypoxemic and hypercapnic respiratory failure. Complete 5 day course of steroids.   No significant wheezing on exam. Wean O2 as tolerated.  Continue antibiotics for CAP.  Recommend incentive spirometry.

## 2024-01-26 ENCOUNTER — TRANSCRIPTION ENCOUNTER (OUTPATIENT)
Age: 69
End: 2024-01-26

## 2024-01-26 LAB
ALBUMIN SERPL ELPH-MCNC: 2.9 G/DL — LOW (ref 3.3–5)
ALP SERPL-CCNC: 69 U/L — SIGNIFICANT CHANGE UP (ref 40–120)
ALT FLD-CCNC: 16 U/L — SIGNIFICANT CHANGE UP (ref 4–33)
ANION GAP SERPL CALC-SCNC: 10 MMOL/L — SIGNIFICANT CHANGE UP (ref 7–14)
AST SERPL-CCNC: 18 U/L — SIGNIFICANT CHANGE UP (ref 4–32)
BASE EXCESS BLDA CALC-SCNC: 2 MMOL/L — SIGNIFICANT CHANGE UP (ref -2–3)
BASOPHILS # BLD AUTO: 0.01 K/UL — SIGNIFICANT CHANGE UP (ref 0–0.2)
BASOPHILS NFR BLD AUTO: 0.1 % — SIGNIFICANT CHANGE UP (ref 0–2)
BILIRUB SERPL-MCNC: 0.2 MG/DL — SIGNIFICANT CHANGE UP (ref 0.2–1.2)
BUN SERPL-MCNC: 34 MG/DL — HIGH (ref 7–23)
CALCIUM SERPL-MCNC: 8.6 MG/DL — SIGNIFICANT CHANGE UP (ref 8.4–10.5)
CHLORIDE SERPL-SCNC: 104 MMOL/L — SIGNIFICANT CHANGE UP (ref 98–107)
CO2 BLDA-SCNC: 29 MMOL/L — HIGH (ref 19–24)
CO2 SERPL-SCNC: 24 MMOL/L — SIGNIFICANT CHANGE UP (ref 22–31)
CREAT SERPL-MCNC: 1.15 MG/DL — SIGNIFICANT CHANGE UP (ref 0.5–1.3)
CULTURE RESULTS: SIGNIFICANT CHANGE UP
CULTURE RESULTS: SIGNIFICANT CHANGE UP
EGFR: 52 ML/MIN/1.73M2 — LOW
EOSINOPHIL # BLD AUTO: 0.02 K/UL — SIGNIFICANT CHANGE UP (ref 0–0.5)
EOSINOPHIL NFR BLD AUTO: 0.2 % — SIGNIFICANT CHANGE UP (ref 0–6)
GLUCOSE SERPL-MCNC: 110 MG/DL — HIGH (ref 70–99)
HCO3 BLDA-SCNC: 27 MMOL/L — SIGNIFICANT CHANGE UP (ref 21–28)
HCT VFR BLD CALC: 31.8 % — LOW (ref 34.5–45)
HGB BLD-MCNC: 9.7 G/DL — LOW (ref 11.5–15.5)
IANC: 4.93 K/UL — SIGNIFICANT CHANGE UP (ref 1.8–7.4)
IMM GRANULOCYTES NFR BLD AUTO: 1.8 % — HIGH (ref 0–0.9)
LYMPHOCYTES # BLD AUTO: 2.17 K/UL — SIGNIFICANT CHANGE UP (ref 1–3.3)
LYMPHOCYTES # BLD AUTO: 26.8 % — SIGNIFICANT CHANGE UP (ref 13–44)
MAGNESIUM SERPL-MCNC: 2.6 MG/DL — SIGNIFICANT CHANGE UP (ref 1.6–2.6)
MCHC RBC-ENTMCNC: 27.1 PG — SIGNIFICANT CHANGE UP (ref 27–34)
MCHC RBC-ENTMCNC: 30.5 GM/DL — LOW (ref 32–36)
MCV RBC AUTO: 88.8 FL — SIGNIFICANT CHANGE UP (ref 80–100)
MONOCYTES # BLD AUTO: 0.83 K/UL — SIGNIFICANT CHANGE UP (ref 0–0.9)
MONOCYTES NFR BLD AUTO: 10.2 % — SIGNIFICANT CHANGE UP (ref 2–14)
NEUTROPHILS # BLD AUTO: 4.93 K/UL — SIGNIFICANT CHANGE UP (ref 1.8–7.4)
NEUTROPHILS NFR BLD AUTO: 60.9 % — SIGNIFICANT CHANGE UP (ref 43–77)
NRBC # BLD: 0 /100 WBCS — SIGNIFICANT CHANGE UP (ref 0–0)
NRBC # FLD: 0 K/UL — SIGNIFICANT CHANGE UP (ref 0–0)
PCO2 BLDA: 44 MMHG — SIGNIFICANT CHANGE UP (ref 32–45)
PH BLDA: 7.4 — SIGNIFICANT CHANGE UP (ref 7.35–7.45)
PHOSPHATE SERPL-MCNC: 4.2 MG/DL — SIGNIFICANT CHANGE UP (ref 2.5–4.5)
PLATELET # BLD AUTO: 250 K/UL — SIGNIFICANT CHANGE UP (ref 150–400)
PO2 BLDA: 82 MMHG — LOW (ref 83–108)
POTASSIUM SERPL-MCNC: 4.7 MMOL/L — SIGNIFICANT CHANGE UP (ref 3.5–5.3)
POTASSIUM SERPL-SCNC: 4.7 MMOL/L — SIGNIFICANT CHANGE UP (ref 3.5–5.3)
PROT SERPL-MCNC: 6.3 G/DL — SIGNIFICANT CHANGE UP (ref 6–8.3)
RBC # BLD: 3.58 M/UL — LOW (ref 3.8–5.2)
RBC # FLD: 13.9 % — SIGNIFICANT CHANGE UP (ref 10.3–14.5)
SAO2 % BLDA: 96.8 % — SIGNIFICANT CHANGE UP (ref 94–98)
SODIUM SERPL-SCNC: 138 MMOL/L — SIGNIFICANT CHANGE UP (ref 135–145)
SPECIMEN SOURCE: SIGNIFICANT CHANGE UP
SPECIMEN SOURCE: SIGNIFICANT CHANGE UP
WBC # BLD: 8.11 K/UL — SIGNIFICANT CHANGE UP (ref 3.8–10.5)
WBC # FLD AUTO: 8.11 K/UL — SIGNIFICANT CHANGE UP (ref 3.8–10.5)

## 2024-01-26 RX ORDER — PANTOPRAZOLE SODIUM 20 MG/1
1 TABLET, DELAYED RELEASE ORAL
Qty: 30 | Refills: 0
Start: 2024-01-26 | End: 2024-02-24

## 2024-01-26 RX ORDER — APIXABAN 2.5 MG/1
1 TABLET, FILM COATED ORAL
Qty: 60 | Refills: 0
Start: 2024-01-26 | End: 2024-02-24

## 2024-01-26 RX ORDER — FAMOTIDINE 10 MG/ML
1 INJECTION INTRAVENOUS
Refills: 0 | DISCHARGE

## 2024-01-26 RX ORDER — TAMSULOSIN HYDROCHLORIDE 0.4 MG/1
1 CAPSULE ORAL
Qty: 30 | Refills: 0
Start: 2024-01-26 | End: 2024-02-24

## 2024-01-26 RX ADMIN — Medication 40 MILLIGRAM(S): at 05:36

## 2024-01-26 RX ADMIN — SODIUM CHLORIDE 4 MILLILITER(S): 9 INJECTION INTRAMUSCULAR; INTRAVENOUS; SUBCUTANEOUS at 15:47

## 2024-01-26 RX ADMIN — Medication 3 MILLILITER(S): at 02:37

## 2024-01-26 RX ADMIN — Medication 100 MILLIGRAM(S): at 13:01

## 2024-01-26 RX ADMIN — SERTRALINE 25 MILLIGRAM(S): 25 TABLET, FILM COATED ORAL at 11:37

## 2024-01-26 RX ADMIN — Medication 81 MILLIGRAM(S): at 11:37

## 2024-01-26 RX ADMIN — SODIUM CHLORIDE 4 MILLILITER(S): 9 INJECTION INTRAMUSCULAR; INTRAVENOUS; SUBCUTANEOUS at 02:37

## 2024-01-26 RX ADMIN — SODIUM CHLORIDE 4 MILLILITER(S): 9 INJECTION INTRAMUSCULAR; INTRAVENOUS; SUBCUTANEOUS at 22:43

## 2024-01-26 RX ADMIN — Medication 3 MILLILITER(S): at 22:43

## 2024-01-26 RX ADMIN — MONTELUKAST 10 MILLIGRAM(S): 4 TABLET, CHEWABLE ORAL at 11:37

## 2024-01-26 RX ADMIN — Medication 600 MILLIGRAM(S): at 17:40

## 2024-01-26 RX ADMIN — Medication 1 TABLET(S): at 11:37

## 2024-01-26 RX ADMIN — Medication 3 MILLILITER(S): at 15:47

## 2024-01-26 RX ADMIN — AMLODIPINE BESYLATE 10 MILLIGRAM(S): 2.5 TABLET ORAL at 05:36

## 2024-01-26 RX ADMIN — PANTOPRAZOLE SODIUM 40 MILLIGRAM(S): 20 TABLET, DELAYED RELEASE ORAL at 05:38

## 2024-01-26 RX ADMIN — APIXABAN 5 MILLIGRAM(S): 2.5 TABLET, FILM COATED ORAL at 17:40

## 2024-01-26 RX ADMIN — Medication 600 MILLIGRAM(S): at 05:37

## 2024-01-26 RX ADMIN — TAMSULOSIN HYDROCHLORIDE 0.4 MILLIGRAM(S): 0.4 CAPSULE ORAL at 21:59

## 2024-01-26 RX ADMIN — Medication 100 MILLIGRAM(S): at 05:37

## 2024-01-26 RX ADMIN — ATENOLOL 50 MILLIGRAM(S): 25 TABLET ORAL at 05:37

## 2024-01-26 RX ADMIN — SODIUM CHLORIDE 4 MILLILITER(S): 9 INJECTION INTRAMUSCULAR; INTRAVENOUS; SUBCUTANEOUS at 09:14

## 2024-01-26 RX ADMIN — ATORVASTATIN CALCIUM 20 MILLIGRAM(S): 80 TABLET, FILM COATED ORAL at 21:59

## 2024-01-26 RX ADMIN — APIXABAN 5 MILLIGRAM(S): 2.5 TABLET, FILM COATED ORAL at 05:36

## 2024-01-26 RX ADMIN — Medication 3 MILLILITER(S): at 09:14

## 2024-01-26 NOTE — DISCHARGE NOTE NURSING/CASE MANAGEMENT/SOCIAL WORK - NSDCDMETYPESERV_GEN_ALL_CORE_FT
Oxygen concentrator and portable oxygen concentrator to be delivered to home tonight 1/26/24 between 3PM-5PM

## 2024-01-26 NOTE — DISCHARGE NOTE NURSING/CASE MANAGEMENT/SOCIAL WORK - PATIENT PORTAL LINK FT
You can access the FollowMyHealth Patient Portal offered by [facility] by registering at the following website: [email or web address]. By joining [facility]’s FollowMyHealth portal, you will also be able to view your health information using other applications (apps) compatible with our system.

## 2024-01-26 NOTE — DISCHARGE NOTE PROVIDER - NSDCMRMEDTOKEN_GEN_ALL_CORE_FT
Albuterol (Eqv-ProAir HFA) 90 mcg/inh inhalation aerosol: 1 puff(s) inhaled every 6 hours as needed for  shortness of breath and/or wheezing  amLODIPine 10 mg oral tablet: 1 tab(s) orally once a day  aspirin 81 mg oral tablet: 1 tab(s) orally once a day  atenolol 50 mg oral tablet: 1 tab(s) orally once a day  atorvastatin 20 mg oral tablet: 1 tab(s) orally once a day (at bedtime)  calcium (as carbonate)-vitamin D 600 mg-3.125 mcg (125 intl units) oral tablet: 1 tab(s) orally once a day  cyproheptadine 4 mg oral tablet: 1 tab(s) orally 3 times a day  Eliquis 5 mg oral tablet: 1 tab(s) orally 2 times a day  famotidine 20 mg oral tablet: 1 tab(s) orally once a day  ipratropium-albuterol 0.5 mg-2.5 mg/3 mL inhalation solution: 3 milliliter(s) by nebulizer every 4 hours as needed for  shortness of breath and/or wheezing  montelukast 10 mg oral tablet: 1 tab(s) orally once a day  Multiple Vitamins oral tablet: 1 tab(s) orally once a day  sertraline 25 mg oral tablet: 1 tab(s) orally once a day  Shower chair: Diagnosis: CVA with right sided residual, heart failure with preserved ejection fraction, pneumonia, subacute pulmonary embolus   Albuterol (Eqv-ProAir HFA) 90 mcg/inh inhalation aerosol: 1 puff(s) inhaled every 6 hours as needed for  shortness of breath and/or wheezing  amLODIPine 10 mg oral tablet: 1 tab(s) orally once a day  aspirin 81 mg oral tablet: 1 tab(s) orally once a day  atenolol 50 mg oral tablet: 1 tab(s) orally once a day  atorvastatin 20 mg oral tablet: 1 tab(s) orally once a day (at bedtime)  calcium (as carbonate)-vitamin D 600 mg-3.125 mcg (125 intl units) oral tablet: 1 tab(s) orally once a day  cyproheptadine 4 mg oral tablet: 1 tab(s) orally 3 times a day  Eliquis 5 mg oral tablet: 1 tab(s) orally 2 times a day  famotidine 20 mg oral tablet: 1 tab(s) orally once a day  guaiFENesin 600 mg oral tablet, extended release: 1 tab(s) orally every 12 hours  ipratropium-albuterol 0.5 mg-2.5 mg/3 mL inhalation solution: 3 milliliter(s) by nebulizer every 4 hours as needed for  shortness of breath and/or wheezing  montelukast 10 mg oral tablet: 1 tab(s) orally once a day  Multiple Vitamins oral tablet: 1 tab(s) orally once a day  pantoprazole 40 mg oral delayed release tablet: 1 tab(s) orally once a day (before a meal)  predniSONE 20 mg oral tablet: 2 tab(s) orally once a day  sertraline 25 mg oral tablet: 1 tab(s) orally once a day  tamsulosin 0.4 mg oral capsule: 1 cap(s) orally once a day (at bedtime)   Albuterol (Eqv-ProAir HFA) 90 mcg/inh inhalation aerosol: 1 puff(s) inhaled every 6 hours as needed for  shortness of breath and/or wheezing  amLODIPine 10 mg oral tablet: 1 tab(s) orally once a day  aspirin 81 mg oral tablet: 1 tab(s) orally once a day  atenolol 50 mg oral tablet: 1 tab(s) orally once a day  atorvastatin 20 mg oral tablet: 1 tab(s) orally once a day (at bedtime)  calcium (as carbonate)-vitamin D 600 mg-3.125 mcg (125 intl units) oral tablet: 1 tab(s) orally once a day  cyproheptadine 4 mg oral tablet: 1 tab(s) orally 3 times a day  Eliquis 5 mg oral tablet: 1 tab(s) orally 2 times a day  guaiFENesin 600 mg oral tablet, extended release: 1 tab(s) orally every 12 hours  ipratropium-albuterol 0.5 mg-2.5 mg/3 mL inhalation solution: 3 milliliter(s) by nebulizer every 4 hours as needed for  shortness of breath and/or wheezing  montelukast 10 mg oral tablet: 1 tab(s) orally once a day  Multiple Vitamins oral tablet: 1 tab(s) orally once a day  pantoprazole 40 mg oral delayed release tablet: 1 tab(s) orally once a day (before a meal)  predniSONE 20 mg oral tablet: 2 tab(s) orally once a day  sertraline 25 mg oral tablet: 1 tab(s) orally once a day  tamsulosin 0.4 mg oral capsule: 1 cap(s) orally once a day (at bedtime)

## 2024-01-26 NOTE — DISCHARGE NOTE NURSING/CASE MANAGEMENT/SOCIAL WORK - NSDCCRTYPESERV_GEN_ALL_CORE_FT
Ambulance transport home; pickup 6PM tonight 1/26/24 trip#[id number]-PLEASE ENSURE OXYGEN HAS BEEN DELIVERED TO HOME BEFORE PATIENT IS DISCHARGED OR LEAVES WITH AMBULANCE!

## 2024-01-26 NOTE — DISCHARGE NOTE PROVIDER - NSDCFUADDAPPT_GEN_ALL_CORE_FT
Please follow up with primary doctor within 1-2 weeks of discharge  You will receive a call from the Lung doctor (pulmonologist) for follow up

## 2024-01-26 NOTE — DISCHARGE NOTE PROVIDER - NSDCFUSCHEDAPPT_GEN_ALL_CORE_FT
Candelaria Richards  [facility]  PULED [address] [place] [address]  Scheduled Appointment: 01/31/2024

## 2024-01-26 NOTE — DISCHARGE NOTE NURSING/CASE MANAGEMENT/SOCIAL WORK - NSDCPEFALRISK_GEN_ALL_CORE
For information on Fall & Injury Prevention, visit: [email or web address] OR  [email or web address] OR  https://www.cdc.gov/steadi/patient.html

## 2024-01-26 NOTE — DISCHARGE NOTE PROVIDER - CARE PROVIDER_API CALL
Jose Mejia  Internal Medicine  [address] [place] [address]  Phone: [phone]  Fax: [phone]  Follow Up Time:

## 2024-01-26 NOTE — DISCHARGE NOTE PROVIDER - NSDCCPCAREPLAN_GEN_ALL_CORE_FT
PRINCIPAL DISCHARGE DIAGNOSIS  Diagnosis: Acute respiratory failure with hypoxia  Assessment and Plan of Treatment: Your oxygen level was low due to asthma and Pneumonia. You will need oxygen at home and follo wup with Lungs doctor      SECONDARY DISCHARGE DIAGNOSES  Diagnosis: Choledocholithiasis  Assessment and Plan of Treatment: Your have gallbladder stone and liver lesion on CT chets, please get MRI of the abdomen with primary care doctor upon discharge    Diagnosis: Subacute pulmonary embolism  Assessment and Plan of Treatment: You have clots in your lungs, continue blood thinner eliquis at home    Diagnosis: Acute asthma exacerbation  Assessment and Plan of Treatment: Contine steroids at home and follow up with Lung doctors    Diagnosis: CAP (community acquired pneumonia)  Assessment and Plan of Treatment: You complete antibiotics course in hospital, follow up with Pulmonary doctor    Diagnosis: NANCY (acute kidney injury)  Assessment and Plan of Treatment: Your kiney function was abnormal when admitted, now improving     PRINCIPAL DISCHARGE DIAGNOSIS  Diagnosis: Acute respiratory failure with hypoxia  Assessment and Plan of Treatment: Your oxygen level was low due to asthma and Pneumonia. You will need oxygen at home and follo wup with Lungs doctor      SECONDARY DISCHARGE DIAGNOSES  Diagnosis: Choledocholithiasis  Assessment and Plan of Treatment: Your have gallbladder stone and liver lesion on CT chets, please get MRI of the abdomen with primary care doctor upon discharge. Follow up with Dr. Mejia    Diagnosis: Subacute pulmonary embolism  Assessment and Plan of Treatment: You have clots in your lungs, continue blood thinner eliquis at home    Diagnosis: Acute asthma exacerbation  Assessment and Plan of Treatment: Contine steroids at home and follow up with Lung doctors    Diagnosis: CAP (community acquired pneumonia)  Assessment and Plan of Treatment: You complete antibiotics course in hospital, follow up with Pulmonary doctor    Diagnosis: NANCY (acute kidney injury)  Assessment and Plan of Treatment: Your kiney function was abnormal when admitted, now improving

## 2024-01-26 NOTE — DISCHARGE NOTE NURSING/CASE MANAGEMENT/SOCIAL WORK - ADDITIONAL RESOURCE NAME
[facility] will coordinate skilled home care services for RN & PT visits; [facility] home attendant services to resume today 1/26/24

## 2024-01-26 NOTE — DISCHARGE NOTE PROVIDER - HOSPITAL COURSE
68F Swedish-speaking with PMH of asthma, HTN, HFpEF, CVA with right-sided deficits p/w SOB and cough for 2 weeks. A/w AHRF 2/2 CAP and acute asthma exacerbation.      Acute respiratory failure with hypoxia.   Likely multifactorial 2/2 multifocal PNA, asthma exacerbation, and Subacute PE  Improved s/p continuous bipap, Improved acidosis on ABG  Plan for dc with oxygen given hypoxia at rest. CM set up home oxygen  s/p IV solumedrol, pt to take 5 days course total of perdnisone and follow up with Pulm upon dc. HOME will f/u  c/w inhalers  s/p Ceftriaxone and Azithromycin for Pneumonia    Chronic Diastolic HF  acute exacerbation ruled out.   c/w BP monitoring    Subacute pulmonary embolism.   -CTA PE shows possible chronic PE  -c/w Eliquis 5mg BID      NANCY (acute kidney injury).   ·  Plan: BUN/Cr 21/1.35 on presentation. B/l Cr ~1. Likely 2/2 urinary retention.   Improved, s/p TOV, pt urinating well    Hyponatremia.   Imrpoved    Choledocholithiasis.   Pt h/o cholelithiasis on imaging from 2021. No intervention at that time. Now with incidentally found cholelithiasis, GB distension, and dilated CBD. C/f possible choledocholithiasis. LFTs wnl. Pt with lesion on the Liver. MRI outpatient, discussed with daughter, she verbalized understanding     HTN (hypertension).   C/w home atenolol and amlodipine.         68F Greek-speaking with PMH of asthma, HTN, HFpEF, CVA with right-sided deficits p/w SOB and cough for 2 weeks. A/w AHRF 2/2 CAP and acute asthma exacerbation.      Acute respiratory failure with hypoxia.   Likely multifactorial 2/2 multifocal PNA, asthma exacerbation, and Subacute PE  Improved s/p continuous bipap, Improved acidosis on ABG  Plan for dc with oxygen given hypoxia at rest. CM set up home oxygen  s/p IV solumedrol, pt to take 5 days course total of prednisone and follow up with Pulm upon dc. HOME will f/u  c/w inhalers  s/p Ceftriaxone and Azithromycin for Pneumonia    Chronic Diastolic HF  acute exacerbation ruled out.   c/w BP monitoring    Subacute pulmonary embolism.   -CTA PE shows possible chronic PE  -c/w Eliquis 5mg BID      NANCY (acute kidney injury).   ·  Plan: BUN/Cr 21/1.35 on presentation. B/l Cr ~1. Likely 2/2 urinary retention.   Improved, s/p TOV, pt urinating well    Hyponatremia.   Imrpoved    Choledocholithiasis.   Pt h/o cholelithiasis on imaging from 2021. No intervention at that time. Now with incidentally found cholelithiasis, GB distension, and dilated CBD. C/f possible choledocholithiasis. LFTs wnl. Pt with lesion on the Liver. MRI outpatient, discussed with daughter, she verbalized understanding. Also discussed with Dr. Mejia 1/26, he verbalized understanding     HTN (hypertension).   C/w home atenolol and amlodipine.         68F Italian-speaking with PMH of asthma, HTN, HFpEF, CVA with right-sided deficits p/w SOB and cough for 2 weeks. A/w AHRF 2/2 CAP and acute asthma exacerbation.      Acute respiratory failure with hypoxia.   Likely multifactorial 2/2 multifocal PNA, asthma exacerbation, and Subacute PE  Improved s/p continuous bipap, Improved acidosis on ABG  Plan for dc with oxygen given hypoxia at rest. CM set up home oxygen  s/p IV solumedrol, pt to take 5 days course total of prednisone and follow up with Pulm upon dc. HOME will f/u  c/w inhalers  s/p Ceftriaxone and Azithromycin for Pneumonia    Chronic Diastolic HF  acute exacerbation ruled out.   c/w BP monitoring    Subacute pulmonary embolism.   -CTA PE shows possible chronic PE  -c/w Eliquis 5mg BID      NANCY (acute kidney injury).   ·  Plan: BUN/Cr 21/1.35 on presentation. B/l Cr ~1. Likely 2/2 urinary retention.   Improved, s/p TOV, pt urinating well    Hyponatremia.   Imrpoved    Choledocholithiasis.   Pt h/o cholelithiasis on imaging from 2021. No intervention at that time. Now with incidentally found cholelithiasis, GB distension, and dilated CBD. C/f possible choledocholithiasis. LFTs wnl. Pt with lesion on the Liver. MRI outpatient, discussed with daughter, she verbalized understanding. Also discussed with Dr. Mejia 1/26, he verbalized understanding     HTN (hypertension).   C/w home atenolol and amlodipine.    On day prior to dc c/o chest pain which self resolved, trop neg, stable for dc home

## 2024-01-27 LAB
ANION GAP SERPL CALC-SCNC: 12 MMOL/L — SIGNIFICANT CHANGE UP (ref 7–14)
BASOPHILS # BLD AUTO: 0.03 K/UL — SIGNIFICANT CHANGE UP (ref 0–0.2)
BASOPHILS NFR BLD AUTO: 0.3 % — SIGNIFICANT CHANGE UP (ref 0–2)
BUN SERPL-MCNC: 37 MG/DL — HIGH (ref 7–23)
CALCIUM SERPL-MCNC: 8.7 MG/DL — SIGNIFICANT CHANGE UP (ref 8.4–10.5)
CHLORIDE SERPL-SCNC: 102 MMOL/L — SIGNIFICANT CHANGE UP (ref 98–107)
CK MB CFR SERPL CALC: 1.8 NG/ML — SIGNIFICANT CHANGE UP
CK MB CFR SERPL CALC: 1.8 NG/ML — SIGNIFICANT CHANGE UP
CO2 SERPL-SCNC: 24 MMOL/L — SIGNIFICANT CHANGE UP (ref 22–31)
CREAT SERPL-MCNC: 1.17 MG/DL — SIGNIFICANT CHANGE UP (ref 0.5–1.3)
EGFR: 51 ML/MIN/1.73M2 — LOW
EOSINOPHIL # BLD AUTO: 0.02 K/UL — SIGNIFICANT CHANGE UP (ref 0–0.5)
EOSINOPHIL NFR BLD AUTO: 0.2 % — SIGNIFICANT CHANGE UP (ref 0–6)
GLUCOSE SERPL-MCNC: 84 MG/DL — SIGNIFICANT CHANGE UP (ref 70–99)
HCT VFR BLD CALC: 40.7 % — SIGNIFICANT CHANGE UP (ref 34.5–45)
HGB BLD-MCNC: 12.3 G/DL — SIGNIFICANT CHANGE UP (ref 11.5–15.5)
IANC: 6.87 K/UL — SIGNIFICANT CHANGE UP (ref 1.8–7.4)
IMM GRANULOCYTES NFR BLD AUTO: 1.7 % — HIGH (ref 0–0.9)
LYMPHOCYTES # BLD AUTO: 2.67 K/UL — SIGNIFICANT CHANGE UP (ref 1–3.3)
LYMPHOCYTES # BLD AUTO: 24.9 % — SIGNIFICANT CHANGE UP (ref 13–44)
MAGNESIUM SERPL-MCNC: 2.5 MG/DL — SIGNIFICANT CHANGE UP (ref 1.6–2.6)
MCHC RBC-ENTMCNC: 26.8 PG — LOW (ref 27–34)
MCHC RBC-ENTMCNC: 30.2 GM/DL — LOW (ref 32–36)
MCV RBC AUTO: 88.7 FL — SIGNIFICANT CHANGE UP (ref 80–100)
MONOCYTES # BLD AUTO: 0.94 K/UL — HIGH (ref 0–0.9)
MONOCYTES NFR BLD AUTO: 8.8 % — SIGNIFICANT CHANGE UP (ref 2–14)
NEUTROPHILS # BLD AUTO: 6.87 K/UL — SIGNIFICANT CHANGE UP (ref 1.8–7.4)
NEUTROPHILS NFR BLD AUTO: 64.1 % — SIGNIFICANT CHANGE UP (ref 43–77)
NRBC # BLD: 0 /100 WBCS — SIGNIFICANT CHANGE UP (ref 0–0)
NRBC # FLD: 0 K/UL — SIGNIFICANT CHANGE UP (ref 0–0)
PHOSPHATE SERPL-MCNC: 4.2 MG/DL — SIGNIFICANT CHANGE UP (ref 2.5–4.5)
PLATELET # BLD AUTO: 276 K/UL — SIGNIFICANT CHANGE UP (ref 150–400)
POTASSIUM SERPL-MCNC: 4.7 MMOL/L — SIGNIFICANT CHANGE UP (ref 3.5–5.3)
POTASSIUM SERPL-SCNC: 4.7 MMOL/L — SIGNIFICANT CHANGE UP (ref 3.5–5.3)
RBC # BLD: 4.59 M/UL — SIGNIFICANT CHANGE UP (ref 3.8–5.2)
RBC # FLD: 14.2 % — SIGNIFICANT CHANGE UP (ref 10.3–14.5)
SODIUM SERPL-SCNC: 138 MMOL/L — SIGNIFICANT CHANGE UP (ref 135–145)
TROPONIN T, HIGH SENSITIVITY RESULT: 12 NG/L — SIGNIFICANT CHANGE UP
TROPONIN T, HIGH SENSITIVITY RESULT: 9 NG/L — SIGNIFICANT CHANGE UP
WBC # BLD: 10.71 K/UL — HIGH (ref 3.8–10.5)
WBC # FLD AUTO: 10.71 K/UL — HIGH (ref 3.8–10.5)

## 2024-01-27 PROCEDURE — 99233 SBSQ HOSP IP/OBS HIGH 50: CPT

## 2024-01-27 PROCEDURE — 93010 ELECTROCARDIOGRAM REPORT: CPT

## 2024-01-27 RX ADMIN — Medication 3 MILLILITER(S): at 09:25

## 2024-01-27 RX ADMIN — TAMSULOSIN HYDROCHLORIDE 0.4 MILLIGRAM(S): 0.4 CAPSULE ORAL at 21:30

## 2024-01-27 RX ADMIN — AMLODIPINE BESYLATE 10 MILLIGRAM(S): 2.5 TABLET ORAL at 06:13

## 2024-01-27 RX ADMIN — Medication 40 MILLIGRAM(S): at 06:13

## 2024-01-27 RX ADMIN — SODIUM CHLORIDE 4 MILLILITER(S): 9 INJECTION INTRAMUSCULAR; INTRAVENOUS; SUBCUTANEOUS at 04:13

## 2024-01-27 RX ADMIN — SODIUM CHLORIDE 4 MILLILITER(S): 9 INJECTION INTRAMUSCULAR; INTRAVENOUS; SUBCUTANEOUS at 09:25

## 2024-01-27 RX ADMIN — PANTOPRAZOLE SODIUM 40 MILLIGRAM(S): 20 TABLET, DELAYED RELEASE ORAL at 06:14

## 2024-01-27 RX ADMIN — Medication 81 MILLIGRAM(S): at 12:02

## 2024-01-27 RX ADMIN — Medication 3 MILLILITER(S): at 04:12

## 2024-01-27 RX ADMIN — ATORVASTATIN CALCIUM 20 MILLIGRAM(S): 80 TABLET, FILM COATED ORAL at 21:30

## 2024-01-27 RX ADMIN — Medication 600 MILLIGRAM(S): at 06:14

## 2024-01-27 RX ADMIN — SODIUM CHLORIDE 4 MILLILITER(S): 9 INJECTION INTRAMUSCULAR; INTRAVENOUS; SUBCUTANEOUS at 22:46

## 2024-01-27 RX ADMIN — ATENOLOL 50 MILLIGRAM(S): 25 TABLET ORAL at 06:14

## 2024-01-27 RX ADMIN — APIXABAN 5 MILLIGRAM(S): 2.5 TABLET, FILM COATED ORAL at 17:18

## 2024-01-27 RX ADMIN — Medication 3 MILLILITER(S): at 15:39

## 2024-01-27 RX ADMIN — SODIUM CHLORIDE 4 MILLILITER(S): 9 INJECTION INTRAMUSCULAR; INTRAVENOUS; SUBCUTANEOUS at 15:39

## 2024-01-27 RX ADMIN — Medication 1 TABLET(S): at 12:03

## 2024-01-27 RX ADMIN — SERTRALINE 25 MILLIGRAM(S): 25 TABLET, FILM COATED ORAL at 12:03

## 2024-01-27 RX ADMIN — MONTELUKAST 10 MILLIGRAM(S): 4 TABLET, CHEWABLE ORAL at 12:03

## 2024-01-27 RX ADMIN — Medication 3 MILLILITER(S): at 22:44

## 2024-01-27 RX ADMIN — Medication 600 MILLIGRAM(S): at 17:19

## 2024-01-27 RX ADMIN — APIXABAN 5 MILLIGRAM(S): 2.5 TABLET, FILM COATED ORAL at 06:15

## 2024-01-27 NOTE — PROGRESS NOTE ADULT - SUBJECTIVE AND OBJECTIVE BOX
Patient is a 68y old  Female who presents with a chief complaint of AHRF (26 Jan 2024 12:30)      SUBJECTIVE / OVERNIGHT EVENTS: Pt seen and examined at 11:25am, no overnight events, used [facility]  [facility] ID:[id number], pt reported left sided chest pain earlier today, says that her chest pain has resolved now, ekg showed tachy at 116/mt and pvcs, no other complaints    MEDICATIONS  (STANDING):  albuterol    90 MICROgram(s) HFA Inhaler 1 Puff(s) Inhalation every 6 hours  albuterol/ipratropium for Nebulization 3 milliLiter(s) Nebulizer every 6 hours  amLODIPine   Tablet 10 milliGRAM(s) Oral daily  apixaban 5 milliGRAM(s) Oral every 12 hours  aspirin enteric coated 81 milliGRAM(s) Oral daily  atenolol  Tablet 50 milliGRAM(s) Oral daily  atorvastatin 20 milliGRAM(s) Oral at bedtime  guaiFENesin  milliGRAM(s) Oral every 12 hours  influenza  Vaccine (HIGH DOSE) 0.7 milliLiter(s) IntraMuscular once  montelukast 10 milliGRAM(s) Oral daily  multivitamin 1 Tablet(s) Oral daily  pantoprazole    Tablet 40 milliGRAM(s) Oral before breakfast  predniSONE   Tablet 40 milliGRAM(s) Oral daily  sertraline 25 milliGRAM(s) Oral daily  sodium chloride 3%  Inhalation 4 milliLiter(s) Inhalation every 6 hours  tamsulosin 0.4 milliGRAM(s) Oral at bedtime    MEDICATIONS  (PRN):  acetaminophen     Tablet .. 650 milliGRAM(s) Oral every 6 hours PRN Temp greater or equal to 38C (100.4F), Mild Pain (1 - 3)      Vital Signs Last 24 Hrs  T(C): 36.4 (27 Jan 2024 12:00), Max: 36.9 (26 Jan 2024 17:30)  T(F): 97.6 (27 Jan 2024 12:00), Max: 98.4 (26 Jan 2024 17:30)  HR: 70 (27 Jan 2024 12:00) (65 - 75)  BP: 126/72 (27 Jan 2024 12:00) (126/72 - 147/72)  BP(mean): --  RR: 18 (27 Jan 2024 12:00) (18 - 18)  SpO2: 96% (27 Jan 2024 12:00) (96% - 100%)    Parameters below as of 27 Jan 2024 12:00  Patient On (Oxygen Delivery Method): nasal cannula  O2 Flow (L/min): 3    CAPILLARY BLOOD GLUCOSE        I&O's Summary    26 Jan 2024 07:01  -  27 Jan 2024 07:00  --------------------------------------------------------  IN: 320 mL / OUT: 1050 mL / NET: -730 mL        PHYSICAL EXAM:  GENERAL: NAD  CHEST/LUNG: Clear to auscultation bilaterally; No wheeze  HEART: Regular rate and rhythm  ABDOMEN: Soft, Nontender, Nondistended  EXTREMITIES: no LE edema  PSYCH: Calm  NEUROLOGY: AA, oriented to self, and says medical when asked where she is, not oriented to time      LABS:                        12.3   10.71 )-----------( 276      ( 27 Jan 2024 05:39 )             40.7     01-27    138  |  102  |  37<H>  ----------------------------<  84  4.7   |  24  |  1.17    Ca    8.7      27 Jan 2024 05:39  Phos  4.2     01-27  Mg     2.50     01-27    TPro  6.3  /  Alb  2.9<L>  /  TBili  0.2  /  DBili  x   /  AST  18  /  ALT  16  /  AlkPhos  69  01-26      CARDIAC MARKERS ( 27 Jan 2024 12:00 )  x     / x     / x     / x     / 1.8 ng/mL  CARDIAC MARKERS ( 27 Jan 2024 05:39 )  x     / x     / x     / x     / 1.5 ng/mL      Urinalysis Basic - ( 27 Jan 2024 05:39 )    Color: x / Appearance: x / SG: x / pH: x  Gluc: 84 mg/dL / Ketone: x  / Bili: x / Urobili: x   Blood: x / Protein: x / Nitrite: x   Leuk Esterase: x / RBC: x / WBC x   Sq Epi: x / Non Sq Epi: x / Bacteria: x        RADIOLOGY & ADDITIONAL TESTS:    Imaging Personally Reviewed:    Consultant(s) Notes Reviewed:      Care Discussed with Consultants/Other Providers:

## 2024-01-27 NOTE — PROGRESS NOTE ADULT - ASSESSMENT
68F Spanish-speaking with PMH of asthma, HTN, HFpEF, CVA with right-sided deficits p/w SOB and cough for 2 weeks. A/w AHRF 2/2 CAP and acute asthma exacerbation.

## 2024-01-28 VITALS
HEART RATE: 68 BPM | TEMPERATURE: 98 F | RESPIRATION RATE: 18 BRPM | OXYGEN SATURATION: 100 % | DIASTOLIC BLOOD PRESSURE: 68 MMHG | SYSTOLIC BLOOD PRESSURE: 129 MMHG

## 2024-01-28 PROCEDURE — 99239 HOSP IP/OBS DSCHRG MGMT >30: CPT

## 2024-01-28 RX ADMIN — Medication 1 TABLET(S): at 12:09

## 2024-01-28 RX ADMIN — ATENOLOL 50 MILLIGRAM(S): 25 TABLET ORAL at 05:19

## 2024-01-28 RX ADMIN — APIXABAN 5 MILLIGRAM(S): 2.5 TABLET, FILM COATED ORAL at 05:18

## 2024-01-28 RX ADMIN — Medication 600 MILLIGRAM(S): at 05:18

## 2024-01-28 RX ADMIN — Medication 40 MILLIGRAM(S): at 05:19

## 2024-01-28 RX ADMIN — AMLODIPINE BESYLATE 10 MILLIGRAM(S): 2.5 TABLET ORAL at 05:19

## 2024-01-28 RX ADMIN — PANTOPRAZOLE SODIUM 40 MILLIGRAM(S): 20 TABLET, DELAYED RELEASE ORAL at 05:18

## 2024-01-28 RX ADMIN — SODIUM CHLORIDE 4 MILLILITER(S): 9 INJECTION INTRAMUSCULAR; INTRAVENOUS; SUBCUTANEOUS at 09:36

## 2024-01-28 RX ADMIN — Medication 81 MILLIGRAM(S): at 12:09

## 2024-01-28 RX ADMIN — Medication 3 MILLILITER(S): at 09:37

## 2024-01-28 RX ADMIN — SERTRALINE 25 MILLIGRAM(S): 25 TABLET, FILM COATED ORAL at 12:16

## 2024-01-28 RX ADMIN — MONTELUKAST 10 MILLIGRAM(S): 4 TABLET, CHEWABLE ORAL at 12:09

## 2024-01-28 NOTE — PROGRESS NOTE ADULT - PROBLEM SELECTOR PLAN 3
Pt with h/o mild diastolic heart failure on TTE from 2021, well-compensated. Not on diuretics. Now with progressive PETER, orthopnea, PND, LE edema over past few months. A/w AHRF.   -proBNP 2926  -S/p IV lasix 20mg x 1 in ED with significant UOP  -Exam difficult, limited air entry due to asthma exacerbation and trace LE edema. No crackles appreciated.   -Will hold off on additional diuresis today,   -TTE ordered  -Spot diurese PRN   -Strict I/Os
Acute on chronic diastolic HF, Pt with h/o mild diastolic heart failure on TTE from 2021, well-compensated. Not on diuretics. Now with progressive PETER, orthopnea, PND, LE edema over past few months.   -proBNP 2926  -S/p IV lasix 20mg x 1 will hold further diuresis given bump in Cr ( now improving)  -TTE : mode pulm HTN, no acute findings  -Spot diurese PRN   -Strict I/Os
Acute on chronci diastolic HF, Pt with h/o mild diastolic heart failure on TTE from 2021, well-compensated. Not on diuretics. Now with progressive PETER, orthopnea, PND, LE edema over past few months.   -proBNP 2926  -S/p IV lasix 20mg x 1 will hold furthere diuresis given bump in Cr and pending TTE ( West in place)  -TTE ordered  -Spot diurese PRN   -Strict I/Os
Acute on chronic diastolic HF, Pt with h/o mild diastolic heart failure on TTE from 2021, well-compensated. Not on diuretics. Now with progressive PETER, orthopnea, PND, LE edema over past few months.   -proBNP 2926  -S/p IV lasix 20mg x 1 will hold further diuresis given bump in Cr ( now improving)  -TTE : mode pulm HTN, no acute findings  -Spot diurese PRN   -Strict I/Os

## 2024-01-28 NOTE — PROGRESS NOTE ADULT - PROBLEM/PLAN-8
DISPLAY PLAN FREE TEXT
DISPLAY PLAN FREE TEXT
No
DISPLAY PLAN FREE TEXT

## 2024-01-28 NOTE — PROGRESS NOTE ADULT - PROBLEM SELECTOR PLAN 9
H/H 10.4/31.9, normocytic. No e/o bleeding other than blood-tinged sputum x 3 in setting of PNA and PE.   -Iron studies, TSH, B12/folate wml  Trend Hb
H/H 10.4/31.9, normocytic. No e/o bleeding other than blood-tinged sputum x 3 in setting of PNA and PE.   -Iron studies, TSH, B12/folate wml  Trend Hb
H/H 10.4/31.9, normocytic. No e/o bleeding other than blood-tinged sputum x 3 in setting of PNA and PE.   -Iron studies, TSH, B12/folate in AM   -CTM
H/H 10.4/31.9, normocytic. No e/o bleeding other than blood-tinged sputum x 3 in setting of PNA and PE.   -Iron studies, TSH, B12/folate wml  Trend Hb

## 2024-01-28 NOTE — PROGRESS NOTE ADULT - PROBLEM SELECTOR PLAN 12
DVT ppx: Eliquis  Diet: Purees  Dispo: Pending clinical improvement       Discussed with daughter via phone 1/24    c/o chest pain earlier today- ekg reviewed, check trop x2, monitor for now, if trop neg and  is stable, plan for dc tomorrow  Plan discussed with ACP
DVT ppx: Eliquis  Diet: NPO while on BiPAP   Dispo: Pending clinical improvement     Daughter updated at bedside 1/21,1/22
DVT ppx: Eliquis  Diet: Purees  Dispo: Pending clinical improvement       Discussed with daughter via phone 1/24
DVT ppx: Eliquis  Diet: Purees  Dispo: Pending clinical improvement       Discussed with daughter via phone 1/24
DVT ppx: Eliquis  Diet: Purees  Dispo: Pending clinical improvement       Discussed with daughter via phone 1/24    c/o chest pain on 1/27- ekg reviewed,  trop neg, no further chest pain, no other new issues reported  dc home today, dc planning time spent in coordination 38mts ( preparing dc summary and plan)  Plan discussed with ACP
DVT ppx: Eliquis  Diet: Purees  Dispo: Pending clinical improvement     Daughter updated at bedside 1/21,1/22

## 2024-01-28 NOTE — PROGRESS NOTE ADULT - PROBLEM SELECTOR PLAN 1
Likely multifactorial 2/2 multifocal PNA, asthma exacerbation, +/- component of CHF.   -Improved s/p continuous bipap, dc Nocturnal Bipap per  discussion with Pulm 1/24,  VBG shows slightly worsening acidosis, will keep off bipap   -Mgmt of asthma, CAP, and ADHF as below   -
Likely multifactorial 2/2 multifocal PNA, asthma exacerbation, +/- component of CHF.   -Improved s/p continuous bipap, dc Nocturnal Bipap per  discussion with Pulm 1/24,  VBG shows slightly worsening acidosis, will keep off bipap   -Mgmt of asthma, CAP, and ADHF as below   -
Likely multifactorial 2/2 multifocal PNA, asthma exacerbation, +/- component of CHF.   -Improved s/p continuous bipap, will keep Nocturnal Bipap given hypercapnia per Pulm recs  -Repeat VBG in the AM  -Mgmt of asthma, CAP, and ADHF as below   -
Likely multifactorial 2/2 multifocal PNA, asthma exacerbation, +/- component of CHF.   -Briefly on BiPAP, weaned to 4L NC  -Worsening blood gas, will placed back on BiPAP   -Pulm consulted   -Mgmt of asthma, CAP, and ADHF as below   -
Likely multifactorial 2/2 multifocal PNA, asthma exacerbation, +/- component of CHF.   -Improved s/p continuous bipap, dc Nocturnal Bipap per  discussion with Pulm 1/24, will VBG in the AM  -Mgmt of asthma, CAP, and ADHF as below   -
Likely multifactorial 2/2 multifocal PNA, asthma exacerbation, +/- component of CHF.   -Improved s/p continuous bipap, dc Nocturnal Bipap per  discussion with Pulm 1/24,  VBG shows slightly worsening acidosis, will keep off bipap   -Mgmt of asthma, CAP, and ADHF as below   -

## 2024-01-28 NOTE — PROGRESS NOTE ADULT - ASSESSMENT
68F English-speaking with PMH of asthma, HTN, HFpEF, CVA with right-sided deficits p/w SOB and cough for 2 weeks. A/w AHRF 2/2 CAP and acute asthma exacerbation.

## 2024-01-28 NOTE — PROGRESS NOTE ADULT - PROBLEM SELECTOR PLAN 10
H/o L BG infarct 7/2021 with R-sided deficits.   -C/w home ASA and statin

## 2024-01-28 NOTE — PROGRESS NOTE ADULT - PROBLEM SELECTOR PLAN 2
Pt with acute asthma exacerbation on presentation likely 2/2 CAP.   -s/p solumedrol now transitioned to prednisone per Pulm recs, to complete 5 days course  -c/w monitoring  -Duonebs q6H
Pt with acute asthma exacerbation on presentation likely 2/2 CAP.   -s/p solumedrol now transitioned to prednisone per Pulm recs, to complete 5 days course  -c/w monitoring  -Duonebs q6H
Pt with acute asthma exacerbation on presentation likely 2/2 CAP.   -Will transition solumedrol to prednisone per Pulm recs, to complete 5 day scourse  -c/w monitoring  -Ducinthia q6H
Pt with acute asthma exacerbation on presentation likely 2/2 CAP.   -Will transition oslumedrol to prednisone per Pulm recs  -c/w monitoring  -Duonebs q6H
Pt with acute asthma exacerbation on presentation likely 2/2 CAP.   -s/p solumedrol now transitioned to prednisone per Pulm recs, to complete 5 days course  -c/w monitoring  -Duonebs q6H
Pt with acute asthma exacerbtaion on presentation likely 2/2 CAP.   -S/p solu-medrol 125mg, Mg 2mg, duonebs in ED   -VBG 7.25/45/50/21  -Pt briefly on BiPAP, weaned to 4L NC   -Repeat VBG 7.18/59/53/22  -Pulm consulted - will place back on BiPAP and reassess   -Solumedrol 40 q8 for now   -Duonebs q6H

## 2024-01-28 NOTE — PROGRESS NOTE ADULT - SUBJECTIVE AND OBJECTIVE BOX
Patient is a 68y old  Female who presents with a chief complaint of AHRF (27 Jan 2024 14:30)      SUBJECTIVE / OVERNIGHT EVENTS: Pt seen and examined at 11:46am, no overnight events, used [facility]  [facility] ID:[id number] pt has no complaints, eager to go home asking when she is going home, no other complaints      MEDICATIONS  (STANDING):  albuterol    90 MICROgram(s) HFA Inhaler 1 Puff(s) Inhalation every 6 hours  albuterol/ipratropium for Nebulization 3 milliLiter(s) Nebulizer every 6 hours  amLODIPine   Tablet 10 milliGRAM(s) Oral daily  apixaban 5 milliGRAM(s) Oral every 12 hours  aspirin enteric coated 81 milliGRAM(s) Oral daily  atenolol  Tablet 50 milliGRAM(s) Oral daily  atorvastatin 20 milliGRAM(s) Oral at bedtime  guaiFENesin  milliGRAM(s) Oral every 12 hours  influenza  Vaccine (HIGH DOSE) 0.7 milliLiter(s) IntraMuscular once  montelukast 10 milliGRAM(s) Oral daily  multivitamin 1 Tablet(s) Oral daily  pantoprazole    Tablet 40 milliGRAM(s) Oral before breakfast  predniSONE   Tablet 40 milliGRAM(s) Oral daily  sertraline 25 milliGRAM(s) Oral daily  sodium chloride 3%  Inhalation 4 milliLiter(s) Inhalation every 6 hours  tamsulosin 0.4 milliGRAM(s) Oral at bedtime    MEDICATIONS  (PRN):  acetaminophen     Tablet .. 650 milliGRAM(s) Oral every 6 hours PRN Temp greater or equal to 38C (100.4F), Mild Pain (1 - 3)      Vital Signs Last 24 Hrs  T(C): 36.6 (28 Jan 2024 05:15), Max: 36.6 (28 Jan 2024 05:15)  T(F): 97.8 (28 Jan 2024 05:15), Max: 97.8 (28 Jan 2024 05:15)  HR: 64 (28 Jan 2024 09:37) (64 - 78)  BP: 136/70 (28 Jan 2024 05:15) (134/73 - 144/71)  BP(mean): --  RR: 17 (28 Jan 2024 05:15) (17 - 18)  SpO2: 98% (28 Jan 2024 09:37) (98% - 100%)    Parameters below as of 28 Jan 2024 09:37  Patient On (Oxygen Delivery Method): nasal cannula      CAPILLARY BLOOD GLUCOSE        I&O's Summary      PHYSICAL EXAM:  GENERAL: NAD  CHEST/LUNG: Clear to auscultation bilaterally; No wheeze  HEART: Regular rate and rhythm  ABDOMEN: Soft, Nontender, Nondistended  EXTREMITIES: no LE edema  PSYCH: Calm  NEUROLOGY: AA, answers questions    LABS:                        12.3   10.71 )-----------( 276      ( 27 Jan 2024 05:39 )             40.7     01-27    138  |  102  |  37<H>  ----------------------------<  84  4.7   |  24  |  1.17    Ca    8.7      27 Jan 2024 05:39  Phos  4.2     01-27  Mg     2.50     01-27        CARDIAC MARKERS ( 27 Jan 2024 18:47 )  x     / x     / x     / x     / 1.8 ng/mL  CARDIAC MARKERS ( 27 Jan 2024 12:00 )  x     / x     / x     / x     / 1.8 ng/mL  CARDIAC MARKERS ( 27 Jan 2024 05:39 )  x     / x     / x     / x     / 1.5 ng/mL      Urinalysis Basic - ( 27 Jan 2024 05:39 )    Color: x / Appearance: x / SG: x / pH: x  Gluc: 84 mg/dL / Ketone: x  / Bili: x / Urobili: x   Blood: x / Protein: x / Nitrite: x   Leuk Esterase: x / RBC: x / WBC x   Sq Epi: x / Non Sq Epi: x / Bacteria: x        RADIOLOGY & ADDITIONAL TESTS:    Imaging Personally Reviewed:    Consultant(s) Notes Reviewed:      Care Discussed with Consultants/Other Providers:

## 2024-01-28 NOTE — PROGRESS NOTE ADULT - PROBLEM SELECTOR PROBLEM 10
H/O: CVA (cerebrovascular accident)

## 2024-01-28 NOTE — PROGRESS NOTE ADULT - PROVIDER SPECIALTY LIST ADULT
Pulmonology
Hospitalist
Pulmonology
Hospitalist

## 2024-01-28 NOTE — PROGRESS NOTE ADULT - PROBLEM SELECTOR PLAN 6
BUN/Cr 21/1.35 on presentation. B/l Cr ~1. Likely 2/2 urinary retention.   -West placed in ED, 1500cc drained   -Kidney/bladder sono ordered to r/o hydronephrosis   -Strict I/Os  -Avoid nephrotoxic agents   -CTM closely, if not improving - will need urine studies  - TOV in the AM
BUN/Cr 21/1.35 on presentation. B/l Cr ~1. Likely 2/2 urinary retention.   -West placed in ED, 1500cc drained   -Kidney/bladder sono ordered to r/o hydronephrosis   -Strict I/Os  -Avoid nephrotoxic agents   -CTM closely, if not improving - will need urine studies  - Passed TOV
BUN/Cr 21/1.35 on presentation. B/l Cr ~1. Likely 2/2 urinary retention.   -West placed in ED, 1500cc drained   -Kidney/bladder sono ordered to r/o hydronephrosis   -Strict I/Os  -Avoid nephrotoxic agents   -CTM closely, if not improving - will need urine studies
BUN/Cr 21/1.35 on presentation. B/l Cr ~1. Likely 2/2 urinary retention.   -West placed in ED, 1500cc drained   -Kidney/bladder sono ordered to r/o hydronephrosis   -Strict I/Os  -Avoid nephrotoxic agents   -CTM closely, if not improving - will need urine studies  - Passed TOV

## 2024-01-28 NOTE — PROGRESS NOTE ADULT - PROBLEM SELECTOR PLAN 8
Pt h/o cholelithiasis on imaging from 2021. No intervention at that time. Now with incidentally found cholelithiasis, GB distension, and dilated CBD. C/f possible choledocholithiasis. LFTs wnl.   Pt with lesion on the Liver  Would defer MRI abd as an outpatient
Pt h/o cholelithiasis on imaging from 2021. No intervention at that time. Now with incidentally found cholelithiasis, GB distension, and dilated CBD. C/f possible choledocholithiasis. LFTs wnl.   -CTA Chest: "dilated common bile duct, measuring 1.0 cm. No obstructing stone is visualized. Cholelithiasis with gallbladder distention. Hypodense lesion in the medial right hepatic lobe, indeterminate on this study."  -Pt with need nonurgent hepatic protocol MRI abdomen with and without intravenous contrast and MRCP. Not ordered today as patient appears to be not optimized from a respiratory standpoint, can order once more stable.
Pt h/o cholelithiasis on imaging from 2021. No intervention at that time. Now with incidentally found cholelithiasis, GB distension, and dilated CBD. C/f possible choledocholithiasis. LFTs wnl.   Pt with lesion on the Liver  Would defer MRI abd as an outpatient

## 2024-01-28 NOTE — PROGRESS NOTE ADULT - PROBLEM SELECTOR PLAN 11
-C/w home atenolol and amlodipine

## 2024-01-28 NOTE — PROGRESS NOTE ADULT - PROBLEM SELECTOR PROBLEM 6
NANCY (acute kidney injury)

## 2024-01-28 NOTE — PROGRESS NOTE ADULT - PROBLEM SELECTOR PLAN 7
Na 131 on presentation. Likely in setting of NANCY and urinary retention. , now improving 134  -S/p cordova in ED for urinary retention   -Kidney/bladder sono ordered to r/o hydronephrosis   -CTM   -Urine studies if not improving
Na 131 on presentation. Likely in setting of NANCY and urinary retention. , now improving   -S/p cordova in ED for urinary retention   -Kidney/bladder sono ordered to r/o hydronephrosis   -CTM   -Urine studies if not improving

## 2024-01-28 NOTE — PROGRESS NOTE ADULT - PROBLEM SELECTOR PROBLEM 2
Patient would like an alternative plan.  Messages through [employer] she does not have COVID-19.   Lung function tests?  Possible Asthma?  Etc.  Anxiety related?  Provider please review and advise. Thank you.    
Acute asthma exacerbation

## 2024-01-28 NOTE — PROGRESS NOTE ADULT - PROBLEM SELECTOR PLAN 4
Pt with subjective fevers, SOB, cough productive of yellow sputum.   -CTA Chest with "Consolidative opacities in the left lower lobe and lingula, concerning for multifocal pneumonia."  -S/p Vanc/zosyn in ED   -C/w CTX/azithro for CAP coverage  -F/u infectious workup including MRSA PCR, Bcx, UA/Ucx, urine legionella, urine strep ag, and sputum cx
Pt with subjective fevers, SOB, cough productive of yellow sputum.   -CTA Chest with "Consolidative opacities in the left lower lobe and lingula, concerning for multifocal pneumonia."  -S/p Vanc/zosyn in ED   -C/w CTX/azithro for CAP coverage  -F/u infectious workup including MRSA PCR, Bcx, UA/Ucx, urine legionella, urine strep ag, and sputum cx
Pt with subjective fevers, SOB, cough productive of yellow sputum.   -CTA Chest with "Consolidative opacities in the left lower lobe and lingula, concerning for multifocal pneumonia."  -S/p Vanc/zosyn in ED   -s/p CTX/azithro for CAP coverage  -F/u infectious workup including MRSA PCR, Bcx, UA/Ucx, urine legionella, urine strep ag, and sputum cx
Pt with subjective fevers, SOB, cough productive of yellow sputum.   -CTA Chest with "Consolidative opacities in the left lower lobe and lingula, concerning for multifocal pneumonia."  -S/p Vanc/zosyn in ED   -C/w CTX/azithro for CAP coverage  -F/u infectious workup including MRSA PCR, Bcx, UA/Ucx, urine legionella, urine strep ag, and sputum cx

## 2024-01-28 NOTE — PROGRESS NOTE ADULT - PROBLEM SELECTOR PLAN 5
Pt p/w acute hypoxia and cough/fevers/SOB for a few weeks.   -CTA PE shows possible chronic PE  -In the setting of likely provoked (pt mostly nonambulatory) incidentally found small segmental nonacute PE, pt would still benefit from AC as she has no h/o bleeding.   -VA duplex BLE neg for LE DVTs,   -c/w Eliquis 5mg BID ( Hold off 10mg BID given Cr uptrend and chronicity of PE)
Pt p/w acute hypoxia and cough/fevers/SOB for a few weeks.   -CTA PE shows "Small nonocclusive eccentric filling defect right upper lobe apical segmental pulmonary artery most likely represents chronic sequela of remote pulmonary embolus. No other pulmonary embolus."  -In the setting of likely provoked (pt mostly nonambulatory) incidentally found small segmental nonacute PE, pt would still benefit from AC as she has no h/o bleeding.   -VA duplex BLE ordered to r/o LE DVTs, would create a more compelling argument for anticoagulating patient  -Will start Eliquis 5mg BID   -CTM

## 2024-01-28 NOTE — PROGRESS NOTE ADULT - PROBLEM SELECTOR PROBLEM 4
CAP (community acquired pneumonia)

## 2024-01-28 NOTE — PROGRESS NOTE ADULT - PROBLEM SELECTOR PROBLEM 5
Subacute pulmonary embolism

## 2024-01-28 NOTE — PROGRESS NOTE ADULT - PROBLEM SELECTOR PROBLEM 3
Acute decompensated heart failure

## 2024-01-31 ENCOUNTER — APPOINTMENT (OUTPATIENT)
Dept: PULMONOLOGY | Facility: CLINIC | Age: 69
End: 2024-01-31
Payer: MEDICAID

## 2024-01-31 ENCOUNTER — NON-APPOINTMENT (OUTPATIENT)
Age: 69
End: 2024-01-31

## 2024-01-31 PROCEDURE — ZZZZZ: CPT

## 2024-07-18 NOTE — H&P ADULT - PROBLEM SELECTOR PLAN 7
Patient is scheduled for a left reverse TSA on 10/14/24 with Dr. Green, orthopedic's at [facility]. Requesting form completion. Will have [place] address when returns to clinic on 7/23/24 as patient has not been seen by any other provider.     Requesting a copy of last office note and any associated testing (EKG, echo, labs, etc) and to address the following questions:    - fitness for surgery from a cardiac perspective. able to provide clearance from an Electrophysiology perspective, but cardiac clearance to come from either Cardiology or PCP.    - if patient is required to have surgery at a tertiary care center for cardiac reasons, please state so on form    - if xarelto can safely be stopped 72 hours prior to surgery. See attached anticoagulation guidelines     - Confirm there are no holds regarding Flecainide medication. Continue flecainide along with metoprolol, do not hold.         Jacquelnie Parson was last seen in EP clinic on 7/16/24.   [id number] score = 2 (age, gender)  Annual risk of stroke = 2.2%  Anticoagulant = Xarelto 20mg one time a day  CrCl 108 ml/min      For Low bleeding risk procedure, if surgical benefits are higher than risks of holding anticoagulant, then based on below guidelines, it would be reasonable to hold anticoagulant for at least 24 hours. Bridging not needed.    For High bleeding risk procedure, if surgical benefits are higher than risks of holding anticoagulant, then based on below guidelines, it would be reasonable to hold anticoagulant for at least 48 hours. Bridging not needed.    Anticoagulation can be held by the operating or ordering physician based on the attached guidelines. The operating or ordering physician need to determine whether the risk of holding anticoagulation (and the risk of stroke) outweigh the benefit of the procedure, and discuss this with the patient. The EP Clinic cannot determine whether the benefits of the procedure outweigh the risk of stroke, and  therefore will not be responsible for stopping and restarting anticoagulation.      Guidelines for Interruption/Bridging Oral Anticoagulation for Non-Cardiac Surgical Intervention for Patients with Atrial Fibrillation    It is the responsibility of the surgeon to discuss pros and cons of withholding anticoagulation with the patient prior to surgery. Such discussions should be guided by:  Risk of thrombotic events (CHADSVASc score), the procedure-related bleeding risk and the need/urgency of the procedure  Need for complete hemostasis (spinal/epidural, vascular)  Pharmacological properties of the drug (half-life, rapid onset and offset)  Renal function    Withholding Oral Antigoagulation Preoperatively  If on warfarin, stop warfarin 3-5 days preoperatively, check INR 24 h prior to procedure. Resume after hemostasis  If High thrombotic Risk (Any mechanical valve; mitral stenosis or rheumatic heart disease; any stroke, TIA or thrombo-embolism within 12 months): bridge with unfractionated heparin or low molecular weight heparin (BRIDGE trial NEJM 2015). Please refer to coumadin clinic for bridging instructions.   Resume anticoagulation within 24-48 hours post surgery.  If on a novel oral anticoagulant (NOAC) (PAUSE trial CHELY 2019)   No bridging is generally indicated.   Interruption is based on bleeding risk, renal function and pharmacokinetics of NOAC - see table below.   Resume NOAC:    Low Bleeding Risk Surgery: 8-24 hours post surgery  High Bleeding Risk Surgery: >48 hours post surgery         Assessment of Stroke Risk in AF Patients without Mitral Stenosis or Mechanical Valve using [id number] Score    Risk Factor Score   Cardiac failure 1   HTN 1   Age >75 y 2   Diabetes 1   Stroke 2   Vasc dis (MI, PAD, aortic ath) 1   Age 65-74 y 1   Sex category 1       [id number] Score Stroke Risk %   0 0   1 1.3   2 2.2   3 3.2   4 4.0   5 6.7   6 9.8   7 9.6   8 12.5   9 15.2         REFERENCES  HRS 2023 AFIB  guidelines           Na 131 on presentation. Likely in setting of NANCY and urinary retention.   -S/p cordova in ED for urinary retention   -Kidney/bladder sono ordered to r/o hydronephrosis   -CTM   -Urine studies if not improving

## 2025-02-27 ENCOUNTER — INPATIENT (INPATIENT)
Facility: HOSPITAL | Age: 70
LOS: 12 days | Discharge: HOME HEALTH | End: 2025-03-12
Attending: STUDENT IN AN ORGANIZED HEALTH CARE EDUCATION/TRAINING PROGRAM | Admitting: STUDENT IN AN ORGANIZED HEALTH CARE EDUCATION/TRAINING PROGRAM
Payer: MEDICAID

## 2025-02-27 VITALS
WEIGHT: 149.91 LBS | SYSTOLIC BLOOD PRESSURE: 176 MMHG | OXYGEN SATURATION: 96 % | HEIGHT: 55 IN | HEART RATE: 92 BPM | TEMPERATURE: 97 F | RESPIRATION RATE: 32 BRPM | DIASTOLIC BLOOD PRESSURE: 75 MMHG

## 2025-02-27 DIAGNOSIS — R06.02 SHORTNESS OF BREATH: ICD-10-CM

## 2025-02-27 LAB
ALBUMIN SERPL ELPH-MCNC: 3.9 G/DL — SIGNIFICANT CHANGE UP (ref 3.3–5)
ALP SERPL-CCNC: 155 U/L — HIGH (ref 40–120)
ALT FLD-CCNC: 11 U/L — SIGNIFICANT CHANGE UP (ref 4–33)
ANION GAP SERPL CALC-SCNC: 14 MMOL/L — SIGNIFICANT CHANGE UP (ref 7–14)
AST SERPL-CCNC: 20 U/L — SIGNIFICANT CHANGE UP (ref 4–32)
BASOPHILS # BLD AUTO: 0.01 K/UL — SIGNIFICANT CHANGE UP (ref 0–0.2)
BASOPHILS NFR BLD AUTO: 0.1 % — SIGNIFICANT CHANGE UP (ref 0–2)
BILIRUB SERPL-MCNC: 0.3 MG/DL — SIGNIFICANT CHANGE UP (ref 0.2–1.2)
BLOOD GAS VENOUS COMPREHENSIVE RESULT: SIGNIFICANT CHANGE UP
BUN SERPL-MCNC: 11 MG/DL — SIGNIFICANT CHANGE UP (ref 7–23)
CALCIUM SERPL-MCNC: 8.9 MG/DL — SIGNIFICANT CHANGE UP (ref 8.4–10.5)
CHLORIDE SERPL-SCNC: 90 MMOL/L — LOW (ref 98–107)
CK MB BLD-MCNC: 5.5 % — HIGH (ref 0–2.5)
CK MB BLD-MCNC: 6.2 % — HIGH (ref 0–2.5)
CK MB CFR SERPL CALC: 4.5 NG/ML — SIGNIFICANT CHANGE UP
CK MB CFR SERPL CALC: 5.1 NG/ML — HIGH
CK SERPL-CCNC: 82 U/L — SIGNIFICANT CHANGE UP (ref 25–170)
CK SERPL-CCNC: 82 U/L — SIGNIFICANT CHANGE UP (ref 25–170)
CO2 SERPL-SCNC: 19 MMOL/L — LOW (ref 22–31)
CREAT SERPL-MCNC: 0.94 MG/DL — SIGNIFICANT CHANGE UP (ref 0.5–1.3)
EGFR: 65 ML/MIN/1.73M2 — SIGNIFICANT CHANGE UP
EGFR: 65 ML/MIN/1.73M2 — SIGNIFICANT CHANGE UP
EOSINOPHIL # BLD AUTO: 0.01 K/UL — SIGNIFICANT CHANGE UP (ref 0–0.5)
EOSINOPHIL NFR BLD AUTO: 0.1 % — SIGNIFICANT CHANGE UP (ref 0–6)
FLUAV AG NPH QL: SIGNIFICANT CHANGE UP
FLUBV AG NPH QL: SIGNIFICANT CHANGE UP
GLUCOSE SERPL-MCNC: 102 MG/DL — HIGH (ref 70–99)
HCT VFR BLD CALC: 29.8 % — LOW (ref 34.5–45)
HGB BLD-MCNC: 9.3 G/DL — LOW (ref 11.5–15.5)
IANC: 5.05 K/UL — SIGNIFICANT CHANGE UP (ref 1.8–7.4)
IMM GRANULOCYTES NFR BLD AUTO: 0.3 % — SIGNIFICANT CHANGE UP (ref 0–0.9)
LYMPHOCYTES # BLD AUTO: 0.98 K/UL — LOW (ref 1–3.3)
LYMPHOCYTES # BLD AUTO: 13.9 % — SIGNIFICANT CHANGE UP (ref 13–44)
MAGNESIUM SERPL-MCNC: 1.7 MG/DL — SIGNIFICANT CHANGE UP (ref 1.6–2.6)
MCHC RBC-ENTMCNC: 24.7 PG — LOW (ref 27–34)
MCHC RBC-ENTMCNC: 31.2 G/DL — LOW (ref 32–36)
MCV RBC AUTO: 79 FL — LOW (ref 80–100)
MONOCYTES # BLD AUTO: 0.98 K/UL — HIGH (ref 0–0.9)
MONOCYTES NFR BLD AUTO: 13.9 % — SIGNIFICANT CHANGE UP (ref 2–14)
NEUTROPHILS # BLD AUTO: 5.05 K/UL — SIGNIFICANT CHANGE UP (ref 1.8–7.4)
NEUTROPHILS NFR BLD AUTO: 71.7 % — SIGNIFICANT CHANGE UP (ref 43–77)
NRBC # BLD AUTO: 0 K/UL — SIGNIFICANT CHANGE UP (ref 0–0)
NRBC # FLD: 0 K/UL — SIGNIFICANT CHANGE UP (ref 0–0)
NRBC BLD AUTO-RTO: 0 /100 WBCS — SIGNIFICANT CHANGE UP (ref 0–0)
PHOSPHATE SERPL-MCNC: 3.5 MG/DL — SIGNIFICANT CHANGE UP (ref 2.5–4.5)
PLATELET # BLD AUTO: 199 K/UL — SIGNIFICANT CHANGE UP (ref 150–400)
POTASSIUM SERPL-MCNC: 5.2 MMOL/L — SIGNIFICANT CHANGE UP (ref 3.5–5.3)
POTASSIUM SERPL-SCNC: 5.2 MMOL/L — SIGNIFICANT CHANGE UP (ref 3.5–5.3)
PROT SERPL-MCNC: 7.4 G/DL — SIGNIFICANT CHANGE UP (ref 6–8.3)
RBC # BLD: 3.77 M/UL — LOW (ref 3.8–5.2)
RBC # FLD: 16 % — HIGH (ref 10.3–14.5)
RSV RNA NPH QL NAA+NON-PROBE: SIGNIFICANT CHANGE UP
SARS-COV-2 RNA SPEC QL NAA+PROBE: SIGNIFICANT CHANGE UP
SODIUM SERPL-SCNC: 123 MMOL/L — LOW (ref 135–145)
TROPONIN T, HIGH SENSITIVITY RESULT: 13 NG/L — SIGNIFICANT CHANGE UP
TROPONIN T, HIGH SENSITIVITY RESULT: 15 NG/L — SIGNIFICANT CHANGE UP
WBC # BLD: 7.05 K/UL — SIGNIFICANT CHANGE UP (ref 3.8–10.5)
WBC # FLD AUTO: 7.05 K/UL — SIGNIFICANT CHANGE UP (ref 3.8–10.5)

## 2025-02-27 PROCEDURE — 99285 EMERGENCY DEPT VISIT HI MDM: CPT

## 2025-02-27 PROCEDURE — 99292 CRITICAL CARE ADDL 30 MIN: CPT

## 2025-02-27 PROCEDURE — 99291 CRITICAL CARE FIRST HOUR: CPT

## 2025-02-27 PROCEDURE — 71275 CT ANGIOGRAPHY CHEST: CPT | Mod: 26

## 2025-02-27 PROCEDURE — 71045 X-RAY EXAM CHEST 1 VIEW: CPT | Mod: 26

## 2025-02-27 RX ORDER — METHYLPREDNISOLONE ACETATE 80 MG/ML
60 INJECTION, SUSPENSION INTRA-ARTICULAR; INTRALESIONAL; INTRAMUSCULAR; SOFT TISSUE ONCE
Refills: 0 | Status: COMPLETED | OUTPATIENT
Start: 2025-02-27 | End: 2025-02-27

## 2025-02-27 RX ORDER — APIXABAN 2.5 MG/1
5 TABLET, FILM COATED ORAL EVERY 12 HOURS
Refills: 0 | Status: DISCONTINUED | OUTPATIENT
Start: 2025-02-28 | End: 2025-02-28

## 2025-02-27 RX ORDER — ONDANSETRON HCL/PF 4 MG/2 ML
4 VIAL (ML) INJECTION EVERY 8 HOURS
Refills: 0 | Status: DISCONTINUED | OUTPATIENT
Start: 2025-02-27 | End: 2025-03-12

## 2025-02-27 RX ORDER — ACETAMINOPHEN 500 MG/5ML
650 LIQUID (ML) ORAL EVERY 6 HOURS
Refills: 0 | Status: DISCONTINUED | OUTPATIENT
Start: 2025-02-27 | End: 2025-03-12

## 2025-02-27 RX ORDER — ASPIRIN 325 MG
81 TABLET ORAL DAILY
Refills: 0 | Status: DISCONTINUED | OUTPATIENT
Start: 2025-02-27 | End: 2025-03-07

## 2025-02-27 RX ORDER — IPRATROPIUM BROMIDE AND ALBUTEROL SULFATE .5; 2.5 MG/3ML; MG/3ML
3 SOLUTION RESPIRATORY (INHALATION)
Refills: 0 | Status: COMPLETED | OUTPATIENT
Start: 2025-02-27 | End: 2025-02-27

## 2025-02-27 RX ORDER — SERTRALINE 100 MG/1
25 TABLET, FILM COATED ORAL DAILY
Refills: 0 | Status: DISCONTINUED | OUTPATIENT
Start: 2025-02-27 | End: 2025-02-28

## 2025-02-27 RX ORDER — MONTELUKAST SODIUM 10 MG/1
10 TABLET ORAL DAILY
Refills: 0 | Status: DISCONTINUED | OUTPATIENT
Start: 2025-02-27 | End: 2025-03-12

## 2025-02-27 RX ORDER — MAGNESIUM, ALUMINUM HYDROXIDE 200-200 MG
30 TABLET,CHEWABLE ORAL EVERY 4 HOURS
Refills: 0 | Status: DISCONTINUED | OUTPATIENT
Start: 2025-02-27 | End: 2025-03-12

## 2025-02-27 RX ORDER — MAGNESIUM SULFATE 500 MG/ML
2 SYRINGE (ML) INJECTION ONCE
Refills: 0 | Status: COMPLETED | OUTPATIENT
Start: 2025-02-27 | End: 2025-02-27

## 2025-02-27 RX ORDER — LISINOPRIL 30 MG/1
50 TABLET ORAL DAILY
Refills: 0 | Status: DISCONTINUED | OUTPATIENT
Start: 2025-02-27 | End: 2025-03-12

## 2025-02-27 RX ORDER — AMLODIPINE BESYLATE 10 MG/1
10 TABLET ORAL DAILY
Refills: 0 | Status: DISCONTINUED | OUTPATIENT
Start: 2025-02-27 | End: 2025-03-12

## 2025-02-27 RX ORDER — ATORVASTATIN CALCIUM 80 MG/1
20 TABLET, FILM COATED ORAL AT BEDTIME
Refills: 0 | Status: DISCONTINUED | OUTPATIENT
Start: 2025-02-27 | End: 2025-03-12

## 2025-02-27 RX ORDER — TAMSULOSIN HYDROCHLORIDE 0.4 MG/1
0.4 CAPSULE ORAL AT BEDTIME
Refills: 0 | Status: DISCONTINUED | OUTPATIENT
Start: 2025-02-27 | End: 2025-03-12

## 2025-02-27 RX ORDER — ALBUTEROL SULFATE 2.5 MG/3ML
2 VIAL, NEBULIZER (ML) INHALATION ONCE
Refills: 0 | Status: DISCONTINUED | OUTPATIENT
Start: 2025-02-27 | End: 2025-03-12

## 2025-02-27 RX ORDER — IPRATROPIUM BROMIDE AND ALBUTEROL SULFATE .5; 2.5 MG/3ML; MG/3ML
3 SOLUTION RESPIRATORY (INHALATION) EVERY 6 HOURS
Refills: 0 | Status: DISCONTINUED | OUTPATIENT
Start: 2025-02-27 | End: 2025-02-28

## 2025-02-27 RX ORDER — MELATONIN 5 MG
3 TABLET ORAL AT BEDTIME
Refills: 0 | Status: DISCONTINUED | OUTPATIENT
Start: 2025-02-27 | End: 2025-03-12

## 2025-02-27 RX ADMIN — IPRATROPIUM BROMIDE AND ALBUTEROL SULFATE 3 MILLILITER(S): .5; 2.5 SOLUTION RESPIRATORY (INHALATION) at 13:08

## 2025-02-27 RX ADMIN — IPRATROPIUM BROMIDE AND ALBUTEROL SULFATE 3 MILLILITER(S): .5; 2.5 SOLUTION RESPIRATORY (INHALATION) at 13:22

## 2025-02-27 RX ADMIN — Medication 1000 MILLILITER(S): at 19:04

## 2025-02-27 RX ADMIN — METHYLPREDNISOLONE ACETATE 60 MILLIGRAM(S): 80 INJECTION, SUSPENSION INTRA-ARTICULAR; INTRALESIONAL; INTRAMUSCULAR; SOFT TISSUE at 23:00

## 2025-02-27 RX ADMIN — IPRATROPIUM BROMIDE AND ALBUTEROL SULFATE 3 MILLILITER(S): .5; 2.5 SOLUTION RESPIRATORY (INHALATION) at 12:49

## 2025-02-27 RX ADMIN — IPRATROPIUM BROMIDE AND ALBUTEROL SULFATE 3 MILLILITER(S): .5; 2.5 SOLUTION RESPIRATORY (INHALATION) at 22:59

## 2025-02-27 RX ADMIN — Medication 150 GRAM(S): at 23:02

## 2025-02-27 RX ADMIN — METHYLPREDNISOLONE ACETATE 60 MILLIGRAM(S): 80 INJECTION, SUSPENSION INTRA-ARTICULAR; INTRALESIONAL; INTRAMUSCULAR; SOFT TISSUE at 13:04

## 2025-02-27 NOTE — ED PROVIDER NOTE - CARDIAC, MLM
Normal rate, regular rhythm.  Heart sounds S1, S2.  No murmurs, rubs or gallops. No pedal edema or tenderness

## 2025-02-27 NOTE — ED ADULT NURSE REASSESSMENT NOTE - NS ED NURSE REASSESS COMMENT FT1
Report received from day RN: pt resting comfortably in stretcher, breathing even and unlabored. Offers no complaints at this time. Instructed to call for assistance. Stretcher in lowest position, wheels locked, appropriate side rails in place, call bell in reach. Pending admission

## 2025-02-27 NOTE — H&P ADULT - ASSESSMENT
This is a 69F with history of Asthma (on prn supp O2, no hx of intubation), HTN, HFpEF, CVA with L sided deficits (as per son), PE, AFib on Eliquis, and Liver mass who presents to the hospital with complaints of worsening SOB with cough and burning chest pain x 3-4 days found to have acuter asthma exacerbation c/b respiratory failure with hypercapnia and hypoxia.

## 2025-02-27 NOTE — H&P ADULT - PROBLEM SELECTOR PLAN 5
- Known R liver mass, was recommended for outpatient follow up and MRI but son states that the family has been unable to get it done as the patient is very debilitated (needs a wheelchair to get around) and has frequent asthma exacerbations limiting her exertions  - CT here with worsening size of the mass, patient denies GI complaints, no LFT abnormalities noted    -> Will try to obtain MR Abd w/wo as inpatient but if unable then would see if she can be scheduled for one as outpatient with transportation  -> Possible GI eval pending MR imaging

## 2025-02-27 NOTE — ED PROVIDER NOTE - CONSTITUTIONAL, MLM
normal... Well appearing, awake, alert, oriented to person, place, time/situation and in no apparent distress. Ill appearing, awake, alert, oriented to person, place, time/situation and in no apparent distress.

## 2025-02-27 NOTE — ED PROVIDER NOTE - PROGRESS NOTE DETAILS
Attending MD Oliveira.  Pt signed out to me in stable condition pending XRr, note, 68 yo fem with hx asthma, never intubated, no recent hospitalizations, SOB, cough, congestion, bilateral wheeze. JOSE Burt: wheezing improved on ausculation pt no longer with coughing fits and able to rest. Discussed with son-in-law at bedside labs and imaging okay but given lungs still not clear will obtain CTA. Pt will likely need admission for supportive care. Un clear what pt's baseline o2 is but no accessory muscle use at this time to indication increased work of breathing. Favian PGY2: Received patient at signout.  69-year-old female, history of A-fib on Eliquis, COPD, asthma, presents to ED with 1 week of cough, congestion, wheezing.  CTA negative for PE.  Noted increasing size of liver lesion.  Received back-to-back DuoNeb treatments with relief in wheezing but patient still short of breath and requiring 2 L nasal cannula.  Patient to be admitted. Favian PGY2: Received patient at signout.  69-year-old female, history of A-fib on Eliquis, COPD, asthma, presents to ED with 1 week of cough, congestion, wheezing.  CTA negative for PE.  Noted increasing size of liver lesion-son at bedside updated on this.  Received back-to-back DuoNeb treatments with relief in wheezing but patient still short of breath and requiring 2 L nasal cannula.  Patient to be admitted.

## 2025-02-27 NOTE — H&P ADULT - PROBLEM SELECTOR PROBLEM 7
Patient: Jesus Lantigua  : 1941    Encounter Date: 2024    PROGRESS NOTE    Subjective  Chief complaint: Jesus Lantigua is a 82 y.o. male who is an acute skilled patient being seen and evaluated for weakness    HPI: Just finished breakfast, reports that he is waiting for therapy.   Denies any chest pain or tightness.   Reports that he slept well last evening.   HPI      Objective  Vital signs: 125/73-72-18-97.3     Physical Exam  Vitals and nursing note reviewed.   Constitutional:       General: He is not in acute distress.  Eyes:      Extraocular Movements: Extraocular movements intact.   Cardiovascular:      Rate and Rhythm: Normal rate and regular rhythm.   Pulmonary:      Effort: Pulmonary effort is normal.      Breath sounds: Normal breath sounds.      Comments: Lungs clear   Abdominal:      General: Bowel sounds are normal.      Palpations: Abdomen is soft.   Musculoskeletal:      Cervical back: Neck supple.      Right lower leg: No edema.      Left lower leg: No edema.   Neurological:      Mental Status: He is alert.   Psychiatric:         Mood and Affect: Mood normal.         Behavior: Behavior is cooperative.         Assessment/Plan  Problem List Items Addressed This Visit       Essential (primary) hypertension     Monitor blood pressure  Metoprolol         Parkinson's disease (CMS/HCC)     Carbidopa levodopa  Monitor         Type 2 diabetes mellitus without complication (CMS/[facility])     Glucoscans with sliding scale insulin  Insulin  Consistent carb/liberal diabetic diet  No evidence of any hypogylcemia          Weakness - Primary     Continue working in therapy towards goals         Seizure disorder (CMS/[facility])     Phenobarbital  Monitor for seizure activity          Medications, treatments, and labs reviewed  Continue medications and treatments as listed in EMR    JACKIE Jones      Electronically Signed By: JACKIE Jones   24  6:20 PM   Essential hypertension

## 2025-02-27 NOTE — H&P ADULT - NSICDXPASTMEDICALHX_GEN_ALL_CORE_FT
PAST MEDICAL HISTORY:  Asthma     Atrial fibrillation     Brain aneurysm     CVA (cerebrovascular accident)     Hyperlipidemia     Hypertension     Smoking

## 2025-02-27 NOTE — ED ADULT NURSE NOTE - NSFALLHARMRISKINTERV_ED_ALL_ED

## 2025-02-27 NOTE — H&P ADULT - NSHPLABSRESULTS_GEN_ALL_CORE
LABS and ADDITIONAL STUDIES:                        9.3    7.05  )-----------( 199      ( 27 Feb 2025 13:07 )             29.8     123[L]  |  90[L]  |  11  ----------------------------<  102[H]     02-27  5.2   |  19[L]  |  0.94    Ca    8.9      27 Feb 2025 13:07  Phos  3.5     02-27  Mg     1.70     02-27    TPro  7.4  /  Alb  3.9  /  TBili  0.3  /  DBili  x   /  AST  20  /  ALT  11  /  AlkPhos  155[H]  02-27    22:40 - VBG - pH: 7.22  | pCO2: 50    | pO2: 51    | Lactate: 2.4      hs Troponin, T - 13 ng/L (02-27-25 @ 15:27)  Creatine Kinase: 82 U/L (02.27.25 @ 15:27)   CKMB Units: 5.1 ng/mL  CPK Mass Assay %: 6.2 %  hs Troponin, T - 15 ng/L (02-27-25 @ 13:07)  Creatine Kinase: 82 U/L (02.27.25 @ 13:07)   CKMB Units: 4.5 ng/mL  CPK Mass Assay %: 5.5 %    COVID/Flu/RSV - neg    < from: CT Angio Chest PE Protocol w/ IV Cont (02.27.25 @ 19:22) >  FINDINGS:  PULMONARY VESSELS: No main or lobar pulmonary embolus. The segmental and subsegmental branches of the pulmonary arteries are not evaluated secondary to motion.    HEART AND VASCULATURE: Heart size is normal. Coronary calcifications. Aortic atherosclerosis. No pericardial effusion. No aortic aneurysm or dissection.    LUNGS, AIRWAYS, PLEURA: Patent central airways. Emphysema. Bandlike atelectasis within lingula. The lungs are otherwise clear. No pleural effusions or pneumothorax.    MEDIASTINUM: No mass or lymphadenopathy.    UPPER ABDOMEN: Small hiatal hernia. 5.9 x 5 cm hypodense mass within right lobe of the liver is enlarged, previously 3.2 x 1.7 cm, suspicious for neoplasm. Other too small to characterize hypodensities within the liver parenchyma.    BONES AND SOFT TISSUES: Within normal limits.    LOWER NECK: Within normal limits.    IMPRESSION:  No main or lobar pulmonary embolus. The segmental and subsegmental branches of the pulmonary arteries are not evaluated secondary to motion.    5.9 x 5 cm hypodense mass within right lobe of the liver is enlarged, previously 3.2 x 1.7 cm, likely neoplastic. Further evaluation can be performed with MRI.  --- End of Report ---  < end of copied text >    EKG - NSR at 81, QTc 411, no significant ST-T wave changes

## 2025-02-27 NOTE — ED ADULT NURSE NOTE - OBJECTIVE STATEMENT
Pt is A+O1 to self, pt not answering other orientation questions. Per family at bedside, pt Pt is A+O1 to self, pt not answering other orientation questions. Per family at bedside, pt c/o cough and SOb x 1 month. Pt is A+O1 to self, pt not answering other orientation questions. Per family at bedside, pt c/o cough and SOB x 1 month. Hx of HTN and "coughing problem". Even chest rise and fall noted bila Pt is A+O1 to self, pt not answering other orientation questions. Family at bedside translating per pt request. Per family at bedside, pt c/o cough and SOB x 1 month. Hx of HTN and "coughing problem". Family noticed increased work of breathing and tachypnea. Even chest rise and fall noted bilaterally. Pt noted to be tachypneic. Pt able to speak in full sentences. No audible wheezing noted. No accessory muscle use noted. Pt able to maintain SpO2 >95% on room air. Abdomen is soft, nontender, nondistended. Normal sinus noted on cardiac monitor.

## 2025-02-27 NOTE — H&P ADULT - PROBLEM SELECTOR PLAN 1
- Patient with worsening cough and SOB, also with subjective fevers/chills but no findings of PNA on CT  - Reports sore throat but no rhinorrhea, fluvid neg -> Will add on full RVP  - Significant tachypnea but no reported hypoxia, placed on NC for comfort but now VBG shows patient with hypercapnia with acidosis  - Lungs still with significant end-exp wheeze and poor air entry b/l  - CTA Chest neg for PE in main/lobar PE, segmental and subsegmental arteries not evaluated 2/2 motion but low suspicion for PE as patient is on eliquis    -> Patient with acute asthma exacerbation, either 2/2 poorly controlled asthma at home or in setting of possible viral infection (check full RVP), s/p 4 rounds of nebs and solu-medrol 60mg IV in ED, will place on ATC duonebs q6h, give additional solu-medrol 60mg IV x1, and Mg 2g  -> Will place on BiPAP for her acidosis, monitor VBGs, if worsening overnight then would consult MICU, otherwise Pulm c/s in AM  -> Monitor on   -> Given no opacities low suspicion for PNA - will hold off on abx  -> Not on inhaled ICS, given her symptoms might benefit from starting one on discharge - Patient with worsening cough and SOB, also with subjective fevers/chills but no findings of PNA on CT  - Reports sore throat but no rhinorrhea, fluvid neg -> Will add on full RVP  - Significant tachypnea but no reported hypoxia, placed on NC for comfort but now VBG shows patient with hypercapnia with acidosis  - Lungs still with significant end-exp wheeze and poor air entry b/l  - CTA Chest neg for PE in main/lobar PE, segmental and subsegmental arteries not evaluated 2/2 motion but low suspicion for PE as patient is on eliquis    -> Patient with acute asthma exacerbation, either 2/2 poorly controlled asthma at home or in setting of possible viral infection (check full RVP), s/p 4 rounds of nebs and solu-medrol 60mg IV in ED followed by 40mg IV daily, will place on ATC duonebs q6h, give additional solu-medrol 60mg IV x1, and Mg 2g  -> Will place on BiPAP for her acidosis, monitor VBGs, if worsening overnight then would consult MICU, otherwise Pulm c/s in AM  -> Monitor on   -> Given no opacities low suspicion for PNA - will hold off on abx  -> Not on inhaled ICS, given her symptoms might benefit from starting one on discharge

## 2025-02-27 NOTE — ED PROVIDER NOTE - CLINICAL SUMMARY MEDICAL DECISION MAKING FREE TEXT BOX
69-year-old female with past medical history of asthma/COPD (not on home oxygen, never intubated, no recent hospitalizations,) A-fib on Eliquis and ASA, hyperlipidemia, hypertension brought in by son for 1 week of worsening cough, nasal congestion, shortness of breath and coughing fits.  No recent travel or known ill contacts.  Has been using her ventolin, symbicort budesonide and singular with no improvement.  Denies fevers, chills, sore throat, chest pain, palpitations, abdominal pain, vomiting, diarrhea, dysuria, rash, lower leg swelling or pain, syncope    This patient presents with symptoms suspicious for likely viral upper respiratory infection.     Differential includes bacterial pneumonia, sinusitis, allergic rhinitis. Do not suspect underlying cardiopulmonary process. I considered, but think unlikely, dangerous causes of this patient’s symptoms to include ACS, CHF or COPD exacerbations, pneumonia, pneumothorax. Patient is nontoxic appearing and not in need of emergent medical intervention.    Plan: solumederol, duoneb, labs, EKG, CXR, reassess 69-year-old female with past medical history of asthma/COPD (not on home oxygen, never intubated, no recent hospitalizations,) A-fib on Eliquis and ASA, hyperlipidemia, hypertension brought in by son for 1 week of worsening cough, nasal congestion, shortness of breath and coughing fits.  No recent travel or known ill contacts.  Has been using her ventolin, symbicort budesonide and singular with no improvement.  Denies fevers, chills, sore throat, chest pain, palpitations, abdominal pain, vomiting, diarrhea, dysuria, rash, lower leg swelling or pain, syncope    This patient presents with symptoms suspicious for likely viral upper respiratory infection c/b asthma    Differential includes bacterial pneumonia, sinusitis, allergic rhinitis. Do not suspect underlying other cardiopulmonary process. I considered, but think unlikely, dangerous causes of this patient’s symptoms to include ACS, CHF exacerbations, pneumonia, pneumothorax. Patient is nontoxic appearing and not in need of emergent medical intervention.    Plan: solumederol, duoneb, labs, EKG, CXR, reassess

## 2025-02-27 NOTE — H&P ADULT - PROBLEM SELECTOR PLAN 3
- New onset hyponatremia, possibly 2/2 poor oral intake x few days in setting of her worsening SOB/asthma vs medications (on sertraline, not on diuretics) vs SIADH vs other  - Repeat BMP with Na 126 after patient had 1L IVF    -> Check urine osm, urine Na, serum osm  -> Will place on fluid restriction to 1.2L for now for possible SIADH  -> Trend Na

## 2025-02-27 NOTE — ED PROVIDER NOTE - ATTENDING APP SHARED VISIT CONTRIBUTION OF CARE
Dr. Narayanan: 70 yo female with COPD/asthma not on O2 at home, afib on Eliquis and ASA, HLD, HTN, in ED with 1 week of nasal congestion and SOB associated with cough.  She continues to use her prescribed asthma/COPD medications without relief.  No fever, CP, N/V/D or abdominal pain.  On exam pt chronically-ill appearing, in mild respiratory distress due to significant coughing, heart irregular, lungs coarse breath sounds throughout, abd NTND, extremities without swelling, strength 5/5 in all extremities and skin without rash.    IFabio MD, personally made/approved the management plan for this patient and take responsibility for the patient's management.

## 2025-02-27 NOTE — ED ADULT NURSE REASSESSMENT NOTE - NS ED NURSE REASSESS COMMENT FT1
Pt at baseline mental status. Spontaneous respirations are even and unlabored on room air. Pt able to maintain SpO2 >95% on room air. Normal sinus noted on cardiac monitor. Medicated as prescribed.

## 2025-02-27 NOTE — ED ADULT TRIAGE NOTE - AS HEIGHT TYPE
stated - d/c Lantus 6 units qHS in setting of hypoglycemia 2/2 poor PO intake   - ISS  - Monitor FSG

## 2025-02-27 NOTE — ED PROVIDER NOTE - NSICDXPASTMEDICALHX_GEN_ALL_CORE_FT
PAST MEDICAL HISTORY:  Asthma     Atrial fibrillation     Brain aneurysm     Hyperlipidemia     Hypertension     Smoking

## 2025-02-27 NOTE — H&P ADULT - PROBLEM SELECTOR PLAN 2
- Patient with hypercapnia and acidosis on VBG in setting of acute asthma exacerbation  - Lungs still with significant diffuse wheezing and poor air entry as above    -> c/w acute asthma exacerbation management as above  -> Will start patient on BiPAP, was on BiPAP last year and tolerated it well, will hold off on MICU consult for now, trend VBG, if not improving on BiPAP then would obtain MICU consult, if improving then pulmonary c/s in AM - Patient with hypercapnia and acidosis on VBG in setting of acute asthma exacerbation  - Lungs still with significant diffuse wheezing and poor air entry as above    -> c/w acute asthma exacerbation management as above  -> Will start patient on BiPAP, was on BiPAP last year and tolerated it well, will hold off on MICU consult for now, trend VBG, if not improving on BiPAP then would obtain MICU consult, if improving then pulmonary c/s in AM    Addendum:  - Delay in patient being started on BiPAP as she is currently in a holding area in the ED, currently has a bed on the [facility] and is being prepped to be moved up where she will be started on BiPAP. Will check a repeat VBG a few hours after starting BiPAP. Patient reassessed, currently sleeping but easily arousable, alert, follows commands. Lungs current improved from initial examination after she received additional solu-medrol, duoneb, and mag, better air movement, no wheezing present. Will monitor.

## 2025-02-27 NOTE — ED ADULT TRIAGE NOTE - CHIEF COMPLAINT QUOTE
Patient c/o cough and SOB x 1 month, worse over the past week. Denies fever, c/o chest pain. Patient tachypneic with labored breathing and with productive cough in triage, oxygen 96%. Phx HTN

## 2025-02-27 NOTE — H&P ADULT - PROBLEM SELECTOR PLAN 4
- Reports intermittent burning quality chest/abd pain, none during examination  - Troponin neg x2, EKG without acute ischemic findings    -> possibly having chest pain in setting of her acute asthma exacerbation but would monitor on telemetry for now, check TTE  -> Check A1C, lipid profile, TSH  -> Consider cards c/s in AM

## 2025-02-27 NOTE — ED PROVIDER NOTE - CONSIDERATION OF ADMISSION OBSERVATION
Consideration of Admission/Observation pt still with significant SOB despite ED treatment, will require admission

## 2025-02-27 NOTE — ED ADULT NURSE REASSESSMENT NOTE - NS ED NURSE REASSESS COMMENT FT1
Pt resting in stretcher. Spontaneous respirations are even and unlabored, pt able to maintain SpO2 >95% on room air. No acute distress noted. Normal sinus noted on cardiac monitor. Awaiting xray results

## 2025-02-27 NOTE — ED PROVIDER NOTE - OBJECTIVE STATEMENT
69-year-old female with past medical history of asthma/COPD (not on home oxygen, never intubated, no recent hospitalizations,) A-fib on Eliquis and ASA, hyperlipidemia, hypertension brought in by son for 1 week of worsening cough, nasal congestion, shortness of breath and coughing fits.  No recent travel or known ill contacts.  Has been using her ventolin, symbicort budesonide and singular with no improvement.  Denies fevers, chills, sore throat, chest pain, palpitations, abdominal pain, vomiting, diarrhea, dysuria, rash, lower leg swelling or pain, syncope

## 2025-02-27 NOTE — H&P ADULT - NSHPREVIEWOFSYSTEMS_GEN_ALL_CORE
REVIEW OF SYSTEMS:    CONSTITUTIONAL: No weakness, +Subj fevers and chills  EYES: +Chronic R eye vision changes x3 years, No acute visual changes or eye discharge  ENT: No rhinorrhea, +sore throat  NECK: No pain or stiffness  RESPIRATORY: +cough with white sputum, +wheezing, Denies hemoptysis; +shortness of breath  CARDIOVASCULAR: +Burning chest/abd pain, No palpitations; No lower extremity edema  GASTROINTESTINAL: No abdominal or epigastric pain. No nausea, vomiting, or hematemesis; No diarrhea or constipation. No melena or hematochezia.  BACK: No back pain  GENITOURINARY: No dysuria, frequency or hematuria  NEUROLOGICAL: No numbness or weakness  SKIN: No itching, burning, rashes, or lesions

## 2025-02-27 NOTE — H&P ADULT - HISTORY OF PRESENT ILLNESS
This is a 69F with history of Asthma (on prn supp O2, no hx of intubation), HTN, HFpEF, CVA with L sided deficits (as per son), PE on Eliquis, and Liver mass who presents to the hospital with complaints of worsening SOB with cough and burning chest pain x 3-4 days.  This is a 69F with history of Asthma (on prn supp O2, no hx of intubation), HTN, HFpEF, CVA with L sided deficits (as per son), PE, AFib on Eliquis, and Liver mass who presents to the hospital with complaints of worsening SOB with cough and burning chest pain x 3-4 days. Patient and her son state that she has had significant issues with her asthma and is never fully controlled. Over the past few days her symptoms have worsened and she has been using her inhalers and nebulizers without significant improvement in her symptoms. Son states patient has not really slept in 2 days due to persistent coughing. Was therefore brought to the ED. Patient currently reports having subjective fevers/chills, cough with white colored sputum (denies hemoptysis), sore throat, and intermittent chest/abd burning style pain (none currently). Denies other acute complaints.     On arrival to the ED her vitals were T 97.1, P 92, /75, RR 32, O2 sat 96% RA. Her lab work showed microcytic anemia (chronic), new hyponatremia, negative troponin x2, negative fluvid. Her imaging showed clear lungs, no PE (limited evaluation of the segmental/subsegmental branches), and an enlarging R liver mass. She was given duonebs x3, solumedrol 60 x1, and NS 1L. She was admitted to medicine.  no vomiting/no constipation/no abdominal pain

## 2025-02-27 NOTE — H&P ADULT - NSHPSOCIALHISTORY_GEN_ALL_CORE
Lives with family  Currently ambulatory mostly with a wheelchair  Former tobacco use, quit ~15 years ago  Denies EtOH use

## 2025-02-27 NOTE — H&P ADULT - PROBLEM SELECTOR PLAN 10
DVT ppx - On Eliquis  Diet - DASH soft and bite sized diet  Activity - OOB with assistance, increase as tolerated    Fall and aspiration precautions

## 2025-02-27 NOTE — H&P ADULT - NSHPPHYSICALEXAM_GEN_ALL_CORE
Vital Signs Last 24 Hrs  T(C): 36.6 (27 Feb 2025 23:15), Max: 37 (27 Feb 2025 16:26)  T(F): 97.8 (27 Feb 2025 23:15), Max: 98.6 (27 Feb 2025 16:26)  HR: 111 (27 Feb 2025 23:15) (89 - 111)  BP: 149/93 (27 Feb 2025 23:15) (143/62 - 176/75)  BP(mean): --  RR: 22 (27 Feb 2025 23:15) (22 - 32)  SpO2: 100% (27 Feb 2025 23:15) (94% - 100%)    Parameters below as of 27 Feb 2025 23:15  Patient On (Oxygen Delivery Method): nasal cannula  O2 Flow (L/min): 3    GENERAL: +distress 2/2 SOB, well-developed  EYES/ENT: EOMI, PERRL, conjunctiva and sclera clear, Neck supple, No JVD, moist mucosa  CHEST/LUNG: Poor air entry b/l, diffuse end-exp wheezing b/l, no crackles  BACK: No spinal tenderness  HEART: Difficult to auscultate over lung sounds but overall Regular rate and rhythm; No murmurs, rubs, or gallops  ABDOMEN: Soft, Nontender, Nondistended; Bowel sounds present  EXTREMITIES: +radial pulses b/l, +cap refill b/l, No LE edema or asymmetry b/l  PSYCH: Nl behavior, nl affect  NEUROLOGY: AAOx3, non-focal  SKIN: Normal color, No rashes or lesions

## 2025-02-27 NOTE — ED PROVIDER NOTE - PATIENT'S PREFERRED PRONOUN
Patient discharged to home at this time with tristan. Taken downstairs in wheelchair by [employer]. IV and tele removed. [facility] educated on discharge instructions, verbalized understanding and denied questions. No complications prior to discharge. Discharged with all belongings. Her/She

## 2025-02-27 NOTE — H&P ADULT - CRITICAL CARE ATTENDING COMMENT
Upon my evaluation, this patient had a high probability of imminent or life-threatening deterioration due to concern for acute asthma exacerbation with acute respiratory failure with hypercapnia, which required my direct attention, intervention, and personal management.  The patient has a  medical condition that impairs one or more vital organ systems.  Frequent personal assessment and adjustment of medical interventions was performed.       I have personally provided 120 minutes of critical care time exclusive of time spent on separately billable procedures. Time includes review of laboratory data, radiology results, discussion with consultants, patient and family; monitoring for potential decompensation, as well as time spent retrieving data and reviewing the chart and documenting the visit. Interventions were performed as documented above.

## 2025-02-28 ENCOUNTER — RESULT REVIEW (OUTPATIENT)
Age: 70
End: 2025-02-28

## 2025-02-28 DIAGNOSIS — I48.0 PAROXYSMAL ATRIAL FIBRILLATION: ICD-10-CM

## 2025-02-28 DIAGNOSIS — I10 ESSENTIAL (PRIMARY) HYPERTENSION: ICD-10-CM

## 2025-02-28 DIAGNOSIS — E87.1 HYPO-OSMOLALITY AND HYPONATREMIA: ICD-10-CM

## 2025-02-28 DIAGNOSIS — E78.5 HYPERLIPIDEMIA, UNSPECIFIED: ICD-10-CM

## 2025-02-28 DIAGNOSIS — J45.901 UNSPECIFIED ASTHMA WITH (ACUTE) EXACERBATION: ICD-10-CM

## 2025-02-28 DIAGNOSIS — J96.01 ACUTE RESPIRATORY FAILURE WITH HYPOXIA: ICD-10-CM

## 2025-02-28 DIAGNOSIS — Z29.9 ENCOUNTER FOR PROPHYLACTIC MEASURES, UNSPECIFIED: ICD-10-CM

## 2025-02-28 DIAGNOSIS — R07.9 CHEST PAIN, UNSPECIFIED: ICD-10-CM

## 2025-02-28 DIAGNOSIS — R16.0 HEPATOMEGALY, NOT ELSEWHERE CLASSIFIED: ICD-10-CM

## 2025-02-28 DIAGNOSIS — J96.02 ACUTE RESPIRATORY FAILURE WITH HYPERCAPNIA: ICD-10-CM

## 2025-02-28 DIAGNOSIS — Z86.73 PERSONAL HISTORY OF TRANSIENT ISCHEMIC ATTACK (TIA), AND CEREBRAL INFARCTION WITHOUT RESIDUAL DEFICITS: ICD-10-CM

## 2025-02-28 PROBLEM — I67.1 CEREBRAL ANEURYSM, NONRUPTURED: Chronic | Status: INACTIVE | Noted: 2021-06-29 | Resolved: 2025-02-27

## 2025-02-28 LAB
ADD ON TEST-SPECIMEN IN LAB: SIGNIFICANT CHANGE UP
ADD ON TEST-SPECIMEN IN LAB: SIGNIFICANT CHANGE UP
ALBUMIN SERPL ELPH-MCNC: 3.7 G/DL — SIGNIFICANT CHANGE UP (ref 3.3–5)
ALP SERPL-CCNC: 149 U/L — HIGH (ref 40–120)
ALT FLD-CCNC: 10 U/L — SIGNIFICANT CHANGE UP (ref 4–33)
ANION GAP SERPL CALC-SCNC: 13 MMOL/L — SIGNIFICANT CHANGE UP (ref 7–14)
ANION GAP SERPL CALC-SCNC: 16 MMOL/L — HIGH (ref 7–14)
AST SERPL-CCNC: 18 U/L — SIGNIFICANT CHANGE UP (ref 4–32)
B PERT DNA SPEC QL NAA+PROBE: SIGNIFICANT CHANGE UP
B PERT+PARAPERT DNA PNL SPEC NAA+PROBE: SIGNIFICANT CHANGE UP
BILIRUB SERPL-MCNC: 0.2 MG/DL — SIGNIFICANT CHANGE UP (ref 0.2–1.2)
BLOOD GAS VENOUS COMPREHENSIVE RESULT: SIGNIFICANT CHANGE UP
BUN SERPL-MCNC: 11 MG/DL — SIGNIFICANT CHANGE UP (ref 7–23)
BUN SERPL-MCNC: 14 MG/DL — SIGNIFICANT CHANGE UP (ref 7–23)
C PNEUM DNA SPEC QL NAA+PROBE: SIGNIFICANT CHANGE UP
CALCIUM SERPL-MCNC: 8.6 MG/DL — SIGNIFICANT CHANGE UP (ref 8.4–10.5)
CALCIUM SERPL-MCNC: 8.9 MG/DL — SIGNIFICANT CHANGE UP (ref 8.4–10.5)
CHLORIDE SERPL-SCNC: 93 MMOL/L — LOW (ref 98–107)
CHLORIDE SERPL-SCNC: 95 MMOL/L — LOW (ref 98–107)
CHLORIDE SERPL-SCNC: 96 MMOL/L — LOW (ref 98–107)
CO2 SERPL-SCNC: 17 MMOL/L — LOW (ref 22–31)
CO2 SERPL-SCNC: 19 MMOL/L — LOW (ref 22–31)
CREAT SERPL-MCNC: 0.85 MG/DL — SIGNIFICANT CHANGE UP (ref 0.5–1.3)
CREAT SERPL-MCNC: 0.87 MG/DL — SIGNIFICANT CHANGE UP (ref 0.5–1.3)
EGFR: 72 ML/MIN/1.73M2 — SIGNIFICANT CHANGE UP
EGFR: 72 ML/MIN/1.73M2 — SIGNIFICANT CHANGE UP
EGFR: 74 ML/MIN/1.73M2 — SIGNIFICANT CHANGE UP
EGFR: 74 ML/MIN/1.73M2 — SIGNIFICANT CHANGE UP
FLUAV SUBTYP SPEC NAA+PROBE: SIGNIFICANT CHANGE UP
FLUBV RNA SPEC QL NAA+PROBE: SIGNIFICANT CHANGE UP
GLUCOSE SERPL-MCNC: 146 MG/DL — HIGH (ref 70–99)
GLUCOSE SERPL-MCNC: 163 MG/DL — HIGH (ref 70–99)
HADV DNA SPEC QL NAA+PROBE: SIGNIFICANT CHANGE UP
HCOV 229E RNA SPEC QL NAA+PROBE: SIGNIFICANT CHANGE UP
HCOV HKU1 RNA SPEC QL NAA+PROBE: SIGNIFICANT CHANGE UP
HCOV NL63 RNA SPEC QL NAA+PROBE: SIGNIFICANT CHANGE UP
HCOV OC43 RNA SPEC QL NAA+PROBE: SIGNIFICANT CHANGE UP
HCT VFR BLD CALC: 28.5 % — LOW (ref 34.5–45)
HGB BLD-MCNC: 8.6 G/DL — LOW (ref 11.5–15.5)
HMPV RNA SPEC QL NAA+PROBE: SIGNIFICANT CHANGE UP
HPIV1 RNA SPEC QL NAA+PROBE: SIGNIFICANT CHANGE UP
HPIV2 RNA SPEC QL NAA+PROBE: SIGNIFICANT CHANGE UP
HPIV3 RNA SPEC QL NAA+PROBE: SIGNIFICANT CHANGE UP
HPIV4 RNA SPEC QL NAA+PROBE: SIGNIFICANT CHANGE UP
LACTATE SERPL-SCNC: 1.7 MMOL/L — SIGNIFICANT CHANGE UP (ref 0.5–2)
LACTATE SERPL-SCNC: 2.2 MMOL/L — HIGH (ref 0.5–2)
M PNEUMO DNA SPEC QL NAA+PROBE: SIGNIFICANT CHANGE UP
MAGNESIUM SERPL-MCNC: 1.7 MG/DL — SIGNIFICANT CHANGE UP (ref 1.6–2.6)
MAGNESIUM SERPL-MCNC: 2.1 MG/DL — SIGNIFICANT CHANGE UP (ref 1.6–2.6)
MAGNESIUM SERPL-MCNC: 2.4 MG/DL — SIGNIFICANT CHANGE UP (ref 1.6–2.6)
MCHC RBC-ENTMCNC: 24 PG — LOW (ref 27–34)
MCHC RBC-ENTMCNC: 30.2 G/DL — LOW (ref 32–36)
MCV RBC AUTO: 79.6 FL — LOW (ref 80–100)
NRBC # BLD AUTO: 0 K/UL — SIGNIFICANT CHANGE UP (ref 0–0)
NRBC # FLD: 0 K/UL — SIGNIFICANT CHANGE UP (ref 0–0)
NRBC BLD AUTO-RTO: 0 /100 WBCS — SIGNIFICANT CHANGE UP (ref 0–0)
NT-PROBNP SERPL-SCNC: 372 PG/ML — HIGH
OSMOLALITY SERPL: 275 MOSM/KG — SIGNIFICANT CHANGE UP (ref 275–295)
OSMOLALITY UR: 230 MOSM/KG — SIGNIFICANT CHANGE UP (ref 50–1200)
PHOSPHATE SERPL-MCNC: 4.2 MG/DL — SIGNIFICANT CHANGE UP (ref 2.5–4.5)
PHOSPHATE SERPL-MCNC: 4.5 MG/DL — SIGNIFICANT CHANGE UP (ref 2.5–4.5)
PLATELET # BLD AUTO: 204 K/UL — SIGNIFICANT CHANGE UP (ref 150–400)
POTASSIUM SERPL-MCNC: 4.6 MMOL/L — SIGNIFICANT CHANGE UP (ref 3.5–5.3)
POTASSIUM SERPL-MCNC: 5.3 MMOL/L — SIGNIFICANT CHANGE UP (ref 3.5–5.3)
POTASSIUM SERPL-MCNC: 5.5 MMOL/L — HIGH (ref 3.5–5.3)
POTASSIUM SERPL-SCNC: 4.6 MMOL/L — SIGNIFICANT CHANGE UP (ref 3.5–5.3)
POTASSIUM SERPL-SCNC: 5.3 MMOL/L — SIGNIFICANT CHANGE UP (ref 3.5–5.3)
POTASSIUM SERPL-SCNC: 5.5 MMOL/L — HIGH (ref 3.5–5.3)
PROT SERPL-MCNC: 6.9 G/DL — SIGNIFICANT CHANGE UP (ref 6–8.3)
RAPID RVP RESULT: SIGNIFICANT CHANGE UP
RBC # BLD: 3.58 M/UL — LOW (ref 3.8–5.2)
RBC # FLD: 16 % — HIGH (ref 10.3–14.5)
RSV RNA SPEC QL NAA+PROBE: SIGNIFICANT CHANGE UP
RV+EV RNA SPEC QL NAA+PROBE: SIGNIFICANT CHANGE UP
SARS-COV-2 RNA SPEC QL NAA+PROBE: SIGNIFICANT CHANGE UP
SODIUM SERPL-SCNC: 126 MMOL/L — LOW (ref 135–145)
SODIUM SERPL-SCNC: 127 MMOL/L — LOW (ref 135–145)
SODIUM SERPL-SCNC: 128 MMOL/L — LOW (ref 135–145)
SODIUM UR-SCNC: 83 MMOL/L — SIGNIFICANT CHANGE UP
WBC # BLD: 3.88 K/UL — SIGNIFICANT CHANGE UP (ref 3.8–10.5)
WBC # FLD AUTO: 3.88 K/UL — SIGNIFICANT CHANGE UP (ref 3.8–10.5)

## 2025-02-28 PROCEDURE — 99223 1ST HOSP IP/OBS HIGH 75: CPT

## 2025-02-28 PROCEDURE — 93306 TTE W/DOPPLER COMPLETE: CPT | Mod: 26

## 2025-02-28 PROCEDURE — 99233 SBSQ HOSP IP/OBS HIGH 50: CPT

## 2025-02-28 RX ORDER — ALBUTEROL SULFATE 2.5 MG/3ML
2 VIAL, NEBULIZER (ML) INHALATION EVERY 4 HOURS
Refills: 0 | Status: DISCONTINUED | OUTPATIENT
Start: 2025-02-28 | End: 2025-03-05

## 2025-02-28 RX ORDER — IPRATROPIUM BROMIDE AND ALBUTEROL SULFATE .5; 2.5 MG/3ML; MG/3ML
3 SOLUTION RESPIRATORY (INHALATION) EVERY 4 HOURS
Refills: 0 | Status: DISCONTINUED | OUTPATIENT
Start: 2025-02-28 | End: 2025-03-05

## 2025-02-28 RX ORDER — AZITHROMYCIN 250 MG
500 CAPSULE ORAL EVERY 24 HOURS
Refills: 0 | Status: COMPLETED | OUTPATIENT
Start: 2025-02-28 | End: 2025-03-04

## 2025-02-28 RX ORDER — METHYLPREDNISOLONE ACETATE 80 MG/ML
40 INJECTION, SUSPENSION INTRA-ARTICULAR; INTRALESIONAL; INTRAMUSCULAR; SOFT TISSUE EVERY 12 HOURS
Refills: 0 | Status: DISCONTINUED | OUTPATIENT
Start: 2025-02-28 | End: 2025-03-02

## 2025-02-28 RX ORDER — BUDESONIDE 0.25 MG/2ML
0.5 SUSPENSION RESPIRATORY (INHALATION)
Refills: 0 | Status: DISCONTINUED | OUTPATIENT
Start: 2025-02-28 | End: 2025-03-11

## 2025-02-28 RX ORDER — METHYLPREDNISOLONE ACETATE 80 MG/ML
40 INJECTION, SUSPENSION INTRA-ARTICULAR; INTRALESIONAL; INTRAMUSCULAR; SOFT TISSUE DAILY
Refills: 0 | Status: DISCONTINUED | OUTPATIENT
Start: 2025-02-28 | End: 2025-02-28

## 2025-02-28 RX ORDER — ENOXAPARIN SODIUM 100 MG/ML
70 INJECTION SUBCUTANEOUS
Refills: 0 | Status: DISCONTINUED | OUTPATIENT
Start: 2025-02-28 | End: 2025-03-09

## 2025-02-28 RX ORDER — SODIUM ZIRCONIUM CYCLOSILICATE 5 G/5G
10 POWDER, FOR SUSPENSION ORAL ONCE
Refills: 0 | Status: COMPLETED | OUTPATIENT
Start: 2025-02-28 | End: 2025-02-28

## 2025-02-28 RX ADMIN — ENOXAPARIN SODIUM 70 MILLIGRAM(S): 100 INJECTION SUBCUTANEOUS at 18:15

## 2025-02-28 RX ADMIN — Medication 20 MILLIGRAM(S): at 12:54

## 2025-02-28 RX ADMIN — METHYLPREDNISOLONE ACETATE 40 MILLIGRAM(S): 80 INJECTION, SUSPENSION INTRA-ARTICULAR; INTRALESIONAL; INTRAMUSCULAR; SOFT TISSUE at 11:34

## 2025-02-28 RX ADMIN — IPRATROPIUM BROMIDE AND ALBUTEROL SULFATE 3 MILLILITER(S): .5; 2.5 SOLUTION RESPIRATORY (INHALATION) at 20:02

## 2025-02-28 RX ADMIN — TAMSULOSIN HYDROCHLORIDE 0.4 MILLIGRAM(S): 0.4 CAPSULE ORAL at 21:13

## 2025-02-28 RX ADMIN — AMLODIPINE BESYLATE 10 MILLIGRAM(S): 10 TABLET ORAL at 05:17

## 2025-02-28 RX ADMIN — ATORVASTATIN CALCIUM 20 MILLIGRAM(S): 80 TABLET, FILM COATED ORAL at 21:13

## 2025-02-28 RX ADMIN — Medication 250 MILLIGRAM(S): at 14:41

## 2025-02-28 RX ADMIN — LISINOPRIL 50 MILLIGRAM(S): 30 TABLET ORAL at 05:17

## 2025-02-28 RX ADMIN — BUDESONIDE 0.5 MILLIGRAM(S): 0.25 SUSPENSION RESPIRATORY (INHALATION) at 20:07

## 2025-02-28 RX ADMIN — Medication 81 MILLIGRAM(S): at 12:53

## 2025-02-28 RX ADMIN — SODIUM ZIRCONIUM CYCLOSILICATE 10 GRAM(S): 5 POWDER, FOR SUSPENSION ORAL at 14:41

## 2025-02-28 RX ADMIN — IPRATROPIUM BROMIDE AND ALBUTEROL SULFATE 3 MILLILITER(S): .5; 2.5 SOLUTION RESPIRATORY (INHALATION) at 04:58

## 2025-02-28 RX ADMIN — MONTELUKAST SODIUM 10 MILLIGRAM(S): 10 TABLET ORAL at 12:54

## 2025-02-28 RX ADMIN — METHYLPREDNISOLONE ACETATE 40 MILLIGRAM(S): 80 INJECTION, SUSPENSION INTRA-ARTICULAR; INTRALESIONAL; INTRAMUSCULAR; SOFT TISSUE at 18:14

## 2025-02-28 RX ADMIN — APIXABAN 5 MILLIGRAM(S): 2.5 TABLET, FILM COATED ORAL at 05:17

## 2025-02-28 RX ADMIN — IPRATROPIUM BROMIDE AND ALBUTEROL SULFATE 3 MILLILITER(S): .5; 2.5 SOLUTION RESPIRATORY (INHALATION) at 10:19

## 2025-02-28 RX ADMIN — IPRATROPIUM BROMIDE AND ALBUTEROL SULFATE 3 MILLILITER(S): .5; 2.5 SOLUTION RESPIRATORY (INHALATION) at 16:29

## 2025-02-28 NOTE — CONSULT NOTE ADULT - SUBJECTIVE AND OBJECTIVE BOX
[facility]  [place] [id number]    This is a 69F with history of Asthma (on prn supp O2, no hx of intubation), HTN, HFpEF, CVA with L sided deficits (as per son), PE, AFib on Eliquis, and Liver mass who presents to the hospital with complaints of worsening SOB with cough and burning chest pain x 3-4 days. Patient and her son state that she has had significant issues with her asthma and is never fully controlled. Over the past few days her symptoms have worsened and she has been using her inhalers and nebulizers without significant improvement in her symptoms. Son states patient has not really slept in 2 days due to persistent coughing. Was therefore brought to the ED. Patient currently reports having subjective fevers/chills, cough with white colored sputum (denies hemoptysis), sore throat, and intermittent chest/abd burning style pain (none currently). Denies other acute complaints.     On arrival to the ED her vitals were T 97.1, P 92, /75, RR 32, O2 sat 96% RA. Her lab work showed microcytic anemia (chronic), new hyponatremia, negative troponin x2, negative fluvid. Her imaging showed clear lungs, no PE (limited evaluation of the segmental/subsegmental branches), and an enlarging R liver mass. She was given duonebs x3, solumedrol 60 x1, and NS 1L. She was admitted to medicine.       PAST MEDICAL & SURGICAL HISTORY:  Brain aneurysm      Hypertension      Smoking      Atrial fibrillation      Asthma      Hyperlipidemia      CVA (cerebrovascular accident)      No significant past surgical history          FAMILY HISTORY:  No pertinent family history in first degree relatives        SOCIAL HISTORY:  Smoking: [ ] Never Smoked [X] Former Smoker (__ packs x ___ years) [ ] Current Smoker  (__ packs x ___ years)  Substance Use: [ ] Never Used [ ] Used ____  EtOH Use:  Marital Status: [ ] Single [ ]  [ ]  [ ]   Sexual History:   Occupation:  Recent Travel:  Country of Birth:  Advance Directives:    Allergies    No Known Allergies    Intolerances        HOME MEDICATIONS:    REVIEW OF SYSTEMS:  Constitutional: [ X] negative [ ] fevers [ ] chills [ ] weight loss [ ] weight gain  HEENT: [ X] negative [ ] dry eyes [ ] eye irritation [ ] postnasal drip [ ] nasal congestion  CV: [ X] negative  [ ] chest pain [ ] orthopnea [ ] palpitations [ ] murmur  Resp: [ ] negative [ X] cough [ X] shortness of breath [ ] dyspnea [ ] wheezing [ ] sputum [ ] hemoptysis  GI: [X ] negative [ ] nausea [ ] vomiting [ ] diarrhea [ ] constipation [ ] abd pain [ ] dysphagia   : [ X] negative [ ] dysuria [ ] nocturia [ ] hematuria [ ] increased urinary frequency  Musculoskeletal: [ ] negative [ ] back pain [ ] myalgias [ ] arthralgias [ ] fracture  Skin: [ ] negative [ ] rash [ ] itch  Neurological: [ ] negative [ ] headache [ ] dizziness [ ] syncope [ ] weakness [ ] numbness  Psychiatric: [ ] negative [ ] anxiety [ ] depression  Endocrine: [ ] negative [ ] diabetes [ ] thyroid problem  Hematologic/Lymphatic: [ ] negative [ ] anemia [ ] bleeding problem  Allergic/Immunologic: [ ] negative [ ] itchy eyes [ ] nasal discharge [ ] hives [ ] angioedema  [ ] All other systems negative  [ ] Unable to assess ROS because ________    OBJECTIVE:  ICU Vital Signs Last 24 Hrs  T(C): 36.4 (28 Feb 2025 08:34), Max: 37 (27 Feb 2025 16:26)  T(F): 97.6 (28 Feb 2025 08:34), Max: 98.6 (27 Feb 2025 16:26)  HR: 66 (28 Feb 2025 08:34) (66 - 111)  BP: 144/50 (28 Feb 2025 08:34) (135/66 - 176/75)  BP(mean): 95 (27 Feb 2025 23:51) (95 - 95)  ABP: --  ABP(mean): --  RR: 16 (28 Feb 2025 08:34) (16 - 32)  SpO2: 100% (28 Feb 2025 08:34) (94% - 100%)    O2 Parameters below as of 28 Feb 2025 08:34  Patient On (Oxygen Delivery Method): BiPAP/CPAP              02-27 @ 07:01  -  02-28 @ 07:00  --------------------------------------------------------  IN: 240 mL / OUT: 400 mL / NET: -160 mL      CAPILLARY BLOOD GLUCOSE          PHYSICAL EXAM:  General: no acute distress, speaking in full sentences, audible wheezing  HEENT: no icterus very poor dentition   Neck: supple  Respiratory: diffuse wheezing bilaterally  Cardiovascular: s1/s2, rrr  Abdomen: soft nontender  Extremities: no le edema  Skin: no rash  Neurological: axox2  Psychiatry:difficult to assess    HOSPITAL MEDICATIONS:  apixaban 5 milliGRAM(s) Oral every 12 hours  aspirin enteric coated 81 milliGRAM(s) Oral daily      amLODIPine   Tablet 10 milliGRAM(s) Oral daily  atenolol  Tablet 50 milliGRAM(s) Oral daily    atorvastatin 20 milliGRAM(s) Oral at bedtime  methylPREDNISolone sodium succinate Injectable 40 milliGRAM(s) IV Push daily    albuterol    90 MICROgram(s) HFA Inhaler 2 Puff(s) Inhalation once  albuterol/ipratropium for Nebulization 3 milliLiter(s) Nebulizer every 6 hours  montelukast 10 milliGRAM(s) Oral daily    acetaminophen     Tablet .. 650 milliGRAM(s) Oral every 6 hours PRN  melatonin 3 milliGRAM(s) Oral at bedtime PRN  ondansetron Injectable 4 milliGRAM(s) IV Push every 8 hours PRN    aluminum hydroxide/magnesium hydroxide/simethicone Suspension 30 milliLiter(s) Oral every 4 hours PRN  famotidine    Tablet 20 milliGRAM(s) Oral daily      tamsulosin 0.4 milliGRAM(s) Oral at bedtime              LABS:                        8.6    3.88  )-----------( 204      ( 28 Feb 2025 09:27 )             28.5     Hgb Trend: 8.6<--, 9.3<--  02-28    128[L]  |  96[L]  |  14  ----------------------------<  146[H]  5.5[H]   |  19[L]  |  0.87    Ca    8.9      28 Feb 2025 07:40  Phos  4.5     02-28  Mg     2.40     02-28    TPro  6.9  /  Alb  3.7  /  TBili  0.2  /  DBili  x   /  AST  18  /  ALT  10  /  AlkPhos  149[H]  02-28    Creatinine Trend: 0.87<--, 0.85<--, 0.94<--    Urinalysis Basic - ( 28 Feb 2025 07:40 )    Color: x / Appearance: x / SG: x / pH: x  Gluc: 146 mg/dL / Ketone: x  / Bili: x / Urobili: x   Blood: x / Protein: x / Nitrite: x   Leuk Esterase: x / RBC: x / WBC x   Sq Epi: x / Non Sq Epi: x / Bacteria: x        Venous Blood Gas:  02-28 @ 07:40  7.23/53/52/22/78.2  VBG Lactate: 1.4  Venous Blood Gas:  02-27 @ 22:40  7.22/50/51/20/75.9  VBG Lactate: 2.4      MICROBIOLOGY:     RADIOLOGY:  [X ] Reviewed and interpreted by me

## 2025-02-28 NOTE — PROGRESS NOTE ADULT - SUBJECTIVE AND OBJECTIVE BOX
[facility] Division of Hospital Medicine  Tong Logan MD  Contact via [employer] Teams (Mon-Fri 8AM-4PM)  Otherwise: contact Hospitalist in Charge at [phone]    Patient is a 69y old  Female who presents with a chief complaint of Worsening SOB and cough, +burning chest pain (28 Feb 2025 10:09)    SUBJECTIVE / OVERNIGHT EVENTS:  Patient with improved respiratory status with bipap overnight. Still with significant wheezing with expiration.   No F/C, N/V, CP, Cough, lightheadedness, dizziness, abdominal pain, diarrhea, dysuria.    MEDICATIONS  (STANDING):  albuterol    90 MICROgram(s) HFA Inhaler 2 Puff(s) Inhalation once  albuterol    90 MICROgram(s) HFA Inhaler 2 Puff(s) Inhalation every 4 hours  albuterol/ipratropium for Nebulization 3 milliLiter(s) Nebulizer every 4 hours  amLODIPine   Tablet 10 milliGRAM(s) Oral daily  aspirin enteric coated 81 milliGRAM(s) Oral daily  atenolol  Tablet 50 milliGRAM(s) Oral daily  atorvastatin 20 milliGRAM(s) Oral at bedtime  azithromycin  IVPB 500 milliGRAM(s) IV Intermittent every 24 hours  buDESOnide    Inhalation Suspension 0.5 milliGRAM(s) Inhalation two times a day  enoxaparin Injectable 70 milliGRAM(s) SubCutaneous <User Schedule>  famotidine    Tablet 20 milliGRAM(s) Oral daily  methylPREDNISolone sodium succinate Injectable 40 milliGRAM(s) IV Push every 12 hours  montelukast 10 milliGRAM(s) Oral daily  sodium zirconium cyclosilicate 10 Gram(s) Oral once  tamsulosin 0.4 milliGRAM(s) Oral at bedtime    MEDICATIONS  (PRN):  acetaminophen     Tablet .. 650 milliGRAM(s) Oral every 6 hours PRN Temp greater or equal to 38C (100.4F), Mild Pain (1 - 3)  aluminum hydroxide/magnesium hydroxide/simethicone Suspension 30 milliLiter(s) Oral every 4 hours PRN Dyspepsia  melatonin 3 milliGRAM(s) Oral at bedtime PRN Insomnia  ondansetron Injectable 4 milliGRAM(s) IV Push every 8 hours PRN Nausea and/or Vomiting      Vital Signs Last 24 Hrs  T(C): 36.7 (28 Feb 2025 12:38), Max: 37 (27 Feb 2025 16:26)  T(F): 98 (28 Feb 2025 12:38), Max: 98.6 (27 Feb 2025 16:26)  HR: 68 (28 Feb 2025 12:38) (66 - 111)  BP: 129/51 (28 Feb 2025 12:38) (129/51 - 157/93)  BP(mean): 95 (27 Feb 2025 23:51) (95 - 95)  RR: 16 (28 Feb 2025 12:38) (16 - 22)  SpO2: 100% (28 Feb 2025 12:38) (94% - 100%)    Parameters below as of 28 Feb 2025 12:38  Patient On (Oxygen Delivery Method): nasal cannula  O2 Flow (L/min): 3    CAPILLARY BLOOD GLUCOSE        I&O's Summary    27 Feb 2025 07:01  -  28 Feb 2025 07:00  --------------------------------------------------------  IN: 240 mL / OUT: 400 mL / NET: -160 mL        PHYSICAL EXAM:  CONSTITUTIONAL: NAD, frail  EYES: PERRLA; conjunctiva and sclera clear  ENMT: Moist oral mucosa, no pharyngeal injection or exudates; normal dentition  NECK: Supple, no palpable masses; no thyromegaly  RESPIRATORY: Normal respiratory effort; diffuse expiratory wheeze still present  CARDIOVASCULAR: Regular rate and rhythm, normal S1 and S2, no murmur/rub/gallop; No lower extremity edema; Peripheral pulses are 2+ bilaterally  ABDOMEN: Nontender to palpation, normoactive bowel sounds, no rebound/guarding; No hepatosplenomegaly  MUSCULOSKELETAL:  Did not assess gait; no clubbing or cyanosis of digits; no joint swelling or tenderness to palpation  PSYCH: A+O to person, place, and time; affect appropriate  NEUROLOGY: CN 2-12 are intact and symmetric; no gross sensory deficits   SKIN: No rashes; no palpable lesions    LABS:                        8.6    3.88  )-----------( 204      ( 28 Feb 2025 09:27 )             28.5     02-28    128[L]  |  96[L]  |  14  ----------------------------<  146[H]  5.5[H]   |  19[L]  |  0.87    Ca    8.9      28 Feb 2025 07:40  Phos  4.5     02-28  Mg     2.40     02-28    TPro  6.9  /  Alb  3.7  /  TBili  0.2  /  DBili  x   /  AST  18  /  ALT  10  /  AlkPhos  149[H]  02-28      CARDIAC MARKERS ( 27 Feb 2025 15:27 )  x     / x     / x     / x     / 5.1 ng/mL  CARDIAC MARKERS ( 27 Feb 2025 13:07 )  x     / x     / x     / x     / 4.5 ng/mL      Urinalysis Basic - ( 28 Feb 2025 07:40 )    Color: x / Appearance: x / SG: x / pH: x  Gluc: 146 mg/dL / Ketone: x  / Bili: x / Urobili: x   Blood: x / Protein: x / Nitrite: x   Leuk Esterase: x / RBC: x / WBC x   Sq Epi: x / Non Sq Epi: x / Bacteria: x        RADIOLOGY & ADDITIONAL TESTS:    Imaging Personally Reviewed:    Care Discussed with Consultants/Other Providers:

## 2025-02-28 NOTE — PROGRESS NOTE ADULT - PROBLEM SELECTOR PLAN 5
Rate control with atenolol  - Eliquis but will switch to Lovenox BID in anticipation for potential liver Bx

## 2025-02-28 NOTE — PROGRESS NOTE ADULT - PROBLEM SELECTOR PLAN 7
significant change to size of the liver mass  - patient unable to get recommended MR of liver d/t debility  - ordered MRI of abdomen for evaluation  - may need Bx - d/c Eliquis and change to lovenox BID

## 2025-02-28 NOTE — PROGRESS NOTE ADULT - PROBLEM SELECTOR PLAN 2
d/t asthma exacerbation/COPD  - currently on 3L NC while awake.  - monitor O2 sat with continuous pulse ox

## 2025-02-28 NOTE — PROGRESS NOTE ADULT - ASSESSMENT
69F Asthma/COPD, HTN, dCHF, CVA w/ Lt weakness, PE, Afib-Eliquis, liver mass p/w acute asthma exacerbation c/b Acute hypoxia and hypercapnea s/p BiPAP for respiratory support, hyponatremia, enlarging liver mass 5.9x5cm from 3.2x1.7cm, chest pain.

## 2025-02-28 NOTE — CONSULT NOTE ADULT - ASSESSMENT
69F [place]-speaking with PMH of asthma/COPD/Emphysema, on PRN O2, (last PFTs with FEV1 38, FEV1/FVC of 55, DLCO 25) HTN, HFpEF, CVA with right-sided deficits presents to the hospital with 2 weeks of worsening shortness of breath and cough, admitted for hypoxemic hypercapnic respiratory failure in the setting of COPD exacerbation.    #Acute  hypoxemic hypercapnic respiratory   #COPD exacerbation  #Emphysema   - patient presenting with 2 weeks of worsening SOB and cough, admitted with diffuse wheezing and hypoxemic hypercapnic resp failure  - reports she has improved since admission; remains with significant wheezing on examination   - Ct reviewed showing emphysema without evidence of bacterial PNA   - severe obstructive lung disease based on PFTs in 2019  - RVP negative  - unclear home regimen for inhaler therapy at this time- per patient she uses the albuterol puff multiple times/ day    Recommendations:  - s/p 60mg of IV solumedrol x 2 yesterday; would place on 40mg of IV solu-medrol BID for now  - increase duonebs to q4 hours; add pulmicort .5mg BID; please keep albuterol MDI at bedside so patient can use as needed  - add azithromycin x 5 days  - blood gas with acute decompensated hypercapnea- c/w BIPAP qhs at 12/6 and check arterial blood gas in AM   69F [place]-speaking with PMH of asthma/COPD/Emphysema, on PRN O2, (last PFTs with FEV1 38, FEV1/FVC of 55, DLCO 25) HTN, HFpEF, CVA with right-sided deficits presents to the hospital with 2 weeks of worsening shortness of breath and cough, admitted for hypoxemic hypercapnic respiratory failure in the setting of COPD exacerbation.    #Acute  hypoxemic hypercapnic respiratory   #COPD exacerbation  #Emphysema   - patient presenting with 2 weeks of worsening SOB and cough, admitted with diffuse wheezing and hypoxemic hypercapnic resp failure  - reports she has improved since admission; remains with significant wheezing on examination   - Ct reviewed showing emphysema without evidence of bacterial PNA   - severe obstructive lung disease based on PFTs in 2019  - RVP negative  - unclear home regimen for inhaler therapy at this time- per patient she uses the albuterol puff multiple times/ day    Recommendations:  - s/p 60mg of IV solumedrol x 2 yesterday; would place on 40mg of IV solu-medrol BID for now  - increase duonebs to q4 hours; add pulmicort .5mg BID; please keep albuterol MDI at bedside so patient can use as needed  - add azithromycin x 5 days  - blood gas with acute decompensated hypercapnea (as well as component of metabolic acidosis)- c/w BIPAP qhs at 12/6 and check arterial blood gas in AM

## 2025-03-01 LAB
ALBUMIN SERPL ELPH-MCNC: 3.5 G/DL — SIGNIFICANT CHANGE UP (ref 3.3–5)
ALP SERPL-CCNC: 117 U/L — SIGNIFICANT CHANGE UP (ref 40–120)
ALT FLD-CCNC: 10 U/L — SIGNIFICANT CHANGE UP (ref 4–33)
ANION GAP SERPL CALC-SCNC: 11 MMOL/L — SIGNIFICANT CHANGE UP (ref 7–14)
ANION GAP SERPL CALC-SCNC: 13 MMOL/L — SIGNIFICANT CHANGE UP (ref 7–14)
AST SERPL-CCNC: 16 U/L — SIGNIFICANT CHANGE UP (ref 4–32)
BILIRUB SERPL-MCNC: <0.2 MG/DL — SIGNIFICANT CHANGE UP (ref 0.2–1.2)
BUN SERPL-MCNC: 23 MG/DL — SIGNIFICANT CHANGE UP (ref 7–23)
BUN SERPL-MCNC: 27 MG/DL — HIGH (ref 7–23)
CALCIUM SERPL-MCNC: 7.9 MG/DL — LOW (ref 8.4–10.5)
CALCIUM SERPL-MCNC: 8.4 MG/DL — SIGNIFICANT CHANGE UP (ref 8.4–10.5)
CHLORIDE SERPL-SCNC: 97 MMOL/L — LOW (ref 98–107)
CO2 SERPL-SCNC: 18 MMOL/L — LOW (ref 22–31)
CO2 SERPL-SCNC: 21 MMOL/L — LOW (ref 22–31)
CREAT SERPL-MCNC: 1.04 MG/DL — SIGNIFICANT CHANGE UP (ref 0.5–1.3)
CREAT SERPL-MCNC: 1.17 MG/DL — SIGNIFICANT CHANGE UP (ref 0.5–1.3)
EGFR: 50 ML/MIN/1.73M2 — LOW
EGFR: 50 ML/MIN/1.73M2 — LOW
EGFR: 58 ML/MIN/1.73M2 — LOW
EGFR: 58 ML/MIN/1.73M2 — LOW
GAS PNL BLDV: SIGNIFICANT CHANGE UP
GLUCOSE SERPL-MCNC: 146 MG/DL — HIGH (ref 70–99)
GLUCOSE SERPL-MCNC: 217 MG/DL — HIGH (ref 70–99)
HCT VFR BLD CALC: 29.2 % — LOW (ref 34.5–45)
HGB BLD-MCNC: 8.8 G/DL — LOW (ref 11.5–15.5)
MAGNESIUM SERPL-MCNC: 2.2 MG/DL — SIGNIFICANT CHANGE UP (ref 1.6–2.6)
MCHC RBC-ENTMCNC: 24.6 PG — LOW (ref 27–34)
MCHC RBC-ENTMCNC: 30.1 G/DL — LOW (ref 32–36)
MCV RBC AUTO: 81.6 FL — SIGNIFICANT CHANGE UP (ref 80–100)
NRBC # BLD AUTO: 0 K/UL — SIGNIFICANT CHANGE UP (ref 0–0)
NRBC # FLD: 0 K/UL — SIGNIFICANT CHANGE UP (ref 0–0)
NRBC BLD AUTO-RTO: 0 /100 WBCS — SIGNIFICANT CHANGE UP (ref 0–0)
PHOSPHATE SERPL-MCNC: 4.3 MG/DL — SIGNIFICANT CHANGE UP (ref 2.5–4.5)
PHOSPHATE SERPL-MCNC: 4.6 MG/DL — HIGH (ref 2.5–4.5)
PLATELET # BLD AUTO: 206 K/UL — SIGNIFICANT CHANGE UP (ref 150–400)
POTASSIUM SERPL-MCNC: 4.4 MMOL/L — SIGNIFICANT CHANGE UP (ref 3.5–5.3)
POTASSIUM SERPL-SCNC: 4.4 MMOL/L — SIGNIFICANT CHANGE UP (ref 3.5–5.3)
PROT SERPL-MCNC: 6.4 G/DL — SIGNIFICANT CHANGE UP (ref 6–8.3)
RBC # BLD: 3.58 M/UL — LOW (ref 3.8–5.2)
RBC # FLD: 16.4 % — HIGH (ref 10.3–14.5)
SODIUM SERPL-SCNC: 128 MMOL/L — LOW (ref 135–145)
WBC # BLD: 8.38 K/UL — SIGNIFICANT CHANGE UP (ref 3.8–10.5)
WBC # FLD AUTO: 8.38 K/UL — SIGNIFICANT CHANGE UP (ref 3.8–10.5)

## 2025-03-01 PROCEDURE — 99233 SBSQ HOSP IP/OBS HIGH 50: CPT

## 2025-03-01 RX ADMIN — METHYLPREDNISOLONE ACETATE 40 MILLIGRAM(S): 80 INJECTION, SUSPENSION INTRA-ARTICULAR; INTRALESIONAL; INTRAMUSCULAR; SOFT TISSUE at 17:43

## 2025-03-01 RX ADMIN — TAMSULOSIN HYDROCHLORIDE 0.4 MILLIGRAM(S): 0.4 CAPSULE ORAL at 22:14

## 2025-03-01 RX ADMIN — Medication 20 MILLIGRAM(S): at 11:41

## 2025-03-01 RX ADMIN — MONTELUKAST SODIUM 10 MILLIGRAM(S): 10 TABLET ORAL at 11:41

## 2025-03-01 RX ADMIN — METHYLPREDNISOLONE ACETATE 40 MILLIGRAM(S): 80 INJECTION, SUSPENSION INTRA-ARTICULAR; INTRALESIONAL; INTRAMUSCULAR; SOFT TISSUE at 05:50

## 2025-03-01 RX ADMIN — Medication 250 MILLIGRAM(S): at 11:42

## 2025-03-01 RX ADMIN — IPRATROPIUM BROMIDE AND ALBUTEROL SULFATE 3 MILLILITER(S): .5; 2.5 SOLUTION RESPIRATORY (INHALATION) at 01:21

## 2025-03-01 RX ADMIN — LISINOPRIL 50 MILLIGRAM(S): 30 TABLET ORAL at 05:50

## 2025-03-01 RX ADMIN — BUDESONIDE 0.5 MILLIGRAM(S): 0.25 SUSPENSION RESPIRATORY (INHALATION) at 08:48

## 2025-03-01 RX ADMIN — BUDESONIDE 0.5 MILLIGRAM(S): 0.25 SUSPENSION RESPIRATORY (INHALATION) at 21:49

## 2025-03-01 RX ADMIN — IPRATROPIUM BROMIDE AND ALBUTEROL SULFATE 3 MILLILITER(S): .5; 2.5 SOLUTION RESPIRATORY (INHALATION) at 16:45

## 2025-03-01 RX ADMIN — ENOXAPARIN SODIUM 70 MILLIGRAM(S): 100 INJECTION SUBCUTANEOUS at 17:43

## 2025-03-01 RX ADMIN — IPRATROPIUM BROMIDE AND ALBUTEROL SULFATE 3 MILLILITER(S): .5; 2.5 SOLUTION RESPIRATORY (INHALATION) at 08:48

## 2025-03-01 RX ADMIN — AMLODIPINE BESYLATE 10 MILLIGRAM(S): 10 TABLET ORAL at 05:50

## 2025-03-01 RX ADMIN — IPRATROPIUM BROMIDE AND ALBUTEROL SULFATE 3 MILLILITER(S): .5; 2.5 SOLUTION RESPIRATORY (INHALATION) at 21:48

## 2025-03-01 RX ADMIN — ENOXAPARIN SODIUM 70 MILLIGRAM(S): 100 INJECTION SUBCUTANEOUS at 05:50

## 2025-03-01 RX ADMIN — IPRATROPIUM BROMIDE AND ALBUTEROL SULFATE 3 MILLILITER(S): .5; 2.5 SOLUTION RESPIRATORY (INHALATION) at 04:03

## 2025-03-01 RX ADMIN — ATORVASTATIN CALCIUM 20 MILLIGRAM(S): 80 TABLET, FILM COATED ORAL at 22:14

## 2025-03-01 RX ADMIN — Medication 81 MILLIGRAM(S): at 11:41

## 2025-03-01 NOTE — PHYSICAL THERAPY INITIAL EVALUATION ADULT - PERTINENT HX OF CURRENT PROBLEM, REHAB EVAL
This is a 69 year old female with history of Asthma (on prn supp O2, no hx of intubation), HTN, HFpEF, CVA with L sided deficits (as per son), PE on Eliquis, and Liver mass who presents to the hospital with complaints of worsening SOB with cough and burning chest pain x 3-4 days.

## 2025-03-01 NOTE — PHYSICAL THERAPY INITIAL EVALUATION ADULT - ADDITIONAL COMMENTS
Lives with daughter in a house with +stairs. Ambulated short distances with rolling walker +assist from daughter. Daughter assists with ADLs.    After session, patient left semi supine in bed with all lines intact in NAD. RN aware.

## 2025-03-01 NOTE — PROGRESS NOTE ADULT - ASSESSMENT
69F Asthma/COPD, HTN, dCHF, CVA w/ Lt weakness, PE, Afib-Eliquis, liver mass p/w acute asthma exacerbation c/b Acute hypoxia and hypercapnea s/p BiPAP for respiratory support, hyponatremia, enlarging liver mass 5.9x5cm from 3.2x1.7cm, chest pain.    ====Neuro  ·  Problem: History of CVA in adulthood.   ·  Plan: w/ baseline left sided weakness  - continue ASA, lipitor.    ====Cardiovascular   ·  Problem: Chest pain.   ·  Plan: c/b ACS  - troponin neg x2  - TTE pending  - Tele monitor.    --Problem: Paroxysmal atrial fibrillation.   ·  Plan: Rate control with atenolol  - Eliquis but will switch to Lovenox BID in anticipation for potential liver Bx.  ====Respiratory   ·  Problem: Acute asthma exacerbation.   ·  Plan: c/b acute hypoxia and hypercapnea  - continue solumedrol IV  - BiPAP at night  - singulair  - appreciate pulmonary consult  - Azithromycin x 5d.    ---Problem: Essential hypertension.   ·  Plan: continue Norvasc, atenolol.    ·  Problem: Acute respiratory failure with hypercapnia.   ·  Plan: d/t asthma exacerbation/COPD  - currently on 3L NC while awake.  - monitor O2 sat with continuous pulse ox.    ====GI  Diet: Regular dash diet     --·  Problem: Liver mass.   ·  Plan: significant change to size of the liver mass  - patient unable to get recommended MR of liver d/t debility  - ordered MRI of abdomen for evaluation  - may need Bx - d/c Eliquis and change to lovenox BID.    ====  ·  Problem: Hyponatremia.   ·  Plan: UOsm 230 - unlikely to be SIADH  - continue to monitor    DVT ppx- Therpapuetic Lovenox            69F Asthma/COPD, HTN, dCHF, CVA w/ Lt weakness, PE, Afib-Eliquis, liver mass p/w acute asthma exacerbation c/b Acute hypoxia and hypercapnea s/p BiPAP for respiratory support, hyponatremia, enlarging liver mass 5.9x5cm from 3.2x1.7cm, chest pain.    ====Neuro  ·  Problem: History of CVA in adulthood.   ·  Plan: w/ baseline left sided weakness  - continue ASA, lipitor.    ====Cardiovascular   ·  Problem: Chest pain.   ·  Plan: c/b ACS  - troponin neg x2  - TTE pending  - Tele monitor.    --Problem: Paroxysmal atrial fibrillation.   ·  Plan: Rate control with atenolol  - Eliquis but will switch to Lovenox BID in anticipation for potential liver Bx.  ====Respiratory   ·  Problem: Acute asthma exacerbation.   ·  Plan: c/b acute hypoxia and hypercapnea  - continue solumedrol IV  - BiPAP at night and prn   - singulair  - Azithromycin x 5d.    ---Problem: Essential hypertension.   ·  Plan: continue Norvasc, atenolol.    ·  Problem: Acute respiratory failure with hypercapnia.   ·  Plan: d/t asthma exacerbation/COPD  - currently on 3L NC while awake.  - monitor O2 sat with continuous pulse ox.    ====GI  Diet: Regular dash diet     --·  Problem: Liver mass.   ·  Plan: significant change to size of the liver mass  - patient unable to get recommended MR of liver d/t debility  - ordered MRI of abdomen for evaluation  - may need Bx - d/c Eliquis and change to lovenox BID.    ====  ·  Problem: Hyponatremia.   ·  Plan: UOsm 230 - unlikely to be SIADH  - continue to monitor    DVT ppx- Therapuetic Lovenox

## 2025-03-01 NOTE — PROGRESS NOTE ADULT - SUBJECTIVE AND OBJECTIVE BOX
CHIEF COMPLAINT: Patient is a 69y old  Female who presents with a chief complaint of Worsening SOB and cough, +burning chest pain (28 Feb 2025 13:41)      INTERVAL EVENTS:     ROS: Seen by bedside during AM rounds     OBJECTIVE:  ICU Vital Signs Last 24 Hrs  T(C): 36.1 (01 Mar 2025 04:40), Max: 36.7 (28 Feb 2025 12:38)  T(F): 97 (01 Mar 2025 04:40), Max: 98 (28 Feb 2025 12:38)  HR: 59 (01 Mar 2025 04:40) (56 - 88)  BP: 123/54 (01 Mar 2025 04:40) (123/54 - 138/56)  BP(mean): 74 (01 Mar 2025 04:40) (74 - 74)  ABP: --  ABP(mean): --  RR: 18 (01 Mar 2025 04:40) (16 - 19)  SpO2: 94% (01 Mar 2025 04:40) (94% - 100%)    O2 Parameters below as of 01 Mar 2025 04:40  Patient On (Oxygen Delivery Method): nasal cannula  O2 Flow (L/min): 3            02-28 @ 07:01  -  03-01 @ 07:00  --------------------------------------------------------  IN: 1040 mL / OUT: 1200 mL / NET: -160 mL      CAPILLARY BLOOD GLUCOSE          PHYSICAL EXAM:  General:   HEENT:   Lymph Nodes:  Neck:   Respiratory:   Cardiovascular:   Abdomen:   Extremities:   Skin:   Neurological:  Psychiatry:        HOSPITAL MEDICATIONS:  MEDICATIONS  (STANDING):  albuterol    90 MICROgram(s) HFA Inhaler 2 Puff(s) Inhalation once  albuterol    90 MICROgram(s) HFA Inhaler 2 Puff(s) Inhalation every 4 hours  albuterol/ipratropium for Nebulization 3 milliLiter(s) Nebulizer every 4 hours  amLODIPine   Tablet 10 milliGRAM(s) Oral daily  aspirin enteric coated 81 milliGRAM(s) Oral daily  atenolol  Tablet 50 milliGRAM(s) Oral daily  atorvastatin 20 milliGRAM(s) Oral at bedtime  azithromycin  IVPB 500 milliGRAM(s) IV Intermittent every 24 hours  buDESOnide    Inhalation Suspension 0.5 milliGRAM(s) Inhalation two times a day  enoxaparin Injectable 70 milliGRAM(s) SubCutaneous <User Schedule>  famotidine    Tablet 20 milliGRAM(s) Oral daily  methylPREDNISolone sodium succinate Injectable 40 milliGRAM(s) IV Push every 12 hours  montelukast 10 milliGRAM(s) Oral daily  tamsulosin 0.4 milliGRAM(s) Oral at bedtime    MEDICATIONS  (PRN):  acetaminophen     Tablet .. 650 milliGRAM(s) Oral every 6 hours PRN Temp greater or equal to 38C (100.4F), Mild Pain (1 - 3)  aluminum hydroxide/magnesium hydroxide/simethicone Suspension 30 milliLiter(s) Oral every 4 hours PRN Dyspepsia  melatonin 3 milliGRAM(s) Oral at bedtime PRN Insomnia  ondansetron Injectable 4 milliGRAM(s) IV Push every 8 hours PRN Nausea and/or Vomiting      LABS:                        8.6    3.88  )-----------( 204      ( 28 Feb 2025 09:27 )             28.5     02-28    127[L]  |  95[L]  |  23  ----------------------------<  146[H]  4.6   |  21[L]  |  1.04    Ca    8.4      28 Feb 2025 23:30  Phos  4.3     02-28  Mg     2.10     02-28    TPro  6.9  /  Alb  3.7  /  TBili  0.2  /  DBili  x   /  AST  18  /  ALT  10  /  AlkPhos  149[H]  02-28      Urinalysis Basic - ( 28 Feb 2025 23:30 )    Color: x / Appearance: x / SG: x / pH: x  Gluc: 146 mg/dL / Ketone: x  / Bili: x / Urobili: x   Blood: x / Protein: x / Nitrite: x   Leuk Esterase: x / RBC: x / WBC x   Sq Epi: x / Non Sq Epi: x / Bacteria: x        Venous Blood Gas:  02-28 @ 07:40  7.23/53/52/22/78.2  VBG Lactate: 1.4  Venous Blood Gas:  02-27 @ 22:40  7.22/50/51/20/75.9  VBG Lactate: 2.4   CHIEF COMPLAINT: Patient is a 69y old  Female who presents with a chief complaint of Worsening SOB and cough, +burning chest pain (28 Feb 2025 13:41)      INTERVAL EVENTS: transferred to [facility]     ROS: Seen by bedside during AM rounds     OBJECTIVE:  ICU Vital Signs Last 24 Hrs  T(C): 36.1 (01 Mar 2025 04:40), Max: 36.7 (28 Feb 2025 12:38)  T(F): 97 (01 Mar 2025 04:40), Max: 98 (28 Feb 2025 12:38)  HR: 59 (01 Mar 2025 04:40) (56 - 88)  BP: 123/54 (01 Mar 2025 04:40) (123/54 - 138/56)  BP(mean): 74 (01 Mar 2025 04:40) (74 - 74)  ABP: --  ABP(mean): --  RR: 18 (01 Mar 2025 04:40) (16 - 19)  SpO2: 94% (01 Mar 2025 04:40) (94% - 100%)    O2 Parameters below as of 01 Mar 2025 04:40  Patient On (Oxygen Delivery Method): nasal cannula  O2 Flow (L/min): 3            02-28 @ 07:01  -  03-01 @ 07:00  --------------------------------------------------------  IN: 1040 mL / OUT: 1200 mL / NET: -160 mL      CAPILLARY BLOOD GLUCOSE          PHYSICAL EXAM:  General: NAD  HEENT: MM  moist   Neck: Supple   Respiratory: NC on , BIlat coarse BS Loose cough   Cardiovascular: S1S2  Abdomen: Soft nt   Extremities:  Tr edema   Skin: Wm/dry   Neurological: Awake alert   Psychiatry: No agitation         HOSPITAL MEDICATIONS:  MEDICATIONS  (STANDING):  albuterol    90 MICROgram(s) HFA Inhaler 2 Puff(s) Inhalation once  albuterol    90 MICROgram(s) HFA Inhaler 2 Puff(s) Inhalation every 4 hours  albuterol/ipratropium for Nebulization 3 milliLiter(s) Nebulizer every 4 hours  amLODIPine   Tablet 10 milliGRAM(s) Oral daily  aspirin enteric coated 81 milliGRAM(s) Oral daily  atenolol  Tablet 50 milliGRAM(s) Oral daily  atorvastatin 20 milliGRAM(s) Oral at bedtime  azithromycin  IVPB 500 milliGRAM(s) IV Intermittent every 24 hours  buDESOnide    Inhalation Suspension 0.5 milliGRAM(s) Inhalation two times a day  enoxaparin Injectable 70 milliGRAM(s) SubCutaneous <User Schedule>  famotidine    Tablet 20 milliGRAM(s) Oral daily  methylPREDNISolone sodium succinate Injectable 40 milliGRAM(s) IV Push every 12 hours  montelukast 10 milliGRAM(s) Oral daily  tamsulosin 0.4 milliGRAM(s) Oral at bedtime    MEDICATIONS  (PRN):  acetaminophen     Tablet .. 650 milliGRAM(s) Oral every 6 hours PRN Temp greater or equal to 38C (100.4F), Mild Pain (1 - 3)  aluminum hydroxide/magnesium hydroxide/simethicone Suspension 30 milliLiter(s) Oral every 4 hours PRN Dyspepsia  melatonin 3 milliGRAM(s) Oral at bedtime PRN Insomnia  ondansetron Injectable 4 milliGRAM(s) IV Push every 8 hours PRN Nausea and/or Vomiting      LABS:                        8.6    3.88  )-----------( 204      ( 28 Feb 2025 09:27 )             28.5     02-28    127[L]  |  95[L]  |  23  ----------------------------<  146[H]  4.6   |  21[L]  |  1.04    Ca    8.4      28 Feb 2025 23:30  Phos  4.3     02-28  Mg     2.10     02-28    TPro  6.9  /  Alb  3.7  /  TBili  0.2  /  DBili  x   /  AST  18  /  ALT  10  /  AlkPhos  149[H]  02-28      Urinalysis Basic - ( 28 Feb 2025 23:30 )    Color: x / Appearance: x / SG: x / pH: x  Gluc: 146 mg/dL / Ketone: x  / Bili: x / Urobili: x   Blood: x / Protein: x / Nitrite: x   Leuk Esterase: x / RBC: x / WBC x   Sq Epi: x / Non Sq Epi: x / Bacteria: x        Venous Blood Gas:  02-28 @ 07:40  7.23/53/52/22/78.2  VBG Lactate: 1.4  Venous Blood Gas:  02-27 @ 22:40  7.22/50/51/20/75.9  VBG Lactate: 2.4

## 2025-03-02 LAB
ALBUMIN SERPL ELPH-MCNC: 3.4 G/DL — SIGNIFICANT CHANGE UP (ref 3.3–5)
ALP SERPL-CCNC: 111 U/L — SIGNIFICANT CHANGE UP (ref 40–120)
ALT FLD-CCNC: 11 U/L — SIGNIFICANT CHANGE UP (ref 4–33)
ANION GAP SERPL CALC-SCNC: 14 MMOL/L — SIGNIFICANT CHANGE UP (ref 7–14)
APPEARANCE UR: CLEAR — SIGNIFICANT CHANGE UP
AST SERPL-CCNC: 20 U/L — SIGNIFICANT CHANGE UP (ref 4–32)
BACTERIA # UR AUTO: ABNORMAL /HPF
BASOPHILS # BLD AUTO: 0 K/UL — SIGNIFICANT CHANGE UP (ref 0–0.2)
BASOPHILS NFR BLD AUTO: 0 % — SIGNIFICANT CHANGE UP (ref 0–2)
BILIRUB SERPL-MCNC: <0.2 MG/DL — SIGNIFICANT CHANGE UP (ref 0.2–1.2)
BILIRUB UR-MCNC: NEGATIVE — SIGNIFICANT CHANGE UP
BUN SERPL-MCNC: 28 MG/DL — HIGH (ref 7–23)
CALCIUM SERPL-MCNC: 8.3 MG/DL — LOW (ref 8.4–10.5)
CAST: 0 /LPF — SIGNIFICANT CHANGE UP (ref 0–4)
CHLORIDE SERPL-SCNC: 97 MMOL/L — LOW (ref 98–107)
CHLORIDE UR-SCNC: 29 MMOL/L — SIGNIFICANT CHANGE UP
CO2 SERPL-SCNC: 20 MMOL/L — LOW (ref 22–31)
COLOR SPEC: YELLOW — SIGNIFICANT CHANGE UP
CREAT SERPL-MCNC: 1.13 MG/DL — SIGNIFICANT CHANGE UP (ref 0.5–1.3)
DIFF PNL FLD: NEGATIVE — SIGNIFICANT CHANGE UP
EGFR: 52 ML/MIN/1.73M2 — LOW
EGFR: 52 ML/MIN/1.73M2 — LOW
EOSINOPHIL # BLD AUTO: 0 K/UL — SIGNIFICANT CHANGE UP (ref 0–0.5)
EOSINOPHIL NFR BLD AUTO: 0 % — SIGNIFICANT CHANGE UP (ref 0–6)
GAS PNL BLDV: SIGNIFICANT CHANGE UP
GLUCOSE SERPL-MCNC: 120 MG/DL — HIGH (ref 70–99)
GLUCOSE UR QL: NEGATIVE MG/DL — SIGNIFICANT CHANGE UP
HCT VFR BLD CALC: 33 % — LOW (ref 34.5–45)
HGB BLD-MCNC: 9.7 G/DL — LOW (ref 11.5–15.5)
IANC: 6.73 K/UL — SIGNIFICANT CHANGE UP (ref 1.8–7.4)
IMM GRANULOCYTES NFR BLD AUTO: 0.5 % — SIGNIFICANT CHANGE UP (ref 0–0.9)
KETONES UR-MCNC: NEGATIVE MG/DL — SIGNIFICANT CHANGE UP
LEUKOCYTE ESTERASE UR-ACNC: ABNORMAL
LYMPHOCYTES # BLD AUTO: 1.38 K/UL — SIGNIFICANT CHANGE UP (ref 1–3.3)
LYMPHOCYTES # BLD AUTO: 16.2 % — SIGNIFICANT CHANGE UP (ref 13–44)
MAGNESIUM SERPL-MCNC: 2.3 MG/DL — SIGNIFICANT CHANGE UP (ref 1.6–2.6)
MCHC RBC-ENTMCNC: 24.1 PG — LOW (ref 27–34)
MCHC RBC-ENTMCNC: 29.4 G/DL — LOW (ref 32–36)
MCV RBC AUTO: 82.1 FL — SIGNIFICANT CHANGE UP (ref 80–100)
MONOCYTES # BLD AUTO: 0.37 K/UL — SIGNIFICANT CHANGE UP (ref 0–0.9)
MONOCYTES NFR BLD AUTO: 4.3 % — SIGNIFICANT CHANGE UP (ref 2–14)
NEUTROPHILS # BLD AUTO: 6.73 K/UL — SIGNIFICANT CHANGE UP (ref 1.8–7.4)
NEUTROPHILS NFR BLD AUTO: 79 % — HIGH (ref 43–77)
NITRITE UR-MCNC: POSITIVE
NRBC # BLD AUTO: 0 K/UL — SIGNIFICANT CHANGE UP (ref 0–0)
NRBC # FLD: 0 K/UL — SIGNIFICANT CHANGE UP (ref 0–0)
NRBC BLD AUTO-RTO: 0 /100 WBCS — SIGNIFICANT CHANGE UP (ref 0–0)
OSMOLALITY UR: 468 MOSM/KG — SIGNIFICANT CHANGE UP (ref 50–1200)
PH UR: 6 — SIGNIFICANT CHANGE UP (ref 5–8)
PH UR: 6 — SIGNIFICANT CHANGE UP (ref 5–8)
PHOSPHATE SERPL-MCNC: 4.2 MG/DL — SIGNIFICANT CHANGE UP (ref 2.5–4.5)
PLATELET # BLD AUTO: 200 K/UL — SIGNIFICANT CHANGE UP (ref 150–400)
POTASSIUM SERPL-MCNC: 4.9 MMOL/L — SIGNIFICANT CHANGE UP (ref 3.5–5.3)
POTASSIUM SERPL-SCNC: 4.9 MMOL/L — SIGNIFICANT CHANGE UP (ref 3.5–5.3)
POTASSIUM UR-SCNC: 22.9 MMOL/L — SIGNIFICANT CHANGE UP
PROT SERPL-MCNC: 6 G/DL — SIGNIFICANT CHANGE UP (ref 6–8.3)
PROT UR-MCNC: NEGATIVE MG/DL — SIGNIFICANT CHANGE UP
RBC # BLD: 4.02 M/UL — SIGNIFICANT CHANGE UP (ref 3.8–5.2)
RBC # FLD: 16.4 % — HIGH (ref 10.3–14.5)
RBC CASTS # UR COMP ASSIST: 0 /HPF — SIGNIFICANT CHANGE UP (ref 0–4)
SODIUM SERPL-SCNC: 131 MMOL/L — LOW (ref 135–145)
SODIUM UR-SCNC: 33 MMOL/L — SIGNIFICANT CHANGE UP
SP GR SPEC: 1.01 — SIGNIFICANT CHANGE UP (ref 1–1.03)
SQUAMOUS # UR AUTO: 0 /HPF — SIGNIFICANT CHANGE UP (ref 0–5)
UROBILINOGEN FLD QL: 0.2 MG/DL — SIGNIFICANT CHANGE UP (ref 0.2–1)
WBC # BLD: 8.52 K/UL — SIGNIFICANT CHANGE UP (ref 3.8–10.5)
WBC # FLD AUTO: 8.52 K/UL — SIGNIFICANT CHANGE UP (ref 3.8–10.5)
WBC UR QL: 1 /HPF — SIGNIFICANT CHANGE UP (ref 0–5)

## 2025-03-02 PROCEDURE — 99233 SBSQ HOSP IP/OBS HIGH 50: CPT

## 2025-03-02 RX ORDER — METHYLPREDNISOLONE ACETATE 80 MG/ML
40 INJECTION, SUSPENSION INTRA-ARTICULAR; INTRALESIONAL; INTRAMUSCULAR; SOFT TISSUE DAILY
Refills: 0 | Status: COMPLETED | OUTPATIENT
Start: 2025-03-02 | End: 2025-03-04

## 2025-03-02 RX ORDER — CEFTRIAXONE 500 MG/1
1000 INJECTION, POWDER, FOR SOLUTION INTRAMUSCULAR; INTRAVENOUS EVERY 24 HOURS
Refills: 0 | Status: COMPLETED | OUTPATIENT
Start: 2025-03-02 | End: 2025-03-04

## 2025-03-02 RX ADMIN — ENOXAPARIN SODIUM 70 MILLIGRAM(S): 100 INJECTION SUBCUTANEOUS at 05:14

## 2025-03-02 RX ADMIN — BUDESONIDE 0.5 MILLIGRAM(S): 0.25 SUSPENSION RESPIRATORY (INHALATION) at 19:34

## 2025-03-02 RX ADMIN — LISINOPRIL 50 MILLIGRAM(S): 30 TABLET ORAL at 05:13

## 2025-03-02 RX ADMIN — ATORVASTATIN CALCIUM 20 MILLIGRAM(S): 80 TABLET, FILM COATED ORAL at 21:32

## 2025-03-02 RX ADMIN — BUDESONIDE 0.5 MILLIGRAM(S): 0.25 SUSPENSION RESPIRATORY (INHALATION) at 10:32

## 2025-03-02 RX ADMIN — Medication 81 MILLIGRAM(S): at 11:32

## 2025-03-02 RX ADMIN — IPRATROPIUM BROMIDE AND ALBUTEROL SULFATE 3 MILLILITER(S): .5; 2.5 SOLUTION RESPIRATORY (INHALATION) at 23:46

## 2025-03-02 RX ADMIN — IPRATROPIUM BROMIDE AND ALBUTEROL SULFATE 3 MILLILITER(S): .5; 2.5 SOLUTION RESPIRATORY (INHALATION) at 04:18

## 2025-03-02 RX ADMIN — METHYLPREDNISOLONE ACETATE 40 MILLIGRAM(S): 80 INJECTION, SUSPENSION INTRA-ARTICULAR; INTRALESIONAL; INTRAMUSCULAR; SOFT TISSUE at 05:13

## 2025-03-02 RX ADMIN — ENOXAPARIN SODIUM 70 MILLIGRAM(S): 100 INJECTION SUBCUTANEOUS at 17:03

## 2025-03-02 RX ADMIN — Medication 250 MILLIGRAM(S): at 11:31

## 2025-03-02 RX ADMIN — IPRATROPIUM BROMIDE AND ALBUTEROL SULFATE 3 MILLILITER(S): .5; 2.5 SOLUTION RESPIRATORY (INHALATION) at 10:32

## 2025-03-02 RX ADMIN — Medication 20 MILLIGRAM(S): at 11:32

## 2025-03-02 RX ADMIN — TAMSULOSIN HYDROCHLORIDE 0.4 MILLIGRAM(S): 0.4 CAPSULE ORAL at 21:32

## 2025-03-02 RX ADMIN — MONTELUKAST SODIUM 10 MILLIGRAM(S): 10 TABLET ORAL at 11:32

## 2025-03-02 RX ADMIN — AMLODIPINE BESYLATE 10 MILLIGRAM(S): 10 TABLET ORAL at 05:14

## 2025-03-02 RX ADMIN — IPRATROPIUM BROMIDE AND ALBUTEROL SULFATE 3 MILLILITER(S): .5; 2.5 SOLUTION RESPIRATORY (INHALATION) at 16:10

## 2025-03-02 RX ADMIN — IPRATROPIUM BROMIDE AND ALBUTEROL SULFATE 3 MILLILITER(S): .5; 2.5 SOLUTION RESPIRATORY (INHALATION) at 01:01

## 2025-03-02 RX ADMIN — IPRATROPIUM BROMIDE AND ALBUTEROL SULFATE 3 MILLILITER(S): .5; 2.5 SOLUTION RESPIRATORY (INHALATION) at 19:34

## 2025-03-02 RX ADMIN — CEFTRIAXONE 100 MILLIGRAM(S): 500 INJECTION, POWDER, FOR SOLUTION INTRAMUSCULAR; INTRAVENOUS at 21:32

## 2025-03-02 NOTE — PROGRESS NOTE ADULT - ASSESSMENT
69F Asthma/COPD, HTN, dCHF, CVA w/ Lt weakness, PE, Afib-Eliquis, liver mass p/w acute asthma exacerbation c/b Acute hypoxia and hypercapnea s/p BiPAP for respiratory support, hyponatremia, enlarging liver mass 5.9x5cm from 3.2x1.7cm, chest pain.    ====Neuro  ·  Problem: History of CVA in adulthood.   ·  Plan: w/ baseline left sided weakness  - continue ASA, lipitor.    ====Cardiovascular   ·  Problem: Chest pain.   ·  Plan: c/b ACS  - troponin neg x2  - TTE pending  - Tele monitor.    --Problem: Paroxysmal atrial fibrillation.   ·  Plan: Rate control with atenolol  - Eliquis but will switch to Lovenox BID in anticipation for potential liver Bx.    ====Respiratory   ·  Problem: Acute asthma exacerbation.   ·  Plan: c/b acute hypoxia and hypercapnea  - continue solumedrol IV  - BiPAP at night and prn   - singulair  - Azithromycin x 5d.    ---Problem: Essential hypertension.   ·  Plan: continue Norvasc, atenolol.    ·  Problem: Acute respiratory failure with hypercapnia.   ·  Plan: d/t asthma exacerbation/COPD  - currently on 3L NC while awake.  - monitor O2 sat with continuous pulse ox.    ====GI  Diet: Regular dash diet     --·  Problem: Liver mass.   ·  Plan: significant change to size of the liver mass  - patient unable to get recommended MR of liver d/t debility  - ordered MRI of abdomen for evaluation  - may need Bx - d/c Eliquis and change to lovenox BID.    ====  ·  Problem: Hyponatremia.   ·  Plan: UOsm 230 - unlikely to be SIADH  - continue to monitor    DVT ppx- Therapuetic Lovenox            69F Asthma/COPD, HTN, dCHF, CVA w/ Lt weakness, PE, Afib-Eliquis, liver mass p/w acute asthma exacerbation c/b Acute hypoxia and hypercapnea s/p BiPAP for respiratory support, hyponatremia, enlarging liver mass 5.9x5cm from 3.2x1.7cm, chest pain.    ====Neuro  ·  Problem: History of CVA in adulthood.   ·  Plan: w/ baseline left sided weakness  - continue ASA, lipitor.    ====Cardiovascular   ·  Problem: Chest pain.   ·  Plan: c/b ACS  - troponin neg x2  - TTE pending  - Tele monitor.    --Problem: Paroxysmal atrial fibrillation.   ·  Plan: Rate control with atenolol  - Eliquis but will switch to Lovenox BID in anticipation for potential liver Bx.    ====Respiratory   ·  Problem: Acute asthma exacerbation.   ·  Plan: c/b acute hypoxia and hypercapnea  - continue solumedrol IV  - BiPAP at night and prn   - singulair  - Azithromycin x 5d.    ---Problem: Essential hypertension.   ·  Plan: continue Norvasc, atenolol.    ·  Problem: Acute respiratory failure with hypercapnia.   ·  Plan: d/t asthma exacerbation/COPD  - currently on 3L NC while awake.  - monitor O2 sat with continuous pulse ox.  -evidence of abnormal acid-base metabolic status with decompensated hypercapnic Unclear cause - UA/urine lytes      ====GI  Diet: Regular dash diet     --·  Problem: Liver mass.   ·  Plan: significant change to size of the liver mass  - patient unable to get recommended MR of liver d/t debility  - ordered MRI of abdomen for evaluation  - may need Bx - d/c Eliquis and change to lovenox BID.    ====  ·  Problem: Hyponatremia.   ·  Plan: UOsm 230 - unlikely to be SIADH  - continue to monitor    DVT ppx- Therapuetic Lovenox            69F Asthma/COPD, HTN, dCHF, CVA w/ Lt weakness, PE, Afib-Eliquis, liver mass p/w acute asthma exacerbation c/b Acute hypoxia and hypercapnea s/p BiPAP for respiratory support, hyponatremia, enlarging liver mass 5.9x5cm from 3.2x1.7cm, chest pain.    # NEUROLOGY  Hx CVA in adulthood  - c/w aspirin, statin    # CARDIOVASCULAR  Hyperlipidemia   Hypertension  Paroxysmal atrial fibrillation  Chest pain in ED  -  Reports intermittent burning quality chest/abd pain  - Trop neg x2   - TTE (2/28/25): EF 65%. WNL. Calcification of mitral valve annulus. Trace MR. Mild TR. Mild pulmonary hypertension  - c/w amlodipine, atenolol, atorvastatin.  - s/p home eliquis    # RESPIRATORY  Acute hypoxemic hypercapnic respiratory failure   COPD exacerbation  Emphysema  Hx PE  - present with 2 weeks of worsening SOB and cough, admitted with diffuse wheezing and hypoxemic hypercapnic resp failure. 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   - CTA (2/27/25): no main or lobar PE   - increase duonebs to q4 hours; add pulmicort .5mg BID; please keep albuterol MDI at bedside so patient can use as needed  - add azithromycin x 5 days (2/28/25 - )  - blood gas with acute decompensated hypercapnea (as well as component of metabolic acidosis)  - c/w BIPAP qhs at 12/6   - s/p 60mg of IV solumedrol x 2 (2/27/25); s/p 40mg BID (2/28/25- 3/2/25), on 40 mg qd (3/3 - )    # GI  Liver mass  Small hiatal hernia   - Known R liver mass, was recommended for outpatient follow up and MRI but son states that the family has been unable to get it done as the patient is very debilitated (needs a wheelchair to get around) and has frequent asthma exacerbations limiting her exertions  - CT here with worsening size of the mass  - pending MR Abd w/wo - safety sheet in chart  - IR c/s for Liver biopsy eventually     # RENAL  Hyponatremia.   - New onset hyponatremia, possibly 2/2 poor oral intake x few days in setting of her worsening SOB/asthma vs medications (on sertraline, not on diuretics) vs SIADH vs other  - on fluid restriction to 1.2L for now for possible SIADH  - Trend Na.    # INFECTIOUS DISEASE  - MRSA/MSSA pending  - RVP (2/27/25) neg    # HEME  - DVT PPX: Lovenox 70 mg BID     # ENDOCRINE  - no active issues    # SKIN  - no active issues    # ETHICS/ GOC    FULL CODE    # DISPO  TBD 69F Asthma/COPD, HTN, dCHF, CVA w/ Lt weakness, PE, Afib-Eliquis, liver mass p/w acute asthma exacerbation c/b Acute hypoxia and hypercapnea s/p BiPAP for respiratory support, hyponatremia, enlarging liver mass 5.9x5cm from 3.2x1.7cm, chest pain.    ====Neuro  ·  Problem: History of CVA in adulthood.   ·  Plan: w/ baseline left sided weakness  - continue ASA, lipitor.    ====Cardiovascular   ·  Problem: Chest pain.   ·  Plan: c/b ACS  - troponin neg x2  - TTE pending  - Tele monitor.    --Problem: Paroxysmal atrial fibrillation.   ·  Plan: Rate control with atenolol  - Eliquis but will switch to Lovenox BID in anticipation for potential liver Bx.    ====Respiratory   ·  Problem: Acute asthma exacerbation.   ·  Plan: c/b acute hypoxia and hypercapnea  - continue solumedrol IV  - BiPAP at night and prn   - singulair  - Azithromycin x 5d.    ---Problem: Essential hypertension.   ·  Plan: continue Norvasc, atenolol.    ·  Problem: Acute respiratory failure with hypercapnia.   ·  Plan: d/t asthma exacerbation/COPD  - currently on 3L NC while awake.  - monitor O2 sat with continuous pulse ox.  -evidence of abnormal acid-base metabolic status with decompensated hypercapnic Unclear cause - UA/urine lytes      ====GI  Diet: Regular dash diet     --·  Problem: Liver mass.   ·  Plan: significant change to size of the liver mass  - patient unable to get recommended MR of liver d/t debility  - ordered MRI of abdomen for evaluation  - may need Bx - d/c Eliquis and change to lovenox BID.    ====  ·  Problem: Hyponatremia.   ·  Plan: UOsm 230 - unlikely to be SIADH  - continue to monitor    DVT ppx- Therapuetic Lovenox

## 2025-03-02 NOTE — PROGRESS NOTE ADULT - SUBJECTIVE AND OBJECTIVE BOX
CHIEF COMPLAINT: Patient is a 69y old  Female who presents with a chief complaint of Worsening SOB and cough, +burning chest pain (01 Mar 2025 09:06)      INTERVAL EVENTS: No acute overnight events     ROS: Seen by bedside during AM rounds     OBJECTIVE:  ICU Vital Signs Last 24 Hrs  T(C): 36.7 (02 Mar 2025 05:10), Max: 36.7 (02 Mar 2025 05:10)  T(F): 98.1 (02 Mar 2025 05:10), Max: 98.1 (02 Mar 2025 05:10)  HR: 50 (02 Mar 2025 08:27) (47 - 75)  BP: 118/54 (02 Mar 2025 05:10) (107/52 - 143/64)  BP(mean): 73 (02 Mar 2025 05:10) (73 - 84)  ABP: --  ABP(mean): --  RR: 20 (02 Mar 2025 05:10) (16 - 20)  SpO2: 100% (02 Mar 2025 08:27) (97% - 100%)    O2 Parameters below as of 02 Mar 2025 05:10  Patient On (Oxygen Delivery Method): nasal cannula  O2 Flow (L/min): 3            CAPILLARY BLOOD GLUCOSE      PHYSICAL EXAM:  General: NAD  HEENT: MM  moist   Neck: Supple   Respiratory: NC on , BIlat coarse BS Loose cough   Cardiovascular: S1S2  Abdomen: Soft nt   Extremities:  Tr edema   Skin: Wm/dry   Neurological: Awake alert   Psychiatry: No agitation         HOSPITAL MEDICATIONS:  MEDICATIONS  (STANDING):  albuterol    90 MICROgram(s) HFA Inhaler 2 Puff(s) Inhalation once  albuterol    90 MICROgram(s) HFA Inhaler 2 Puff(s) Inhalation every 4 hours  albuterol/ipratropium for Nebulization 3 milliLiter(s) Nebulizer every 4 hours  amLODIPine   Tablet 10 milliGRAM(s) Oral daily  aspirin enteric coated 81 milliGRAM(s) Oral daily  atenolol  Tablet 50 milliGRAM(s) Oral daily  atorvastatin 20 milliGRAM(s) Oral at bedtime  azithromycin  IVPB 500 milliGRAM(s) IV Intermittent every 24 hours  buDESOnide    Inhalation Suspension 0.5 milliGRAM(s) Inhalation two times a day  enoxaparin Injectable 70 milliGRAM(s) SubCutaneous <User Schedule>  famotidine    Tablet 20 milliGRAM(s) Oral daily  methylPREDNISolone sodium succinate Injectable 40 milliGRAM(s) IV Push every 12 hours  montelukast 10 milliGRAM(s) Oral daily  tamsulosin 0.4 milliGRAM(s) Oral at bedtime    MEDICATIONS  (PRN):  acetaminophen     Tablet .. 650 milliGRAM(s) Oral every 6 hours PRN Temp greater or equal to 38C (100.4F), Mild Pain (1 - 3)  aluminum hydroxide/magnesium hydroxide/simethicone Suspension 30 milliLiter(s) Oral every 4 hours PRN Dyspepsia  melatonin 3 milliGRAM(s) Oral at bedtime PRN Insomnia  ondansetron Injectable 4 milliGRAM(s) IV Push every 8 hours PRN Nausea and/or Vomiting      LABS:                        9.7    8.52  )-----------( 200      ( 02 Mar 2025 05:41 )             33.0     03-02    131[L]  |  97[L]  |  28[H]  ----------------------------<  120[H]  4.9   |  20[L]  |  1.13    Ca    8.3[L]      02 Mar 2025 05:41  Phos  4.2     03-02  Mg     2.30     03-02    TPro  6.0  /  Alb  3.4  /  TBili  <0.2  /  DBili  x   /  AST  20  /  ALT  11  /  AlkPhos  111  03-02      Urinalysis Basic - ( 02 Mar 2025 05:41 )    Color: x / Appearance: x / SG: x / pH: x  Gluc: 120 mg/dL / Ketone: x  / Bili: x / Urobili: x   Blood: x / Protein: x / Nitrite: x   Leuk Esterase: x / RBC: x / WBC x   Sq Epi: x / Non Sq Epi: x / Bacteria: x        Venous Blood Gas:  03-02 @ 07:17  7.25/58/37/25/56.2  VBG Lactate: 1.5  Venous Blood Gas:  03-01 @ 13:02  7.22/55/51/22/71.9  VBG Lactate: 3.6   CHIEF COMPLAINT: Patient is a 69y old  Female who presents with a chief complaint of Worsening SOB and cough, +burning chest pain     INTERVAL EVENTS: No acute overnight events     ROS: Seen by bedside during AM rounds. As per son, did not have sleep x 2 days.      OBJECTIVE:  ICU Vital Signs Last 24 Hrs  T(C): 36.7 (02 Mar 2025 05:10), Max: 36.7 (02 Mar 2025 05:10)  T(F): 98.1 (02 Mar 2025 05:10), Max: 98.1 (02 Mar 2025 05:10)  HR: 50 (02 Mar 2025 08:27) (47 - 75)  BP: 118/54 (02 Mar 2025 05:10) (107/52 - 143/64)  BP(mean): 73 (02 Mar 2025 05:10) (73 - 84)  ABP: --  ABP(mean): --  RR: 20 (02 Mar 2025 05:10) (16 - 20)  SpO2: 100% (02 Mar 2025 08:27) (97% - 100%)    O2 Parameters below as of 02 Mar 2025 05:10  Patient On (Oxygen Delivery Method): nasal cannula  O2 Flow (L/min): 3            CAPILLARY BLOOD GLUCOSE      PHYSICAL EXAM:  General: NAD  HEENT: MM  moist   Neck: Supple   Respiratory: NC on , BIlat coarse BS Loose cough   Cardiovascular: S1S2  Abdomen: Soft nt   Extremities:  Tr edema   Skin: Wm/dry   Neurological: Awake alert   Psychiatry: No agitation         HOSPITAL MEDICATIONS:  MEDICATIONS  (STANDING):  albuterol    90 MICROgram(s) HFA Inhaler 2 Puff(s) Inhalation once  albuterol    90 MICROgram(s) HFA Inhaler 2 Puff(s) Inhalation every 4 hours  albuterol/ipratropium for Nebulization 3 milliLiter(s) Nebulizer every 4 hours  amLODIPine   Tablet 10 milliGRAM(s) Oral daily  aspirin enteric coated 81 milliGRAM(s) Oral daily  atenolol  Tablet 50 milliGRAM(s) Oral daily  atorvastatin 20 milliGRAM(s) Oral at bedtime  azithromycin  IVPB 500 milliGRAM(s) IV Intermittent every 24 hours  buDESOnide    Inhalation Suspension 0.5 milliGRAM(s) Inhalation two times a day  enoxaparin Injectable 70 milliGRAM(s) SubCutaneous <User Schedule>  famotidine    Tablet 20 milliGRAM(s) Oral daily  methylPREDNISolone sodium succinate Injectable 40 milliGRAM(s) IV Push every 12 hours  montelukast 10 milliGRAM(s) Oral daily  tamsulosin 0.4 milliGRAM(s) Oral at bedtime    MEDICATIONS  (PRN):  acetaminophen     Tablet .. 650 milliGRAM(s) Oral every 6 hours PRN Temp greater or equal to 38C (100.4F), Mild Pain (1 - 3)  aluminum hydroxide/magnesium hydroxide/simethicone Suspension 30 milliLiter(s) Oral every 4 hours PRN Dyspepsia  melatonin 3 milliGRAM(s) Oral at bedtime PRN Insomnia  ondansetron Injectable 4 milliGRAM(s) IV Push every 8 hours PRN Nausea and/or Vomiting      LABS:                        9.7    8.52  )-----------( 200      ( 02 Mar 2025 05:41 )             33.0     03-02    131[L]  |  97[L]  |  28[H]  ----------------------------<  120[H]  4.9   |  20[L]  |  1.13    Ca    8.3[L]      02 Mar 2025 05:41  Phos  4.2     03-02  Mg     2.30     03-02    TPro  6.0  /  Alb  3.4  /  TBili  <0.2  /  DBili  x   /  AST  20  /  ALT  11  /  AlkPhos  111  03-02      Urinalysis Basic - ( 02 Mar 2025 05:41 )    Color: x / Appearance: x / SG: x / pH: x  Gluc: 120 mg/dL / Ketone: x  / Bili: x / Urobili: x   Blood: x / Protein: x / Nitrite: x   Leuk Esterase: x / RBC: x / WBC x   Sq Epi: x / Non Sq Epi: x / Bacteria: x        Venous Blood Gas:  03-02 @ 07:17  7.25/58/37/25/56.2  VBG Lactate: 1.5  Venous Blood Gas:  03-01 @ 13:02  7.22/55/51/22/71.9  VBG Lactate: 3.6   CHIEF COMPLAINT: Patient is a 69y old  Female who presents with a chief complaint of Worsening SOB and cough, +burning chest pain     INTERVAL EVENTS: No acute overnight events     ROS: Seen by bedside during AM rounds.     OBJECTIVE:  ICU Vital Signs Last 24 Hrs  T(C): 36.7 (02 Mar 2025 05:10), Max: 36.7 (02 Mar 2025 05:10)  T(F): 98.1 (02 Mar 2025 05:10), Max: 98.1 (02 Mar 2025 05:10)  HR: 50 (02 Mar 2025 08:27) (47 - 75)  BP: 118/54 (02 Mar 2025 05:10) (107/52 - 143/64)  BP(mean): 73 (02 Mar 2025 05:10) (73 - 84)  ABP: --  ABP(mean): --  RR: 20 (02 Mar 2025 05:10) (16 - 20)  SpO2: 100% (02 Mar 2025 08:27) (97% - 100%)    O2 Parameters below as of 02 Mar 2025 05:10  Patient On (Oxygen Delivery Method): nasal cannula  O2 Flow (L/min): 3            CAPILLARY BLOOD GLUCOSE      PHYSICAL EXAM:  General: NAD  HEENT: MM  moist   Neck: Supple   Respiratory: NC on , BIlat coarse BS Loose cough   Cardiovascular: S1S2  Abdomen: Soft nt   Extremities:  Tr edema   Skin: Wm/dry   Neurological: Awake alert   Psychiatry: No agitation         HOSPITAL MEDICATIONS:  MEDICATIONS  (STANDING):  albuterol    90 MICROgram(s) HFA Inhaler 2 Puff(s) Inhalation once  albuterol    90 MICROgram(s) HFA Inhaler 2 Puff(s) Inhalation every 4 hours  albuterol/ipratropium for Nebulization 3 milliLiter(s) Nebulizer every 4 hours  amLODIPine   Tablet 10 milliGRAM(s) Oral daily  aspirin enteric coated 81 milliGRAM(s) Oral daily  atenolol  Tablet 50 milliGRAM(s) Oral daily  atorvastatin 20 milliGRAM(s) Oral at bedtime  azithromycin  IVPB 500 milliGRAM(s) IV Intermittent every 24 hours  buDESOnide    Inhalation Suspension 0.5 milliGRAM(s) Inhalation two times a day  enoxaparin Injectable 70 milliGRAM(s) SubCutaneous <User Schedule>  famotidine    Tablet 20 milliGRAM(s) Oral daily  methylPREDNISolone sodium succinate Injectable 40 milliGRAM(s) IV Push every 12 hours  montelukast 10 milliGRAM(s) Oral daily  tamsulosin 0.4 milliGRAM(s) Oral at bedtime    MEDICATIONS  (PRN):  acetaminophen     Tablet .. 650 milliGRAM(s) Oral every 6 hours PRN Temp greater or equal to 38C (100.4F), Mild Pain (1 - 3)  aluminum hydroxide/magnesium hydroxide/simethicone Suspension 30 milliLiter(s) Oral every 4 hours PRN Dyspepsia  melatonin 3 milliGRAM(s) Oral at bedtime PRN Insomnia  ondansetron Injectable 4 milliGRAM(s) IV Push every 8 hours PRN Nausea and/or Vomiting      LABS:                        9.7    8.52  )-----------( 200      ( 02 Mar 2025 05:41 )             33.0     03-02    131[L]  |  97[L]  |  28[H]  ----------------------------<  120[H]  4.9   |  20[L]  |  1.13    Ca    8.3[L]      02 Mar 2025 05:41  Phos  4.2     03-02  Mg     2.30     03-02    TPro  6.0  /  Alb  3.4  /  TBili  <0.2  /  DBili  x   /  AST  20  /  ALT  11  /  AlkPhos  111  03-02      Urinalysis Basic - ( 02 Mar 2025 05:41 )    Color: x / Appearance: x / SG: x / pH: x  Gluc: 120 mg/dL / Ketone: x  / Bili: x / Urobili: x   Blood: x / Protein: x / Nitrite: x   Leuk Esterase: x / RBC: x / WBC x   Sq Epi: x / Non Sq Epi: x / Bacteria: x        Venous Blood Gas:  03-02 @ 07:17  7.25/58/37/25/56.2  VBG Lactate: 1.5  Venous Blood Gas:  03-01 @ 13:02  7.22/55/51/22/71.9  VBG Lactate: 3.6

## 2025-03-03 LAB
ADD ON TEST-SPECIMEN IN LAB: SIGNIFICANT CHANGE UP
ALBUMIN SERPL ELPH-MCNC: 3.5 G/DL — SIGNIFICANT CHANGE UP (ref 3.3–5)
ALP SERPL-CCNC: 110 U/L — SIGNIFICANT CHANGE UP (ref 40–120)
ALT FLD-CCNC: 12 U/L — SIGNIFICANT CHANGE UP (ref 4–33)
ANION GAP SERPL CALC-SCNC: 16 MMOL/L — HIGH (ref 7–14)
AST SERPL-CCNC: 19 U/L — SIGNIFICANT CHANGE UP (ref 4–32)
BASOPHILS # BLD AUTO: 0.03 K/UL — SIGNIFICANT CHANGE UP (ref 0–0.2)
BASOPHILS NFR BLD AUTO: 0.2 % — SIGNIFICANT CHANGE UP (ref 0–2)
BILIRUB SERPL-MCNC: <0.2 MG/DL — SIGNIFICANT CHANGE UP (ref 0.2–1.2)
BUN SERPL-MCNC: 27 MG/DL — HIGH (ref 7–23)
CALCIUM SERPL-MCNC: 8.5 MG/DL — SIGNIFICANT CHANGE UP (ref 8.4–10.5)
CHLORIDE SERPL-SCNC: 99 MMOL/L — SIGNIFICANT CHANGE UP (ref 98–107)
CO2 SERPL-SCNC: 18 MMOL/L — LOW (ref 22–31)
CREAT SERPL-MCNC: 1.2 MG/DL — SIGNIFICANT CHANGE UP (ref 0.5–1.3)
EGFR: 49 ML/MIN/1.73M2 — LOW
EGFR: 49 ML/MIN/1.73M2 — LOW
EOSINOPHIL # BLD AUTO: 0 K/UL — SIGNIFICANT CHANGE UP (ref 0–0.5)
EOSINOPHIL NFR BLD AUTO: 0 % — SIGNIFICANT CHANGE UP (ref 0–6)
GLUCOSE SERPL-MCNC: 82 MG/DL — SIGNIFICANT CHANGE UP (ref 70–99)
HCT VFR BLD CALC: 34.9 % — SIGNIFICANT CHANGE UP (ref 34.5–45)
HGB BLD-MCNC: 9.9 G/DL — LOW (ref 11.5–15.5)
IANC: 15.73 K/UL — HIGH (ref 1.8–7.4)
IMM GRANULOCYTES NFR BLD AUTO: 0.5 % — SIGNIFICANT CHANGE UP (ref 0–0.9)
LYMPHOCYTES # BLD AUTO: 12.6 % — LOW (ref 13–44)
LYMPHOCYTES # BLD AUTO: 2.47 K/UL — SIGNIFICANT CHANGE UP (ref 1–3.3)
MAGNESIUM SERPL-MCNC: 2.3 MG/DL — SIGNIFICANT CHANGE UP (ref 1.6–2.6)
MCHC RBC-ENTMCNC: 23.9 PG — LOW (ref 27–34)
MCHC RBC-ENTMCNC: 28.4 G/DL — LOW (ref 32–36)
MCV RBC AUTO: 84.1 FL — SIGNIFICANT CHANGE UP (ref 80–100)
MONOCYTES # BLD AUTO: 1.25 K/UL — HIGH (ref 0–0.9)
MONOCYTES NFR BLD AUTO: 6.4 % — SIGNIFICANT CHANGE UP (ref 2–14)
NEUTROPHILS # BLD AUTO: 15.73 K/UL — HIGH (ref 1.8–7.4)
NEUTROPHILS NFR BLD AUTO: 80.3 % — HIGH (ref 43–77)
NRBC # BLD AUTO: 0 K/UL — SIGNIFICANT CHANGE UP (ref 0–0)
NRBC # FLD: 0 K/UL — SIGNIFICANT CHANGE UP (ref 0–0)
NRBC BLD AUTO-RTO: 0 /100 WBCS — SIGNIFICANT CHANGE UP (ref 0–0)
PHOSPHATE SERPL-MCNC: 3.5 MG/DL — SIGNIFICANT CHANGE UP (ref 2.5–4.5)
PLATELET # BLD AUTO: 203 K/UL — SIGNIFICANT CHANGE UP (ref 150–400)
POTASSIUM SERPL-MCNC: 4.3 MMOL/L — SIGNIFICANT CHANGE UP (ref 3.5–5.3)
POTASSIUM SERPL-SCNC: 4.3 MMOL/L — SIGNIFICANT CHANGE UP (ref 3.5–5.3)
PROT SERPL-MCNC: 6.8 G/DL — SIGNIFICANT CHANGE UP (ref 6–8.3)
RBC # BLD: 4.15 M/UL — SIGNIFICANT CHANGE UP (ref 3.8–5.2)
RBC # FLD: 16.4 % — HIGH (ref 10.3–14.5)
SODIUM SERPL-SCNC: 133 MMOL/L — LOW (ref 135–145)
WBC # BLD: 19.58 K/UL — HIGH (ref 3.8–10.5)
WBC # FLD AUTO: 19.58 K/UL — HIGH (ref 3.8–10.5)

## 2025-03-03 PROCEDURE — 99233 SBSQ HOSP IP/OBS HIGH 50: CPT

## 2025-03-03 RX ADMIN — IPRATROPIUM BROMIDE AND ALBUTEROL SULFATE 3 MILLILITER(S): .5; 2.5 SOLUTION RESPIRATORY (INHALATION) at 07:46

## 2025-03-03 RX ADMIN — MONTELUKAST SODIUM 10 MILLIGRAM(S): 10 TABLET ORAL at 14:47

## 2025-03-03 RX ADMIN — Medication 20 MILLIGRAM(S): at 14:47

## 2025-03-03 RX ADMIN — METHYLPREDNISOLONE ACETATE 40 MILLIGRAM(S): 80 INJECTION, SUSPENSION INTRA-ARTICULAR; INTRALESIONAL; INTRAMUSCULAR; SOFT TISSUE at 05:39

## 2025-03-03 RX ADMIN — ENOXAPARIN SODIUM 70 MILLIGRAM(S): 100 INJECTION SUBCUTANEOUS at 18:35

## 2025-03-03 RX ADMIN — LISINOPRIL 50 MILLIGRAM(S): 30 TABLET ORAL at 05:40

## 2025-03-03 RX ADMIN — IPRATROPIUM BROMIDE AND ALBUTEROL SULFATE 3 MILLILITER(S): .5; 2.5 SOLUTION RESPIRATORY (INHALATION) at 23:27

## 2025-03-03 RX ADMIN — IPRATROPIUM BROMIDE AND ALBUTEROL SULFATE 3 MILLILITER(S): .5; 2.5 SOLUTION RESPIRATORY (INHALATION) at 11:32

## 2025-03-03 RX ADMIN — Medication 4 MILLIGRAM(S): at 14:48

## 2025-03-03 RX ADMIN — IPRATROPIUM BROMIDE AND ALBUTEROL SULFATE 3 MILLILITER(S): .5; 2.5 SOLUTION RESPIRATORY (INHALATION) at 17:20

## 2025-03-03 RX ADMIN — ATORVASTATIN CALCIUM 20 MILLIGRAM(S): 80 TABLET, FILM COATED ORAL at 21:22

## 2025-03-03 RX ADMIN — TAMSULOSIN HYDROCHLORIDE 0.4 MILLIGRAM(S): 0.4 CAPSULE ORAL at 21:22

## 2025-03-03 RX ADMIN — IPRATROPIUM BROMIDE AND ALBUTEROL SULFATE 3 MILLILITER(S): .5; 2.5 SOLUTION RESPIRATORY (INHALATION) at 19:34

## 2025-03-03 RX ADMIN — IPRATROPIUM BROMIDE AND ALBUTEROL SULFATE 3 MILLILITER(S): .5; 2.5 SOLUTION RESPIRATORY (INHALATION) at 03:43

## 2025-03-03 RX ADMIN — Medication 250 MILLIGRAM(S): at 14:46

## 2025-03-03 RX ADMIN — Medication 1 APPLICATION(S): at 21:27

## 2025-03-03 RX ADMIN — BUDESONIDE 0.5 MILLIGRAM(S): 0.25 SUSPENSION RESPIRATORY (INHALATION) at 07:46

## 2025-03-03 RX ADMIN — Medication 650 MILLIGRAM(S): at 14:47

## 2025-03-03 RX ADMIN — Medication 81 MILLIGRAM(S): at 14:47

## 2025-03-03 RX ADMIN — ENOXAPARIN SODIUM 70 MILLIGRAM(S): 100 INJECTION SUBCUTANEOUS at 05:39

## 2025-03-03 RX ADMIN — CEFTRIAXONE 100 MILLIGRAM(S): 500 INJECTION, POWDER, FOR SOLUTION INTRAMUSCULAR; INTRAVENOUS at 21:27

## 2025-03-03 RX ADMIN — AMLODIPINE BESYLATE 10 MILLIGRAM(S): 10 TABLET ORAL at 05:39

## 2025-03-03 RX ADMIN — BUDESONIDE 0.5 MILLIGRAM(S): 0.25 SUSPENSION RESPIRATORY (INHALATION) at 19:35

## 2025-03-03 NOTE — PROGRESS NOTE ADULT - ASSESSMENT
69F Asthma/COPD, HTN, dCHF, CVA w/ Lt weakness, PE, Afib-Eliquis, liver mass p/w acute asthma exacerbation c/b Acute hypoxia and hypercapnea s/p BiPAP for respiratory support, hyponatremia, enlarging liver mass 5.9x5cm from 3.2x1.7cm, chest pain.    ====Neuro  ·  Problem: History of CVA in adulthood.   ·  Plan: w/ baseline left sided weakness  - continue ASA, lipitor.    ====Cardiovascular   ·  Problem: Chest pain.   ·  Plan: c/b ACS  - troponin neg x2  - TTE pending  - Tele monitor.    --Problem: Paroxysmal atrial fibrillation.   ·  Plan: Rate control with atenolol  - Eliquis but will switch to Lovenox BID in anticipation for potential liver Bx.    ====Respiratory   ·  Problem: Acute asthma exacerbation.   ·  Plan: c/b acute hypoxia and hypercapnea  - continue solumedrol IV  - BiPAP at night and prn   - singulair  - Azithromycin x 5d.    ---Problem: Essential hypertension.   ·  Plan: continue Norvasc, atenolol.    ·  Problem: Acute respiratory failure with hypercapnia.   ·  Plan: d/t asthma exacerbation/COPD  - currently on 3L NC while awake.  - monitor O2 sat with continuous pulse ox.  -evidence of abnormal acid-base metabolic status with decompensated hypercapnic Unclear cause - UA/urine lytes      ====GI  Diet: Regular dash diet     --·  Problem: Liver mass.   ·  Plan: significant change to size of the liver mass  - patient unable to get recommended MR of liver d/t debility  - ordered MRI of abdomen for evaluation  - may need Bx - d/c Eliquis and change to lovenox BID.    ====  ·  Problem: Hyponatremia.   ·  Plan: UOsm 230 - unlikely to be SIADH  - continue to monitor    DVT ppx- Therapuetic Lovenox            69F Asthma/COPD, HTN, dCHF, CVA w/ Lt weakness, PE, Afib-Eliquis, liver mass p/w acute asthma exacerbation c/b Acute hypoxia and hypercapnea s/p BiPAP for respiratory support, hyponatremia, enlarging liver mass 5.9x5cm from 3.2x1.7cm, chest pain.    # NEUROLOGY  Hx CVA in adulthood  - c/w aspirin, statin    # CARDIOVASCULAR  Hyperlipidemia   Hypertension  Paroxysmal atrial fibrillation  Chest pain in ED  -  Reports intermittent burning quality chest/abd pain  - Trop neg x2   - TTE (2/28/25): EF 65%. WNL. Calcification of mitral valve annulus. Trace MR. Mild TR. Mild pulmonary hypertension  - c/w amlodipine, atenolol, atorvastatin.  - s/p home eliquis    # RESPIRATORY  Acute hypoxemic hypercapnic respiratory failure   COPD exacerbation  Emphysema  Hx PE  - present with 2 weeks of worsening SOB and cough, admitted with diffuse wheezing and hypoxemic hypercapnic resp failure. 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   - CTA (2/27/25): no main or lobar PE   - increase duonebs to q4 hours; add pulmicort .5mg BID; please keep albuterol MDI at bedside so patient can use as needed  - add azithromycin x 5 days (2/28/25 - )  - blood gas with acute decompensated hypercapnea (as well as component of metabolic acidosis)  - c/w BIPAP qhs at 12/6   - s/p 60mg of IV solumedrol x 2 (2/27/25); s/p 40mg BID (2/28/25- 3/2/25), on 40 mg qd (3/3 - )    # GI  Liver mass  Small hiatal hernia   - Known R liver mass, was recommended for outpatient follow up and MRI but son states that the family has been unable to get it done as the patient is very debilitated (needs a wheelchair to get around) and has frequent asthma exacerbations limiting her exertions  - CT here with worsening size of the mass  - pending MR Abd w/wo - safety sheet in chart  - IR c/s for Liver biopsy eventually     # RENAL  Hyponatremia.   - New onset hyponatremia, possibly 2/2 poor oral intake x few days in setting of her worsening SOB/asthma vs medications (on sertraline, not on diuretics) vs SIADH vs other  - on fluid restriction to 1.2L for now for possible SIADH  - Trend Na.    # INFECTIOUS DISEASE  - MRSA/MSSA pending  - RVP (2/27/25) neg    # HEME  - DVT PPX: Lovenox 70 mg BID     # ENDOCRINE  - no active issues    # SKIN  - no active issues    # ETHICS/ GOC    FULL CODE    # DISPO  TBD 69F Asthma/COPD, HTN, dCHF, CVA w/ Lt weakness, PE, Afib-Eliquis, liver mass p/w acute asthma exacerbation c/b Acute hypoxia and hypercapnea s/p BiPAP for respiratory support, hyponatremia, enlarging liver mass 5.9x5cm from 3.2x1.7cm, chest pain.    # NEUROLOGY  Hx CVA in adulthood  - c/w aspirin, statin    # CARDIOVASCULAR  Hyperlipidemia   Hypertension  Paroxysmal atrial fibrillation  Chest pain in ED  -  Reports intermittent burning quality chest/abd pain  - Trop neg x2   - TTE (2/28/25): EF 65%. WNL. Calcification of mitral valve annulus. Trace MR. Mild TR. Mild pulmonary hypertension  - c/w amlodipine, atenolol, atorvastatin.  - s/p home eliquis    # RESPIRATORY  Acute hypoxemic hypercapnic respiratory failure   COPD exacerbation  Emphysema  Hx PE  - present with 2 weeks of worsening SOB and cough, admitted with diffuse wheezing and hypoxemic hypercapnic resp failure. 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   - CTA (2/27/25): no main or lobar PE   - increase duonebs to q4 hours; add pulmicort .5mg BID; please keep albuterol MDI at bedside so patient can use as needed  - add azithromycin x 5 days (2/28/25 - )  - blood gas with acute decompensated hypercapnea (as well as component of metabolic acidosis)  - c/w BIPAP qhs at 12/6   - s/p 60mg of IV solumedrol x 2 (2/27/25); s/p 40mg BID (2/28/25- 3/2/25), on 40 mg qd (3/3 - )    # GI  Liver mass  Small hiatal hernia   - Known R liver mass, was recommended for outpatient follow up and MRI but son states that the family has been unable to get it done as the patient is very debilitated (needs a wheelchair to get around) and has frequent asthma exacerbations limiting her exertions  - CT here with worsening size of the mass  - pending MR Abd w/wo - safety sheet in chart  - IR c/s for Liver biopsy eventually     # RENAL  Hyponatremia.   - New onset hyponatremia, possibly 2/2 poor oral intake x few days in setting of her worsening SOB/asthma vs medications (on sertraline, not on diuretics) vs SIADH vs other  - on fluid restriction to 1.2L for now for possible SIADH  - Trend Na.    # INFECTIOUS DISEASE  UTI   - UCx (3/2) pending results   - on CTX (3/2-3/3)  - MRSA/MSSA pending  - RVP (2/27/25) neg    # HEME  - DVT PPX: Lovenox 70 mg BID     # ENDOCRINE  - no active issues    # SKIN  - no active issues    # ETHICS/ GOC    FULL CODE    # DISPO  TBD 69F Asthma/COPD, HTN, dCHF, CVA w/ Lt weakness, PE, Afib-Eliquis, liver mass p/w acute asthma exacerbation c/b Acute hypoxia and hypercapnea s/p BiPAP for respiratory support, hyponatremia, enlarging liver mass 5.9x5cm from 3.2x1.7cm, chest pain.    # NEUROLOGY  Hx CVA in adulthood  - c/w aspirin, statin    # CARDIOVASCULAR  Hyperlipidemia   Hypertension  Paroxysmal atrial fibrillation  Chest pain in ED  -  Reports intermittent burning quality chest/abd pain  - Trop neg x2   - TTE (2/28/25): EF 65%. WNL. Calcification of mitral valve annulus. Trace MR. Mild TR. Mild pulmonary hypertension  - c/w amlodipine, atenolol, atorvastatin.  - s/p home eliquis    # RESPIRATORY  Acute hypoxemic hypercapnic respiratory failure   COPD exacerbation  Emphysema  Hx PE  - present with 2 weeks of worsening SOB and cough, admitted with diffuse wheezing and hypoxemic hypercapnic resp failure. 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   - CTA (2/27/25): no main or lobar PE   - increase duonebs to q4 hours; add pulmicort .5mg BID; please keep albuterol MDI at bedside so patient can use as needed  - add azithromycin x 5 days (2/28/25 - )  - blood gas with acute decompensated hypercapnea (as well as component of metabolic acidosis)  - c/w BIPAP qhs at 12/6   - s/p 60mg of IV solumedrol x 2 (2/27/25); s/p 40mg BID (2/28/25- 3/2/25), on 40 mg qd (3/3 - )    # GI  Liver mass  Small hiatal hernia   - Known R liver mass, was recommended for outpatient follow up and MRI but son states that the family has been unable to get it done as the patient is very debilitated (needs a wheelchair to get around) and has frequent asthma exacerbations limiting her exertions  - CT here with worsening size of the mass  - pending MR Abd w/wo - safety sheet in chart  - IR c/s for Liver biopsy eventually     # RENAL  Hyponatremia.   - New onset hyponatremia, possibly 2/2 poor oral intake x few days in setting of her worsening SOB/asthma vs medications (on sertraline, not on diuretics) vs SIADH vs other  - on fluid restriction to 1.2L for now for possible SIADH  - Trend Na.    # INFECTIOUS DISEASE  UTI   - UCx (3/2) pending results   - on CTX (3/2-3/3)  - MRSA/MSSA pending  - RVP (2/27/25) neg    # HEME  - DVT PPX: Lovenox 70 mg BID     # ENDOCRINE  - no active issues    # SKIN  - no active issues    # ETHICS/ GOC    FULL CODE    # DISPO  PT: home PT 69F Asthma/COPD, HTN, dCHF, CVA w/ Lt weakness, PE, Afib-Eliquis, liver mass p/w acute asthma exacerbation c/b Acute hypoxia and hypercapnea s/p BiPAP for respiratory support, hyponatremia, enlarging liver mass 5.9x5cm from 3.2x1.7cm, chest pain.    # NEUROLOGY  Hx CVA in adulthood  - c/w aspirin, statin  - Hx CVA with left residual left hemiplegia    # CARDIOVASCULAR  Hyperlipidemia   Hypertension  Paroxysmal atrial fibrillation  Chest pain in ED  HFpEF  -  Reports intermittent burning quality chest/abd pain  - Trop neg x2   - TTE (2/28/25): EF 65%. WNL. Calcification of mitral valve annulus. Trace MR. Mild TR. Mild pulmonary hypertension  - c/w amlodipine, atenolol, atorvastatin.  - s/p home eliquis    # RESPIRATORY  Acute hypoxemic hypercapnic respiratory failure   COPD exacerbation  Emphysema  Hx PE  - present with 2 weeks of worsening SOB and cough, admitted with diffuse wheezing and hypoxemic hypercapnic resp failure. 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   - CTA (2/27/25): no main or lobar PE   - increase duonebs to q4 hours; add pulmicort .5mg BID; please keep albuterol MDI at bedside so patient can use as needed  - add azithromycin x 5 days (2/28/25 - )  - blood gas with acute decompensated hypercapnea (as well as component of metabolic acidosis)  - c/w BIPAP qhs at 12/6   - s/p 60mg of IV solumedrol x 2 (2/27/25); s/p 40mg BID (2/28/25- 3/2/25), on 40 mg qd (3/3 - )    # GI  Liver mass  Small hiatal hernia   - Known R liver mass, was recommended for outpatient follow up and MRI but son states that the family has been unable to get it done as the patient is very debilitated (needs a wheelchair to get around) and has frequent asthma exacerbations limiting her exertions  - CT here with worsening size of the mass  - pending MR Abd w/wo - safety sheet in chart  - IR c/s for Liver biopsy eventually     # RENAL  Hyponatremia.   - New onset hyponatremia, possibly 2/2 poor oral intake x few days in setting of her worsening SOB/asthma vs medications (on sertraline, not on diuretics) vs SIADH vs other  - on fluid restriction to 1.2L for now for possible SIADH  - Trend Na.    # INFECTIOUS DISEASE  UTI   - UCx (3/2) pending results   - on CTX (3/2-3/3)  - MRSA/MSSA pending  - RVP (2/27/25) neg    # HEME  - DVT PPX: Lovenox 70 mg BID     # ENDOCRINE  - no active issues    # SKIN  - no active issues    # ETHICS/ GOC    FULL CODE    # DISPO  PT: home PT 69F Asthma/COPD, HTN, dCHF, CVA w/ Lt weakness, PE, Afib-Eliquis, liver mass p/w acute asthma exacerbation c/b Acute hypoxia and hypercapnea s/p BiPAP for respiratory support, hyponatremia, enlarging liver mass 5.9x5cm from 3.2x1.7cm, chest pain.    # NEUROLOGY  Hx CVA in adulthood  - c/w aspirin, statin  - Hx CVA with left residual left hemiplegia    # CARDIOVASCULAR  Hyperlipidemia   Hypertension  Paroxysmal atrial fibrillation  Chest pain in ED  HFpEF  -  Reports intermittent burning quality chest/abd pain  - Trop neg x2   - TTE (2/28/25): EF 65%. WNL. Calcification of mitral valve annulus. Trace MR. Mild TR. Mild pulmonary hypertension  - c/w amlodipine, atenolol, atorvastatin.  - s/p home eliquis    # RESPIRATORY  Acute hypoxemic hypercapnic respiratory failure   COPD exacerbation  Emphysema  Hx PE  - present with 2 weeks of worsening SOB and cough, admitted with diffuse wheezing and hypoxemic hypercapnic resp failure. 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   - CTA (2/27/25): no main or lobar PE   - increase duonebs to q4 hours; add pulmicort .5mg BID; please keep albuterol MDI at bedside so patient can use as needed  - add azithromycin x 5 days (2/28/25 - )  - blood gas with acute decompensated hypercapnea (as well as component of metabolic acidosis)  - c/w BIPAP qhs at 12/6   - c/w albuterol q4, montelukast qd  - s/p 60mg of IV solumedrol x 2 (2/27/25); s/p 40mg BID (2/28/25- 3/2/25), on 40 mg qd (3/3 - )    # GI  Liver mass  Small hiatal hernia   - Known R liver mass, was recommended for outpatient follow up and MRI but son states that the family has been unable to get it done as the patient is very debilitated (needs a wheelchair to get around) and has frequent asthma exacerbations limiting her exertions  - CT here with worsening size of the mass  - pending MR Abd w/wo - safety sheet in chart  - IR c/s for Liver biopsy eventually     # RENAL  Hyponatremia.   - New onset hyponatremia, possibly 2/2 poor oral intake x few days in setting of her worsening SOB/asthma vs medications (on sertraline, not on diuretics) vs SIADH vs other  - on fluid restriction to 1.2L for now for possible SIADH  - Trend Na.    # INFECTIOUS DISEASE  UTI   - UCx (3/2) pending results   - on CTX (3/2-3/3)  - MRSA/MSSA pending  - RVP (2/27/25) neg    # HEME  - DVT PPX: Lovenox 70 mg BID     # ENDOCRINE  - no active issues    # SKIN  - no active issues    # ETHICS/ GOC    FULL CODE    # DISPO  PT: home PT

## 2025-03-03 NOTE — PROGRESS NOTE ADULT - SUBJECTIVE AND OBJECTIVE BOX
CHIEF COMPLAINT: Patient is a 69y old  Female who presents with a chief complaint of Worsening SOB and cough, +burning chest pain (02 Mar 2025 08:35)      INTERVAL EVENTS: No acute events overnight.    REVIEW OF SYSTEMS: Seen and evaluated by bedside during AM rounds.            OBJECTIVE:  ICU Vital Signs Last 24 Hrs  T(C): 35.8 (02 Mar 2025 20:00), Max: 36.6 (02 Mar 2025 12:00)  T(F): 96.5 (02 Mar 2025 20:00), Max: 97.9 (02 Mar 2025 12:00)  HR: 61 (03 Mar 2025 03:43) (50 - 78)  BP: 120/56 (02 Mar 2025 20:00) (109/50 - 120/56)  BP(mean): 73 (02 Mar 2025 20:00) (73 - 73)  ABP: --  ABP(mean): --  RR: 24 (02 Mar 2025 20:00) (19 - 24)  SpO2: 98% (03 Mar 2025 03:43) (98% - 100%)    O2 Parameters below as of 03 Mar 2025 03:43  Patient On (Oxygen Delivery Method): BiPAP/CPAP              CAPILLARY BLOOD GLUCOSE          HOSPITAL MEDICATIONS:  MEDICATIONS  (STANDING):  albuterol    90 MICROgram(s) HFA Inhaler 2 Puff(s) Inhalation once  albuterol    90 MICROgram(s) HFA Inhaler 2 Puff(s) Inhalation every 4 hours  albuterol/ipratropium for Nebulization 3 milliLiter(s) Nebulizer every 4 hours  amLODIPine   Tablet 10 milliGRAM(s) Oral daily  aspirin enteric coated 81 milliGRAM(s) Oral daily  atenolol  Tablet 50 milliGRAM(s) Oral daily  atorvastatin 20 milliGRAM(s) Oral at bedtime  azithromycin  IVPB 500 milliGRAM(s) IV Intermittent every 24 hours  buDESOnide    Inhalation Suspension 0.5 milliGRAM(s) Inhalation two times a day  cefTRIAXone   IVPB 1000 milliGRAM(s) IV Intermittent every 24 hours  enoxaparin Injectable 70 milliGRAM(s) SubCutaneous <User Schedule>  famotidine    Tablet 20 milliGRAM(s) Oral daily  methylPREDNISolone sodium succinate Injectable 40 milliGRAM(s) IV Push daily  montelukast 10 milliGRAM(s) Oral daily  tamsulosin 0.4 milliGRAM(s) Oral at bedtime    MEDICATIONS  (PRN):  acetaminophen     Tablet .. 650 milliGRAM(s) Oral every 6 hours PRN Temp greater or equal to 38C (100.4F), Mild Pain (1 - 3)  aluminum hydroxide/magnesium hydroxide/simethicone Suspension 30 milliLiter(s) Oral every 4 hours PRN Dyspepsia  melatonin 3 milliGRAM(s) Oral at bedtime PRN Insomnia  ondansetron Injectable 4 milliGRAM(s) IV Push every 8 hours PRN Nausea and/or Vomiting      PHYSICAL EXAMINATION  General: Alert and cooperative. Resting comfortably. No apparent distress noted. Dry mucous membrane noted. White conjunctiva, no icterus.   HEENT: Normocephalic/atraumatic. EOMI. No discharge, conjunctivitis, or scleral icterus. No ptosis. No discharge or sinus tenderness noted. Mucous membranes are pink without bleeding. Tonsils are not enlarged. Good dental hygiene noted. No facial asymmetry. Neck is supple with a full range of motion. No masses or tenderness. Trachea is midline. Lymph nodes are not enlarged.   Cardiovascular: Normal rate and regular rhythm. No murmur/gallop.   Respiratory: Breath sounds clear and equal bilaterally without rales, wheezing, or rhonchi. No accessory muscles used.   Abdomen: Soft, nontender, nondistended. Bowel sounds are present. No bruit. No hepatosplenomegaly or masses. No rebound or guarding.   Skin: Warm to touch. No rashes, wounds, or skin lesions noted.   Extremities: +2 dorsalis pedis pulse is noted for the lower extremities. Full range of motion on all four extremities. 5/5 motor strength noted in all extremities. No clubbing, cyanosis, edema, or varicose veins.   MSK: No swelling or deformity. Posture, body symmetry, and movements are appropriate.   Neuro: AxO x 3, CN II - XII grossly intact.    LABS:                        9.7    8.52  )-----------( 200      ( 02 Mar 2025 05:41 )             33.0     03-02    131[L]  |  97[L]  |  28[H]  ----------------------------<  120[H]  4.9   |  20[L]  |  1.13    Ca    8.3[L]      02 Mar 2025 05:41  Phos  4.2     -  Mg     2.30     -    TPro  6.0  /  Alb  3.4  /  TBili  <0.2  /  DBili  x   /  AST  20  /  ALT  11  /  AlkPhos  111  -      Urinalysis Basic - ( 02 Mar 2025 14:04 )    Color: Yellow / Appearance: Clear / S.015 / pH: x  Gluc: x / Ketone: Negative mg/dL  / Bili: Negative / Urobili: 0.2 mg/dL   Blood: x / Protein: Negative mg/dL / Nitrite: Positive   Leuk Esterase: Trace / RBC: 0 /HPF / WBC 1 /HPF   Sq Epi: x / Non Sq Epi: 0 /HPF / Bacteria: Many /HPF        Venous Blood Gas:   @ 07:17  7.25/58/37/25/56.2  VBG Lactate: 1.5  Venous Blood Gas:   @ 13:02  7.22/55/51/22/71.9  VBG Lactate: 3.6      PAST MEDICAL & SURGICAL HISTORY:  Brain aneurysm      Hypertension      Smoking      Atrial fibrillation      Asthma      Hyperlipidemia      CVA (cerebrovascular accident)      No significant past surgical history          FAMILY HISTORY:  No pertinent family history in first degree relatives        Social History:  Lives with family  Currently ambulatory mostly with a wheelchair  Former tobacco use, quit ~15 years ago  Denies EtOH use (2025 23:32)      RADIOLOGY:  [ ] Reviewed and interpreted by me    PULMONARY FUNCTION TESTS:    EKG: CHIEF COMPLAINT: Patient is a 69y old  Female who presents with a chief complaint of Worsening SOB and cough, +burning chest pain     INTERVAL EVENTS: No acute events overnight.    REVIEW OF SYSTEMS: Seen and evaluated by bedside during AM rounds.            OBJECTIVE:  ICU Vital Signs Last 24 Hrs  T(C): 35.8 (02 Mar 2025 20:00), Max: 36.6 (02 Mar 2025 12:00)  T(F): 96.5 (02 Mar 2025 20:00), Max: 97.9 (02 Mar 2025 12:00)  HR: 61 (03 Mar 2025 03:43) (50 - 78)  BP: 120/56 (02 Mar 2025 20:00) (109/50 - 120/56)  BP(mean): 73 (02 Mar 2025 20:00) (73 - 73)  ABP: --  ABP(mean): --  RR: 24 (02 Mar 2025 20:00) (19 - 24)  SpO2: 98% (03 Mar 2025 03:43) (98% - 100%)    O2 Parameters below as of 03 Mar 2025 03:43  Patient On (Oxygen Delivery Method): BiPAP/CPAP              CAPILLARY BLOOD GLUCOSE          HOSPITAL MEDICATIONS:  MEDICATIONS  (STANDING):  albuterol    90 MICROgram(s) HFA Inhaler 2 Puff(s) Inhalation once  albuterol    90 MICROgram(s) HFA Inhaler 2 Puff(s) Inhalation every 4 hours  albuterol/ipratropium for Nebulization 3 milliLiter(s) Nebulizer every 4 hours  amLODIPine   Tablet 10 milliGRAM(s) Oral daily  aspirin enteric coated 81 milliGRAM(s) Oral daily  atenolol  Tablet 50 milliGRAM(s) Oral daily  atorvastatin 20 milliGRAM(s) Oral at bedtime  azithromycin  IVPB 500 milliGRAM(s) IV Intermittent every 24 hours  buDESOnide    Inhalation Suspension 0.5 milliGRAM(s) Inhalation two times a day  cefTRIAXone   IVPB 1000 milliGRAM(s) IV Intermittent every 24 hours  enoxaparin Injectable 70 milliGRAM(s) SubCutaneous <User Schedule>  famotidine    Tablet 20 milliGRAM(s) Oral daily  methylPREDNISolone sodium succinate Injectable 40 milliGRAM(s) IV Push daily  montelukast 10 milliGRAM(s) Oral daily  tamsulosin 0.4 milliGRAM(s) Oral at bedtime    MEDICATIONS  (PRN):  acetaminophen     Tablet .. 650 milliGRAM(s) Oral every 6 hours PRN Temp greater or equal to 38C (100.4F), Mild Pain (1 - 3)  aluminum hydroxide/magnesium hydroxide/simethicone Suspension 30 milliLiter(s) Oral every 4 hours PRN Dyspepsia  melatonin 3 milliGRAM(s) Oral at bedtime PRN Insomnia  ondansetron Injectable 4 milliGRAM(s) IV Push every 8 hours PRN Nausea and/or Vomiting      PHYSICAL EXAMINATION  General: Alert and cooperative. Resting comfortably. No apparent distress noted. Dry mucous membrane noted. White conjunctiva, no icterus.   HEENT: Normocephalic/atraumatic. EOMI. No discharge, conjunctivitis, or scleral icterus. No ptosis. No discharge or sinus tenderness noted. Mucous membranes are pink without bleeding. Tonsils are not enlarged. Good dental hygiene noted. No facial asymmetry. Neck is supple with a full range of motion. No masses or tenderness. Trachea is midline. Lymph nodes are not enlarged.   Cardiovascular: Normal rate and regular rhythm. No murmur/gallop.   Respiratory: Breath sounds clear and equal bilaterally without rales, wheezing, or rhonchi. No accessory muscles used.   Abdomen: Soft, nontender, nondistended. Bowel sounds are present. No bruit. No hepatosplenomegaly or masses. No rebound or guarding.   Skin: Warm to touch. No rashes, wounds, or skin lesions noted.   Extremities: +2 dorsalis pedis pulse is noted for the lower extremities. Full range of motion on all four extremities. 5/5 motor strength noted in all extremities. No clubbing, cyanosis, edema, or varicose veins.   MSK: No swelling or deformity. Posture, body symmetry, and movements are appropriate.   Neuro: AxO x 3, CN II - XII grossly intact.    LABS:                        9.7    8.52  )-----------( 200      ( 02 Mar 2025 05:41 )             33.0     03-02    131[L]  |  97[L]  |  28[H]  ----------------------------<  120[H]  4.9   |  20[L]  |  1.13    Ca    8.3[L]      02 Mar 2025 05:41  Phos  4.2     -  Mg     2.30     -    TPro  6.0  /  Alb  3.4  /  TBili  <0.2  /  DBili  x   /  AST  20  /  ALT  11  /  AlkPhos  111        Urinalysis Basic - ( 02 Mar 2025 14:04 )    Color: Yellow / Appearance: Clear / S.015 / pH: x  Gluc: x / Ketone: Negative mg/dL  / Bili: Negative / Urobili: 0.2 mg/dL   Blood: x / Protein: Negative mg/dL / Nitrite: Positive   Leuk Esterase: Trace / RBC: 0 /HPF / WBC 1 /HPF   Sq Epi: x / Non Sq Epi: 0 /HPF / Bacteria: Many /HPF        Venous Blood Gas:   @ 07:17  7.25/58/37/25/56.2  VBG Lactate: 1.5  Venous Blood Gas:   @ 13:02  7.22/55/51/22/71.9  VBG Lactate: 3.6      PAST MEDICAL & SURGICAL HISTORY:  Brain aneurysm      Hypertension      Smoking      Atrial fibrillation      Asthma      Hyperlipidemia      CVA (cerebrovascular accident)      No significant past surgical history          FAMILY HISTORY:  No pertinent family history in first degree relatives        Social History:  Lives with family  Currently ambulatory mostly with a wheelchair  Former tobacco use, quit ~15 years ago  Denies EtOH use (2025 23:32)      RADIOLOGY:  [ ] Reviewed and interpreted by me    PULMONARY FUNCTION TESTS:    EKG: CHIEF COMPLAINT: Patient is a 69y old  Female who presents with a chief complaint of Worsening SOB and cough, +burning chest pain     INTERVAL EVENTS: No acute events overnight.    REVIEW OF SYSTEMS: Seen and evaluated by bedside during AM rounds. As per son, pt has not slept for x2 days.        OBJECTIVE:  ICU Vital Signs Last 24 Hrs  T(C): 35.8 (02 Mar 2025 20:00), Max: 36.6 (02 Mar 2025 12:00)  T(F): 96.5 (02 Mar 2025 20:00), Max: 97.9 (02 Mar 2025 12:00)  HR: 61 (03 Mar 2025 03:43) (50 - 78)  BP: 120/56 (02 Mar 2025 20:00) (109/50 - 120/56)  BP(mean): 73 (02 Mar 2025 20:00) (73 - 73)  ABP: --  ABP(mean): --  RR: 24 (02 Mar 2025 20:00) (19 - 24)  SpO2: 98% (03 Mar 2025 03:43) (98% - 100%)    O2 Parameters below as of 03 Mar 2025 03:43  Patient On (Oxygen Delivery Method): BiPAP/CPAP              CAPILLARY BLOOD GLUCOSE          HOSPITAL MEDICATIONS:  MEDICATIONS  (STANDING):  albuterol    90 MICROgram(s) HFA Inhaler 2 Puff(s) Inhalation once  albuterol    90 MICROgram(s) HFA Inhaler 2 Puff(s) Inhalation every 4 hours  albuterol/ipratropium for Nebulization 3 milliLiter(s) Nebulizer every 4 hours  amLODIPine   Tablet 10 milliGRAM(s) Oral daily  aspirin enteric coated 81 milliGRAM(s) Oral daily  atenolol  Tablet 50 milliGRAM(s) Oral daily  atorvastatin 20 milliGRAM(s) Oral at bedtime  azithromycin  IVPB 500 milliGRAM(s) IV Intermittent every 24 hours  buDESOnide    Inhalation Suspension 0.5 milliGRAM(s) Inhalation two times a day  cefTRIAXone   IVPB 1000 milliGRAM(s) IV Intermittent every 24 hours  enoxaparin Injectable 70 milliGRAM(s) SubCutaneous <User Schedule>  famotidine    Tablet 20 milliGRAM(s) Oral daily  methylPREDNISolone sodium succinate Injectable 40 milliGRAM(s) IV Push daily  montelukast 10 milliGRAM(s) Oral daily  tamsulosin 0.4 milliGRAM(s) Oral at bedtime    MEDICATIONS  (PRN):  acetaminophen     Tablet .. 650 milliGRAM(s) Oral every 6 hours PRN Temp greater or equal to 38C (100.4F), Mild Pain (1 - 3)  aluminum hydroxide/magnesium hydroxide/simethicone Suspension 30 milliLiter(s) Oral every 4 hours PRN Dyspepsia  melatonin 3 milliGRAM(s) Oral at bedtime PRN Insomnia  ondansetron Injectable 4 milliGRAM(s) IV Push every 8 hours PRN Nausea and/or Vomiting      PHYSICAL EXAMINATION  General: Sleeping. Resting comfortably. No apparent respiratory distress noted. Son at bedside.   HEENT: Normocephalic/atraumatic.   Cardiovascular: Normal rate and regular rhythm.  Respiratory: + NC. Breath sounds clear and equal bilaterally without rales, wheezing, or rhonchi. No accessory muscles used.   Abdomen: Soft, nontender, nondistended.   Skin: Warm to touch. No rashes, wounds, or skin lesions noted.   Extremities: No clubbing, cyanosis, edema, or varicose veins.   Neuro: unable to assess. as per son, pt axox3    LABS:                        9.7    8.52  )-----------( 200      ( 02 Mar 2025 05:41 )             33.0     03-02    131[L]  |  97[L]  |  28[H]  ----------------------------<  120[H]  4.9   |  20[L]  |  1.13    Ca    8.3[L]      02 Mar 2025 05:41  Phos  4.2     03-02  Mg     2.30     03-02    TPro  6.0  /  Alb  3.4  /  TBili  <0.2  /  DBili  x   /  AST  20  /  ALT  11  /  AlkPhos  111  03-02      Urinalysis Basic - ( 02 Mar 2025 14:04 )    Color: Yellow / Appearance: Clear / S.015 / pH: x  Gluc: x / Ketone: Negative mg/dL  / Bili: Negative / Urobili: 0.2 mg/dL   Blood: x / Protein: Negative mg/dL / Nitrite: Positive   Leuk Esterase: Trace / RBC: 0 /HPF / WBC 1 /HPF   Sq Epi: x / Non Sq Epi: 0 /HPF / Bacteria: Many /HPF        Venous Blood Gas:   @ 07:17  7.25/58/37/25/56.2  VBG Lactate: 1.5  Venous Blood Gas:   @ 13:02  7.22/55/51/22/71.9  VBG Lactate: 3.6      PAST MEDICAL & SURGICAL HISTORY:  Brain aneurysm      Hypertension      Smoking      Atrial fibrillation      Asthma      Hyperlipidemia      CVA (cerebrovascular accident)      No significant past surgical history          FAMILY HISTORY:  No pertinent family history in first degree relatives        Social History:  Lives with family  Currently ambulatory mostly with a wheelchair  Former tobacco use, quit ~15 years ago  Denies EtOH use (2025 23:32)      RADIOLOGY:  [ ] Reviewed and interpreted by me    PULMONARY FUNCTION TESTS:    EKG:

## 2025-03-04 LAB
-  AMOXICILLIN/CLAVULANIC ACID: SIGNIFICANT CHANGE UP
-  AMPICILLIN/SULBACTAM: SIGNIFICANT CHANGE UP
-  AMPICILLIN: SIGNIFICANT CHANGE UP
-  AZTREONAM: SIGNIFICANT CHANGE UP
-  CEFAZOLIN: SIGNIFICANT CHANGE UP
-  CEFEPIME: SIGNIFICANT CHANGE UP
-  CEFOXITIN: SIGNIFICANT CHANGE UP
-  CEFTRIAXONE: SIGNIFICANT CHANGE UP
-  CEFUROXIME: SIGNIFICANT CHANGE UP
-  CIPROFLOXACIN: SIGNIFICANT CHANGE UP
-  ERTAPENEM: SIGNIFICANT CHANGE UP
-  GENTAMICIN: SIGNIFICANT CHANGE UP
-  IMIPENEM: SIGNIFICANT CHANGE UP
-  LEVOFLOXACIN: SIGNIFICANT CHANGE UP
-  MEROPENEM: SIGNIFICANT CHANGE UP
-  NITROFURANTOIN: SIGNIFICANT CHANGE UP
-  PIPERACILLIN/TAZOBACTAM: SIGNIFICANT CHANGE UP
-  TOBRAMYCIN: SIGNIFICANT CHANGE UP
-  TRIMETHOPRIM/SULFAMETHOXAZOLE: SIGNIFICANT CHANGE UP
ALBUMIN SERPL ELPH-MCNC: 3.2 G/DL — LOW (ref 3.3–5)
ALP SERPL-CCNC: 90 U/L — SIGNIFICANT CHANGE UP (ref 40–120)
ALT FLD-CCNC: 11 U/L — SIGNIFICANT CHANGE UP (ref 4–33)
ANION GAP SERPL CALC-SCNC: 7 MMOL/L — SIGNIFICANT CHANGE UP (ref 7–14)
AST SERPL-CCNC: 12 U/L — SIGNIFICANT CHANGE UP (ref 4–32)
BASOPHILS # BLD AUTO: 0 K/UL — SIGNIFICANT CHANGE UP (ref 0–0.2)
BASOPHILS NFR BLD AUTO: 0 % — SIGNIFICANT CHANGE UP (ref 0–2)
BILIRUB SERPL-MCNC: 0.2 MG/DL — SIGNIFICANT CHANGE UP (ref 0.2–1.2)
BUN SERPL-MCNC: 22 MG/DL — SIGNIFICANT CHANGE UP (ref 7–23)
CALCIUM SERPL-MCNC: 8.4 MG/DL — SIGNIFICANT CHANGE UP (ref 8.4–10.5)
CHLORIDE SERPL-SCNC: 99 MMOL/L — SIGNIFICANT CHANGE UP (ref 98–107)
CO2 SERPL-SCNC: 26 MMOL/L — SIGNIFICANT CHANGE UP (ref 22–31)
CREAT SERPL-MCNC: 1.2 MG/DL — SIGNIFICANT CHANGE UP (ref 0.5–1.3)
CULTURE RESULTS: ABNORMAL
EGFR: 49 ML/MIN/1.73M2 — LOW
EGFR: 49 ML/MIN/1.73M2 — LOW
EOSINOPHIL # BLD AUTO: 0 K/UL — SIGNIFICANT CHANGE UP (ref 0–0.5)
EOSINOPHIL NFR BLD AUTO: 0 % — SIGNIFICANT CHANGE UP (ref 0–6)
GLUCOSE SERPL-MCNC: 97 MG/DL — SIGNIFICANT CHANGE UP (ref 70–99)
HCT VFR BLD CALC: 32.6 % — LOW (ref 34.5–45)
HGB BLD-MCNC: 9.8 G/DL — LOW (ref 11.5–15.5)
IANC: 11.71 K/UL — HIGH (ref 1.8–7.4)
IMM GRANULOCYTES NFR BLD AUTO: 0.4 % — SIGNIFICANT CHANGE UP (ref 0–0.9)
LYMPHOCYTES # BLD AUTO: 1.08 K/UL — SIGNIFICANT CHANGE UP (ref 1–3.3)
LYMPHOCYTES # BLD AUTO: 8.3 % — LOW (ref 13–44)
MAGNESIUM SERPL-MCNC: 2.2 MG/DL — SIGNIFICANT CHANGE UP (ref 1.6–2.6)
MCHC RBC-ENTMCNC: 24.7 PG — LOW (ref 27–34)
MCHC RBC-ENTMCNC: 30.1 G/DL — LOW (ref 32–36)
MCV RBC AUTO: 82.3 FL — SIGNIFICANT CHANGE UP (ref 80–100)
METHOD TYPE: SIGNIFICANT CHANGE UP
MONOCYTES # BLD AUTO: 0.19 K/UL — SIGNIFICANT CHANGE UP (ref 0–0.9)
MONOCYTES NFR BLD AUTO: 1.5 % — LOW (ref 2–14)
MRSA PCR RESULT.: SIGNIFICANT CHANGE UP
NEUTROPHILS # BLD AUTO: 11.71 K/UL — HIGH (ref 1.8–7.4)
NEUTROPHILS NFR BLD AUTO: 89.8 % — HIGH (ref 43–77)
NRBC # BLD AUTO: 0 K/UL — SIGNIFICANT CHANGE UP (ref 0–0)
NRBC # FLD: 0 K/UL — SIGNIFICANT CHANGE UP (ref 0–0)
NRBC BLD AUTO-RTO: 0 /100 WBCS — SIGNIFICANT CHANGE UP (ref 0–0)
ORGANISM # SPEC MICROSCOPIC CNT: ABNORMAL
ORGANISM # SPEC MICROSCOPIC CNT: ABNORMAL
PHOSPHATE SERPL-MCNC: 4.4 MG/DL — SIGNIFICANT CHANGE UP (ref 2.5–4.5)
PLATELET # BLD AUTO: 162 K/UL — SIGNIFICANT CHANGE UP (ref 150–400)
POTASSIUM SERPL-MCNC: 4.6 MMOL/L — SIGNIFICANT CHANGE UP (ref 3.5–5.3)
POTASSIUM SERPL-SCNC: 4.6 MMOL/L — SIGNIFICANT CHANGE UP (ref 3.5–5.3)
PROT SERPL-MCNC: 6.2 G/DL — SIGNIFICANT CHANGE UP (ref 6–8.3)
RBC # BLD: 3.96 M/UL — SIGNIFICANT CHANGE UP (ref 3.8–5.2)
RBC # FLD: 16.1 % — HIGH (ref 10.3–14.5)
S AUREUS DNA NOSE QL NAA+PROBE: SIGNIFICANT CHANGE UP
SODIUM SERPL-SCNC: 132 MMOL/L — LOW (ref 135–145)
SPECIMEN SOURCE: SIGNIFICANT CHANGE UP
WBC # BLD: 13.03 K/UL — HIGH (ref 3.8–10.5)
WBC # FLD AUTO: 13.03 K/UL — HIGH (ref 3.8–10.5)

## 2025-03-04 PROCEDURE — 99233 SBSQ HOSP IP/OBS HIGH 50: CPT

## 2025-03-04 RX ORDER — PREDNISONE 20 MG/1
40 TABLET ORAL DAILY
Refills: 0 | Status: DISCONTINUED | OUTPATIENT
Start: 2025-03-05 | End: 2025-03-07

## 2025-03-04 RX ADMIN — IPRATROPIUM BROMIDE AND ALBUTEROL SULFATE 3 MILLILITER(S): .5; 2.5 SOLUTION RESPIRATORY (INHALATION) at 15:37

## 2025-03-04 RX ADMIN — Medication 1 APPLICATION(S): at 12:51

## 2025-03-04 RX ADMIN — IPRATROPIUM BROMIDE AND ALBUTEROL SULFATE 3 MILLILITER(S): .5; 2.5 SOLUTION RESPIRATORY (INHALATION) at 19:25

## 2025-03-04 RX ADMIN — Medication 250 MILLIGRAM(S): at 12:51

## 2025-03-04 RX ADMIN — IPRATROPIUM BROMIDE AND ALBUTEROL SULFATE 3 MILLILITER(S): .5; 2.5 SOLUTION RESPIRATORY (INHALATION) at 03:34

## 2025-03-04 RX ADMIN — CEFTRIAXONE 100 MILLIGRAM(S): 500 INJECTION, POWDER, FOR SOLUTION INTRAMUSCULAR; INTRAVENOUS at 21:42

## 2025-03-04 RX ADMIN — AMLODIPINE BESYLATE 10 MILLIGRAM(S): 10 TABLET ORAL at 05:29

## 2025-03-04 RX ADMIN — LISINOPRIL 50 MILLIGRAM(S): 30 TABLET ORAL at 05:29

## 2025-03-04 RX ADMIN — METHYLPREDNISOLONE ACETATE 40 MILLIGRAM(S): 80 INJECTION, SUSPENSION INTRA-ARTICULAR; INTRALESIONAL; INTRAMUSCULAR; SOFT TISSUE at 05:31

## 2025-03-04 RX ADMIN — IPRATROPIUM BROMIDE AND ALBUTEROL SULFATE 3 MILLILITER(S): .5; 2.5 SOLUTION RESPIRATORY (INHALATION) at 08:11

## 2025-03-04 RX ADMIN — ENOXAPARIN SODIUM 70 MILLIGRAM(S): 100 INJECTION SUBCUTANEOUS at 05:30

## 2025-03-04 RX ADMIN — TAMSULOSIN HYDROCHLORIDE 0.4 MILLIGRAM(S): 0.4 CAPSULE ORAL at 23:57

## 2025-03-04 RX ADMIN — Medication 650 MILLIGRAM(S): at 12:51

## 2025-03-04 RX ADMIN — MONTELUKAST SODIUM 10 MILLIGRAM(S): 10 TABLET ORAL at 12:51

## 2025-03-04 RX ADMIN — BUDESONIDE 0.5 MILLIGRAM(S): 0.25 SUSPENSION RESPIRATORY (INHALATION) at 20:23

## 2025-03-04 RX ADMIN — ENOXAPARIN SODIUM 70 MILLIGRAM(S): 100 INJECTION SUBCUTANEOUS at 18:02

## 2025-03-04 RX ADMIN — BUDESONIDE 0.5 MILLIGRAM(S): 0.25 SUSPENSION RESPIRATORY (INHALATION) at 08:13

## 2025-03-04 RX ADMIN — IPRATROPIUM BROMIDE AND ALBUTEROL SULFATE 3 MILLILITER(S): .5; 2.5 SOLUTION RESPIRATORY (INHALATION) at 23:55

## 2025-03-04 RX ADMIN — Medication 20 MILLIGRAM(S): at 12:50

## 2025-03-04 RX ADMIN — ATORVASTATIN CALCIUM 20 MILLIGRAM(S): 80 TABLET, FILM COATED ORAL at 23:57

## 2025-03-04 RX ADMIN — Medication 81 MILLIGRAM(S): at 12:50

## 2025-03-04 RX ADMIN — Medication 4 MILLIGRAM(S): at 12:52

## 2025-03-04 RX ADMIN — IPRATROPIUM BROMIDE AND ALBUTEROL SULFATE 3 MILLILITER(S): .5; 2.5 SOLUTION RESPIRATORY (INHALATION) at 11:28

## 2025-03-04 RX ADMIN — Medication 4 MILLIGRAM(S): at 21:41

## 2025-03-04 NOTE — PROGRESS NOTE ADULT - ASSESSMENT
69F Asthma/COPD, HTN, dCHF, CVA w/ Lt weakness, PE, Afib-Eliquis, liver mass p/w acute asthma exacerbation c/b Acute hypoxia and hypercapnea s/p BiPAP for respiratory support, hyponatremia, enlarging liver mass 5.9x5cm from 3.2x1.7cm, chest pain.    # NEUROLOGY  Hx CVA in adulthood  - c/w aspirin, statin  - Hx CVA with left residual left hemiplegia    # CARDIOVASCULAR  Hyperlipidemia   Hypertension  Paroxysmal atrial fibrillation  Chest pain in ED  HFpEF  -  Reports intermittent burning quality chest/abd pain  - Trop neg x2   - TTE (2/28/25): EF 65%. WNL. Calcification of mitral valve annulus. Trace MR. Mild TR. Mild pulmonary hypertension  - c/w amlodipine, atenolol, atorvastatin.  - s/p home eliquis    # RESPIRATORY  Acute hypoxemic hypercapnic respiratory failure   COPD exacerbation  Emphysema  Hx PE  - present with 2 weeks of worsening SOB and cough, admitted with diffuse wheezing and hypoxemic hypercapnic resp failure. 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   - CTA (2/27/25): no main or lobar PE   - increase duonebs to q4 hours; add pulmicort .5mg BID; please keep albuterol MDI at bedside so patient can use as needed  - add azithromycin x 5 days (2/28/25 - )  - blood gas with acute decompensated hypercapnea (as well as component of metabolic acidosis)  - c/w BIPAP qhs at 12/6   - c/w albuterol q4, montelukast qd  - s/p 60mg of IV solumedrol x 2 (2/27/25); s/p 40mg BID (2/28/25- 3/2/25), on 40 mg qd (3/3 - )    # GI  Liver mass  Small hiatal hernia   - Known R liver mass, was recommended for outpatient follow up and MRI but son states that the family has been unable to get it done as the patient is very debilitated (needs a wheelchair to get around) and has frequent asthma exacerbations limiting her exertions  - CT here with worsening size of the mass  - pending MR Abd w/wo - safety sheet in chart  - IR c/s for Liver biopsy eventually     # RENAL  Hyponatremia.   - New onset hyponatremia, possibly 2/2 poor oral intake x few days in setting of her worsening SOB/asthma vs medications (on sertraline, not on diuretics) vs SIADH vs other  - on fluid restriction to 1.2L for now for possible SIADH  - Trend Na.    # INFECTIOUS DISEASE  UTI   - UCx (3/2) pending results   - on CTX (3/2-3/3)  - MRSA/MSSA pending  - RVP (2/27/25) neg    # HEME  - DVT PPX: Lovenox 70 mg BID     # ENDOCRINE  - no active issues    # SKIN  - no active issues    # ETHICS/ GOC    FULL CODE    # DISPO  PT: home PT 69F Asthma/COPD, HTN, dCHF, CVA w/ Lt weakness, PE, Afib-Eliquis, liver mass p/w acute asthma exacerbation c/b Acute hypoxia and hypercapnea s/p BiPAP for respiratory support, hyponatremia, enlarging liver mass 5.9x5cm from 3.2x1.7cm, chest pain.    # NEUROLOGY  Hx CVA in adulthood  - c/w aspirin, statin  - Hx CVA with left residual left hemiplegia    # CARDIOVASCULAR  Hyperlipidemia   Hypertension  Paroxysmal atrial fibrillation  Chest pain in ED  HFpEF  -  Reports intermittent burning quality chest/abd pain  - Trop neg x2   - TTE (2/28/25): EF 65%. WNL. Calcification of mitral valve annulus. Trace MR. Mild TR. Mild pulmonary hypertension  - c/w amlodipine, atenolol, atorvastatin.  - s/p home eliquis    # RESPIRATORY  Acute hypoxemic hypercapnic respiratory failure   COPD exacerbation  Emphysema  Hx PE  - present with 2 weeks of worsening SOB and cough, admitted with diffuse wheezing and hypoxemic hypercapnic resp failure. 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   - CTA (2/27/25): no main or lobar PE   - increase duonebs to q4 hours; add pulmicort .5mg BID; please keep albuterol MDI at bedside so patient can use as needed  - add azithromycin x 5 days (2/28/25 - )  - blood gas with acute decompensated hypercapnea (as well as component of metabolic acidosis)  - c/w BIPAP qhs at 12/6   - c/w albuterol q4, montelukast qd  - s/p 60mg of IV solumedrol x 2 (2/27/25); s/p 40mg BID (2/28/25- 3/2/25), on 40 mg qd (3/3 - )    # GI  Liver mass  Small hiatal hernia   - Known R liver mass, was recommended for outpatient follow up and MRI but son states that the family has been unable to get it done as the patient is very debilitated (needs a wheelchair to get around) and has frequent asthma exacerbations limiting her exertions  - CT here with worsening size of the mass  - pending MR Abd w/wo - safety sheet in chart  - IR c/s for Liver biopsy eventually     # RENAL  Hyponatremia.   - New onset hyponatremia, possibly 2/2 poor oral intake x few days in setting of her worsening SOB/asthma vs medications (on sertraline, not on diuretics) vs SIADH vs other  - on fluid restriction to 1.2L for now for possible SIADH  - Trend Na.    # INFECTIOUS DISEASE  UTI   - UCx (3/2) growing E Coli pending sensitivity  - on CTX (3/2-3/4)  - MRSA/MSSA pending  - RVP (2/27/25) neg    # HEME  - DVT PPX: Lovenox 70 mg BID     # ENDOCRINE  - no active issues    # SKIN  - no active issues    # ETHICS/ GOC    FULL CODE    # DISPO  PT: home PT

## 2025-03-04 NOTE — PROGRESS NOTE ADULT - SUBJECTIVE AND OBJECTIVE BOX
CHIEF COMPLAINT:Patient is a 69y old  Female who presents with a chief complaint of Worsening SOB and cough, +burning chest pain (03 Mar 2025 06:44)      INTERVAL EVENTS:     ROS: Seen by bedside during AM rounds     OBJECTIVE:  ICU Vital Signs Last 24 Hrs  T(C): 36.2 (04 Mar 2025 04:00), Max: 36.5 (03 Mar 2025 14:31)  T(F): 97.1 (04 Mar 2025 04:00), Max: 97.7 (03 Mar 2025 14:31)  HR: 65 (04 Mar 2025 08:14) (60 - 677)  BP: 131/67 (04 Mar 2025 04:00) (112/63 - 131/67)  BP(mean): 86 (04 Mar 2025 04:00) (81 - 86)  ABP: --  ABP(mean): --  RR: 15 (04 Mar 2025 04:00) (15 - 22)  SpO2: 99% (04 Mar 2025 08:14) (95% - 100%)    O2 Parameters below as of 04 Mar 2025 08:13  Patient On (Oxygen Delivery Method): nasal cannula, 3 l/m              03-03 @ 07:01  -  03-04 @ 07:00  --------------------------------------------------------  IN: 400 mL / OUT: 1500 mL / NET: -1100 mL      CAPILLARY BLOOD GLUCOSE          PHYSICAL EXAM:  General:   HEENT:   Lymph Nodes:  Neck:   Respiratory:   Cardiovascular:   Abdomen:   Extremities:   Skin:   Neurological:  Psychiatry:        HOSPITAL MEDICATIONS:  MEDICATIONS  (STANDING):  albuterol    90 MICROgram(s) HFA Inhaler 2 Puff(s) Inhalation once  albuterol    90 MICROgram(s) HFA Inhaler 2 Puff(s) Inhalation every 4 hours  albuterol/ipratropium for Nebulization 3 milliLiter(s) Nebulizer every 4 hours  amLODIPine   Tablet 10 milliGRAM(s) Oral daily  aspirin enteric coated 81 milliGRAM(s) Oral daily  atenolol  Tablet 50 milliGRAM(s) Oral daily  atorvastatin 20 milliGRAM(s) Oral at bedtime  azithromycin  IVPB 500 milliGRAM(s) IV Intermittent every 24 hours  buDESOnide    Inhalation Suspension 0.5 milliGRAM(s) Inhalation two times a day  cefTRIAXone   IVPB 1000 milliGRAM(s) IV Intermittent every 24 hours  chlorhexidine 2% Cloths 1 Application(s) Topical daily  enoxaparin Injectable 70 milliGRAM(s) SubCutaneous <User Schedule>  famotidine    Tablet 20 milliGRAM(s) Oral daily  methylPREDNISolone sodium succinate Injectable 40 milliGRAM(s) IV Push daily  montelukast 10 milliGRAM(s) Oral daily  tamsulosin 0.4 milliGRAM(s) Oral at bedtime    MEDICATIONS  (PRN):  acetaminophen     Tablet .. 650 milliGRAM(s) Oral every 6 hours PRN Temp greater or equal to 38C (100.4F), Mild Pain (1 - 3)  aluminum hydroxide/magnesium hydroxide/simethicone Suspension 30 milliLiter(s) Oral every 4 hours PRN Dyspepsia  melatonin 3 milliGRAM(s) Oral at bedtime PRN Insomnia  ondansetron Injectable 4 milliGRAM(s) IV Push every 8 hours PRN Nausea and/or Vomiting      LABS:                        9.9    19.58 )-----------( 203      ( 03 Mar 2025 07:25 )             34.9     03-04    132[L]  |  99  |  22  ----------------------------<  97  4.6   |  26  |  1.20    Ca    8.4      04 Mar 2025 06:26  Phos  4.4     03-04  Mg     2.20     03-04    TPro  6.2  /  Alb  3.2[L]  /  TBili  0.2  /  DBili  x   /  AST  12  /  ALT  11  /  AlkPhos  90  03-04      Urinalysis Basic - ( 04 Mar 2025 06:26 )    Color: x / Appearance: x / SG: x / pH: x  Gluc: 97 mg/dL / Ketone: x  / Bili: x / Urobili: x   Blood: x / Protein: x / Nitrite: x   Leuk Esterase: x / RBC: x / WBC x   Sq Epi: x / Non Sq Epi: x / Bacteria: x         CHIEF COMPLAINT:Patient is a 69y old  Female who presents with a chief complaint of Worsening SOB and cough, +burning chest pain (03 Mar 2025 06:44)      INTERVAL EVENTS: Kinyarwanda  [employer]ange #[id number] utilized.     ROS: Seen by bedside during AM rounds     OBJECTIVE:  ICU Vital Signs Last 24 Hrs  T(C): 36.2 (04 Mar 2025 04:00), Max: 36.5 (03 Mar 2025 14:31)  T(F): 97.1 (04 Mar 2025 04:00), Max: 97.7 (03 Mar 2025 14:31)  HR: 65 (04 Mar 2025 08:14) (60 - 677)  BP: 131/67 (04 Mar 2025 04:00) (112/63 - 131/67)  BP(mean): 86 (04 Mar 2025 04:00) (81 - 86)  ABP: --  ABP(mean): --  RR: 15 (04 Mar 2025 04:00) (15 - 22)  SpO2: 99% (04 Mar 2025 08:14) (95% - 100%)    O2 Parameters below as of 04 Mar 2025 08:13  Patient On (Oxygen Delivery Method): nasal cannula, 3 l/m              03-03 @ 07:01  -  03-04 @ 07:00  --------------------------------------------------------  IN: 400 mL / OUT: 1500 mL / NET: -1100 mL      CAPILLARY BLOOD GLUCOSE          PHYSICAL EXAM:  General: NAD   Neck: trachea midline.   Cards: S1/S2, no murmurs   Pulm: Mild wheezes b/l. No resp distress.   Abdomen: Soft, NTND.   Extremities: No pedal edema. Extremities warm to touch.  Neurology: AOx3. 5/5 motor strength x4E. Following basic commands.   Skin: warm to touch, color appropriate for ethnicity. Refer to RN assessment for further details.          HOSPITAL MEDICATIONS:  MEDICATIONS  (STANDING):  albuterol    90 MICROgram(s) HFA Inhaler 2 Puff(s) Inhalation once  albuterol    90 MICROgram(s) HFA Inhaler 2 Puff(s) Inhalation every 4 hours  albuterol/ipratropium for Nebulization 3 milliLiter(s) Nebulizer every 4 hours  amLODIPine   Tablet 10 milliGRAM(s) Oral daily  aspirin enteric coated 81 milliGRAM(s) Oral daily  atenolol  Tablet 50 milliGRAM(s) Oral daily  atorvastatin 20 milliGRAM(s) Oral at bedtime  azithromycin  IVPB 500 milliGRAM(s) IV Intermittent every 24 hours  buDESOnide    Inhalation Suspension 0.5 milliGRAM(s) Inhalation two times a day  cefTRIAXone   IVPB 1000 milliGRAM(s) IV Intermittent every 24 hours  chlorhexidine 2% Cloths 1 Application(s) Topical daily  enoxaparin Injectable 70 milliGRAM(s) SubCutaneous <User Schedule>  famotidine    Tablet 20 milliGRAM(s) Oral daily  methylPREDNISolone sodium succinate Injectable 40 milliGRAM(s) IV Push daily  montelukast 10 milliGRAM(s) Oral daily  tamsulosin 0.4 milliGRAM(s) Oral at bedtime    MEDICATIONS  (PRN):  acetaminophen     Tablet .. 650 milliGRAM(s) Oral every 6 hours PRN Temp greater or equal to 38C (100.4F), Mild Pain (1 - 3)  aluminum hydroxide/magnesium hydroxide/simethicone Suspension 30 milliLiter(s) Oral every 4 hours PRN Dyspepsia  melatonin 3 milliGRAM(s) Oral at bedtime PRN Insomnia  ondansetron Injectable 4 milliGRAM(s) IV Push every 8 hours PRN Nausea and/or Vomiting      LABS:                        9.9    19.58 )-----------( 203      ( 03 Mar 2025 07:25 )             34.9     03-04    132[L]  |  99  |  22  ----------------------------<  97  4.6   |  26  |  1.20    Ca    8.4      04 Mar 2025 06:26  Phos  4.4     03-04  Mg     2.20     03-04    TPro  6.2  /  Alb  3.2[L]  /  TBili  0.2  /  DBili  x   /  AST  12  /  ALT  11  /  AlkPhos  90  03-04      Urinalysis Basic - ( 04 Mar 2025 06:26 )    Color: x / Appearance: x / SG: x / pH: x  Gluc: 97 mg/dL / Ketone: x  / Bili: x / Urobili: x   Blood: x / Protein: x / Nitrite: x   Leuk Esterase: x / RBC: x / WBC x   Sq Epi: x / Non Sq Epi: x / Bacteria: x         CHIEF COMPLAINT:Patient is a 69y old  Female who presents with a chief complaint of Worsening SOB and cough, +burning chest pain (03 Mar 2025 06:44)      INTERVAL EVENTS: Italian  Solange #[id number] utilized. Pt c/o intermittent room spinning dizziness that occurs with movement present for several weeks. Also reports diminished appetite.     ROS: Seen by bedside during AM rounds     OBJECTIVE:  ICU Vital Signs Last 24 Hrs  T(C): 36.2 (04 Mar 2025 04:00), Max: 36.5 (03 Mar 2025 14:31)  T(F): 97.1 (04 Mar 2025 04:00), Max: 97.7 (03 Mar 2025 14:31)  HR: 65 (04 Mar 2025 08:14) (60 - 677)  BP: 131/67 (04 Mar 2025 04:00) (112/63 - 131/67)  BP(mean): 86 (04 Mar 2025 04:00) (81 - 86)  ABP: --  ABP(mean): --  RR: 15 (04 Mar 2025 04:00) (15 - 22)  SpO2: 99% (04 Mar 2025 08:14) (95% - 100%)    O2 Parameters below as of 04 Mar 2025 08:13  Patient On (Oxygen Delivery Method): nasal cannula, 3 l/m              03-03 @ 07:01  -  03-04 @ 07:00  --------------------------------------------------------  IN: 400 mL / OUT: 1500 mL / NET: -1100 mL      CAPILLARY BLOOD GLUCOSE          PHYSICAL EXAM:  General: NAD   Neck: trachea midline.   Cards: S1/S2, no murmurs   Pulm: Mild wheezes b/l. No resp distress.   Abdomen: Soft, NTND.   Extremities: No pedal edema. Extremities warm to touch.  Neurology: AOx3. 5/5 motor strength x4E. Following basic commands.   Skin: warm to touch, color appropriate for ethnicity. Refer to RN assessment for further details.          HOSPITAL MEDICATIONS:  MEDICATIONS  (STANDING):  albuterol    90 MICROgram(s) HFA Inhaler 2 Puff(s) Inhalation once  albuterol    90 MICROgram(s) HFA Inhaler 2 Puff(s) Inhalation every 4 hours  albuterol/ipratropium for Nebulization 3 milliLiter(s) Nebulizer every 4 hours  amLODIPine   Tablet 10 milliGRAM(s) Oral daily  aspirin enteric coated 81 milliGRAM(s) Oral daily  atenolol  Tablet 50 milliGRAM(s) Oral daily  atorvastatin 20 milliGRAM(s) Oral at bedtime  azithromycin  IVPB 500 milliGRAM(s) IV Intermittent every 24 hours  buDESOnide    Inhalation Suspension 0.5 milliGRAM(s) Inhalation two times a day  cefTRIAXone   IVPB 1000 milliGRAM(s) IV Intermittent every 24 hours  chlorhexidine 2% Cloths 1 Application(s) Topical daily  enoxaparin Injectable 70 milliGRAM(s) SubCutaneous <User Schedule>  famotidine    Tablet 20 milliGRAM(s) Oral daily  methylPREDNISolone sodium succinate Injectable 40 milliGRAM(s) IV Push daily  montelukast 10 milliGRAM(s) Oral daily  tamsulosin 0.4 milliGRAM(s) Oral at bedtime    MEDICATIONS  (PRN):  acetaminophen     Tablet .. 650 milliGRAM(s) Oral every 6 hours PRN Temp greater or equal to 38C (100.4F), Mild Pain (1 - 3)  aluminum hydroxide/magnesium hydroxide/simethicone Suspension 30 milliLiter(s) Oral every 4 hours PRN Dyspepsia  melatonin 3 milliGRAM(s) Oral at bedtime PRN Insomnia  ondansetron Injectable 4 milliGRAM(s) IV Push every 8 hours PRN Nausea and/or Vomiting      LABS:                        9.9    19.58 )-----------( 203      ( 03 Mar 2025 07:25 )             34.9     03-04    132[L]  |  99  |  22  ----------------------------<  97  4.6   |  26  |  1.20    Ca    8.4      04 Mar 2025 06:26  Phos  4.4     03-04  Mg     2.20     03-04    TPro  6.2  /  Alb  3.2[L]  /  TBili  0.2  /  DBili  x   /  AST  12  /  ALT  11  /  AlkPhos  90  03-04      Urinalysis Basic - ( 04 Mar 2025 06:26 )    Color: x / Appearance: x / SG: x / pH: x  Gluc: 97 mg/dL / Ketone: x  / Bili: x / Urobili: x   Blood: x / Protein: x / Nitrite: x   Leuk Esterase: x / RBC: x / WBC x   Sq Epi: x / Non Sq Epi: x / Bacteria: x

## 2025-03-05 LAB
ANION GAP SERPL CALC-SCNC: 13 MMOL/L — SIGNIFICANT CHANGE UP (ref 7–14)
BASE EXCESS BLDA CALC-SCNC: 1.5 MMOL/L — SIGNIFICANT CHANGE UP (ref -2–3)
BASOPHILS # BLD AUTO: 0.01 K/UL — SIGNIFICANT CHANGE UP (ref 0–0.2)
BASOPHILS NFR BLD AUTO: 0.1 % — SIGNIFICANT CHANGE UP (ref 0–2)
BUN SERPL-MCNC: 24 MG/DL — HIGH (ref 7–23)
CALCIUM SERPL-MCNC: 8.4 MG/DL — SIGNIFICANT CHANGE UP (ref 8.4–10.5)
CHLORIDE SERPL-SCNC: 95 MMOL/L — LOW (ref 98–107)
CO2 BLDA-SCNC: 29 MMOL/L — HIGH (ref 19–24)
CO2 SERPL-SCNC: 24 MMOL/L — SIGNIFICANT CHANGE UP (ref 22–31)
CREAT SERPL-MCNC: 1.23 MG/DL — SIGNIFICANT CHANGE UP (ref 0.5–1.3)
EGFR: 47 ML/MIN/1.73M2 — LOW
EGFR: 47 ML/MIN/1.73M2 — LOW
EOSINOPHIL # BLD AUTO: 0 K/UL — SIGNIFICANT CHANGE UP (ref 0–0.5)
EOSINOPHIL NFR BLD AUTO: 0 % — SIGNIFICANT CHANGE UP (ref 0–6)
GAS PNL BLDA: SIGNIFICANT CHANGE UP
GLUCOSE SERPL-MCNC: 92 MG/DL — SIGNIFICANT CHANGE UP (ref 70–99)
HCO3 BLDA-SCNC: 27 MMOL/L — SIGNIFICANT CHANGE UP (ref 21–28)
HCT VFR BLD CALC: 32.2 % — LOW (ref 34.5–45)
HGB BLD-MCNC: 9.7 G/DL — LOW (ref 11.5–15.5)
HOROWITZ INDEX BLDA+IHG-RTO: 40 — SIGNIFICANT CHANGE UP
IANC: 9.39 K/UL — HIGH (ref 1.8–7.4)
IMM GRANULOCYTES NFR BLD AUTO: 0.4 % — SIGNIFICANT CHANGE UP (ref 0–0.9)
LYMPHOCYTES # BLD AUTO: 2.94 K/UL — SIGNIFICANT CHANGE UP (ref 1–3.3)
LYMPHOCYTES # BLD AUTO: 22.1 % — SIGNIFICANT CHANGE UP (ref 13–44)
MAGNESIUM SERPL-MCNC: 2.3 MG/DL — SIGNIFICANT CHANGE UP (ref 1.6–2.6)
MCHC RBC-ENTMCNC: 24.6 PG — LOW (ref 27–34)
MCHC RBC-ENTMCNC: 30.1 G/DL — LOW (ref 32–36)
MCV RBC AUTO: 81.5 FL — SIGNIFICANT CHANGE UP (ref 80–100)
MONOCYTES # BLD AUTO: 0.9 K/UL — SIGNIFICANT CHANGE UP (ref 0–0.9)
MONOCYTES NFR BLD AUTO: 6.8 % — SIGNIFICANT CHANGE UP (ref 2–14)
NEUTROPHILS # BLD AUTO: 9.39 K/UL — HIGH (ref 1.8–7.4)
NEUTROPHILS NFR BLD AUTO: 70.6 % — SIGNIFICANT CHANGE UP (ref 43–77)
NRBC # BLD AUTO: 0 K/UL — SIGNIFICANT CHANGE UP (ref 0–0)
NRBC # FLD: 0 K/UL — SIGNIFICANT CHANGE UP (ref 0–0)
NRBC BLD AUTO-RTO: 0 /100 WBCS — SIGNIFICANT CHANGE UP (ref 0–0)
PCO2 BLDA: 46 MMHG — HIGH (ref 32–45)
PH BLDA: 7.38 — SIGNIFICANT CHANGE UP (ref 7.35–7.45)
PHOSPHATE SERPL-MCNC: 3.7 MG/DL — SIGNIFICANT CHANGE UP (ref 2.5–4.5)
PLATELET # BLD AUTO: 159 K/UL — SIGNIFICANT CHANGE UP (ref 150–400)
PO2 BLDA: 117 MMHG — HIGH (ref 83–108)
POTASSIUM SERPL-MCNC: 4.9 MMOL/L — SIGNIFICANT CHANGE UP (ref 3.5–5.3)
POTASSIUM SERPL-SCNC: 4.9 MMOL/L — SIGNIFICANT CHANGE UP (ref 3.5–5.3)
RBC # BLD: 3.95 M/UL — SIGNIFICANT CHANGE UP (ref 3.8–5.2)
RBC # FLD: 16.1 % — HIGH (ref 10.3–14.5)
SAO2 % BLDA: 97.6 % — SIGNIFICANT CHANGE UP (ref 94–98)
SODIUM SERPL-SCNC: 132 MMOL/L — LOW (ref 135–145)
WBC # BLD: 13.29 K/UL — HIGH (ref 3.8–10.5)
WBC # FLD AUTO: 13.29 K/UL — HIGH (ref 3.8–10.5)

## 2025-03-05 PROCEDURE — 99233 SBSQ HOSP IP/OBS HIGH 50: CPT

## 2025-03-05 RX ORDER — ALBUTEROL SULFATE 2.5 MG/3ML
2 VIAL, NEBULIZER (ML) INHALATION EVERY 6 HOURS
Refills: 0 | Status: DISCONTINUED | OUTPATIENT
Start: 2025-03-05 | End: 2025-03-12

## 2025-03-05 RX ORDER — IPRATROPIUM BROMIDE AND ALBUTEROL SULFATE .5; 2.5 MG/3ML; MG/3ML
3 SOLUTION RESPIRATORY (INHALATION) EVERY 6 HOURS
Refills: 0 | Status: DISCONTINUED | OUTPATIENT
Start: 2025-03-05 | End: 2025-03-11

## 2025-03-05 RX ADMIN — BUDESONIDE 0.5 MILLIGRAM(S): 0.25 SUSPENSION RESPIRATORY (INHALATION) at 07:47

## 2025-03-05 RX ADMIN — Medication 20 MILLIGRAM(S): at 11:43

## 2025-03-05 RX ADMIN — LISINOPRIL 50 MILLIGRAM(S): 30 TABLET ORAL at 05:11

## 2025-03-05 RX ADMIN — Medication 81 MILLIGRAM(S): at 11:43

## 2025-03-05 RX ADMIN — TAMSULOSIN HYDROCHLORIDE 0.4 MILLIGRAM(S): 0.4 CAPSULE ORAL at 21:20

## 2025-03-05 RX ADMIN — ENOXAPARIN SODIUM 70 MILLIGRAM(S): 100 INJECTION SUBCUTANEOUS at 05:16

## 2025-03-05 RX ADMIN — IPRATROPIUM BROMIDE AND ALBUTEROL SULFATE 3 MILLILITER(S): .5; 2.5 SOLUTION RESPIRATORY (INHALATION) at 22:56

## 2025-03-05 RX ADMIN — ATORVASTATIN CALCIUM 20 MILLIGRAM(S): 80 TABLET, FILM COATED ORAL at 21:21

## 2025-03-05 RX ADMIN — IPRATROPIUM BROMIDE AND ALBUTEROL SULFATE 3 MILLILITER(S): .5; 2.5 SOLUTION RESPIRATORY (INHALATION) at 07:46

## 2025-03-05 RX ADMIN — PREDNISONE 40 MILLIGRAM(S): 20 TABLET ORAL at 05:14

## 2025-03-05 RX ADMIN — Medication 3 MILLIGRAM(S): at 21:20

## 2025-03-05 RX ADMIN — BUDESONIDE 0.5 MILLIGRAM(S): 0.25 SUSPENSION RESPIRATORY (INHALATION) at 22:56

## 2025-03-05 RX ADMIN — MONTELUKAST SODIUM 10 MILLIGRAM(S): 10 TABLET ORAL at 11:43

## 2025-03-05 RX ADMIN — Medication 1 APPLICATION(S): at 11:44

## 2025-03-05 RX ADMIN — Medication 650 MILLIGRAM(S): at 07:08

## 2025-03-05 RX ADMIN — AMLODIPINE BESYLATE 10 MILLIGRAM(S): 10 TABLET ORAL at 05:11

## 2025-03-05 RX ADMIN — IPRATROPIUM BROMIDE AND ALBUTEROL SULFATE 3 MILLILITER(S): .5; 2.5 SOLUTION RESPIRATORY (INHALATION) at 03:31

## 2025-03-05 RX ADMIN — IPRATROPIUM BROMIDE AND ALBUTEROL SULFATE 3 MILLILITER(S): .5; 2.5 SOLUTION RESPIRATORY (INHALATION) at 16:18

## 2025-03-05 RX ADMIN — ENOXAPARIN SODIUM 70 MILLIGRAM(S): 100 INJECTION SUBCUTANEOUS at 17:27

## 2025-03-05 NOTE — PROGRESS NOTE ADULT - ASSESSMENT
69F Asthma/COPD, HTN, dCHF, CVA w/ Lt weakness, PE, Afib-Eliquis, liver mass p/w acute asthma exacerbation c/b Acute hypoxia and hypercapnea s/p BiPAP for respiratory support, hyponatremia, enlarging liver mass 5.9x5cm from 3.2x1.7cm, chest pain.    # NEUROLOGY  Hx CVA in adulthood  - c/w aspirin, statin  - Hx CVA with left residual left hemiplegia    # CARDIOVASCULAR  Hyperlipidemia   Hypertension  Paroxysmal atrial fibrillation  Chest pain in ED  HFpEF  -  Reports intermittent burning quality chest/abd pain  - Trop neg x2   - TTE (2/28/25): EF 65%. WNL. Calcification of mitral valve annulus. Trace MR. Mild TR. Mild pulmonary hypertension  - c/w amlodipine, atenolol, atorvastatin.  - s/p home eliquis    # RESPIRATORY  Acute hypoxemic hypercapnic respiratory failure   COPD exacerbation  Emphysema  Hx PE  - present with 2 weeks of worsening SOB and cough, admitted with diffuse wheezing and hypoxemic hypercapnic resp failure. 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   - CTA (2/27/25): no main or lobar PE   - increase duonebs to q4 hours; add pulmicort .5mg BID; please keep albuterol MDI at bedside so patient can use as needed  - add azithromycin x 5 days (2/28/25 - )  - blood gas with acute decompensated hypercapnea (as well as component of metabolic acidosis)  - c/w BIPAP qhs at 12/6   - c/w albuterol q4, montelukast qd  - s/p 60mg of IV solumedrol x 2 (2/27/25); s/p 40mg BID (2/28/25- 3/2/25), on 40 mg qd (3/3 - )    # GI  Liver mass  Small hiatal hernia   - Known R liver mass, was recommended for outpatient follow up and MRI but son states that the family has been unable to get it done as the patient is very debilitated (needs a wheelchair to get around) and has frequent asthma exacerbations limiting her exertions  - CT here with worsening size of the mass  - pending MR Abd w/wo - safety sheet in chart  - IR c/s for Liver biopsy eventually     # RENAL  Hyponatremia.   - New onset hyponatremia, possibly 2/2 poor oral intake x few days in setting of her worsening SOB/asthma vs medications (on sertraline, not on diuretics) vs SIADH vs other  - on fluid restriction to 1.2L for now for possible SIADH  - Trend Na.    # INFECTIOUS DISEASE  UTI   - UCx (3/2) growing E Coli pending sensitivity  - on CTX (3/2-3/4)  - MRSA/MSSA pending  - RVP (2/27/25) neg    # HEME  - DVT PPX: Lovenox 70 mg BID     # ENDOCRINE  - no active issues    # SKIN  - no active issues    # ETHICS/ GOC    FULL CODE    # DISPO  PT: home PT 69F Asthma/COPD, HTN, dCHF, CVA w/ Lt weakness, PE, Afib-Eliquis, liver mass p/w acute asthma exacerbation c/b Acute hypoxia and hypercapnea s/p BiPAP for respiratory support, hyponatremia, enlarging liver mass 5.9x5cm from 3.2x1.7cm, chest pain.    # NEUROLOGY  Hx CVA in adulthood  - c/w aspirin, statin  - Hx CVA with left residual left hemiplegia    # CARDIOVASCULAR  Hyperlipidemia   Hypertension  Paroxysmal atrial fibrillation  Chest pain in ED  HFpEF  -  Reports intermittent burning quality chest/abd pain  - Trop neg x2   - TTE (2/28/25): EF 65%. WNL. Calcification of mitral valve annulus. Trace MR. Mild TR. Mild pulmonary hypertension  - c/w amlodipine, atenolol, atorvastatin.  - s/p home eliquis    # RESPIRATORY  Acute hypoxemic hypercapnic respiratory failure   COPD exacerbation  Emphysema  Hx PE  - present with 2 weeks of worsening SOB and cough, admitted with diffuse wheezing and hypoxemic hypercapnic resp failure. 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   - CTA (2/27/25): no main or lobar PE   - increase duonebs to q4 hours; add pulmicort .5mg BID; please keep albuterol MDI at bedside so patient can use as needed  - add azithromycin x 5 days (2/28/25 - )  - blood gas with acute decompensated hypercapnea (as well as component of metabolic acidosis)  - c/w BIPAP qhs at 12/6   - c/w albuterol q4, montelukast qd  - s/p 60mg of IV solumedrol x 2 (2/27/25); s/p 40mg BID (2/28/25- 3/2/25), on 40 mg qd (3/3 - 3/4) switched to Pred 40 QD (3/5 - )    # GI  Liver mass  Small hiatal hernia   - Known R liver mass, was recommended for outpatient follow up and MRI but son states that the family has been unable to get it done as the patient is very debilitated (needs a wheelchair to get around) and has frequent asthma exacerbations limiting her exertions  - CT here with worsening size of the mass  - pending MR Abd w/wo - safety sheet in chart  - IR c/s for Liver biopsy eventually     # RENAL  Hyponatremia.   - New onset hyponatremia, possibly 2/2 poor oral intake x few days in setting of her worsening SOB/asthma vs medications (on sertraline, not on diuretics) vs SIADH vs other  - on fluid restriction to 1.2L for now for possible SIADH  - Trend Na.    # INFECTIOUS DISEASE  UTI   - UCx (3/2) growing E Coli pending sensitivity  - on CTX (3/2-3/4)  - MRSA/MSSA pending  - RVP (2/27/25) neg    # HEME  - DVT PPX: Lovenox 70 mg BID     # ENDOCRINE  - no active issues    # SKIN  - no active issues    # ETHICS/ GOC    FULL CODE    # DISPO  PT: home PT

## 2025-03-05 NOTE — PROGRESS NOTE ADULT - SUBJECTIVE AND OBJECTIVE BOX
CHIEF COMPLAINT:Patient is a 69y old  Female who presents with a chief complaint of Worsening SOB and cough, +burning chest pain (04 Mar 2025 09:00)      INTERVAL EVENTS:     ROS: Seen by bedside during AM rounds     OBJECTIVE:  ICU Vital Signs Last 24 Hrs  T(C): 36.4 (05 Mar 2025 05:00), Max: 36.4 (04 Mar 2025 18:02)  T(F): 97.6 (05 Mar 2025 05:00), Max: 97.6 (05 Mar 2025 05:00)  HR: 72 (05 Mar 2025 10:46) (71 - 92)  BP: 122/60 (05 Mar 2025 05:00) (107/64 - 122/60)  BP(mean): 76 (05 Mar 2025 05:00) (76 - 80)  ABP: --  ABP(mean): --  RR: 15 (05 Mar 2025 05:00) (15 - 25)  SpO2: 96% (05 Mar 2025 10:46) (95% - 100%)    O2 Parameters below as of 05 Mar 2025 07:51  Patient On (Oxygen Delivery Method): BiPAP/CPAP              03-04 @ 07:01  -  03-05 @ 07:00  --------------------------------------------------------  IN: 400 mL / OUT: 1800 mL / NET: -1400 mL      CAPILLARY BLOOD GLUCOSE          PHYSICAL EXAM:  General:   HEENT:   Lymph Nodes:  Neck:   Respiratory:   Cardiovascular:   Abdomen:   Extremities:   Skin:   Neurological:  Psychiatry:        HOSPITAL MEDICATIONS:  MEDICATIONS  (STANDING):  albuterol    90 MICROgram(s) HFA Inhaler 2 Puff(s) Inhalation once  albuterol    90 MICROgram(s) HFA Inhaler 2 Puff(s) Inhalation every 6 hours  albuterol/ipratropium for Nebulization 3 milliLiter(s) Nebulizer every 6 hours  amLODIPine   Tablet 10 milliGRAM(s) Oral daily  aspirin enteric coated 81 milliGRAM(s) Oral daily  atenolol  Tablet 50 milliGRAM(s) Oral daily  atorvastatin 20 milliGRAM(s) Oral at bedtime  buDESOnide    Inhalation Suspension 0.5 milliGRAM(s) Inhalation two times a day  chlorhexidine 2% Cloths 1 Application(s) Topical daily  enoxaparin Injectable 70 milliGRAM(s) SubCutaneous <User Schedule>  famotidine    Tablet 20 milliGRAM(s) Oral daily  montelukast 10 milliGRAM(s) Oral daily  predniSONE   Tablet 40 milliGRAM(s) Oral daily  tamsulosin 0.4 milliGRAM(s) Oral at bedtime    MEDICATIONS  (PRN):  acetaminophen     Tablet .. 650 milliGRAM(s) Oral every 6 hours PRN Temp greater or equal to 38C (100.4F), Mild Pain (1 - 3)  aluminum hydroxide/magnesium hydroxide/simethicone Suspension 30 milliLiter(s) Oral every 4 hours PRN Dyspepsia  melatonin 3 milliGRAM(s) Oral at bedtime PRN Insomnia  ondansetron Injectable 4 milliGRAM(s) IV Push every 8 hours PRN Nausea and/or Vomiting      LABS:                        9.7    13.29 )-----------( 159      ( 05 Mar 2025 05:35 )             32.2     03-05    132[L]  |  95[L]  |  24[H]  ----------------------------<  92  4.9   |  24  |  1.23    Ca    8.4      05 Mar 2025 05:35  Phos  3.7     03-05  Mg     2.30     03-05    TPro  6.2  /  Alb  3.2[L]  /  TBili  0.2  /  DBili  x   /  AST  12  /  ALT  11  /  AlkPhos  90  03-04      Urinalysis Basic - ( 05 Mar 2025 05:35 )    Color: x / Appearance: x / SG: x / pH: x  Gluc: 92 mg/dL / Ketone: x  / Bili: x / Urobili: x   Blood: x / Protein: x / Nitrite: x   Leuk Esterase: x / RBC: x / WBC x   Sq Epi: x / Non Sq Epi: x / Bacteria: x         CHIEF COMPLAINT:Patient is a 69y old  Female who presents with a chief complaint of Worsening SOB and cough, +burning chest pain (04 Mar 2025 09:00)      INTERVAL EVENTS:     ROS: Seen by bedside during AM rounds     OBJECTIVE:  ICU Vital Signs Last 24 Hrs  T(C): 36.4 (05 Mar 2025 05:00), Max: 36.4 (04 Mar 2025 18:02)  T(F): 97.6 (05 Mar 2025 05:00), Max: 97.6 (05 Mar 2025 05:00)  HR: 72 (05 Mar 2025 10:46) (71 - 92)  BP: 122/60 (05 Mar 2025 05:00) (107/64 - 122/60)  BP(mean): 76 (05 Mar 2025 05:00) (76 - 80)  ABP: --  ABP(mean): --  RR: 15 (05 Mar 2025 05:00) (15 - 25)  SpO2: 96% (05 Mar 2025 10:46) (95% - 100%)    O2 Parameters below as of 05 Mar 2025 07:51  Patient On (Oxygen Delivery Method): BiPAP/CPAP              03-04 @ 07:01  -  03-05 @ 07:00  --------------------------------------------------------  IN: 400 mL / OUT: 1800 mL / NET: -1400 mL      CAPILLARY BLOOD GLUCOSE          PHYSICAL EXAM:  General: NAD   Neck: trachea midline.  Cards: S1/S2, no murmurs   Pulm: Diminished b/l with scant mix of wheeze and rhonchi bilaterally. No resp distress.   Abdomen: Soft, NTND.   Extremities: No pedal edema. Extremities warm to touch.  Neurology: Awake/alert. Following basic commands. BROWN spontaneously and on command.    Skin: warm to touch, color appropriate for ethnicity. Refer to RN assessment for further details.        HOSPITAL MEDICATIONS:  MEDICATIONS  (STANDING):  albuterol    90 MICROgram(s) HFA Inhaler 2 Puff(s) Inhalation once  albuterol    90 MICROgram(s) HFA Inhaler 2 Puff(s) Inhalation every 6 hours  albuterol/ipratropium for Nebulization 3 milliLiter(s) Nebulizer every 6 hours  amLODIPine   Tablet 10 milliGRAM(s) Oral daily  aspirin enteric coated 81 milliGRAM(s) Oral daily  atenolol  Tablet 50 milliGRAM(s) Oral daily  atorvastatin 20 milliGRAM(s) Oral at bedtime  buDESOnide    Inhalation Suspension 0.5 milliGRAM(s) Inhalation two times a day  chlorhexidine 2% Cloths 1 Application(s) Topical daily  enoxaparin Injectable 70 milliGRAM(s) SubCutaneous <User Schedule>  famotidine    Tablet 20 milliGRAM(s) Oral daily  montelukast 10 milliGRAM(s) Oral daily  predniSONE   Tablet 40 milliGRAM(s) Oral daily  tamsulosin 0.4 milliGRAM(s) Oral at bedtime    MEDICATIONS  (PRN):  acetaminophen     Tablet .. 650 milliGRAM(s) Oral every 6 hours PRN Temp greater or equal to 38C (100.4F), Mild Pain (1 - 3)  aluminum hydroxide/magnesium hydroxide/simethicone Suspension 30 milliLiter(s) Oral every 4 hours PRN Dyspepsia  melatonin 3 milliGRAM(s) Oral at bedtime PRN Insomnia  ondansetron Injectable 4 milliGRAM(s) IV Push every 8 hours PRN Nausea and/or Vomiting      LABS:                        9.7    13.29 )-----------( 159      ( 05 Mar 2025 05:35 )             32.2     03-05    132[L]  |  95[L]  |  24[H]  ----------------------------<  92  4.9   |  24  |  1.23    Ca    8.4      05 Mar 2025 05:35  Phos  3.7     03-05  Mg     2.30     03-05    TPro  6.2  /  Alb  3.2[L]  /  TBili  0.2  /  DBili  x   /  AST  12  /  ALT  11  /  AlkPhos  90  03-04      Urinalysis Basic - ( 05 Mar 2025 05:35 )    Color: x / Appearance: x / SG: x / pH: x  Gluc: 92 mg/dL / Ketone: x  / Bili: x / Urobili: x   Blood: x / Protein: x / Nitrite: x   Leuk Esterase: x / RBC: x / WBC x   Sq Epi: x / Non Sq Epi: x / Bacteria: x         CHIEF COMPLAINT:Patient is a 69y old  Female who presents with a chief complaint of Worsening SOB and cough, +burning chest pain (04 Mar 2025 09:00)      INTERVAL EVENTS: no acute overnight events noted. Reportedly wore BIPAP until ~2am when she vomited then did not wear it again until around 7/8 this morning.    ROS: Seen by bedside during AM rounds     OBJECTIVE:  ICU Vital Signs Last 24 Hrs  T(C): 36.4 (05 Mar 2025 05:00), Max: 36.4 (04 Mar 2025 18:02)  T(F): 97.6 (05 Mar 2025 05:00), Max: 97.6 (05 Mar 2025 05:00)  HR: 72 (05 Mar 2025 10:46) (71 - 92)  BP: 122/60 (05 Mar 2025 05:00) (107/64 - 122/60)  BP(mean): 76 (05 Mar 2025 05:00) (76 - 80)  ABP: --  ABP(mean): --  RR: 15 (05 Mar 2025 05:00) (15 - 25)  SpO2: 96% (05 Mar 2025 10:46) (95% - 100%)    O2 Parameters below as of 05 Mar 2025 07:51  Patient On (Oxygen Delivery Method): BiPAP/CPAP              03-04 @ 07:01  -  03-05 @ 07:00  --------------------------------------------------------  IN: 400 mL / OUT: 1800 mL / NET: -1400 mL      CAPILLARY BLOOD GLUCOSE          PHYSICAL EXAM:  General: NAD   Neck: trachea midline.  Cards: S1/S2, no murmurs   Pulm: Diminished b/l with scant mix of wheeze and rhonchi bilaterally. No resp distress.   Abdomen: Soft, NTND.   Extremities: No pedal edema. Extremities warm to touch.  Neurology: Awake/alert. Following basic commands. BROWN spontaneously and on command.    Skin: warm to touch, color appropriate for ethnicity. Refer to RN assessment for further details.        HOSPITAL MEDICATIONS:  MEDICATIONS  (STANDING):  albuterol    90 MICROgram(s) HFA Inhaler 2 Puff(s) Inhalation once  albuterol    90 MICROgram(s) HFA Inhaler 2 Puff(s) Inhalation every 6 hours  albuterol/ipratropium for Nebulization 3 milliLiter(s) Nebulizer every 6 hours  amLODIPine   Tablet 10 milliGRAM(s) Oral daily  aspirin enteric coated 81 milliGRAM(s) Oral daily  atenolol  Tablet 50 milliGRAM(s) Oral daily  atorvastatin 20 milliGRAM(s) Oral at bedtime  buDESOnide    Inhalation Suspension 0.5 milliGRAM(s) Inhalation two times a day  chlorhexidine 2% Cloths 1 Application(s) Topical daily  enoxaparin Injectable 70 milliGRAM(s) SubCutaneous <User Schedule>  famotidine    Tablet 20 milliGRAM(s) Oral daily  montelukast 10 milliGRAM(s) Oral daily  predniSONE   Tablet 40 milliGRAM(s) Oral daily  tamsulosin 0.4 milliGRAM(s) Oral at bedtime    MEDICATIONS  (PRN):  acetaminophen     Tablet .. 650 milliGRAM(s) Oral every 6 hours PRN Temp greater or equal to 38C (100.4F), Mild Pain (1 - 3)  aluminum hydroxide/magnesium hydroxide/simethicone Suspension 30 milliLiter(s) Oral every 4 hours PRN Dyspepsia  melatonin 3 milliGRAM(s) Oral at bedtime PRN Insomnia  ondansetron Injectable 4 milliGRAM(s) IV Push every 8 hours PRN Nausea and/or Vomiting      LABS:                        9.7    13.29 )-----------( 159      ( 05 Mar 2025 05:35 )             32.2     03-05    132[L]  |  95[L]  |  24[H]  ----------------------------<  92  4.9   |  24  |  1.23    Ca    8.4      05 Mar 2025 05:35  Phos  3.7     03-05  Mg     2.30     03-05    TPro  6.2  /  Alb  3.2[L]  /  TBili  0.2  /  DBili  x   /  AST  12  /  ALT  11  /  AlkPhos  90  03-04      Urinalysis Basic - ( 05 Mar 2025 05:35 )    Color: x / Appearance: x / SG: x / pH: x  Gluc: 92 mg/dL / Ketone: x  / Bili: x / Urobili: x   Blood: x / Protein: x / Nitrite: x   Leuk Esterase: x / RBC: x / WBC x   Sq Epi: x / Non Sq Epi: x / Bacteria: x

## 2025-03-06 LAB
ANION GAP SERPL CALC-SCNC: 11 MMOL/L — SIGNIFICANT CHANGE UP (ref 7–14)
BASOPHILS # BLD AUTO: 0 K/UL — SIGNIFICANT CHANGE UP (ref 0–0.2)
BASOPHILS NFR BLD AUTO: 0 % — SIGNIFICANT CHANGE UP (ref 0–2)
BLOOD GAS VENOUS COMPREHENSIVE RESULT: SIGNIFICANT CHANGE UP
BUN SERPL-MCNC: 28 MG/DL — HIGH (ref 7–23)
CALCIUM SERPL-MCNC: 8.8 MG/DL — SIGNIFICANT CHANGE UP (ref 8.4–10.5)
CHLORIDE SERPL-SCNC: 96 MMOL/L — LOW (ref 98–107)
CO2 SERPL-SCNC: 26 MMOL/L — SIGNIFICANT CHANGE UP (ref 22–31)
CREAT SERPL-MCNC: 1.13 MG/DL — SIGNIFICANT CHANGE UP (ref 0.5–1.3)
D-LACTATE SERPL-SCNC: 0.18 MMOL/L — SIGNIFICANT CHANGE UP (ref 0–0.25)
EGFR: 52 ML/MIN/1.73M2 — LOW
EGFR: 52 ML/MIN/1.73M2 — LOW
EOSINOPHIL # BLD AUTO: 0 K/UL — SIGNIFICANT CHANGE UP (ref 0–0.5)
EOSINOPHIL NFR BLD AUTO: 0 % — SIGNIFICANT CHANGE UP (ref 0–6)
GLUCOSE SERPL-MCNC: 133 MG/DL — HIGH (ref 70–99)
HCT VFR BLD CALC: 33.2 % — LOW (ref 34.5–45)
HGB BLD-MCNC: 10 G/DL — LOW (ref 11.5–15.5)
IANC: 7.91 K/UL — HIGH (ref 1.8–7.4)
IMM GRANULOCYTES NFR BLD AUTO: 0.2 % — SIGNIFICANT CHANGE UP (ref 0–0.9)
LYMPHOCYTES # BLD AUTO: 0.96 K/UL — LOW (ref 1–3.3)
LYMPHOCYTES # BLD AUTO: 10.6 % — LOW (ref 13–44)
MAGNESIUM SERPL-MCNC: 2.4 MG/DL — SIGNIFICANT CHANGE UP (ref 1.6–2.6)
MCHC RBC-ENTMCNC: 24.3 PG — LOW (ref 27–34)
MCHC RBC-ENTMCNC: 30.1 G/DL — LOW (ref 32–36)
MCV RBC AUTO: 80.8 FL — SIGNIFICANT CHANGE UP (ref 80–100)
MONOCYTES # BLD AUTO: 0.19 K/UL — SIGNIFICANT CHANGE UP (ref 0–0.9)
MONOCYTES NFR BLD AUTO: 2.1 % — SIGNIFICANT CHANGE UP (ref 2–14)
NEUTROPHILS # BLD AUTO: 7.91 K/UL — HIGH (ref 1.8–7.4)
NEUTROPHILS NFR BLD AUTO: 87.1 % — HIGH (ref 43–77)
NRBC # BLD AUTO: 0 K/UL — SIGNIFICANT CHANGE UP (ref 0–0)
NRBC # FLD: 0 K/UL — SIGNIFICANT CHANGE UP (ref 0–0)
NRBC BLD AUTO-RTO: 0 /100 WBCS — SIGNIFICANT CHANGE UP (ref 0–0)
PHOSPHATE SERPL-MCNC: 3.8 MG/DL — SIGNIFICANT CHANGE UP (ref 2.5–4.5)
PLATELET # BLD AUTO: 187 K/UL — SIGNIFICANT CHANGE UP (ref 150–400)
POTASSIUM SERPL-MCNC: 5.2 MMOL/L — SIGNIFICANT CHANGE UP (ref 3.5–5.3)
POTASSIUM SERPL-SCNC: 5.2 MMOL/L — SIGNIFICANT CHANGE UP (ref 3.5–5.3)
RBC # BLD: 4.11 M/UL — SIGNIFICANT CHANGE UP (ref 3.8–5.2)
RBC # FLD: 16.2 % — HIGH (ref 10.3–14.5)
SODIUM SERPL-SCNC: 133 MMOL/L — LOW (ref 135–145)
WBC # BLD: 9.08 K/UL — SIGNIFICANT CHANGE UP (ref 3.8–10.5)
WBC # FLD AUTO: 9.08 K/UL — SIGNIFICANT CHANGE UP (ref 3.8–10.5)

## 2025-03-06 PROCEDURE — 74183 MRI ABD W/O CNTR FLWD CNTR: CPT | Mod: 26

## 2025-03-06 PROCEDURE — 99233 SBSQ HOSP IP/OBS HIGH 50: CPT

## 2025-03-06 RX ORDER — MECLIZINE HCL 12.5 MG
12.5 TABLET ORAL EVERY 8 HOURS
Refills: 0 | Status: DISCONTINUED | OUTPATIENT
Start: 2025-03-06 | End: 2025-03-12

## 2025-03-06 RX ORDER — INFLUENZA A VIRUS A/IDAHO/07/2018 (H1N1) ANTIGEN (MDCK CELL DERIVED, PROPIOLACTONE INACTIVATED, INFLUENZA A VIRUS A/INDIANA/08/2018 (H3N2) ANTIGEN (MDCK CELL DERIVED, PROPIOLACTONE INACTIVATED), INFLUENZA B VIRUS B/SINGAPORE/INFTT-16-0610/2016 ANTIGEN (MDCK CELL DERIVED, PROPIOLACTONE INACTIVATED), INFLUENZA B VIRUS B/IOWA/06/2017 ANTIGEN (MDCK CELL DERIVED, PROPIOLACTONE INACTIVATED) 15; 15; 15; 15 UG/.5ML; UG/.5ML; UG/.5ML; UG/.5ML
0.5 INJECTION, SUSPENSION INTRAMUSCULAR ONCE
Refills: 0 | Status: DISCONTINUED | OUTPATIENT
Start: 2025-03-06 | End: 2025-03-12

## 2025-03-06 RX ADMIN — Medication 1 APPLICATION(S): at 13:22

## 2025-03-06 RX ADMIN — AMLODIPINE BESYLATE 10 MILLIGRAM(S): 10 TABLET ORAL at 05:55

## 2025-03-06 RX ADMIN — PREDNISONE 40 MILLIGRAM(S): 20 TABLET ORAL at 05:56

## 2025-03-06 RX ADMIN — TAMSULOSIN HYDROCHLORIDE 0.4 MILLIGRAM(S): 0.4 CAPSULE ORAL at 23:06

## 2025-03-06 RX ADMIN — BUDESONIDE 0.5 MILLIGRAM(S): 0.25 SUSPENSION RESPIRATORY (INHALATION) at 07:42

## 2025-03-06 RX ADMIN — MONTELUKAST SODIUM 10 MILLIGRAM(S): 10 TABLET ORAL at 13:22

## 2025-03-06 RX ADMIN — ENOXAPARIN SODIUM 70 MILLIGRAM(S): 100 INJECTION SUBCUTANEOUS at 19:03

## 2025-03-06 RX ADMIN — IPRATROPIUM BROMIDE AND ALBUTEROL SULFATE 3 MILLILITER(S): .5; 2.5 SOLUTION RESPIRATORY (INHALATION) at 07:42

## 2025-03-06 RX ADMIN — IPRATROPIUM BROMIDE AND ALBUTEROL SULFATE 3 MILLILITER(S): .5; 2.5 SOLUTION RESPIRATORY (INHALATION) at 04:40

## 2025-03-06 RX ADMIN — LISINOPRIL 50 MILLIGRAM(S): 30 TABLET ORAL at 05:55

## 2025-03-06 RX ADMIN — Medication 81 MILLIGRAM(S): at 13:22

## 2025-03-06 RX ADMIN — IPRATROPIUM BROMIDE AND ALBUTEROL SULFATE 3 MILLILITER(S): .5; 2.5 SOLUTION RESPIRATORY (INHALATION) at 15:28

## 2025-03-06 RX ADMIN — ATORVASTATIN CALCIUM 20 MILLIGRAM(S): 80 TABLET, FILM COATED ORAL at 23:06

## 2025-03-06 RX ADMIN — Medication 20 MILLIGRAM(S): at 13:21

## 2025-03-06 RX ADMIN — ENOXAPARIN SODIUM 70 MILLIGRAM(S): 100 INJECTION SUBCUTANEOUS at 05:59

## 2025-03-06 NOTE — DIETITIAN INITIAL EVALUATION ADULT - PERTINENT MEDS FT
MEDICATIONS  (STANDING):  albuterol    90 MICROgram(s) HFA Inhaler 2 Puff(s) Inhalation once  albuterol    90 MICROgram(s) HFA Inhaler 2 Puff(s) Inhalation every 6 hours  albuterol/ipratropium for Nebulization 3 milliLiter(s) Nebulizer every 6 hours  amLODIPine   Tablet 10 milliGRAM(s) Oral daily  aspirin enteric coated 81 milliGRAM(s) Oral daily  atenolol  Tablet 50 milliGRAM(s) Oral daily  atorvastatin 20 milliGRAM(s) Oral at bedtime  buDESOnide    Inhalation Suspension 0.5 milliGRAM(s) Inhalation two times a day  chlorhexidine 2% Cloths 1 Application(s) Topical daily  enoxaparin Injectable 70 milliGRAM(s) SubCutaneous <User Schedule>  famotidine    Tablet 20 milliGRAM(s) Oral daily  influenza  Vaccine (HIGH DOSE) 0.5 milliLiter(s) IntraMuscular once  montelukast 10 milliGRAM(s) Oral daily  predniSONE   Tablet 40 milliGRAM(s) Oral daily  tamsulosin 0.4 milliGRAM(s) Oral at bedtime    MEDICATIONS  (PRN):  acetaminophen     Tablet .. 650 milliGRAM(s) Oral every 6 hours PRN Temp greater or equal to 38C (100.4F), Mild Pain (1 - 3)  aluminum hydroxide/magnesium hydroxide/simethicone Suspension 30 milliLiter(s) Oral every 4 hours PRN Dyspepsia  melatonin 3 milliGRAM(s) Oral at bedtime PRN Insomnia  ondansetron Injectable 4 milliGRAM(s) IV Push every 8 hours PRN Nausea and/or Vomiting

## 2025-03-06 NOTE — PROGRESS NOTE ADULT - SUBJECTIVE AND OBJECTIVE BOX
CHIEF COMPLAINT:Patient is a 69y old  Female who presents with a chief complaint of Worsening SOB and cough, +burning chest pain (05 Mar 2025 11:38)      INTERVAL EVENTS:     ROS: Seen by bedside during AM rounds     OBJECTIVE:  ICU Vital Signs Last 24 Hrs  T(C): 36.8 (06 Mar 2025 00:00), Max: 37.1 (05 Mar 2025 16:00)  T(F): 98.2 (06 Mar 2025 00:00), Max: 98.8 (05 Mar 2025 16:00)  HR: 57 (06 Mar 2025 07:42) (52 - 79)  BP: 119/74 (06 Mar 2025 05:54) (103/66 - 119/94)  BP(mean): --  ABP: --  ABP(mean): --  RR: 18 (06 Mar 2025 00:00) (17 - 18)  SpO2: 100% (06 Mar 2025 07:42) (96% - 100%)    O2 Parameters below as of 06 Mar 2025 07:42  Patient On (Oxygen Delivery Method): nasal cannula              03-05 @ 07:01  -  03-06 @ 07:00  --------------------------------------------------------  IN: 0 mL / OUT: 500 mL / NET: -500 mL      CAPILLARY BLOOD GLUCOSE          PHYSICAL EXAM:  General:   HEENT:   Lymph Nodes:  Neck:   Respiratory:   Cardiovascular:   Abdomen:   Extremities:   Skin:   Neurological:  Psychiatry:        HOSPITAL MEDICATIONS:  MEDICATIONS  (STANDING):  albuterol    90 MICROgram(s) HFA Inhaler 2 Puff(s) Inhalation once  albuterol    90 MICROgram(s) HFA Inhaler 2 Puff(s) Inhalation every 6 hours  albuterol/ipratropium for Nebulization 3 milliLiter(s) Nebulizer every 6 hours  amLODIPine   Tablet 10 milliGRAM(s) Oral daily  aspirin enteric coated 81 milliGRAM(s) Oral daily  atenolol  Tablet 50 milliGRAM(s) Oral daily  atorvastatin 20 milliGRAM(s) Oral at bedtime  buDESOnide    Inhalation Suspension 0.5 milliGRAM(s) Inhalation two times a day  chlorhexidine 2% Cloths 1 Application(s) Topical daily  enoxaparin Injectable 70 milliGRAM(s) SubCutaneous <User Schedule>  famotidine    Tablet 20 milliGRAM(s) Oral daily  influenza  Vaccine (HIGH DOSE) 0.5 milliLiter(s) IntraMuscular once  montelukast 10 milliGRAM(s) Oral daily  predniSONE   Tablet 40 milliGRAM(s) Oral daily  tamsulosin 0.4 milliGRAM(s) Oral at bedtime    MEDICATIONS  (PRN):  acetaminophen     Tablet .. 650 milliGRAM(s) Oral every 6 hours PRN Temp greater or equal to 38C (100.4F), Mild Pain (1 - 3)  aluminum hydroxide/magnesium hydroxide/simethicone Suspension 30 milliLiter(s) Oral every 4 hours PRN Dyspepsia  melatonin 3 milliGRAM(s) Oral at bedtime PRN Insomnia  ondansetron Injectable 4 milliGRAM(s) IV Push every 8 hours PRN Nausea and/or Vomiting      LABS:                        9.7    13.29 )-----------( 159      ( 05 Mar 2025 05:35 )             32.2     03-05    132[L]  |  95[L]  |  24[H]  ----------------------------<  92  4.9   |  24  |  1.23    Ca    8.4      05 Mar 2025 05:35  Phos  3.7     03-05  Mg     2.30     03-05        Urinalysis Basic - ( 05 Mar 2025 05:35 )    Color: x / Appearance: x / SG: x / pH: x  Gluc: 92 mg/dL / Ketone: x  / Bili: x / Urobili: x   Blood: x / Protein: x / Nitrite: x   Leuk Esterase: x / RBC: x / WBC x   Sq Epi: x / Non Sq Epi: x / Bacteria: x      Arterial Blood Gas:  03-05 @ 10:50  7.38/46/117/27/97.6/1.5  ABG lactate: --     CHIEF COMPLAINT:Patient is a 69y old  Female who presents with a chief complaint of Worsening SOB and cough, +burning chest pain (05 Mar 2025 11:38)      INTERVAL EVENTS:     ROS: Seen by bedside during AM rounds     OBJECTIVE:  ICU Vital Signs Last 24 Hrs  T(C): 36.8 (06 Mar 2025 00:00), Max: 37.1 (05 Mar 2025 16:00)  T(F): 98.2 (06 Mar 2025 00:00), Max: 98.8 (05 Mar 2025 16:00)  HR: 57 (06 Mar 2025 07:42) (52 - 79)  BP: 119/74 (06 Mar 2025 05:54) (103/66 - 119/94)  BP(mean): --  ABP: --  ABP(mean): --  RR: 18 (06 Mar 2025 00:00) (17 - 18)  SpO2: 100% (06 Mar 2025 07:42) (96% - 100%)    O2 Parameters below as of 06 Mar 2025 07:42  Patient On (Oxygen Delivery Method): nasal cannula              03-05 @ 07:01  -  03-06 @ 07:00  --------------------------------------------------------  IN: 0 mL / OUT: 500 mL / NET: -500 mL      CAPILLARY BLOOD GLUCOSE          PHYSICAL EXAM:  General: NAD   Neck: trachea midline.  Cards: S1/S2, no murmurs   Pulm: Scant occasional wheeze b/l. No resp distress.   Abdomen: Soft, NTND.   Extremities: No pedal edema. Extremities warm to touch.  Neurology: AOx2 (person/place when spoken to in native language by son at bedside). 5/5 motor strength x4E. Follows basic commands. Skin: warm to touch, color appropriate for ethnicity. Refer to RN assessment for further details.        HOSPITAL MEDICATIONS:  MEDICATIONS  (STANDING):  albuterol    90 MICROgram(s) HFA Inhaler 2 Puff(s) Inhalation once  albuterol    90 MICROgram(s) HFA Inhaler 2 Puff(s) Inhalation every 6 hours  albuterol/ipratropium for Nebulization 3 milliLiter(s) Nebulizer every 6 hours  amLODIPine   Tablet 10 milliGRAM(s) Oral daily  aspirin enteric coated 81 milliGRAM(s) Oral daily  atenolol  Tablet 50 milliGRAM(s) Oral daily  atorvastatin 20 milliGRAM(s) Oral at bedtime  buDESOnide    Inhalation Suspension 0.5 milliGRAM(s) Inhalation two times a day  chlorhexidine 2% Cloths 1 Application(s) Topical daily  enoxaparin Injectable 70 milliGRAM(s) SubCutaneous <User Schedule>  famotidine    Tablet 20 milliGRAM(s) Oral daily  influenza  Vaccine (HIGH DOSE) 0.5 milliLiter(s) IntraMuscular once  montelukast 10 milliGRAM(s) Oral daily  predniSONE   Tablet 40 milliGRAM(s) Oral daily  tamsulosin 0.4 milliGRAM(s) Oral at bedtime    MEDICATIONS  (PRN):  acetaminophen     Tablet .. 650 milliGRAM(s) Oral every 6 hours PRN Temp greater or equal to 38C (100.4F), Mild Pain (1 - 3)  aluminum hydroxide/magnesium hydroxide/simethicone Suspension 30 milliLiter(s) Oral every 4 hours PRN Dyspepsia  melatonin 3 milliGRAM(s) Oral at bedtime PRN Insomnia  ondansetron Injectable 4 milliGRAM(s) IV Push every 8 hours PRN Nausea and/or Vomiting      LABS:                        9.7    13.29 )-----------( 159      ( 05 Mar 2025 05:35 )             32.2     03-05    132[L]  |  95[L]  |  24[H]  ----------------------------<  92  4.9   |  24  |  1.23    Ca    8.4      05 Mar 2025 05:35  Phos  3.7     03-05  Mg     2.30     03-05        Urinalysis Basic - ( 05 Mar 2025 05:35 )    Color: x / Appearance: x / SG: x / pH: x  Gluc: 92 mg/dL / Ketone: x  / Bili: x / Urobili: x   Blood: x / Protein: x / Nitrite: x   Leuk Esterase: x / RBC: x / WBC x   Sq Epi: x / Non Sq Epi: x / Bacteria: x      Arterial Blood Gas:  03-05 @ 10:50  7.38/46/117/27/97.6/1.5  ABG lactate: --     CHIEF COMPLAINT:Patient is a 69y old  Female who presents with a chief complaint of Worsening SOB and cough, +burning chest pain (05 Mar 2025 11:38)      INTERVAL EVENTS: no acute overnight events noted. Clinically appears unchanged. PCO2 stable with normal pH on yesterday's ABG. Will discontinue nocturnal NIV per discussion. Still c/o spinning dizziness. Will trial meclizine.     ROS: Seen by bedside during AM rounds     OBJECTIVE:  ICU Vital Signs Last 24 Hrs  T(C): 36.8 (06 Mar 2025 00:00), Max: 37.1 (05 Mar 2025 16:00)  T(F): 98.2 (06 Mar 2025 00:00), Max: 98.8 (05 Mar 2025 16:00)  HR: 57 (06 Mar 2025 07:42) (52 - 79)  BP: 119/74 (06 Mar 2025 05:54) (103/66 - 119/94)  BP(mean): --  ABP: --  ABP(mean): --  RR: 18 (06 Mar 2025 00:00) (17 - 18)  SpO2: 100% (06 Mar 2025 07:42) (96% - 100%)    O2 Parameters below as of 06 Mar 2025 07:42  Patient On (Oxygen Delivery Method): nasal cannula              03-05 @ 07:01  -  03-06 @ 07:00  --------------------------------------------------------  IN: 0 mL / OUT: 500 mL / NET: -500 mL      CAPILLARY BLOOD GLUCOSE          PHYSICAL EXAM:  General: NAD   Neck: trachea midline.  Cards: S1/S2, no murmurs   Pulm: Scant occasional wheeze b/l. No resp distress.   Abdomen: Soft, NTND.   Extremities: No pedal edema. Extremities warm to touch.  Neurology: AOx2 (person/place when spoken to in native language by son at bedside). 5/5 motor strength x4E. Follows basic commands.   Skin: warm to touch, color appropriate for ethnicity. Refer to RN assessment for further details.        HOSPITAL MEDICATIONS:  MEDICATIONS  (STANDING):  albuterol    90 MICROgram(s) HFA Inhaler 2 Puff(s) Inhalation once  albuterol    90 MICROgram(s) HFA Inhaler 2 Puff(s) Inhalation every 6 hours  albuterol/ipratropium for Nebulization 3 milliLiter(s) Nebulizer every 6 hours  amLODIPine   Tablet 10 milliGRAM(s) Oral daily  aspirin enteric coated 81 milliGRAM(s) Oral daily  atenolol  Tablet 50 milliGRAM(s) Oral daily  atorvastatin 20 milliGRAM(s) Oral at bedtime  buDESOnide    Inhalation Suspension 0.5 milliGRAM(s) Inhalation two times a day  chlorhexidine 2% Cloths 1 Application(s) Topical daily  enoxaparin Injectable 70 milliGRAM(s) SubCutaneous <User Schedule>  famotidine    Tablet 20 milliGRAM(s) Oral daily  influenza  Vaccine (HIGH DOSE) 0.5 milliLiter(s) IntraMuscular once  montelukast 10 milliGRAM(s) Oral daily  predniSONE   Tablet 40 milliGRAM(s) Oral daily  tamsulosin 0.4 milliGRAM(s) Oral at bedtime    MEDICATIONS  (PRN):  acetaminophen     Tablet .. 650 milliGRAM(s) Oral every 6 hours PRN Temp greater or equal to 38C (100.4F), Mild Pain (1 - 3)  aluminum hydroxide/magnesium hydroxide/simethicone Suspension 30 milliLiter(s) Oral every 4 hours PRN Dyspepsia  melatonin 3 milliGRAM(s) Oral at bedtime PRN Insomnia  ondansetron Injectable 4 milliGRAM(s) IV Push every 8 hours PRN Nausea and/or Vomiting      LABS:                        9.7    13.29 )-----------( 159      ( 05 Mar 2025 05:35 )             32.2     03-05    132[L]  |  95[L]  |  24[H]  ----------------------------<  92  4.9   |  24  |  1.23    Ca    8.4      05 Mar 2025 05:35  Phos  3.7     03-05  Mg     2.30     03-05        Urinalysis Basic - ( 05 Mar 2025 05:35 )    Color: x / Appearance: x / SG: x / pH: x  Gluc: 92 mg/dL / Ketone: x  / Bili: x / Urobili: x   Blood: x / Protein: x / Nitrite: x   Leuk Esterase: x / RBC: x / WBC x   Sq Epi: x / Non Sq Epi: x / Bacteria: x      Arterial Blood Gas:  03-05 @ 10:50  7.38/46/117/27/97.6/1.5  ABG lactate: --

## 2025-03-06 NOTE — DIETITIAN INITIAL EVALUATION ADULT - ORAL INTAKE PTA/DIET HISTORY
Nutrition interview conducted today with Pt utilizing  services via language line in [facility] (ID# [id number]). Pt lives at home with her son and daughter. They cook together at home. No difficulty obtaining groceries. No specific diet followed PTA. Pt taking iron supplement PTA.

## 2025-03-06 NOTE — DIETITIAN INITIAL EVALUATION ADULT - OTHER INFO
Per chart, 70 y/o female Asthma/COPD, HTN, dCHF, CVA w/ Lt weakness, PE, Afib-Eliquis, liver mass p/w acute asthma exacerbation c/b Acute hypoxia and hypercapnea s/p BiPAP for respiratory support, hyponatremia, enlarging liver mass 5.9x5cm from 3.2x1.7cm, chest pain.    Nutrition interview: No reports of any recent episodes of nausea, vomiting, diarrhea or constipation, Last BM noted on 3/5 per RN flowsheets. Denies any chewing/swallowing difficulties. No food allergies. Unable to state UBW. HIE Wt January: 130.8#. Pt current wt in house 124.1#. Pt noted with significant wt loss x1 month (-5.1%). Etiology unknown. Food preferences explored and noted. Intake is fair-good (50-75%) per RN flowsheets and per pt. Feeding skills: set up help required. Will recommend Ensure Plus 1x daily (350kcal, 13 gm protein) to promote optimal PO intake.

## 2025-03-06 NOTE — DIETITIAN INITIAL EVALUATION ADULT - PROBLEM/PLAN-7
----- Message from Sommer Ferris LPN sent at 4/4/2017 12:08 PM CDT -----  Contact: Krystal feliz/ Danilo [facility]      ----- Message -----     From: Amy Spencer     Sent: 4/4/2017  11:22 AM       To: [facility] Staff    Krystal feliz/ Danilo [facility] calling to request another order for Home Health Physical Therapy and Occupational Therapy. Please advise.  Call back Krystal Merritt   Thanks!   DISPLAY PLAN FREE TEXT

## 2025-03-06 NOTE — PROGRESS NOTE ADULT - ASSESSMENT
69F Asthma/COPD, HTN, dCHF, CVA w/ Lt weakness, PE, Afib-Eliquis, liver mass p/w acute asthma exacerbation c/b Acute hypoxia and hypercapnea s/p BiPAP for respiratory support, hyponatremia, enlarging liver mass 5.9x5cm from 3.2x1.7cm, chest pain.    # NEUROLOGY  Hx CVA in adulthood  - c/w aspirin, statin  - Hx CVA with left residual left hemiplegia    # CARDIOVASCULAR  Hyperlipidemia   Hypertension  Paroxysmal atrial fibrillation  Chest pain in ED  HFpEF  -  Reports intermittent burning quality chest/abd pain  - Trop neg x2   - TTE (2/28/25): EF 65%. WNL. Calcification of mitral valve annulus. Trace MR. Mild TR. Mild pulmonary hypertension  - c/w amlodipine, atenolol, atorvastatin.  - s/p home eliquis    # RESPIRATORY  Acute hypoxemic hypercapnic respiratory failure   COPD exacerbation  Emphysema  Hx PE  - present with 2 weeks of worsening SOB and cough, admitted with diffuse wheezing and hypoxemic hypercapnic resp failure. 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   - CTA (2/27/25): no main or lobar PE   - increase duonebs to q4 hours; add pulmicort .5mg BID; please keep albuterol MDI at bedside so patient can use as needed  - add azithromycin x 5 days (2/28/25 - )  - blood gas with acute decompensated hypercapnea (as well as component of metabolic acidosis)  - c/w BIPAP qhs at 12/6   - c/w albuterol q4, montelukast qd  - s/p 60mg of IV solumedrol x 2 (2/27/25); s/p 40mg BID (2/28/25- 3/2/25), on 40 mg qd (3/3 - 3/4) switched to Pred 40 QD (3/5 - )    # GI  Liver mass  Small hiatal hernia   - Known R liver mass, was recommended for outpatient follow up and MRI but son states that the family has been unable to get it done as the patient is very debilitated (needs a wheelchair to get around) and has frequent asthma exacerbations limiting her exertions  - CT here with worsening size of the mass  - pending MR Abd w/wo - safety sheet in chart  - IR c/s for Liver biopsy eventually     # RENAL  Hyponatremia.   - New onset hyponatremia, possibly 2/2 poor oral intake x few days in setting of her worsening SOB/asthma vs medications (on sertraline, not on diuretics) vs SIADH vs other  - on fluid restriction to 1.2L for now for possible SIADH  - Trend Na.    # INFECTIOUS DISEASE  UTI   - UCx (3/2) growing E Coli pending sensitivity  - on CTX (3/2-3/4)  - MRSA/MSSA pending  - RVP (2/27/25) neg    # HEME  - DVT PPX: Lovenox 70 mg BID     # ENDOCRINE  - no active issues    # SKIN  - no active issues    # ETHICS/ GOC    FULL CODE    # DISPO  PT: home PT 69F Asthma/COPD, HTN, dCHF, CVA w/ Lt weakness, PE, Afib-Eliquis, liver mass p/w acute asthma exacerbation c/b Acute hypoxia and hypercapnea s/p BiPAP for respiratory support, hyponatremia, enlarging liver mass 5.9x5cm from 3.2x1.7cm, chest pain.    # NEUROLOGY  Hx CVA in adulthood  - c/w aspirin, statin  - Hx CVA with left residual left hemiplegia    # CARDIOVASCULAR  Hyperlipidemia   Hypertension  Paroxysmal atrial fibrillation  Chest pain in ED  HFpEF  -  Reports intermittent burning quality chest/abd pain  - Trop neg x2   - TTE (2/28/25): EF 65%. WNL. Calcification of mitral valve annulus. Trace MR. Mild TR. Mild pulmonary hypertension  - c/w amlodipine, atenolol, atorvastatin.  - s/p home eliquis    # RESPIRATORY  Acute hypoxemic hypercapnic respiratory failure   COPD exacerbation  Emphysema  Hx PE  - present with 2 weeks of worsening SOB and cough, admitted with diffuse wheezing and hypoxemic hypercapnic resp failure. 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   - CTA (2/27/25): no main or lobar PE   - increase duonebs to q4 hours; add pulmicort .5mg BID; please keep albuterol MDI at bedside so patient can use as needed  - add azithromycin x 5 days (2/28/25 - 3/4/25)  - blood gas with acute decompensated hypercapnea (as well as component of metabolic acidosis)  - s/p BIPAP use however repeat ABG with stable compensated mild hypercapnea so will discontinue nocturnal NIV  - c/w albuterol q4, montelukast qd  - s/p 60mg of IV solumedrol x 2 (2/27/25); s/p 40mg BID (2/28/25- 3/2/25), on 40 mg qd (3/3 - 3/4) switched to Pred 40 QD (3/5 - )    # GI  Liver mass  Small hiatal hernia   - Known R liver mass, was recommended for outpatient follow up and MRI but son states that the family has been unable to get it done as the patient is very debilitated (needs a wheelchair to get around) and has frequent asthma exacerbations limiting her exertions  - CT here with worsening size of the mass  - pending MR Abd w/wo - safety sheet in chart  - IR c/s for Liver biopsy eventually vs outpatient    # RENAL  Hyponatremia.   - New onset hyponatremia, possibly 2/2 poor oral intake x few days in setting of her worsening SOB/asthma vs medications (on sertraline, not on diuretics) vs SIADH vs other  - on fluid restriction to 1.2L for now for possible SIADH  - Trend Na.    # INFECTIOUS DISEASE  UTI   - UCx (3/2) grew pansensitive E Coli s/p CTX (3/2-3/4)  - MRSA/MSSA pending  - RVP (2/27/25) neg    # HEME  - DVT PPX: Lovenox 70 mg BID     # ENDOCRINE  - no active issues    # SKIN  - no active issues    # ETHICS/ GOC    FULL CODE    # DISPO  PT: home PT

## 2025-03-06 NOTE — DIETITIAN INITIAL EVALUATION ADULT - NS FNS DIET ORDER
Diet, Regular:   DASH/TLC {Sodium & Cholesterol Restricted} (DASH)  Soft and Bite Sized (SOFTBTSZ)  1200mL Fluid Restriction ([id number]) (02-28-25 @ 00:45) [Active]

## 2025-03-06 NOTE — DIETITIAN NUTRITION RISK NOTIFICATION - TREATMENT: THE FOLLOWING DIET HAS BEEN RECOMMENDED
Diet, Regular:   DASH/TLC {Sodium & Cholesterol Restricted} (DASH)  Soft and Bite Sized (SOFTBTSZ)  1200mL Fluid Restriction ([id number]) (02-28-25 @ 00:45) [Active]

## 2025-03-06 NOTE — DIETITIAN INITIAL EVALUATION ADULT - PERTINENT LABORATORY DATA
03-06    133[L]  |  96[L]  |  28[H]  ----------------------------<  133[H]  5.2   |  26  |  1.13    Ca    8.8      06 Mar 2025 10:55  Phos  3.8     03-06  Mg     2.40     03-06

## 2025-03-07 LAB
ANION GAP SERPL CALC-SCNC: 12 MMOL/L — SIGNIFICANT CHANGE UP (ref 7–14)
ANION GAP SERPL CALC-SCNC: 14 MMOL/L — SIGNIFICANT CHANGE UP (ref 7–14)
BASOPHILS # BLD AUTO: 0.01 K/UL — SIGNIFICANT CHANGE UP (ref 0–0.2)
BASOPHILS NFR BLD AUTO: 0.1 % — SIGNIFICANT CHANGE UP (ref 0–2)
BUN SERPL-MCNC: 26 MG/DL — HIGH (ref 7–23)
BUN SERPL-MCNC: 26 MG/DL — HIGH (ref 7–23)
CALCIUM SERPL-MCNC: 8.3 MG/DL — LOW (ref 8.4–10.5)
CALCIUM SERPL-MCNC: 8.4 MG/DL — SIGNIFICANT CHANGE UP (ref 8.4–10.5)
CHLORIDE SERPL-SCNC: 100 MMOL/L — SIGNIFICANT CHANGE UP (ref 98–107)
CHLORIDE SERPL-SCNC: 100 MMOL/L — SIGNIFICANT CHANGE UP (ref 98–107)
CO2 SERPL-SCNC: 17 MMOL/L — LOW (ref 22–31)
CO2 SERPL-SCNC: 22 MMOL/L — SIGNIFICANT CHANGE UP (ref 22–31)
CREAT SERPL-MCNC: 1.04 MG/DL — SIGNIFICANT CHANGE UP (ref 0.5–1.3)
CREAT SERPL-MCNC: 1.06 MG/DL — SIGNIFICANT CHANGE UP (ref 0.5–1.3)
EGFR: 57 ML/MIN/1.73M2 — LOW
EGFR: 57 ML/MIN/1.73M2 — LOW
EGFR: 58 ML/MIN/1.73M2 — LOW
EGFR: 58 ML/MIN/1.73M2 — LOW
EOSINOPHIL # BLD AUTO: 0.01 K/UL — SIGNIFICANT CHANGE UP (ref 0–0.5)
EOSINOPHIL NFR BLD AUTO: 0.1 % — SIGNIFICANT CHANGE UP (ref 0–6)
GLUCOSE SERPL-MCNC: 170 MG/DL — HIGH (ref 70–99)
GLUCOSE SERPL-MCNC: 85 MG/DL — SIGNIFICANT CHANGE UP (ref 70–99)
HCT VFR BLD CALC: 31.9 % — LOW (ref 34.5–45)
HGB BLD-MCNC: 9.6 G/DL — LOW (ref 11.5–15.5)
IANC: 4.3 K/UL — SIGNIFICANT CHANGE UP (ref 1.8–7.4)
IMM GRANULOCYTES NFR BLD AUTO: 0.4 % — SIGNIFICANT CHANGE UP (ref 0–0.9)
LYMPHOCYTES # BLD AUTO: 2.81 K/UL — SIGNIFICANT CHANGE UP (ref 1–3.3)
LYMPHOCYTES # BLD AUTO: 35.8 % — SIGNIFICANT CHANGE UP (ref 13–44)
MAGNESIUM SERPL-MCNC: 2.2 MG/DL — SIGNIFICANT CHANGE UP (ref 1.6–2.6)
MAGNESIUM SERPL-MCNC: 2.2 MG/DL — SIGNIFICANT CHANGE UP (ref 1.6–2.6)
MCHC RBC-ENTMCNC: 24.6 PG — LOW (ref 27–34)
MCHC RBC-ENTMCNC: 30.1 G/DL — LOW (ref 32–36)
MCV RBC AUTO: 81.6 FL — SIGNIFICANT CHANGE UP (ref 80–100)
MONOCYTES # BLD AUTO: 0.68 K/UL — SIGNIFICANT CHANGE UP (ref 0–0.9)
MONOCYTES NFR BLD AUTO: 8.7 % — SIGNIFICANT CHANGE UP (ref 2–14)
NEUTROPHILS # BLD AUTO: 4.3 K/UL — SIGNIFICANT CHANGE UP (ref 1.8–7.4)
NEUTROPHILS NFR BLD AUTO: 54.9 % — SIGNIFICANT CHANGE UP (ref 43–77)
NRBC # BLD AUTO: 0 K/UL — SIGNIFICANT CHANGE UP (ref 0–0)
NRBC # FLD: 0 K/UL — SIGNIFICANT CHANGE UP (ref 0–0)
NRBC BLD AUTO-RTO: 0 /100 WBCS — SIGNIFICANT CHANGE UP (ref 0–0)
PHOSPHATE SERPL-MCNC: 3.5 MG/DL — SIGNIFICANT CHANGE UP (ref 2.5–4.5)
PHOSPHATE SERPL-MCNC: 4.3 MG/DL — SIGNIFICANT CHANGE UP (ref 2.5–4.5)
PLATELET # BLD AUTO: 132 K/UL — LOW (ref 150–400)
POTASSIUM SERPL-MCNC: 4.6 MMOL/L — SIGNIFICANT CHANGE UP (ref 3.5–5.3)
POTASSIUM SERPL-MCNC: 6 MMOL/L — HIGH (ref 3.5–5.3)
POTASSIUM SERPL-SCNC: 4.6 MMOL/L — SIGNIFICANT CHANGE UP (ref 3.5–5.3)
POTASSIUM SERPL-SCNC: 6 MMOL/L — HIGH (ref 3.5–5.3)
RBC # BLD: 3.91 M/UL — SIGNIFICANT CHANGE UP (ref 3.8–5.2)
RBC # FLD: 16 % — HIGH (ref 10.3–14.5)
SODIUM SERPL-SCNC: 131 MMOL/L — LOW (ref 135–145)
SODIUM SERPL-SCNC: 134 MMOL/L — LOW (ref 135–145)
WBC # BLD: 7.84 K/UL — SIGNIFICANT CHANGE UP (ref 3.8–10.5)
WBC # FLD AUTO: 7.84 K/UL — SIGNIFICANT CHANGE UP (ref 3.8–10.5)

## 2025-03-07 PROCEDURE — 99233 SBSQ HOSP IP/OBS HIGH 50: CPT

## 2025-03-07 RX ORDER — PREDNISONE 20 MG/1
5 TABLET ORAL DAILY
Refills: 0 | Status: CANCELLED | OUTPATIENT
Start: 2025-03-17 | End: 2025-03-12

## 2025-03-07 RX ORDER — PREDNISONE 20 MG/1
20 TABLET ORAL DAILY
Refills: 0 | Status: DISCONTINUED | OUTPATIENT
Start: 2025-03-11 | End: 2025-03-12

## 2025-03-07 RX ORDER — PREDNISONE 20 MG/1
30 TABLET ORAL DAILY
Refills: 0 | Status: COMPLETED | OUTPATIENT
Start: 2025-03-08 | End: 2025-03-10

## 2025-03-07 RX ORDER — PREDNISONE 20 MG/1
10 TABLET ORAL DAILY
Refills: 0 | Status: CANCELLED | OUTPATIENT
Start: 2025-03-14 | End: 2025-03-12

## 2025-03-07 RX ADMIN — Medication 1 APPLICATION(S): at 12:05

## 2025-03-07 RX ADMIN — TAMSULOSIN HYDROCHLORIDE 0.4 MILLIGRAM(S): 0.4 CAPSULE ORAL at 20:56

## 2025-03-07 RX ADMIN — PREDNISONE 40 MILLIGRAM(S): 20 TABLET ORAL at 06:10

## 2025-03-07 RX ADMIN — ENOXAPARIN SODIUM 70 MILLIGRAM(S): 100 INJECTION SUBCUTANEOUS at 06:10

## 2025-03-07 RX ADMIN — LISINOPRIL 50 MILLIGRAM(S): 30 TABLET ORAL at 06:10

## 2025-03-07 RX ADMIN — Medication 81 MILLIGRAM(S): at 12:04

## 2025-03-07 RX ADMIN — MONTELUKAST SODIUM 10 MILLIGRAM(S): 10 TABLET ORAL at 12:04

## 2025-03-07 RX ADMIN — AMLODIPINE BESYLATE 10 MILLIGRAM(S): 10 TABLET ORAL at 06:10

## 2025-03-07 RX ADMIN — Medication 3 MILLIGRAM(S): at 20:50

## 2025-03-07 RX ADMIN — IPRATROPIUM BROMIDE AND ALBUTEROL SULFATE 3 MILLILITER(S): .5; 2.5 SOLUTION RESPIRATORY (INHALATION) at 08:09

## 2025-03-07 RX ADMIN — ENOXAPARIN SODIUM 70 MILLIGRAM(S): 100 INJECTION SUBCUTANEOUS at 17:00

## 2025-03-07 RX ADMIN — ATORVASTATIN CALCIUM 20 MILLIGRAM(S): 80 TABLET, FILM COATED ORAL at 20:56

## 2025-03-07 RX ADMIN — IPRATROPIUM BROMIDE AND ALBUTEROL SULFATE 3 MILLILITER(S): .5; 2.5 SOLUTION RESPIRATORY (INHALATION) at 15:13

## 2025-03-07 RX ADMIN — IPRATROPIUM BROMIDE AND ALBUTEROL SULFATE 3 MILLILITER(S): .5; 2.5 SOLUTION RESPIRATORY (INHALATION) at 04:50

## 2025-03-07 RX ADMIN — BUDESONIDE 0.5 MILLIGRAM(S): 0.25 SUSPENSION RESPIRATORY (INHALATION) at 08:09

## 2025-03-07 RX ADMIN — Medication 20 MILLIGRAM(S): at 12:04

## 2025-03-07 NOTE — CONSULT NOTE ADULT - SUBJECTIVE AND OBJECTIVE BOX
IR Follow-Up     -- Recommend additional imaging with CT abdomen and pelvis with IV contrast liver protocol, due to limited visualization on MRI    --  Jose F Lazaro M.D, PGY2  Vascular and Interventional Radiology   Available on Microsoft Teams    For EMERGENT inquiries/questions:  IR Pager ([facility]): [phone]  IR Pager ([facility]): [phone] ; [phone]    For non-emergent consults/questions:   Please place a sunrise order "Consult- Interventional Radiology" with an appropriate callback number    For questions about scheduling during appropriate work hours, call IR :  [facility]: [phone]  [facility]: [phone]    For outpatient IR booking:  [facility]: [phone]  [facility]: [phone] Interventional Radiology    Evaluate for Procedure:     HPI: 69F Asthma/COPD, HTN, dCHF, CVA w/ Lt weakness, PE, Afib-Eliquis, liver mass p/w acute asthma exacerbation c/b Acute hypoxia and hypercapnea s/p BiPAP for respiratory support, hyponatremia, enlarging liver mass 5.9x5cm from 3.2x1.7cm, chest pain.    Allergies: No Known Allergies    Medications (Abx/Cardiac/Anticoagulation/Blood Products)    amLODIPine   Tablet: 10 milliGRAM(s) Oral (03-07 @ 06:10)  aspirin enteric coated: 81 milliGRAM(s) Oral (03-07 @ 12:04)  atenolol  Tablet: 50 milliGRAM(s) Oral (03-07 @ 06:10)  enoxaparin Injectable: 70 milliGRAM(s) SubCutaneous (03-07 @ 17:00)    Data:    T(C): 36.4  HR: 66  BP: 128/65  RR: 17  SpO2: 100%    -WBC 7.84 / HgB 9.6 / Hct 31.9 / Plt 132  -Na 134 / Cl 100 / BUN 26 / Glucose 170  -K 4.6 / CO2 22 / Cr 1.04  -ALT -- / Alk Phos -- / T.Bili --  -INR 1.05 / PTT 28.4      Radiology:     Assessment/Plan:   69F Asthma/COPD, HTN, dCHF, CVA w/ Lt weakness, PE, Afib-Eliquis, liver mass p/w acute asthma exacerbation c/b Acute hypoxia and hypercapnea s/p BiPAP for respiratory support, hyponatremia, enlarging liver mass 5.9x5cm from 3.2x1.7cm, chest pain.    -- Recommend additional imaging with CT abdomen and pelvis with IV contrast liver protocol, due to limited visualization on MRI due to breathing artifact      --  Jose F Lazaro M.D PGY2  Vascular and Interventional Radiology   Available on Microsoft Teams    - Non-emergent consults: Place IR consult order in [place]  - Emergent issues (pager): [facility] [phone]; [facility] [phone]; [phone]  - Scheduling questions: [facility] [phone]; [facility] [phone]  - Clinic/outpatient booking: [facility] [phone]; [facility] [phone]

## 2025-03-07 NOTE — PROGRESS NOTE ADULT - SUBJECTIVE AND OBJECTIVE BOX
CHIEF COMPLAINT: Patient is a 69y old  Female who presents with a chief complaint of Shortness of breath     (06 Mar 2025 13:18)      INTERVAL EVENTS: No acute events overnight.    REVIEW OF SYSTEMS: Seen and evaluated by bedside during AM rounds.        Arterial Blood Gas:  03-05 @ 10:50  7.38/46/117/27/97.6/1.5  ABG lactate: --      OBJECTIVE:  ICU Vital Signs Last 24 Hrs  T(C): 36.8 (07 Mar 2025 06:00), Max: 36.9 (07 Mar 2025 00:00)  T(F): 98.3 (07 Mar 2025 06:00), Max: 98.5 (07 Mar 2025 00:00)  HR: 72 (07 Mar 2025 06:00) (57 - 78)  BP: 101/51 (07 Mar 2025 06:00) (101/51 - 112/68)  BP(mean): --  ABP: --  ABP(mean): --  RR: 17 (07 Mar 2025 06:00) (16 - 17)  SpO2: 100% (07 Mar 2025 06:00) (98% - 100%)    O2 Parameters below as of 07 Mar 2025 06:00  Patient On (Oxygen Delivery Method): nasal cannula  O2 Flow (L/min): 2            03-05 @ 07:01  -  03-06 @ 07:00  --------------------------------------------------------  IN: 0 mL / OUT: 500 mL / NET: -500 mL      CAPILLARY BLOOD GLUCOSE          HOSPITAL MEDICATIONS:  MEDICATIONS  (STANDING):  albuterol    90 MICROgram(s) HFA Inhaler 2 Puff(s) Inhalation once  albuterol    90 MICROgram(s) HFA Inhaler 2 Puff(s) Inhalation every 6 hours  albuterol/ipratropium for Nebulization 3 milliLiter(s) Nebulizer every 6 hours  amLODIPine   Tablet 10 milliGRAM(s) Oral daily  aspirin enteric coated 81 milliGRAM(s) Oral daily  atenolol  Tablet 50 milliGRAM(s) Oral daily  atorvastatin 20 milliGRAM(s) Oral at bedtime  buDESOnide    Inhalation Suspension 0.5 milliGRAM(s) Inhalation two times a day  chlorhexidine 2% Cloths 1 Application(s) Topical daily  enoxaparin Injectable 70 milliGRAM(s) SubCutaneous <User Schedule>  famotidine    Tablet 20 milliGRAM(s) Oral daily  influenza  Vaccine (HIGH DOSE) 0.5 milliLiter(s) IntraMuscular once  montelukast 10 milliGRAM(s) Oral daily  predniSONE   Tablet 40 milliGRAM(s) Oral daily  tamsulosin 0.4 milliGRAM(s) Oral at bedtime    MEDICATIONS  (PRN):  acetaminophen     Tablet .. 650 milliGRAM(s) Oral every 6 hours PRN Temp greater or equal to 38C (100.4F), Mild Pain (1 - 3)  aluminum hydroxide/magnesium hydroxide/simethicone Suspension 30 milliLiter(s) Oral every 4 hours PRN Dyspepsia  meclizine 12.5 milliGRAM(s) Oral every 8 hours PRN Dizziness  melatonin 3 milliGRAM(s) Oral at bedtime PRN Insomnia  ondansetron Injectable 4 milliGRAM(s) IV Push every 8 hours PRN Nausea and/or Vomiting      PHYSICAL EXAMINATION  General: Alert and cooperative. Resting comfortably. No apparent distress noted. Dry mucous membrane noted. White conjunctiva, no icterus.   HEENT: Normocephalic/atraumatic. EOMI. No discharge, conjunctivitis, or scleral icterus. No ptosis. No discharge or sinus tenderness noted. Mucous membranes are pink without bleeding. Tonsils are not enlarged. Good dental hygiene noted. No facial asymmetry. Neck is supple with a full range of motion. No masses or tenderness. Trachea is midline. Lymph nodes are not enlarged.   Cardiovascular: Normal rate and regular rhythm. No murmur/gallop.   Respiratory: Breath sounds clear and equal bilaterally without rales, wheezing, or rhonchi. No accessory muscles used.   Abdomen: Soft, nontender, nondistended. Bowel sounds are present. No bruit. No hepatosplenomegaly or masses. No rebound or guarding.   Skin: Warm to touch. No rashes, wounds, or skin lesions noted.   Extremities: +2 dorsalis pedis pulse is noted for the lower extremities. Full range of motion on all four extremities. 5/5 motor strength noted in all extremities. No clubbing, cyanosis, edema, or varicose veins.   MSK: No swelling or deformity. Posture, body symmetry, and movements are appropriate.   Neuro: AxO x 3, CN II - XII grossly intact.    LABS:                        10.0   9.08  )-----------( 187      ( 06 Mar 2025 10:55 )             33.2     03-06    133[L]  |  96[L]  |  28[H]  ----------------------------<  133[H]  5.2   |  26  |  1.13    Ca    8.8      06 Mar 2025 10:55  Phos  3.8     03-06  Mg     2.40     03-06        Urinalysis Basic - ( 06 Mar 2025 10:55 )    Color: x / Appearance: x / SG: x / pH: x  Gluc: 133 mg/dL / Ketone: x  / Bili: x / Urobili: x   Blood: x / Protein: x / Nitrite: x   Leuk Esterase: x / RBC: x / WBC x   Sq Epi: x / Non Sq Epi: x / Bacteria: x      Arterial Blood Gas:  03-05 @ 10:50  7.38/46/117/27/97.6/1.5  ABG lactate: --    Venous Blood Gas:  03-06 @ 10:55  7.29/60/22/29/23.2  VBG Lactate: 2.5      PAST MEDICAL & SURGICAL HISTORY:  Brain aneurysm      Hypertension      Smoking      Atrial fibrillation      Asthma      Hyperlipidemia      CVA (cerebrovascular accident)      No significant past surgical history          FAMILY HISTORY:  No pertinent family history in first degree relatives        Social History:  Lives with family  Currently ambulatory mostly with a wheelchair  Former tobacco use, quit ~15 years ago  Denies EtOH use (27 Feb 2025 23:32)      RADIOLOGY:  [ ] Reviewed and interpreted by me    PULMONARY FUNCTION TESTS:    EKG: CHIEF COMPLAINT: Patient is a 69y old  Female who presents with a chief complaint of Shortness of breath    INTERVAL EVENTS: No acute events overnight.    REVIEW OF SYSTEMS: Seen and evaluated by bedside during AM rounds.        Arterial Blood Gas:  03-05 @ 10:50  7.38/46/117/27/97.6/1.5  ABG lactate: --      OBJECTIVE:  ICU Vital Signs Last 24 Hrs  T(C): 36.8 (07 Mar 2025 06:00), Max: 36.9 (07 Mar 2025 00:00)  T(F): 98.3 (07 Mar 2025 06:00), Max: 98.5 (07 Mar 2025 00:00)  HR: 72 (07 Mar 2025 06:00) (57 - 78)  BP: 101/51 (07 Mar 2025 06:00) (101/51 - 112/68)  BP(mean): --  ABP: --  ABP(mean): --  RR: 17 (07 Mar 2025 06:00) (16 - 17)  SpO2: 100% (07 Mar 2025 06:00) (98% - 100%)    O2 Parameters below as of 07 Mar 2025 06:00  Patient On (Oxygen Delivery Method): nasal cannula  O2 Flow (L/min): 2            03-05 @ 07:01  -  03-06 @ 07:00  --------------------------------------------------------  IN: 0 mL / OUT: 500 mL / NET: -500 mL      CAPILLARY BLOOD GLUCOSE          HOSPITAL MEDICATIONS:  MEDICATIONS  (STANDING):  albuterol    90 MICROgram(s) HFA Inhaler 2 Puff(s) Inhalation once  albuterol    90 MICROgram(s) HFA Inhaler 2 Puff(s) Inhalation every 6 hours  albuterol/ipratropium for Nebulization 3 milliLiter(s) Nebulizer every 6 hours  amLODIPine   Tablet 10 milliGRAM(s) Oral daily  aspirin enteric coated 81 milliGRAM(s) Oral daily  atenolol  Tablet 50 milliGRAM(s) Oral daily  atorvastatin 20 milliGRAM(s) Oral at bedtime  buDESOnide    Inhalation Suspension 0.5 milliGRAM(s) Inhalation two times a day  chlorhexidine 2% Cloths 1 Application(s) Topical daily  enoxaparin Injectable 70 milliGRAM(s) SubCutaneous <User Schedule>  famotidine    Tablet 20 milliGRAM(s) Oral daily  influenza  Vaccine (HIGH DOSE) 0.5 milliLiter(s) IntraMuscular once  montelukast 10 milliGRAM(s) Oral daily  predniSONE   Tablet 40 milliGRAM(s) Oral daily  tamsulosin 0.4 milliGRAM(s) Oral at bedtime    MEDICATIONS  (PRN):  acetaminophen     Tablet .. 650 milliGRAM(s) Oral every 6 hours PRN Temp greater or equal to 38C (100.4F), Mild Pain (1 - 3)  aluminum hydroxide/magnesium hydroxide/simethicone Suspension 30 milliLiter(s) Oral every 4 hours PRN Dyspepsia  meclizine 12.5 milliGRAM(s) Oral every 8 hours PRN Dizziness  melatonin 3 milliGRAM(s) Oral at bedtime PRN Insomnia  ondansetron Injectable 4 milliGRAM(s) IV Push every 8 hours PRN Nausea and/or Vomiting      PHYSICAL EXAMINATION  General: Awake and cooperative. Resting comfortably. No apparent respiratory distress noted.  HEENT: Normocephalic/atraumatic.   Cardiovascular: Normal rate and regular rhythm.  Respiratory: + NC. Breath sounds clear and equal bilaterally without rales, wheezing, or rhonchi. No accessory muscles used.   Abdomen: Soft, nontender, nondistended.   Skin: Warm to touch. No rashes, wounds, or skin lesions noted.   Extremities: No clubbing, cyanosis, edema, or varicose veins.   Neuro: As per son, pt axox3.       LABS:                        10.0   9.08  )-----------( 187      ( 06 Mar 2025 10:55 )             33.2     03-06    133[L]  |  96[L]  |  28[H]  ----------------------------<  133[H]  5.2   |  26  |  1.13    Ca    8.8      06 Mar 2025 10:55  Phos  3.8     03-06  Mg     2.40     03-06        Urinalysis Basic - ( 06 Mar 2025 10:55 )    Color: x / Appearance: x / SG: x / pH: x  Gluc: 133 mg/dL / Ketone: x  / Bili: x / Urobili: x   Blood: x / Protein: x / Nitrite: x   Leuk Esterase: x / RBC: x / WBC x   Sq Epi: x / Non Sq Epi: x / Bacteria: x      Arterial Blood Gas:  03-05 @ 10:50  7.38/46/117/27/97.6/1.5  ABG lactate: --    Venous Blood Gas:  03-06 @ 10:55  7.29/60/22/29/23.2  VBG Lactate: 2.5      PAST MEDICAL & SURGICAL HISTORY:  Brain aneurysm      Hypertension      Smoking      Atrial fibrillation      Asthma      Hyperlipidemia      CVA (cerebrovascular accident)      No significant past surgical history          FAMILY HISTORY:  No pertinent family history in first degree relatives        Social History:  Lives with family  Currently ambulatory mostly with a wheelchair  Former tobacco use, quit ~15 years ago  Denies EtOH use (27 Feb 2025 23:32)      RADIOLOGY:  [ ] Reviewed and interpreted by me    PULMONARY FUNCTION TESTS:    EKG: CHIEF COMPLAINT: Patient is a 69y old  Female who presents with a chief complaint of Shortness of breath    INTERVAL EVENTS: No acute events overnight.    REVIEW OF SYSTEMS: Seen and evaluated by bedside during AM rounds.  ROS negative      Arterial Blood Gas:  03-05 @ 10:50  7.38/46/117/27/97.6/1.5  ABG lactate: --      OBJECTIVE:  ICU Vital Signs Last 24 Hrs  T(C): 36.8 (07 Mar 2025 06:00), Max: 36.9 (07 Mar 2025 00:00)  T(F): 98.3 (07 Mar 2025 06:00), Max: 98.5 (07 Mar 2025 00:00)  HR: 72 (07 Mar 2025 06:00) (57 - 78)  BP: 101/51 (07 Mar 2025 06:00) (101/51 - 112/68)  BP(mean): --  ABP: --  ABP(mean): --  RR: 17 (07 Mar 2025 06:00) (16 - 17)  SpO2: 100% (07 Mar 2025 06:00) (98% - 100%)    O2 Parameters below as of 07 Mar 2025 06:00  Patient On (Oxygen Delivery Method): nasal cannula  O2 Flow (L/min): 2            03-05 @ 07:01  -  03-06 @ 07:00  --------------------------------------------------------  IN: 0 mL / OUT: 500 mL / NET: -500 mL      CAPILLARY BLOOD GLUCOSE          HOSPITAL MEDICATIONS:  MEDICATIONS  (STANDING):  albuterol    90 MICROgram(s) HFA Inhaler 2 Puff(s) Inhalation once  albuterol    90 MICROgram(s) HFA Inhaler 2 Puff(s) Inhalation every 6 hours  albuterol/ipratropium for Nebulization 3 milliLiter(s) Nebulizer every 6 hours  amLODIPine   Tablet 10 milliGRAM(s) Oral daily  aspirin enteric coated 81 milliGRAM(s) Oral daily  atenolol  Tablet 50 milliGRAM(s) Oral daily  atorvastatin 20 milliGRAM(s) Oral at bedtime  buDESOnide    Inhalation Suspension 0.5 milliGRAM(s) Inhalation two times a day  chlorhexidine 2% Cloths 1 Application(s) Topical daily  enoxaparin Injectable 70 milliGRAM(s) SubCutaneous <User Schedule>  famotidine    Tablet 20 milliGRAM(s) Oral daily  influenza  Vaccine (HIGH DOSE) 0.5 milliLiter(s) IntraMuscular once  montelukast 10 milliGRAM(s) Oral daily  predniSONE   Tablet 40 milliGRAM(s) Oral daily  tamsulosin 0.4 milliGRAM(s) Oral at bedtime    MEDICATIONS  (PRN):  acetaminophen     Tablet .. 650 milliGRAM(s) Oral every 6 hours PRN Temp greater or equal to 38C (100.4F), Mild Pain (1 - 3)  aluminum hydroxide/magnesium hydroxide/simethicone Suspension 30 milliLiter(s) Oral every 4 hours PRN Dyspepsia  meclizine 12.5 milliGRAM(s) Oral every 8 hours PRN Dizziness  melatonin 3 milliGRAM(s) Oral at bedtime PRN Insomnia  ondansetron Injectable 4 milliGRAM(s) IV Push every 8 hours PRN Nausea and/or Vomiting      PHYSICAL EXAMINATION  General: Awake and cooperative. Resting comfortably. No apparent respiratory distress noted.  HEENT: Normocephalic/atraumatic.   Cardiovascular: Normal rate and regular rhythm.  Respiratory: + NC. Breath sounds clear and equal bilaterally without rales, wheezing, or rhonchi. No accessory muscles used.   Abdomen: Soft, nontender, nondistended.   Skin: Warm to touch. No rashes, wounds, or skin lesions noted.   Extremities: No clubbing, cyanosis, edema, or varicose veins.   Neuro: As per son, pt axox3.       LABS:                        10.0   9.08  )-----------( 187      ( 06 Mar 2025 10:55 )             33.2     03-06    133[L]  |  96[L]  |  28[H]  ----------------------------<  133[H]  5.2   |  26  |  1.13    Ca    8.8      06 Mar 2025 10:55  Phos  3.8     03-06  Mg     2.40     03-06        Urinalysis Basic - ( 06 Mar 2025 10:55 )    Color: x / Appearance: x / SG: x / pH: x  Gluc: 133 mg/dL / Ketone: x  / Bili: x / Urobili: x   Blood: x / Protein: x / Nitrite: x   Leuk Esterase: x / RBC: x / WBC x   Sq Epi: x / Non Sq Epi: x / Bacteria: x      Arterial Blood Gas:  03-05 @ 10:50  7.38/46/117/27/97.6/1.5  ABG lactate: --    Venous Blood Gas:  03-06 @ 10:55  7.29/60/22/29/23.2  VBG Lactate: 2.5      PAST MEDICAL & SURGICAL HISTORY:  Brain aneurysm      Hypertension      Smoking      Atrial fibrillation      Asthma      Hyperlipidemia      CVA (cerebrovascular accident)      No significant past surgical history          FAMILY HISTORY:  No pertinent family history in first degree relatives        Social History:  Lives with family  Currently ambulatory mostly with a wheelchair  Former tobacco use, quit ~15 years ago  Denies EtOH use (27 Feb 2025 23:32)      RADIOLOGY:  [ ] Reviewed and interpreted by me    PULMONARY FUNCTION TESTS:    EKG:

## 2025-03-07 NOTE — PROGRESS NOTE ADULT - ASSESSMENT
69F Asthma/COPD, HTN, dCHF, CVA w/ Lt weakness, PE, Afib-Eliquis, liver mass p/w acute asthma exacerbation c/b Acute hypoxia and hypercapnea s/p BiPAP for respiratory support, hyponatremia, enlarging liver mass 5.9x5cm from 3.2x1.7cm, chest pain.    # NEUROLOGY  Hx CVA in adulthood  - c/w aspirin, statin  - Hx CVA with left residual left hemiplegia    # CARDIOVASCULAR  Hyperlipidemia   Hypertension  Paroxysmal atrial fibrillation  Chest pain in ED  HFpEF  -  Reports intermittent burning quality chest/abd pain  - Trop neg x2   - TTE (2/28/25): EF 65%. WNL. Calcification of mitral valve annulus. Trace MR. Mild TR. Mild pulmonary hypertension  - c/w amlodipine, atenolol, atorvastatin.  - s/p home eliquis    # RESPIRATORY  Acute hypoxemic hypercapnic respiratory failure   COPD exacerbation  Emphysema  Hx PE  - present with 2 weeks of worsening SOB and cough, admitted with diffuse wheezing and hypoxemic hypercapnic resp failure. 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   - CTA (2/27/25): no main or lobar PE   - increase duonebs to q4 hours; add pulmicort .5mg BID; please keep albuterol MDI at bedside so patient can use as needed  - add azithromycin x 5 days (2/28/25 - 3/4/25)  - blood gas with acute decompensated hypercapnea (as well as component of metabolic acidosis)  - s/p BIPAP use however repeat ABG with stable compensated mild hypercapnea so will discontinue nocturnal NIV  - c/w albuterol q4, montelukast qd  - s/p 60mg of IV solumedrol x 2 (2/27/25); s/p 40mg BID (2/28/25- 3/2/25), on 40 mg qd (3/3 - 3/4) switched to Pred 40 QD (3/5 - )    # GI  Liver mass  Small hiatal hernia   - Known R liver mass, was recommended for outpatient follow up and MRI but son states that the family has been unable to get it done as the patient is very debilitated (needs a wheelchair to get around) and has frequent asthma exacerbations limiting her exertions  - CT here with worsening size of the mass  - pending MR Abd w/wo - safety sheet in chart  - IR c/s for Liver biopsy eventually vs outpatient    # RENAL  Hyponatremia.   - New onset hyponatremia, possibly 2/2 poor oral intake x few days in setting of her worsening SOB/asthma vs medications (on sertraline, not on diuretics) vs SIADH vs other  - on fluid restriction to 1.2L for now for possible SIADH  - Trend Na.    # INFECTIOUS DISEASE  UTI   - UCx (3/2) grew pansensitive E Coli s/p CTX (3/2-3/4)  - MRSA/MSSA pending  - RVP (2/27/25) neg    # HEME  - DVT PPX: Lovenox 70 mg BID     # ENDOCRINE  - no active issues    # SKIN  - no active issues    # ETHICS/ GOC    FULL CODE    # DISPO  PT: home PT 69F Asthma/COPD, HTN, dCHF, CVA w/ Lt weakness, PE, Afib-Eliquis, liver mass p/w acute asthma exacerbation c/b Acute hypoxia and hypercapnea s/p BiPAP for respiratory support, hyponatremia, enlarging liver mass 5.9x5cm from 3.2x1.7cm, chest pain.    # NEUROLOGY  Hx CVA in adulthood  - c/w aspirin, statin  - Hx CVA with left residual left hemiplegia    # CARDIOVASCULAR  Hyperlipidemia   Hypertension  Paroxysmal atrial fibrillation  Chest pain in ED  HFpEF  -  Reports intermittent burning quality chest/abd pain  - Trop neg x2   - TTE (2/28/25): EF 65%. WNL. Calcification of mitral valve annulus. Trace MR. Mild TR. Mild pulmonary hypertension  - c/w amlodipine, atenolol, atorvastatin.  - s/p home eliquis    # RESPIRATORY  Acute hypoxemic hypercapnic respiratory failure   COPD exacerbation  Emphysema  Hx PE  - present with 2 weeks of worsening SOB and cough, admitted with diffuse wheezing and hypoxemic hypercapnic resp failure. 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   - CTA (2/27/25): no main or lobar PE   - increase duonebs to q4 hours; add pulmicort .5mg BID; please keep albuterol MDI at bedside so patient can use as needed  - add azithromycin x 5 days (2/28/25 - 3/4/25)  - blood gas with acute decompensated hypercapnea (as well as component of metabolic acidosis)  - s/p BIPAP use however repeat ABG with stable compensated mild hypercapnea so will discontinue nocturnal NIV  - c/w albuterol q4, montelukast qd  - s/p 60mg of IV solumedrol x 2 (2/27/25); s/p 40mg BID (2/28/25- 3/2/25), on 40 mg qd (3/3 - 3/4), s/p Pred 40 QD (3/5 - 3/7), now on prednisone 30 qd (3/8- ) - on pred taper    # GI  Liver mass  Small hiatal hernia   - Known R liver mass, was recommended for outpatient follow up and MRI but son states that the family has been unable to get it done as the patient is very debilitated (needs a wheelchair to get around) and has frequent asthma exacerbations limiting her exertions  - CT here with worsening size of the mass  - MR Abd (3/7): Limited evaluation due to extensive breathing motion artifacts. A 4.7 cm complex cystic lesion in segment 7 of the liver demonstrating fluid fluid level which may be related to the mucinous/proteinaceous content of the cystic components of the lesion; limited evaluation of the lesion on the postcontrast images due to extensive breathing motion artifacts; the differential includes biliary cystadenoma; close continued follow-up recommended in approximately 3-6 months with MRI to ensure stability. Multiple smaller liver cysts are also noted. The common bile duct measures 1 cm with layering sludge at the level of ampulla.  - IR c/s for Liver biopsy (3/7) -- Aspirin DC'ed (3/7)    # RENAL  Hyponatremia.   - New onset hyponatremia, possibly 2/2 poor oral intake x few days in setting of her worsening SOB/asthma vs medications (on sertraline, not on diuretics) vs SIADH vs other  - on fluid restriction to 1.2L for now for possible SIADH  - Trend Na.    # INFECTIOUS DISEASE  UTI   - UCx (3/2) grew pansensitive E Coli s/p CTX (3/2-3/4)  - MRSA/MSSA neg  - RVP (2/27/25) neg    # HEME  - DVT PPX: Lovenox 70 mg BID     # ENDOCRINE  - no active issues    # SKIN  - no active issues    # ETHICS/ GOC    FULL CODE    # DISPO  PT: home PT 69F Asthma/COPD, HTN, dCHF, CVA w/ Lt weakness, PE, Afib-Eliquis, liver mass p/w acute asthma exacerbation c/b Acute hypoxia and hypercapnea s/p BiPAP for respiratory support, hyponatremia, enlarging liver mass 5.9x5cm from 3.2x1.7cm, chest pain.    # NEUROLOGY  Hx CVA in adulthood  - c/w aspirin, statin  - Hx CVA with left residual left hemiplegia    # CARDIOVASCULAR  Hyperlipidemia   Hypertension  Paroxysmal atrial fibrillation  Chest pain in ED  HFpEF  -  Reports intermittent burning quality chest/abd pain  - Trop neg x2   - TTE (2/28/25): EF 65%. WNL. Calcification of mitral valve annulus. Trace MR. Mild TR. Mild pulmonary hypertension  - c/w amlodipine, atenolol, atorvastatin.  - s/p home eliquis    # RESPIRATORY  Acute hypoxemic hypercapnic respiratory failure   COPD exacerbation  Emphysema  Hx PE  - present with 2 weeks of worsening SOB and cough, admitted with diffuse wheezing and hypoxemic hypercapnic resp failure. 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   - CTA (2/27/25): no main or lobar PE   - increase duonebs to q4 hours; add pulmicort .5mg BID; please keep albuterol MDI at bedside so patient can use as needed  - add azithromycin x 5 days (2/28/25 - 3/4/25)  - blood gas with acute decompensated hypercapnea (as well as component of metabolic acidosis)  - s/p BIPAP use however repeat ABG with stable compensated mild hypercapnea so will discontinue nocturnal NIV  - c/w albuterol q4, montelukast qd  - s/p 60mg of IV solumedrol x 2 (2/27/25); s/p 40mg BID (2/28/25- 3/2/25), on 40 mg qd (3/3 - 3/4), s/p Pred 40 QD (3/5 - 3/7), now on prednisone 30 qd (3/8- ) - on pred taper    # GI  Liver mass  Small hiatal hernia   - Known R liver mass, was recommended for outpatient follow up and MRI but son states that the family has been unable to get it done as the patient is very debilitated (needs a wheelchair to get around) and has frequent asthma exacerbations limiting her exertions  - CT here with worsening size of the mass  - MR Abd (3/7): Limited evaluation due to extensive breathing motion artifacts. A 4.7 cm complex cystic lesion in segment 7 of the liver demonstrating fluid fluid level which may be related to the mucinous/proteinaceous content of the cystic components of the lesion; limited evaluation of the lesion on the postcontrast images due to extensive breathing motion artifacts; the differential includes biliary cystadenoma; close continued follow-up recommended in approximately 3-6 months with MRI to ensure stability. Multiple smaller liver cysts are also noted. The common bile duct measures 1 cm with layering sludge at the level of ampulla.  - IR c/s for Liver biopsy (3/7) -- Aspirin DC'ed (3/7)  - CT A/P w/ IV contrast pending    # RENAL  Hyponatremia.   - New onset hyponatremia, possibly 2/2 poor oral intake x few days in setting of her worsening SOB/asthma vs medications (on sertraline, not on diuretics) vs SIADH vs other  - on fluid restriction to 1.2L for now for possible SIADH  - Trend Na.    # INFECTIOUS DISEASE  UTI   - UCx (3/2) grew pansensitive E Coli s/p CTX (3/2-3/4)  - MRSA/MSSA neg  - RVP (2/27/25) neg    # HEME  - DVT PPX: Lovenox 70 mg BID     # ENDOCRINE  - no active issues    # SKIN  - no active issues    # ETHICS/ GOC    FULL CODE    # DISPO  PT: home PT

## 2025-03-07 NOTE — CHART NOTE - NSCHARTNOTEFT_GEN_A_CORE
RCU TRANSFER NOTE     TRANSFER FROM: RCU    TRANSFER TO: (X) Medicine    (  ) Telemetry     (   ) RCU        (    ) Palliative         (   ) Stroke Unit          (   ) __________________    ACCEPTING PHYSICIAN: Dr. Brian    RCU COURSE:     69F with MHx asthma-COPD overlap syndrome with chronic hypoxemic respiratory failure on O2 supplementation (last PFTs with FEV1 38, FEV1/FVC of 55, DLCO 25 in 2019) HTN, HFpEF, and hx CVA with right-sided deficits presents to [facility] on 2/27/2025 with 2 weeks of worsening shortness of breath and cough, admitted for hypoxemic hypercapnic respiratory failure in the setting of COPD exacerbation.     Pt initially p/w acute on chronic combined hypoxemic and hypercapnic respiratory failure i/s/o COPD exacerbation i/s/o recent medication noncompliance further found to have incidental liver mass concerning for malignancy. Pt s/p 60mg of IV solumedrol x 2 and on steroids taper. c/w Duonebs to q4 hours. Pulmicort .5mg BID added. Albuterol MDI at bedside so patient can use as needed. Completed 5 day course of Azithromycin. Pt clinically responded well to initial therapeutic mgmt with improvement in respiratory mechanics to baseline. Pt initiated on Bilevel NIV in ED, QHS only, and now no longer needed.    Pt with known pAFib and hx CVA with residual left hemiplegia. Restarted home rate control with atenolol. Eliquis held for possible liver biopsy, Lovenox in place for now. ACS (-). Home ASA and statin restarted. Telemetry monitoring. MAP goal 65.     Pt continues to have evidence of abnormal acid-base metabolic status with decompensated hypercapnia with chronic NAGMA and hypochloremic hyponatremia with hyperkalemia. Unclear primary etiology. UA (+) UTI, will treat for uncomplicated cystitis. MRI Abdomen completed for unclear new liver lesion concerning for possible malignancy. Pending IR liver biopsy/      Vital Signs Last 24 Hrs  T(C): 36.8 (07 Mar 2025 08:00), Max: 36.9 (07 Mar 2025 00:00)  T(F): 98.2 (07 Mar 2025 08:00), Max: 98.5 (07 Mar 2025 00:00)  HR: 66 (07 Mar 2025 15:16) (59 - 72)  BP: 109/48 (07 Mar 2025 08:00) (101/51 - 109/48)  BP(mean): --  RR: 18 (07 Mar 2025 08:00) (16 - 18)  SpO2: 97% (07 Mar 2025 15:16) (97% - 100%)    Parameters below as of 07 Mar 2025 15:16  Patient On (Oxygen Delivery Method): nasal cannula, 2 l/m      I&O's Summary      MEDICATIONS  (STANDING):  albuterol    90 MICROgram(s) HFA Inhaler 2 Puff(s) Inhalation once  albuterol    90 MICROgram(s) HFA Inhaler 2 Puff(s) Inhalation every 6 hours  albuterol/ipratropium for Nebulization 3 milliLiter(s) Nebulizer every 6 hours  amLODIPine   Tablet 10 milliGRAM(s) Oral daily  atenolol  Tablet 50 milliGRAM(s) Oral daily  atorvastatin 20 milliGRAM(s) Oral at bedtime  buDESOnide    Inhalation Suspension 0.5 milliGRAM(s) Inhalation two times a day  chlorhexidine 2% Cloths 1 Application(s) Topical daily  enoxaparin Injectable 70 milliGRAM(s) SubCutaneous <User Schedule>  famotidine    Tablet 20 milliGRAM(s) Oral daily  influenza  Vaccine (HIGH DOSE) 0.5 milliLiter(s) IntraMuscular once  montelukast 10 milliGRAM(s) Oral daily  tamsulosin 0.4 milliGRAM(s) Oral at bedtime    MEDICATIONS  (PRN):  acetaminophen     Tablet .. 650 milliGRAM(s) Oral every 6 hours PRN Temp greater or equal to 38C (100.4F), Mild Pain (1 - 3)  aluminum hydroxide/magnesium hydroxide/simethicone Suspension 30 milliLiter(s) Oral every 4 hours PRN Dyspepsia  meclizine 12.5 milliGRAM(s) Oral every 8 hours PRN Dizziness  melatonin 3 milliGRAM(s) Oral at bedtime PRN Insomnia  ondansetron Injectable 4 milliGRAM(s) IV Push every 8 hours PRN Nausea and/or Vomiting      LABS                                            9.6                   Neurophils% (auto):   x      (03-07 @ 06:00):    7.84 )-----------(132          Lymphocytes% (auto):  x                                             31.9                   Eosinphils% (auto):   x        Manual%: Neutrophils x    ; Lymphocytes x    ; Eosinophils x    ; Bands%: x    ; Blasts x                                    134    |  100    |  26                  Calcium: 8.4   / iCa: x      (03-07 @ 11:23)    ----------------------------<  170       Magnesium: 2.20                             4.6     |  22     |  1.04             Phosphorous: 3.5          FOR FOLLOW UP:  - ? IR liver biopsy - aspirin dc'ed 3/7  - c/w prednisone taper  - wean oxygen as tolerated RCU TRANSFER NOTE     TRANSFER FROM: RCU    TRANSFER TO: (X) Medicine       ACCEPTING PHYSICIAN: Dr. Brian    RCU COURSE:     69F with MHx asthma-COPD overlap syndrome with chronic hypoxemic respiratory failure on O2 supplementation (last PFTs with FEV1 38, FEV1/FVC of 55, DLCO 25 in 2019) HTN, HFpEF, and hx CVA with right-sided deficits presents to [facility] on 2/27/2025 with 2 weeks of worsening shortness of breath and cough, admitted for hypoxemic hypercapnic respiratory failure in the setting of COPD exacerbation.     Pt initially p/w acute on chronic combined hypoxemic and hypercapnic respiratory failure i/s/o COPD exacerbation i/s/o recent medication noncompliance further found to have incidental liver mass concerning for malignancy. Pt s/p 60mg of IV solumedrol x 2 and on steroids taper. c/w Duonebs to q4 hours. Pulmicort .5mg BID added. Albuterol MDI at bedside so patient can use as needed. Completed 5 day course of Azithromycin. Pt clinically responded well to initial therapeutic mgmt with improvement in respiratory mechanics to baseline. Pt initiated on Bilevel NIV in ED, QHS only, and now no longer needed.    Pt with known pAFib and hx CVA with residual left hemiplegia. Restarted home rate control with atenolol. Eliquis held for possible liver biopsy, Lovenox in place for now. ACS (-). Home ASA and statin restarted. Telemetry monitoring. MAP goal 65.     Pt continues to have evidence of abnormal acid-base metabolic status with decompensated hypercapnia with chronic NAGMA and hypochloremic hyponatremia with hyperkalemia. Unclear primary etiology. UA (+) UTI, will treat for uncomplicated cystitis. MRI Abdomen completed for unclear new liver lesion concerning for possible malignancy. Pending IR liver biopsy/      Vital Signs Last 24 Hrs  T(C): 36.8 (07 Mar 2025 08:00), Max: 36.9 (07 Mar 2025 00:00)  T(F): 98.2 (07 Mar 2025 08:00), Max: 98.5 (07 Mar 2025 00:00)  HR: 66 (07 Mar 2025 15:16) (59 - 72)  BP: 109/48 (07 Mar 2025 08:00) (101/51 - 109/48)  BP(mean): --  RR: 18 (07 Mar 2025 08:00) (16 - 18)  SpO2: 97% (07 Mar 2025 15:16) (97% - 100%)    Parameters below as of 07 Mar 2025 15:16  Patient On (Oxygen Delivery Method): nasal cannula, 2 l/m      I&O's Summary      MEDICATIONS  (STANDING):  albuterol    90 MICROgram(s) HFA Inhaler 2 Puff(s) Inhalation once  albuterol    90 MICROgram(s) HFA Inhaler 2 Puff(s) Inhalation every 6 hours  albuterol/ipratropium for Nebulization 3 milliLiter(s) Nebulizer every 6 hours  amLODIPine   Tablet 10 milliGRAM(s) Oral daily  atenolol  Tablet 50 milliGRAM(s) Oral daily  atorvastatin 20 milliGRAM(s) Oral at bedtime  buDESOnide    Inhalation Suspension 0.5 milliGRAM(s) Inhalation two times a day  chlorhexidine 2% Cloths 1 Application(s) Topical daily  enoxaparin Injectable 70 milliGRAM(s) SubCutaneous <User Schedule>  famotidine    Tablet 20 milliGRAM(s) Oral daily  influenza  Vaccine (HIGH DOSE) 0.5 milliLiter(s) IntraMuscular once  montelukast 10 milliGRAM(s) Oral daily  tamsulosin 0.4 milliGRAM(s) Oral at bedtime    MEDICATIONS  (PRN):  acetaminophen     Tablet .. 650 milliGRAM(s) Oral every 6 hours PRN Temp greater or equal to 38C (100.4F), Mild Pain (1 - 3)  aluminum hydroxide/magnesium hydroxide/simethicone Suspension 30 milliLiter(s) Oral every 4 hours PRN Dyspepsia  meclizine 12.5 milliGRAM(s) Oral every 8 hours PRN Dizziness  melatonin 3 milliGRAM(s) Oral at bedtime PRN Insomnia  ondansetron Injectable 4 milliGRAM(s) IV Push every 8 hours PRN Nausea and/or Vomiting      LABS                                            9.6                   Neurophils% (auto):   x      (03-07 @ 06:00):    7.84 )-----------(132          Lymphocytes% (auto):  x                                             31.9                   Eosinphils% (auto):   x        Manual%: Neutrophils x    ; Lymphocytes x    ; Eosinophils x    ; Bands%: x    ; Blasts x                                    134    |  100    |  26                  Calcium: 8.4   / iCa: x      (03-07 @ 11:23)    ----------------------------<  170       Magnesium: 2.20                             4.6     |  22     |  1.04             Phosphorous: 3.5          FOR FOLLOW UP:  - ? IR liver biopsy - aspirin dc'ed 3/7  - c/w prednisone taper  - wean oxygen as tolerated RCU TRANSFER NOTE     TRANSFER FROM: RCU    TRANSFER TO: (X) Medicine       ACCEPTING PHYSICIAN: Dr. Brian    RCU COURSE:     69F with MHx asthma-COPD overlap syndrome with chronic hypoxemic respiratory failure on O2 supplementation (last PFTs with FEV1 38, FEV1/FVC of 55, DLCO 25 in 2019) HTN, HFpEF, and hx CVA with right-sided deficits presents to [facility] on 2/27/2025 with 2 weeks of worsening shortness of breath and cough, admitted for hypoxemic hypercapnic respiratory failure in the setting of COPD exacerbation.     Pt initially p/w acute on chronic combined hypoxemic and hypercapnic respiratory failure i/s/o COPD exacerbation i/s/o recent medication noncompliance further found to have incidental liver mass concerning for malignancy. Pt s/p 60mg of IV solumedrol x 2 and on steroids taper. c/w Duonebs to q4 hours. Pulmicort .5mg BID added. Albuterol MDI at bedside so patient can use as needed. Completed 5 day course of Azithromycin. Pt clinically responded well to initial therapeutic mgmt with improvement in respiratory mechanics to baseline. Pt initiated on Bilevel NIV in ED, QHS only, and now no longer needed.    Pt with known pAFib and hx CVA with residual left hemiplegia. Restarted home rate control with atenolol. Eliquis held for possible liver biopsy, Lovenox in place for now. ACS (-). Home ASA and statin restarted. Telemetry monitoring. MAP goal 65.     Pt continues to have evidence of abnormal acid-base metabolic status with decompensated hypercapnia with chronic NAGMA and hypochloremic hyponatremia with hyperkalemia. Unclear primary etiology. UA (+) UTI, will treat for uncomplicated cystitis. MRI Abdomen completed for unclear new liver lesion concerning for possible malignancy. Pending IR liver biopsy/      Vital Signs Last 24 Hrs  T(C): 36.8 (07 Mar 2025 08:00), Max: 36.9 (07 Mar 2025 00:00)  T(F): 98.2 (07 Mar 2025 08:00), Max: 98.5 (07 Mar 2025 00:00)  HR: 66 (07 Mar 2025 15:16) (59 - 72)  BP: 109/48 (07 Mar 2025 08:00) (101/51 - 109/48)  BP(mean): --  RR: 18 (07 Mar 2025 08:00) (16 - 18)  SpO2: 97% (07 Mar 2025 15:16) (97% - 100%)    Parameters below as of 07 Mar 2025 15:16  Patient On (Oxygen Delivery Method): nasal cannula, 2 l/m      I&O's Summary      MEDICATIONS  (STANDING):  albuterol    90 MICROgram(s) HFA Inhaler 2 Puff(s) Inhalation once  albuterol    90 MICROgram(s) HFA Inhaler 2 Puff(s) Inhalation every 6 hours  albuterol/ipratropium for Nebulization 3 milliLiter(s) Nebulizer every 6 hours  amLODIPine   Tablet 10 milliGRAM(s) Oral daily  atenolol  Tablet 50 milliGRAM(s) Oral daily  atorvastatin 20 milliGRAM(s) Oral at bedtime  buDESOnide    Inhalation Suspension 0.5 milliGRAM(s) Inhalation two times a day  chlorhexidine 2% Cloths 1 Application(s) Topical daily  enoxaparin Injectable 70 milliGRAM(s) SubCutaneous <User Schedule>  famotidine    Tablet 20 milliGRAM(s) Oral daily  influenza  Vaccine (HIGH DOSE) 0.5 milliLiter(s) IntraMuscular once  montelukast 10 milliGRAM(s) Oral daily  tamsulosin 0.4 milliGRAM(s) Oral at bedtime    MEDICATIONS  (PRN):  acetaminophen     Tablet .. 650 milliGRAM(s) Oral every 6 hours PRN Temp greater or equal to 38C (100.4F), Mild Pain (1 - 3)  aluminum hydroxide/magnesium hydroxide/simethicone Suspension 30 milliLiter(s) Oral every 4 hours PRN Dyspepsia  meclizine 12.5 milliGRAM(s) Oral every 8 hours PRN Dizziness  melatonin 3 milliGRAM(s) Oral at bedtime PRN Insomnia  ondansetron Injectable 4 milliGRAM(s) IV Push every 8 hours PRN Nausea and/or Vomiting      LABS                                            9.6                   Neurophils% (auto):   x      (03-07 @ 06:00):    7.84 )-----------(132          Lymphocytes% (auto):  x                                             31.9                   Eosinphils% (auto):   x        Manual%: Neutrophils x    ; Lymphocytes x    ; Eosinophils x    ; Bands%: x    ; Blasts x                                    134    |  100    |  26                  Calcium: 8.4   / iCa: x      (03-07 @ 11:23)    ----------------------------<  170       Magnesium: 2.20                             4.6     |  22     |  1.04             Phosphorous: 3.5          FOR FOLLOW UP:  - ? IR liver biopsy - aspirin dc'ed 3/7  - c/w prednisone taper  - wean oxygen as tolerated  - Monitor/trend Na RCU TRANSFER NOTE     TRANSFER FROM: RCU    TRANSFER TO: (X) Medicine       ACCEPTING PHYSICIAN: Dr. Biran    RCU COURSE:     69F with MHx asthma-COPD overlap syndrome with chronic hypoxemic respiratory failure on O2 supplementation (last PFTs with FEV1 38, FEV1/FVC of 55, DLCO 25 in 2019) HTN, HFpEF, and hx CVA with right-sided deficits presents to [facility] on 2/27/2025 with 2 weeks of worsening shortness of breath and cough, admitted for hypoxemic hypercapnic respiratory failure in the setting of COPD exacerbation.     Pt initially p/w acute on chronic combined hypoxemic and hypercapnic respiratory failure i/s/o COPD exacerbation i/s/o recent medication noncompliance further found to have incidental liver mass concerning for malignancy. Pt s/p 60mg of IV solumedrol x 2 and on steroids taper. c/w Duonebs to q4 hours. Pulmicort .5mg BID added. Albuterol MDI at bedside so patient can use as needed. Completed 5 day course of Azithromycin. Pt clinically responded well to initial therapeutic mgmt with improvement in respiratory mechanics to baseline. Pt initiated on Bilevel NIV in ED, QHS only, and now no longer needed.    Pt with known pAFib and hx CVA with residual left hemiplegia. Restarted home rate control with atenolol. Eliquis held for possible liver biopsy, Lovenox in place for now. ACS (-). Home ASA and statin restarted. Telemetry monitoring. MAP goal 65.     Pt continues to have evidence of abnormal acid-base metabolic status with decompensated hypercapnia with chronic NAGMA and hypochloremic hyponatremia with hyperkalemia. Unclear primary etiology. UA (+) UTI, will treat for uncomplicated cystitis. MRI Abdomen completed for unclear new liver lesion concerning for possible malignancy. Pending IR liver biopsy/      Vital Signs Last 24 Hrs  T(C): 36.8 (07 Mar 2025 08:00), Max: 36.9 (07 Mar 2025 00:00)  T(F): 98.2 (07 Mar 2025 08:00), Max: 98.5 (07 Mar 2025 00:00)  HR: 66 (07 Mar 2025 15:16) (59 - 72)  BP: 109/48 (07 Mar 2025 08:00) (101/51 - 109/48)  BP(mean): --  RR: 18 (07 Mar 2025 08:00) (16 - 18)  SpO2: 97% (07 Mar 2025 15:16) (97% - 100%)    Parameters below as of 07 Mar 2025 15:16  Patient On (Oxygen Delivery Method): nasal cannula, 2 l/m      I&O's Summary      MEDICATIONS  (STANDING):  albuterol    90 MICROgram(s) HFA Inhaler 2 Puff(s) Inhalation once  albuterol    90 MICROgram(s) HFA Inhaler 2 Puff(s) Inhalation every 6 hours  albuterol/ipratropium for Nebulization 3 milliLiter(s) Nebulizer every 6 hours  amLODIPine   Tablet 10 milliGRAM(s) Oral daily  atenolol  Tablet 50 milliGRAM(s) Oral daily  atorvastatin 20 milliGRAM(s) Oral at bedtime  buDESOnide    Inhalation Suspension 0.5 milliGRAM(s) Inhalation two times a day  chlorhexidine 2% Cloths 1 Application(s) Topical daily  enoxaparin Injectable 70 milliGRAM(s) SubCutaneous <User Schedule>  famotidine    Tablet 20 milliGRAM(s) Oral daily  influenza  Vaccine (HIGH DOSE) 0.5 milliLiter(s) IntraMuscular once  montelukast 10 milliGRAM(s) Oral daily  tamsulosin 0.4 milliGRAM(s) Oral at bedtime    MEDICATIONS  (PRN):  acetaminophen     Tablet .. 650 milliGRAM(s) Oral every 6 hours PRN Temp greater or equal to 38C (100.4F), Mild Pain (1 - 3)  aluminum hydroxide/magnesium hydroxide/simethicone Suspension 30 milliLiter(s) Oral every 4 hours PRN Dyspepsia  meclizine 12.5 milliGRAM(s) Oral every 8 hours PRN Dizziness  melatonin 3 milliGRAM(s) Oral at bedtime PRN Insomnia  ondansetron Injectable 4 milliGRAM(s) IV Push every 8 hours PRN Nausea and/or Vomiting      LABS                                            9.6                   Neurophils% (auto):   x      (03-07 @ 06:00):    7.84 )-----------(132          Lymphocytes% (auto):  x                                             31.9                   Eosinphils% (auto):   x        Manual%: Neutrophils x    ; Lymphocytes x    ; Eosinophils x    ; Bands%: x    ; Blasts x                                    134    |  100    |  26                  Calcium: 8.4   / iCa: x      (03-07 @ 11:23)    ----------------------------<  170       Magnesium: 2.20                             4.6     |  22     |  1.04             Phosphorous: 3.5          FOR FOLLOW UP:  - ? IR liver biopsy - aspirin dc'ed 3/7  - CT A/P w/ IV contrast pending  - c/w prednisone taper  - wean oxygen as tolerated  - Monitor/trend Na

## 2025-03-07 NOTE — PROGRESS NOTE ADULT - NS ATTEND AMEND GEN_ALL_CORE FT
Pt is a 69F with MHx asthma-COPD overlap syndrome with chronic hypoxemic respiratory failure on O2 supplementation (last PFTs with FEV1 38, FEV1/FVC of 55, DLCO 25 in 2019) HTN, HFpEF, and hx CVA with right-sided deficits presents to [facility] on 2/27/2025 with 2 weeks of worsening shortness of breath and cough, admitted for hypoxemic hypercapnic respiratory failure in the setting of COPD exacerbation.     Pt initially p/w acute on chronic combined hypoxemic and hypercapnic respiratory failure i/s/o COPD exacerbation i/s/o recent medication noncompliance further found to have incidental liver mass concerning for malignancy. Pt s/p 60mg of IV solumedrol x 2 weaned to 40mg of IV Solumedrol BID for now, with plan to transition to PO Prednisone 3/2. Will c/w Duonebs to q4 hours. Pulmicort .5mg BID added. Albuterol MDI at bedside so patient can use as needed. Will complete 5 day course of Azithromycin.   Pt clinically responded well to initial therapeutic mgmt with improvement in respiratory mechanics to baseline. Pt initiated on Bilevel NIV in ED, now using qhs only.     Pt with known pAFib and hx CVA with residual left hemiplegia. Restarted home rate control with atenolol. Eliquis held for possible liver biopsy, Lovenox in place for now. TTE pending. ACS (-). Home ASA and statin restarted. Telemetry monitoring. MAP goal 65.     Pt continues to have evidence of abnormal acid-base metabolic status with decompensated hypercapnia with chronic NAGMA and hypochloremic hyponatremia with hyperkalemia. Unclear primary etiology. Will evaluate for underlying RTA with UA and urine studies. UA (+) UTI, will treat for uncomplicated cystitis. MSI Abdomen pending for unclear new liver lesion concerning for possible malignancy. Check D-lactate in AM.     Dispo pending medical optimization. Pt full code. DVT ppx in place. Will continue to update family and discuss plan of care throughout hospitalization.
agree with above  stable on nasal cannula, on tx cap, exac asthma  af on ac - now lovenox for pot need liver bx  pending MRI abd
agree with above  continue to try to use nocturnal bilevel  change to po prednisone  pending abd MRI  ceftriaxone 3 d uti
agree with above  resp status stable, on nasal cannula, po pred  pending liver mri
Pt is a 69F with MHx asthma-COPD overlap syndrome with chronic hypoxemic respiratory failure on O2 supplementation (last PFTs with FEV1 38, FEV1/FVC of 55, DLCO 25 in 2019) HTN, HFpEF, and hx CVA with right-sided deficits presents to [facility] on 2/27/2025 with 2 weeks of worsening shortness of breath and cough, admitted for hypoxemic hypercapnic respiratory failure in the setting of COPD exacerbation.     Pt initially p/w acute on chronic combined hypoxemic and hypercapnic respiratory failure i/s/o COPD exacerbation i/s/o recent medication noncompliance further found to have incidental liver mass concerning for malignancy. Pt s/p 60mg of IV solumedrol x 2 weaned to 40mg of IV Solumedrol BID for now, with plan to transition to PO Prednisone 3/2. Will c/w Duonebs to q4 hours. Pulmicort .5mg BID added. Albuterol MDI at bedside so patient can use as needed. Will complete 5 day course of Azithromycin.   Pt clinically responded well to initial therapeutic mgmt with improvement in respiratory mechanics to baseline. Pt initiated on Bilevel NIV in ED, now using qhs only.     Pt with known pAFib and hx CVA with residual left hemiplegia. Restarted home rate control with atenolol. Eliquis held for possible liver biopsy, Lovenox in place for now. TTE pending. ACS (-). Home ASA and statin restarted. Telemetry monitoring. MAP goal 65.     Pt continues to have evidence of abnormal acid-base metabolic status with decompensated hypercapnia with chronic NAGMA and hypochloremic hyponatremia with hyperkalemia. Unclear primary etiology. Will evaluate for underlying RTA with UA and urine studies. UA (+) UTI, will treat for uncomplicated cystitis. MSI Abdomen pending for unclear new liver lesion concerning for possible malignancy.    Dispo pending medical optimization. Pt full code. DVT ppx in place. Will continue to update family and discuss plan of care throughout hospitalization.
agree with above  MRI abd done, pending result  From a pulm standpoint, stable. slow pred taper. already has home 02  If no intervention needed for MRI result, d/c plan  If yes, can tx to Medicine for further w/u
agree with above  improved from a pulm standpoint  no hypercapnea on abg.   awaiting MRI abd - for today  d/c plan for tomorrow. d/w son at bedside - he just asks that we get what needs to be done while in the hospital because she is homebound

## 2025-03-08 DIAGNOSIS — Z29.9 ENCOUNTER FOR PROPHYLACTIC MEASURES, UNSPECIFIED: ICD-10-CM

## 2025-03-08 DIAGNOSIS — I26.99 OTHER PULMONARY EMBOLISM WITHOUT ACUTE COR PULMONALE: ICD-10-CM

## 2025-03-08 DIAGNOSIS — N39.0 URINARY TRACT INFECTION, SITE NOT SPECIFIED: ICD-10-CM

## 2025-03-08 LAB
ANION GAP SERPL CALC-SCNC: 12 MMOL/L — SIGNIFICANT CHANGE UP (ref 7–14)
BUN SERPL-MCNC: 28 MG/DL — HIGH (ref 7–23)
CALCIUM SERPL-MCNC: 8.2 MG/DL — LOW (ref 8.4–10.5)
CHLORIDE SERPL-SCNC: 97 MMOL/L — LOW (ref 98–107)
CO2 SERPL-SCNC: 22 MMOL/L — SIGNIFICANT CHANGE UP (ref 22–31)
CREAT SERPL-MCNC: 1.06 MG/DL — SIGNIFICANT CHANGE UP (ref 0.5–1.3)
EGFR: 57 ML/MIN/1.73M2 — LOW
EGFR: 57 ML/MIN/1.73M2 — LOW
GLUCOSE SERPL-MCNC: 139 MG/DL — HIGH (ref 70–99)
HCT VFR BLD CALC: 27.8 % — LOW (ref 34.5–45)
HGB BLD-MCNC: 8.6 G/DL — LOW (ref 11.5–15.5)
MAGNESIUM SERPL-MCNC: 2.2 MG/DL — SIGNIFICANT CHANGE UP (ref 1.6–2.6)
MCHC RBC-ENTMCNC: 24.4 PG — LOW (ref 27–34)
MCHC RBC-ENTMCNC: 30.9 G/DL — LOW (ref 32–36)
MCV RBC AUTO: 78.8 FL — LOW (ref 80–100)
NRBC # BLD AUTO: 0 K/UL — SIGNIFICANT CHANGE UP (ref 0–0)
NRBC # FLD: 0 K/UL — SIGNIFICANT CHANGE UP (ref 0–0)
NRBC BLD AUTO-RTO: 0 /100 WBCS — SIGNIFICANT CHANGE UP (ref 0–0)
PHOSPHATE SERPL-MCNC: 4.3 MG/DL — SIGNIFICANT CHANGE UP (ref 2.5–4.5)
PLATELET # BLD AUTO: 177 K/UL — SIGNIFICANT CHANGE UP (ref 150–400)
POTASSIUM SERPL-MCNC: 4.7 MMOL/L — SIGNIFICANT CHANGE UP (ref 3.5–5.3)
POTASSIUM SERPL-SCNC: 4.7 MMOL/L — SIGNIFICANT CHANGE UP (ref 3.5–5.3)
RBC # BLD: 3.53 M/UL — LOW (ref 3.8–5.2)
RBC # FLD: 16.1 % — HIGH (ref 10.3–14.5)
SODIUM SERPL-SCNC: 131 MMOL/L — LOW (ref 135–145)
WBC # BLD: 7.1 K/UL — SIGNIFICANT CHANGE UP (ref 3.8–10.5)
WBC # FLD AUTO: 7.1 K/UL — SIGNIFICANT CHANGE UP (ref 3.8–10.5)

## 2025-03-08 PROCEDURE — 74177 CT ABD & PELVIS W/CONTRAST: CPT | Mod: 26

## 2025-03-08 PROCEDURE — 99232 SBSQ HOSP IP/OBS MODERATE 35: CPT

## 2025-03-08 RX ADMIN — IPRATROPIUM BROMIDE AND ALBUTEROL SULFATE 3 MILLILITER(S): .5; 2.5 SOLUTION RESPIRATORY (INHALATION) at 16:50

## 2025-03-08 RX ADMIN — IPRATROPIUM BROMIDE AND ALBUTEROL SULFATE 3 MILLILITER(S): .5; 2.5 SOLUTION RESPIRATORY (INHALATION) at 05:07

## 2025-03-08 RX ADMIN — Medication 650 MILLIGRAM(S): at 05:47

## 2025-03-08 RX ADMIN — MONTELUKAST SODIUM 10 MILLIGRAM(S): 10 TABLET ORAL at 11:39

## 2025-03-08 RX ADMIN — Medication 650 MILLIGRAM(S): at 06:47

## 2025-03-08 RX ADMIN — ATORVASTATIN CALCIUM 20 MILLIGRAM(S): 80 TABLET, FILM COATED ORAL at 21:53

## 2025-03-08 RX ADMIN — ENOXAPARIN SODIUM 70 MILLIGRAM(S): 100 INJECTION SUBCUTANEOUS at 05:48

## 2025-03-08 RX ADMIN — BUDESONIDE 0.5 MILLIGRAM(S): 0.25 SUSPENSION RESPIRATORY (INHALATION) at 08:42

## 2025-03-08 RX ADMIN — Medication 1 APPLICATION(S): at 11:54

## 2025-03-08 RX ADMIN — Medication 20 MILLIGRAM(S): at 11:39

## 2025-03-08 RX ADMIN — IPRATROPIUM BROMIDE AND ALBUTEROL SULFATE 3 MILLILITER(S): .5; 2.5 SOLUTION RESPIRATORY (INHALATION) at 08:42

## 2025-03-08 RX ADMIN — PREDNISONE 30 MILLIGRAM(S): 20 TABLET ORAL at 05:47

## 2025-03-08 RX ADMIN — BUDESONIDE 0.5 MILLIGRAM(S): 0.25 SUSPENSION RESPIRATORY (INHALATION) at 21:37

## 2025-03-08 RX ADMIN — TAMSULOSIN HYDROCHLORIDE 0.4 MILLIGRAM(S): 0.4 CAPSULE ORAL at 21:53

## 2025-03-08 RX ADMIN — ENOXAPARIN SODIUM 70 MILLIGRAM(S): 100 INJECTION SUBCUTANEOUS at 17:32

## 2025-03-08 RX ADMIN — IPRATROPIUM BROMIDE AND ALBUTEROL SULFATE 3 MILLILITER(S): .5; 2.5 SOLUTION RESPIRATORY (INHALATION) at 21:37

## 2025-03-08 NOTE — PROGRESS NOTE ADULT - SUBJECTIVE AND OBJECTIVE BOX
Patient is a 69y old  Female who presents with a chief complaint of Worsening SOB and cough, +burning chest pain (07 Mar 2025 17:58)      INTERVAL HPI/OVERNIGHT EVENTS: SUMMER overnight. This morning, pt c/o SOB and lethargy.      REVIEW OF SYSTEMS:    CONSTITUTIONAL: No weakness, fevers or chills  EYES/ENT: No visual changes;  No vertigo or throat pain   NECK: No pain or stiffness  RESPIRATORY: No cough, wheezing, hemoptysis; + shortness of breath  CARDIOVASCULAR: No chest pain or palpitations  GASTROINTESTINAL: No abdominal or epigastric pain. No nausea, vomiting, or hematemesis; No diarrhea or constipation. No melena or hematochezia.  GENITOURINARY: No dysuria, frequency or hematuria  NEUROLOGICAL: No numbness or weakness  All other review of systems is negative unless indicated above.    FAMILY HISTORY:  No pertinent family history in first degree relatives      T(C): 36.7 (03-08-25 @ 09:49), Max: 37.1 (03-08-25 @ 05:38)  HR: 76 (03-08-25 @ 09:49) (62 - 76)  BP: 100/76 (03-08-25 @ 09:49) (100/76 - 128/65)  RR: 18 (03-08-25 @ 09:49) (17 - 18)  SpO2: 96% (03-08-25 @ 09:49) (96% - 100%)  Wt(kg): --Vital Signs Last 24 Hrs  T(C): 36.7 (08 Mar 2025 09:49), Max: 37.1 (08 Mar 2025 05:38)  T(F): 98 (08 Mar 2025 09:49), Max: 98.8 (08 Mar 2025 05:38)  HR: 76 (08 Mar 2025 09:49) (62 - 76)  BP: 100/76 (08 Mar 2025 09:49) (100/76 - 128/65)  BP(mean): --  RR: 18 (08 Mar 2025 09:49) (17 - 18)  SpO2: 96% (08 Mar 2025 09:49) (96% - 100%)    Parameters below as of 08 Mar 2025 09:49  Patient On (Oxygen Delivery Method): nasal cannula        PHYSICAL EXAM:  General: Awake and cooperative. Resting comfortably. No apparent respiratory distress noted.  HEENT: Normocephalic/atraumatic.   Cardiovascular: Normal rate and regular rhythm.  Respiratory: + NC. Breath sounds clear and equal bilaterally without rales, wheezing, or rhonchi. No accessory muscles used.   Abdomen: Soft, nontender, nondistended.   Skin: Warm to touch. No rashes, wounds, or skin lesions noted.   Extremities: No clubbing, cyanosis, edema, or varicose veins.   Neuro: As per son, pt axox3.     Consultant(s) Notes Reviewed:  [x ] YES  [ ] NO  Care Discussed with Consultants/Other Providers [ x] YES  [ ] NO    LABS:                        8.6    7.10  )-----------( 177      ( 08 Mar 2025 03:05 )             27.8     08 Mar 2025 03:05    131    |  97     |  28     ----------------------------<  139    4.7     |  22     |  1.06     Ca    8.2        08 Mar 2025 03:05  Phos  4.3       08 Mar 2025 03:05  Mg     2.20      08 Mar 2025 03:05        CAPILLARY BLOOD GLUCOSE        BLOOD CULTURE      RADIOLOGY & ADDITIONAL TESTS:    Imaging Personally Reviewed:  [ ] YES  [ ] NO  acetaminophen     Tablet .. 650 milliGRAM(s) Oral every 6 hours PRN  albuterol    90 MICROgram(s) HFA Inhaler 2 Puff(s) Inhalation once  albuterol    90 MICROgram(s) HFA Inhaler 2 Puff(s) Inhalation every 6 hours  albuterol/ipratropium for Nebulization 3 milliLiter(s) Nebulizer every 6 hours  aluminum hydroxide/magnesium hydroxide/simethicone Suspension 30 milliLiter(s) Oral every 4 hours PRN  amLODIPine   Tablet 10 milliGRAM(s) Oral daily  atenolol  Tablet 50 milliGRAM(s) Oral daily  atorvastatin 20 milliGRAM(s) Oral at bedtime  buDESOnide    Inhalation Suspension 0.5 milliGRAM(s) Inhalation two times a day  chlorhexidine 2% Cloths 1 Application(s) Topical daily  enoxaparin Injectable 70 milliGRAM(s) SubCutaneous <User Schedule>  famotidine    Tablet 20 milliGRAM(s) Oral daily  influenza  Vaccine (HIGH DOSE) 0.5 milliLiter(s) IntraMuscular once  meclizine 12.5 milliGRAM(s) Oral every 8 hours PRN  melatonin 3 milliGRAM(s) Oral at bedtime PRN  montelukast 10 milliGRAM(s) Oral daily  ondansetron Injectable 4 milliGRAM(s) IV Push every 8 hours PRN  predniSONE   Tablet 30 milliGRAM(s) Oral daily  tamsulosin 0.4 milliGRAM(s) Oral at bedtime      HEALTH ISSUES - PROBLEM Dx:  Acute asthma exacerbation    Hyponatremia    Paroxysmal atrial fibrillation    Essential hypertension    Liver mass    Chest pain    Acute respiratory failure with hypercapnia    History of CVA in adulthood

## 2025-03-08 NOTE — PROGRESS NOTE ADULT - PROBLEM SELECTOR PLAN 5
- New onset hyponatremia, possibly 2/2 poor oral intake x few days in setting of her worsening SOB/asthma vs medications (on sertraline, not on diuretics) vs SIADH vs other  - on fluid restriction to 1.2L for now for possible SIADH  - Trend Na.

## 2025-03-08 NOTE — PROGRESS NOTE ADULT - PROBLEM SELECTOR PLAN 3
- Known R liver mass, was recommended for outpatient follow up and MRI but son states that the family has been unable to get it done as the patient is very debilitated (needs a wheelchair to get around) and has frequent asthma exacerbations limiting her exertions  - CT here with worsening size of the mass  - MR Abd (3/7): Limited evaluation due to extensive breathing motion artifacts. A 4.7 cm complex cystic lesion in segment 7 of the liver demonstrating fluid fluid level which may be related to the mucinous/proteinaceous content of the cystic components of the lesion; limited evaluation of the lesion on the postcontrast images due to extensive breathing motion artifacts; the differential includes biliary cystadenoma; close continued follow-up recommended in approximately 3-6 months with MRI to ensure stability. Multiple smaller liver cysts are also noted. The common bile duct measures 1 cm with layering sludge at the level of ampulla.  - IR c/s for Liver biopsy (3/7) -- Aspirin DC'ed (3/7)  - CT A/P w/ IV contrast pending

## 2025-03-08 NOTE — PROGRESS NOTE ADULT - PROBLEM SELECTOR PLAN 6
- Rate control with atenolol  - Eliquis but will switch to Lovenox BID in anticipation for potential liver Bx.  - TTE (2/28/25): EF 65%. WNL. Calcification of mitral valve annulus. Trace MR. Mild TR. Mild pulmonary hypertension

## 2025-03-08 NOTE — PROGRESS NOTE ADULT - PROBLEM SELECTOR PLAN 2
c/b acute hypoxia and hypercapnia  - continue solumedrol IV  - BiPAP at night and prn   - singulair  - s/p Azithromycin x 5d.

## 2025-03-08 NOTE — PROGRESS NOTE ADULT - PROBLEM SELECTOR PLAN 4
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   - CTA (2/27/25): no main or lobar PE  - Currently on Lovenox 70 bid

## 2025-03-08 NOTE — PROGRESS NOTE ADULT - ASSESSMENT
69F Asthma/COPD, HTN, dCHF, CVA w/ Lt weakness, PE, Afib-Eliquis, liver mass p/w acute asthma exacerbation c/b Acute hypoxia and hypercapnea s/p BiPAP for respiratory support, hyponatremia, enlarging liver mass 5.9x5cm from 3.2x1.7cm, chest pain. Pending liver biopsy.

## 2025-03-09 LAB
ANION GAP SERPL CALC-SCNC: 11 MMOL/L — SIGNIFICANT CHANGE UP (ref 7–14)
BUN SERPL-MCNC: 24 MG/DL — HIGH (ref 7–23)
CALCIUM SERPL-MCNC: 8.1 MG/DL — LOW (ref 8.4–10.5)
CHLORIDE SERPL-SCNC: 98 MMOL/L — SIGNIFICANT CHANGE UP (ref 98–107)
CO2 SERPL-SCNC: 22 MMOL/L — SIGNIFICANT CHANGE UP (ref 22–31)
CREAT SERPL-MCNC: 1.09 MG/DL — SIGNIFICANT CHANGE UP (ref 0.5–1.3)
EGFR: 55 ML/MIN/1.73M2 — LOW
EGFR: 55 ML/MIN/1.73M2 — LOW
GLUCOSE SERPL-MCNC: 111 MG/DL — HIGH (ref 70–99)
HCT VFR BLD CALC: 28 % — LOW (ref 34.5–45)
HGB BLD-MCNC: 8.6 G/DL — LOW (ref 11.5–15.5)
MAGNESIUM SERPL-MCNC: 2.1 MG/DL — SIGNIFICANT CHANGE UP (ref 1.6–2.6)
MCHC RBC-ENTMCNC: 24.6 PG — LOW (ref 27–34)
MCHC RBC-ENTMCNC: 30.7 G/DL — LOW (ref 32–36)
MCV RBC AUTO: 80.2 FL — SIGNIFICANT CHANGE UP (ref 80–100)
NRBC # BLD AUTO: 0 K/UL — SIGNIFICANT CHANGE UP (ref 0–0)
NRBC # FLD: 0 K/UL — SIGNIFICANT CHANGE UP (ref 0–0)
NRBC BLD AUTO-RTO: 0 /100 WBCS — SIGNIFICANT CHANGE UP (ref 0–0)
PHOSPHATE SERPL-MCNC: 3 MG/DL — SIGNIFICANT CHANGE UP (ref 2.5–4.5)
PLATELET # BLD AUTO: 168 K/UL — SIGNIFICANT CHANGE UP (ref 150–400)
POTASSIUM SERPL-MCNC: 4.2 MMOL/L — SIGNIFICANT CHANGE UP (ref 3.5–5.3)
POTASSIUM SERPL-SCNC: 4.2 MMOL/L — SIGNIFICANT CHANGE UP (ref 3.5–5.3)
RBC # BLD: 3.49 M/UL — LOW (ref 3.8–5.2)
RBC # FLD: 16.2 % — HIGH (ref 10.3–14.5)
SODIUM SERPL-SCNC: 131 MMOL/L — LOW (ref 135–145)
WBC # BLD: 8.98 K/UL — SIGNIFICANT CHANGE UP (ref 3.8–10.5)
WBC # FLD AUTO: 8.98 K/UL — SIGNIFICANT CHANGE UP (ref 3.8–10.5)

## 2025-03-09 PROCEDURE — 99232 SBSQ HOSP IP/OBS MODERATE 35: CPT

## 2025-03-09 PROCEDURE — 71045 X-RAY EXAM CHEST 1 VIEW: CPT | Mod: 26

## 2025-03-09 RX ORDER — FUROSEMIDE 10 MG/ML
20 INJECTION INTRAMUSCULAR; INTRAVENOUS ONCE
Refills: 0 | Status: COMPLETED | OUTPATIENT
Start: 2025-03-09 | End: 2025-03-09

## 2025-03-09 RX ORDER — ASPIRIN 325 MG
81 TABLET ORAL DAILY
Refills: 0 | Status: DISCONTINUED | OUTPATIENT
Start: 2025-03-09 | End: 2025-03-11

## 2025-03-09 RX ADMIN — AMLODIPINE BESYLATE 10 MILLIGRAM(S): 10 TABLET ORAL at 06:33

## 2025-03-09 RX ADMIN — Medication 81 MILLIGRAM(S): at 11:40

## 2025-03-09 RX ADMIN — ATORVASTATIN CALCIUM 20 MILLIGRAM(S): 80 TABLET, FILM COATED ORAL at 22:25

## 2025-03-09 RX ADMIN — BUDESONIDE 0.5 MILLIGRAM(S): 0.25 SUSPENSION RESPIRATORY (INHALATION) at 22:04

## 2025-03-09 RX ADMIN — FUROSEMIDE 20 MILLIGRAM(S): 10 INJECTION INTRAMUSCULAR; INTRAVENOUS at 07:52

## 2025-03-09 RX ADMIN — IPRATROPIUM BROMIDE AND ALBUTEROL SULFATE 3 MILLILITER(S): .5; 2.5 SOLUTION RESPIRATORY (INHALATION) at 22:02

## 2025-03-09 RX ADMIN — IPRATROPIUM BROMIDE AND ALBUTEROL SULFATE 3 MILLILITER(S): .5; 2.5 SOLUTION RESPIRATORY (INHALATION) at 14:01

## 2025-03-09 RX ADMIN — Medication 650 MILLIGRAM(S): at 22:53

## 2025-03-09 RX ADMIN — IPRATROPIUM BROMIDE AND ALBUTEROL SULFATE 3 MILLILITER(S): .5; 2.5 SOLUTION RESPIRATORY (INHALATION) at 07:41

## 2025-03-09 RX ADMIN — Medication 1 APPLICATION(S): at 11:41

## 2025-03-09 RX ADMIN — TAMSULOSIN HYDROCHLORIDE 0.4 MILLIGRAM(S): 0.4 CAPSULE ORAL at 22:25

## 2025-03-09 RX ADMIN — IPRATROPIUM BROMIDE AND ALBUTEROL SULFATE 3 MILLILITER(S): .5; 2.5 SOLUTION RESPIRATORY (INHALATION) at 04:58

## 2025-03-09 RX ADMIN — ENOXAPARIN SODIUM 70 MILLIGRAM(S): 100 INJECTION SUBCUTANEOUS at 17:12

## 2025-03-09 RX ADMIN — MONTELUKAST SODIUM 10 MILLIGRAM(S): 10 TABLET ORAL at 11:40

## 2025-03-09 RX ADMIN — PREDNISONE 30 MILLIGRAM(S): 20 TABLET ORAL at 06:29

## 2025-03-09 RX ADMIN — ENOXAPARIN SODIUM 70 MILLIGRAM(S): 100 INJECTION SUBCUTANEOUS at 06:29

## 2025-03-09 RX ADMIN — Medication 650 MILLIGRAM(S): at 22:23

## 2025-03-09 RX ADMIN — LISINOPRIL 50 MILLIGRAM(S): 30 TABLET ORAL at 06:33

## 2025-03-09 RX ADMIN — Medication 20 MILLIGRAM(S): at 11:40

## 2025-03-09 RX ADMIN — BUDESONIDE 0.5 MILLIGRAM(S): 0.25 SUSPENSION RESPIRATORY (INHALATION) at 07:41

## 2025-03-09 NOTE — PROGRESS NOTE ADULT - PROBLEM SELECTOR PLAN 9
- baseline left sided weakness  - ASA held for liver biopsy  - Lipitor 20 - baseline left sided weakness  - ASA 81  - Lipitor 20

## 2025-03-09 NOTE — CHART NOTE - NSCHARTNOTEFT_GEN_A_CORE
69F PMH asthma, CHF, PE, afib on eliquis (now on therapeutic lvnx), asa, admit with ARF iso of asthma exacerbation, now improved on 2LNC. Found to have enlarging liver mass, not well characterized on MRI, IR consulted for bx.      - CTAP with nonenhancing complex cystic lesion. Recommend followup MRI in 3-6 months, if increasing can potentially plan for outpatient biopsy. If need for outpt biopsy, our booking office can be reached at [phone].     --  Laurent Murrieta MD, PGY-4  Vascular and Interventional Radiology   Available on Microsoft Teams    - Non-emergent consults: Place IR consult order in [place]  - Emergent issues (pager): [facility] [phone]; [facility] [phone]; [phone]  - Scheduling questions: [facility] [phone]; [facility] [phone]  - Clinic/outpatient booking: [facility] [phone]; [facility] [phone]

## 2025-03-09 NOTE — PROGRESS NOTE ADULT - PROBLEM SELECTOR PLAN 3
- Known R liver mass, was recommended for outpatient follow up and MRI but son states that the family has been unable to get it done as the patient is very debilitated (needs a wheelchair to get around) and has frequent asthma exacerbations limiting her exertions  - CT here with worsening size of the mass  - MR Abd (3/7): Limited evaluation due to extensive breathing motion artifacts. A 4.7 cm complex cystic lesion in segment 7 of the liver demonstrating fluid fluid level which may be related to the mucinous/proteinaceous content of the cystic components of the lesion; limited evaluation of the lesion on the postcontrast images due to extensive breathing motion artifacts; the differential includes biliary cystadenoma; close continued follow-up recommended in approximately 3-6 months with MRI to ensure stability. Multiple smaller liver cysts are also noted. The common bile duct measures 1 cm with layering sludge at the level of ampulla.  - CT A/P w/ IV contrast: Redemonstration of a nonenhancing bilobed complex cystic lesion predominantly in segment 7 of the liver.  - IR c/s for Liver biopsy placed -- Aspirin DC'ed (3/7). NPO for possible intervention Mon - Known R liver mass, was recommended for outpatient follow up and MRI but son states that the family has been unable to get it done as the patient is very debilitated (needs a wheelchair to get around) and has frequent asthma exacerbations limiting her exertions  - CT here with worsening size of the mass  - MR Abd (3/7): Limited evaluation due to extensive breathing motion artifacts. A 4.7 cm complex cystic lesion in segment 7 of the liver demonstrating fluid fluid level which may be related to the mucinous/proteinaceous content of the cystic components of the lesion; limited evaluation of the lesion on the postcontrast images due to extensive breathing motion artifacts; the differential includes biliary cystadenoma; close continued follow-up recommended in approximately 3-6 months with MRI to ensure stability. Multiple smaller liver cysts are also noted. The common bile duct measures 1 cm with layering sludge at the level of ampulla.  - CT A/P w/ IV contrast: Redemonstration of a nonenhancing bilobed complex cystic lesion predominantly in segment 7 of the liver.  - IR c/s for Liver biopsy placed -- IR deferring to outpatient, pt will instead need repeat MRI in 3-6 months

## 2025-03-09 NOTE — CHART NOTE - NSCHARTNOTEFT_GEN_A_CORE
This is a 69F with history of Asthma (on prn supp O2, no hx of intubation), HTN, HFpEF, CVA with L sided deficits (as per son), PE on Eliquis, and Liver mass who presents to the hospital with complaints of worsening SOB with cough and burning chest pain x 3-4 days. A/w AHHRF in s/o COPD/asthma exac.    Notified by RN that pt and family wanted to see the provider. Pt was seen at bedside. Pt Son reported seeing worsening painful bruising on her abdomen at a prior lovenox injection site and was concerned about a needle being stuck after the injection. Lovenox was administered on 3/8/2025 at the location and RN stated that the bruising post injection was small. RN notified that 3/9/2025 AM dose was placed on R arm and there was some bruising there as well, but not as large as the one on her abdomen. Pt denies CP, SOB, or other complaints.     History obtained by son and Irish  ([id number]).     Vital Signs Last 24 Hrs  T(C): 37.1 (03-09-25 @ 22:03), Max: 37.1 (03-09-25 @ 22:03)  HR: 80 (03-09-25 @ 22:03) (80 - 98)  BP: 122/64 (03-09-25 @ 22:03) (99/47 - 122/64)  RR: 18 (03-09-25 @ 22:03) (17 - 18)  SpO2: 96% (03-09-25 @ 22:03) (82% - 100%)    Physical exam  CONSTITUTIONAL: Well groomed, mild distress  EYES: PERRLA and symmetric, EOMI, No conjunctival or scleral injection, non-icteric  RESP: No respiratory distress, no use of accessory muscles; CTA b/l, no WRR  CV: RRR, +S1S2, no MRG; no JVD; no peripheral edema  GI: Soft, non distended, large ecchymosis in RLQ that spreads from the groin to the lateral abdomen with a small brown papule in the middle, tender to palpation at the ecchymotic area  MSK: ecchymosis on R upper arm  PSYCH: Appropriate insight/judgment; mood and affect appropriate    Assessment and Plan  Bruising on Abdomen  - US abdomen, CBC, type and screen, and coags pending.   - Administered tylenol for pain.   - Will continue to monitor.     JOSE Goodman  Department of Medicine This is a 69F with history of Asthma (on prn supp O2, no hx of intubation), HTN, HFpEF, CVA with L sided deficits (as per son), PE on Eliquis, and Liver mass who presents to the hospital with complaints of worsening SOB with cough and burning chest pain x 3-4 days. A/w AHHRF in s/o COPD/asthma exac.    Notified by RN that pt and family wanted to see the provider. Pt was seen at bedside. Pt Son reported seeing worsening painful bruising on her abdomen at a prior lovenox injection site and was concerned about a needle being stuck after the injection. Lovenox was administered on 3/8/2025 at the location and RN stated that the bruising post injection was small. RN notified that 3/9/2025 AM dose was placed on R arm and there was some bruising there as well, but not as large as the one on her abdomen. Pt denies CP, SOB, or other complaints.     History obtained by son and Maltese  ([id number]).     Vital Signs Last 24 Hrs  T(C): 37.1 (03-09-25 @ 22:03), Max: 37.1 (03-09-25 @ 22:03)  HR: 80 (03-09-25 @ 22:03) (80 - 98)  BP: 122/64 (03-09-25 @ 22:03) (99/47 - 122/64)  RR: 18 (03-09-25 @ 22:03) (17 - 18)  SpO2: 96% (03-09-25 @ 22:03) (82% - 100%)    Physical exam  CONSTITUTIONAL: Well groomed, mild distress  EYES: PERRLA and symmetric, EOMI, No conjunctival or scleral injection, non-icteric  RESP: No respiratory distress, no use of accessory muscles; CTA b/l, no WRR  CV: RRR, +S1S2, no MRG; no JVD; no peripheral edema  GI: Soft, non distended, large ecchymosis in RLQ that spreads from the groin to the lateral abdomen with a small brown papule in the middle, tender to palpation at the ecchymotic area  MSK: ecchymosis on R upper arm  PSYCH: Appropriate insight/judgment; mood and affect appropriate    Assessment and Plan  Bruising on Abdomen  - US abdomen, CBC, type and screen, and coags pending.   - Administered tylenol for pain.   - Discontinued Lovenox at this time.   - Will continue to monitor.     JOSE Goodman  Department of Medicine This is a 69F with history of Asthma (on prn supp O2, no hx of intubation), HTN, HFpEF, CVA with L sided deficits (as per son), PE on Eliquis, and Liver mass who presents to the hospital with complaints of worsening SOB with cough and burning chest pain x 3-4 days. A/w AHHRF in s/o COPD/asthma exac.    Notified by RN that pt and family wanted to see the provider. Pt was seen at bedside. Pt son reported seeing worsening painful bruising on her abdomen at a prior lovenox injection site and was concerned about a needle being stuck after the injection. Lovenox was administered on 3/8/2025 at the location and RN stated that the bruising post injection was small. RN notified that 3/9/2025 AM dose was placed on R arm and there was some bruising there as well, but not as large as the one on her abdomen. Pt denies CP, SOB, or other complaints.     History obtained by son and Yoruba  ([id number]).     Vital Signs Last 24 Hrs  T(C): 37.1 (03-09-25 @ 22:03), Max: 37.1 (03-09-25 @ 22:03)  HR: 80 (03-09-25 @ 22:03) (80 - 98)  BP: 122/64 (03-09-25 @ 22:03) (99/47 - 122/64)  RR: 18 (03-09-25 @ 22:03) (17 - 18)  SpO2: 96% (03-09-25 @ 22:03) (82% - 100%)    Physical exam  CONSTITUTIONAL: Well groomed, mild distress  EYES: PERRLA and symmetric, EOMI, No conjunctival or scleral injection, non-icteric  RESP: No respiratory distress, no use of accessory muscles; CTA b/l, no WRR  CV: RRR, +S1S2, no MRG; no JVD; no peripheral edema  GI: Soft, non distended, large ecchymosis in RLQ that spreads from the groin to the lateral abdomen with a small brown papule in the middle, tender to palpation at the ecchymotic area  MSK: nontender ecchymosis on R upper arm  PSYCH: Appropriate insight/judgment; mood and affect appropriate    Assessment and Plan  Bruising on Abdomen  - US abdomen, CBC, type and screen, and coags pending.   - Administered tylenol for pain.   - Discontinued Lovenox at this time.   - Will continue to monitor.     JOSE Goodman  Department of Medicine

## 2025-03-09 NOTE — PROGRESS NOTE ADULT - SUBJECTIVE AND OBJECTIVE BOX
Mak Miranda  30 y.o.  male  Chief Complaint   Patient presents with   • T-5000 Lacerations     Present to triage right facial laceration s/p tripped on his dog and hit corner of table. Bleeding noted. Controlled. Denies loc.   Patient is a 69y old  Female who presents with a chief complaint of Worsening SOB and cough, +burning chest pain (08 Mar 2025 11:17)      INTERVAL HPI/OVERNIGHT EVENTS: SUMMER overnight. This morning, c/o SOB and lethargy.      REVIEW OF SYSTEMS:    CONSTITUTIONAL: No weakness, fevers or chills  EYES/ENT: No visual changes;  No vertigo or throat pain   NECK: No pain or stiffness  RESPIRATORY: No cough, wheezing, hemoptysis; + shortness of breath  CARDIOVASCULAR: No chest pain or palpitations  GASTROINTESTINAL: No abdominal or epigastric pain. No nausea, vomiting, or hematemesis; No diarrhea or constipation. No melena or hematochezia.  GENITOURINARY: No dysuria, frequency or hematuria  NEUROLOGICAL: No numbness or weakness  All other review of systems is negative unless indicated above.    FAMILY HISTORY:  No pertinent family history in first degree relatives      T(C): 36.7 (03-08-25 @ 21:43), Max: 36.9 (03-08-25 @ 14:04)  HR: 87 (03-08-25 @ 21:43) (72 - 87)  BP: 125/58 (03-08-25 @ 21:43) (100/76 - 125/58)  RR: 18 (03-08-25 @ 21:43) (18 - 18)  SpO2: 100% (03-08-25 @ 21:43) (96% - 100%)  Wt(kg): --Vital Signs Last 24 Hrs  T(C): 36.7 (08 Mar 2025 21:43), Max: 36.9 (08 Mar 2025 14:04)  T(F): 98 (08 Mar 2025 21:43), Max: 98.5 (08 Mar 2025 14:04)  HR: 87 (08 Mar 2025 21:43) (72 - 87)  BP: 125/58 (08 Mar 2025 21:43) (100/76 - 125/58)  BP(mean): --  RR: 18 (08 Mar 2025 21:43) (18 - 18)  SpO2: 100% (08 Mar 2025 21:43) (96% - 100%)    Parameters below as of 08 Mar 2025 18:19  Patient On (Oxygen Delivery Method): nasal cannula  O2 Flow (L/min): 2      PHYSICAL EXAM:  General: Awake and cooperative. Appears uncomfortable.  HEENT: Normocephalic/atraumatic.   Cardiovascular: Normal rate and regular rhythm.  Respiratory: + NC. Breath sounds clear and equal bilaterally without rales, wheezing, or rhonchi. No accessory muscles used.   Abdomen: Soft, nontender, nondistended.   Skin: Warm to touch. No rashes, wounds, or skin lesions noted.   Extremities: No clubbing, cyanosis, edema, or varicose veins.   Neuro: As per son, pt axox3.     Consultant(s) Notes Reviewed:  [x ] YES  [ ] NO  Care Discussed with Consultants/Other Providers [ x] YES  [ ] NO    LABS:                        8.6    8.98  )-----------( 168      ( 09 Mar 2025 03:30 )             28.0     09 Mar 2025 03:30    131    |  98     |  24     ----------------------------<  111    4.2     |  22     |  1.09     Ca    8.1        09 Mar 2025 03:30  Phos  3.0       09 Mar 2025 03:30  Mg     2.10      09 Mar 2025 03:30        CAPILLARY BLOOD GLUCOSE        BLOOD CULTURE      RADIOLOGY & ADDITIONAL TESTS:    Imaging Personally Reviewed:  [ ] YES  [ ] NO  acetaminophen     Tablet .. 650 milliGRAM(s) Oral every 6 hours PRN  albuterol    90 MICROgram(s) HFA Inhaler 2 Puff(s) Inhalation once  albuterol    90 MICROgram(s) HFA Inhaler 2 Puff(s) Inhalation every 6 hours  albuterol/ipratropium for Nebulization 3 milliLiter(s) Nebulizer every 6 hours  aluminum hydroxide/magnesium hydroxide/simethicone Suspension 30 milliLiter(s) Oral every 4 hours PRN  amLODIPine   Tablet 10 milliGRAM(s) Oral daily  atenolol  Tablet 50 milliGRAM(s) Oral daily  atorvastatin 20 milliGRAM(s) Oral at bedtime  buDESOnide    Inhalation Suspension 0.5 milliGRAM(s) Inhalation two times a day  chlorhexidine 2% Cloths 1 Application(s) Topical daily  enoxaparin Injectable 70 milliGRAM(s) SubCutaneous <User Schedule>  famotidine    Tablet 20 milliGRAM(s) Oral daily  influenza  Vaccine (HIGH DOSE) 0.5 milliLiter(s) IntraMuscular once  meclizine 12.5 milliGRAM(s) Oral every 8 hours PRN  melatonin 3 milliGRAM(s) Oral at bedtime PRN  montelukast 10 milliGRAM(s) Oral daily  ondansetron Injectable 4 milliGRAM(s) IV Push every 8 hours PRN  predniSONE   Tablet 30 milliGRAM(s) Oral daily  tamsulosin 0.4 milliGRAM(s) Oral at bedtime      HEALTH ISSUES - PROBLEM Dx:  Acute respiratory failure with hypercapnia    Acute asthma exacerbation    Liver mass    Pulmonary embolism    Hyponatremia    Paroxysmal atrial fibrillation    Acute UTI    Essential hypertension    Chest pain    History of CVA in adulthood    Prophylactic measure

## 2025-03-10 ENCOUNTER — TRANSCRIPTION ENCOUNTER (OUTPATIENT)
Age: 70
End: 2025-03-10

## 2025-03-10 DIAGNOSIS — T14.8XXA OTHER INJURY OF UNSPECIFIED BODY REGION, INITIAL ENCOUNTER: ICD-10-CM

## 2025-03-10 LAB
ANION GAP SERPL CALC-SCNC: 10 MMOL/L — SIGNIFICANT CHANGE UP (ref 7–14)
APTT BLD: 37.6 SEC — HIGH (ref 24.5–35.6)
BLD GP AB SCN SERPL QL: NEGATIVE — SIGNIFICANT CHANGE UP
BUN SERPL-MCNC: 37 MG/DL — HIGH (ref 7–23)
CALCIUM SERPL-MCNC: 8.3 MG/DL — LOW (ref 8.4–10.5)
CHLORIDE SERPL-SCNC: 97 MMOL/L — LOW (ref 98–107)
CO2 SERPL-SCNC: 23 MMOL/L — SIGNIFICANT CHANGE UP (ref 22–31)
CREAT ?TM UR-MCNC: 79 MG/DL — SIGNIFICANT CHANGE UP
CREAT SERPL-MCNC: 1.33 MG/DL — HIGH (ref 0.5–1.3)
EGFR: 43 ML/MIN/1.73M2 — LOW
EGFR: 43 ML/MIN/1.73M2 — LOW
GLUCOSE SERPL-MCNC: 96 MG/DL — SIGNIFICANT CHANGE UP (ref 70–99)
HCT VFR BLD CALC: 28.5 % — LOW (ref 34.5–45)
HCT VFR BLD CALC: 30.6 % — LOW (ref 34.5–45)
HGB BLD-MCNC: 8.7 G/DL — LOW (ref 11.5–15.5)
HGB BLD-MCNC: 9.2 G/DL — LOW (ref 11.5–15.5)
INR BLD: 0.98 RATIO — SIGNIFICANT CHANGE UP (ref 0.85–1.16)
MAGNESIUM SERPL-MCNC: 2.1 MG/DL — SIGNIFICANT CHANGE UP (ref 1.6–2.6)
MCHC RBC-ENTMCNC: 24.3 PG — LOW (ref 27–34)
MCHC RBC-ENTMCNC: 24.6 PG — LOW (ref 27–34)
MCHC RBC-ENTMCNC: 30.1 G/DL — LOW (ref 32–36)
MCHC RBC-ENTMCNC: 30.5 G/DL — LOW (ref 32–36)
MCV RBC AUTO: 80.7 FL — SIGNIFICANT CHANGE UP (ref 80–100)
MCV RBC AUTO: 81 FL — SIGNIFICANT CHANGE UP (ref 80–100)
NRBC # BLD AUTO: 0 K/UL — SIGNIFICANT CHANGE UP (ref 0–0)
NRBC # BLD AUTO: 0 K/UL — SIGNIFICANT CHANGE UP (ref 0–0)
NRBC # FLD: 0 K/UL — SIGNIFICANT CHANGE UP (ref 0–0)
NRBC # FLD: 0 K/UL — SIGNIFICANT CHANGE UP (ref 0–0)
NRBC BLD AUTO-RTO: 0 /100 WBCS — SIGNIFICANT CHANGE UP (ref 0–0)
NRBC BLD AUTO-RTO: 0 /100 WBCS — SIGNIFICANT CHANGE UP (ref 0–0)
OSMOLALITY UR: 447 MOSM/KG — SIGNIFICANT CHANGE UP (ref 50–1200)
PHOSPHATE SERPL-MCNC: 4.4 MG/DL — SIGNIFICANT CHANGE UP (ref 2.5–4.5)
PLATELET # BLD AUTO: 194 K/UL — SIGNIFICANT CHANGE UP (ref 150–400)
PLATELET # BLD AUTO: 206 K/UL — SIGNIFICANT CHANGE UP (ref 150–400)
POTASSIUM SERPL-MCNC: 4.6 MMOL/L — SIGNIFICANT CHANGE UP (ref 3.5–5.3)
POTASSIUM SERPL-SCNC: 4.6 MMOL/L — SIGNIFICANT CHANGE UP (ref 3.5–5.3)
PROTHROM AB SERPL-ACNC: 11.7 SEC — SIGNIFICANT CHANGE UP (ref 9.9–13.4)
RBC # BLD: 3.53 M/UL — LOW (ref 3.8–5.2)
RBC # BLD: 3.78 M/UL — LOW (ref 3.8–5.2)
RBC # FLD: 16.6 % — HIGH (ref 10.3–14.5)
RBC # FLD: 16.7 % — HIGH (ref 10.3–14.5)
RH IG SCN BLD-IMP: POSITIVE — SIGNIFICANT CHANGE UP
SODIUM SERPL-SCNC: 130 MMOL/L — LOW (ref 135–145)
SODIUM UR-SCNC: 31 MMOL/L — SIGNIFICANT CHANGE UP
UUN UR-MCNC: 809 MG/DL — SIGNIFICANT CHANGE UP
WBC # BLD: 11.42 K/UL — HIGH (ref 3.8–10.5)
WBC # BLD: 14.41 K/UL — HIGH (ref 3.8–10.5)
WBC # FLD AUTO: 11.42 K/UL — HIGH (ref 3.8–10.5)
WBC # FLD AUTO: 14.41 K/UL — HIGH (ref 3.8–10.5)

## 2025-03-10 PROCEDURE — 99233 SBSQ HOSP IP/OBS HIGH 50: CPT

## 2025-03-10 RX ORDER — SODIUM CHLORIDE 9 G/1000ML
1000 INJECTION, SOLUTION INTRAVENOUS
Refills: 0 | Status: DISCONTINUED | OUTPATIENT
Start: 2025-03-10 | End: 2025-03-11

## 2025-03-10 RX ADMIN — SODIUM CHLORIDE 75 MILLILITER(S): 9 INJECTION, SOLUTION INTRAVENOUS at 21:24

## 2025-03-10 RX ADMIN — IPRATROPIUM BROMIDE AND ALBUTEROL SULFATE 3 MILLILITER(S): .5; 2.5 SOLUTION RESPIRATORY (INHALATION) at 20:52

## 2025-03-10 RX ADMIN — Medication 650 MILLIGRAM(S): at 21:24

## 2025-03-10 RX ADMIN — TAMSULOSIN HYDROCHLORIDE 0.4 MILLIGRAM(S): 0.4 CAPSULE ORAL at 21:25

## 2025-03-10 RX ADMIN — LISINOPRIL 50 MILLIGRAM(S): 30 TABLET ORAL at 06:33

## 2025-03-10 RX ADMIN — IPRATROPIUM BROMIDE AND ALBUTEROL SULFATE 3 MILLILITER(S): .5; 2.5 SOLUTION RESPIRATORY (INHALATION) at 16:12

## 2025-03-10 RX ADMIN — SODIUM CHLORIDE 75 MILLILITER(S): 9 INJECTION, SOLUTION INTRAVENOUS at 15:52

## 2025-03-10 RX ADMIN — IPRATROPIUM BROMIDE AND ALBUTEROL SULFATE 3 MILLILITER(S): .5; 2.5 SOLUTION RESPIRATORY (INHALATION) at 09:07

## 2025-03-10 RX ADMIN — BUDESONIDE 0.5 MILLIGRAM(S): 0.25 SUSPENSION RESPIRATORY (INHALATION) at 09:07

## 2025-03-10 RX ADMIN — AMLODIPINE BESYLATE 10 MILLIGRAM(S): 10 TABLET ORAL at 06:34

## 2025-03-10 RX ADMIN — PREDNISONE 30 MILLIGRAM(S): 20 TABLET ORAL at 06:31

## 2025-03-10 RX ADMIN — MONTELUKAST SODIUM 10 MILLIGRAM(S): 10 TABLET ORAL at 12:06

## 2025-03-10 RX ADMIN — ATORVASTATIN CALCIUM 20 MILLIGRAM(S): 80 TABLET, FILM COATED ORAL at 21:25

## 2025-03-10 RX ADMIN — Medication 20 MILLIGRAM(S): at 12:06

## 2025-03-10 RX ADMIN — Medication 81 MILLIGRAM(S): at 12:06

## 2025-03-10 RX ADMIN — Medication 1 GRAM(S): at 13:14

## 2025-03-10 RX ADMIN — Medication 1 APPLICATION(S): at 12:06

## 2025-03-10 RX ADMIN — Medication 650 MILLIGRAM(S): at 22:13

## 2025-03-10 RX ADMIN — IPRATROPIUM BROMIDE AND ALBUTEROL SULFATE 3 MILLILITER(S): .5; 2.5 SOLUTION RESPIRATORY (INHALATION) at 04:01

## 2025-03-10 RX ADMIN — BUDESONIDE 0.5 MILLIGRAM(S): 0.25 SUSPENSION RESPIRATORY (INHALATION) at 20:52

## 2025-03-10 NOTE — PROGRESS NOTE ADULT - PROBLEM SELECTOR PLAN 7
- UCx (3/2) grew pansensitive E Coli s/p CTX (3/2-3/4)  - MRSA/MSSA neg  - RVP (2/27/25) neg - Rate control with atenolol  - Eliquis but will switch to Lovenox BID in anticipation for potential liver Bx.  - TTE (2/28/25): EF 65%. WNL. Calcification of mitral valve annulus. Trace MR. Mild TR. Mild pulmonary hypertension

## 2025-03-10 NOTE — PROGRESS NOTE ADULT - PROBLEM SELECTOR PLAN 8
continue Norvasc 10, atenolol 50 - UCx (3/2) grew pansensitive E Coli s/p CTX (3/2-3/4)  - MRSA/MSSA neg  - RVP (2/27/25) neg

## 2025-03-10 NOTE — PROGRESS NOTE ADULT - PROBLEM SELECTOR PLAN 5
- New onset hyponatremia, possibly 2/2 poor oral intake x few days in setting of her worsening SOB/asthma vs medications (on sertraline, not on diuretics) vs SIADH vs other  - on fluid restriction to 1.2L for now for possible SIADH  - Trend Na. 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   - CTA (2/27/25): no main or lobar PE  - Currently on Lovenox 70 bid - held due to abdominal wall hematoma.   - will restart on Eliquis when safe to resume AC.

## 2025-03-10 NOTE — PROGRESS NOTE ADULT - PROBLEM SELECTOR PLAN 10
- Diet: DASH  - DVT ppx: SCD. AC on hold due to hematoma.   - Dispo: PT rec [facility], but family wishes to bring pt home. CM f/u.    discussed with son on 3/10, they had poor experience at [facility] last year, wishes to bring patient home instead. - baseline left sided weakness  - ASA 81  - Lipitor 20

## 2025-03-10 NOTE — DISCHARGE NOTE PROVIDER - NSDCFUADDAPPT_GEN_ALL_CORE_FT
APPTS ARE READY TO BE MADE: [X] YES    Best Family or Patient Contact (if needed):    Additional Information about above appointments (if needed):    1: HOME  2:   3:     Other comments or requests:    APPTS ARE READY TO BE MADE: [x] YES    Best Family or Patient Contact (if needed):    Additional Information about above appointments (if needed):    1: HOME  2:   3:     Other comments or requests:    APPTS ARE READY TO BE MADE: [x] YES    Best Family or Patient Contact (if needed):    Additional Information about above appointments (if needed):    1: HOME  2:   3:     Other comments or requests:   Patient was provided with referral details by phone or email for a [facility] provider and will coordinate the appointment on their own. Spoke with patients son, offered appt this afternoon 4/2 with pulmonologist Dr. Peter Le, son declined. He preferred to speak with office and set up appt himself. Provided him Dr. Le's office number [phone]    Provided patient with provider referral information, however patient prefers to schedule the appointments on their own. Spoke with patients son, he will set up appt with PCP.

## 2025-03-10 NOTE — PROGRESS NOTE ADULT - PROBLEM SELECTOR PLAN 2
c/b acute hypoxia and hypercapnia  - continue solumedrol IV  - BiPAP at night and prn   - singulair  - s/p Azithromycin x 5d. large area of hard hematoma over R lower abdomen, c/w prior Lovenox injection.   US abd wall ordered to evaluate hematoma and rule-out active extravasation.   area marked with markers.   ctm clinically.

## 2025-03-10 NOTE — DISCHARGE NOTE PROVIDER - HOSPITAL COURSE
HPI:  This is a 69F with history of Asthma (on prn supp O2, no hx of intubation), HTN, HFpEF, CVA with L sided deficits (as per son), PE, AFib on Eliquis, and Liver mass who presents to the hospital with complaints of worsening SOB with cough and burning chest pain x 3-4 days. Patient and her son state that she has had significant issues with her asthma and is never fully controlled. Over the past few days her symptoms have worsened and she has been using her inhalers and nebulizers without significant improvement in her symptoms. Son states patient has not really slept in 2 days due to persistent coughing. Was therefore brought to the ED. Patient currently reports having subjective fevers/chills, cough with white colored sputum (denies hemoptysis), sore throat, and intermittent chest/abd burning style pain (none currently). Denies other acute complaints.     On arrival to the ED her vitals were T 97.1, P 92, /75, RR 32, O2 sat 96% RA. Her lab work showed microcytic anemia (chronic), new hyponatremia, negative troponin x2, negative fluvid. Her imaging showed clear lungs, no PE (limited evaluation of the segmental/subsegmental branches), and an enlarging R liver mass. She was given duonebs x3, solumedrol 60 x1, and NS 1L. She was admitted to medicine.  (27 Feb 2025 23:32)    Hospital Course:  Patient was treated for acute on chronic hypercarbic respiratory failure. She was started on IV solumedrol then transition to prednisone taper as she clinically improved. Her repeat ABG showing well compensated hypercarbia and no longer require NIV at home. She is currently at 2L NC, stable home req. CT showed enlarged liver cyst, evalauted by IR and recommended follow up imaging with MR in 3-6 months. She is medically stable for d/c. PT recommended [facility], but family wishes to bring patient home.     Important Medication Changes and Reason:      Active or Pending Issues Requiring Follow-up:  Pulmonary.     Advanced Directives:   [x] Full code  [ ] DNR  [ ] Hospice    Discharge Diagnoses:  acute on chronic hypercarbic respiratory failure  COPD/Asthma exacerbation.   Liver Cyst.    HPI:  This is a 69F with history of Asthma (on prn supp O2, no hx of intubation), HTN, HFpEF, CVA with L sided deficits (as per son), PE, AFib on Eliquis, and Liver mass who presents to the hospital with complaints of worsening SOB with cough and burning chest pain x 3-4 days. Patient and her son state that she has had significant issues with her asthma and is never fully controlled. Over the past few days her symptoms have worsened and she has been using her inhalers and nebulizers without significant improvement in her symptoms. Son states patient has not really slept in 2 days due to persistent coughing. Was therefore brought to the ED. Patient currently reports having subjective fevers/chills, cough with white colored sputum (denies hemoptysis), sore throat, and intermittent chest/abd burning style pain (none currently). Denies other acute complaints.     On arrival to the ED her vitals were T 97.1, P 92, /75, RR 32, O2 sat 96% RA. Her lab work showed microcytic anemia (chronic), new hyponatremia, negative troponin x2, negative fluvid. Her imaging showed clear lungs, no PE (limited evaluation of the segmental/subsegmental branches), and an enlarging R liver mass. She was given duonebs x3, solumedrol 60 x1, and NS 1L. She was admitted to medicine.  (27 Feb 2025 23:32)    Hospital Course:  Patient was treated for acute on chronic hypercarbic respiratory failure. She was started on IV solumedrol then transition to prednisone taper as she clinically improved. Her repeat ABG showing well compensated hypercarbia and no longer require NIV at home. She is currently at 2L NC, stable home req. CT showed enlarged liver cyst, evaluated by IR and recommended follow up imaging with MR in 3-6 months. She is medically stable for d/c. PT recommended [facility], but family wishes to bring patient home. She was noted to have drop in h/h. found to have a large SQ hematoma of right lower abdomen. a CT scan was performed and no signs of active extravasation or internal bleeding. her h/h has been stable since. she is now medically stable for d/c home.     Important Medication Changes and Reason:      Active or Pending Issues Requiring Follow-up:  Pulmonary.     Advanced Directives:   [x] Full code  [ ] DNR  [ ] Hospice    Discharge Diagnoses:  acute on chronic hypercarbic respiratory failure  COPD/Asthma exacerbation.   Liver Cyst.

## 2025-03-10 NOTE — PROGRESS NOTE ADULT - PROBLEM SELECTOR PLAN 6
- Rate control with atenolol  - Eliquis but will switch to Lovenox BID in anticipation for potential liver Bx.  - TTE (2/28/25): EF 65%. WNL. Calcification of mitral valve annulus. Trace MR. Mild TR. Mild pulmonary hypertension - New onset hyponatremia, possibly 2/2 poor oral intake x few days in setting of her worsening SOB/asthma vs medications (on sertraline, not on diuretics) vs SIADH vs other  - on fluid restriction to 1.2L for now for possible SIADH  - Trend Na.

## 2025-03-10 NOTE — DISCHARGE NOTE PROVIDER - NSDCMRMEDTOKEN_GEN_ALL_CORE_FT
Albuterol (Eqv-ProAir HFA) 90 mcg/inh inhalation aerosol: 1 puff(s) inhaled every 6 hours as needed for  shortness of breath and/or wheezing  amLODIPine 10 mg oral tablet: 1 tab(s) orally once a day  aspirin 81 mg oral tablet: 1 tab(s) orally once a day  atenolol 50 mg oral tablet: 1 tab(s) orally once a day  atorvastatin 20 mg oral tablet: 1 tab(s) orally once a day (at bedtime)  calcium (as carbonate)-vitamin D 600 mg-400 intl units (10 mcg) oral tablet: 1 tab(s) orally 2 times a day  cholecalciferol 25 mcg (1000 intl units) oral capsule: 1 cap(s) orally once a day  Eliquis 5 mg oral tablet: 1 tab(s) orally 2 times a day  guaiFENesin 600 mg oral tablet, extended release: 1 tab(s) orally every 12 hours  ipratropium-albuterol 0.5 mg-2.5 mg/3 mL inhalation solution: 3 milliliter(s) by nebulizer every 4 hours as needed for  shortness of breath and/or wheezing  montelukast 10 mg oral tablet: 1 tab(s) orally once a day  Multiple Vitamins oral tablet: 1 tab(s) orally once a day  Pepcid 20 mg oral tablet: 1 tab(s) orally once a day  sertraline 25 mg oral tablet: 1 tab(s) orally once a day  tamsulosin 0.4 mg oral capsule: 1 cap(s) orally once a day (at bedtime)   Advair Diskus 250 mcg-50 mcg/inh inhalation powder: 1 inhaler(s) inhaled 2 times a day  Albuterol (Eqv-ProAir HFA) 90 mcg/inh inhalation aerosol: 1 puff(s) inhaled every 6 hours as needed for  shortness of breath and/or wheezing  amLODIPine 10 mg oral tablet: 1 tab(s) orally once a day  atenolol 50 mg oral tablet: 1 tab(s) orally once a day  atorvastatin 20 mg oral tablet: 1 tab(s) orally once a day (at bedtime)  ipratropium-albuterol 0.5 mg-2.5 mg/3 mL inhalation solution: 3 milliliter(s) inhaled every 6 hours as needed for Shortness of Breath and/or Wheezing  meclizine 12.5 mg oral tablet: 1 tab(s) orally every 8 hours as needed for Dizziness  montelukast 10 mg oral tablet: 1 tab(s) orally once a day  Multiple Vitamins oral tablet: 1 tab(s) orally once a day  Nebulizer: please use with appropriate medications  Pepcid 20 mg oral tablet: 1 tab(s) orally once a day  predniSONE 10 mg oral tablet: 1 tab(s) orally once a day Please take this medication from 3/14/25 to 3/16/25  predniSONE 20 mg oral tablet: 1 tab(s) orally once a day last dose to be taken on 3/13  predniSONE 5 mg oral tablet: 1 tab(s) orally once a day Please take this medication from 3/17/25 to 3/19/25  sodium chloride 1 g oral tablet: 1 tab(s) orally once a day  tamsulosin 0.4 mg oral capsule: 1 cap(s) orally once a day (at bedtime)  tiotropium 2.5 mcg/inh inhalation aerosol: 2 puff(s) inhaled once a day   Advair Diskus 250 mcg-50 mcg/inh inhalation powder: 1 inhaler(s) inhaled 2 times a day  Albuterol (Eqv-ProAir HFA) 90 mcg/inh inhalation aerosol: 1 puff(s) inhaled every 6 hours as needed for  shortness of breath and/or wheezing  amLODIPine 10 mg oral tablet: 1 tab(s) orally once a day  apixaban 5 mg oral tablet: 1 tab(s) orally 2 times a day  atenolol 50 mg oral tablet: 1 tab(s) orally once a day  atorvastatin 20 mg oral tablet: 1 tab(s) orally once a day (at bedtime)  ipratropium-albuterol 0.5 mg-2.5 mg/3 mL inhalation solution: 3 milliliter(s) inhaled every 6 hours as needed for Shortness of Breath and/or Wheezing  meclizine 12.5 mg oral tablet: 1 tab(s) orally every 8 hours as needed for Dizziness  montelukast 10 mg oral tablet: 1 tab(s) orally once a day  Multiple Vitamins oral tablet: 1 tab(s) orally once a day  Nebulizer: please use with appropriate medications  Pepcid 20 mg oral tablet: 1 tab(s) orally once a day  predniSONE 10 mg oral tablet: 1 tab(s) orally once a day Please take this medication from 3/14/25 to 3/16/25  predniSONE 20 mg oral tablet: 1 tab(s) orally once a day last dose to be taken on 3/13  predniSONE 5 mg oral tablet: 1 tab(s) orally once a day Please take this medication from 3/17/25 to 3/19/25  sodium chloride 1 g oral tablet: 1 tab(s) orally once a day  tamsulosin 0.4 mg oral capsule: 1 cap(s) orally once a day (at bedtime)  tiotropium 2.5 mcg/inh inhalation aerosol: 2 puff(s) inhaled once a day

## 2025-03-10 NOTE — DISCHARGE NOTE PROVIDER - CARE PROVIDERS DIRECT ADDRESSES
[email or web address]

## 2025-03-10 NOTE — PROGRESS NOTE ADULT - SUBJECTIVE AND OBJECTIVE BOX
[facility] Division of Hospital Medicine  Murtaza Ellis MD  In House Pager [phone]    Patient is a 69y old  Female who presents with a chief complaint of Worsening SOB and cough, +burning chest pain (09 Mar 2025 06:57)    OVERNIGHT EVENTS: no acute events.   SUBJECTIVE: complaining of pain in her R lower abdomen, due to hematoma.   ROS: Denied Fever, Chill, CP, SOB, N/V/D, LE swelling or pain.     MEDICATIONS  (STANDING):  albuterol    90 MICROgram(s) HFA Inhaler 2 Puff(s) Inhalation once  albuterol    90 MICROgram(s) HFA Inhaler 2 Puff(s) Inhalation every 6 hours  albuterol/ipratropium for Nebulization 3 milliLiter(s) Nebulizer every 6 hours  amLODIPine   Tablet 10 milliGRAM(s) Oral daily  aspirin enteric coated 81 milliGRAM(s) Oral daily  atenolol  Tablet 50 milliGRAM(s) Oral daily  atorvastatin 20 milliGRAM(s) Oral at bedtime  buDESOnide    Inhalation Suspension 0.5 milliGRAM(s) Inhalation two times a day  chlorhexidine 2% Cloths 1 Application(s) Topical daily  famotidine    Tablet 20 milliGRAM(s) Oral daily  influenza  Vaccine (HIGH DOSE) 0.5 milliLiter(s) IntraMuscular once  lactated ringers. 1000 milliLiter(s) (75 mL/Hr) IV Continuous <Continuous>  montelukast 10 milliGRAM(s) Oral daily  sodium chloride 1 Gram(s) Oral daily  tamsulosin 0.4 milliGRAM(s) Oral at bedtime    MEDICATIONS  (PRN):  acetaminophen     Tablet .. 650 milliGRAM(s) Oral every 6 hours PRN Temp greater or equal to 38C (100.4F), Mild Pain (1 - 3)  aluminum hydroxide/magnesium hydroxide/simethicone Suspension 30 milliLiter(s) Oral every 4 hours PRN Dyspepsia  meclizine 12.5 milliGRAM(s) Oral every 8 hours PRN Dizziness  melatonin 3 milliGRAM(s) Oral at bedtime PRN Insomnia  ondansetron Injectable 4 milliGRAM(s) IV Push every 8 hours PRN Nausea and/or Vomiting    CAPILLARY BLOOD GLUCOSE        I&O's Summary    09 Mar 2025 07:01  -  10 Mar 2025 07:00  --------------------------------------------------------  IN: 550 mL / OUT: 1300 mL / NET: -750 mL    10 Mar 2025 07:01  -  10 Mar 2025 15:29  --------------------------------------------------------  IN: 400 mL / OUT: 600 mL / NET: -200 mL        Vital Signs Last 24 Hrs  T(C): 36.7 (10 Mar 2025 14:38), Max: 37.1 (09 Mar 2025 22:03)  T(F): 98 (10 Mar 2025 14:38), Max: 98.8 (09 Mar 2025 22:03)  HR: 83 (10 Mar 2025 14:38) (71 - 94)  BP: 119/61 (10 Mar 2025 14:38) (116/67 - 122/64)  BP(mean): --  RR: 17 (10 Mar 2025 14:38) (17 - 18)  SpO2: 97% (10 Mar 2025 14:38) (94% - 98%)    Parameters below as of 10 Mar 2025 14:38  Patient On (Oxygen Delivery Method): nasal cannula  O2 Flow (L/min): 2      LABS:                        8.7    14.41 )-----------( 194      ( 10 Mar 2025 03:38 )             28.5     03-10    130[L]  |  97[L]  |  37[H]  ----------------------------<  96  4.6   |  23  |  1.33[H]    Ca    8.3[L]      10 Mar 2025 03:38  Phos  4.4     03-10  Mg     2.10     03-10      PT/INR - ( 09 Mar 2025 23:50 )   PT: 11.7 sec;   INR: 0.98 ratio         PTT - ( 09 Mar 2025 23:50 )  PTT:37.6 sec      Urinalysis Basic - ( 10 Mar 2025 03:38 )    Color: x / Appearance: x / SG: x / pH: x  Gluc: 96 mg/dL / Ketone: x  / Bili: x / Urobili: x   Blood: x / Protein: x / Nitrite: x   Leuk Esterase: x / RBC: x / WBC x   Sq Epi: x / Non Sq Epi: x / Bacteria: x        RADIOLOGY & ADDITIONAL TESTS:  Results Reviewed: Y  Imaging Personally Reviewed: Y  Electrocardiogram Personally Reviewed: Y    COORDINATION OF CARE:  Care Discussed with Consultants/Other Providers [Y/N]: Y  Prior or Outpatient Records Reviewed [Y/N]: Y

## 2025-03-10 NOTE — PROGRESS NOTE ADULT - PROBLEM SELECTOR PLAN 4
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   - CTA (2/27/25): no main or lobar PE  - Currently on Lovenox 70 bid - held due to abdominal wall hematoma.   - will restart on Eliquis when safe to resume AC. - Known R liver mass, was recommended for outpatient follow up and MRI but son states that the family has been unable to get it done as the patient is very debilitated (needs a wheelchair to get around) and has frequent asthma exacerbations limiting her exertions  - CT here with worsening size of the mass  - MR Abd (3/7): Limited evaluation due to extensive breathing motion artifacts. A 4.7 cm complex cystic lesion in segment 7 of the liver demonstrating fluid fluid level which may be related to the mucinous/proteinaceous content of the cystic components of the lesion; limited evaluation of the lesion on the postcontrast images due to extensive breathing motion artifacts; the differential includes biliary cystadenoma; close continued follow-up recommended in approximately 3-6 months with MRI to ensure stability. Multiple smaller liver cysts are also noted. The common bile duct measures 1 cm with layering sludge at the level of ampulla.  - CT A/P w/ IV contrast: Redemonstration of a nonenhancing bilobed complex cystic lesion predominantly in segment 7 of the liver.  - IR c/s for Liver biopsy placed -- IR deferring to outpatient, pt will instead need repeat MRI in 3-6 months

## 2025-03-10 NOTE — PROGRESS NOTE ADULT - PROBLEM SELECTOR PLAN 3
- Known R liver mass, was recommended for outpatient follow up and MRI but son states that the family has been unable to get it done as the patient is very debilitated (needs a wheelchair to get around) and has frequent asthma exacerbations limiting her exertions  - CT here with worsening size of the mass  - MR Abd (3/7): Limited evaluation due to extensive breathing motion artifacts. A 4.7 cm complex cystic lesion in segment 7 of the liver demonstrating fluid fluid level which may be related to the mucinous/proteinaceous content of the cystic components of the lesion; limited evaluation of the lesion on the postcontrast images due to extensive breathing motion artifacts; the differential includes biliary cystadenoma; close continued follow-up recommended in approximately 3-6 months with MRI to ensure stability. Multiple smaller liver cysts are also noted. The common bile duct measures 1 cm with layering sludge at the level of ampulla.  - CT A/P w/ IV contrast: Redemonstration of a nonenhancing bilobed complex cystic lesion predominantly in segment 7 of the liver.  - IR c/s for Liver biopsy placed -- IR deferring to outpatient, pt will instead need repeat MRI in 3-6 months c/b acute hypoxia and hypercapnia  - continue solumedrol IV  - BiPAP at night and prn   - singulair  - s/p Azithromycin x 5d.

## 2025-03-10 NOTE — DISCHARGE NOTE PROVIDER - NSDCCPCAREPLAN_GEN_ALL_CORE_FT
PRINCIPAL DISCHARGE DIAGNOSIS  Diagnosis: Acute respiratory failure with hypercapnia  Assessment and Plan of Treatment: wean back to home O2, does not require NIV per pulm. will attempt to setup outpatient pulm follow up.      SECONDARY DISCHARGE DIAGNOSES  Diagnosis: Liver mass  Assessment and Plan of Treatment: Ct showing complex liver cyst ~5cm in largest diameter. IR recommened repeat MRI in 3-6month for follow up. Biopsy can be arranged as outpatient at that time.  If need for outpt biopsy, [facility] IR booking office can be reached at [phone].

## 2025-03-10 NOTE — DISCHARGE NOTE PROVIDER - NSDCCPTREATMENT_GEN_ALL_CORE_FT
Last visit: 8.30.23    Latuda 40 mg with dinner 350 to 500 stu will increase absorption of medication by 50%    F/U: 10.12.23   PRINCIPAL PROCEDURE  Procedure: CT abdomen  Findings and Treatment:   < end of copied text >  LOWER CHEST: Emphysema. Bibasilar atelectasis. Coronary and aortic   calcifications.  LIVER: Nonenhancing bilobed complex cystic lesion predominantly within   segment 7 measuring approximately 4.9 x 4.3 x 4.2 cm. Additional   scattered subcentimeter hypodense foci corresponding to subcentimeter   cysts on prior MRI.  BILE DUCTS: No intrahepatic biliary ductal dilatation. The common bile   duct measures up to 0.9 cm, unchanged from prior.  GALLBLADDER: Cholelithiasis.  SPLEEN: Within normallimits.  PANCREAS: Within normal limits.  ADRENALS: Within normal limits.  KIDNEYS/URETERS: Bilateral renal cysts. No hydronephrosis.  BLADDER: Within normal limits.  REPRODUCTIVE ORGANS: Hysterectomy.  BOWEL: Small hiatal hernia. No bowel obstruction. Colonic diverticulosis.   Appendix is normal.  PERITONEUM/RETROPERITONEUM: Within normal limits.  VESSELS: Atherosclerotic changes.  LYMPH NODES: No lymphadenopathy.  ABDOMINAL WALL: Moderate fat-containing umbilical hernia. Subcutaneous   edema/fat stranding along the right lateral pelvis.  BONES: Degenerative changes. Sacral Tarlov cysts.  IMPRESSION:  Redemonstration of a nonenhancing bilobed complex cystic lesion   predominantly in segment 7 of the liver.< from: CT Abdomen and Pelvis w/ IV Cont (03.08.25 @ 12:08) >

## 2025-03-10 NOTE — PROGRESS NOTE ADULT - ASSESSMENT
69F Asthma/COPD, HTN, dCHF, CVA w/ Lt weakness, PE, Afib-Eliquis, liver mass p/w acute asthma exacerbation c/b Acute hypoxia and hypercapnea s/p BiPAP for respiratory support, hyponatremia, enlarging liver mass 5.9x5cm from 3.2x1.7cm, chest pain. IR rec outpatient follow up with repeat imaging of liver cyst.

## 2025-03-10 NOTE — DISCHARGE NOTE PROVIDER - CARE PROVIDER_API CALL
Jose Mejia  Internal Medicine  [address] [place] [address]  Phone: [phone]  Fax: [phone]  Follow Up Time:

## 2025-03-10 NOTE — DISCHARGE NOTE PROVIDER - ATTENDING DISCHARGE PHYSICAL EXAMINATION:
patient seen and examined at bedside. reports feeling well, no acute complaints. labs and vitals reviewed. no acute changes.     Physical Exam:  GENERAL: no apparent distress; on RA; calm  CHEST/LUNG: Clear to auscultation bilaterally; No wheezing; No crackles  HEART: no obvious audible murmurs; ext warm to touch; No edema  ABDOMEN: Soft, Nontender, Nondistended; Bowel sounds present; R lower abd pain, stable.   MSK: no joint or back on palpation; no joint erythema or swelling.   NEUROLOGY: Awake and alert; CN 2-12 grossly intact, no obvious FND

## 2025-03-11 LAB
ANION GAP SERPL CALC-SCNC: 9 MMOL/L — SIGNIFICANT CHANGE UP (ref 7–14)
BASOPHILS # BLD AUTO: 0.01 K/UL — SIGNIFICANT CHANGE UP (ref 0–0.2)
BASOPHILS NFR BLD AUTO: 0.1 % — SIGNIFICANT CHANGE UP (ref 0–2)
BLD GP AB SCN SERPL QL: NEGATIVE — SIGNIFICANT CHANGE UP
BUN SERPL-MCNC: 33 MG/DL — HIGH (ref 7–23)
CALCIUM SERPL-MCNC: 8.1 MG/DL — LOW (ref 8.4–10.5)
CHLORIDE SERPL-SCNC: 102 MMOL/L — SIGNIFICANT CHANGE UP (ref 98–107)
CO2 SERPL-SCNC: 22 MMOL/L — SIGNIFICANT CHANGE UP (ref 22–31)
CREAT SERPL-MCNC: 1.31 MG/DL — HIGH (ref 0.5–1.3)
EGFR: 44 ML/MIN/1.73M2 — LOW
EGFR: 44 ML/MIN/1.73M2 — LOW
EOSINOPHIL # BLD AUTO: 0 K/UL — SIGNIFICANT CHANGE UP (ref 0–0.5)
EOSINOPHIL NFR BLD AUTO: 0 % — SIGNIFICANT CHANGE UP (ref 0–6)
GLUCOSE SERPL-MCNC: 96 MG/DL — SIGNIFICANT CHANGE UP (ref 70–99)
HCT VFR BLD CALC: 24.1 % — LOW (ref 34.5–45)
HCT VFR BLD CALC: 24.1 % — LOW (ref 34.5–45)
HCT VFR BLD CALC: 25.6 % — LOW (ref 34.5–45)
HGB BLD-MCNC: 7.1 G/DL — LOW (ref 11.5–15.5)
HGB BLD-MCNC: 7.4 G/DL — LOW (ref 11.5–15.5)
HGB BLD-MCNC: 7.6 G/DL — LOW (ref 11.5–15.5)
IANC: 10.95 K/UL — HIGH (ref 1.8–7.4)
IMM GRANULOCYTES NFR BLD AUTO: 0.8 % — SIGNIFICANT CHANGE UP (ref 0–0.9)
LYMPHOCYTES # BLD AUTO: 1.07 K/UL — SIGNIFICANT CHANGE UP (ref 1–3.3)
LYMPHOCYTES # BLD AUTO: 8.7 % — LOW (ref 13–44)
MAGNESIUM SERPL-MCNC: 2.1 MG/DL — SIGNIFICANT CHANGE UP (ref 1.6–2.6)
MCHC RBC-ENTMCNC: 24.6 PG — LOW (ref 27–34)
MCHC RBC-ENTMCNC: 24.8 PG — LOW (ref 27–34)
MCHC RBC-ENTMCNC: 25.3 PG — LOW (ref 27–34)
MCHC RBC-ENTMCNC: 29.5 G/DL — LOW (ref 32–36)
MCHC RBC-ENTMCNC: 29.7 G/DL — LOW (ref 32–36)
MCHC RBC-ENTMCNC: 30.7 G/DL — LOW (ref 32–36)
MCV RBC AUTO: 82.3 FL — SIGNIFICANT CHANGE UP (ref 80–100)
MCV RBC AUTO: 83.4 FL — SIGNIFICANT CHANGE UP (ref 80–100)
MCV RBC AUTO: 83.4 FL — SIGNIFICANT CHANGE UP (ref 80–100)
MONOCYTES # BLD AUTO: 0.18 K/UL — SIGNIFICANT CHANGE UP (ref 0–0.9)
MONOCYTES NFR BLD AUTO: 1.5 % — LOW (ref 2–14)
NEUTROPHILS # BLD AUTO: 10.95 K/UL — HIGH (ref 1.8–7.4)
NEUTROPHILS NFR BLD AUTO: 88.9 % — HIGH (ref 43–77)
NRBC # BLD AUTO: 0 K/UL — SIGNIFICANT CHANGE UP (ref 0–0)
NRBC # FLD: 0 K/UL — SIGNIFICANT CHANGE UP (ref 0–0)
NRBC BLD AUTO-RTO: 0 /100 WBCS — SIGNIFICANT CHANGE UP (ref 0–0)
PHOSPHATE SERPL-MCNC: 4.1 MG/DL — SIGNIFICANT CHANGE UP (ref 2.5–4.5)
PLATELET # BLD AUTO: 150 K/UL — SIGNIFICANT CHANGE UP (ref 150–400)
PLATELET # BLD AUTO: 165 K/UL — SIGNIFICANT CHANGE UP (ref 150–400)
PLATELET # BLD AUTO: 170 K/UL — SIGNIFICANT CHANGE UP (ref 150–400)
POTASSIUM SERPL-MCNC: 4.8 MMOL/L — SIGNIFICANT CHANGE UP (ref 3.5–5.3)
POTASSIUM SERPL-SCNC: 4.8 MMOL/L — SIGNIFICANT CHANGE UP (ref 3.5–5.3)
RBC # BLD: 2.89 M/UL — LOW (ref 3.8–5.2)
RBC # BLD: 2.93 M/UL — LOW (ref 3.8–5.2)
RBC # BLD: 3.07 M/UL — LOW (ref 3.8–5.2)
RBC # FLD: 16.8 % — HIGH (ref 10.3–14.5)
RBC # FLD: 17.2 % — HIGH (ref 10.3–14.5)
RBC # FLD: 17.2 % — HIGH (ref 10.3–14.5)
RH IG SCN BLD-IMP: POSITIVE — SIGNIFICANT CHANGE UP
SODIUM SERPL-SCNC: 133 MMOL/L — LOW (ref 135–145)
WBC # BLD: 10.5 K/UL — SIGNIFICANT CHANGE UP (ref 3.8–10.5)
WBC # BLD: 11.14 K/UL — HIGH (ref 3.8–10.5)
WBC # BLD: 12.31 K/UL — HIGH (ref 3.8–10.5)
WBC # FLD AUTO: 10.5 K/UL — SIGNIFICANT CHANGE UP (ref 3.8–10.5)
WBC # FLD AUTO: 11.14 K/UL — HIGH (ref 3.8–10.5)
WBC # FLD AUTO: 12.31 K/UL — HIGH (ref 3.8–10.5)

## 2025-03-11 PROCEDURE — 99233 SBSQ HOSP IP/OBS HIGH 50: CPT

## 2025-03-11 PROCEDURE — 74174 CTA ABD&PLVS W/CONTRAST: CPT | Mod: 26

## 2025-03-11 RX ORDER — TIOTROPIUM BROMIDE INHALATION SPRAY 3.12 UG/1
2 SPRAY, METERED RESPIRATORY (INHALATION) DAILY
Refills: 0 | Status: DISCONTINUED | OUTPATIENT
Start: 2025-03-11 | End: 2025-03-12

## 2025-03-11 RX ORDER — SODIUM CHLORIDE 9 G/1000ML
1000 INJECTION, SOLUTION INTRAVENOUS
Refills: 0 | Status: DISCONTINUED | OUTPATIENT
Start: 2025-03-11 | End: 2025-03-12

## 2025-03-11 RX ORDER — IPRATROPIUM BROMIDE AND ALBUTEROL SULFATE .5; 2.5 MG/3ML; MG/3ML
3 SOLUTION RESPIRATORY (INHALATION) EVERY 6 HOURS
Refills: 0 | Status: DISCONTINUED | OUTPATIENT
Start: 2025-03-11 | End: 2025-03-12

## 2025-03-11 RX ADMIN — ATORVASTATIN CALCIUM 20 MILLIGRAM(S): 80 TABLET, FILM COATED ORAL at 21:17

## 2025-03-11 RX ADMIN — IPRATROPIUM BROMIDE AND ALBUTEROL SULFATE 3 MILLILITER(S): .5; 2.5 SOLUTION RESPIRATORY (INHALATION) at 03:57

## 2025-03-11 RX ADMIN — Medication 1 GRAM(S): at 12:19

## 2025-03-11 RX ADMIN — BUDESONIDE 0.5 MILLIGRAM(S): 0.25 SUSPENSION RESPIRATORY (INHALATION) at 10:34

## 2025-03-11 RX ADMIN — SODIUM CHLORIDE 80 MILLILITER(S): 9 INJECTION, SOLUTION INTRAVENOUS at 11:14

## 2025-03-11 RX ADMIN — PREDNISONE 20 MILLIGRAM(S): 20 TABLET ORAL at 05:30

## 2025-03-11 RX ADMIN — LISINOPRIL 50 MILLIGRAM(S): 30 TABLET ORAL at 05:30

## 2025-03-11 RX ADMIN — SODIUM CHLORIDE 80 MILLILITER(S): 9 INJECTION, SOLUTION INTRAVENOUS at 21:17

## 2025-03-11 RX ADMIN — Medication 1 APPLICATION(S): at 12:19

## 2025-03-11 RX ADMIN — AMLODIPINE BESYLATE 10 MILLIGRAM(S): 10 TABLET ORAL at 05:30

## 2025-03-11 RX ADMIN — IPRATROPIUM BROMIDE AND ALBUTEROL SULFATE 3 MILLILITER(S): .5; 2.5 SOLUTION RESPIRATORY (INHALATION) at 10:33

## 2025-03-11 RX ADMIN — MONTELUKAST SODIUM 10 MILLIGRAM(S): 10 TABLET ORAL at 12:19

## 2025-03-11 RX ADMIN — TAMSULOSIN HYDROCHLORIDE 0.4 MILLIGRAM(S): 0.4 CAPSULE ORAL at 21:17

## 2025-03-11 RX ADMIN — Medication 20 MILLIGRAM(S): at 12:18

## 2025-03-11 NOTE — PROGRESS NOTE ADULT - PROBLEM SELECTOR PROBLEM 1
Acute asthma exacerbation
Acute respiratory failure with hypercapnia

## 2025-03-11 NOTE — PROGRESS NOTE ADULT - SUBJECTIVE AND OBJECTIVE BOX
[facility] Division of Hospital Medicine  Murtaza Ellis MD  In House Pager [phone]    Patient is a 69y old  Female who presents with a chief complaint of Worsening SOB and cough, +burning chest pain (10 Mar 2025 15:36)    OVERNIGHT EVENTS: no acute events.   SUBJECTIVE: no new subjective symptoms. still have pain over R lower abdomen due to hematoma.   ROS: Denied Fever, Chill, CP, SOB, N/V/D, LE swelling or pain.     MEDICATIONS  (STANDING):  albuterol    90 MICROgram(s) HFA Inhaler 2 Puff(s) Inhalation once  albuterol    90 MICROgram(s) HFA Inhaler 2 Puff(s) Inhalation every 6 hours  albuterol/ipratropium for Nebulization 3 milliLiter(s) Nebulizer every 6 hours  amLODIPine   Tablet 10 milliGRAM(s) Oral daily  atenolol  Tablet 50 milliGRAM(s) Oral daily  atorvastatin 20 milliGRAM(s) Oral at bedtime  buDESOnide    Inhalation Suspension 0.5 milliGRAM(s) Inhalation two times a day  chlorhexidine 2% Cloths 1 Application(s) Topical daily  famotidine    Tablet 20 milliGRAM(s) Oral daily  influenza  Vaccine (HIGH DOSE) 0.5 milliLiter(s) IntraMuscular once  lactated ringers. 1000 milliLiter(s) (80 mL/Hr) IV Continuous <Continuous>  montelukast 10 milliGRAM(s) Oral daily  predniSONE   Tablet 20 milliGRAM(s) Oral daily  sodium chloride 1 Gram(s) Oral daily  tamsulosin 0.4 milliGRAM(s) Oral at bedtime    MEDICATIONS  (PRN):  acetaminophen     Tablet .. 650 milliGRAM(s) Oral every 6 hours PRN Temp greater or equal to 38C (100.4F), Mild Pain (1 - 3)  aluminum hydroxide/magnesium hydroxide/simethicone Suspension 30 milliLiter(s) Oral every 4 hours PRN Dyspepsia  meclizine 12.5 milliGRAM(s) Oral every 8 hours PRN Dizziness  melatonin 3 milliGRAM(s) Oral at bedtime PRN Insomnia  ondansetron Injectable 4 milliGRAM(s) IV Push every 8 hours PRN Nausea and/or Vomiting    CAPILLARY BLOOD GLUCOSE        I&O's Summary    10 Mar 2025 07:01  -  11 Mar 2025 07:00  --------------------------------------------------------  IN: 2055 mL / OUT: 1500 mL / NET: 555 mL    11 Mar 2025 07:01  -  11 Mar 2025 12:56  --------------------------------------------------------  IN: 300 mL / OUT: 500 mL / NET: -200 mL        Vital Signs Last 24 Hrs  T(C): 37.1 (11 Mar 2025 09:09), Max: 37.1 (11 Mar 2025 09:09)  T(F): 98.7 (11 Mar 2025 09:09), Max: 98.7 (11 Mar 2025 09:09)  HR: 80 (11 Mar 2025 11:03) (70 - 86)  BP: 102/53 (11 Mar 2025 09:09) (102/53 - 121/63)  BP(mean): --  RR: 18 (11 Mar 2025 09:09) (17 - 18)  SpO2: 95% (11 Mar 2025 11:03) (95% - 100%)    Parameters below as of 11 Mar 2025 09:09  Patient On (Oxygen Delivery Method): nasal cannula  O2 Flow (L/min): 2      LABS:                        7.6    12.31 )-----------( 170      ( 11 Mar 2025 11:08 )             25.6     03-11    133[L]  |  102  |  33[H]  ----------------------------<  96  4.8   |  22  |  1.31[H]    Ca    8.1[L]      11 Mar 2025 02:45  Phos  4.1     03-11  Mg     2.10     03-11      PT/INR - ( 09 Mar 2025 23:50 )   PT: 11.7 sec;   INR: 0.98 ratio         PTT - ( 09 Mar 2025 23:50 )  PTT:37.6 sec      Urinalysis Basic - ( 11 Mar 2025 02:45 )    Color: x / Appearance: x / SG: x / pH: x  Gluc: 96 mg/dL / Ketone: x  / Bili: x / Urobili: x   Blood: x / Protein: x / Nitrite: x   Leuk Esterase: x / RBC: x / WBC x   Sq Epi: x / Non Sq Epi: x / Bacteria: x        RADIOLOGY & ADDITIONAL TESTS:  Results Reviewed: Y  Imaging Personally Reviewed: Y  Electrocardiogram Personally Reviewed: Y    COORDINATION OF CARE:  Care Discussed with Consultants/Other Providers [Y/N]: Y  Prior or Outpatient Records Reviewed [Y/N]: Y

## 2025-03-11 NOTE — PROGRESS NOTE ADULT - GASTROINTESTINAL
R lower abd wall hematoma, ttp, marked with marker./normal/soft/nontender/nondistended/normal active bowel sounds
R lower abd wall hematoma, ttp, marked with marker./normal/soft/nontender/nondistended/normal active bowel sounds

## 2025-03-11 NOTE — PROGRESS NOTE ADULT - PROVIDER SPECIALTY LIST ADULT
Pulmonology
Hospitalist

## 2025-03-11 NOTE — PROGRESS NOTE ADULT - PROBLEM SELECTOR PLAN 5
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   - CTA (2/27/25): no main or lobar PE  - Currently on Lovenox 70 bid - held due to abdominal wall hematoma.   - will restart on Eliquis when safe to resume AC.

## 2025-03-11 NOTE — PROGRESS NOTE ADULT - PROBLEM SELECTOR PLAN 2
large area of hard hematoma over R lower abdomen, c/w prior Lovenox injection.   US abd wall ordered to evaluate hematoma and rule-out active extravasation.   area marked with markers. does not appear to have significant change, some area with ecchymosis slightly passed the marked line, more posterior. likely gravity related hematoma expansive.   large drop in h/h this morning, will need to rule-out internal bleeding.   ctm clinically. currently vss.

## 2025-03-11 NOTE — PROGRESS NOTE ADULT - RESPIRATORY
normal/clear to auscultation bilaterally/no rales/no rhonchi/wheezes
normal/clear to auscultation bilaterally/no rales/no rhonchi/wheezes

## 2025-03-11 NOTE — PROGRESS NOTE ADULT - NUTRITIONAL ASSESSMENT
This patient has been assessed with a concern for Malnutrition and has been determined to have a diagnosis/diagnoses of Severe protein-calorie malnutrition.    This patient is being managed with:   Diet Regular-  DASH/TLC {Sodium & Cholesterol Restricted} (DASH)  Soft and Bite Sized (SOFTBTSZ)  1200mL Fluid Restriction ([id number])  Supplement Feeding Modality:  Oral  Ensure Plus High Protein Cans or Servings Per Day:  1       Frequency:  Daily  Entered: Mar  6 2025  6:14PM  
This patient has been assessed with a concern for Malnutrition and has been determined to have a diagnosis/diagnoses of Severe protein-calorie malnutrition.    This patient is being managed with:   Diet Regular-  DASH/TLC {Sodium & Cholesterol Restricted} (DASH)  Soft and Bite Sized (SOFTBTSZ)  1200mL Fluid Restriction ([id number])  Supplement Feeding Modality:  Oral  Ensure Plus High Protein Cans or Servings Per Day:  1       Frequency:  Daily  Entered: Mar  6 2025  6:14PM  
This patient has been assessed with a concern for Malnutrition and has been determined to have a diagnosis/diagnoses of Severe protein-calorie malnutrition.    This patient is being managed with:   Diet Regular-  DASH/TLC {Sodium & Cholesterol Restricted} (DASH)  Soft and Bite Sized (SOFTBTSZ)  1200mL Fluid Restriction ([id number])  Supplement Feeding Modality:  Oral  Ensure Plus High Protein Cans or Servings Per Day:  1       Frequency:  Daily  Entered: Mar  6 2025  6:14PM  
This patient has been assessed with a concern for Malnutrition and has been determined to have a diagnosis/diagnoses of Severe protein-calorie malnutrition.    This patient is being managed with:   Diet Regular-  DASH/TLC {Sodium & Cholesterol Restricted} (DASH)  Soft and Bite Sized (SOFTBTSZ)  1200mL Fluid Restriction ([id number])  Supplement Feeding Modality:  Oral  Ensure Plus High Protein Cans or Servings Per Day:  1       Frequency:  Daily  Entered: Mar  6 2025  6:14PM  
This patient has been assessed with a concern for Malnutrition and has been determined to have a diagnosis/diagnoses of Severe protein-calorie malnutrition.    This patient is being managed with:   Diet Regular-  DASH/TLC {Sodium & Cholesterol Restricted} (DASH)  Soft and Bite Sized (SOFTBTSZ)  1200mL Fluid Restriction ([id number])  Supplement Feeding Modality:  Oral  Ensure Plus High Protein Cans or Servings Per Day:  1       Frequency:  Daily  Entered: Mar  6 2025  6:14PM

## 2025-03-11 NOTE — PROGRESS NOTE ADULT - PROBLEM SELECTOR PLAN 11
- Diet: DASH  - DVT ppx: SCD. AC on hold due to hematoma.   - Dispo: PT rec [facility], but family wishes to bring pt home. CM f/u.    discussed with son on 3/10, they had poor experience at [facility] last year, wishes to bring patient home instead.
- Diet: DASH  - DVT ppx: SCD. AC on hold due to hematoma.   - Dispo: PT rec [facility], but family wishes to bring pt home. CM f/u.    discussed with son on 3/10, they had poor experience at [facility] last year, wishes to bring patient home instead.

## 2025-03-11 NOTE — PROGRESS NOTE ADULT - PROBLEM SELECTOR PLAN 1
- present with 2 weeks of worsening SOB and cough, admitted with diffuse wheezing and hypoxemic hypercapnic resp failure. reports she has improved since admission  - CT reviewed showing emphysema without evidence of bacterial PNA    - increase duonebs to q4 hours; add pulmicort .5mg BID; please keep albuterol MDI at bedside so patient can use as needed  - s/p azithromycin x 5 days (2/28/25 - 3/4/25)  - blood gas with acute decompensated hypercapnea (as well as component of metabolic acidosis)  - s/p BIPAP use however repeat ABG with stable compensated mild hypercapnea so will discontinue nocturnal NIV  - c/w albuterol q4, montelukast qd  - s/p 60mg of IV solumedrol x 2 (2/27/25); s/p 40mg BID (2/28/25- 3/2/25), on 40 mg qd (3/3 - 3/4), s/p Pred 40 QD (3/5 - 3/7), now on prednisone 30 qd (3/8- ) - on pred taper
c/b acute hypoxia and hypercapnea  - continue solumedrol IV  - BiPAP at night  - singulair  - appreciate pulmonary consult  - Azithromycin x 5d.
- present with 2 weeks of worsening SOB and cough, admitted with diffuse wheezing and hypoxemic hypercapnic resp failure. reports she has improved since admission  - CT reviewed showing emphysema without evidence of bacterial PNA    - increase duonebs to q4 hours; add pulmicort .5mg BID; please keep albuterol MDI at bedside so patient can use as needed  - s/p azithromycin x 5 days (2/28/25 - 3/4/25)  - blood gas with acute decompensated hypercapnea (as well as component of metabolic acidosis)  - s/p BIPAP use however repeat ABG with stable compensated mild hypercapnea so will discontinue nocturnal NIV  - c/w albuterol q4, montelukast qd  - s/p 60mg of IV solumedrol x 2 (2/27/25); s/p 40mg BID (2/28/25- 3/2/25), on 40 mg qd (3/3 - 3/4), s/p Pred 40 QD (3/5 - 3/7), now on prednisone 30 qd (3/8- ) - on pred taper
- present with 2 weeks of worsening SOB and cough, admitted with diffuse wheezing and hypoxemic hypercapnic resp failure. reports she has improved since admission  - CT reviewed showing emphysema without evidence of bacterial PNA    - increase duonebs to q4 hours; add pulmicort .5mg BID; please keep albuterol MDI at bedside so patient can use as needed  - s/p azithromycin x 5 days (2/28/25 - 3/4/25)  - blood gas initially with acute decompensated hypercapnea (as well as component of metabolic acidosis), s/p BIPAP use however repeat ABG with stable compensated mild hypercapnea, now discontinue nocturnal NIV  - c/w montelukast qd; change pulmicort to Advair to add LABA. change DuoNeb to PRN. start Spiriva.   - pred taper as ordered.   - pulm HOME follow up on discharge.
- present with 2 weeks of worsening SOB and cough, admitted with diffuse wheezing and hypoxemic hypercapnic resp failure. reports she has improved since admission  - CT reviewed showing emphysema without evidence of bacterial PNA    - increase duonebs to q4 hours; add pulmicort .5mg BID; please keep albuterol MDI at bedside so patient can use as needed  - s/p azithromycin x 5 days (2/28/25 - 3/4/25)  - blood gas with acute decompensated hypercapnea (as well as component of metabolic acidosis)  - s/p BIPAP use however repeat ABG with stable compensated mild hypercapnea so will discontinue nocturnal NIV  - c/w albuterol q4, montelukast qd  - s/p 60mg of IV solumedrol x 2 (2/27/25); s/p 40mg BID (2/28/25- 3/2/25), on 40 mg qd (3/3 - 3/4), s/p Pred 40 QD (3/5 - 3/7), now on prednisone 30 qd (3/8- ) - on pred taper

## 2025-03-11 NOTE — PROGRESS NOTE ADULT - TIME BILLING
Preparing to see the patient including review of tests and other providers' notes, confirming history with family member, performing medical examination and evaluation, counseling and educating the patient/family/caregiver, ordering medications, tests and procedures, communicating with other health care professionals, documenting clinical information in the EMR, independently interpreting results and communicating results to the patient/family/caregiven, care coordination.
Review of patient records, including history, laboratory data, and imaging. Patient evaluation and assessment. Coordination of care. Time excludes teaching or procedures.
Review of patient records, including history, laboratory data, and imaging. Patient evaluation and assessment. Coordination of care. Time excludes teaching or procedures.
- Ordering, reviewing, and interpreting labs, testing, and imaging.  - Independently obtaining a review of systems and performing a physical exam  - Reviewing prior hospitalization and where necessary, outpatient records.  - Reviewing consultant recommendations/communicating with consultants  - Counselling and educating patient and family regarding interpretation of aforementioned items and plan of care.
- Ordering, reviewing, and interpreting labs, testing, and imaging.  - Independently obtaining a review of systems and performing a physical exam  - Reviewing prior hospitalization and where necessary, outpatient records.  - Reviewing consultant recommendations/communicating with consultants  - Counselling and educating patient and family regarding interpretation of aforementioned items and plan of care.

## 2025-03-11 NOTE — PROGRESS NOTE ADULT - PROBLEM SELECTOR PLAN 4
- Known R liver mass, was recommended for outpatient follow up and MRI but son states that the family has been unable to get it done as the patient is very debilitated (needs a wheelchair to get around) and has frequent asthma exacerbations limiting her exertions  - CT here with worsening size of the mass  - MR Abd (3/7): Limited evaluation due to extensive breathing motion artifacts. A 4.7 cm complex cystic lesion in segment 7 of the liver demonstrating fluid fluid level which may be related to the mucinous/proteinaceous content of the cystic components of the lesion; limited evaluation of the lesion on the postcontrast images due to extensive breathing motion artifacts; the differential includes biliary cystadenoma; close continued follow-up recommended in approximately 3-6 months with MRI to ensure stability. Multiple smaller liver cysts are also noted. The common bile duct measures 1 cm with layering sludge at the level of ampulla.  - CT A/P w/ IV contrast: Redemonstration of a nonenhancing bilobed complex cystic lesion predominantly in segment 7 of the liver.  - IR c/s for Liver biopsy placed -- IR deferring to outpatient, pt will instead need repeat MRI in 3-6 months; this was discussed with son at bedside.

## 2025-03-11 NOTE — PROGRESS NOTE ADULT - ASSESSMENT
69F Asthma/COPD, HTN, dCHF, CVA w/ Lt weakness, PE, Afib-Eliquis, liver mass p/w acute asthma exacerbation c/b Acute hypoxia and hypercapnia s/p BiPAP for respiratory support, hyponatremia, enlarging liver mass 5.9x5cm from 3.2x1.7cm, chest pain. IR rec outpatient follow up with repeat imaging of liver cyst.

## 2025-03-11 NOTE — PROGRESS NOTE ADULT - REASON FOR ADMISSION
Worsening SOB and cough, +burning chest pain

## 2025-03-12 ENCOUNTER — TRANSCRIPTION ENCOUNTER (OUTPATIENT)
Age: 70
End: 2025-03-12

## 2025-03-12 VITALS
RESPIRATION RATE: 18 BRPM | DIASTOLIC BLOOD PRESSURE: 51 MMHG | OXYGEN SATURATION: 98 % | SYSTOLIC BLOOD PRESSURE: 132 MMHG | HEART RATE: 72 BPM | TEMPERATURE: 98 F

## 2025-03-12 LAB
ANION GAP SERPL CALC-SCNC: 11 MMOL/L — SIGNIFICANT CHANGE UP (ref 7–14)
BUN SERPL-MCNC: 21 MG/DL — SIGNIFICANT CHANGE UP (ref 7–23)
CALCIUM SERPL-MCNC: 8.1 MG/DL — LOW (ref 8.4–10.5)
CHLORIDE SERPL-SCNC: 100 MMOL/L — SIGNIFICANT CHANGE UP (ref 98–107)
CO2 SERPL-SCNC: 20 MMOL/L — LOW (ref 22–31)
CREAT SERPL-MCNC: 0.99 MG/DL — SIGNIFICANT CHANGE UP (ref 0.5–1.3)
EGFR: 61 ML/MIN/1.73M2 — SIGNIFICANT CHANGE UP
EGFR: 61 ML/MIN/1.73M2 — SIGNIFICANT CHANGE UP
GLUCOSE SERPL-MCNC: 117 MG/DL — HIGH (ref 70–99)
HCT VFR BLD CALC: 23.8 % — LOW (ref 34.5–45)
HGB BLD-MCNC: 7.1 G/DL — LOW (ref 11.5–15.5)
MAGNESIUM SERPL-MCNC: 2 MG/DL — SIGNIFICANT CHANGE UP (ref 1.6–2.6)
MCHC RBC-ENTMCNC: 24.7 PG — LOW (ref 27–34)
MCHC RBC-ENTMCNC: 29.8 G/DL — LOW (ref 32–36)
MCV RBC AUTO: 82.9 FL — SIGNIFICANT CHANGE UP (ref 80–100)
NRBC # BLD AUTO: 0 K/UL — SIGNIFICANT CHANGE UP (ref 0–0)
NRBC # FLD: 0 K/UL — SIGNIFICANT CHANGE UP (ref 0–0)
NRBC BLD AUTO-RTO: 0 /100 WBCS — SIGNIFICANT CHANGE UP (ref 0–0)
PHOSPHATE SERPL-MCNC: 3.6 MG/DL — SIGNIFICANT CHANGE UP (ref 2.5–4.5)
PLATELET # BLD AUTO: 160 K/UL — SIGNIFICANT CHANGE UP (ref 150–400)
POTASSIUM SERPL-MCNC: 4.4 MMOL/L — SIGNIFICANT CHANGE UP (ref 3.5–5.3)
POTASSIUM SERPL-SCNC: 4.4 MMOL/L — SIGNIFICANT CHANGE UP (ref 3.5–5.3)
RBC # BLD: 2.87 M/UL — LOW (ref 3.8–5.2)
RBC # FLD: 17.3 % — HIGH (ref 10.3–14.5)
SODIUM SERPL-SCNC: 131 MMOL/L — LOW (ref 135–145)
WBC # BLD: 11.12 K/UL — HIGH (ref 3.8–10.5)
WBC # FLD AUTO: 11.12 K/UL — HIGH (ref 3.8–10.5)

## 2025-03-12 PROCEDURE — 99239 HOSP IP/OBS DSCHRG MGMT >30: CPT

## 2025-03-12 RX ORDER — IPRATROPIUM BROMIDE AND ALBUTEROL SULFATE .5; 2.5 MG/3ML; MG/3ML
3 SOLUTION RESPIRATORY (INHALATION)
Qty: 360 | Refills: 0
Start: 2025-03-12 | End: 2025-04-10

## 2025-03-12 RX ORDER — MECLIZINE HCL 12.5 MG
1 TABLET ORAL
Qty: 90 | Refills: 0
Start: 2025-03-12 | End: 2025-04-10

## 2025-03-12 RX ORDER — APIXABAN 2.5 MG/1
1 TABLET, FILM COATED ORAL
Qty: 0 | Refills: 0 | DISCHARGE
Start: 2025-03-12

## 2025-03-12 RX ORDER — PREDNISONE 20 MG/1
1 TABLET ORAL
Qty: 3 | Refills: 0
Start: 2025-03-12 | End: 2025-03-14

## 2025-03-12 RX ORDER — TIOTROPIUM BROMIDE INHALATION SPRAY 3.12 UG/1
2 SPRAY, METERED RESPIRATORY (INHALATION)
Qty: 1 | Refills: 0
Start: 2025-03-12 | End: 2025-04-10

## 2025-03-12 RX ORDER — PREDNISONE 20 MG/1
1 TABLET ORAL
Qty: 1 | Refills: 0
Start: 2025-03-12 | End: 2025-03-12

## 2025-03-12 RX ORDER — CALCIUM CARBONATE/VITAMIN D3 500MG-5MCG
1 TABLET ORAL
Refills: 0 | DISCHARGE

## 2025-03-12 RX ADMIN — Medication 30 MILLILITER(S): at 11:57

## 2025-03-12 RX ADMIN — MONTELUKAST SODIUM 10 MILLIGRAM(S): 10 TABLET ORAL at 11:49

## 2025-03-12 RX ADMIN — Medication 20 MILLIGRAM(S): at 11:50

## 2025-03-12 RX ADMIN — AMLODIPINE BESYLATE 10 MILLIGRAM(S): 10 TABLET ORAL at 05:29

## 2025-03-12 RX ADMIN — Medication 1 APPLICATION(S): at 11:51

## 2025-03-12 RX ADMIN — LISINOPRIL 50 MILLIGRAM(S): 30 TABLET ORAL at 05:29

## 2025-03-12 RX ADMIN — Medication 1 DOSE(S): at 11:58

## 2025-03-12 RX ADMIN — TIOTROPIUM BROMIDE INHALATION SPRAY 2 PUFF(S): 3.12 SPRAY, METERED RESPIRATORY (INHALATION) at 11:45

## 2025-03-12 RX ADMIN — PREDNISONE 20 MILLIGRAM(S): 20 TABLET ORAL at 05:29

## 2025-03-12 RX ADMIN — Medication 1 GRAM(S): at 11:49

## 2025-03-12 NOTE — DISCHARGE NOTE NURSING/CASE MANAGEMENT/SOCIAL WORK - NSDCPEFALRISK_GEN_ALL_CORE
For information on Fall & Injury Prevention, visit: [email or web address] OR  [email or web address] OR  https://www.cdc.gov/steadi/patient.html

## 2025-03-12 NOTE — DISCHARGE NOTE NURSING/CASE MANAGEMENT/SOCIAL WORK - NSDCFUADDAPPT_GEN_ALL_CORE_FT
APPTS ARE READY TO BE MADE: [x] YES    Best Family or Patient Contact (if needed):    Additional Information about above appointments (if needed):    1: HOME  2:   3:     Other comments or requests:    15-Apr-2024

## 2025-03-12 NOTE — DISCHARGE NOTE NURSING/CASE MANAGEMENT/SOCIAL WORK - FINANCIAL ASSISTANCE
[facility] provides services at a reduced cost to those who are determined to be eligible through [facility]’s financial assistance program. Information regarding [facility]’s financial assistance program can be found by going to [email or web address] or by calling [phone].

## 2025-03-12 NOTE — CHART NOTE - NSCHARTNOTEFT_GEN_A_CORE
Patient seen and examined at bedside. clinically stable. Hematoma about the same clinically. CTA neg for active bleeding. h/h been stable.   stable for d/c home today.   refer to d/c summary      Vital Signs Last 24 Hrs  T(C): 36.4 (12 Mar 2025 10:20), Max: 36.7 (11 Mar 2025 17:02)  T(F): 97.6 (12 Mar 2025 10:20), Max: 98.1 (11 Mar 2025 17:02)  HR: 72 (12 Mar 2025 10:20) (72 - 85)  BP: 132/51 (12 Mar 2025 10:20) (117/50 - 145/64)  BP(mean): --  RR: 18 (12 Mar 2025 10:20) (18 - 18)  SpO2: 98% (12 Mar 2025 10:20) (98% - 100%)    Parameters below as of 12 Mar 2025 10:20  Patient On (Oxygen Delivery Method): room air    CAPILLARY BLOOD GLUCOSE                              7.1    11.12 )-----------( 160      ( 12 Mar 2025 02:15 )             23.8     03-12    131[L]  |  100  |  21  ----------------------------<  117[H]  4.4   |  20[L]  |  0.99    Ca    8.1[L]      12 Mar 2025 02:15  Phos  3.6     03-12  Mg     2.00     03-12            Urinalysis Basic - ( 12 Mar 2025 02:15 )    Color: x / Appearance: x / SG: x / pH: x  Gluc: 117 mg/dL / Ketone: x  / Bili: x / Urobili: x   Blood: x / Protein: x / Nitrite: x   Leuk Esterase: x / RBC: x / WBC x   Sq Epi: x / Non Sq Epi: x / Bacteria: x

## 2025-03-12 NOTE — DISCHARGE NOTE NURSING/CASE MANAGEMENT/SOCIAL WORK - PATIENT PORTAL LINK FT
You can access the FollowMyHealth Patient Portal offered by [facility] by registering at the following website: [email or web address]. By joining [employer]’s FollowMyHealth portal, you will also be able to view your health information using other applications (apps) compatible with our system.

## 2025-04-03 NOTE — ED ADULT NURSE NOTE - NS ED NURSE DISCH DISPOSITION
Subjective   Patient ID: Shelby Mckenzie is a 69 y.o. female who presents for Follow-up.    HPI patient presents to clinic for follow-up visit on history of atherosclerotic heart disease, hyperlipidemia, coronary artery disease, hypertension, hypothyroidism, vitamin d deficiency,anxiety disorder and insomnia.  She is doing fairly well and denies any chest pain, cough congestion fever chills abdominal pain and bone pain.  Laboratory studies done shows serum cholesterol of 218, HDL of 111 and other studies unremarkable.  CT cardiac scoring done last year reveals coronary artery calcium score of 64 and mammogram done came back negative for malignancy.   OARRS:  Abdoulaye Cutler MD on 4/3/2025  3:59 PM  I have personally reviewed the OARRS report for Shelby Mckenzie. I have considered the risks of abuse, dependence, addiction and diversion    Is the patient prescribed a combination of a benzodiazepine and opioid?  Yes, I feel it is clincially indicated to continue the medication and have discussed with the patient risks/benefits/alternatives.    Last Urine Drug Screen / ordered today: Yes  No results found for this or any previous visit (from the past 8760 hours).  Results are as expected.           Controlled Substance Agreement:  Date of the Last Agreement: 1/3/24  Reviewed Controlled Substance Agreement including but not limited to the benefits, risks, and alternatives to treatment with a Controlled Substance medication(s).    Benzodiazepines:  What is the patient's goal of therapy? To help with anxiety   Is this being achieved with current treatment? yes    ALEJANDRA-7: She is 71 so we could evaluate diabetes.  She said this provide restricted diets  No data recorded    Activities of Daily Living:   Is your overall impression that this patient is benefiting (symptom reduction outweighs side effects) from benzodiazepine therapy?  Disorder eating fast    1. Physical Functioning: Better  2. Family Relationship: Better  3.  "Social Relationship: Same  4. Mood: Better  5. Sleep Patterns: Same  6. Overall Function: Better    Review of Systems   Constitutional: Negative.    HENT: Negative.     Eyes: Negative.    Respiratory: Negative.     Cardiovascular: Negative.    Gastrointestinal: Negative.    Endocrine: Negative.    Genitourinary: Negative.    Musculoskeletal: Negative.    Skin: Negative.    Allergic/Immunologic: Negative.    Neurological: Negative.    Hematological: Negative.    Psychiatric/Behavioral: Negative.         Objective   /70 (BP Location: Right arm, Patient Position: Sitting)   Pulse 86   Ht 1.702 m (5' 7\")   Wt 69.9 kg (154 lb)   SpO2 99%   BMI 24.12 kg/m²     Physical Exam  Constitutional:       Appearance: Normal appearance. She is normal weight.   HENT:      Right Ear: Tympanic membrane normal.      Left Ear: Tympanic membrane and ear canal normal.      Nose: Nose normal.   Neck:      Vascular: No carotid bruit.   Cardiovascular:      Rate and Rhythm: Normal rate.   Pulmonary:      Effort: No respiratory distress.      Breath sounds: No stridor. No wheezing.   Abdominal:      Palpations: Abdomen is soft.      Tenderness: There is no abdominal tenderness. There is no guarding or rebound.   Skin:     Coloration: Skin is not jaundiced.      Findings: No bruising.   Neurological:      General: No focal deficit present.      Mental Status: She is alert and oriented to person, place, and time.   Psychiatric:         Mood and Affect: Mood normal.         Assessment/Plan   Assessment & Plan  Primary hypertension  Patient is advised to continue losartan regarding history of hypertension.  She will be scheduled for repeat blood work and will return to clinic in 6 months for follow-up visit.  Orders:    losartan (Cozaar) 50 mg tablet; Take 1 tablet (50 mg) by mouth once daily.    Comprehensive Metabolic Panel; Future    Urinalysis with Reflex Microscopic; Future    Hyperlipidemia, mild  She is refusing to take statin " regarding dyslipidemia and will continue Zetia.  Will check repeat lipid panel.  Orders:    ezetimibe (Zetia) 10 mg tablet; Take 1 tablet (10 mg) by mouth once daily.    Lipid Panel; Future    ASHD (arteriosclerotic heart disease)    Orders:    ezetimibe (Zetia) 10 mg tablet; Take 1 tablet (10 mg) by mouth once daily.    Hypothyroidism, unspecified type  She will  continue levothyroxine and will check TSH.  Orders:    TSH; Future    Osteoporosis, post-menopausal  She is refusing to take bisphosphonate regarding her history of osteoporosis.  She is encouraged to use over-the-counter calcium citrate with vitamin D.       Anxiety  She will be scheduled for urine drug screen regarding her history of anxiety and benzo use.  Orders:    Opiate/Opioid/Benzo Prescription Compliance; Future    Benzodiazepine use agreement exists  Will check urine drug screen  Orders:    Opiate/Opioid/Benzo Prescription Compliance; Future            Transferred

## 2025-06-08 ENCOUNTER — INPATIENT (INPATIENT)
Facility: HOSPITAL | Age: 70
LOS: 17 days | Discharge: HOME HEALTH | End: 2025-06-26
Attending: STUDENT IN AN ORGANIZED HEALTH CARE EDUCATION/TRAINING PROGRAM | Admitting: STUDENT IN AN ORGANIZED HEALTH CARE EDUCATION/TRAINING PROGRAM
Payer: MEDICARE

## 2025-06-08 VITALS
SYSTOLIC BLOOD PRESSURE: 135 MMHG | HEIGHT: 59 IN | OXYGEN SATURATION: 100 % | TEMPERATURE: 98 F | RESPIRATION RATE: 26 BRPM | HEART RATE: 82 BPM | DIASTOLIC BLOOD PRESSURE: 61 MMHG | WEIGHT: 156.09 LBS

## 2025-06-08 DIAGNOSIS — I26.99 OTHER PULMONARY EMBOLISM WITHOUT ACUTE COR PULMONALE: ICD-10-CM

## 2025-06-08 LAB
ALBUMIN SERPL ELPH-MCNC: 3 G/DL — LOW (ref 3.3–5)
ALP SERPL-CCNC: 115 U/L — SIGNIFICANT CHANGE UP (ref 40–120)
ALT FLD-CCNC: 20 U/L — SIGNIFICANT CHANGE UP (ref 12–78)
ANION GAP SERPL CALC-SCNC: 9 MMOL/L — SIGNIFICANT CHANGE UP (ref 5–17)
APTT BLD: 22.8 SEC — LOW (ref 26.1–36.8)
AST SERPL-CCNC: 20 U/L — SIGNIFICANT CHANGE UP (ref 15–37)
BASE EXCESS BLDA CALC-SCNC: -10.8 MMOL/L — LOW (ref -2–3)
BASE EXCESS BLDV CALC-SCNC: -8.1 MMOL/L — LOW (ref -2–3)
BASOPHILS # BLD AUTO: 0.01 K/UL — SIGNIFICANT CHANGE UP (ref 0–0.2)
BASOPHILS NFR BLD AUTO: 0.1 % — SIGNIFICANT CHANGE UP (ref 0–2)
BILIRUB SERPL-MCNC: 0.4 MG/DL — SIGNIFICANT CHANGE UP (ref 0.2–1.2)
BLOOD GAS COMMENTS ARTERIAL: SIGNIFICANT CHANGE UP
BLOOD GAS COMMENTS, VENOUS: SIGNIFICANT CHANGE UP
BUN SERPL-MCNC: 27 MG/DL — HIGH (ref 7–23)
CALCIUM SERPL-MCNC: 8.5 MG/DL — SIGNIFICANT CHANGE UP (ref 8.5–10.1)
CHLORIDE SERPL-SCNC: 92 MMOL/L — LOW (ref 96–108)
CK MB BLD-MCNC: 6.7 % — HIGH (ref 0–3.5)
CK MB CFR SERPL CALC: 4.4 NG/ML — HIGH (ref 0.5–3.6)
CK SERPL-CCNC: 66 U/L — SIGNIFICANT CHANGE UP (ref 26–192)
CO2 BLDA-SCNC: 20 MMOL/L — SIGNIFICANT CHANGE UP (ref 19–24)
CO2 BLDV-SCNC: 24 MMOL/L — SIGNIFICANT CHANGE UP (ref 22–26)
CO2 SERPL-SCNC: 20 MMOL/L — LOW (ref 22–31)
CREAT SERPL-MCNC: 1.38 MG/DL — HIGH (ref 0.5–1.3)
EGFR: 41 ML/MIN/1.73M2 — LOW
EGFR: 41 ML/MIN/1.73M2 — LOW
EOSINOPHIL # BLD AUTO: 0 K/UL — SIGNIFICANT CHANGE UP (ref 0–0.5)
EOSINOPHIL NFR BLD AUTO: 0 % — SIGNIFICANT CHANGE UP (ref 0–6)
FLUAV AG NPH QL: SIGNIFICANT CHANGE UP
FLUBV AG NPH QL: SIGNIFICANT CHANGE UP
GAS PNL BLDA: SIGNIFICANT CHANGE UP
GAS PNL BLDV: SIGNIFICANT CHANGE UP
GLUCOSE BLDC GLUCOMTR-MCNC: 180 MG/DL — HIGH (ref 70–99)
GLUCOSE SERPL-MCNC: 127 MG/DL — HIGH (ref 70–99)
HCO3 BLDA-SCNC: 18 MMOL/L — LOW (ref 21–28)
HCO3 BLDV-SCNC: 22 MMOL/L — SIGNIFICANT CHANGE UP (ref 22–28)
HCT VFR BLD CALC: 28.6 % — LOW (ref 34.5–45)
HGB BLD-MCNC: 8.6 G/DL — LOW (ref 11.5–15.5)
HOROWITZ INDEX BLDA+IHG-RTO: 35 — SIGNIFICANT CHANGE UP
HOROWITZ INDEX BLDV+IHG-RTO: 40 — SIGNIFICANT CHANGE UP
IMM GRANULOCYTES NFR BLD AUTO: 0.5 % — SIGNIFICANT CHANGE UP (ref 0–0.9)
INR BLD: 1.5 RATIO — HIGH (ref 0.85–1.16)
LYMPHOCYTES # BLD AUTO: 0.97 K/UL — LOW (ref 1–3.3)
LYMPHOCYTES # BLD AUTO: 8.2 % — LOW (ref 13–44)
MCHC RBC-ENTMCNC: 23.4 PG — LOW (ref 27–34)
MCHC RBC-ENTMCNC: 30.1 G/DL — LOW (ref 32–36)
MCV RBC AUTO: 77.9 FL — LOW (ref 80–100)
MONOCYTES # BLD AUTO: 0.32 K/UL — SIGNIFICANT CHANGE UP (ref 0–0.9)
MONOCYTES NFR BLD AUTO: 2.7 % — SIGNIFICANT CHANGE UP (ref 2–14)
NEUTROPHILS # BLD AUTO: 10.52 K/UL — HIGH (ref 1.8–7.4)
NEUTROPHILS NFR BLD AUTO: 88.5 % — HIGH (ref 43–77)
NRBC BLD AUTO-RTO: 0 /100 WBCS — SIGNIFICANT CHANGE UP (ref 0–0)
NT-PROBNP SERPL-SCNC: 2309 PG/ML — HIGH (ref 0–125)
PCO2 BLDA: 57 MMHG — HIGH (ref 32–46)
PCO2 BLDV: 64 MMHG — HIGH (ref 42–55)
PH BLDA: 7.11 — CRITICAL LOW (ref 7.35–7.45)
PH BLDV: 7.14 — CRITICAL LOW (ref 7.32–7.43)
PLATELET # BLD AUTO: 278 K/UL — SIGNIFICANT CHANGE UP (ref 150–400)
PO2 BLDA: 82 MMHG — LOW (ref 83–108)
PO2 BLDV: 21 MMHG — LOW (ref 25–45)
POTASSIUM SERPL-MCNC: 5.8 MMOL/L — HIGH (ref 3.5–5.3)
POTASSIUM SERPL-SCNC: 5.8 MMOL/L — HIGH (ref 3.5–5.3)
PROT SERPL-MCNC: 7.5 GM/DL — SIGNIFICANT CHANGE UP (ref 6–8.3)
PROTHROM AB SERPL-ACNC: 16.9 SEC — HIGH (ref 9.9–13.4)
RBC # BLD: 3.67 M/UL — LOW (ref 3.8–5.2)
RBC # FLD: 16.4 % — HIGH (ref 10.3–14.5)
RSV RNA NPH QL NAA+NON-PROBE: SIGNIFICANT CHANGE UP
SAO2 % BLDA: 96 % — SIGNIFICANT CHANGE UP (ref 94–98)
SAO2 % BLDV: 25.9 % — LOW (ref 94–98)
SARS-COV-2 RNA SPEC QL NAA+PROBE: SIGNIFICANT CHANGE UP
SODIUM SERPL-SCNC: 121 MMOL/L — LOW (ref 135–145)
SOURCE RESPIRATORY: SIGNIFICANT CHANGE UP
TROPONIN I, HIGH SENSITIVITY RESULT: 62.6 NG/L — HIGH
WBC # BLD: 11.88 K/UL — HIGH (ref 3.8–10.5)
WBC # FLD AUTO: 11.88 K/UL — HIGH (ref 3.8–10.5)

## 2025-06-08 PROCEDURE — 99291 CRITICAL CARE FIRST HOUR: CPT

## 2025-06-08 PROCEDURE — 71045 X-RAY EXAM CHEST 1 VIEW: CPT | Mod: 26

## 2025-06-08 PROCEDURE — 93010 ELECTROCARDIOGRAM REPORT: CPT

## 2025-06-08 RX ORDER — AZITHROMYCIN 250 MG
CAPSULE ORAL
Refills: 0 | Status: DISCONTINUED | OUTPATIENT
Start: 2025-06-08 | End: 2025-06-10

## 2025-06-08 RX ORDER — IPRATROPIUM BROMIDE AND ALBUTEROL SULFATE .5; 2.5 MG/3ML; MG/3ML
3 SOLUTION RESPIRATORY (INHALATION) EVERY 4 HOURS
Refills: 0 | Status: DISCONTINUED | OUTPATIENT
Start: 2025-06-08 | End: 2025-06-10

## 2025-06-08 RX ORDER — DEXAMETHASONE 0.5 MG/1
6 TABLET ORAL ONCE
Refills: 0 | Status: COMPLETED | OUTPATIENT
Start: 2025-06-08 | End: 2025-06-08

## 2025-06-08 RX ORDER — METHYLPREDNISOLONE ACETATE 80 MG/ML
40 INJECTION, SUSPENSION INTRA-ARTICULAR; INTRALESIONAL; INTRAMUSCULAR; SOFT TISSUE EVERY 12 HOURS
Refills: 0 | Status: DISCONTINUED | OUTPATIENT
Start: 2025-06-08 | End: 2025-06-14

## 2025-06-08 RX ORDER — LEVALBUTEROL HYDROCHLORIDE 1.25 MG/3ML
0.63 SOLUTION RESPIRATORY (INHALATION)
Refills: 0 | Status: COMPLETED | OUTPATIENT
Start: 2025-06-08 | End: 2025-06-08

## 2025-06-08 RX ORDER — IPRATROPIUM BROMIDE AND ALBUTEROL SULFATE .5; 2.5 MG/3ML; MG/3ML
3 SOLUTION RESPIRATORY (INHALATION)
Refills: 0 | Status: DISCONTINUED | OUTPATIENT
Start: 2025-06-08 | End: 2025-06-08

## 2025-06-08 RX ORDER — AZITHROMYCIN 250 MG
500 CAPSULE ORAL EVERY 24 HOURS
Refills: 0 | Status: DISCONTINUED | OUTPATIENT
Start: 2025-06-09 | End: 2025-06-10

## 2025-06-08 RX ORDER — AZITHROMYCIN 250 MG
500 CAPSULE ORAL ONCE
Refills: 0 | Status: COMPLETED | OUTPATIENT
Start: 2025-06-08 | End: 2025-06-08

## 2025-06-08 RX ORDER — ONDANSETRON HCL/PF 4 MG/2 ML
4 VIAL (ML) INJECTION ONCE
Refills: 0 | Status: COMPLETED | OUTPATIENT
Start: 2025-06-08 | End: 2025-06-08

## 2025-06-08 RX ORDER — ONDANSETRON HCL/PF 4 MG/2 ML
4 VIAL (ML) INJECTION EVERY 12 HOURS
Refills: 0 | Status: COMPLETED | OUTPATIENT
Start: 2025-06-08 | End: 2025-06-09

## 2025-06-08 RX ADMIN — Medication 4 MILLIGRAM(S): at 19:12

## 2025-06-08 RX ADMIN — LEVALBUTEROL HYDROCHLORIDE 0.63 MILLIGRAM(S): 1.25 SOLUTION RESPIRATORY (INHALATION) at 20:27

## 2025-06-08 RX ADMIN — DEXAMETHASONE 6 MILLIGRAM(S): 0.5 TABLET ORAL at 20:12

## 2025-06-08 RX ADMIN — METHYLPREDNISOLONE ACETATE 40 MILLIGRAM(S): 80 INJECTION, SUSPENSION INTRA-ARTICULAR; INTRALESIONAL; INTRAMUSCULAR; SOFT TISSUE at 22:55

## 2025-06-08 RX ADMIN — Medication 1000 MILLILITER(S): at 19:12

## 2025-06-08 RX ADMIN — Medication 80 MILLIGRAM(S): at 19:12

## 2025-06-08 RX ADMIN — IPRATROPIUM BROMIDE AND ALBUTEROL SULFATE 3 MILLILITER(S): .5; 2.5 SOLUTION RESPIRATORY (INHALATION) at 18:26

## 2025-06-08 RX ADMIN — Medication 255 MILLIGRAM(S): at 20:59

## 2025-06-08 RX ADMIN — IPRATROPIUM BROMIDE AND ALBUTEROL SULFATE 3 MILLILITER(S): .5; 2.5 SOLUTION RESPIRATORY (INHALATION) at 18:25

## 2025-06-08 RX ADMIN — LEVALBUTEROL HYDROCHLORIDE 0.63 MILLIGRAM(S): 1.25 SOLUTION RESPIRATORY (INHALATION) at 20:07

## 2025-06-08 RX ADMIN — LEVALBUTEROL HYDROCHLORIDE 0.63 MILLIGRAM(S): 1.25 SOLUTION RESPIRATORY (INHALATION) at 20:59

## 2025-06-08 RX ADMIN — IPRATROPIUM BROMIDE AND ALBUTEROL SULFATE 3 MILLILITER(S): .5; 2.5 SOLUTION RESPIRATORY (INHALATION) at 22:03

## 2025-06-08 NOTE — ED PROVIDER NOTE - CRITICAL CARE ATTENDING CONTRIBUTION TO CARE
Upon my evaluation, this patient had a high probability of imminent or life-threatening deterioration due to hypercapnic respiratory failure, COPD/asthma exacerbation, which required my direct attention, intervention, and personal management.  The patient has a  medical condition that impairs one or more vital organ systems.  Frequent personal assessment and adjustment of medical interventions was performed.      I have personally provided 40 minutes of critical care time exclusive of time spent on separately billable procedures. Time includes review of laboratory data, radiology results, discussion with consultants, patient and family; monitoring for potential decompensation, as well as time spent retrieving data and reviewing the chart and documenting the visit. Interventions were performed as documented above.

## 2025-06-08 NOTE — ED PROVIDER NOTE - PROGRESS NOTE DETAILS
MD Janeth: Patient received on sign-out from Dr. Pulliam. concern for hypercapnic respiratory failure. unable to tolerate BIPAP, switched to HFNC. dexamethasone and levalbuterol given. ICU consulted with acceptance to ICU

## 2025-06-08 NOTE — H&P ADULT - HISTORY OF PRESENT ILLNESS
*History obtained from chart review and son Maldonado.     69 year old woman with a PMHx of Asthma (on prn supp O2), COPD (prev smoker quit 20 years ago), HTN, HFpEF, CVA with L sided deficits (as per son), A FIb, PE on Eliquis, and Liver mass (IR recommended for outpatient follow up w repeat imaging).   Pt presented to the ED with SOB that started the night prior. Per daughter in law, pt has been declining in her health, decreased appetite since her last hospital admission in March 2025. Has been bed bound last 3 years. In the ED,        *History obtained from chart review and son Maldonado.     69 year old woman with a PMHx of Asthma (on prn supp O2), COPD (prev smoker quit 20 years ago), HTN, HFpEF, CVA with L sided deficits (as per son), A FIb, PE on Eliquis, and Liver mass (IR recommended for outpatient follow up w repeat imaging).   Pt presented to the ED with SOB that started the night prior. Per daughter in law, pt has been declining in her health, decreased appetite since her last hospital admission in March 2025. Has been bed bound last 3 years. In the ED, pt tripoding and was placed on BIPAP. Received nebs and steroids. Pt then developed episode of vomiting and switch to high flow NC. Labs significant for wbc 11.8, hgb 8.6, Na 121, K: 5.8, Bicarb 20, Creat: 1.38, trop: 62, pH: 7.11, pCO2: 57.   Pt developed hypotension and received 1 L of IV fluids with some improvement. Admitted to the ICU for further management.        *History obtained from chart review and son Maldonado.     69 year old woman with a PMHx of Asthma (on prn supp O2), COPD (prev smoker quit 20 years ago), HTN, HFpEF, CVA with L sided deficits (as per son), A FIb, PE off Eliquis and Aspirin since March 2025 , and Liver mass (IR recommended for outpatient follow up w repeat imaging).   Pt presented to the ED with SOB that started the night prior. Per daughter in law, pt has been declining in her health, decreased appetite since her last hospital admission in March 2025. Has been bed bound last 3 years. In the ED, pt tripoding and was placed on BIPAP. Received nebs and steroids. Pt then developed episode of vomiting and switch to high flow NC. Labs significant for wbc 11.8, hgb 8.6, Na 121, K: 5.8, Bicarb 20, Creat: 1.38, trop: 62, pH: 7.11, pCO2: 57.   Pt developed hypotension and received 1 L of IV fluids with some improvement. Admitted to the ICU for further management.        *History obtained from chart review and son Maldonado.     69 year old woman with a PMHx of Asthma (on prn supp O2), COPD (prev smoker quit 20 years ago), HTN, HFpEF, CVA with L sided deficits (as per son), A Fib, PE (off Eliquis and Aspirin since March 2025 due to abdominal wall hematoma), and Liver mass (IR recommended for outpatient follow up w repeat imaging).   Pt presented to the ED with SOB that started the night prior. Per daughter in law, pt has been declining in her health, decreased appetite since her last hospital admission in March 2025. Has been bed bound last 3 years. In the ED, pt tripoding and was placed on BIPAP. Received nebs and steroids. Pt then developed episode of vomiting and switch to high flow NC. Labs significant for wbc 11.8, hgb 8.6, Na 121, K: 5.8, Bicarb 20, Creat: 1.38, trop: 62, pH: 7.11, pCO2: 57.   Pt developed hypotension and received 1 L of IV fluids with some improvement. Admitted to the ICU for further management.        *History obtained from chart review and son Maldonado.     69 year old woman with a PMHx of Asthma (on prn supp O2), COPD (prev smoker quit 20 years ago), HTN, HFpEF, CVA with L sided deficits (as per son), A Fib, PE (off Eliquis and Aspirin since March 2025 due to abdominal wall hematoma), and Liver mass (IR recommended for outpatient follow up w repeat imaging).     Pt presented to the ED with SOB that started the night prior. Per daughter in law, pt has been declining in her health, decreased appetite since her last hospital admission in March 2025. Has been bed bound last 3 years. In the ED, pt tripoding and was placed on BIPAP. Received nebs and steroids. Pt then developed episode of vomiting and switch to high flow NC. Labs significant for wbc 11.8, hgb 8.6, Na 121, K: 5.8, Bicarb 20, Creat: 1.38, trop: 62, pH: 7.11, pCO2: 57.   Pt developed hypotension and received 1 L of IV fluids with some improvement. Admitted to the ICU for further management.        *History obtained from chart review and son Maldonado.     69 year old woman with a PMHx of Asthma (on prn supp O2), COPD (prev smoker quit 20 years ago), HTN, HFpEF, CVA with L sided deficits (as per son), A Fib, PE (off Eliquis and Aspirin since March 2025 due to abdominal wall hematoma), and Liver mass (IR recommended for outpatient follow up w repeat imaging).     Pt presented to the ED with SOB that started the night prior. Per daughter in law, pt has been declining in her health, decreased appetite since her last hospital admission in March 2025. Has been bed bound last 3 years. In the ED, pt tripoding and was placed on BIPAP. Received nebs and steroids. Pt then developed episode of coffee ground vomiting and switch to high flow NC. Labs significant for wbc 11.8, hgb 8.6, Na 121, K: 5.8, Bicarb 20, Creat: 1.38, trop: 62, pH: 7.11, pCO2: 57.   Pt developed hypotension and received 1 L of IV fluids with some improvement. Admitted to the ICU for further management.

## 2025-06-08 NOTE — PATIENT PROFILE ADULT - FALL HARM RISK - HARM RISK INTERVENTIONS
Communicate Risk of Fall with Harm to all staff/Reinforce activity limits and safety measures with patient and family/Tailored Fall Risk Interventions/Visual Cue: Yellow wristband and red socks/Bed in lowest position, wheels locked, appropriate side rails in place/Call bell, personal items and telephone in reach/Instruct patient to call for assistance before getting out of bed or chair/Non-slip footwear when patient is out of bed/[place] to call system/Physically safe environment - no spills, clutter or unnecessary equipment/Purposeful Proactive Rounding/Room/bathroom lighting operational, light cord in reach Assistance with ambulation/Assistance OOB with selected safe patient handling equipment/Communicate Risk of Fall with Harm to all staff/Discuss with provider need for PT consult/Monitor for mental status changes/Monitor gait and stability/Reinforce activity limits and safety measures with patient and family/Reorient to person, place and time as needed/Review medications for side effects contributing to fall risk/Sit up slowly, dangle for a short time, stand at bedside before walking/Tailored Fall Risk Interventions/Visual Cue: Yellow wristband and red socks/Bed in lowest position, wheels locked, appropriate side rails in place/Call bell, personal items and telephone in reach/Instruct patient to call for assistance before getting out of bed or chair/Non-slip footwear when patient is out of bed/[place] to call system/Physically safe environment - no spills, clutter or unnecessary equipment/Purposeful Proactive Rounding/Room/bathroom lighting operational, light cord in reach Assistance with ambulation/Assistance OOB with selected safe patient handling equipment/Communicate Risk of Fall with Harm to all staff/Discuss with provider need for PT consult/Monitor gait and stability/Provide patient with walking aids - walker, cane, crutches/Reinforce activity limits and safety measures with patient and family/Tailored Fall Risk Interventions/Visual Cue: Yellow wristband and red socks/Bed in lowest position, wheels locked, appropriate side rails in place/Call bell, personal items and telephone in reach/Instruct patient to call for assistance before getting out of bed or chair/Non-slip footwear when patient is out of bed/[place] to call system/Physically safe environment - no spills, clutter or unnecessary equipment/Purposeful Proactive Rounding/Room/bathroom lighting operational, light cord in reach

## 2025-06-08 NOTE — ED PROVIDER NOTE - CARE PLAN
1 Principal Discharge DX:	Asthma exacerbation in COPD  Secondary Diagnosis:	Acute respiratory failure with hypercapnia

## 2025-06-08 NOTE — H&P ADULT - NSHPPHYSICALEXAM_GEN_ALL_CORE
Physical Exam:   GENERAL: restless in bed, looks malnourished.   HEAD:  Atraumatic, Normocephalic  EYES: PERRLA  ENMT: Dry mucous membranes  NECK: Supple, No JVD  NERVOUS SYSTEM: Awake but not following commands at this time. Per daughter has been having confusion at home as well.    Appears to move all extremities. unable to assess strength at this time.   CHEST/LUNG: Bilateral air entry. B/l  wheezing present  HEART: S1, S2; No murmurs  ABDOMEN: Soft, Nontender, Nondistended; Bowel sounds present  EXTREMITIES:  Pulses intact, +1  edema LE  SKIN: No rashes seen

## 2025-06-08 NOTE — H&P ADULT - NSHPADDITIONALINFOADULT_GEN_ALL_CORE
ATTENDING ATTESTATION:    Patient not seen or examined by me on this date. Please see progress note dated 6/9/2025

## 2025-06-08 NOTE — H&P ADULT - ASSESSMENT
*History obtained from chart review and son Maldonado.     69 year old woman with a PMHx of Asthma (on prn supp O2), COPD (prev smoker quit 20 years ago), HTN, HFpEF, CVA with L sided deficits (as per son), A FIb, PE on Eliquis, and Liver mass (IR recommended for outpatient follow up w repeat imaging).   Pt presented to the ED with SOB that started the night prior. Per daughter in law, pt has been declining in her health, decreased appetite since her last hospital admission in March 2025. Has been bed bound last 3 years. In the ED, pt tripoding and was placed on BIPAP. Received nebs and steroids. Pt then developed episode of vomiting and switch to high flow NC. Labs significant for wbc 11.8, hgb 8.6, Na 121, K: 5.8, Bicarb 20, Creat: 1.38, trop: 62, pH: 7.11, pCO2: 57.   Pt developed hypotension and received 1 L of IV fluids with some improvement. Admitted to the ICU for further management.    *History obtained from chart review and son Maldonado.     69 year old woman with a PMHx of Asthma (on prn supp O2), COPD (prev smoker quit 20 years ago), HTN, HFpEF, CVA with L sided deficits (as per son), A FIb, PE on Eliquis, and Liver mass (IR recommended for outpatient follow up w repeat imaging).   Pt presented to the ED with SOB that started the night prior. Per daughter in law, pt has been declining in her health, decreased appetite since her last hospital admission in March 2025. Has been bed bound last 3 years. In the ED, pt tripoding and was placed on BIPAP. Received nebs and steroids. Pt then developed episode of vomiting and switch to high flow NC. Labs significant for wbc 11.8, hgb 8.6, Na 121, K: 5.8, Bicarb 20, Creat: 1.38, trop: 62, pH: 7.11, pCO2: 57.   Pt developed hypotension and received 1 L of IV fluids with some improvement. Admitted to the ICU for further management.       Plan:     Presents w/ NANCY on CKD and acute hypercapnic respiratory failure w/ evidence of volume overload - b/l pleural effusions. Pt admitted to Medical floors s/p diuretics x1 and CAP coverage and placed on BiPAP. Today RRT called for AMS and ABG shows worsening hypercapnia.     #Neuro - pt is lethargic but arousable, due to hypercapnia. Will continue to monitor mental status.    #CV -Hypotension responded to fluid bolus. Will do bedside POCUS once arrives to the unit. Will hold HTN meds at this time. Elevated trop, will continue to trend. Will obtain EKG. Elevated BNP noted.       #Pulm - acute hypercapnic respiratory failure w/ metabolic component as well, switch to high flow due to vomiting. Follow up ABG. CXR:No radiographic evidence of acute cardiopulmonary disease. Duonebs q 4 hrs. Steroids   #GI- NPO for now.   #Renal/metabolic - NANCY. monitor I/Os and electrolytes. Hyperkalemia noted. Will treat w medications.   #ID-  #Heme - chronic anemia, close to baseline; on AC for afib   #Endo -  monitor FS q6  #PPx -   #Dispo- transfer to ICU for close monitoring and high likelihood for respiratory compromise; prognosis guarded; Full code    Son updated by ICU PA. Will come to the hospital after work.    *History obtained from chart review and son Maldonado.     69 year old woman with a PMHx of Asthma (on prn supp O2), COPD (prev smoker quit 20 years ago), HTN, HFpEF, CVA with L sided deficits (as per son), A FIb, PE on Eliquis, and Liver mass (IR recommended for outpatient follow up w repeat imaging).   Pt presented to the ED with SOB that started the night prior. Per daughter in law, pt has been declining in her health, decreased appetite since her last hospital admission in March 2025. Has been bed bound last 3 years. In the ED, pt tripoding and was placed on BIPAP. Received nebs and steroids. Pt then developed episode of vomiting and switch to high flow NC. Labs significant for wbc 11.8, hgb 8.6, Na 121, K: 5.8, Bicarb 20, Creat: 1.38, trop: 62, pH: 7.11, pCO2: 57.   Pt developed hypotension and received 1 L of IV fluids with some improvement. Admitted to the ICU for further management.     Acute hypercapnic respiratory   Asthma/COPD Exacerbation   NANCY  Hyperkalemia  Metabolic acidosis  Hyponatremia        Plan:     #Neuro - pt is lethargic but arousable, due to hypercapnia. Will continue to monitor mental status.    #CV -Hypotension responded to fluid bolus. Will do bedside POCUS once arrives to the unit. Will hold HTN meds at this time. Elevated trop, will continue to trend. Will obtain EKG. Elevated BNP noted.       #Pulm - acute hypercapnic respiratory failure w/ metabolic component as well, switch to high flow due to vomiting. Follow up ABG. CXR:No radiographic evidence of acute cardiopulmonary disease. Duonebs q 4 hrs. Steroids   #GI- NPO for now.   #Renal/metabolic - NANCY. monitor I/Os and electrolytes. Hyperkalemia noted. Will treat w medications.   #ID-  #Heme - chronic anemia, close to baseline; on AC for afib   #Endo -  monitor FS q6  #PPx -   #Dispo- transfer to ICU for close monitoring and high likelihood for respiratory compromise; prognosis guarded; Full code    Son updated by ICU PA. Will come to the hospital after work.    *History obtained from chart review and son Maldonado.     69 year old woman with a PMHx of Asthma (on prn supp O2), COPD (prev smoker quit 20 years ago), HTN, HFpEF, CVA with L sided deficits (as per son), A FIb, PE on Eliquis, and Liver mass (IR recommended for outpatient follow up w repeat imaging).   Pt presented to the ED with SOB that started the night prior. Per daughter in law, pt has been declining in her health, decreased appetite since her last hospital admission in March 2025. Has been bed bound last 3 years. In the ED, pt tripoding and was placed on BIPAP. Received nebs and steroids. Pt then developed episode of vomiting and switch to high flow NC. Labs significant for wbc 11.8, hgb 8.6, Na 121, K: 5.8, Bicarb 20, Creat: 1.38, trop: 62, pH: 7.11, pCO2: 57.   Pt developed hypotension and received 1 L of IV fluids with some improvement. Admitted to the ICU for further management.     Acute hypercapnic respiratory   Asthma/COPD Exacerbation   NANCY  Hyperkalemia  Metabolic acidosis  Hyponatremia        Plan:     #Neuro - pt is lethargic but arousable, due to hypercapnia. Will continue to monitor mental status.    #CV -Hypotension responded to fluid bolus. Will do bedside POCUS once arrives to the unit. Will hold HTN meds at this time. Elevated trop, will continue to trend. Will obtain EKG. Elevated BNP noted.     #Pulm - acute hypercapnic respiratory failure w/ metabolic component as well. Was switched to high flow due to vomiting. Follow up ABG. CXR with no radiographic evidence of acute cardiopulmonary disease. Duonebs q 4 hrs. Steroids   #GI- NPO for now.   #Renal/metabolic - NANCY. monitor I/Os and electrolytes. Hyperkalemia noted. Will treat w medications.   #ID-afebrile.   #Heme - appears to have chronic anemia, Hgb close to baseline. On AC for afib   #Endo -  monitor FS q6, ISS as needed   #PPx -   #Dispo- transfer to ICU for close monitoring and high likelihood for respiratory compromise; prognosis guarded; Full code    Son updated by ICU PA. Will come to the hospital after work.    *History obtained from chart review and son Maldonado.     69 year old woman with a PMHx of Asthma (on prn supp O2), COPD (prev smoker quit 20 years ago), HTN, HFpEF, CVA with L sided deficits (as per son), A FIb, PE on Eliquis, and Liver mass (IR recommended for outpatient follow up w repeat imaging).   Pt presented to the ED with SOB that started the night prior. Per daughter in law, pt has been declining in her health, decreased appetite since her last hospital admission in March 2025. Has been bed bound last 3 years. In the ED, pt tripoding and was placed on BIPAP. Received nebs and steroids. Pt then developed episode of vomiting and switch to high flow NC. Labs significant for wbc 11.8, hgb 8.6, Na 121, K: 5.8, Bicarb 20, Creat: 1.38, trop: 62, pH: 7.11, pCO2: 57.   Pt developed hypotension and received 1 L of IV fluids with some improvement. Admitted to the ICU for further management.     Acute hypercapnic respiratory   Asthma/COPD Exacerbation   NANCY  Hyperkalemia  Metabolic acidosis  Hyponatremia        Plan:     #Neuro - pt is lethargic but arousable, due to hypercapnia. Will continue to monitor mental status.    #CV -Hypotension responded to fluid bolus. Will do bedside POCUS once arrives to the unit. Will hold HTN meds at this time. Elevated trop, will continue to trend. Will obtain EKG. Elevated BNP noted.   #Pulm - acute hypercapnic respiratory failure w/ metabolic component as well. Was switched to high flow due to vomiting. Follow up ABG. CXR with no radiographic evidence of acute cardiopulmonary disease. Duonebs q 4 hrs. Steroids   #GI- NPO for now. zofran prn   #Renal/metabolic - NANCY. monitor I/Os and electrolytes. Hyperkalemia noted. Repeat BMP pending. Will treat w medications.   #ID- afebrile. Will start azithromycin for 5 day course.   #Heme - appears to have chronic anemia, Hgb close to baseline. PEr her discharge paperwork has been off AC since march 2025 due to abdominal wall hematoma.   #Endo -  monitor FS q6, ISS as needed   #PPx - DVT ppx.   #Dispo- transfer to ICU for close monitoring and high likelihood for respiratory compromise; prognosis guarded; Full code  Daughter in law and Son updated by ICU PA. Will come to the hospital after work.     Case discussed with  69 year old woman with a PMHx of Asthma (on prn supp O2), COPD (prev smoker quit 20 years ago), HTN, HFpEF, CVA with L sided deficits (as per son), A Fib, PE (off Eliquis and Aspirin since March 2025 due to abdominal wall hematoma), and Liver mass (IR recommended for outpatient follow up w repeat imaging).     Pt presented to the ED with SOB that started the night prior. Per daughter in law, pt has been declining in her health, decreased appetite since her last hospital admission in March 2025. Has been bed bound last 4 years. In the ED, pt with respiratory distress and tripoding. Pt was placed on BIPAP. Received nebs and steroids. Pt then developed episode of vomiting and switch to high flow NC. Labs significant for wbc 11.8, hgb 8.6, Na 121, K: 5.8, Bicarb 20, Creat: 1.38, trop: 62, pH: 7.11, pCO2: 57.  Pt developed hypotension and received 1 L of IV fluids with some improvement. Admitted to the ICU for further management.     Acute hypercapnic respiratory   Asthma/COPD Exacerbation   NANCY  Hyperkalemia  Metabolic acidosis  Hyponatremia  Anemia   Coffee ground vomiting  Slightly elevated troponin likely demand         Plan:     #Neuro - pt is lethargic but arousable, due to hypercapnia. Will continue to monitor mental status.    #CV -Hypotension responded to fluid bolus. Will do bedside POCUS once arrives to the unit. Will hold HTN meds at this time. Elevated trop, will continue to trend. Likely demand. EKG done in ED. Elevated BNP noted.   #Pulm - acute hypercapnic respiratory failure w/ metabolic component as well. Was switched to high flow due to vomiting. Follow up ABG. CXR with no radiographic evidence of acute cardiopulmonary disease. Duonebs q 4 hrs. Steroids   #GI- NPO for now. PPI q 12 hrs. zofran prn.   #Renal/metabolic - NANCY. Monitor I/Os and electrolytes. Hyperkalemia and hyponatremia noted. Pt got 1 L of IV fluids in ED. Repeat BMP pending.   #ID- afebrile. Will start azithromycin for 5 day course.   #Heme - appears to have chronic anemia, Hgb close to baseline. Per her discharge paperwork has been off AC since march 2025 due to abdominal wall hematoma.  #Endo -  monitor FS q6, ISS as needed   #PPx - DVT ppx.   #Dispo- transfer to ICU for close monitoring and high likelihood for respiratory compromise; prognosis guarded; Full code per son.  Daughter in law and Son updated by ICU PA. He will come to the hospital after work.     Case discussed with EICU attending, Dr. Borjas.    69 year old woman with a PMHx of Asthma (on prn supp O2), COPD (prev smoker quit 20 years ago), HTN, HFpEF, CVA with L sided deficits (as per son), A Fib, PE (off Eliquis and Aspirin since March 2025 due to abdominal wall hematoma), and Liver mass (IR recommended for outpatient follow up w repeat imaging).     Pt presented to the ED with SOB that started the night prior. Per daughter in law, pt has been declining in her health, decreased appetite since her last hospital admission in March 2025. Has been bed bound last 4 years. In the ED, pt with respiratory distress and tripoding. Pt was placed on BIPAP. Received nebs and steroids. Pt then developed episode of vomiting and switch to high flow NC. Labs significant for wbc 11.8, hgb 8.6, Na 121, K: 5.8, Bicarb 20, Creat: 1.38, trop: 62, pH: 7.11, pCO2: 57.  Pt developed hypotension and received 1 L of IV fluids with some improvement. Admitted to the ICU for further management.     Acute hypercapnic respiratory   Asthma/COPD Exacerbation   NANCY  Hyperkalemia  Metabolic acidosis  Hyponatremia  Anemia   Coffee ground vomiting  Slightly elevated troponin likely demand         Plan:     #Neuro - pt is lethargic but arousable, due to hypercapnia. Will continue to monitor mental status.    #CV -Hypotension responded to fluid bolus. Will do bedside POCUS once arrives to the unit. Will hold HTN meds at this time. Elevated trop, will continue to trend. Likely demand. EKG done in ED. Elevated BNP noted.   #Pulm - acute hypercapnic respiratory failure w/ metabolic component as well. Was switched to high flow due to vomiting. Follow up ABG. CXR with no radiographic evidence of acute cardiopulmonary disease. Duonebs q 4 hrs. Steroids   #GI- NPO for now. PPI q 12 hrs. zofran prn.   #Renal/metabolic - NANCY. Monitor I/Os and electrolytes. Hyperkalemia and hyponatremia noted. Pt got 1 L of IV fluids in ED. Repeat BMP pending.   #ID- afebrile. Will start azithromycin for 5 day course.   #Heme - appears to have chronic anemia, Hgb close to baseline. Per her discharge paperwork has been off AC since march 2025 due to abdominal wall hematoma.  #Endo -  monitor FS q6, ISS as needed   #PPx - DVT ppx with SCD now due to coffee grounds emesis.   #Dispo- transfer to ICU for close monitoring and high likelihood for respiratory compromise; prognosis guarded; Full code per son.  Daughter in law and Son updated by ICU PA. He will come to the hospital after work.     Case discussed with EICU attending, Dr. Borjas.    69 year old woman with a PMHx of Asthma (on prn supp O2), COPD (prev smoker quit 20 years ago), HTN, HFpEF, CVA with L sided deficits (as per son), A Fib, PE (off Eliquis and Aspirin since March 2025 due to abdominal wall hematoma), and Liver mass (IR recommended for outpatient follow up w repeat imaging).     Pt presented to the ED with SOB that started the night prior. Per daughter in law, pt has been declining in her health, decreased appetite since her last hospital admission in March 2025. Has been bed bound last 4 years. In the ED, pt with respiratory distress and tripoding. Pt was placed on BIPAP. Received nebs and steroids. Pt then developed episode of coffee ground vomiting and switch to high flow NC. Labs significant for wbc 11.8, hgb 8.6, Na 121, K: 5.8, Bicarb 20, Creat: 1.38, trop: 62, pH: 7.11, pCO2: 57.  Pt developed hypotension and received 1 L of IV fluids with some improvement. Admitted to the ICU for further management.     Acute hypercapnic respiratory   Asthma/COPD Exacerbation   NANCY  Hyperkalemia  Metabolic acidosis  Hyponatremia  Anemia   Coffee ground vomiting  Slightly elevated troponin likely demand         Plan:     #Neuro - pt is lethargic but arousable, due to hypercapnia. Will continue to monitor mental status.    #CV -Hypotension responded to fluid bolus. Will do bedside POCUS once arrives to the unit. Will hold HTN meds at this time. Elevated trop, will continue to trend. Likely demand. EKG done in ED. Elevated BNP noted.   #Pulm - acute hypercapnic respiratory failure w/ metabolic component as well. Was switched to high flow in the ED. Follow up ABG. CXR with no radiographic evidence of acute cardiopulmonary disease. Duonebs q 4 hrs. Steroids. Will trial BIPAP since pt has stopped vomiting.   #GI- NPO for now. PPI q 12 hrs. zofran prn.   #Renal/metabolic - NANCY. Monitor I/Os and electrolytes. Hyperkalemia and hyponatremia noted. Pt got 1 L of IV fluids in ED. Repeat BMP pending. Medical management for hyperkalemia.   #ID- afebrile. Will start azithromycin for 5 day course.   #Heme - appears to have chronic anemia, Hgb close to baseline. Per her discharge paperwork has been off AC since march 2025 due to abdominal wall hematoma.  #Endo -  monitor FS q6, ISS as needed   #PPx - DVT ppx with SCD now due to coffee grounds emesis.   #Dispo- transfer to ICU for close monitoring and high likelihood for respiratory compromise; prognosis guarded; Full code per son.  Daughter in law and Son updated by ICU PA. He will come to the hospital after work.     Case discussed with EICU attending, Dr. Borjas.

## 2025-06-08 NOTE — ED PROVIDER NOTE - CLINICAL SUMMARY MEDICAL DECISION MAKING FREE TEXT BOX
Elderly female, Lithuanian speaking, history being provided by the son who speaks English on the phone as well as a daughter-in-law who is at bedside.  With hx of COPD on 3 L at home.  History of asthma..  Patient presenting to the ED reporting shortness of breath and dry cough that started last night.  Also endorsing chest tightness.  No fevers no chills no sick contacts.  Family states she is never needed to be intubated or needed BiPAP for asthma.  They did say she was admitted to [facility] 2 [facility] for breathing issues.  Patient is a full code.  EMS gave 12 Decadron and DuoNebs and placed on BiPAP prior to ED arrival.  The patient arrives on the BiPAP mask with positive work of breathing but normal O2 sat.  Normal blood pressure.  Afebrile.  Positive tachypnea.  Wheezing on auscultation in all fields.  Appears to be having respiratory distress.  Tripoding.  Otherwise neurovascularly intact airway intact no abdominal tenderness or distention or guarding or rigidity nonfocal neuroexam skin is normal.  No peripheral edema.    Will continue DuoNebs via BiPAP.  Will give time for the steroids to kick in.  Patient will need admission for severe work of breathing.  Blood gases pending labs are pending chest x-ray is pending.    Patient reassessed.  Work of breathing appears improved after about an hour of BiPAP.  The patient wants the mask off at this time.  Will take the mask off.  She is on 3 L of nasal cannula.  Minimal work of breathing at this time but will continue to reassess.

## 2025-06-08 NOTE — ED ADULT NURSE NOTE - OBJECTIVE STATEMENT
Patient is a 69 years old female, BIBA from home fr SOB, chest pain. As per family member, patient has been having SOB x 1 week and got worse last night. Patient is a 69 years old female, BIBA from home fr SOB, chest pain. As per family member, patient has been having SOB x 1 week and got worse last night. Received Decadron and Duoneb from EMS.  PMHX: COPD (on home oxygen 3L ), Asthma,

## 2025-06-08 NOTE — H&P ADULT - NSHPLABSRESULTS_GEN_ALL_CORE
Lab Results:  CBC  CBC Full  -  ( 08 Jun 2025 18:17 )  WBC Count : 11.88 K/uL  RBC Count : 3.67 M/uL  Hemoglobin : 8.6 g/dL  Hematocrit : 28.6 %  Platelet Count - Automated : 278 K/uL  Mean Cell Volume : 77.9 fl  Mean Cell Hemoglobin : 23.4 pg  Mean Cell Hemoglobin Concentration : 30.1 g/dL  Auto Neutrophil # : x  Auto Lymphocyte # : x  Auto Monocyte # : x  Auto Eosinophil # : x  Auto Basophil # : x  Auto Neutrophil % : x  Auto Lymphocyte % : x  Auto Monocyte % : x  Auto Eosinophil % : x  Auto Basophil % : x    .		Differential:	[] Automated		[] Manual  Chemistry                        8.6    11.88 )-----------( 278      ( 08 Jun 2025 18:17 )             28.6     06-08    121[L]  |  92[L]  |  27[H]  ----------------------------<  127[H]  5.8[H]   |  20[L]  |  1.38[H]    Ca    8.5      08 Jun 2025 18:17    TPro  7.5  /  Alb  3.0[L]  /  TBili  0.4  /  DBili  x   /  AST  20  /  ALT  20  /  AlkPhos  115  06-08    LIVER FUNCTIONS - ( 08 Jun 2025 18:17 )  Alb: 3.0 g/dL / Pro: 7.5 gm/dL / ALK PHOS: 115 U/L / ALT: 20 U/L / AST: 20 U/L / GGT: x             Urinalysis Basic - ( 08 Jun 2025 18:17 )    Color: x / Appearance: x / SG: x / pH: x  Gluc: 127 mg/dL / Ketone: x  / Bili: x / Urobili: x   Blood: x / Protein: x / Nitrite: x   Leuk Esterase: x / RBC: x / WBC x   Sq Epi: x / Non Sq Epi: x / Bacteria: x        ABG - ( 08 Jun 2025 20:51 )  pH, Arterial: 7.11  pH, Blood: x     /  pCO2: 57    /  pO2: 82    / HCO3: 18    / Base Excess: -10.8 /  SaO2: 96.0

## 2025-06-08 NOTE — ED ADULT NURSE NOTE - ED STAT RN HANDOFF DETAILS
handoff report given to ICU RN Austin. All pending orders endorsed, safety precautions maintained. Pt and family updated on plan of care.

## 2025-06-08 NOTE — ED PROVIDER NOTE - OBJECTIVE STATEMENT
Nonstress Test   Patient: Adwoa Felix    Gestation: 30w4d    NST:       Variability: Moderate           Accelerations: Yes           Decelerations: None            Baseline: 140 BPM           Uterine Irritability: Yes           Contractions: Irregular
See MDM Section

## 2025-06-08 NOTE — ED ADULT NURSE NOTE - CHIEF COMPLAINT QUOTE
BIBA from home for difficulty breathing since last night, as per emt, patient was sating around 96% on RA but appears tachypneic, uses accessory muscles to breath, was given 2 combivent treatments and 12mg dexamethasone IM by emt, patient arrived with CPAP

## 2025-06-08 NOTE — ED PROVIDER NOTE - BIRTH SEX
Message from [employer]:  Abiola MAGUIREMagdi Mendozaeric would like a refill of the following medications:     busPIRone (BUSPAR) 5 MG tablet [Nivia Cope MD]    Preferred pharmacy: [facility] [address] [place], [place] [address] [facility]  Delivery method: Pickup        Medication renewals requested in this message routed separately:     albuterol 108 (90 Base) MCG/ACT inhaler [ARSH Tariq]     Female

## 2025-06-09 PROBLEM — I63.9 CEREBRAL INFARCTION, UNSPECIFIED: Chronic | Status: ACTIVE | Noted: 2025-02-27

## 2025-06-09 PROBLEM — I48.91 UNSPECIFIED ATRIAL FIBRILLATION: Chronic | Status: ACTIVE | Noted: 2025-02-27

## 2025-06-09 PROBLEM — J45.909 UNSPECIFIED ASTHMA, UNCOMPLICATED: Chronic | Status: ACTIVE | Noted: 2025-02-27

## 2025-06-09 PROBLEM — E78.5 HYPERLIPIDEMIA, UNSPECIFIED: Chronic | Status: ACTIVE | Noted: 2025-02-27

## 2025-06-09 LAB
ABO RH CONFIRMATION: SIGNIFICANT CHANGE UP
ALBUMIN SERPL ELPH-MCNC: 2.5 G/DL — LOW (ref 3.3–5)
ALBUMIN SERPL ELPH-MCNC: 2.6 G/DL — LOW (ref 3.3–5)
ALP SERPL-CCNC: 101 U/L — SIGNIFICANT CHANGE UP (ref 40–120)
ALP SERPL-CCNC: 93 U/L — SIGNIFICANT CHANGE UP (ref 40–120)
ALT FLD-CCNC: 14 U/L — SIGNIFICANT CHANGE UP (ref 12–78)
ALT FLD-CCNC: 17 U/L — SIGNIFICANT CHANGE UP (ref 12–78)
ANION GAP SERPL CALC-SCNC: 10 MMOL/L — SIGNIFICANT CHANGE UP (ref 5–17)
ANION GAP SERPL CALC-SCNC: 8 MMOL/L — SIGNIFICANT CHANGE UP (ref 5–17)
ANISOCYTOSIS BLD QL: SLIGHT — SIGNIFICANT CHANGE UP
AST SERPL-CCNC: 15 U/L — SIGNIFICANT CHANGE UP (ref 15–37)
AST SERPL-CCNC: 16 U/L — SIGNIFICANT CHANGE UP (ref 15–37)
BASE EXCESS BLDA CALC-SCNC: -8.5 MMOL/L — LOW (ref -2–3)
BASOPHILS # BLD AUTO: 0 K/UL — SIGNIFICANT CHANGE UP (ref 0–0.2)
BASOPHILS NFR BLD AUTO: 0 % — SIGNIFICANT CHANGE UP (ref 0–2)
BILIRUB SERPL-MCNC: 0.4 MG/DL — SIGNIFICANT CHANGE UP (ref 0.2–1.2)
BILIRUB SERPL-MCNC: 0.4 MG/DL — SIGNIFICANT CHANGE UP (ref 0.2–1.2)
BLD GP AB SCN SERPL QL: SIGNIFICANT CHANGE UP
BLOOD GAS COMMENTS ARTERIAL: SIGNIFICANT CHANGE UP
BUN SERPL-MCNC: 26 MG/DL — HIGH (ref 7–23)
BUN SERPL-MCNC: 26 MG/DL — HIGH (ref 7–23)
BURR CELLS BLD QL SMEAR: PRESENT — SIGNIFICANT CHANGE UP
CALCIUM SERPL-MCNC: 8 MG/DL — LOW (ref 8.5–10.1)
CALCIUM SERPL-MCNC: 8.5 MG/DL — SIGNIFICANT CHANGE UP (ref 8.5–10.1)
CHLORIDE SERPL-SCNC: 98 MMOL/L — SIGNIFICANT CHANGE UP (ref 96–108)
CHLORIDE SERPL-SCNC: 99 MMOL/L — SIGNIFICANT CHANGE UP (ref 96–108)
CO2 BLDA-SCNC: 18 MMOL/L — LOW (ref 19–24)
CO2 SERPL-SCNC: 17 MMOL/L — LOW (ref 22–31)
CO2 SERPL-SCNC: 18 MMOL/L — LOW (ref 22–31)
CREAT SERPL-MCNC: 1.2 MG/DL — SIGNIFICANT CHANGE UP (ref 0.5–1.3)
CREAT SERPL-MCNC: 1.33 MG/DL — HIGH (ref 0.5–1.3)
EGFR: 43 ML/MIN/1.73M2 — LOW
EGFR: 43 ML/MIN/1.73M2 — LOW
EGFR: 49 ML/MIN/1.73M2 — LOW
EGFR: 49 ML/MIN/1.73M2 — LOW
ELLIPTOCYTES BLD QL SMEAR: SIGNIFICANT CHANGE UP
EOSINOPHIL # BLD AUTO: 0.07 K/UL — SIGNIFICANT CHANGE UP (ref 0–0.5)
EOSINOPHIL NFR BLD AUTO: 1 % — SIGNIFICANT CHANGE UP (ref 0–6)
GAS PNL BLDA: SIGNIFICANT CHANGE UP
GLUCOSE BLDC GLUCOMTR-MCNC: 135 MG/DL — HIGH (ref 70–99)
GLUCOSE BLDC GLUCOMTR-MCNC: 154 MG/DL — HIGH (ref 70–99)
GLUCOSE BLDC GLUCOMTR-MCNC: 161 MG/DL — HIGH (ref 70–99)
GLUCOSE BLDC GLUCOMTR-MCNC: 165 MG/DL — HIGH (ref 70–99)
GLUCOSE BLDC GLUCOMTR-MCNC: 173 MG/DL — HIGH (ref 70–99)
GLUCOSE BLDC GLUCOMTR-MCNC: 211 MG/DL — HIGH (ref 70–99)
GLUCOSE BLDC GLUCOMTR-MCNC: 248 MG/DL — HIGH (ref 70–99)
GLUCOSE SERPL-MCNC: 179 MG/DL — HIGH (ref 70–99)
GLUCOSE SERPL-MCNC: 182 MG/DL — HIGH (ref 70–99)
HCO3 BLDA-SCNC: 17 MMOL/L — LOW (ref 21–28)
HCT VFR BLD CALC: 23.9 % — LOW (ref 34.5–45)
HCT VFR BLD CALC: 26.6 % — LOW (ref 34.5–45)
HGB BLD-MCNC: 7 G/DL — CRITICAL LOW (ref 11.5–15.5)
HGB BLD-MCNC: 7.8 G/DL — LOW (ref 11.5–15.5)
HOROWITZ INDEX BLDA+IHG-RTO: 30 — SIGNIFICANT CHANGE UP
HYPOCHROMIA BLD QL: SLIGHT — SIGNIFICANT CHANGE UP
LYMPHOCYTES # BLD AUTO: 0.64 K/UL — LOW (ref 1–3.3)
LYMPHOCYTES # BLD AUTO: 9 % — LOW (ref 13–44)
MAGNESIUM SERPL-MCNC: 1.8 MG/DL — SIGNIFICANT CHANGE UP (ref 1.6–2.6)
MAGNESIUM SERPL-MCNC: 1.8 MG/DL — SIGNIFICANT CHANGE UP (ref 1.6–2.6)
MANUAL SMEAR VERIFICATION: SIGNIFICANT CHANGE UP
MCHC RBC-ENTMCNC: 23 PG — LOW (ref 27–34)
MCHC RBC-ENTMCNC: 23.6 PG — LOW (ref 27–34)
MCHC RBC-ENTMCNC: 29.3 G/DL — LOW (ref 32–36)
MCHC RBC-ENTMCNC: 29.3 G/DL — LOW (ref 32–36)
MCV RBC AUTO: 78.6 FL — LOW (ref 80–100)
MCV RBC AUTO: 80.6 FL — SIGNIFICANT CHANGE UP (ref 80–100)
MICROCYTES BLD QL: SLIGHT — SIGNIFICANT CHANGE UP
MONOCYTES # BLD AUTO: 0.07 K/UL — SIGNIFICANT CHANGE UP (ref 0–0.9)
MONOCYTES NFR BLD AUTO: 1 % — LOW (ref 2–14)
NEUTROPHILS # BLD AUTO: 6.34 K/UL — SIGNIFICANT CHANGE UP (ref 1.8–7.4)
NEUTROPHILS NFR BLD AUTO: 89 % — HIGH (ref 43–77)
NRBC # BLD: 0 /100 WBCS — SIGNIFICANT CHANGE UP (ref 0–0)
NRBC BLD AUTO-RTO: 0 /100 WBCS — SIGNIFICANT CHANGE UP (ref 0–0)
NRBC BLD AUTO-RTO: SIGNIFICANT CHANGE UP /100 WBCS (ref 0–0)
NRBC BLD-RTO: 0 /100 WBCS — SIGNIFICANT CHANGE UP (ref 0–0)
OSMOLALITY UR: 276 MOSM/KG — SIGNIFICANT CHANGE UP (ref 50–1200)
PCO2 BLDA: 36 MMHG — SIGNIFICANT CHANGE UP (ref 32–46)
PH BLDA: 7.29 — LOW (ref 7.35–7.45)
PHOSPHATE SERPL-MCNC: 4.7 MG/DL — HIGH (ref 2.5–4.5)
PHOSPHATE SERPL-MCNC: 5.4 MG/DL — HIGH (ref 2.5–4.5)
PLAT MORPH BLD: NORMAL — SIGNIFICANT CHANGE UP
PLATELET # BLD AUTO: 212 K/UL — SIGNIFICANT CHANGE UP (ref 150–400)
PLATELET # BLD AUTO: 219 K/UL — SIGNIFICANT CHANGE UP (ref 150–400)
PO2 BLDA: 171 MMHG — HIGH (ref 83–108)
POIKILOCYTOSIS BLD QL AUTO: SIGNIFICANT CHANGE UP
POTASSIUM SERPL-MCNC: 5.3 MMOL/L — SIGNIFICANT CHANGE UP (ref 3.5–5.3)
POTASSIUM SERPL-MCNC: 5.7 MMOL/L — HIGH (ref 3.5–5.3)
POTASSIUM SERPL-SCNC: 5.3 MMOL/L — SIGNIFICANT CHANGE UP (ref 3.5–5.3)
POTASSIUM SERPL-SCNC: 5.7 MMOL/L — HIGH (ref 3.5–5.3)
PROCALCITONIN SERPL-MCNC: 0.22 NG/ML — HIGH (ref 0.02–0.1)
PROT SERPL-MCNC: 6 GM/DL — SIGNIFICANT CHANGE UP (ref 6–8.3)
PROT SERPL-MCNC: 6.5 GM/DL — SIGNIFICANT CHANGE UP (ref 6–8.3)
RBC # BLD: 3.04 M/UL — LOW (ref 3.8–5.2)
RBC # BLD: 3.3 M/UL — LOW (ref 3.8–5.2)
RBC # FLD: 16.3 % — HIGH (ref 10.3–14.5)
RBC # FLD: 16.3 % — HIGH (ref 10.3–14.5)
RBC BLD AUTO: ABNORMAL
SAO2 % BLDA: 98.8 % — HIGH (ref 94–98)
SODIUM SERPL-SCNC: 125 MMOL/L — LOW (ref 135–145)
SODIUM SERPL-SCNC: 125 MMOL/L — LOW (ref 135–145)
SODIUM UR-SCNC: 37 MMOL/L — SIGNIFICANT CHANGE UP
TROPONIN I, HIGH SENSITIVITY RESULT: 66.2 NG/L — HIGH
WBC # BLD: 6.09 K/UL — SIGNIFICANT CHANGE UP (ref 3.8–10.5)
WBC # BLD: 7.12 K/UL — SIGNIFICANT CHANGE UP (ref 3.8–10.5)
WBC # FLD AUTO: 6.09 K/UL — SIGNIFICANT CHANGE UP (ref 3.8–10.5)
WBC # FLD AUTO: 7.12 K/UL — SIGNIFICANT CHANGE UP (ref 3.8–10.5)

## 2025-06-09 PROCEDURE — 74177 CT ABD & PELVIS W/CONTRAST: CPT | Mod: 26

## 2025-06-09 PROCEDURE — 99291 CRITICAL CARE FIRST HOUR: CPT

## 2025-06-09 PROCEDURE — 71275 CT ANGIOGRAPHY CHEST: CPT | Mod: 26

## 2025-06-09 RX ORDER — DEXTROSE 50 % IN WATER 50 %
50 SYRINGE (ML) INTRAVENOUS ONCE
Refills: 0 | Status: COMPLETED | OUTPATIENT
Start: 2025-06-09 | End: 2025-06-09

## 2025-06-09 RX ORDER — MAGNESIUM SULFATE 500 MG/ML
2 SYRINGE (ML) INJECTION ONCE
Refills: 0 | Status: COMPLETED | OUTPATIENT
Start: 2025-06-09 | End: 2025-06-09

## 2025-06-09 RX ORDER — CALCIUM GLUCONATE 20 MG/ML
1 INJECTION, SOLUTION INTRAVENOUS ONCE
Refills: 0 | Status: COMPLETED | OUTPATIENT
Start: 2025-06-09 | End: 2025-06-09

## 2025-06-09 RX ADMIN — Medication 40 MILLIGRAM(S): at 05:50

## 2025-06-09 RX ADMIN — METHYLPREDNISOLONE ACETATE 40 MILLIGRAM(S): 80 INJECTION, SUSPENSION INTRA-ARTICULAR; INTRALESIONAL; INTRAMUSCULAR; SOFT TISSUE at 22:46

## 2025-06-09 RX ADMIN — CALCIUM GLUCONATE 100 GRAM(S): 20 INJECTION, SOLUTION INTRAVENOUS at 00:42

## 2025-06-09 RX ADMIN — IPRATROPIUM BROMIDE AND ALBUTEROL SULFATE 3 MILLILITER(S): .5; 2.5 SOLUTION RESPIRATORY (INHALATION) at 17:20

## 2025-06-09 RX ADMIN — IPRATROPIUM BROMIDE AND ALBUTEROL SULFATE 3 MILLILITER(S): .5; 2.5 SOLUTION RESPIRATORY (INHALATION) at 02:07

## 2025-06-09 RX ADMIN — Medication 25 GRAM(S): at 04:49

## 2025-06-09 RX ADMIN — Medication 4 MILLIGRAM(S): at 18:00

## 2025-06-09 RX ADMIN — Medication 1 APPLICATION(S): at 05:50

## 2025-06-09 RX ADMIN — Medication 1 DOSE(S): at 19:20

## 2025-06-09 RX ADMIN — Medication 255 MILLIGRAM(S): at 20:10

## 2025-06-09 RX ADMIN — Medication 50 MILLILITER(S): at 00:39

## 2025-06-09 RX ADMIN — METHYLPREDNISOLONE ACETATE 40 MILLIGRAM(S): 80 INJECTION, SUSPENSION INTRA-ARTICULAR; INTRALESIONAL; INTRAMUSCULAR; SOFT TISSUE at 11:38

## 2025-06-09 RX ADMIN — IPRATROPIUM BROMIDE AND ALBUTEROL SULFATE 3 MILLILITER(S): .5; 2.5 SOLUTION RESPIRATORY (INHALATION) at 21:32

## 2025-06-09 RX ADMIN — IPRATROPIUM BROMIDE AND ALBUTEROL SULFATE 3 MILLILITER(S): .5; 2.5 SOLUTION RESPIRATORY (INHALATION) at 05:35

## 2025-06-09 RX ADMIN — Medication 40 MILLIGRAM(S): at 18:00

## 2025-06-09 RX ADMIN — Medication 5 UNIT(S): at 00:39

## 2025-06-09 RX ADMIN — Medication 4 MILLIGRAM(S): at 05:50

## 2025-06-09 RX ADMIN — IPRATROPIUM BROMIDE AND ALBUTEROL SULFATE 3 MILLILITER(S): .5; 2.5 SOLUTION RESPIRATORY (INHALATION) at 13:36

## 2025-06-09 RX ADMIN — IPRATROPIUM BROMIDE AND ALBUTEROL SULFATE 3 MILLILITER(S): .5; 2.5 SOLUTION RESPIRATORY (INHALATION) at 09:55

## 2025-06-09 NOTE — PROGRESS NOTE ADULT - SUBJECTIVE AND OBJECTIVE BOX
Date of service  is equal to the date of entry    Patient is a 69y old  Female who presents with a chief complaint of Asthma Exacerbation (08 Jun 2025 20:54)    PAST MEDICAL & SURGICAL HISTORY:  Brain aneurysm      Hypertension      Smoking      Atrial fibrillation      Asthma      Hyperlipidemia      CVA (cerebrovascular accident)      No significant past surgical history        MARKIE HERNANDEZ   69y    Female    BRIEF HOSPITAL COURSE:    Review of Systems:                       All other ROS are negative.    Allergies    No Known Allergies    Intolerances      Weight (kg): 56.8 (06-08-25 @ 21:55)  BMI (kg/m2): 25.3 (06-08-25 @ 21:55)    ICU Vital Signs Last 24 Hrs  T(C): 36.6 (09 Jun 2025 03:00), Max: 37.6 (08 Jun 2025 23:56)  T(F): 97.8 (09 Jun 2025 03:00), Max: 99.6 (08 Jun 2025 23:56)  HR: 71 (09 Jun 2025 03:00) (66 - 101)  BP: 115/46 (09 Jun 2025 03:00) (78/56 - 139/119)  BP(mean): 65 (09 Jun 2025 03:00) (51 - 98)  ABP: --  ABP(mean): --  RR: 19 (09 Jun 2025 03:00) (17 - 30)  SpO2: 100% (09 Jun 2025 03:00) (90% - 100%)    O2 Parameters below as of 09 Jun 2025 02:08  Patient On (Oxygen Delivery Method): BiPAP/CPAP, 30 %           Physical Examination:    General: drowsy but arousable     HEENT: no JVD     PULM:  bilateral BS diminished.  Few wheezes      CVS: s1 s2 reg     ABD: soft     EXT:  No edema     SKIN: warm     Neuro: BROWN lethargic     ABG - ( 09 Jun 2025 02:52 )  pH, Arterial: 7.29  pH, Blood: x     /  pCO2: 36    /  pO2: 171   / HCO3: 17    / Base Excess: -8.5  /  SaO2: 98.8          LABS:                        8.6    11.88 )-----------( 278      ( 08 Jun 2025 18:17 )             28.6     06-08    125[L]  |  98  |  26[H]  ----------------------------<  179[H]  5.7[H]   |  17[L]  |  1.33[H]    Ca    8.0[L]      08 Jun 2025 23:33  Phos  5.4     06-08  Mg     1.8     06-08    TPro  6.5  /  Alb  2.6[L]  /  TBili  0.4  /  DBili  x   /  AST  16  /  ALT  17  /  AlkPhos  101  06-08      CARDIAC MARKERS ( 08 Jun 2025 23:33 )  x     / x     / x     / x     / 4.4 ng/mL      CAPILLARY BLOOD GLUCOSE      POCT Blood Glucose.: 211 mg/dL (09 Jun 2025 02:28)  POCT Blood Glucose.: 248 mg/dL (09 Jun 2025 01:33)  POCT Blood Glucose.: 165 mg/dL (09 Jun 2025 00:32)  POCT Blood Glucose.: 180 mg/dL (08 Jun 2025 23:07)    PT/INR - ( 08 Jun 2025 23:33 )   PT: 16.9 sec;   INR: 1.50 ratio         PTT - ( 08 Jun 2025 23:33 )  PTT:22.8 sec  Urinalysis Basic - ( 08 Jun 2025 23:33 )    Color: x / Appearance: x / SG: x / pH: x  Gluc: 179 mg/dL / Ketone: x  / Bili: x / Urobili: x   Blood: x / Protein: x / Nitrite: x   Leuk Esterase: x / RBC: x / WBC x   Sq Epi: x / Non Sq Epi: x / Bacteria: x      CULTURES:      Medications:  MEDICATIONS  (STANDING):  albuterol/ipratropium for Nebulization 3 milliLiter(s) Nebulizer every 4 hours  azithromycin  IVPB      azithromycin  IVPB 500 milliGRAM(s) IV Intermittent every 24 hours  chlorhexidine 2% Cloths 1 Application(s) Topical <User Schedule>  methylPREDNISolone sodium succinate Injectable 40 milliGRAM(s) IV Push every 12 hours  ondansetron Injectable 4 milliGRAM(s) IV Push every 12 hours  pantoprazole  Injectable 40 milliGRAM(s) IV Push every 12 hours    MEDICATIONS  (PRN):          RADIOLOGY/IMAGING/ECHO      < from: Xray Chest 1 View- PORTABLE-Urgent (06.08.25 @ 18:23) >  IMPRESSION:    No radiographic evidence of acute cardiopulmonary disease    --- End of Report ---        Assessment/Plan:    69F hx  asthma/COPD ex smoker  (on prn supp O2), COPD  HTN, HFpEF, CVA with L sided deficits, A Fib, PE not on AC  due to abdominal wall hematoma in March,  and a cystic liver mass     Here with SOB and cough  mixed resp/metabolic acidosis       AECOPD with Type 2 resp failure not responding to BIPAP,   improved with AVAPS   BD therapies IV steroids.   Empiric azithro  No infiltrate on CXR   NANCY with Non anion gap  metabolic acidosis and hyperkalemia.  K+   Shifting therapies given.     Hypo-osmolar hyponatremia  Low solute r/o SIADH   s/p saline bolus watch UOP and repeat NA+  Limit 6-8 meq in 24 hrs.    Liver lesion for future BX     Should resume AC as soon as feasible.  Recent PE and high stroke risk,    Will see about liver bx while admitted.        CRITICAL CARE TIME SPENT:    37 minutes assessing presenting problems of acute illness, which pose high probability of life threatening deterioration or end organ damage/dysfunction, as well as medical decision making including initiating plan of care, reviewing data, reviewing radiologic exams, discussing with multidisciplinary team,  discussing goals of care with patient/family, and writing this note.  Non-inclusive of procedures performed.  The date of service for this encounter is the entered date.

## 2025-06-09 NOTE — PROGRESS NOTE ADULT - SUBJECTIVE AND OBJECTIVE BOX
HPI:  *History obtained from chart review and son Maldonado.     69 year old woman with a PMHx of Asthma (on prn supp O2), COPD (prev smoker quit 20 years ago), HTN, HFpEF, CVA with L sided deficits (as per son), A Fib, PE (off Eliquis and Aspirin since March 2025 due to abdominal wall hematoma), and Liver mass (IR recommended for outpatient follow up w repeat imaging).     Pt presented to the ED with SOB that started the night prior. Per daughter in law, pt has been declining in her health, decreased appetite since her last hospital admission in March 2025. Has been bed bound last 3 years. In the ED, pt tripoding and was placed on BIPAP. Received nebs and steroids. Pt then developed episode of coffee ground vomiting and switch to high flow NC. Labs significant for wbc 11.8, hgb 8.6, Na 121, K: 5.8, Bicarb 20, Creat: 1.38, trop: 62, pH: 7.11, pCO2: 57.   Pt developed hypotension and received 1 L of IV fluids with some improvement. Admitted to the ICU for further management.        (08 Jun 2025 20:54)      24 hr events: Placed on HFNC and transferred to ICU. Feels a little better this morning.  Breathing improving. No chest pain, fevers, chills, nausea, emesis, or dairrhea.     ## ROS:  [x ] unable to obtain      ## Vitals  ICU Vital Signs Last 24 Hrs  T(C): 33.2 (09 Jun 2025 09:30), Max: 37.6 (08 Jun 2025 23:56)  T(F): 91.8 (09 Jun 2025 09:30), Max: 99.6 (08 Jun 2025 23:56)  HR: 87 (09 Jun 2025 09:30) (60 - 101)  BP: 134/113 (09 Jun 2025 09:30) (78/56 - 139/119)  BP(mean): 121 (09 Jun 2025 09:30) (51 - 121)  ABP: --  ABP(mean): --  RR: 17 (09 Jun 2025 09:30) (15 - 30)  SpO2: 96% (09 Jun 2025 09:30) (90% - 100%)    O2 Parameters below as of 09 Jun 2025 06:28  Patient On (Oxygen Delivery Method): BiPAP/CPAP            ## Physical Exam:  Gen: Adult female lying in bed, NAD  HEENT: NC/AT sclerae anicteric. HFNC in place  Resp: No increased WOB, CTAB  CV: S1, S2  Abd: Soft, + BS  Ext: WWP  Neuro: Awake, alert, follows commands    ## Vent Data      ## Labs:  Chem:  06-09    125[L]  |  99  |  26[H]  ----------------------------<  182[H]  5.3   |  18[L]  |  1.20    Ca    8.5      09 Jun 2025 03:30  Phos  4.7     06-09  Mg     1.8     06-09    TPro  6.0  /  Alb  2.5[L]  /  TBili  0.4  /  DBili  x   /  AST  15  /  ALT  14  /  AlkPhos  93  06-09    LIVER FUNCTIONS - ( 09 Jun 2025 03:30 )  Alb: 2.5 g/dL / Pro: 6.0 gm/dL / ALK PHOS: 93 U/L / ALT: 14 U/L / AST: 15 U/L / GGT: x           CBC:                        7.0    7.12  )-----------( 212      ( 09 Jun 2025 03:30 )             23.9     Coags:  PT/INR - ( 08 Jun 2025 23:33 )   PT: 16.9 sec;   INR: 1.50 ratio         PTT - ( 08 Jun 2025 23:33 )  PTT:22.8 sec    Urinalysis Basic - ( 09 Jun 2025 03:30 )    Color: x / Appearance: x / SG: x / pH: x  Gluc: 182 mg/dL / Ketone: x  / Bili: x / Urobili: x   Blood: x / Protein: x / Nitrite: x   Leuk Esterase: x / RBC: x / WBC x   Sq Epi: x / Non Sq Epi: x / Bacteria: x        ## Cardiac  CARDIAC MARKERS ( 08 Jun 2025 23:33 )  x     / x     / x     / x     / 4.4 ng/mL        ## Blood Gas  ABG - ( 09 Jun 2025 02:52 )  pH, Arterial: 7.29  pH, Blood: x     /  pCO2: 36    /  pO2: 171   / HCO3: 17    / Base Excess: -8.5  /  SaO2: 98.8                #I/Os  I&O's Detail    08 Jun 2025 07:01  -  09 Jun 2025 07:00  --------------------------------------------------------  IN:    IV PiggyBack: 50 mL  Total IN: 50 mL    OUT:    Voided (mL): 350 mL  Total OUT: 350 mL    Total NET: -300 mL          ## Imaging:    ## Medications:  MEDICATIONS  (STANDING):  albuterol/ipratropium for Nebulization 3 milliLiter(s) Nebulizer every 4 hours  azithromycin  IVPB      azithromycin  IVPB 500 milliGRAM(s) IV Intermittent every 24 hours  chlorhexidine 2% Cloths 1 Application(s) Topical <User Schedule>  methylPREDNISolone sodium succinate Injectable 40 milliGRAM(s) IV Push every 12 hours  ondansetron Injectable 4 milliGRAM(s) IV Push every 12 hours  pantoprazole  Injectable 40 milliGRAM(s) IV Push every 12 hours    MEDICATIONS  (PRN):       HPI:  *History obtained from chart review and son Maldonado.     69 year old woman with a PMHx of Asthma (on prn supp O2), COPD (prev smoker quit 20 years ago), HTN, HFpEF, CVA with L sided deficits (as per son), A Fib, PE (off Eliquis and Aspirin since March 2025 due to abdominal wall hematoma), and Liver mass (IR recommended for outpatient follow up w repeat imaging).     Pt presented to the ED with SOB that started the night prior. Per daughter in law, pt has been declining in her health, decreased appetite since her last hospital admission in March 2025. Has been bed bound last 3 years. In the ED, pt tripoding and was placed on BIPAP. Received nebs and steroids. Pt then developed episode of coffee ground vomiting and switch to high flow NC. Labs significant for wbc 11.8, hgb 8.6, Na 121, K: 5.8, Bicarb 20, Creat: 1.38, trop: 62, pH: 7.11, pCO2: 57.   Pt developed hypotension and received 1 L of IV fluids with some improvement. Admitted to the ICU for further management.        (08 Jun 2025 20:54)      24 hr events: Placed on HFNC and transferred to ICU. Feels a little better this morning.  Breathing improving. No chest pain, fevers, chills, nausea, emesis, or dairrhea.     ## ROS:  See above. ROS otherwise negative      ## Vitals  ICU Vital Signs Last 24 Hrs  T(C): 33.2 (09 Jun 2025 09:30), Max: 37.6 (08 Jun 2025 23:56)  T(F): 91.8 (09 Jun 2025 09:30), Max: 99.6 (08 Jun 2025 23:56)  HR: 87 (09 Jun 2025 09:30) (60 - 101)  BP: 134/113 (09 Jun 2025 09:30) (78/56 - 139/119)  BP(mean): 121 (09 Jun 2025 09:30) (51 - 121)  ABP: --  ABP(mean): --  RR: 17 (09 Jun 2025 09:30) (15 - 30)  SpO2: 96% (09 Jun 2025 09:30) (90% - 100%)    O2 Parameters below as of 09 Jun 2025 06:28  Patient On (Oxygen Delivery Method): BiPAP/CPAP            ## Physical Exam:  Gen: Adult female lying in bed, NAD  HEENT: NC/AT sclerae anicteric. Poor air movement b/l. No w/c/r  Resp: No increased WOB, CTAB  CV: S1, S2  Abd: Soft, + BS  Ext: WWP  Neuro: Awake, alert, follows commands    ## Vent Data      ## Labs:  Chem:  06-09    125[L]  |  99  |  26[H]  ----------------------------<  182[H]  5.3   |  18[L]  |  1.20    Ca    8.5      09 Jun 2025 03:30  Phos  4.7     06-09  Mg     1.8     06-09    TPro  6.0  /  Alb  2.5[L]  /  TBili  0.4  /  DBili  x   /  AST  15  /  ALT  14  /  AlkPhos  93  06-09    LIVER FUNCTIONS - ( 09 Jun 2025 03:30 )  Alb: 2.5 g/dL / Pro: 6.0 gm/dL / ALK PHOS: 93 U/L / ALT: 14 U/L / AST: 15 U/L / GGT: x           CBC:                        7.0    7.12  )-----------( 212      ( 09 Jun 2025 03:30 )             23.9     Coags:  PT/INR - ( 08 Jun 2025 23:33 )   PT: 16.9 sec;   INR: 1.50 ratio         PTT - ( 08 Jun 2025 23:33 )  PTT:22.8 sec    Urinalysis Basic - ( 09 Jun 2025 03:30 )    Color: x / Appearance: x / SG: x / pH: x  Gluc: 182 mg/dL / Ketone: x  / Bili: x / Urobili: x   Blood: x / Protein: x / Nitrite: x   Leuk Esterase: x / RBC: x / WBC x   Sq Epi: x / Non Sq Epi: x / Bacteria: x        ## Cardiac  CARDIAC MARKERS ( 08 Jun 2025 23:33 )  x     / x     / x     / x     / 4.4 ng/mL        ## Blood Gas  ABG - ( 09 Jun 2025 02:52 )  pH, Arterial: 7.29  pH, Blood: x     /  pCO2: 36    /  pO2: 171   / HCO3: 17    / Base Excess: -8.5  /  SaO2: 98.8                #I/Os  I&O's Detail    08 Jun 2025 07:01  -  09 Jun 2025 07:00  --------------------------------------------------------  IN:    IV PiggyBack: 50 mL  Total IN: 50 mL    OUT:    Voided (mL): 350 mL  Total OUT: 350 mL    Total NET: -300 mL          ## Imaging:    ## Medications:  MEDICATIONS  (STANDING):  albuterol/ipratropium for Nebulization 3 milliLiter(s) Nebulizer every 4 hours  azithromycin  IVPB      azithromycin  IVPB 500 milliGRAM(s) IV Intermittent every 24 hours  chlorhexidine 2% Cloths 1 Application(s) Topical <User Schedule>  methylPREDNISolone sodium succinate Injectable 40 milliGRAM(s) IV Push every 12 hours  ondansetron Injectable 4 milliGRAM(s) IV Push every 12 hours  pantoprazole  Injectable 40 milliGRAM(s) IV Push every 12 hours    MEDICATIONS  (PRN):       HPI:  *History obtained from chart review and son Maldonado.     69 year old woman with a PMHx of Asthma (on prn supp O2), COPD (prev smoker quit 20 years ago), HTN, HFpEF, CVA with L sided deficits (as per son), A Fib, PE (off Eliquis and Aspirin since March 2025 due to abdominal wall hematoma), and Liver mass (IR recommended for outpatient follow up w repeat imaging).     Pt presented to the ED with SOB that started the night prior. Per daughter in law, pt has been declining in her health, decreased appetite since her last hospital admission in March 2025. Has been bed bound last 3 years. In the ED, pt tripoding and was placed on BIPAP. Received nebs and steroids. Pt then developed episode of coffee ground vomiting and switch to high flow NC. Labs significant for wbc 11.8, hgb 8.6, Na 121, K: 5.8, Bicarb 20, Creat: 1.38, trop: 62, pH: 7.11, pCO2: 57.   Pt developed hypotension and received 1 L of IV fluids with some improvement. Admitted to the ICU for further management.        (08 Jun 2025 20:54)      24 hr events: Placed on HFNC and transferred to ICU. Feels a little better this morning.  Breathing improving. No chest pain, fevers, chills, nausea, emesis, or dairrhea.     ## ROS:  See above. ROS otherwise negative      ## Vitals  ICU Vital Signs Last 24 Hrs  T(C): 33.2 (09 Jun 2025 09:30), Max: 37.6 (08 Jun 2025 23:56)  T(F): 91.8 (09 Jun 2025 09:30), Max: 99.6 (08 Jun 2025 23:56)  HR: 87 (09 Jun 2025 09:30) (60 - 101)  BP: 134/113 (09 Jun 2025 09:30) (78/56 - 139/119)  BP(mean): 121 (09 Jun 2025 09:30) (51 - 121)  ABP: --  ABP(mean): --  RR: 17 (09 Jun 2025 09:30) (15 - 30)  SpO2: 96% (09 Jun 2025 09:30) (90% - 100%)    O2 Parameters below as of 09 Jun 2025 06:28  Patient On (Oxygen Delivery Method): BiPAP/CPAP            ## Physical Exam:  Gen: Adult female lying in bed, NAD  HEENT: NC/AT sclerae anicteric.   Resp: Poor air movement b/l. No w/c/r  CV: S1, S2  Abd: Soft, + BS  Ext: WWP  Neuro: Awake, alert, follows commands    ## Vent Data      ## Labs:  Chem:  06-09    125[L]  |  99  |  26[H]  ----------------------------<  182[H]  5.3   |  18[L]  |  1.20    Ca    8.5      09 Jun 2025 03:30  Phos  4.7     06-09  Mg     1.8     06-09    TPro  6.0  /  Alb  2.5[L]  /  TBili  0.4  /  DBili  x   /  AST  15  /  ALT  14  /  AlkPhos  93  06-09    LIVER FUNCTIONS - ( 09 Jun 2025 03:30 )  Alb: 2.5 g/dL / Pro: 6.0 gm/dL / ALK PHOS: 93 U/L / ALT: 14 U/L / AST: 15 U/L / GGT: x           CBC:                        7.0    7.12  )-----------( 212      ( 09 Jun 2025 03:30 )             23.9     Coags:  PT/INR - ( 08 Jun 2025 23:33 )   PT: 16.9 sec;   INR: 1.50 ratio         PTT - ( 08 Jun 2025 23:33 )  PTT:22.8 sec    Urinalysis Basic - ( 09 Jun 2025 03:30 )    Color: x / Appearance: x / SG: x / pH: x  Gluc: 182 mg/dL / Ketone: x  / Bili: x / Urobili: x   Blood: x / Protein: x / Nitrite: x   Leuk Esterase: x / RBC: x / WBC x   Sq Epi: x / Non Sq Epi: x / Bacteria: x        ## Cardiac  CARDIAC MARKERS ( 08 Jun 2025 23:33 )  x     / x     / x     / x     / 4.4 ng/mL        ## Blood Gas  ABG - ( 09 Jun 2025 02:52 )  pH, Arterial: 7.29  pH, Blood: x     /  pCO2: 36    /  pO2: 171   / HCO3: 17    / Base Excess: -8.5  /  SaO2: 98.8                #I/Os  I&O's Detail    08 Jun 2025 07:01  -  09 Jun 2025 07:00  --------------------------------------------------------  IN:    IV PiggyBack: 50 mL  Total IN: 50 mL    OUT:    Voided (mL): 350 mL  Total OUT: 350 mL    Total NET: -300 mL          ## Imaging:    ## Medications:  MEDICATIONS  (STANDING):  albuterol/ipratropium for Nebulization 3 milliLiter(s) Nebulizer every 4 hours  azithromycin  IVPB      azithromycin  IVPB 500 milliGRAM(s) IV Intermittent every 24 hours  chlorhexidine 2% Cloths 1 Application(s) Topical <User Schedule>  methylPREDNISolone sodium succinate Injectable 40 milliGRAM(s) IV Push every 12 hours  ondansetron Injectable 4 milliGRAM(s) IV Push every 12 hours  pantoprazole  Injectable 40 milliGRAM(s) IV Push every 12 hours    MEDICATIONS  (PRN):

## 2025-06-09 NOTE — PROGRESS NOTE ADULT - ASSESSMENT
68 y/o F w/HFpEF, COPD, prior CVA w/L sided deficits, hx of PE not on AC due to prior bleed, and recently diagnosed liver mass admitted for acute respiratory failure with hypoxia and hypercapnia. Acute respiratory failure possibly secondary to COPD exacerbation vs new/progression of PE. No evidence of PNA on CXR. NAG metabolic acidosis. Hyponatremia.     # Acute respiratory failure with hypoxia and hypercapnia  # COPD exacerbation  # NAG acidosis  # Hyponatremia  # Liver mass  # History of PE    - Wean HFNC as tolerated  - CTA chest  - Solumedrol, azithro  - Advair, duonebs  - Free water restriction  - Work up of liver mass once clinically imrpoved  - DVT prophylaxis  - Full code    I have personally provided 35 minutes of attending critical care time excluding procedures. 68 y/o F w/HFpEF, COPD, prior CVA w/L sided deficits, hx of PE not on AC due to prior bleed, and recently diagnosed liver mass admitted for acute respiratory failure with hypoxia and hypercapnia. Acute respiratory failure possibly secondary to COPD exacerbation vs new/progression of PE. No evidence of PNA on CXR. NAG metabolic acidosis. Hyponatremia.     # Acute respiratory failure with hypoxia and hypercapnia  # COPD exacerbation  # NAG acidosis  # Hyponatremia  # Liver mass  # History of PE    - Wean HFNC as tolerated  - CTA chest  - Solumedrol, azithro  - Advair, duonebs  - Free water restriction  - Work up of liver mass once clinically improved  - DVT prophylaxis  - Full code    I have personally provided 35 minutes of attending critical care time excluding procedures.

## 2025-06-09 NOTE — PROVIDER CONTACT NOTE (EICU) - SITUATION
69 female SOB that started the night prior. Per daughter in law, pt has been declining in her health, decreased appetite since her last hospital admission in March 2025. Has been bed bound last 3 years. In the ED, pt tripoding and was placed on BIPAP. Received nebs and steroids. Pt then developed episode of vomiting and switch to high flow NC. Labs significant for wbc 11.8, hgb 8.6, Na 121, K: 5.8, Bicarb 20, Creat: 1.38, trop: 62, pH: 7.11, pCO2: 57.   Pt developed hypotension and received 1 L of IV fluids with some improvement.  Case discussed with the ICU PA, clinically improved on high flow then BIPAP.

## 2025-06-09 NOTE — PROVIDER CONTACT NOTE (EICU) - BACKGROUND
labs reviewed  < from: Xray Chest 1 View- PORTABLE-Urgent (06.08.25 @ 18:23) >    No radiographic evidence of acute cardiopulmonary disease    < end of copied text >

## 2025-06-09 NOTE — CHART NOTE - NSCHARTNOTEFT_GEN_A_CORE
:  NIECY Saldaña dr    Indication:  HRF    PROCEDURE:  [x ] LIMITED ECHO  [x ] LIMITED CHEST  [ ] LIMITED RETROPERITONEAL  [ ] LIMITED ABDOMINAL  [ ] LIMITED DVT  [ ] NEEDLE GUIDANCE VASCULAR  [ ] NEEDLE GUIDANCE THORACENTESIS  [ ] NEEDLE GUIDANCE PARACENTESIS  [ ] NEEDLE GUIDANCE PERICARDIOCENTESIS  [ ] OTHER    FINDINGS:  Chest: focal B lines on right anterior field, no B lines on left. No effusions nor dense consolidations bilat.    ECHO: technically limited study but with LV>RV with grossly normal  LV systolic function   No pericardial effusion  IVC: collapses with respirations     INTERPRETATION:  limited b lines on right  limited TTE but grossly nl LVSF      images uploaded to [employer]

## 2025-06-10 LAB
ALBUMIN SERPL ELPH-MCNC: 2.7 G/DL — LOW (ref 3.3–5)
ALBUMIN SERPL ELPH-MCNC: 2.9 G/DL — LOW (ref 3.3–5)
ALP SERPL-CCNC: 90 U/L — SIGNIFICANT CHANGE UP (ref 40–120)
ALP SERPL-CCNC: 92 U/L — SIGNIFICANT CHANGE UP (ref 40–120)
ALT FLD-CCNC: 17 U/L — SIGNIFICANT CHANGE UP (ref 12–78)
ALT FLD-CCNC: 17 U/L — SIGNIFICANT CHANGE UP (ref 12–78)
ANION GAP SERPL CALC-SCNC: 7 MMOL/L — SIGNIFICANT CHANGE UP (ref 5–17)
ANION GAP SERPL CALC-SCNC: 8 MMOL/L — SIGNIFICANT CHANGE UP (ref 5–17)
AST SERPL-CCNC: 13 U/L — LOW (ref 15–37)
AST SERPL-CCNC: 14 U/L — LOW (ref 15–37)
BASE EXCESS BLDA CALC-SCNC: -4.3 MMOL/L — LOW (ref -2–3)
BILIRUB SERPL-MCNC: 0.2 MG/DL — SIGNIFICANT CHANGE UP (ref 0.2–1.2)
BILIRUB SERPL-MCNC: 0.3 MG/DL — SIGNIFICANT CHANGE UP (ref 0.2–1.2)
BLOOD GAS COMMENTS ARTERIAL: SIGNIFICANT CHANGE UP
BUN SERPL-MCNC: 28 MG/DL — HIGH (ref 7–23)
BUN SERPL-MCNC: 37 MG/DL — HIGH (ref 7–23)
CALCIUM SERPL-MCNC: 8.8 MG/DL — SIGNIFICANT CHANGE UP (ref 8.5–10.1)
CALCIUM SERPL-MCNC: 8.9 MG/DL — SIGNIFICANT CHANGE UP (ref 8.5–10.1)
CHLORIDE SERPL-SCNC: 101 MMOL/L — SIGNIFICANT CHANGE UP (ref 96–108)
CHLORIDE SERPL-SCNC: 102 MMOL/L — SIGNIFICANT CHANGE UP (ref 96–108)
CO2 BLDA-SCNC: 24 MMOL/L — SIGNIFICANT CHANGE UP (ref 19–24)
CO2 SERPL-SCNC: 21 MMOL/L — LOW (ref 22–31)
CO2 SERPL-SCNC: 23 MMOL/L — SIGNIFICANT CHANGE UP (ref 22–31)
CREAT SERPL-MCNC: 1.47 MG/DL — HIGH (ref 0.5–1.3)
CREAT SERPL-MCNC: 1.79 MG/DL — HIGH (ref 0.5–1.3)
EGFR: 30 ML/MIN/1.73M2 — LOW
EGFR: 30 ML/MIN/1.73M2 — LOW
EGFR: 38 ML/MIN/1.73M2 — LOW
EGFR: 38 ML/MIN/1.73M2 — LOW
GAS PNL BLDA: SIGNIFICANT CHANGE UP
GAS PNL BLDA: SIGNIFICANT CHANGE UP
GLUCOSE BLDC GLUCOMTR-MCNC: 140 MG/DL — HIGH (ref 70–99)
GLUCOSE BLDC GLUCOMTR-MCNC: 147 MG/DL — HIGH (ref 70–99)
GLUCOSE BLDC GLUCOMTR-MCNC: 175 MG/DL — HIGH (ref 70–99)
GLUCOSE SERPL-MCNC: 154 MG/DL — HIGH (ref 70–99)
GLUCOSE SERPL-MCNC: 160 MG/DL — HIGH (ref 70–99)
HCO3 BLDA-SCNC: 23 MMOL/L — SIGNIFICANT CHANGE UP (ref 21–28)
HCT VFR BLD CALC: 24.9 % — LOW (ref 34.5–45)
HGB BLD-MCNC: 7.4 G/DL — LOW (ref 11.5–15.5)
HOROWITZ INDEX BLDA+IHG-RTO: 30 — SIGNIFICANT CHANGE UP
MAGNESIUM SERPL-MCNC: 2.8 MG/DL — HIGH (ref 1.6–2.6)
MCHC RBC-ENTMCNC: 23.6 PG — LOW (ref 27–34)
MCHC RBC-ENTMCNC: 29.7 G/DL — LOW (ref 32–36)
MCV RBC AUTO: 79.6 FL — LOW (ref 80–100)
NRBC BLD AUTO-RTO: 0 /100 WBCS — SIGNIFICANT CHANGE UP (ref 0–0)
NT-PROBNP SERPL-SCNC: 3690 PG/ML — HIGH (ref 0–125)
PCO2 BLDA: 50 MMHG — HIGH (ref 32–46)
PH BLDA: 7.27 — LOW (ref 7.35–7.45)
PHOSPHATE SERPL-MCNC: 5.3 MG/DL — HIGH (ref 2.5–4.5)
PLATELET # BLD AUTO: 237 K/UL — SIGNIFICANT CHANGE UP (ref 150–400)
PO2 BLDA: 77 MMHG — LOW (ref 83–108)
POTASSIUM SERPL-MCNC: 4.5 MMOL/L — SIGNIFICANT CHANGE UP (ref 3.5–5.3)
POTASSIUM SERPL-MCNC: 5.4 MMOL/L — HIGH (ref 3.5–5.3)
POTASSIUM SERPL-SCNC: 4.5 MMOL/L — SIGNIFICANT CHANGE UP (ref 3.5–5.3)
POTASSIUM SERPL-SCNC: 5.4 MMOL/L — HIGH (ref 3.5–5.3)
PROCALCITONIN SERPL-MCNC: 0.28 NG/ML — HIGH (ref 0.02–0.1)
PROT SERPL-MCNC: 6.6 GM/DL — SIGNIFICANT CHANGE UP (ref 6–8.3)
PROT SERPL-MCNC: 6.9 GM/DL — SIGNIFICANT CHANGE UP (ref 6–8.3)
RBC # BLD: 3.13 M/UL — LOW (ref 3.8–5.2)
RBC # FLD: 16.6 % — HIGH (ref 10.3–14.5)
SAO2 % BLDA: 95.8 % — SIGNIFICANT CHANGE UP (ref 94–98)
SODIUM SERPL-SCNC: 129 MMOL/L — LOW (ref 135–145)
SODIUM SERPL-SCNC: 133 MMOL/L — LOW (ref 135–145)
TROPONIN I, HIGH SENSITIVITY RESULT: 36.9 NG/L — SIGNIFICANT CHANGE UP
TSH SERPL-MCNC: 0.39 UIU/ML — SIGNIFICANT CHANGE UP (ref 0.36–3.74)
WBC # BLD: 10.06 K/UL — SIGNIFICANT CHANGE UP (ref 3.8–10.5)
WBC # FLD AUTO: 10.06 K/UL — SIGNIFICANT CHANGE UP (ref 3.8–10.5)

## 2025-06-10 PROCEDURE — 71045 X-RAY EXAM CHEST 1 VIEW: CPT | Mod: 26

## 2025-06-10 PROCEDURE — 99232 SBSQ HOSP IP/OBS MODERATE 35: CPT

## 2025-06-10 PROCEDURE — 99291 CRITICAL CARE FIRST HOUR: CPT | Mod: 25

## 2025-06-10 RX ORDER — FUROSEMIDE 10 MG/ML
40 INJECTION INTRAMUSCULAR; INTRAVENOUS ONCE
Refills: 0 | Status: COMPLETED | OUTPATIENT
Start: 2025-06-10 | End: 2025-06-10

## 2025-06-10 RX ORDER — SALINE 7; 19 G/118ML; G/118ML
1 ENEMA RECTAL ONCE
Refills: 0 | Status: COMPLETED | OUTPATIENT
Start: 2025-06-10 | End: 2025-06-11

## 2025-06-10 RX ORDER — SODIUM ZIRCONIUM CYCLOSILICATE 5 G/5G
10 POWDER, FOR SUSPENSION ORAL ONCE
Refills: 0 | Status: COMPLETED | OUTPATIENT
Start: 2025-06-10 | End: 2025-06-10

## 2025-06-10 RX ORDER — IPRATROPIUM BROMIDE AND ALBUTEROL SULFATE .5; 2.5 MG/3ML; MG/3ML
3 SOLUTION RESPIRATORY (INHALATION) EVERY 6 HOURS
Refills: 0 | Status: DISCONTINUED | OUTPATIENT
Start: 2025-06-10 | End: 2025-06-24

## 2025-06-10 RX ORDER — AZITHROMYCIN 250 MG
500 CAPSULE ORAL EVERY 24 HOURS
Refills: 0 | Status: COMPLETED | OUTPATIENT
Start: 2025-06-10 | End: 2025-06-12

## 2025-06-10 RX ORDER — HEPARIN SODIUM 1000 [USP'U]/ML
5000 INJECTION INTRAVENOUS; SUBCUTANEOUS EVERY 8 HOURS
Refills: 0 | Status: DISCONTINUED | OUTPATIENT
Start: 2025-06-10 | End: 2025-06-14

## 2025-06-10 RX ORDER — DEXTROMETHORPHAN HBR, GUAIFENESIN 200 MG/10ML
200 LIQUID ORAL EVERY 6 HOURS
Refills: 0 | Status: DISCONTINUED | OUTPATIENT
Start: 2025-06-10 | End: 2025-06-26

## 2025-06-10 RX ORDER — IPRATROPIUM BROMIDE AND ALBUTEROL SULFATE .5; 2.5 MG/3ML; MG/3ML
3 SOLUTION RESPIRATORY (INHALATION) EVERY 6 HOURS
Refills: 0 | Status: DISCONTINUED | OUTPATIENT
Start: 2025-06-10 | End: 2025-06-20

## 2025-06-10 RX ORDER — MONTELUKAST SODIUM 10 MG/1
10 TABLET ORAL DAILY
Refills: 0 | Status: DISCONTINUED | OUTPATIENT
Start: 2025-06-10 | End: 2025-06-26

## 2025-06-10 RX ADMIN — Medication 1 DOSE(S): at 06:39

## 2025-06-10 RX ADMIN — MONTELUKAST SODIUM 10 MILLIGRAM(S): 10 TABLET ORAL at 11:25

## 2025-06-10 RX ADMIN — SODIUM ZIRCONIUM CYCLOSILICATE 10 GRAM(S): 5 POWDER, FOR SUSPENSION ORAL at 05:20

## 2025-06-10 RX ADMIN — Medication 500 MILLIGRAM(S): at 22:19

## 2025-06-10 RX ADMIN — HEPARIN SODIUM 5000 UNIT(S): 1000 INJECTION INTRAVENOUS; SUBCUTANEOUS at 22:18

## 2025-06-10 RX ADMIN — IPRATROPIUM BROMIDE AND ALBUTEROL SULFATE 3 MILLILITER(S): .5; 2.5 SOLUTION RESPIRATORY (INHALATION) at 05:44

## 2025-06-10 RX ADMIN — IPRATROPIUM BROMIDE AND ALBUTEROL SULFATE 3 MILLILITER(S): .5; 2.5 SOLUTION RESPIRATORY (INHALATION) at 17:15

## 2025-06-10 RX ADMIN — METHYLPREDNISOLONE ACETATE 40 MILLIGRAM(S): 80 INJECTION, SUSPENSION INTRA-ARTICULAR; INTRALESIONAL; INTRAMUSCULAR; SOFT TISSUE at 11:23

## 2025-06-10 RX ADMIN — Medication 1 APPLICATION(S): at 05:20

## 2025-06-10 RX ADMIN — Medication 4 MILLILITER(S): at 23:30

## 2025-06-10 RX ADMIN — IPRATROPIUM BROMIDE AND ALBUTEROL SULFATE 3 MILLILITER(S): .5; 2.5 SOLUTION RESPIRATORY (INHALATION) at 01:08

## 2025-06-10 RX ADMIN — IPRATROPIUM BROMIDE AND ALBUTEROL SULFATE 3 MILLILITER(S): .5; 2.5 SOLUTION RESPIRATORY (INHALATION) at 11:12

## 2025-06-10 RX ADMIN — IPRATROPIUM BROMIDE AND ALBUTEROL SULFATE 3 MILLILITER(S): .5; 2.5 SOLUTION RESPIRATORY (INHALATION) at 23:29

## 2025-06-10 RX ADMIN — FUROSEMIDE 40 MILLIGRAM(S): 10 INJECTION INTRAMUSCULAR; INTRAVENOUS at 11:23

## 2025-06-10 RX ADMIN — Medication 40 MILLIGRAM(S): at 11:25

## 2025-06-10 RX ADMIN — Medication 40 MILLIGRAM(S): at 05:19

## 2025-06-10 RX ADMIN — IPRATROPIUM BROMIDE AND ALBUTEROL SULFATE 3 MILLILITER(S): .5; 2.5 SOLUTION RESPIRATORY (INHALATION) at 20:18

## 2025-06-10 RX ADMIN — Medication 1 DOSE(S): at 17:10

## 2025-06-10 RX ADMIN — HEPARIN SODIUM 5000 UNIT(S): 1000 INJECTION INTRAVENOUS; SUBCUTANEOUS at 13:30

## 2025-06-10 RX ADMIN — METHYLPREDNISOLONE ACETATE 40 MILLIGRAM(S): 80 INJECTION, SUSPENSION INTRA-ARTICULAR; INTRALESIONAL; INTRAMUSCULAR; SOFT TISSUE at 20:53

## 2025-06-10 NOTE — PROGRESS NOTE ADULT - SUBJECTIVE AND OBJECTIVE BOX
HPI:  *History obtained from chart review and son Maldonado.     69 year old woman with a PMHx of Asthma (on prn supp O2), COPD (prev smoker quit 20 years ago), HTN, HFpEF, CVA with L sided deficits (as per son), A Fib, PE (off Eliquis and Aspirin since March 2025 due to abdominal wall hematoma), and Liver mass (IR recommended for outpatient follow up w repeat imaging).     Pt presented to the ED with SOB that started the night prior. Per daughter in law, pt has been declining in her health, decreased appetite since her last hospital admission in March 2025. Has been bed bound last 3 years. In the ED, pt tripoding and was placed on BIPAP. Received nebs and steroids. Pt then developed episode of coffee ground vomiting and switch to high flow NC. Labs significant for wbc 11.8, hgb 8.6, Na 121, K: 5.8, Bicarb 20, Creat: 1.38, trop: 62, pH: 7.11, pCO2: 57.   Pt developed hypotension and received 1 L of IV fluids with some improvement. Admitted to the ICU for further management.        (08 Jun 2025 20:54)      24 hr events: No acute events. Breathing continues to improve No chest pain, fevers, chills, nausea, emesis, or dairrhea.     ## ROS:  See above. ROS otherwise negative    ## Vitals  ICU Vital Signs Last 24 Hrs  T(C): 36 (10 Joseph 2025 08:00), Max: 36.6 (09 Jun 2025 16:30)  T(F): 96.8 (10 Joseph 2025 08:00), Max: 97.9 (09 Jun 2025 16:30)  HR: 90 (10 Joseph 2025 08:00) (63 - 103)  BP: 118/48 (10 Joseph 2025 08:00) (94/61 - 157/68)  BP(mean): 68 (10 Joseph 2025 08:00) (56 - 121)  ABP: --  ABP(mean): --  RR: 14 (10 Joseph 2025 08:00) (13 - 23)  SpO2: 100% (10 Joseph 2025 08:00) (89% - 100%)    O2 Parameters below as of 10 Joseph 2025 07:20  Patient On (Oxygen Delivery Method): nasal cannula, 3L  O2 Flow (L/min): 3          ## Physical Exam:  Gen: Adult female lying in bed, NAD  HEENT: NC/AT sclerae anicteric.   Resp:  Poor air movement b/l. No w/c/r  CV: S1, S2  Abd: Soft, + BS  Ext: WWP  Neuro: Awake, alert, follows commands    ## Vent Data      ## Labs:  Chem:  06-10    129[L]  |  101  |  28[H]  ----------------------------<  154[H]  5.4[H]   |  21[L]  |  1.47[H]    Ca    8.9      10 Joseph 2025 02:41  Phos  5.3     06-10  Mg     2.8     06-10    TPro  6.6  /  Alb  2.7[L]  /  TBili  0.2  /  DBili  x   /  AST  13[L]  /  ALT  17  /  AlkPhos  92  06-10    LIVER FUNCTIONS - ( 10 Joseph 2025 02:41 )  Alb: 2.7 g/dL / Pro: 6.6 gm/dL / ALK PHOS: 92 U/L / ALT: 17 U/L / AST: 13 U/L / GGT: x           CBC:                        7.4    10.06 )-----------( 237      ( 10 Joseph 2025 04:41 )             24.9     Coags:  PT/INR - ( 08 Jun 2025 23:33 )   PT: 16.9 sec;   INR: 1.50 ratio         PTT - ( 08 Jun 2025 23:33 )  PTT:22.8 sec    Urinalysis Basic - ( 10 Joseph 2025 02:41 )    Color: x / Appearance: x / SG: x / pH: x  Gluc: 154 mg/dL / Ketone: x  / Bili: x / Urobili: x   Blood: x / Protein: x / Nitrite: x   Leuk Esterase: x / RBC: x / WBC x   Sq Epi: x / Non Sq Epi: x / Bacteria: x        ## Cardiac  CARDIAC MARKERS ( 08 Jun 2025 23:33 )  x     / x     / x     / x     / 4.4 ng/mL        ## Blood Gas  ABG - ( 09 Jun 2025 02:52 )  pH, Arterial: 7.29  pH, Blood: x     /  pCO2: 36    /  pO2: 171   / HCO3: 17    / Base Excess: -8.5  /  SaO2: 98.8                #I/Os  I&O's Detail    09 Jun 2025 07:01  -  10 Joseph 2025 07:00  --------------------------------------------------------  IN:    IV PiggyBack: 250 mL    Oral Fluid: 100 mL  Total IN: 350 mL    OUT:    Voided (mL): 750 mL  Total OUT: 750 mL    Total NET: -400 mL          ## Imaging:    ## Medications:  MEDICATIONS  (STANDING):  albuterol/ipratropium for Nebulization 3 milliLiter(s) Nebulizer every 4 hours  azithromycin   Tablet 500 milliGRAM(s) Oral every 24 hours  chlorhexidine 2% Cloths 1 Application(s) Topical <User Schedule>  fluticasone propionate/ salmeterol 500-50 MICROgram(s) Diskus 1 Dose(s) Inhalation two times a day  methylPREDNISolone sodium succinate Injectable 40 milliGRAM(s) IV Push every 12 hours  pantoprazole  Injectable 40 milliGRAM(s) IV Push every 12 hours    MEDICATIONS  (PRN):

## 2025-06-10 NOTE — CONSULT NOTE ADULT - NSCONSULTADDITIONALINFOA_GEN_ALL_CORE
ATTENDING ATTESTATION:    Patient not seen or examined by me at this time. Please see progress note dated 6/11/2025

## 2025-06-10 NOTE — CHART NOTE - NSCHARTNOTEFT_GEN_A_CORE
MICU DOWN GRADE NOTE      Patient is a 69y old  Female who presents with a chief complaint of Asthma Exacerbation (10 Joseph 2025 08:49)      HPI: 68 y/o F w/HFpEF, COPD, prior CVA w/L sided deficits, hx of PE not on AC due to prior bleed, and recently diagnosed liver mass admitted for acute respiratory failure with hypoxia and hypercapnia. Acute respiratory failure possibly secondary to COPD exacerbation vs new/progression of PE. No evidence of PNA on CXR. NAG metabolic acidosis. Hyponatremia.      INTERVAL HPI/OVERNIGHT EVENTS: weaned of NIV/HFNC on 3L NC, clinically improving       MEDICATIONS:  albuterol/ipratropium for Nebulization 3 milliLiter(s) Nebulizer every 4 hours  azithromycin   Tablet 500 milliGRAM(s) Oral every 24 hours  chlorhexidine 2% Cloths 1 Application(s) Topical <User Schedule>  fluticasone propionate/ salmeterol 500-50 MICROgram(s) Diskus 1 Dose(s) Inhalation two times a day  furosemide   Injectable 40 milliGRAM(s) IV Push once  heparin   Injectable 5000 Unit(s) SubCutaneous every 8 hours  methylPREDNISolone sodium succinate Injectable 40 milliGRAM(s) IV Push every 12 hours  pantoprazole  Injectable 40 milliGRAM(s) IV Push every 12 hours      T(C): 36 (06-10-25 @ 08:00), Max: 36.6 (06-09-25 @ 16:30)  HR: 90 (06-10-25 @ 08:00) (63 - 103)  BP: 118/48 (06-10-25 @ 08:00) (94/61 - 157/68)  RR: 14 (06-10-25 @ 08:00) (13 - 23)  SpO2: 100% (06-10-25 @ 08:00) (89% - 100%)  Wt(kg): --Vital Signs Last 24 Hrs  T(C): 36 (10 Joseph 2025 08:00), Max: 36.6 (09 Jun 2025 16:30)  T(F): 96.8 (10 Joseph 2025 08:00), Max: 97.9 (09 Jun 2025 16:30)  HR: 90 (10 Joseph 2025 08:00) (63 - 103)  BP: 118/48 (10 Joseph 2025 08:00) (94/61 - 157/68)  BP(mean): 68 (10 Joseph 2025 08:00) (56 - 121)  RR: 14 (10 Joseph 2025 08:00) (13 - 23)  SpO2: 100% (10 Joseph 2025 08:00) (89% - 100%)    Parameters below as of 10 Joseph 2025 07:20  Patient On (Oxygen Delivery Method): nasal cannula, 3L  O2 Flow (L/min): 3      PHYSICAL EXAM:  Gen: Adult female lying in bed, NAD  HEENT: NC/AT sclerae anicteric.   Resp:  Air movement improving   CV: S1, S2  Abd: Soft, + BS  Ext: WWP  Neuro: Awake, alert, follows commands    Consultant(s) Notes Reviewed:  [x ] YES  [ ] NO  Care Discussed with Consultants/Other Providers [ x] YES  [ ] NO    LABS:                        7.4    10.06 )-----------( 237      ( 10 Joseph 2025 04:41 )             24.9     06-10    129[L]  |  101  |  28[H]  ----------------------------<  154[H]  5.4[H]   |  21[L]  |  1.47[H]    Ca    8.9      10 Joseph 2025 02:41  Phos  5.3     06-10  Mg     2.8     06-10    TPro  6.6  /  Alb  2.7[L]  /  TBili  0.2  /  DBili  x   /  AST  13[L]  /  ALT  17  /  AlkPhos  92  06-10    PT/INR - ( 08 Jun 2025 23:33 )   PT: 16.9 sec;   INR: 1.50 ratio         PTT - ( 08 Jun 2025 23:33 )  PTT:22.8 sec  Urinalysis Basic - ( 10 Joseph 2025 02:41 )    Color: x / Appearance: x / SG: x / pH: x  Gluc: 154 mg/dL / Ketone: x  / Bili: x / Urobili: x   Blood: x / Protein: x / Nitrite: x   Leuk Esterase: x / RBC: x / WBC x   Sq Epi: x / Non Sq Epi: x / Bacteria: x      CAPILLARY BLOOD GLUCOSE      POCT Blood Glucose.: 140 mg/dL (10 Joseph 2025 06:02)  POCT Blood Glucose.: 173 mg/dL (09 Jun 2025 23:11)  POCT Blood Glucose.: 135 mg/dL (09 Jun 2025 17:31)  POCT Blood Glucose.: 154 mg/dL (09 Jun 2025 11:30)      ABG - ( 09 Jun 2025 02:52 )  pH, Arterial: 7.29  pH, Blood: x     /  pCO2: 36    /  pO2: 171   / HCO3: 17    / Base Excess: -8.5  /  SaO2: 98.8              Urinalysis Basic - ( 10 Joseph 2025 02:41 )    Color: x / Appearance: x / SG: x / pH: x  Gluc: 154 mg/dL / Ketone: x  / Bili: x / Urobili: x   Blood: x / Protein: x / Nitrite: x   Leuk Esterase: x / RBC: x / WBC x   Sq Epi: x / Non Sq Epi: x / Bacteria: x        RADIOLOGY & ADDITIONAL TESTS:    Imaging Personally Reviewed:  [x ] YES  [ ] NO    To follow up:  - Supplemental O2 as tolerated, goal O2 sat >= 90%  - s/p NIV/HFNC but tolerating 2-3L NC   - Pulm follow- up, consulted.   - Solumedrol, azithro  - Advair, duonebs q6hr   - s/p diuresis in ICU   - has hx of hyponatremia in previous admissions; improving; continue free water restriction  - Work up of liver mass once clinically improved; per prior documentation (IR recommended for outpatient follow up w repeat imaging)  - decreased hgb early in hospital stay; PPI; started on DVT prophylaxis today. Was previous on eliquis & aspirin but have been held since March 2025 due to abdominal wall hematoma)  - Downgrade to F: discussed with ICU attending MD Prince. Sign out to hospitalist

## 2025-06-10 NOTE — CONSULT NOTE ADULT - SUBJECTIVE AND OBJECTIVE BOX
Patient is a 69y old  Female who presents with a chief complaint of Asthma Exacerbation (10 Joseph 2025 21:01)      HPI:  *History obtained from chart review and son Maldonado.     69 year old woman with a PMHx of Asthma (on prn supp O2), COPD (prev smoker quit 20 years ago), HTN, HFpEF, CVA with L sided deficits (as per son), A Fib, PE (off Eliquis and Aspirin since March 2025 due to abdominal wall hematoma), and Liver mass (IR recommended for outpatient follow up w repeat imaging).     Pt presented to the ED with SOB that started the night prior. Per daughter in law, pt has been declining in her health, decreased appetite since her last hospital admission in March 2025. Has been bed bound last 3 years. In the ED, pt tripoding and was placed on BIPAP. Received nebs and steroids. Pt then developed episode of coffee ground vomiting and switch to high flow NC. Labs significant for wbc 11.8, hgb 8.6, Na 121, K: 5.8, Bicarb 20, Creat: 1.38, trop: 62, pH: 7.11, pCO2: 57.   Pt developed hypotension and received 1 L of IV fluids with some improvement. Admitted to the ICU for further management.        (08 Jun 2025 20:54)  ================  Admitted to ICU for hypercarbic respiratory failure on BiPAP -> AVAPS.  Weaned off NIV.  Treated for COPD exacerbation with steroids and nebs.  Downgraded to medical service today 6/10.      Pulmonary consulted for respiratory distress on medical floor.    [facility]  used [id number]    pt with audible wheezing and accessory respiratory muscle use, paradoxically abdominally breathing  unable to complete full sentences  confused, only oriented to self. unable to tell month/year, or place.  on exam diffuse bilateral expiratory wheeze  abdomen protuberant mildly distended but soft and extremely tender to palpation  last urine output 7AM per bedside nurse  complaining of SOB and abdominal pain  no nausea/no emesis       duonebs x1 given. solumedrol 40mg given.  ABG drawn 7.26/55/90/25/98% on 4L NC, worsening hypercarbia. PCO2 this AM on blood gas was 36  lactate 0.7  pt with increasing lethargy and wheeze and work of breathing during exam  pt placed on AVAPS  RR 20 Max pressure 30. Min pressure 10. FIO2: 30%        bladder sono done: 550 cc on scan, straight cathed           Allergies  No Known Allergies            MEDICATIONS  (STANDING):  albuterol/ipratropium for Nebulization 3 milliLiter(s) Nebulizer every 6 hours  azithromycin   Tablet 500 milliGRAM(s) Oral every 24 hours  chlorhexidine 2% Cloths 1 Application(s) Topical <User Schedule>  fluticasone propionate/ salmeterol 500-50 MICROgram(s) Diskus 1 Dose(s) Inhalation two times a day  heparin   Injectable 5000 Unit(s) SubCutaneous every 8 hours  methylPREDNISolone sodium succinate Injectable 40 milliGRAM(s) IV Push every 12 hours  montelukast 10 milliGRAM(s) Oral daily  pantoprazole    Tablet 40 milliGRAM(s) Oral before breakfast  saline laxative (FLEET) Rectal Enema 1 Enema Rectal once  sodium chloride 3%  Inhalation 4 milliLiter(s) Inhalation every 6 hours    MEDICATIONS  (PRN):  albuterol/ipratropium for Nebulization 3 milliLiter(s) Nebulizer every 6 hours PRN Shortness of Breath and/or Wheezing  guaiFENesin Oral Liquid (Sugar-Free) 200 milliGRAM(s) Oral every 6 hours PRN Cough      Daily     Daily     Drug Dosing Weight  Height (cm): 149.9 (08 Jun 2025 17:35)  Weight (kg): 54 (10 Joseph 2025 17:58)  BMI (kg/m2): 24 (10 Joseph 2025 17:58)  BSA (m2): 1.48 (10 Joseph 2025 17:58)    PAST MEDICAL & SURGICAL HISTORY:  Brain aneurysm      Hypertension      Smoking      Atrial fibrillation      COPD      Hyperlipidemia      CVA (cerebrovascular accident)      No significant past surgical history          FAMILY HISTORY:  No pertinent family history in first degree relatives        SOCIAL HISTORY:  ex smoker: quit 2013. 1/2 PPD x10 years      ADVANCE DIRECTIVES:  full code      REVIEW OF SYSTEMS:  [x] limited due to lethargy and encephalopathy    + SOB  + wheeze  + chest pain  + abdominal pain, no nausea/emesis              Vital Signs Last 24 Hrs  T(C): 36.5 (10 Joseph 2025 12:00), Max: 36.5 (10 Joseph 2025 12:00)  T(F): 97.7 (10 Joseph 2025 12:00), Max: 97.7 (10 Joseph 2025 12:00)  HR: 105 (10 Joseph 2025 20:55) (72 - 107)  BP: 138/76 (10 Joseph 2025 17:58) (96/40 - 138/76)  BP(mean): 72 (10 Joseph 2025 17:00) (56 - 78)  RR: 19 (10 Joseph 2025 17:58) (14 - 23)  SpO2: 98% (10 Joseph 2025 20:55) (89% - 100%)    O2 Parameters below as of 10 Joseph 2025 20:55  Patient On (Oxygen Delivery Method): BiPAP/CPAP            ABG - ( 10 Joseph 2025 20:24 )  pH, Arterial: 7.26  /  pCO2: 55    /  pO2: 90    / HCO3: 25    / Base Excess: -2.4  /  SaO2: 97.9            I&O's Summary    09 Jun 2025 07:01  -  10 Joseph 2025 07:00  --------------------------------------------------------  IN: 350 mL / OUT: 750 mL / NET: -400 mL    10 Joseph 2025 07:01  -  10 Joseph 2025 21:23  --------------------------------------------------------  IN: 0 mL / OUT: 1000 mL / NET: -1000 mL            PHYSICAL EXAM:  GENERAL: elderly female, in respiratory distress, audible wheezing, unable to complete sentence   HEAD:  Atraumatic, Normocephalic  EYES: EOMI, conjunctiva and sclera clear  ENMT: dry mucous membranes  NECK: Supple, No JVD  NERVOUS SYSTEM:  Alert & Oriented X1, progressively getting more lethargic at time of exam, answering questions but confused (oriented only to person), following simple commands  CHEST/LUNG: bilateral audible expiratory wheeze, + accessory muscle use, + abdominal breathing  HEART: mildly tachycardic  ABDOMEN: mildly distended but soft with Bowel sounds present, + tenderness to palpation bilateral lower quadrants and suprapubic region  EXTREMITIES:  2+ Peripheral Pulses, No clubbing, cyanosis, or edema  SKIN: No rashes or lesions    LABS:                        7.4    10.06 )-----------( 237      ( 10 Joseph 2025 04:41 )             24.9       06-10    129[L]  |  101  |  28[H]  ----------------------------<  154[H]  5.4[H]   |  21[L]  |  1.47[H]    Ca    8.9      10 Joseph 2025 02:41  Phos  5.3     06-10  Mg     2.8     06-10    TPro  6.6  /  Alb  2.7[L]  /  TBili  0.2  /  DBili  x   /  AST  13[L]  /  ALT  17  /  AlkPhos  92  06-10      CAPILLARY BLOOD GLUCOSE  POCT Blood Glucose.: 147 mg/dL (10 Joseph 2025 17:13)    PT/INR - ( 08 Jun 2025 23:33 )   PT: 16.9 sec;   INR: 1.50 ratio    PTT - ( 08 Jun 2025 23:33 )  PTT:22.8 sec      Troponin I, High Sensitivity (06.08.25 @ 23:33)    Troponin I, High Sensitivity Result: 66.2 ng/L      Pro-Brain Natriuretic Peptide (06.08.25 @ 18:17)    Pro-Brain Natriuretic Peptide: 2309 pg/mL          MICRO  FluA/FluB/RSV/COVID PCR (06.08.25 @ 18:17)    SARS-CoV-2 Result: NotDetec: For Emergency Use Authorization   Influenza A Result: NotDetec: For Emergency Use Authorization   Influenza B Result: NotDetec: For Emergency Use Authorization   Resp Syn Virus Result: NotDetec: For Emergency Use Authorization   Source Respiratory: Nasopharyngeal          ECHO:  < from: TTE W or WO Ultrasound Enhancing Agent (02.28.25 @ 15:20) >     CONCLUSIONS:      1. Left ventricular cavity is normal in size. Left ventricular wall thickness is normal. Left ventricular systolic function is normal with an ejection fraction of 65 % by Leija's method of disks. There are no regional wall motion abnormalities seen.   2. There is mild (grade 1) left ventricular diastolic dysfunction.   3. Normal right ventricular cavity size and normal right ventricular systolic function. Tricuspid annular plane systolic excursion (TAPSE) is 1.9 cm (normal >=1.7 cm).   4. Structurally normal mitral valve with normal leaflet excursion. There is calcification of the mitral valve annulus. There is trace mitral regurgitation.   5. Structurally normal tricuspid valve with normal leaflet excursion. There is mild tricuspid regurgitation. Estimated pulmonary artery systolic pressure is 41 mmHg, consistent with mild pulmonary hypertension.          RADIOLOGY:  < from: Xray Chest 1 View- PORTABLE-Urgent (06.08.25 @ 18:23) >  The heart is not well assessed on an AP film.  Bibasilar atelectasis.  There are no pleural effusions.  There is no pneumothorax.    IMPRESSION:    No radiographic evidence of acute cardiopulmonary disease    ----------------------------  < from: CT Angio Chest PE Protocol w/ IV Cont (06.09.25 @ 14:53) >  1. No pulmonary embolism.    2. Small airway mucous plugging/mucoid impaction in the left lower lobe.   Minimal adjacent airspace disease could represent atelectasis and/or   pneumonia. There is underlying severe emphysema.    3. A 5 cm right hepatic lobe cystic mass is unchanged compared to   3/6/2025, larger compared to 1/21/2024. Again the differential diagnosis   includes a biliary cystadenoma.        CRITICAL CARE TIME SPENT:   Patient is a 69y old  Female who presents with a chief complaint of Asthma Exacerbation (10 Joseph 2025 21:01)      HPI:  *History obtained from chart review and son Maldonado.     69 year old woman with a PMHx of Asthma (on prn supp O2), COPD (prev smoker quit 20 years ago), HTN, HFpEF, CVA with L sided deficits (as per son), A Fib, PE (off Eliquis and Aspirin since March 2025 due to abdominal wall hematoma), and Liver mass (IR recommended for outpatient follow up w repeat imaging).     Pt presented to the ED with SOB that started the night prior. Per daughter in law, pt has been declining in her health, decreased appetite since her last hospital admission in March 2025. Has been bed bound last 3 years. In the ED, pt tripoding and was placed on BIPAP. Received nebs and steroids. Pt then developed episode of coffee ground vomiting and switch to high flow NC. Labs significant for wbc 11.8, hgb 8.6, Na 121, K: 5.8, Bicarb 20, Creat: 1.38, trop: 62, pH: 7.11, pCO2: 57.   Pt developed hypotension and received 1 L of IV fluids with some improvement. Admitted to the ICU for further management.        (08 Jun 2025 20:54)  ================  presents to ED 6/8 for SOB starting night prior to presentation  arrives with tachypnea and increased work of breathing. blood gas 7.11/57/82/18/96%  placed on bipap  Admitted to ICU for hypercarbic respiratory failure. transitioned from BiPAP -> AVAPS.  Weaned off NIV.  Treated for COPD exacerbation with steroids and nebs.  Downgraded to medical service today 6/10.      [facility].    [facility]  used [id number]    pt with audible wheezing and accessory respiratory muscle use, paradoxically abdominally breathing  unable to complete full sentences  confused, only oriented to self. unable to tell month/year, or place.  on exam diffuse bilateral expiratory wheeze  abdomen protuberant mildly distended but soft and extremely tender to palpation  last urine output 7AM per bedside nurse  complaining of SOB and abdominal pain with generalized chest pain  no nausea/no emesis       duonebs x1 given. solumedrol 40mg given.  ABG drawn 7.26/55/90/25/98% on 4L NC, worsening hypercarbia. PCO2 this AM on blood gas was 36  lactate 0.7  pt with increasing lethargy and wheeze and work of breathing during exam  pt placed on AVAPS  RR 20 Max pressure 30. Min pressure 10. FIO2: 30%        bladder sono done: 550 cc on scan, straight cathed     ======================  hx obtained from chart and records as pt encephalopathic    ex smoker  PMH HTN, diastolic CHF, Afib on Eliquis, L MCA aneurysm, hx of PE (1/2024), COPD (previously on symbicort and spiriva, changed to duonebs q 6 hrs as there was question if she was able to coordinate use of her hand held inhaler), e-coli UTI 3/2025, liver cyst, abdominal wall hematoma 3/2025  last seen in pulmonary clinic at [address] in 8/2023  PFT 2019 with severe obstructive pattern without bronchodilator response with decreased DLCO and significant air trapping and hyperinflation per records  PFT 5/2023 reported as similar to PFTs in 2019 with new decreased lung volumes and persistent low DLCO            Allergies  No Known Allergies            MEDICATIONS  (STANDING):  albuterol/ipratropium for Nebulization 3 milliLiter(s) Nebulizer every 6 hours  azithromycin   Tablet 500 milliGRAM(s) Oral every 24 hours  chlorhexidine 2% Cloths 1 Application(s) Topical <User Schedule>  fluticasone propionate/ salmeterol 500-50 MICROgram(s) Diskus 1 Dose(s) Inhalation two times a day  heparin   Injectable 5000 Unit(s) SubCutaneous every 8 hours  methylPREDNISolone sodium succinate Injectable 40 milliGRAM(s) IV Push every 12 hours  montelukast 10 milliGRAM(s) Oral daily  pantoprazole    Tablet 40 milliGRAM(s) Oral before breakfast  saline laxative (FLEET) Rectal Enema 1 Enema Rectal once  sodium chloride 3%  Inhalation 4 milliLiter(s) Inhalation every 6 hours    MEDICATIONS  (PRN):  albuterol/ipratropium for Nebulization 3 milliLiter(s) Nebulizer every 6 hours PRN Shortness of Breath and/or Wheezing  guaiFENesin Oral Liquid (Sugar-Free) 200 milliGRAM(s) Oral every 6 hours PRN Cough           Drug Dosing Weight  Height (cm): 149.9 (08 Jun 2025 17:35)  Weight (kg): 54 (10 Joseph 2025 17:58)  BMI (kg/m2): 24 (10 Joseph 2025 17:58)  BSA (m  2): 1.48 (10 Joseph 2025 17:58)    PAST MEDICAL & SURGICAL HISTORY:  Brain aneurysm      Hypertension      Smoking      Atrial fibrillation      COPD      Hyperlipidemia      CVA (cerebrovascular accident)      No significant past surgical history          FAMILY HISTORY:  No pertinent family history in first degree relatives        SOCIAL HISTORY:  ex smoker: quit 2013. 1/2 PPD x10 years      ADVANCE DIRECTIVES:  presumed full code      REVIEW OF SYSTEMS:  [x] limited due to lethargy and encephalopathy    + SOB  + wheeze  + chest pain  + abdominal pain, no nausea/emesis              Vital Signs Last 24 Hrs  T(C): 36.5 (10 Ojseph 2025 12:00), Max: 36.5 (10 Joseph 2025 12:00)  T(F): 97.7 (10 Joseph 2025 12:00), Max: 97.7 (10 Joseph 2025 12:00)  HR: 105 (10 Joseph 2025 20:55) (72 - 107)  BP: 138/76 (10 Joseph 2025 17:58) (96/40 - 138/76)  BP(mean): 72 (10 Joseph 2025 17:00) (56 - 78)  RR: 19 (10 Joseph 2025 17:58) (14 - 23)  SpO2: 98% (10 Joseph 2025 20:55) (89% - 100%)    O2 Parameters below as of 10 Joseph 2025 20:55  Patient On (Oxygen Delivery Method): BiPAP/CPAP            ABG - ( 10 Joseph 2025 20:24 )  pH, Arterial: 7.26  /  pCO2: 55    /  pO2: 90    / HCO3: 25    / Base Excess: -2.4  /  SaO2: 97.9            I&O's Summary    09 Jun 2025 07:01  -  10 Joseph 2025 07:00  --------------------------------------------------------  IN: 350 mL / OUT: 750 mL / NET: -400 mL    10 Joseph 2025 07:01  -  10 Joseph 2025 21:23  --------------------------------------------------------  IN: 0 mL / OUT: 1000 mL / NET: -1000 mL            PHYSICAL EXAM:  GENERAL: elderly female, in respiratory distress, audible wheezing, unable to complete sentence   HEAD:  Atraumatic, Normocephalic  EYES: EOMI, conjunctiva and sclera clear  ENMT: dry mucous membranes  NECK: Supple, No JVD  NERVOUS SYSTEM:  Alert & Oriented X1, progressively getting more lethargic at time of exam, answering questions but confused (oriented only to person), following simple commands  CHEST/LUNG: bilateral audible expiratory wheeze, + accessory muscle use, + abdominal breathing  HEART: mildly tachycardic  ABDOMEN: mildly distended but soft with Bowel sounds present, + tenderness to palpation bilateral lower quadrants and suprapubic region  EXTREMITIES:  2+ Peripheral Pulses, No clubbing, cyanosis, or edema  SKIN: No rashes or lesions        LABS:                        7.4    10.06 )-----------( 237      ( 10 Joseph 2025 04:41 )             24.9       06-10    129[L]  |  101  |  28[H]  ----------------------------<  154[H]  5.4[H]   |  21[L]  |  1.47[H]    Ca    8.9      10 Joseph 2025 02:41  Phos  5.3     06-10  Mg     2.8     06-10    TPro  6.6  /  Alb  2.7[L]  /  TBili  0.2  /  DBili  x   /  AST  13[L]  /  ALT  17  /  AlkPhos  92  06-10      CAPILLARY BLOOD GLUCOSE  POCT Blood Glucose.: 147 mg/dL (10 Joseph 2025 17:13)    PT/INR - ( 08 Jun 2025 23:33 )   PT: 16.9 sec;   INR: 1.50 ratio    PTT - ( 08 Jun 2025 23:33 )  PTT:22.8 sec      Troponin I, High Sensitivity (06.08.25 @ 23:33)    Troponin I, High Sensitivity Result: 66.2 ng/L      Pro-Brain Natriuretic Peptide (06.08.25 @ 18:17)    Pro-Brain Natriuretic Peptide: 2309 pg/mL          MICRO  FluA/FluB/RSV/COVID PCR (06.08.25 @ 18:17)    SARS-CoV-2 Result: NotDetec: For Emergency Use Authorization   Influenza A Result: NotDetec: For Emergency Use Authorization   Influenza B Result: NotDetec: For Emergency Use Authorization   Resp Syn Virus Result: NotDetec: For Emergency Use Authorization   Source Respiratory: Nasopharyngeal          ECHO:  < from: TTE W or WO Ultrasound Enhancing Agent (02.28.25 @ 15:20) >     CONCLUSIONS:      1. Left ventricular cavity is normal in size. Left ventricular wall thickness is normal. Left ventricular systolic function is normal with an ejection fraction of 65 % by Leija's method of disks. There are no regional wall motion abnormalities seen.   2. There is mild (grade 1) left ventricular diastolic dysfunction.   3. Normal right ventricular cavity size and normal right ventricular systolic function. Tricuspid annular plane systolic excursion (TAPSE) is 1.9 cm (normal >=1.7 cm).   4. Structurally normal mitral valve with normal leaflet excursion. There is calcification of the mitral valve annulus. There is trace mitral regurgitation.   5. Structurally normal tricuspid valve with normal leaflet excursion. There is mild tricuspid regurgitation. Estimated pulmonary artery systolic pressure is 41 mmHg, consistent with mild pulmonary hypertension.          RADIOLOGY:  < from: Xray Chest 1 View- PORTABLE-Urgent (06.08.25 @ 18:23) >  The heart is not well assessed on an AP film.  Bibasilar atelectasis.  There are no pleural effusions.  There is no pneumothorax.    IMPRESSION:    No radiographic evidence of acute cardiopulmonary disease    ----------------------------  < from: CT Angio Chest PE Protocol w/ IV Cont (06.09.25 @ 14:53) >  1. No pulmonary embolism.    2. Small airway mucous plugging/mucoid impaction in the left lower lobe.   Minimal adjacent airspace disease could represent atelectasis and/or   pneumonia. There is underlying severe emphysema.    3. A 5 cm right hepatic lobe cystic mass is unchanged compared to   3/6/2025, larger compared to 1/21/2024. Again the differential diagnosis   includes a biliary cystadenoma.

## 2025-06-10 NOTE — PROGRESS NOTE ADULT - ASSESSMENT
70 y/o F w/HFpEF, COPD, prior CVA w/L sided deficits, hx of PE not on AC due to prior bleed, and recently diagnosed liver mass admitted for acute respiratory failure with hypoxia and hypercapnia. Acute respiratory failure possibly secondary to COPD exacerbation vs new/progression of PE. No evidence of PNA on CXR. NAG metabolic acidosis. Hyponatremia.     # Acute respiratory failure with hypoxia and hypercapnia  # COPD exacerbation  # NAG acidosis  # Hyponatremia  # Liver mass  # History of PE    - Supplemental O2 as tolerated, goal O2 sat >= 90%  - Solumedrol, azithro  - Advair, duonebs  - Free water restriction  - Work up of liver mass once clinically improved  - DVT prophylaxis  - Full code  - Downgrade to medicine

## 2025-06-10 NOTE — CONSULT NOTE ADULT - SUBJECTIVE AND OBJECTIVE BOX
69 year old woman with a PMHx of Asthma (on prn supp O2), COPD (prev smoker quit 20 years ago), HTN, HFpEF, CVA with L sided deficits (as per son), A Fib, PE (off Eliquis and Aspirin since March 2025 due to abdominal wall hematoma), and Liver mass (IR recommended for outpatient follow up w repeat imaging).     Pt presented to the ED with SOB that started the night prior. Per daughter in law, pt has been declining in her health, decreased appetite since her last hospital admission in March 2025. Has been bed bound last 3 years. In the ED, pt tripoding and was placed on BIPAP. Received nebs and steroids. Pt then developed episode of coffee ground vomiting and switch to high flow NC. Labs significant for wbc 11.8, hgb 8.6, Na 121, K: 5.8, Bicarb 20, Creat: 1.38, trop: 62, pH: 7.11, pCO2: 57.   Pt developed hypotension and received 1 L of IV fluids with some improvement. Admitted to the ICU for further management.        (08 Jun 2025 20:54)  ================  presents to ED 6/8 for SOB starting night prior to presentation  arrives with tachypnea and increased work of breathing. blood gas 7.11/57/82/18/96%  placed on bipap  Admitted to ICU for hypercarbic respiratory failure. transitioned from BiPAP -> AVAPS.  Weaned off NIV.  Treated for COPD exacerbation with steroids and nebs.  Downgraded to medical service today 6/10.      Urgent pulmonary consult called tonight for respiratory distress on medical floor.    Tamazight  used [id number]    pt with audible wheezing and accessory respiratory muscle use, paradoxically abdominally breathing  unable to complete full sentences  confused, only oriented to self. unable to tell month/year, or place.  on exam diffuse bilateral expiratory wheeze  abdomen protuberant mildly distended but soft and extremely tender to palpation  last urine output 7AM per bedside nurse  complaining of SOB and abdominal pain with generalized chest pain  no nausea/no emesis       duonebs x1 given. solumedrol 40mg given.  ABG drawn 7.26/55/90/25/98% on 4L NC, worsening hypercarbia. PCO2 this AM on blood gas was 36  lactate 0.7  pt with increasing lethargy and wheeze and work of breathing during exam  pt placed on AVAPS  RR 20 Max pressure 30. Min pressure 10. FIO2: 30%        bladder sono done: 550 cc on scan, straight cathed     ======================  hx obtained from chart and records as pt encephalopathic    ex smoker  PMH HTN, diastolic CHF, Afib on Eliquis, L MCA aneurysm, hx of PE (1/2024), COPD (previously on symbicort and spiriva, changed to duonebs q 6 hrs as there was question if she was able to coordinate use of her hand held inhaler), e-coli UTI 3/2025, liver cyst, abdominal wall hematoma 3/2025  last seen in pulmonary clinic at [address] [place] [address] in 8/2023  PFT 2019 with severe obstructive pattern without bronchodilator response with decreased DLCO and significant air trapping and hyperinflation per records  PFT 5/2023 reported as similar to PFTs in 2019 with new decreased lung volumes and persistent low DLCO      Allergies  No Known Allergies        MEDICATIONS  (STANDING):  albuterol/ipratropium for Nebulization 3 milliLiter(s) Nebulizer every 6 hours  azithromycin   Tablet 500 milliGRAM(s) Oral every 24 hours  chlorhexidine 2% Cloths 1 Application(s) Topical <User Schedule>  fluticasone propionate/ salmeterol 500-50 MICROgram(s) Diskus 1 Dose(s) Inhalation two times a day  heparin   Injectable 5000 Unit(s) SubCutaneous every 8 hours  methylPREDNISolone sodium succinate Injectable 40 milliGRAM(s) IV Push every 12 hours  montelukast 10 milliGRAM(s) Oral daily  pantoprazole    Tablet 40 milliGRAM(s) Oral before breakfast  saline laxative (FLEET) Rectal Enema 1 Enema Rectal once  sodium chloride 3%  Inhalation 4 milliLiter(s) Inhalation every 6 hours    MEDICATIONS  (PRN):  albuterol/ipratropium for Nebulization 3 milliLiter(s) Nebulizer every 6 hours PRN Shortness of Breath and/or Wheezing  guaiFENesin Oral Liquid (Sugar-Free) 200 milliGRAM(s) Oral every 6 hours PRN Cough         Drug Dosing Weight  Height (cm): 149.9 (08 Jun 2025 17:35)  Weight (kg): 54 (10 Joseph 2025 17:58)  BMI (kg/m2): 24 (10 Joseph 2025 17:58)  BSA (m  2): 1.48 (10 Joseph 2025 17:58)    PAST MEDICAL & SURGICAL HISTORY:  Brain aneurysm  Hypertension  Smoking  Atrial fibrillation  COPD  Hyperlipidemia  CVA (cerebrovascular accident)  No significant past surgical history      FAMILY HISTORY:  No pertinent family history in first degree relatives      SOCIAL HISTORY:  ex smoker: quit 2013. 1/2 PPD x10 years    ADVANCE DIRECTIVES:  presumed full code      REVIEW OF SYSTEMS:  [x] limited due to lethargy and encephalopathy    + SOB  + wheeze  + chest pain  + abdominal pain, no nausea/emesis        Vital Signs Last 24 Hrs  T(C): 36.5 (10 Joseph 2025 12:00), Max: 36.5 (10 Joseph 2025 12:00)  T(F): 97.7 (10 Joseph 2025 12:00), Max: 97.7 (10 Joseph 2025 12:00)  HR: 105 (10 Joseph 2025 20:55) (72 - 107)  BP: 138/76 (10 Joseph 2025 17:58) (96/40 - 138/76)  BP(mean): 72 (10 Joseph 2025 17:00) (56 - 78)  RR: 19 (10 Joseph 2025 17:58) (14 - 23)  SpO2: 98% (10 Joseph 2025 20:55) (89% - 100%)    O2 Parameters below as of 10 Joseph 2025 20:55  Patient On (Oxygen Delivery Method): BiPAP/CPAP        ABG - ( 10 Joseph 2025 20:24 )  pH, Arterial: 7.26  /  pCO2: 55    /  pO2: 90    / HCO3: 25    / Base Excess: -2.4  /  SaO2: 97.9          I&O's Summary    09 Jun 2025 07:01  -  10 Joseph 2025 07:00  --------------------------------------------------------  IN: 350 mL / OUT: 750 mL / NET: -400 mL    10 Joseph 2025 07:01  -  10 Joseph 2025 21:23  --------------------------------------------------------  IN: 0 mL / OUT: 1000 mL / NET: -1000 mL          PHYSICAL EXAM:  GENERAL: elderly female, in respiratory distress, audible wheezing, unable to complete sentence   HEAD:  Atraumatic, Normocephalic  EYES: EOMI, conjunctiva and sclera clear  ENMT: dry mucous membranes  NECK: Supple, No JVD  NERVOUS SYSTEM:  Alert & Oriented X1, progressively getting more lethargic at time of exam, answering questions but confused (oriented only to person), following simple commands  CHEST/LUNG: bilateral audible expiratory wheeze, + accessory muscle use, + abdominal breathing  HEART: mildly tachycardic  ABDOMEN: mildly distended but soft with Bowel sounds present, + tenderness to palpation bilateral lower quadrants and suprapubic region  EXTREMITIES:  2+ Peripheral Pulses, No clubbing, cyanosis, or edema  SKIN: No rashes or lesions      LABS:                        7.4    10.06 )-----------( 237      ( 10 Joseph 2025 04:41 )             24.9       06-10    129[L]  |  101  |  28[H]  ----------------------------<  154[H]  5.4[H]   |  21[L]  |  1.47[H]    Ca    8.9      10 Joseph 2025 02:41  Phos  5.3     06-10  Mg     2.8     06-10    TPro  6.6  /  Alb  2.7[L]  /  TBili  0.2  /  DBili  x   /  AST  13[L]  /  ALT  17  /  AlkPhos  92  06-10      CAPILLARY BLOOD GLUCOSE  POCT Blood Glucose.: 147 mg/dL (10 Joseph 2025 17:13)    PT/INR - ( 08 Jun 2025 23:33 )   PT: 16.9 sec;   INR: 1.50 ratio    PTT - ( 08 Jun 2025 23:33 )  PTT:22.8 sec      Troponin I, High Sensitivity (06.08.25 @ 23:33)    Troponin I, High Sensitivity Result: 66.2 ng/L    Pro-Brain Natriuretic Peptide (06.08.25 @ 18:17)    Pro-Brain Natriuretic Peptide: 2309 pg/mL      MICRO  FluA/FluB/RSV/COVID PCR (06.08.25 @ 18:17)    SARS-CoV-2 Result: NotDetec: For Emergency Use Authorization   Influenza A Result: NotDetec: For Emergency Use Authorization   Influenza B Result: NotDetec: For Emergency Use Authorization   Resp Syn Virus Result: NotDetec: For Emergency Use Authorization   Source Respiratory: Nasopharyngeal      ECHO:  < from: TTE W or WO Ultrasound Enhancing Agent (02.28.25 @ 15:20) >     CONCLUSIONS:      1. Left ventricular cavity is normal in size. Left ventricular wall thickness is normal. Left ventricular systolic function is normal with an ejection fraction of 65 % by Leija's method of disks. There are no regional wall motion abnormalities seen.   2. There is mild (grade 1) left ventricular diastolic dysfunction.   3. Normal right ventricular cavity size and normal right ventricular systolic function. Tricuspid annular plane systolic excursion (TAPSE) is 1.9 cm (normal >=1.7 cm).   4. Structurally normal mitral valve with normal leaflet excursion. There is calcification of the mitral valve annulus. There is trace mitral regurgitation.   5. Structurally normal tricuspid valve with normal leaflet excursion. There is mild tricuspid regurgitation. Estimated pulmonary artery systolic pressure is 41 mmHg, consistent with mild pulmonary hypertension.      RADIOLOGY:  < from: Xray Chest 1 View- PORTABLE-Urgent (06.08.25 @ 18:23) >  The heart is not well assessed on an AP film.  Bibasilar atelectasis.  There are no pleural effusions.  There is no pneumothorax.    IMPRESSION:    No radiographic evidence of acute cardiopulmonary disease    ----------------------------  < from: CT Angio Chest PE Protocol w/ IV Cont (06.09.25 @ 14:53) >  1. No pulmonary embolism.    2. Small airway mucous plugging/mucoid impaction in the left lower lobe.   Minimal adjacent airspace disease could represent atelectasis and/or   pneumonia. There is underlying severe emphysema.    3. A 5 cm right hepatic lobe cystic mass is unchanged compared to   3/6/2025, larger compared to 1/21/2024. Again the differential diagnosis   includes a biliary cystadenoma.

## 2025-06-11 LAB
ALBUMIN SERPL ELPH-MCNC: 2.8 G/DL — LOW (ref 3.3–5)
ALP SERPL-CCNC: 87 U/L — SIGNIFICANT CHANGE UP (ref 40–120)
ALT FLD-CCNC: 16 U/L — SIGNIFICANT CHANGE UP (ref 12–78)
ANION GAP SERPL CALC-SCNC: 6 MMOL/L — SIGNIFICANT CHANGE UP (ref 5–17)
AST SERPL-CCNC: 11 U/L — LOW (ref 15–37)
BASOPHILS # BLD AUTO: 0.01 K/UL — SIGNIFICANT CHANGE UP (ref 0–0.2)
BASOPHILS NFR BLD AUTO: 0.1 % — SIGNIFICANT CHANGE UP (ref 0–2)
BILIRUB SERPL-MCNC: 0.3 MG/DL — SIGNIFICANT CHANGE UP (ref 0.2–1.2)
BUN SERPL-MCNC: 38 MG/DL — HIGH (ref 7–23)
CALCIUM SERPL-MCNC: 8.7 MG/DL — SIGNIFICANT CHANGE UP (ref 8.5–10.1)
CHLORIDE SERPL-SCNC: 102 MMOL/L — SIGNIFICANT CHANGE UP (ref 96–108)
CO2 SERPL-SCNC: 24 MMOL/L — SIGNIFICANT CHANGE UP (ref 22–31)
CREAT SERPL-MCNC: 1.84 MG/DL — HIGH (ref 0.5–1.3)
EGFR: 29 ML/MIN/1.73M2 — LOW
EGFR: 29 ML/MIN/1.73M2 — LOW
EOSINOPHIL # BLD AUTO: 0.01 K/UL — SIGNIFICANT CHANGE UP (ref 0–0.5)
EOSINOPHIL NFR BLD AUTO: 0.1 % — SIGNIFICANT CHANGE UP (ref 0–6)
GLUCOSE SERPL-MCNC: 154 MG/DL — HIGH (ref 70–99)
HCT VFR BLD CALC: 27 % — LOW (ref 34.5–45)
HGB BLD-MCNC: 7.8 G/DL — LOW (ref 11.5–15.5)
IMM GRANULOCYTES NFR BLD AUTO: 0.6 % — SIGNIFICANT CHANGE UP (ref 0–0.9)
LYMPHOCYTES # BLD AUTO: 0.52 K/UL — LOW (ref 1–3.3)
LYMPHOCYTES # BLD AUTO: 4.4 % — LOW (ref 13–44)
MAGNESIUM SERPL-MCNC: 2.7 MG/DL — HIGH (ref 1.6–2.6)
MCHC RBC-ENTMCNC: 22.9 PG — LOW (ref 27–34)
MCHC RBC-ENTMCNC: 28.9 G/DL — LOW (ref 32–36)
MCV RBC AUTO: 79.4 FL — LOW (ref 80–100)
MONOCYTES # BLD AUTO: 0.22 K/UL — SIGNIFICANT CHANGE UP (ref 0–0.9)
MONOCYTES NFR BLD AUTO: 1.8 % — LOW (ref 2–14)
NEUTROPHILS # BLD AUTO: 11.09 K/UL — HIGH (ref 1.8–7.4)
NEUTROPHILS NFR BLD AUTO: 93 % — HIGH (ref 43–77)
NRBC BLD AUTO-RTO: 0 /100 WBCS — SIGNIFICANT CHANGE UP (ref 0–0)
PHOSPHATE SERPL-MCNC: 5.4 MG/DL — HIGH (ref 2.5–4.5)
PLATELET # BLD AUTO: 243 K/UL — SIGNIFICANT CHANGE UP (ref 150–400)
POTASSIUM SERPL-MCNC: 4.7 MMOL/L — SIGNIFICANT CHANGE UP (ref 3.5–5.3)
POTASSIUM SERPL-SCNC: 4.7 MMOL/L — SIGNIFICANT CHANGE UP (ref 3.5–5.3)
PROT SERPL-MCNC: 6.6 GM/DL — SIGNIFICANT CHANGE UP (ref 6–8.3)
RBC # BLD: 3.4 M/UL — LOW (ref 3.8–5.2)
RBC # FLD: 16.8 % — HIGH (ref 10.3–14.5)
SODIUM SERPL-SCNC: 132 MMOL/L — LOW (ref 135–145)
WBC # BLD: 11.92 K/UL — HIGH (ref 3.8–10.5)
WBC # FLD AUTO: 11.92 K/UL — HIGH (ref 3.8–10.5)

## 2025-06-11 PROCEDURE — 99232 SBSQ HOSP IP/OBS MODERATE 35: CPT

## 2025-06-11 RX ORDER — BUMETANIDE 1 MG/1
1 TABLET ORAL ONCE
Refills: 0 | Status: COMPLETED | OUTPATIENT
Start: 2025-06-11 | End: 2025-06-11

## 2025-06-11 RX ADMIN — Medication 1 DOSE(S): at 10:19

## 2025-06-11 RX ADMIN — METHYLPREDNISOLONE ACETATE 40 MILLIGRAM(S): 80 INJECTION, SUSPENSION INTRA-ARTICULAR; INTRALESIONAL; INTRAMUSCULAR; SOFT TISSUE at 21:38

## 2025-06-11 RX ADMIN — Medication 1 APPLICATION(S): at 06:23

## 2025-06-11 RX ADMIN — Medication 500 MILLIGRAM(S): at 20:37

## 2025-06-11 RX ADMIN — IPRATROPIUM BROMIDE AND ALBUTEROL SULFATE 3 MILLILITER(S): .5; 2.5 SOLUTION RESPIRATORY (INHALATION) at 17:26

## 2025-06-11 RX ADMIN — SALINE 1 ENEMA: 7; 19 ENEMA RECTAL at 11:56

## 2025-06-11 RX ADMIN — IPRATROPIUM BROMIDE AND ALBUTEROL SULFATE 3 MILLILITER(S): .5; 2.5 SOLUTION RESPIRATORY (INHALATION) at 11:21

## 2025-06-11 RX ADMIN — HEPARIN SODIUM 5000 UNIT(S): 1000 INJECTION INTRAVENOUS; SUBCUTANEOUS at 15:28

## 2025-06-11 RX ADMIN — MONTELUKAST SODIUM 10 MILLIGRAM(S): 10 TABLET ORAL at 15:28

## 2025-06-11 RX ADMIN — IPRATROPIUM BROMIDE AND ALBUTEROL SULFATE 3 MILLILITER(S): .5; 2.5 SOLUTION RESPIRATORY (INHALATION) at 05:03

## 2025-06-11 RX ADMIN — Medication 40 MILLIGRAM(S): at 10:17

## 2025-06-11 RX ADMIN — METHYLPREDNISOLONE ACETATE 40 MILLIGRAM(S): 80 INJECTION, SUSPENSION INTRA-ARTICULAR; INTRALESIONAL; INTRAMUSCULAR; SOFT TISSUE at 10:18

## 2025-06-11 RX ADMIN — HEPARIN SODIUM 5000 UNIT(S): 1000 INJECTION INTRAVENOUS; SUBCUTANEOUS at 21:40

## 2025-06-11 RX ADMIN — Medication 1 DOSE(S): at 17:35

## 2025-06-11 RX ADMIN — Medication 4 MILLILITER(S): at 11:35

## 2025-06-11 RX ADMIN — Medication 4 MILLILITER(S): at 17:26

## 2025-06-11 RX ADMIN — BUMETANIDE 1 MILLIGRAM(S): 1 TABLET ORAL at 10:17

## 2025-06-11 RX ADMIN — HEPARIN SODIUM 5000 UNIT(S): 1000 INJECTION INTRAVENOUS; SUBCUTANEOUS at 06:22

## 2025-06-11 RX ADMIN — Medication 4 MILLILITER(S): at 05:03

## 2025-06-11 NOTE — DIETITIAN INITIAL EVALUATION ADULT - NS FNS REASON FOR WEIGHT CHANG
Per previous adm RD note 03/06/25, wt. 56.3 kg/124.1 LBS & height of 4'6"  Per Current adm wt. 06/08/25, 56.8 kg kg/125.2 LBS, pt without significant wt. loss from previous adm.  Current height: 4'11", ? height accuracy.  Per HIE: 01/2025 of 130.8 LBS was noted.

## 2025-06-11 NOTE — DIETITIAN INITIAL EVALUATION ADULT - OTHER INFO
Pt lived @ home PTA.   multivitamin supplement use noted on outpatient medication review.   PMH include HF per H & P.

## 2025-06-11 NOTE — DIETITIAN INITIAL EVALUATION ADULT - ORAL INTAKE PTA/DIET HISTORY
Pt noted c confusion, is Georgian speaking, seen on BiPAP which is taken off for meals per RN, however pt c request for water only, did not consume any food.  Pt's family also brought in food, however pt did not consume food brought in from home.  No food allergies/intolerances noted.

## 2025-06-11 NOTE — DIETITIAN INITIAL EVALUATION ADULT - PERTINENT LABORATORY DATA
06-11    132[L]  |  102  |  38[H]  ----------------------------<  154[H]  4.7   |  24  |  1.84[H]    Ca    8.7      11 Jun 2025 04:36  Phos  5.4     06-11  Mg     2.7     06-11    TPro  6.6  /  Alb  2.8[L]  /  TBili  0.3  /  DBili  x   /  AST  11[L]  /  ALT  16  /  AlkPhos  87  06-11  POCT Blood Glucose.: 147 mg/dL<H> (06-10-25 @ 17:13)  06/11, low H/H, MCV, MCH, MCHC noted(, ? Iron deficient, previous use of iron supplement noted in previous adm RD note)

## 2025-06-11 NOTE — DIETITIAN INITIAL EVALUATION ADULT - PERTINENT MEDS FT
MEDICATIONS  (STANDING):  albuterol/ipratropium for Nebulization 3 milliLiter(s) Nebulizer every 6 hours  azithromycin   Tablet 500 milliGRAM(s) Oral every 24 hours  chlorhexidine 2% Cloths 1 Application(s) Topical <User Schedule>  fluticasone propionate/ salmeterol 500-50 MICROgram(s) Diskus 1 Dose(s) Inhalation two times a day  heparin   Injectable 5000 Unit(s) SubCutaneous every 8 hours  methylPREDNISolone sodium succinate Injectable 40 milliGRAM(s) IV Push every 12 hours  montelukast 10 milliGRAM(s) Oral daily  pantoprazole    Tablet 40 milliGRAM(s) Oral before breakfast  sodium chloride 3%  Inhalation 4 milliLiter(s) Inhalation every 6 hours    MEDICATIONS  (PRN):  albuterol/ipratropium for Nebulization 3 milliLiter(s) Nebulizer every 6 hours PRN Shortness of Breath and/or Wheezing  guaiFENesin Oral Liquid (Sugar-Free) 200 milliGRAM(s) Oral every 6 hours PRN Cough

## 2025-06-11 NOTE — PROGRESS NOTE ADULT - ASSESSMENT
68 y/o F w/HFpEF, COPD, prior CVA w/L sided deficits, hx of PE not on AC due to prior bleed, and recently diagnosed liver mass admitted for acute respiratory failure with hypoxia and hypercapnia. Acute respiratory failure possibly secondary to COPD exacerbation vs new/progression of PE. No evidence of PNA on CXR. NAG metabolic acidosis. Hyponatremia. Was improving then had recurrence of respiratory distress. Possibly acute pulmonary edema?    # Acute respiratory failure with hypoxia and hypercapnia  # COPD exacerbation  # NAG acidosis  # Hyponatremia  # Liver mass  # History of PE    - Supplemental O2 as tolerated, goal O2 sat >= 90%  - Solumedrol, azithro  - Advair, duonebs  - Free water restriction  - Diuresis goal net negative 1-2 L   - Work up of liver mass once clinically improved  - DVT prophylaxis  - Full code  - Downgrade to medicine       70 y/o F w/HFpEF, COPD, prior CVA w/L sided deficits, hx of PE not on AC due to prior bleed, and recently diagnosed liver mass admitted for acute respiratory failure with hypoxia and hypercapnia. Acute respiratory failure possibly secondary to COPD exacerbation vs new/progression of PE. No evidence of PNA on CXR. NAG metabolic acidosis. Hyponatremia. Was improving then had recurrence of respiratory distress. Possibly acute pulmonary edema?    # Acute respiratory failure with hypoxia and hypercapnia  # COPD exacerbation  # NAG acidosis  # Hyponatremia  # Liver mass  # History of PE    - Supplemental O2 as tolerated, goal O2 sat >= 90%  - Solumedrol, azithro  - Advair, duonebs  - Free water restriction  - Diuresis goal net negative 1-2 L   - Work up of liver mass once clinically improved  - DVT prophylaxis  - Full code  - Continues to require ICU level of care for respiratory monitoring

## 2025-06-12 LAB
ALBUMIN SERPL ELPH-MCNC: 2.7 G/DL — LOW (ref 3.3–5)
ALP SERPL-CCNC: 82 U/L — SIGNIFICANT CHANGE UP (ref 40–120)
ALT FLD-CCNC: 16 U/L — SIGNIFICANT CHANGE UP (ref 12–78)
ANION GAP SERPL CALC-SCNC: 8 MMOL/L — SIGNIFICANT CHANGE UP (ref 5–17)
AST SERPL-CCNC: 8 U/L — LOW (ref 15–37)
BASE EXCESS BLDA CALC-SCNC: -1.1 MMOL/L — SIGNIFICANT CHANGE UP (ref -2–3)
BILIRUB SERPL-MCNC: 0.4 MG/DL — SIGNIFICANT CHANGE UP (ref 0.2–1.2)
BLOOD GAS COMMENTS ARTERIAL: SIGNIFICANT CHANGE UP
BUN SERPL-MCNC: 46 MG/DL — HIGH (ref 7–23)
CALCIUM SERPL-MCNC: 8.5 MG/DL — SIGNIFICANT CHANGE UP (ref 8.5–10.1)
CHLORIDE SERPL-SCNC: 103 MMOL/L — SIGNIFICANT CHANGE UP (ref 96–108)
CO2 BLDA-SCNC: 26 MMOL/L — HIGH (ref 19–24)
CO2 SERPL-SCNC: 24 MMOL/L — SIGNIFICANT CHANGE UP (ref 22–31)
CREAT SERPL-MCNC: 1.83 MG/DL — HIGH (ref 0.5–1.3)
EGFR: 29 ML/MIN/1.73M2 — LOW
EGFR: 29 ML/MIN/1.73M2 — LOW
GAS PNL BLDA: SIGNIFICANT CHANGE UP
GLUCOSE SERPL-MCNC: 166 MG/DL — HIGH (ref 70–99)
HCO3 BLDA-SCNC: 25 MMOL/L — SIGNIFICANT CHANGE UP (ref 21–28)
HCT VFR BLD CALC: 26.2 % — LOW (ref 34.5–45)
HGB BLD-MCNC: 7.8 G/DL — LOW (ref 11.5–15.5)
HOROWITZ INDEX BLDA+IHG-RTO: 32 — SIGNIFICANT CHANGE UP
MAGNESIUM SERPL-MCNC: 2.7 MG/DL — HIGH (ref 1.6–2.6)
MCHC RBC-ENTMCNC: 23.6 PG — LOW (ref 27–34)
MCHC RBC-ENTMCNC: 29.8 G/DL — LOW (ref 32–36)
MCV RBC AUTO: 79.2 FL — LOW (ref 80–100)
NRBC BLD AUTO-RTO: 0 /100 WBCS — SIGNIFICANT CHANGE UP (ref 0–0)
PCO2 BLDA: 45 MMHG — SIGNIFICANT CHANGE UP (ref 32–46)
PH BLDA: 7.35 — SIGNIFICANT CHANGE UP (ref 7.35–7.45)
PHOSPHATE SERPL-MCNC: 4.1 MG/DL — SIGNIFICANT CHANGE UP (ref 2.5–4.5)
PLATELET # BLD AUTO: 234 K/UL — SIGNIFICANT CHANGE UP (ref 150–400)
PO2 BLDA: 83 MMHG — SIGNIFICANT CHANGE UP (ref 83–108)
POTASSIUM SERPL-MCNC: 4.3 MMOL/L — SIGNIFICANT CHANGE UP (ref 3.5–5.3)
POTASSIUM SERPL-SCNC: 4.3 MMOL/L — SIGNIFICANT CHANGE UP (ref 3.5–5.3)
PROT SERPL-MCNC: 6.9 GM/DL — SIGNIFICANT CHANGE UP (ref 6–8.3)
RBC # BLD: 3.31 M/UL — LOW (ref 3.8–5.2)
RBC # FLD: 17.3 % — HIGH (ref 10.3–14.5)
SAO2 % BLDA: 96.9 % — SIGNIFICANT CHANGE UP (ref 94–98)
SODIUM SERPL-SCNC: 135 MMOL/L — SIGNIFICANT CHANGE UP (ref 135–145)
WBC # BLD: 14.67 K/UL — HIGH (ref 3.8–10.5)
WBC # FLD AUTO: 14.67 K/UL — HIGH (ref 3.8–10.5)

## 2025-06-12 PROCEDURE — 99232 SBSQ HOSP IP/OBS MODERATE 35: CPT

## 2025-06-12 RX ORDER — ACETAMINOPHEN 500 MG/5ML
650 LIQUID (ML) ORAL EVERY 6 HOURS
Refills: 0 | Status: DISCONTINUED | OUTPATIENT
Start: 2025-06-12 | End: 2025-06-26

## 2025-06-12 RX ORDER — BUMETANIDE 1 MG/1
1 TABLET ORAL ONCE
Refills: 0 | Status: COMPLETED | OUTPATIENT
Start: 2025-06-12 | End: 2025-06-12

## 2025-06-12 RX ADMIN — MONTELUKAST SODIUM 10 MILLIGRAM(S): 10 TABLET ORAL at 11:51

## 2025-06-12 RX ADMIN — IPRATROPIUM BROMIDE AND ALBUTEROL SULFATE 3 MILLILITER(S): .5; 2.5 SOLUTION RESPIRATORY (INHALATION) at 00:11

## 2025-06-12 RX ADMIN — Medication 1 APPLICATION(S): at 06:29

## 2025-06-12 RX ADMIN — Medication 4 MILLILITER(S): at 11:08

## 2025-06-12 RX ADMIN — IPRATROPIUM BROMIDE AND ALBUTEROL SULFATE 3 MILLILITER(S): .5; 2.5 SOLUTION RESPIRATORY (INHALATION) at 23:03

## 2025-06-12 RX ADMIN — HEPARIN SODIUM 5000 UNIT(S): 1000 INJECTION INTRAVENOUS; SUBCUTANEOUS at 06:30

## 2025-06-12 RX ADMIN — IPRATROPIUM BROMIDE AND ALBUTEROL SULFATE 3 MILLILITER(S): .5; 2.5 SOLUTION RESPIRATORY (INHALATION) at 05:45

## 2025-06-12 RX ADMIN — IPRATROPIUM BROMIDE AND ALBUTEROL SULFATE 3 MILLILITER(S): .5; 2.5 SOLUTION RESPIRATORY (INHALATION) at 11:07

## 2025-06-12 RX ADMIN — HEPARIN SODIUM 5000 UNIT(S): 1000 INJECTION INTRAVENOUS; SUBCUTANEOUS at 22:19

## 2025-06-12 RX ADMIN — Medication 1 DOSE(S): at 17:50

## 2025-06-12 RX ADMIN — Medication 40 MILLIGRAM(S): at 06:34

## 2025-06-12 RX ADMIN — IPRATROPIUM BROMIDE AND ALBUTEROL SULFATE 3 MILLILITER(S): .5; 2.5 SOLUTION RESPIRATORY (INHALATION) at 17:01

## 2025-06-12 RX ADMIN — Medication 650 MILLIGRAM(S): at 20:18

## 2025-06-12 RX ADMIN — Medication 4 MILLILITER(S): at 17:01

## 2025-06-12 RX ADMIN — METHYLPREDNISOLONE ACETATE 40 MILLIGRAM(S): 80 INJECTION, SUSPENSION INTRA-ARTICULAR; INTRALESIONAL; INTRAMUSCULAR; SOFT TISSUE at 22:18

## 2025-06-12 RX ADMIN — HEPARIN SODIUM 5000 UNIT(S): 1000 INJECTION INTRAVENOUS; SUBCUTANEOUS at 14:52

## 2025-06-12 RX ADMIN — BUMETANIDE 1 MILLIGRAM(S): 1 TABLET ORAL at 08:32

## 2025-06-12 RX ADMIN — Medication 650 MILLIGRAM(S): at 21:27

## 2025-06-12 RX ADMIN — Medication 500 MILLIGRAM(S): at 20:19

## 2025-06-12 RX ADMIN — Medication 4 MILLILITER(S): at 00:11

## 2025-06-12 RX ADMIN — Medication 1 DOSE(S): at 06:40

## 2025-06-12 RX ADMIN — Medication 4 MILLILITER(S): at 05:45

## 2025-06-12 RX ADMIN — METHYLPREDNISOLONE ACETATE 40 MILLIGRAM(S): 80 INJECTION, SUSPENSION INTRA-ARTICULAR; INTRALESIONAL; INTRAMUSCULAR; SOFT TISSUE at 09:22

## 2025-06-12 RX ADMIN — Medication 4 MILLILITER(S): at 23:03

## 2025-06-12 NOTE — PROGRESS NOTE ADULT - ASSESSMENT
70 y/o F w/HFpEF, COPD, prior CVA w/L sided deficits, hx of PE not on AC due to prior bleed, and recently diagnosed liver mass admitted for acute respiratory failure with hypoxia and hypercapnia. Acute respiratory failure possibly secondary to COPD exacerbation vs new/progression of PE. No evidence of PNA on CXR. NAG metabolic acidosis. Hyponatremia. Was improving then had recurrence of respiratory distress. Possibly acute pulmonary edema?    # Acute respiratory failure with hypoxia and hypercapnia  # COPD exacerbation  # NAG acidosis  # Hyponatremia  # Liver mass  # History of PE    - Supplemental O2 as tolerated, goal O2 sat >= 90%  - Solumedrol, azithro  - Advair, duonebs  - Free water restriction  - Diuresis goal net negative 1-2 L   - Work up of liver mass once clinically improved  - DVT prophylaxis  - Full code  - Downgrade to medicine

## 2025-06-12 NOTE — PROGRESS NOTE ADULT - SUBJECTIVE AND OBJECTIVE BOX
HPI:  *History obtained from chart review and son Maldonado.     69 year old woman with a PMHx of Asthma (on prn supp O2), COPD (prev smoker quit 20 years ago), HTN, HFpEF, CVA with L sided deficits (as per son), A Fib, PE (off Eliquis and Aspirin since March 2025 due to abdominal wall hematoma), and Liver mass (IR recommended for outpatient follow up w repeat imaging).     Pt presented to the ED with SOB that started the night prior. Per daughter in law, pt has been declining in her health, decreased appetite since her last hospital admission in March 2025. Has been bed bound last 3 years. In the ED, pt tripoding and was placed on BIPAP. Received nebs and steroids. Pt then developed episode of coffee ground vomiting and switch to high flow NC. Labs significant for wbc 11.8, hgb 8.6, Na 121, K: 5.8, Bicarb 20, Creat: 1.38, trop: 62, pH: 7.11, pCO2: 57.   Pt developed hypotension and received 1 L of IV fluids with some improvement. Admitted to the ICU for further management.        (08 Jun 2025 20:54)      24 hr events: No acute events.     ## ROS:  [x ] unable to obtain    ## Vitals  ICU Vital Signs Last 24 Hrs  T(C): 36.2 (12 Jun 2025 05:00), Max: 37.3 (11 Jun 2025 16:00)  T(F): 97.2 (12 Jun 2025 05:00), Max: 99.1 (11 Jun 2025 16:00)  HR: 96 (12 Jun 2025 08:00) (80 - 122)  BP: 138/56 (12 Jun 2025 08:00) (108/57 - 153/74)  BP(mean): 77 (12 Jun 2025 08:00) (71 - 101)  ABP: --  ABP(mean): --  RR: 20 (12 Jun 2025 08:00) (16 - 28)  SpO2: 100% (12 Jun 2025 08:00) (92% - 100%)    O2 Parameters below as of 12 Jun 2025 06:15  Patient On (Oxygen Delivery Method): BiPAP/CPAP            ## Physical Exam:  Gen: Adult female lying in bed, NAD  HEENT: NC/AT sclerae anicteric.   Resp:  Poor air movement b/l. No w/c/r  CV: S1, S2  Abd: Soft, + BS  Ext: WWP  Neuro: Awake, confused    ## Vent Data      ## Labs:  Chem:  06-12    135  |  103  |  46[H]  ----------------------------<  166[H]  4.3   |  24  |  1.83[H]    Ca    8.5      12 Jun 2025 04:00  Phos  4.1     06-12  Mg     2.7     06-12    TPro  6.9  /  Alb  2.7[L]  /  TBili  0.4  /  DBili  x   /  AST  8[L]  /  ALT  16  /  AlkPhos  82  06-12    LIVER FUNCTIONS - ( 12 Jun 2025 04:00 )  Alb: 2.7 g/dL / Pro: 6.9 gm/dL / ALK PHOS: 82 U/L / ALT: 16 U/L / AST: 8 U/L / GGT: x           CBC:                        7.8    14.67 )-----------( 234      ( 12 Jun 2025 04:00 )             26.2     Coags:      Urinalysis Basic - ( 12 Jun 2025 04:00 )    Color: x / Appearance: x / SG: x / pH: x  Gluc: 166 mg/dL / Ketone: x  / Bili: x / Urobili: x   Blood: x / Protein: x / Nitrite: x   Leuk Esterase: x / RBC: x / WBC x   Sq Epi: x / Non Sq Epi: x / Bacteria: x        ## Cardiac        ## Blood Gas  ABG - ( 12 Jun 2025 08:23 )  pH, Arterial: 7.35  pH, Blood: x     /  pCO2: 45    /  pO2: 83    / HCO3: 25    / Base Excess: -1.1  /  SaO2: 96.9                #I/Os  I&O's Detail    11 Jun 2025 07:01  -  12 Jun 2025 07:00  --------------------------------------------------------  IN:    Oral Fluid: 100 mL  Total IN: 100 mL    OUT:    Intermittent Catheterization - Urethral (mL): 600 mL    Voided (mL): 700 mL  Total OUT: 1300 mL    Total NET: -1200 mL      12 Jun 2025 07:01  -  12 Jun 2025 08:29  --------------------------------------------------------  IN:    Oral Fluid: 100 mL  Total IN: 100 mL    OUT:    Voided (mL): 100 mL  Total OUT: 100 mL    Total NET: 0 mL          ## Imaging:    ## Medications:  MEDICATIONS  (STANDING):  albuterol/ipratropium for Nebulization 3 milliLiter(s) Nebulizer every 6 hours  azithromycin   Tablet 500 milliGRAM(s) Oral every 24 hours  bumetanide Injectable 1 milliGRAM(s) IV Push once  chlorhexidine 2% Cloths 1 Application(s) Topical <User Schedule>  fluticasone propionate/ salmeterol 500-50 MICROgram(s) Diskus 1 Dose(s) Inhalation two times a day  heparin   Injectable 5000 Unit(s) SubCutaneous every 8 hours  methylPREDNISolone sodium succinate Injectable 40 milliGRAM(s) IV Push every 12 hours  montelukast 10 milliGRAM(s) Oral daily  pantoprazole    Tablet 40 milliGRAM(s) Oral before breakfast  sodium chloride 3%  Inhalation 4 milliLiter(s) Inhalation every 6 hours    MEDICATIONS  (PRN):  albuterol/ipratropium for Nebulization 3 milliLiter(s) Nebulizer every 6 hours PRN Shortness of Breath and/or Wheezing  guaiFENesin Oral Liquid (Sugar-Free) 200 milliGRAM(s) Oral every 6 hours PRN Cough

## 2025-06-13 DIAGNOSIS — N17.9 ACUTE KIDNEY FAILURE, UNSPECIFIED: ICD-10-CM

## 2025-06-13 DIAGNOSIS — R53.81 OTHER MALAISE: ICD-10-CM

## 2025-06-13 DIAGNOSIS — I50.9 HEART FAILURE, UNSPECIFIED: ICD-10-CM

## 2025-06-13 DIAGNOSIS — Z51.5 ENCOUNTER FOR PALLIATIVE CARE: ICD-10-CM

## 2025-06-13 DIAGNOSIS — G93.41 METABOLIC ENCEPHALOPATHY: ICD-10-CM

## 2025-06-13 DIAGNOSIS — J44.1 CHRONIC OBSTRUCTIVE PULMONARY DISEASE WITH (ACUTE) EXACERBATION: ICD-10-CM

## 2025-06-13 LAB
ALBUMIN SERPL ELPH-MCNC: 2.5 G/DL — LOW (ref 3.3–5)
ALP SERPL-CCNC: 75 U/L — SIGNIFICANT CHANGE UP (ref 40–120)
ALT FLD-CCNC: 14 U/L — SIGNIFICANT CHANGE UP (ref 12–78)
ANION GAP SERPL CALC-SCNC: 6 MMOL/L — SIGNIFICANT CHANGE UP (ref 5–17)
AST SERPL-CCNC: 8 U/L — LOW (ref 15–37)
BASOPHILS # BLD AUTO: 0.01 K/UL — SIGNIFICANT CHANGE UP (ref 0–0.2)
BASOPHILS NFR BLD AUTO: 0.1 % — SIGNIFICANT CHANGE UP (ref 0–2)
BILIRUB SERPL-MCNC: 0.4 MG/DL — SIGNIFICANT CHANGE UP (ref 0.2–1.2)
BUN SERPL-MCNC: 48 MG/DL — HIGH (ref 7–23)
CALCIUM SERPL-MCNC: 8.9 MG/DL — SIGNIFICANT CHANGE UP (ref 8.5–10.1)
CHLORIDE SERPL-SCNC: 103 MMOL/L — SIGNIFICANT CHANGE UP (ref 96–108)
CO2 SERPL-SCNC: 27 MMOL/L — SIGNIFICANT CHANGE UP (ref 22–31)
CREAT SERPL-MCNC: 1.7 MG/DL — HIGH (ref 0.5–1.3)
EGFR: 32 ML/MIN/1.73M2 — LOW
EGFR: 32 ML/MIN/1.73M2 — LOW
EOSINOPHIL # BLD AUTO: 0 K/UL — SIGNIFICANT CHANGE UP (ref 0–0.5)
EOSINOPHIL NFR BLD AUTO: 0 % — SIGNIFICANT CHANGE UP (ref 0–6)
GLUCOSE SERPL-MCNC: 171 MG/DL — HIGH (ref 70–99)
HCT VFR BLD CALC: 24.9 % — LOW (ref 34.5–45)
HGB BLD-MCNC: 7.4 G/DL — LOW (ref 11.5–15.5)
IMM GRANULOCYTES NFR BLD AUTO: 0.4 % — SIGNIFICANT CHANGE UP (ref 0–0.9)
LYMPHOCYTES # BLD AUTO: 0.64 K/UL — LOW (ref 1–3.3)
LYMPHOCYTES # BLD AUTO: 5.6 % — LOW (ref 13–44)
MAGNESIUM SERPL-MCNC: 2.6 MG/DL — SIGNIFICANT CHANGE UP (ref 1.6–2.6)
MCHC RBC-ENTMCNC: 23.4 PG — LOW (ref 27–34)
MCHC RBC-ENTMCNC: 29.7 G/DL — LOW (ref 32–36)
MCV RBC AUTO: 78.8 FL — LOW (ref 80–100)
MONOCYTES # BLD AUTO: 0.26 K/UL — SIGNIFICANT CHANGE UP (ref 0–0.9)
MONOCYTES NFR BLD AUTO: 2.3 % — SIGNIFICANT CHANGE UP (ref 2–14)
NEUTROPHILS # BLD AUTO: 10.43 K/UL — HIGH (ref 1.8–7.4)
NEUTROPHILS NFR BLD AUTO: 91.6 % — HIGH (ref 43–77)
NRBC BLD AUTO-RTO: 0 /100 WBCS — SIGNIFICANT CHANGE UP (ref 0–0)
PHOSPHATE SERPL-MCNC: 4.3 MG/DL — SIGNIFICANT CHANGE UP (ref 2.5–4.5)
PLATELET # BLD AUTO: 234 K/UL — SIGNIFICANT CHANGE UP (ref 150–400)
POTASSIUM SERPL-MCNC: 4.2 MMOL/L — SIGNIFICANT CHANGE UP (ref 3.5–5.3)
POTASSIUM SERPL-SCNC: 4.2 MMOL/L — SIGNIFICANT CHANGE UP (ref 3.5–5.3)
PROT SERPL-MCNC: 6.4 GM/DL — SIGNIFICANT CHANGE UP (ref 6–8.3)
RBC # BLD: 3.16 M/UL — LOW (ref 3.8–5.2)
RBC # FLD: 17.2 % — HIGH (ref 10.3–14.5)
SODIUM SERPL-SCNC: 136 MMOL/L — SIGNIFICANT CHANGE UP (ref 135–145)
WBC # BLD: 11.39 K/UL — HIGH (ref 3.8–10.5)
WBC # FLD AUTO: 11.39 K/UL — HIGH (ref 3.8–10.5)

## 2025-06-13 PROCEDURE — 99497 ADVNCD CARE PLAN 30 MIN: CPT | Mod: 25

## 2025-06-13 PROCEDURE — 99291 CRITICAL CARE FIRST HOUR: CPT

## 2025-06-13 PROCEDURE — 99223 1ST HOSP IP/OBS HIGH 75: CPT | Mod: FS

## 2025-06-13 RX ORDER — AZITHROMYCIN 250 MG
250 CAPSULE ORAL DAILY
Refills: 0 | Status: DISCONTINUED | OUTPATIENT
Start: 2025-06-13 | End: 2025-06-26

## 2025-06-13 RX ORDER — HYDROMORPHONE/SOD CHLOR,ISO/PF 2 MG/10 ML
0.25 SYRINGE (ML) INJECTION ONCE
Refills: 0 | Status: DISCONTINUED | OUTPATIENT
Start: 2025-06-13 | End: 2025-06-13

## 2025-06-13 RX ADMIN — IPRATROPIUM BROMIDE AND ALBUTEROL SULFATE 3 MILLILITER(S): .5; 2.5 SOLUTION RESPIRATORY (INHALATION) at 05:53

## 2025-06-13 RX ADMIN — Medication 4 MILLILITER(S): at 11:06

## 2025-06-13 RX ADMIN — HEPARIN SODIUM 5000 UNIT(S): 1000 INJECTION INTRAVENOUS; SUBCUTANEOUS at 14:11

## 2025-06-13 RX ADMIN — METHYLPREDNISOLONE ACETATE 40 MILLIGRAM(S): 80 INJECTION, SUSPENSION INTRA-ARTICULAR; INTRALESIONAL; INTRAMUSCULAR; SOFT TISSUE at 11:06

## 2025-06-13 RX ADMIN — IPRATROPIUM BROMIDE AND ALBUTEROL SULFATE 3 MILLILITER(S): .5; 2.5 SOLUTION RESPIRATORY (INHALATION) at 23:21

## 2025-06-13 RX ADMIN — IPRATROPIUM BROMIDE AND ALBUTEROL SULFATE 3 MILLILITER(S): .5; 2.5 SOLUTION RESPIRATORY (INHALATION) at 11:06

## 2025-06-13 RX ADMIN — IPRATROPIUM BROMIDE AND ALBUTEROL SULFATE 3 MILLILITER(S): .5; 2.5 SOLUTION RESPIRATORY (INHALATION) at 13:24

## 2025-06-13 RX ADMIN — Medication 4 MILLILITER(S): at 05:53

## 2025-06-13 RX ADMIN — Medication 40 MILLIGRAM(S): at 06:46

## 2025-06-13 RX ADMIN — Medication 4 MILLILITER(S): at 23:22

## 2025-06-13 RX ADMIN — HEPARIN SODIUM 5000 UNIT(S): 1000 INJECTION INTRAVENOUS; SUBCUTANEOUS at 06:46

## 2025-06-13 RX ADMIN — METHYLPREDNISOLONE ACETATE 40 MILLIGRAM(S): 80 INJECTION, SUSPENSION INTRA-ARTICULAR; INTRALESIONAL; INTRAMUSCULAR; SOFT TISSUE at 21:40

## 2025-06-13 RX ADMIN — HEPARIN SODIUM 5000 UNIT(S): 1000 INJECTION INTRAVENOUS; SUBCUTANEOUS at 21:40

## 2025-06-13 RX ADMIN — Medication 1 APPLICATION(S): at 06:46

## 2025-06-13 RX ADMIN — Medication 0.25 MILLIGRAM(S): at 18:52

## 2025-06-13 RX ADMIN — Medication 4 MILLILITER(S): at 17:18

## 2025-06-13 RX ADMIN — Medication 0.25 MILLIGRAM(S): at 17:52

## 2025-06-13 RX ADMIN — IPRATROPIUM BROMIDE AND ALBUTEROL SULFATE 3 MILLILITER(S): .5; 2.5 SOLUTION RESPIRATORY (INHALATION) at 17:18

## 2025-06-13 NOTE — CONSULT NOTE ADULT - PROBLEM SELECTOR RECOMMENDATION 2
patient's son said she is forgetful, unclear if she is at baseline  acute illness and unfamiliar environment-promote sleep/wake cycles, family presence and cluster care

## 2025-06-13 NOTE — CONSULT NOTE ADULT - PROBLEM SELECTOR RECOMMENDATION 6
See GOC conversation above.  Patient very high risk for further decline.  Would consider opioids to help relieve dyspnea.  Encouraged further GOC conversation.  Palliative available as needed.     Discussed with SEBASTIÁN Morales

## 2025-06-13 NOTE — PROGRESS NOTE ADULT - ASSESSMENT
70 y/o F w/HFpEF, COPD, prior CVA w/L sided deficits, hx of PE not on AC due to prior bleed, and recently diagnosed liver mass admitted for acute respiratory failure with hypoxia and hypercapnia. Acute respiratory failure possibly secondary to COPD exacerbation vs new/progression of PE. No evidence of PNA on CXR. NAG metabolic acidosis. Hyponatremia. Was improving then had recurrence of respiratory distress. Possibly acute pulmonary edema?    # Acute respiratory failure with hypoxia and hypercapnia  # COPD exacerbation  # NAG acidosis  # Hyponatremia  # Liver mass  # History of PE    She is not likely to be able to revoer with out intubation and given her current degree of weakness, strokes, endstage COPD she will not come off vent w/o tracheostomy and will likely be vent dependent event than.    - Will discuss with family and palliative care eval.  No rush to intubate now and would like to confirm that the above is in line with GOC.   - Supplemental O2 as tolerated, goal O2 sat >= 90%  - Solumedrol, azithro  - Advair, duonebs  - Free water restriction  - DVT prophylaxis  - Full code though needs family to actually come for GOC, poor prognosis.   - would start opiates in small dose and if needs can intubate but is currently in distrerss w/o significant hypercapnia related lethargy.

## 2025-06-13 NOTE — PROGRESS NOTE ADULT - SUBJECTIVE AND OBJECTIVE BOX
CHIEF COMPLAINT:Patient is a 69y old  Female who presents with a chief complaint of Asthma Exacerbation (13 Jun 2025 14:11)        Interval Events:    REVIEW OF SYSTEMS:  IN distress, asking for water, for ice packs, unable to speka more than one word with out catching her breath.  [ ] All other systems negative  [ ] Unable to assess ROS because ________    OBJECTIVE:  ICU Vital Signs Last 24 Hrs  T(C): 36.5 (13 Jun 2025 19:00), Max: 36.9 (12 Jun 2025 23:23)  T(F): 97.7 (13 Jun 2025 19:00), Max: 98.4 (12 Jun 2025 23:23)  HR: 114 (13 Jun 2025 19:10) (68 - 134)  BP: 143/66 (13 Jun 2025 19:00) (114/62 - 171/78)  BP(mean): 86 (13 Jun 2025 19:00) (62 - 129)  ABP: --  ABP(mean): --  RR: 22 (13 Jun 2025 19:10) (16 - 27)  SpO2: 100% (13 Jun 2025 19:10) (88% - 100%)    O2 Parameters below as of 13 Jun 2025 17:28  Patient On (Oxygen Delivery Method): BiPAP/CPAP              06-12 @ 07:01 - 06-13 @ 07:00  --------------------------------------------------------  IN: 550 mL / OUT: 1050 mL / NET: -500 mL    06-13 @ 07:01  -  06-13 @ 20:25  --------------------------------------------------------  IN: 0 mL / OUT: 600 mL / NET: -600 mL      CAPILLARY BLOOD GLUCOSE          PHYSICAL EXAM:  Gen: Adult female lying in bed, Moderate respiratory distress  HEENT: NC/AT sclerae anicteric.   Resp:  Upper airway wheeze from uncleared tracheal secreations patietn with weak cough and not allowing deep suciton.  Distress and hypoxic to 81% Poor air movement b/l. No w/c/r  CV: S1, S2  Abd: Soft, + BS  Ext: WWP  Neuro: Awake, confused  LINES:    HOSPITAL MEDICATIONS:  Standing Meds:  albuterol/ipratropium for Nebulization 3 milliLiter(s) Nebulizer every 6 hours  azithromycin   Tablet 250 milliGRAM(s) Oral daily  chlorhexidine 2% Cloths 1 Application(s) Topical <User Schedule>  fluticasone propionate/ salmeterol 500-50 MICROgram(s) Diskus 1 Dose(s) Inhalation two times a day  heparin   Injectable 5000 Unit(s) SubCutaneous every 8 hours  methylPREDNISolone sodium succinate Injectable 40 milliGRAM(s) IV Push every 12 hours  montelukast 10 milliGRAM(s) Oral daily  pantoprazole    Tablet 40 milliGRAM(s) Oral before breakfast  sodium chloride 3%  Inhalation 4 milliLiter(s) Inhalation every 6 hours      PRN Meds:  acetaminophen     Tablet .. 650 milliGRAM(s) Oral every 6 hours PRN  albuterol/ipratropium for Nebulization 3 milliLiter(s) Nebulizer every 6 hours PRN  guaiFENesin Oral Liquid (Sugar-Free) 200 milliGRAM(s) Oral every 6 hours PRN      LABS:                        7.4    11.39 )-----------( 234      ( 13 Jun 2025 04:00 )             24.9     Hgb Trend: 7.4<--, 7.8<--, 7.8<--, 7.4<--, 7.8<--  06-13    136  |  103  |  48[H]  ----------------------------<  171[H]  4.2   |  27  |  1.70[H]    Ca    8.9      13 Jun 2025 04:00  Phos  4.3     06-13  Mg     2.6     06-13    TPro  6.4  /  Alb  2.5[L]  /  TBili  0.4  /  DBili  x   /  AST  8[L]  /  ALT  14  /  AlkPhos  75  06-13    Creatinine Trend: 1.70<--, 1.83<--, 1.84<--, 1.79<--, 1.47<--, 1.20<--    Urinalysis Basic - ( 13 Jun 2025 04:00 )    Color: x / Appearance: x / SG: x / pH: x  Gluc: 171 mg/dL / Ketone: x  / Bili: x / Urobili: x   Blood: x / Protein: x / Nitrite: x   Leuk Esterase: x / RBC: x / WBC x   Sq Epi: x / Non Sq Epi: x / Bacteria: x      Arterial Blood Gas:  06-12 @ 08:23  7.35/45/83/25/96.9/-1.1  ABG lactate: --        MICROBIOLOGY:     RADIOLOGY:  [ ] Reviewed and interpreted by me    EKG:

## 2025-06-13 NOTE — CONSULT NOTE ADULT - PROBLEM SELECTOR RECOMMENDATION 9
s/p kellie on steroids, supplemental oxygen via NC, intermittent NIV use  wheezing has nebs ordered  increased work of breathing noted, difficulty speaking related to breathing, would recommend dilaudid 0.25mg IVP q 3 hours PRN on azithro, steroids, supplemental oxygen via NC, intermittent NIV use  wheezing has nebs ordered  increased work of breathing noted, difficulty speaking related to breathing, would recommend dilaudid 0.25mg IVP q 3 hours PRN on azithro, steroids, supplemental oxygen via NC, intermittent NIV use  wheezing has nebs ordered  increased work of breathing noted, difficulty speaking related to breathing, would recommend dilaudid 0.25mg IVP q 3 hours PRN    bowel regimen needed, no documented BMs on azithro, steroids, supplemental oxygen via NC, intermittent NIV use  wheezing has nebs ordered, at risk for further mucus plugging   increased work of breathing noted, difficulty speaking related to breathing, would recommend dilaudid 0.25mg IVP q 3 hours PRN    bowel regimen needed, no documented BMs

## 2025-06-13 NOTE — CONSULT NOTE ADULT - PROBLEM SELECTOR RECOMMENDATION 5
predominately bed bound  dependent for care at present  has 75 hours HHA  turn and position q 2 hours limited mobility secondary to shortness of breath OOB with walker and family assist to chair and very short distances  dependent for care at present  has 75 hours HHA  turn and position q 2 hours

## 2025-06-13 NOTE — CONSULT NOTE ADULT - SUBJECTIVE AND OBJECTIVE BOX
HPI:  *History obtained from chart review and son Maldonado.     69 year old woman with a PMHx of Asthma (on prn supp O2), COPD (prev smoker quit 20 years ago), HTN, HFpEF, CVA with L sided deficits (as per son), A Fib, PE (off Eliquis and Aspirin since March 2025 due to abdominal wall hematoma), and Liver mass (IR recommended for outpatient follow up w repeat imaging).     Pt presented to the ED with SOB that started the night prior. Per daughter in law, pt has been declining in her health, decreased appetite since her last hospital admission in March 2025. Has been bed bound last 3 years. In the ED, pt tripoding and was placed on BIPAP. Received nebs and steroids. Pt then developed episode of coffee ground vomiting and switch to high flow NC. Labs significant for wbc 11.8, hgb 8.6, Na 121, K: 5.8, Bicarb 20, Creat: 1.38, trop: 62, pH: 7.11, pCO2: 57.   Pt developed hypotension and received 1 L of IV fluids with some improvement. Admitted to the ICU for further management.        (08 Jun 2025 20:54)    PERTINENT PM/SXH:   Brain aneurysm    Hypertension    Brain aneurysm    Smoking    Atrial fibrillation    Asthma    Hyperlipidemia    CVA (cerebrovascular accident)      No significant past surgical history    No significant past surgical history      FAMILY HISTORY:  No pertinent family history in first degree relatives      ITEMS NOT CHECKED ARE NOT PRESENT    SOCIAL HISTORY:   Significant other/partner:  [ ]  Children: yes [ ]  Moravian/Spirituality: Jew  Substance hx:  [ ]   Tobacco hx:  [ ]   Alcohol hx: [ ]   Home Opioid hx:  [ ] I-Stop Reference No: Reference #: [id number]  Living Situation: [ x]Home  [ ]Long term care  [ ]Rehab [ ]Other    ADVANCE DIRECTIVES:    DNR  MOLST  [ ]  Living Will  [ ]   DECISION MAKER(s):  [ ] Health Care Proxy(s)  [x ] Surrogate(s)  [ ] Guardian           Name(s): Phone Number(s):Brenda (son) [phone]    BASELINE (I)ADL(s) (prior to admission):  [place]: [ ]Total  [ ] Moderate [ x]Dependent    Allergies    No Known Allergies    Intolerances    MEDICATIONS  (STANDING):  albuterol/ipratropium for Nebulization 3 milliLiter(s) Nebulizer every 6 hours  azithromycin   Tablet 250 milliGRAM(s) Oral daily  chlorhexidine 2% Cloths 1 Application(s) Topical <User Schedule>  fluticasone propionate/ salmeterol 500-50 MICROgram(s) Diskus 1 Dose(s) Inhalation two times a day  heparin   Injectable 5000 Unit(s) SubCutaneous every 8 hours  methylPREDNISolone sodium succinate Injectable 40 milliGRAM(s) IV Push every 12 hours  montelukast 10 milliGRAM(s) Oral daily  pantoprazole    Tablet 40 milliGRAM(s) Oral before breakfast  sodium chloride 3%  Inhalation 4 milliLiter(s) Inhalation every 6 hours    MEDICATIONS  (PRN):  acetaminophen     Tablet .. 650 milliGRAM(s) Oral every 6 hours PRN Temp greater or equal to 38C (100.4F), Mild Pain (1 - 3)  albuterol/ipratropium for Nebulization 3 milliLiter(s) Nebulizer every 6 hours PRN Shortness of Breath and/or Wheezing  guaiFENesin Oral Liquid (Sugar-Free) 200 milliGRAM(s) Oral every 6 hours PRN Cough    PRESENT SYMPTOMS: [ ]Unable to obtain due to poor mentation   Source if other than patient:  [ ]Family   [ ]Team     Pain: [ ] yes [ x] no  QOL impact -   Location -                    Aggravating factors -  Quality -  Radiation -  Timing-  Severity (0-10 scale):  Minimal acceptable level (0-10 scale):     PAIN AD Score:     [email or web address] (press ctrl +  left click to view)    Dyspnea:                           [ ]Mild [x ]Moderate [ ]Severe  Anxiety:                             [ ]Mild [ ]Moderate [ ]Severe  Fatigue:                             [ ]Mild [ ]Moderate [ ]Severe  Nausea:                             [ ]Mild [ ]Moderate [ ]Severe  Loss of appetite:              [ ]Mild [ ]Moderate [ ]Severe  Constipation:                    [ ]Mild [ ]Moderate [ ]Severe    Other Symptoms:  [ ]All other review of systems negative     Karnofsky Performance Score/Palliative Performance Status Version 2:       30  %    http://npcrc.org/files/news/palliative_performance_scale_ppsv2.pdf  PHYSICAL EXAM:  Vital Signs Last 24 Hrs  T(C): 36.2 (13 Jun 2025 12:00), Max: 37 (12 Jun 2025 15:23)  T(F): 97.1 (13 Jun 2025 12:00), Max: 98.6 (12 Jun 2025 15:23)  HR: 130 (13 Jun 2025 13:56) (68 - 130)  BP: 156/77 (13 Jun 2025 13:05) (114/62 - 171/78)  BP(mean): 100 (13 Jun 2025 13:05) (62 - 129)  RR: 24 (13 Jun 2025 13:05) (16 - 27)  SpO2: 100% (13 Jun 2025 13:56) (88% - 100%)    Parameters below as of 13 Jun 2025 12:00  Patient On (Oxygen Delivery Method): nasal cannula  O2 Flow (L/min): 4   I&O's Summary    12 Jun 2025 07:01  -  13 Jun 2025 07:00  --------------------------------------------------------  IN: 550 mL / OUT: 1050 mL / NET: -500 mL    GENERAL:  ID# [id number]  [x ]Alert  [x ]Oriented x  2 [ ]Lethargic  [ ]Cachexia  [ ]Unarousable  [ x]Verbal  [ ]Non-Verbal  Behavioral:   [ ] Anxiety  [ ] Delirium [ ] Agitation [ ] Other  HEENT:  [ ]Normal   [ ]Dry mouth   [ ]ET Tube/Trach  [ ]Oral lesions  PULMONARY:   [ ]Clear [ ]Tachypnea  [ ]Audible excessive secretions   [ ]Rhonchi        [ ]Right [ ]Left [ ]Bilateral  [ ]Crackles        [ ]Right [ ]Left [ ]Bilateral  [x ]Wheezing     [ ]Right [ ]Left [ x]Bilateral  CARDIOVASCULAR:    [ ]Regular [ ]Irregular x[ ]Tachy  [ ]Ad [ ]Murmur [ ]Other  GASTROINTESTINAL:  [ x]Soft  [ ]Distended   [ ]+BS  [x]Non tender [ ]Tender  [ ]PEG [ ]OGT/ NGT  Last BM: ?? GENITOURINARY:  [ ]Normal [ ] Incontinent   [ ]Oliguria/Anuria   [ ]West  MUSCULOSKELETAL:   [ ]Normal   [ ]Weakness  [x ]Bed/Wheelchair bound [ ]Edema  NEUROLOGIC:   [ ]No focal deficits  [ x] Cognitive impairment  [ ] Dysphagia [ ]Dysarthria [ ] Paresis [ ]Other   SKIN:   [ ]Normal   [ ]Pressure ulcer(s)  [ ]Rash    CRITICAL CARE:  [ ] Shock Present  [ ]Septic [ ]Cardiogenic [ ]Neurologic [ ]Hypovolemic  [ ]  Vasopressors [ ]  Inotropes   [ x] Respiratory failure present [ ] mechanical ventilation [ ] non-invasive ventilatory support [ ] High flow  [ ] Acute  [ ] Chronic [ ] Hypoxic  [ ] Hypercarbic [ ] Other  [ ] Other organ failure     LABS:                        7.4    11.39 )-----------( 234      ( 13 Jun 2025 04:00 )             24.9   06-13    136  |  103  |  48[H]  ----------------------------<  171[H]  4.2   |  27  |  1.70[H]    Ca    8.9      13 Jun 2025 04:00  Phos  4.3     06-13  Mg     2.6     06-13    TPro  6.4  /  Alb  2.5[L]  /  TBili  0.4  /  DBili  x   /  AST  8[L]  /  ALT  14  /  AlkPhos  75  06-13    FluA/FluB/RSV/COVID PCR (06.08.25 @ 18:17)    Resp Syn Virus Result: NotDetec: For Emergency Use Authorization   Source Respiratory: Nasopharyngeal        RADIOLOGY & ADDITIONAL STUDIES:    PROTEIN CALORIE MALNUTRITION PRESENT: [ ] Yes [ ] No  [ ] PPSV2 < or = to 30% [ ] significant weight loss  [ ] poor nutritional intake [ ] catabolic state [ ] anasarca     Artificial Nutrition [ ]     REFERRALS:   [ ]Chaplaincy  [ ] Hospice  [ ]Child Life  [ ]Social Work  [ ]Case management [ ]Holistic Therapy     Goals of Care Document:   Care Coordination Assessment 201 [C. Provider] (06-10-25 @ 12:44)   Progress Notes - Care Coordination [C. Provider] (06-09-25 @ 12:46)   HPI:  *History obtained from chart review and son Maldonado.     69 year old woman with a PMHx of Asthma (on prn supp O2), COPD (prev smoker quit 20 years ago), HTN, HFpEF, CVA with L sided deficits (as per son), A Fib, PE (off Eliquis and Aspirin since March 2025 due to abdominal wall hematoma), and Liver mass (IR recommended for outpatient follow up w repeat imaging).     Pt presented to the ED with SOB that started the night prior. Per daughter in law, pt has been declining in her health, decreased appetite since her last hospital admission in March 2025. Has been bed bound last 3 years. In the ED, pt tripoding and was placed on BIPAP. Received nebs and steroids. Pt then developed episode of coffee ground vomiting and switch to high flow NC. Labs significant for wbc 11.8, hgb 8.6, Na 121, K: 5.8, Bicarb 20, Creat: 1.38, trop: 62, pH: 7.11, pCO2: 57.   Pt developed hypotension and received 1 L of IV fluids with some improvement. Admitted to the ICU for further management.        (08 Jun 2025 20:54)    PERTINENT PM/SXH:   Brain aneurysm    Hypertension    Brain aneurysm    Smoking    Atrial fibrillation    Asthma    Hyperlipidemia    CVA (cerebrovascular accident)      No significant past surgical history    No significant past surgical history      FAMILY HISTORY:  No pertinent family history in first degree relatives      ITEMS NOT CHECKED ARE NOT PRESENT    SOCIAL HISTORY:   Significant other/partner:  [ ]  Children: yes [ ]  Tenriism/Spirituality: Religion  Substance hx:  [ ]   Tobacco hx:  [ ]   Alcohol hx: [ ]   Home Opioid hx:  [ ] I-Stop Reference No: Reference #: [id number]  Living Situation: [ x]Home  [ ]Long term care  [ ]Rehab [ ]Other    ADVANCE DIRECTIVES:    DNR  MOLST  [ ]  Living Will  [ ]   DECISION MAKER(s):  [ ] Health Care Proxy(s)  [x ] Surrogate(s)  [ ] Guardian           Name(s): Phone Number(s):Brenda (son) [phone]    BASELINE (I)ADL(s) (prior to admission):  [place]: [ ]Total  [ ] Moderate [ x]Dependent    Allergies    No Known Allergies    Intolerances    MEDICATIONS  (STANDING):  albuterol/ipratropium for Nebulization 3 milliLiter(s) Nebulizer every 6 hours  azithromycin   Tablet 250 milliGRAM(s) Oral daily  chlorhexidine 2% Cloths 1 Application(s) Topical <User Schedule>  fluticasone propionate/ salmeterol 500-50 MICROgram(s) Diskus 1 Dose(s) Inhalation two times a day  heparin   Injectable 5000 Unit(s) SubCutaneous every 8 hours  methylPREDNISolone sodium succinate Injectable 40 milliGRAM(s) IV Push every 12 hours  montelukast 10 milliGRAM(s) Oral daily  pantoprazole    Tablet 40 milliGRAM(s) Oral before breakfast  sodium chloride 3%  Inhalation 4 milliLiter(s) Inhalation every 6 hours    MEDICATIONS  (PRN):  acetaminophen     Tablet .. 650 milliGRAM(s) Oral every 6 hours PRN Temp greater or equal to 38C (100.4F), Mild Pain (1 - 3)  albuterol/ipratropium for Nebulization 3 milliLiter(s) Nebulizer every 6 hours PRN Shortness of Breath and/or Wheezing  guaiFENesin Oral Liquid (Sugar-Free) 200 milliGRAM(s) Oral every 6 hours PRN Cough    PRESENT SYMPTOMS: [ ]Unable to obtain due to poor mentation   Source if other than patient:  [ ]Family   [ ]Team     Pain: [ ] yes [ x] no  QOL impact -   Location -                    Aggravating factors -  Quality -  Radiation -  Timing-  Severity (0-10 scale):  Minimal acceptable level (0-10 scale):     PAIN AD Score:     [email or web address] (press ctrl +  left click to view)    Dyspnea:                           [ ]Mild [x ]Moderate [ ]Severe  Anxiety:                             [ ]Mild [ ]Moderate [ ]Severe  Fatigue:                             [ ]Mild [ ]Moderate [ ]Severe  Nausea:                             [ ]Mild [ ]Moderate [ ]Severe  Loss of appetite:              [ ]Mild [ ]Moderate [ ]Severe  Constipation:                    [ ]Mild [ ]Moderate [ ]Severe    Other Symptoms:  [ ]All other review of systems negative     Karnofsky Performance Score/Palliative Performance Status Version 2:       30  %    http://npcrc.org/files/news/palliative_performance_scale_ppsv2.pdf  PHYSICAL EXAM:  Vital Signs Last 24 Hrs  T(C): 36.2 (13 Jun 2025 12:00), Max: 37 (12 Jun 2025 15:23)  T(F): 97.1 (13 Jun 2025 12:00), Max: 98.6 (12 Jun 2025 15:23)  HR: 130 (13 Jun 2025 13:56) (68 - 130)  BP: 156/77 (13 Jun 2025 13:05) (114/62 - 171/78)  BP(mean): 100 (13 Jun 2025 13:05) (62 - 129)  RR: 24 (13 Jun 2025 13:05) (16 - 27)  SpO2: 100% (13 Jun 2025 13:56) (88% - 100%)    Parameters below as of 13 Jun 2025 12:00  Patient On (Oxygen Delivery Method): nasal cannula  O2 Flow (L/min): 4   I&O's Summary    12 Jun 2025 07:01  -  13 Jun 2025 07:00  --------------------------------------------------------  IN: 550 mL / OUT: 1050 mL / NET: -500 mL    GENERAL:  ID# [id number]  [x ]Alert  [x ]Oriented x  2 [ ]Lethargic  [ ]Cachexia  [ ]Unarousable  [ x]Verbal  [ ]Non-Verbal  Behavioral:   [ ] Anxiety  [ ] Delirium [ ] Agitation [ ] Other  HEENT:  [ ]Normal   [ ]Dry mouth   [ ]ET Tube/Trach  [ ]Oral lesions  PULMONARY:   [ ]Clear [ ]Tachypnea  [ ]Audible excessive secretions   [ ]Rhonchi        [ ]Right [ ]Left [ ]Bilateral  [ ]Crackles        [ ]Right [ ]Left [ ]Bilateral  [x ]Wheezing     [ ]Right [ ]Left [ x]Bilateral  CARDIOVASCULAR:    [ ]Regular [ ]Irregular x[ ]Tachy  [ ]Ad [ ]Murmur [ ]Other  GASTROINTESTINAL:  [ x]Soft  [ ]Distended   [ ]+BS  [x]Non tender [ ]Tender  [ ]PEG [ ]OGT/ NGT  Last BM: ?? GENITOURINARY:  [ ]Normal [ ] Incontinent   [ ]Oliguria/Anuria   [ ]West  MUSCULOSKELETAL:   [ ]Normal   [ ]Weakness  [x ]Bed/Wheelchair bound [ ]Edema  NEUROLOGIC:   [ ]No focal deficits  [ x] Cognitive impairment  [ ] Dysphagia [ ]Dysarthria [ ] Paresis [ ]Other   SKIN:   [ ]Normal   [ ]Pressure ulcer(s)  [ ]Rash    CRITICAL CARE:  [ ] Shock Present  [ ]Septic [ ]Cardiogenic [ ]Neurologic [ ]Hypovolemic  [ ]  Vasopressors [ ]  Inotropes   [ x] Respiratory failure present [ ] mechanical ventilation [ ] non-invasive ventilatory support [ ] High flow  [ ] Acute  [ ] Chronic [ ] Hypoxic  [ ] Hypercarbic [ ] Other  [ ] Other organ failure     LABS:                        7.4    11.39 )-----------( 234      ( 13 Jun 2025 04:00 )             24.9   06-13    136  |  103  |  48[H]  ----------------------------<  171[H]  4.2   |  27  |  1.70[H]    Ca    8.9      13 Jun 2025 04:00  Phos  4.3     06-13  Mg     2.6     06-13    TPro  6.4  /  Alb  2.5[L]  /  TBili  0.4  /  DBili  x   /  AST  8[L]  /  ALT  14  /  AlkPhos  75  06-13    FluA/FluB/RSV/COVID PCR (06.08.25 @ 18:17)    Resp Syn Virus Result: NotDetec: For Emergency Use Authorization   Source Respiratory: Nasopharyngeal    RADIOLOGY & ADDITIONAL STUDIES:  < from: Xray Chest 1 View- PORTABLE-Urgent (Xray Chest 1 View- PORTABLE-Urgent .) (06.10.25 @ 21:54) >  IMPRESSION:  Findings concerning for emphysematous lung disease. RIGHT lower zone   reticulonodular opacities/infiltrates.  --- End of Report ---  < end of copied text >    < from: CT Abdomen and Pelvis w/ IV Cont (06.09.25 @ 14:54) >  IMPRESSION:  1. No pulmonary embolism.  2. Small airway mucous plugging/mucoid impaction in the left lower lobe.   Minimal adjacent airspace disease could represent atelectasis and/or   pneumonia. There is underlying severe emphysema.  3. A 5 cm right hepatic lobe cystic mass is unchanged compared to   3/6/2025, larger compared to 1/21/2024. Again the differential diagnosis   includes a biliary cystadenoma.  --- End of Report ---  < end of copied text >    < from: Xray Chest 1 View- PORTABLE-Urgent (06.08.25 @ 18:23) >  IMPRESSION:  No radiographic evidence of acute cardiopulmonary disease  --- End of Report ---  < end of copied text >    < from: TTE W or WO Ultrasound Enhancing Agent (02.28.25 @ 15:20) >   CONCLUSIONS:    1. Left ventricular cavity is normal in size. Left ventricular wall thickness is normal. Left ventricular systolic function is normal with an ejection fraction of 65 % by Leija's method of disks. There are no regional wall motion abnormalities seen.   2. There is mild (grade 1) left ventricular diastolic dysfunction.   3. Normal right ventricular cavity size and normal right ventricular systolic function. Tricuspid annular plane systolic excursion (TAPSE) is 1.9 cm (normal >=1.7 cm).   4. Structurally normal mitral valve with normal leaflet excursion. There is calcification of the mitral valve annulus. There is trace mitral regurgitation.   5. Structurally normal tricuspid valve with normal leaflet excursion. There is mild tricuspid regurgitation. Estimated pulmonary artery systolic pressure is 41 mmHg, consistent with mild pulmonary hypertension.  < end of copied text >    PROTEIN CALORIE MALNUTRITION PRESENT: [ ] Yes [ ] No  [ ] PPSV2 < or = to 30% [ ] significant weight loss  [ ] poor nutritional intake [ ] catabolic state [ ] anasarca     Artificial Nutrition [ ]     REFERRALS:   [ ]Chaplaincy  [ ] Hospice  [ ]Child Life  [ ]Social Work  [ ]Case management [ ]Holistic Therapy     Goals of Care Document:   Care Coordination Assessment 201 [C. Provider] (06-10-25 @ 12:44)   Progress Notes - Care Coordination [C. Provider] (06-09-25 @ 12:46)

## 2025-06-13 NOTE — CONSULT NOTE ADULT - NS ATTEND AMEND GEN_ALL_CORE FT
(1) Other Medications/None 69 y F hx of COPD on home O2 prn, CVA, Afib and PE not on AC since March 2/2 abdominal wall hematoma, known liver mass,  HFpEF, HTN adm for acute hypoxic resp failure 2/2 COPD exacerbation, req NIV, ICU monitoring.  Readmitted to ICU after downgrade 2/2 resp distress. Min ambulatory at baseline. At risk for further complications/decline. GOC discussion as above, pt has 5 children, lives w son Brenda, dependent for most ADLs. Family wishes to continue current medical management. Palliative will follow for ongoing GOC discussions pending clinical course.

## 2025-06-13 NOTE — CONSULT NOTE ADULT - CONVERSATION DETAILS
Spoke with patient using  ID#[id number].  She is alert to self and place but lacking insight for complex decision making.  She said her son, Brenda would make decisions if needed.      Called and spoke with patient's sonBrenda via phone as he was at work and unable to speak in person.  Patient has 5 children, 3 of whom live in [facility].  She lives with her son and his family as well as her daughter.  Patient is OOB with walker and assist to chair or to walk 2-3 feet but doesn't move much as she gets so short of breath.  She has been able to get out to her doctors prior and son said she saw her pulmonologist about 1 month ago.  He said she is sleeping more, not moving much because she is scared if she moves her body she will be short of breath.  He said when she came to the hospital she was breathing so hard and he thought she was going to die.      Discussed COPD is a chronic and progressive illness that gets worse over time and often is accompanied by symptoms such as dyspnea that impact quality of life.  Explained role of opioids in reducing increased work of breathing in advanced lung disease and that patient would benefit from it.  Expressed concern that patient is already pretty limited with her mobility secondary to her shortness of breath and she has been hospitalized recently now in the ICU only downgraded for a brief time before being readmitted to ICU.  Expressed concern that she is very high risk for further decline on this admission.  Asked about wishes pertaining to LST such as CPR and intubation and he said patient had not made wishes about those things known prior.  Explained if patient were intubated would be very hard to be extubated and may not happen.  Discussed intubation as a bridge to getting better but in the setting of chronic lung disease would likely only prolong the dying process.  He shared patient did not like the NIV and explained that intubation is not comfortable and often patients are sedated to breath with the vent and may be placed in restraints.  He said he would think about it when needed and expressed concern that the time to think about it is now.  He is at work and would prefer to speak about this in person.  He plans to visit later today and over the weekend.  Told him this NP will not be here then, but encouraged he continue the conversation with the ICU team.

## 2025-06-14 LAB
ALBUMIN SERPL ELPH-MCNC: 2.7 G/DL — LOW (ref 3.3–5)
ALP SERPL-CCNC: 76 U/L — SIGNIFICANT CHANGE UP (ref 40–120)
ALT FLD-CCNC: 15 U/L — SIGNIFICANT CHANGE UP (ref 12–78)
ANION GAP SERPL CALC-SCNC: 10 MMOL/L — SIGNIFICANT CHANGE UP (ref 5–17)
AST SERPL-CCNC: 7 U/L — LOW (ref 15–37)
BASOPHILS # BLD AUTO: 0.01 K/UL — SIGNIFICANT CHANGE UP (ref 0–0.2)
BASOPHILS NFR BLD AUTO: 0.1 % — SIGNIFICANT CHANGE UP (ref 0–2)
BILIRUB SERPL-MCNC: 0.4 MG/DL — SIGNIFICANT CHANGE UP (ref 0.2–1.2)
BUN SERPL-MCNC: 55 MG/DL — HIGH (ref 7–23)
CALCIUM SERPL-MCNC: 9.7 MG/DL — SIGNIFICANT CHANGE UP (ref 8.5–10.1)
CHLORIDE SERPL-SCNC: 104 MMOL/L — SIGNIFICANT CHANGE UP (ref 96–108)
CO2 SERPL-SCNC: 24 MMOL/L — SIGNIFICANT CHANGE UP (ref 22–31)
CREAT SERPL-MCNC: 1.92 MG/DL — HIGH (ref 0.5–1.3)
EGFR: 28 ML/MIN/1.73M2 — LOW
EGFR: 28 ML/MIN/1.73M2 — LOW
EOSINOPHIL # BLD AUTO: 0 K/UL — SIGNIFICANT CHANGE UP (ref 0–0.5)
EOSINOPHIL NFR BLD AUTO: 0 % — SIGNIFICANT CHANGE UP (ref 0–6)
GLUCOSE SERPL-MCNC: 157 MG/DL — HIGH (ref 70–99)
HCT VFR BLD CALC: 26.4 % — LOW (ref 34.5–45)
HGB BLD-MCNC: 7.9 G/DL — LOW (ref 11.5–15.5)
IMM GRANULOCYTES NFR BLD AUTO: 0.6 % — SIGNIFICANT CHANGE UP (ref 0–0.9)
LYMPHOCYTES # BLD AUTO: 0.63 K/UL — LOW (ref 1–3.3)
LYMPHOCYTES # BLD AUTO: 6.1 % — LOW (ref 13–44)
MAGNESIUM SERPL-MCNC: 2.7 MG/DL — HIGH (ref 1.6–2.6)
MCHC RBC-ENTMCNC: 24 PG — LOW (ref 27–34)
MCHC RBC-ENTMCNC: 29.9 G/DL — LOW (ref 32–36)
MCV RBC AUTO: 80.2 FL — SIGNIFICANT CHANGE UP (ref 80–100)
MONOCYTES # BLD AUTO: 0.26 K/UL — SIGNIFICANT CHANGE UP (ref 0–0.9)
MONOCYTES NFR BLD AUTO: 2.5 % — SIGNIFICANT CHANGE UP (ref 2–14)
NEUTROPHILS # BLD AUTO: 9.43 K/UL — HIGH (ref 1.8–7.4)
NEUTROPHILS NFR BLD AUTO: 90.7 % — HIGH (ref 43–77)
NRBC BLD AUTO-RTO: 0 /100 WBCS — SIGNIFICANT CHANGE UP (ref 0–0)
PHOSPHATE SERPL-MCNC: 5.1 MG/DL — HIGH (ref 2.5–4.5)
PLATELET # BLD AUTO: 237 K/UL — SIGNIFICANT CHANGE UP (ref 150–400)
POTASSIUM SERPL-MCNC: 4.6 MMOL/L — SIGNIFICANT CHANGE UP (ref 3.5–5.3)
POTASSIUM SERPL-SCNC: 4.6 MMOL/L — SIGNIFICANT CHANGE UP (ref 3.5–5.3)
PROT SERPL-MCNC: 6.9 GM/DL — SIGNIFICANT CHANGE UP (ref 6–8.3)
RBC # BLD: 3.29 M/UL — LOW (ref 3.8–5.2)
RBC # FLD: 17.7 % — HIGH (ref 10.3–14.5)
SODIUM SERPL-SCNC: 138 MMOL/L — SIGNIFICANT CHANGE UP (ref 135–145)
WBC # BLD: 10.39 K/UL — SIGNIFICANT CHANGE UP (ref 3.8–10.5)
WBC # FLD AUTO: 10.39 K/UL — SIGNIFICANT CHANGE UP (ref 3.8–10.5)

## 2025-06-14 PROCEDURE — 99233 SBSQ HOSP IP/OBS HIGH 50: CPT

## 2025-06-14 PROCEDURE — 71045 X-RAY EXAM CHEST 1 VIEW: CPT | Mod: 26

## 2025-06-14 RX ORDER — LABETALOL HYDROCHLORIDE 200 MG/1
10 TABLET, FILM COATED ORAL ONCE
Refills: 0 | Status: COMPLETED | OUTPATIENT
Start: 2025-06-14 | End: 2025-06-14

## 2025-06-14 RX ORDER — LISINOPRIL 30 MG/1
50 TABLET ORAL DAILY
Refills: 0 | Status: DISCONTINUED | OUTPATIENT
Start: 2025-06-14 | End: 2025-06-26

## 2025-06-14 RX ORDER — METHYLPREDNISOLONE ACETATE 80 MG/ML
20 INJECTION, SUSPENSION INTRA-ARTICULAR; INTRALESIONAL; INTRAMUSCULAR; SOFT TISSUE DAILY
Refills: 0 | Status: COMPLETED | OUTPATIENT
Start: 2025-06-18 | End: 2025-06-20

## 2025-06-14 RX ORDER — METHYLPREDNISOLONE ACETATE 80 MG/ML
INJECTION, SUSPENSION INTRA-ARTICULAR; INTRALESIONAL; INTRAMUSCULAR; SOFT TISSUE
Refills: 0 | Status: COMPLETED | OUTPATIENT
Start: 2025-06-15 | End: 2025-06-21

## 2025-06-14 RX ORDER — AMLODIPINE BESYLATE 10 MG/1
10 TABLET ORAL DAILY
Refills: 0 | Status: DISCONTINUED | OUTPATIENT
Start: 2025-06-14 | End: 2025-06-26

## 2025-06-14 RX ORDER — METHYLPREDNISOLONE ACETATE 80 MG/ML
40 INJECTION, SUSPENSION INTRA-ARTICULAR; INTRALESIONAL; INTRAMUSCULAR; SOFT TISSUE DAILY
Refills: 0 | Status: COMPLETED | OUTPATIENT
Start: 2025-06-15 | End: 2025-06-17

## 2025-06-14 RX ORDER — APIXABAN 2.5 MG/1
2.5 TABLET, FILM COATED ORAL
Refills: 0 | Status: DISCONTINUED | OUTPATIENT
Start: 2025-06-14 | End: 2025-06-16

## 2025-06-14 RX ADMIN — Medication 1 APPLICATION(S): at 06:29

## 2025-06-14 RX ADMIN — Medication 1 DOSE(S): at 06:26

## 2025-06-14 RX ADMIN — MONTELUKAST SODIUM 10 MILLIGRAM(S): 10 TABLET ORAL at 11:19

## 2025-06-14 RX ADMIN — METHYLPREDNISOLONE ACETATE 40 MILLIGRAM(S): 80 INJECTION, SUSPENSION INTRA-ARTICULAR; INTRALESIONAL; INTRAMUSCULAR; SOFT TISSUE at 10:11

## 2025-06-14 RX ADMIN — HEPARIN SODIUM 5000 UNIT(S): 1000 INJECTION INTRAVENOUS; SUBCUTANEOUS at 06:17

## 2025-06-14 RX ADMIN — IPRATROPIUM BROMIDE AND ALBUTEROL SULFATE 3 MILLILITER(S): .5; 2.5 SOLUTION RESPIRATORY (INHALATION) at 11:34

## 2025-06-14 RX ADMIN — Medication 4 MILLILITER(S): at 23:09

## 2025-06-14 RX ADMIN — IPRATROPIUM BROMIDE AND ALBUTEROL SULFATE 3 MILLILITER(S): .5; 2.5 SOLUTION RESPIRATORY (INHALATION) at 05:43

## 2025-06-14 RX ADMIN — IPRATROPIUM BROMIDE AND ALBUTEROL SULFATE 3 MILLILITER(S): .5; 2.5 SOLUTION RESPIRATORY (INHALATION) at 23:09

## 2025-06-14 RX ADMIN — Medication 250 MILLIGRAM(S): at 11:19

## 2025-06-14 RX ADMIN — Medication 1 DOSE(S): at 18:35

## 2025-06-14 RX ADMIN — Medication 650 MILLIGRAM(S): at 21:45

## 2025-06-14 RX ADMIN — Medication 4 MILLILITER(S): at 17:09

## 2025-06-14 RX ADMIN — LABETALOL HYDROCHLORIDE 10 MILLIGRAM(S): 200 TABLET, FILM COATED ORAL at 21:45

## 2025-06-14 RX ADMIN — Medication 4 MILLILITER(S): at 05:43

## 2025-06-14 RX ADMIN — Medication 650 MILLIGRAM(S): at 22:45

## 2025-06-14 RX ADMIN — IPRATROPIUM BROMIDE AND ALBUTEROL SULFATE 3 MILLILITER(S): .5; 2.5 SOLUTION RESPIRATORY (INHALATION) at 17:08

## 2025-06-14 RX ADMIN — APIXABAN 2.5 MILLIGRAM(S): 2.5 TABLET, FILM COATED ORAL at 17:11

## 2025-06-14 RX ADMIN — Medication 4 MILLILITER(S): at 11:33

## 2025-06-14 NOTE — PROGRESS NOTE ADULT - SUBJECTIVE AND OBJECTIVE BOX
HPI:  Pt is a 70 yo F ex smoker (quit , 1/2 PPD x10 years) with h/o PE (2024), COPD, chronic diastolic CHF (grade I diastolic dysfunction), Afib,  L MCA aneurysm, E. coli UTI 3/2025, liver cyst, abdominal wall hematoma 3/2025 presents from home for complaints of SOB with increased work of breathing. Found to have blood gas 7.11/57/82/18/96% placed on BiPAP. Labs with Na 121, K 5.8, HCO3: 17, Cr 1.38. Pt admitted to ICU (2025) for hypercarbic respiratory failure, COPD exacerbation. Transitioned from BIPAP to AVAPS and weaned off NIV. Given steroids and nebs for COPD exacerbation. CT chest without PE but noted secretions in airway with some mucus plugging LLL with background of severe emphysema. Pt downgraded to medical floor 6/10 and then later on found to be confused, lethargic, in respiratory distress with wheeze, chest pain and abdominal pain (noted to be in urinary retention on bladder sono 550cc urine); transferred back to the ICU.  ICU dx:: 1) acute hypercarbic respiratory failure/ COPD exacerbation (has required frequent management on NIV)  2) NANCY with hyponatremia 3) urinary rentention      ## Labs:  CBC:                        7.9    10.39 )-----------( 237      ( 2025 03:43 )             26.4     Chem:      138  |  104  |  55[H]  ----------------------------<  157[H]  4.6   |  24  |  1.92[H]    Ca    9.7      2025 03:43  Phos  5.1     06-14  Mg     2.7     -14    TPro  6.9  /  Alb  2.7[L]  /  TBili  0.4  /  DBili  x   /  AST  7[L]  /  ALT  15  /  AlkPhos  76  -14    Coags:          ## Imaging:    ## Medications:  azithromycin   Tablet 250 milliGRAM(s) Oral daily      albuterol/ipratropium for Nebulization 3 milliLiter(s) Nebulizer every 6 hours  albuterol/ipratropium for Nebulization 3 milliLiter(s) Nebulizer every 6 hours PRN  fluticasone propionate/ salmeterol 500-50 MICROgram(s) Diskus 1 Dose(s) Inhalation two times a day  guaiFENesin Oral Liquid (Sugar-Free) 200 milliGRAM(s) Oral every 6 hours PRN  montelukast 10 milliGRAM(s) Oral daily  sodium chloride 3%  Inhalation 4 milliLiter(s) Inhalation every 6 hours    methylPREDNISolone sodium succinate Injectable   IV Push     heparin   Injectable 5000 Unit(s) SubCutaneous every 8 hours    pantoprazole    Tablet 40 milliGRAM(s) Oral before breakfast    acetaminophen     Tablet .. 650 milliGRAM(s) Oral every 6 hours PRN      ## Vitals:  T(C): 36.6 (25 @ 08:00), Max: 36.7 (25 @ 04:00)  HR: 99 (25 @ 12:24) (79 - 134)  BP: 149/56 (25 @ 12:00) (122/63 - 168/90)  BP(mean): 82 (25 @ 12:00) (72 - 107)  RR: 19 (25 @ 12:00) (17 - 39)  SpO2: 99% (25 @ 12:24) (98% - 100%)  Wt(kg): --  Vent:   AB-13 @ 07:01  -   @ 07:00  --------------------------------------------------------  IN: 0 mL / OUT: 600 mL / NET: -600 mL          ## P/E:  Gen: lying comfortably in bed in no apparent distress  Face: (+) nc O2  Lungs: CTA  Heart: RRR   Abd: Soft/+BS/ Non-tender  Ext: No edema  Neuro: Alert/ Awake    CENTRAL LINE: [ ] YES [ ] NO  LOCATION:   DATE INSERTED:  REMOVE: [ ] YES [ ] NO      VALENCIA: [ ] YES [ ] NO    DATE INSERTED:  REMOVE:  [ ] YES [ ] NO      A-LINE:  [ ] YES [ ] NO  LOCATION:   DATE INSERTED:  REMOVE:  [ ] YES [ ] NO  EXPLAIN:    CODE STATUS: [x ] full code  [ ] DNR  [ ] DNI  [ ] MOLST  Goals of care discussion: [ ] yes

## 2025-06-14 NOTE — PROGRESS NOTE ADULT - ASSESSMENT
Pt is a 68 yo F ex smoker (quit 2013, 1/2 PPD x10 years) with h/o PE (1/2024), COPD, chronic diastolic CHF (grade I diastolic dysfunction), Afib,  L MCA aneurysm, E. coli UTI 3/2025, liver cyst, abdominal wall hematoma 3/2025 presents from home for complaints of SOB with increased work of breathing. Found to have blood gas 7.11/57/82/18/96% placed on BiPAP. Labs with Na 121, K 5.8, HCO3: 17, Cr 1.38. Pt admitted to ICU (6/8/2025) for hypercarbic respiratory failure, COPD exacerbation. Transitioned from BIPAP to AVAPS and weaned off NIV. Given steroids and nebs for COPD exacerbation. CT chest without PE but noted secretions in airway with some mucus plugging LLL with background of severe emphysema. Pt downgraded to medical floor 6/10 and then later on found to be confused, lethargic, in respiratory distress with wheeze, chest pain and abdominal pain (noted to be in urinary retention on bladder sono 550cc urine); transferred back to the ICU.  ICU dx: 1) acute hypercarbic respiratory failure/ COPD exacerbation (has required frequent management on NIV)  2) NANCY with hyponatremia 3) urinary rentention    Resp/Social: Taken off NIV and currently breathing comfortably on nc O2/ Initiated GOC pt does not speak English and although son does speaks English does quite understand what Intubation is/ Full code/ Cont Steroids, DuoNebs, Singulair and Advair/ NIV prn  ID: Cont Zithromax prophylaxis  HEME: DVT prophylaxis with sq Heparin/ Anemia; transfuse if Hb < 7.0  FEN: Po diet when off NIV  Renal: Follow BUN/Cr and UO  Neuro: MS intact       Pt is a 70 yo F ex smoker (quit 2013, 1/2 PPD x10 years) with h/o PE (1/2024), COPD, chronic diastolic CHF (grade I diastolic dysfunction), Afib,  L MCA aneurysm, E. coli UTI 3/2025, liver cyst, abdominal wall hematoma 3/2025 presents from home for complaints of SOB with increased work of breathing. Found to have blood gas 7.11/57/82/18/96% placed on BiPAP. Labs with Na 121, K 5.8, HCO3: 17, Cr 1.38. Pt admitted to ICU (6/8/2025) for hypercarbic respiratory failure, COPD exacerbation. Transitioned from BIPAP to AVAPS and weaned off NIV. Given steroids and nebs for COPD exacerbation. CT chest without PE but noted secretions in airway with some mucus plugging LLL with background of severe emphysema. Pt downgraded to medical floor 6/10 and then later on found to be confused, lethargic, in respiratory distress with wheeze, chest pain and abdominal pain (noted to be in urinary retention on bladder sono 550cc urine); transferred back to the ICU.  ICU dx: 1) acute hypercarbic respiratory failure/ COPD exacerbation (has required frequent management on NIV)  2) NANCY with hyponatremia 3) urinary rentention    Resp/Social: Taken off NIV and currently breathing comfortably on nc O2/ Initiated GOC pt does not speak English and although son does speaks English does quite understand what Intubation is/ Full code/ Cont Steroids, DuoNebs, Singulair and Advair/ NIV prn  ID: Cont Zithromax prophylaxis  HEME: Cont Eliquis  FEN: Po diet when off NIV  Renal: Follow BUN/Cr and UO  Neuro: MS intact

## 2025-06-15 LAB
ALBUMIN SERPL ELPH-MCNC: 3 G/DL — LOW (ref 3.3–5)
ALP SERPL-CCNC: 83 U/L — SIGNIFICANT CHANGE UP (ref 40–120)
ALT FLD-CCNC: 20 U/L — SIGNIFICANT CHANGE UP (ref 12–78)
ANION GAP SERPL CALC-SCNC: 5 MMOL/L — SIGNIFICANT CHANGE UP (ref 5–17)
AST SERPL-CCNC: 12 U/L — LOW (ref 15–37)
BASOPHILS # BLD AUTO: 0.01 K/UL — SIGNIFICANT CHANGE UP (ref 0–0.2)
BASOPHILS NFR BLD AUTO: 0.1 % — SIGNIFICANT CHANGE UP (ref 0–2)
BILIRUB SERPL-MCNC: 0.6 MG/DL — SIGNIFICANT CHANGE UP (ref 0.2–1.2)
BUN SERPL-MCNC: 53 MG/DL — HIGH (ref 7–23)
CALCIUM SERPL-MCNC: 10 MG/DL — SIGNIFICANT CHANGE UP (ref 8.5–10.1)
CHLORIDE SERPL-SCNC: 104 MMOL/L — SIGNIFICANT CHANGE UP (ref 96–108)
CO2 SERPL-SCNC: 28 MMOL/L — SIGNIFICANT CHANGE UP (ref 22–31)
CREAT SERPL-MCNC: 1.66 MG/DL — HIGH (ref 0.5–1.3)
EGFR: 33 ML/MIN/1.73M2 — LOW
EGFR: 33 ML/MIN/1.73M2 — LOW
EOSINOPHIL # BLD AUTO: 0 K/UL — SIGNIFICANT CHANGE UP (ref 0–0.5)
EOSINOPHIL NFR BLD AUTO: 0 % — SIGNIFICANT CHANGE UP (ref 0–6)
GLUCOSE SERPL-MCNC: 100 MG/DL — HIGH (ref 70–99)
HCT VFR BLD CALC: 33 % — LOW (ref 34.5–45)
HGB BLD-MCNC: 9.5 G/DL — LOW (ref 11.5–15.5)
IMM GRANULOCYTES NFR BLD AUTO: 1.1 % — HIGH (ref 0–0.9)
LYMPHOCYTES # BLD AUTO: 1.34 K/UL — SIGNIFICANT CHANGE UP (ref 1–3.3)
LYMPHOCYTES # BLD AUTO: 11.6 % — LOW (ref 13–44)
MAGNESIUM SERPL-MCNC: 2.7 MG/DL — HIGH (ref 1.6–2.6)
MCHC RBC-ENTMCNC: 23.5 PG — LOW (ref 27–34)
MCHC RBC-ENTMCNC: 28.8 G/DL — LOW (ref 32–36)
MCV RBC AUTO: 81.7 FL — SIGNIFICANT CHANGE UP (ref 80–100)
MONOCYTES # BLD AUTO: 0.76 K/UL — SIGNIFICANT CHANGE UP (ref 0–0.9)
MONOCYTES NFR BLD AUTO: 6.6 % — SIGNIFICANT CHANGE UP (ref 2–14)
NEUTROPHILS # BLD AUTO: 9.28 K/UL — HIGH (ref 1.8–7.4)
NEUTROPHILS NFR BLD AUTO: 80.6 % — HIGH (ref 43–77)
NRBC BLD AUTO-RTO: 0 /100 WBCS — SIGNIFICANT CHANGE UP (ref 0–0)
PHOSPHATE SERPL-MCNC: 4.6 MG/DL — HIGH (ref 2.5–4.5)
PLATELET # BLD AUTO: 267 K/UL — SIGNIFICANT CHANGE UP (ref 150–400)
POTASSIUM SERPL-MCNC: 4.7 MMOL/L — SIGNIFICANT CHANGE UP (ref 3.5–5.3)
POTASSIUM SERPL-SCNC: 4.7 MMOL/L — SIGNIFICANT CHANGE UP (ref 3.5–5.3)
PROCALCITONIN SERPL-MCNC: 0.05 NG/ML — SIGNIFICANT CHANGE UP (ref 0.02–0.1)
PROT SERPL-MCNC: 7.6 GM/DL — SIGNIFICANT CHANGE UP (ref 6–8.3)
RBC # BLD: 4.04 M/UL — SIGNIFICANT CHANGE UP (ref 3.8–5.2)
RBC # FLD: 17.7 % — HIGH (ref 10.3–14.5)
SODIUM SERPL-SCNC: 137 MMOL/L — SIGNIFICANT CHANGE UP (ref 135–145)
WBC # BLD: 11.52 K/UL — HIGH (ref 3.8–10.5)
WBC # FLD AUTO: 11.52 K/UL — HIGH (ref 3.8–10.5)

## 2025-06-15 PROCEDURE — 99291 CRITICAL CARE FIRST HOUR: CPT

## 2025-06-15 RX ORDER — LABETALOL HYDROCHLORIDE 200 MG/1
10 TABLET, FILM COATED ORAL ONCE
Refills: 0 | Status: COMPLETED | OUTPATIENT
Start: 2025-06-15 | End: 2025-06-15

## 2025-06-15 RX ADMIN — Medication 1 APPLICATION(S): at 05:33

## 2025-06-15 RX ADMIN — APIXABAN 2.5 MILLIGRAM(S): 2.5 TABLET, FILM COATED ORAL at 17:38

## 2025-06-15 RX ADMIN — Medication 1 DOSE(S): at 18:15

## 2025-06-15 RX ADMIN — AMLODIPINE BESYLATE 10 MILLIGRAM(S): 10 TABLET ORAL at 05:34

## 2025-06-15 RX ADMIN — MONTELUKAST SODIUM 10 MILLIGRAM(S): 10 TABLET ORAL at 11:21

## 2025-06-15 RX ADMIN — IPRATROPIUM BROMIDE AND ALBUTEROL SULFATE 3 MILLILITER(S): .5; 2.5 SOLUTION RESPIRATORY (INHALATION) at 10:06

## 2025-06-15 RX ADMIN — Medication 1 DOSE(S): at 05:46

## 2025-06-15 RX ADMIN — Medication 40 MILLIGRAM(S): at 05:34

## 2025-06-15 RX ADMIN — IPRATROPIUM BROMIDE AND ALBUTEROL SULFATE 3 MILLILITER(S): .5; 2.5 SOLUTION RESPIRATORY (INHALATION) at 12:12

## 2025-06-15 RX ADMIN — IPRATROPIUM BROMIDE AND ALBUTEROL SULFATE 3 MILLILITER(S): .5; 2.5 SOLUTION RESPIRATORY (INHALATION) at 17:29

## 2025-06-15 RX ADMIN — Medication 10 MILLIGRAM(S): at 22:54

## 2025-06-15 RX ADMIN — Medication 4 MILLILITER(S): at 05:33

## 2025-06-15 RX ADMIN — APIXABAN 2.5 MILLIGRAM(S): 2.5 TABLET, FILM COATED ORAL at 05:34

## 2025-06-15 RX ADMIN — Medication 250 MILLIGRAM(S): at 11:21

## 2025-06-15 RX ADMIN — LABETALOL HYDROCHLORIDE 10 MILLIGRAM(S): 200 TABLET, FILM COATED ORAL at 03:55

## 2025-06-15 RX ADMIN — IPRATROPIUM BROMIDE AND ALBUTEROL SULFATE 3 MILLILITER(S): .5; 2.5 SOLUTION RESPIRATORY (INHALATION) at 05:34

## 2025-06-15 RX ADMIN — LISINOPRIL 50 MILLIGRAM(S): 30 TABLET ORAL at 05:34

## 2025-06-15 RX ADMIN — METHYLPREDNISOLONE ACETATE 40 MILLIGRAM(S): 80 INJECTION, SUSPENSION INTRA-ARTICULAR; INTRALESIONAL; INTRAMUSCULAR; SOFT TISSUE at 05:34

## 2025-06-15 RX ADMIN — IPRATROPIUM BROMIDE AND ALBUTEROL SULFATE 3 MILLILITER(S): .5; 2.5 SOLUTION RESPIRATORY (INHALATION) at 23:02

## 2025-06-15 NOTE — PROGRESS NOTE ADULT - SUBJECTIVE AND OBJECTIVE BOX
HPI:  Pt is a 70 yo F ex smoker (quit , 1/2 PPD x10 years) with h/o PE (2024), COPD, chronic diastolic CHF (grade I diastolic dysfunction), Afib,  L MCA aneurysm, E. coli UTI 3/2025, liver cyst, abdominal wall hematoma 3/2025 presents from home for complaints of SOB with increased work of breathing. Found to have blood gas 7.11/57/82/18/96% placed on BiPAP. Labs with Na 121, K 5.8, HCO3: 17, Cr 1.38. Pt admitted to ICU (2025) for hypercarbic respiratory failure, COPD exacerbation. Transitioned from BIPAP to AVAPS and weaned off NIV. Given steroids and nebs for COPD exacerbation. CT chest without PE but noted secretions in airway with some mucus plugging LLL with background of severe emphysema. Pt downgraded to medical floor 6/10 and then later on found to be confused, lethargic, in respiratory distress with wheeze, chest pain and abdominal pain (noted to be in urinary retention on bladder sono 550cc urine); transferred back to the ICU.  ICU dx: 1) acute hypercarbic respiratory failure/ COPD exacerbation (has required frequent management on NIV)  2) NANCY with hyponatremia 3) urinary rentention. This am pt in resp distress and placed back on BiPAP      ## Labs:  CBC:                        9.5    11.52 )-----------( 267      ( 15 Joseph 2025 03:30 )             33.0     Chem:  06-15    137  |  104  |  53[H]  ----------------------------<  100[H]  4.7   |  28  |  1.66[H]    Ca    10.0      15 Joseph 2025 03:30  Phos  4.6     06-15  Mg     2.7     06-15    TPro  7.6  /  Alb  3.0[L]  /  TBili  0.6  /  DBili  x   /  AST  12[L]  /  ALT  20  /  AlkPhos  83  06-15    Coags:          ## Imaging:    ## Medications:  azithromycin   Tablet 250 milliGRAM(s) Oral daily    amLODIPine   Tablet 10 milliGRAM(s) Oral daily  atenolol  Tablet 50 milliGRAM(s) Oral daily    albuterol/ipratropium for Nebulization 3 milliLiter(s) Nebulizer every 6 hours  albuterol/ipratropium for Nebulization 3 milliLiter(s) Nebulizer every 6 hours PRN  fluticasone propionate/ salmeterol 500-50 MICROgram(s) Diskus 1 Dose(s) Inhalation two times a day  guaiFENesin Oral Liquid (Sugar-Free) 200 milliGRAM(s) Oral every 6 hours PRN  montelukast 10 milliGRAM(s) Oral daily    methylPREDNISolone sodium succinate Injectable 40 milliGRAM(s) IV Push daily  methylPREDNISolone sodium succinate Injectable   IV Push     apixaban 2.5 milliGRAM(s) Oral two times a day    pantoprazole    Tablet 40 milliGRAM(s) Oral before breakfast    acetaminophen     Tablet .. 650 milliGRAM(s) Oral every 6 hours PRN      ## Vitals:  T(C): 36.8 (06-15-25 @ 08:00), Max: 36.8 (06-15-25 @ 08:00)  HR: 78 (06-15-25 @ 11:00) (65 - 107)  BP: 148/70 (06-15-25 @ 11:00) (134/66 - 184/90)  BP(mean): 92 (06-15-25 @ 11:00) (79 - 136)  RR: 19 (06-15-25 @ 11:00) (18 - 44)  SpO2: 95% (06-15-25 @ 11:00) (87% - 100%)  Wt(kg): --  Vent:   AB-14 @ 07:01  -  06-15 @ 07:00  --------------------------------------------------------  IN: 240 mL / OUT: 1100 mL / NET: -860 mL          ## P/E:  Gen: lying comfortably in bed in no apparent distress  Face: (+) BiPAP mask  Lungs: R decreased BS/ L (+) wheezes  Heart: RRR   Abd: Soft/+BS/ Non-tender  Ext: No edema  Neuro: Awake    CENTRAL LINE: [ ] YES [ ] NO  LOCATION:   DATE INSERTED:  REMOVE: [ ] YES [ ] NO      VALENCIA: [ ] YES [ ] NO    DATE INSERTED:  REMOVE:  [ ] YES [ ] NO      A-LINE:  [ ] YES [ ] NO  LOCATION:   DATE INSERTED:  REMOVE:  [ ] YES [ ] NO  EXPLAIN:    CODE STATUS: [x ] full code  [ ] DNR  [ ] DNI  [ ] MOLST  Goals of care discussion: [ ] yes

## 2025-06-15 NOTE — PROGRESS NOTE ADULT - ASSESSMENT
Pt is a 70 yo F ex smoker (quit 2013, 1/2 PPD x10 years) with h/o PE (1/2024), COPD, chronic diastolic CHF (grade I diastolic dysfunction), Afib,  L MCA aneurysm, E. coli UTI 3/2025, liver cyst, abdominal wall hematoma 3/2025 presents from home for complaints of SOB with increased work of breathing. Found to have blood gas 7.11/57/82/18/96% placed on BiPAP. Labs with Na 121, K 5.8, HCO3: 17, Cr 1.38. Pt admitted to ICU (6/8/2025) for hypercarbic respiratory failure, COPD exacerbation. Transitioned from BIPAP to AVAPS and weaned off NIV. Given steroids and nebs for COPD exacerbation. CT chest without PE but noted secretions in airway with some mucus plugging LLL with background of severe emphysema. Pt downgraded to medical floor 6/10 and then later on found to be confused, lethargic, in respiratory distress with wheeze, chest pain and abdominal pain (noted to be in urinary retention on bladder sono 550cc urine); transferred back to the ICU.  ICU dx: 1) acute hypercarbic respiratory failure/ COPD exacerbation (has required frequent management on NIV)  2) NANCY with hyponatremia 3) urinary rentention. This am pt in resp distress and placed back on BiPAP    Resp/Social: This am pt in resp distress placed back on NIV; spoke to son who appears to agree with intubation if necessary and I explained once she is intubated later on depending on the course he would need to decide between extubation and palliative extubation if she was not doing well/ Pt is full code/ Cont Steroids, DuoNebs, Singulair and Advair  ID: Cont Zithromax prophylaxis  CVS: Cont pt's antiHTN meds  HEME: Cont Eliquis  FEN: Po diet when off NIV  Endo: Follow Glu  Renal: Follow BUN/Cr and UO  Neuro: MS intact

## 2025-06-16 PROCEDURE — 99291 CRITICAL CARE FIRST HOUR: CPT

## 2025-06-16 RX ORDER — MAGNESIUM, ALUMINUM HYDROXIDE 200-200 MG
30 TABLET,CHEWABLE ORAL EVERY 4 HOURS
Refills: 0 | Status: DISCONTINUED | OUTPATIENT
Start: 2025-06-16 | End: 2025-06-26

## 2025-06-16 RX ADMIN — APIXABAN 2.5 MILLIGRAM(S): 2.5 TABLET, FILM COATED ORAL at 06:10

## 2025-06-16 RX ADMIN — Medication 650 MILLIGRAM(S): at 18:38

## 2025-06-16 RX ADMIN — LISINOPRIL 50 MILLIGRAM(S): 30 TABLET ORAL at 06:11

## 2025-06-16 RX ADMIN — IPRATROPIUM BROMIDE AND ALBUTEROL SULFATE 3 MILLILITER(S): .5; 2.5 SOLUTION RESPIRATORY (INHALATION) at 11:34

## 2025-06-16 RX ADMIN — Medication 1 DOSE(S): at 18:05

## 2025-06-16 RX ADMIN — IPRATROPIUM BROMIDE AND ALBUTEROL SULFATE 3 MILLILITER(S): .5; 2.5 SOLUTION RESPIRATORY (INHALATION) at 05:44

## 2025-06-16 RX ADMIN — APIXABAN 2.5 MILLIGRAM(S): 2.5 TABLET, FILM COATED ORAL at 17:30

## 2025-06-16 RX ADMIN — Medication 40 MILLIGRAM(S): at 06:10

## 2025-06-16 RX ADMIN — IPRATROPIUM BROMIDE AND ALBUTEROL SULFATE 3 MILLILITER(S): .5; 2.5 SOLUTION RESPIRATORY (INHALATION) at 23:03

## 2025-06-16 RX ADMIN — MONTELUKAST SODIUM 10 MILLIGRAM(S): 10 TABLET ORAL at 11:26

## 2025-06-16 RX ADMIN — Medication 1 APPLICATION(S): at 06:11

## 2025-06-16 RX ADMIN — Medication 250 MILLIGRAM(S): at 11:26

## 2025-06-16 RX ADMIN — AMLODIPINE BESYLATE 10 MILLIGRAM(S): 10 TABLET ORAL at 06:10

## 2025-06-16 RX ADMIN — METHYLPREDNISOLONE ACETATE 40 MILLIGRAM(S): 80 INJECTION, SUSPENSION INTRA-ARTICULAR; INTRALESIONAL; INTRAMUSCULAR; SOFT TISSUE at 06:11

## 2025-06-16 RX ADMIN — IPRATROPIUM BROMIDE AND ALBUTEROL SULFATE 3 MILLILITER(S): .5; 2.5 SOLUTION RESPIRATORY (INHALATION) at 17:37

## 2025-06-16 NOTE — PROGRESS NOTE ADULT - SUBJECTIVE AND OBJECTIVE BOX
HPI:  Pt is a 70 yo F ex smoker (quit , 1/2 PPD x10 years) with h/o PE (2024), COPD, chronic diastolic CHF (grade I diastolic dysfunction), Afib,  L MCA aneurysm, E. coli UTI 3/2025, liver cyst, abdominal wall hematoma 3/2025 presents from home for complaints of SOB with increased work of breathing. Found to have blood gas 7.11/57/82/18/96% placed on BiPAP. Labs with Na 121, K 5.8, HCO3: 17, Cr 1.38. Pt admitted to ICU (2025) for hypercarbic respiratory failure, COPD exacerbation. Transitioned from BIPAP to AVAPS and weaned off NIV. Given steroids and nebs for COPD exacerbation. CT chest without PE but noted secretions in airway with some mucus plugging LLL with background of severe emphysema. Pt downgraded to medical floor 6/10 and then later on found to be confused, lethargic, in respiratory distress with wheeze, chest pain and abdominal pain (noted to be in urinary retention on bladder sono 550cc urine); transferred back to the ICU.  ICU dx: 1) acute hypercarbic respiratory failure/ COPD exacerbation (has required frequent management on NIV)  2) NANCY with hyponatremia 3) urinary rentention. This am breathing comfortably on nc O2. R parotid gland swollen and tender      ## Labs:  CBC:                        9.5    11.52 )-----------( 267      ( 15 Joseph 2025 03:30 )             33.0     Chem:  06-15    137  |  104  |  53[H]  ----------------------------<  100[H]  4.7   |  28  |  1.66[H]    Ca    10.0      15 Joseph 2025 03:30  Phos  4.6     06-15  Mg     2.7     06-15    TPro  7.6  /  Alb  3.0[L]  /  TBili  0.6  /  DBili  x   /  AST  12[L]  /  ALT  20  /  AlkPhos  83  06-15    Coags:          ## Imaging:    ## Medications:  azithromycin   Tablet 250 milliGRAM(s) Oral daily    amLODIPine   Tablet 10 milliGRAM(s) Oral daily  atenolol  Tablet 50 milliGRAM(s) Oral daily    albuterol/ipratropium for Nebulization 3 milliLiter(s) Nebulizer every 6 hours  albuterol/ipratropium for Nebulization 3 milliLiter(s) Nebulizer every 6 hours PRN  fluticasone propionate/ salmeterol 500-50 MICROgram(s) Diskus 1 Dose(s) Inhalation two times a day  guaiFENesin Oral Liquid (Sugar-Free) 200 milliGRAM(s) Oral every 6 hours PRN  montelukast 10 milliGRAM(s) Oral daily    methylPREDNISolone sodium succinate Injectable 40 milliGRAM(s) IV Push daily  methylPREDNISolone sodium succinate Injectable   IV Push     apixaban 2.5 milliGRAM(s) Oral two times a day    aluminum hydroxide/magnesium hydroxide/simethicone Suspension 30 milliLiter(s) Oral every 4 hours PRN  pantoprazole    Tablet 40 milliGRAM(s) Oral before breakfast    acetaminophen     Tablet .. 650 milliGRAM(s) Oral every 6 hours PRN      ## Vitals:  T(C): 36.6 (25 @ 11:37), Max: 37.1 (06-15-25 @ 17:00)  HR: 69 (25 @ 12:05) (69 - 91)  BP: 113/66 (25 @ 12:00) (107/53 - 181/75)  BP(mean): 82 (25 @ 12:00) (67 - 106)  RR: 27 (25 @ 12:00) (21 - 33)  SpO2: 98% (25 @ 12:05) (86% - 100%)  Wt(kg): --  Vent:   AB-15 @ 07:  -   @ 07:00  --------------------------------------------------------  IN: 0 mL / OUT: 1000 mL / NET: -1000 mL     @ 07:01  -   @ 13:00  --------------------------------------------------------  IN: 80 mL / OUT: 250 mL / NET: -170 mL          ## P/E:  Gen: lying comfortably in bed in no apparent distress  Face: (+) nc 02/ R parotid swelling and tenderness  Lungs: CTA  Heart: RRR   Abd: Soft/+BS/ Non-tender  Ext: No edema  Neuro: Awake, alert    CENTRAL LINE: [ ] YES [ ] NO  LOCATION:   DATE INSERTED:  REMOVE: [ ] YES [ ] NO      VALENCIA: [ ] YES [ ] NO    DATE INSERTED:  REMOVE:  [ ] YES [ ] NO      A-LINE:  [ ] YES [ ] NO  LOCATION:   DATE INSERTED:  REMOVE:  [ ] YES [ ] NO  EXPLAIN:    CODE STATUS: [x ] full code  [ ] DNR  [ ] DNI  [ ] MOLST  Goals of care discussion: [ ] yes    HPI:  Pt is a 68 yo F ex smoker (quit , 1/2 PPD x10 years) with h/o PE (2024), COPD, chronic diastolic CHF (grade I diastolic dysfunction), Afib,  L MCA aneurysm, E. coli UTI 3/2025, liver cyst, abdominal wall hematoma 3/2025 presents from home for complaints of SOB with increased work of breathing. Found to have blood gas 7.11/57/82/18/96% placed on BiPAP. Labs with Na 121, K 5.8, HCO3: 17, Cr 1.38. Pt admitted to ICU (2025) for hypercarbic respiratory failure, COPD exacerbation. Transitioned from BIPAP to AVAPS and weaned off NIV. Given steroids and nebs for COPD exacerbation. CT chest without PE but noted secretions in airway with some mucus plugging LLL with background of severe emphysema. Pt downgraded to medical floor 6/10 and then later on found to be confused, lethargic, in respiratory distress with wheeze, chest pain and abdominal pain (noted to be in urinary retention on bladder sono 550cc urine); transferred back to the ICU.  ICU dx: 1) acute hypercarbic respiratory failure/ COPD exacerbation (has required frequent management on NIV)  2) NANCY with hyponatremia 3) urinary rentention. This am breathing comfortably on nc O2. R parotid gland swollen and tender      ## Labs:  CBC:                        9.5    11.52 )-----------( 267      ( 15 Joseph 2025 03:30 )             33.0     Chem:  06-15    137  |  104  |  53[H]  ----------------------------<  100[H]  4.7   |  28  |  1.66[H]    Ca    10.0      15 Joseph 2025 03:30  Phos  4.6     06-15  Mg     2.7     06-15    TPro  7.6  /  Alb  3.0[L]  /  TBili  0.6  /  DBili  x   /  AST  12[L]  /  ALT  20  /  AlkPhos  83  06-15    Coags:          ## Imaging:    ## Medications:  azithromycin   Tablet 250 milliGRAM(s) Oral daily    amLODIPine   Tablet 10 milliGRAM(s) Oral daily  atenolol  Tablet 50 milliGRAM(s) Oral daily    albuterol/ipratropium for Nebulization 3 milliLiter(s) Nebulizer every 6 hours  albuterol/ipratropium for Nebulization 3 milliLiter(s) Nebulizer every 6 hours PRN  fluticasone propionate/ salmeterol 500-50 MICROgram(s) Diskus 1 Dose(s) Inhalation two times a day  guaiFENesin Oral Liquid (Sugar-Free) 200 milliGRAM(s) Oral every 6 hours PRN  montelukast 10 milliGRAM(s) Oral daily    methylPREDNISolone sodium succinate Injectable 40 milliGRAM(s) IV Push daily  methylPREDNISolone sodium succinate Injectable   IV Push     apixaban 2.5 milliGRAM(s) Oral two times a day    aluminum hydroxide/magnesium hydroxide/simethicone Suspension 30 milliLiter(s) Oral every 4 hours PRN  pantoprazole    Tablet 40 milliGRAM(s) Oral before breakfast    acetaminophen     Tablet .. 650 milliGRAM(s) Oral every 6 hours PRN      ## Vitals:  T(C): 36.6 (25 @ 11:37), Max: 37.1 (06-15-25 @ 17:00)  HR: 69 (25 @ 12:05) (69 - 91)  BP: 113/66 (25 @ 12:00) (107/53 - 181/75)  BP(mean): 82 (25 @ 12:00) (67 - 106)  RR: 27 (25 @ 12:00) (21 - 33)  SpO2: 98% (25 @ 12:05) (86% - 100%)  Wt(kg): --  Vent:   AB-15 @ 07:  -   @ 07:00  --------------------------------------------------------  IN: 0 mL / OUT: 1000 mL / NET: -1000 mL     @ 07:01  -   @ 13:00  --------------------------------------------------------  IN: 80 mL / OUT: 250 mL / NET: -170 mL          ## P/E:  Gen: lying comfortably in bed in no apparent distress  Face: (+) nc 02/ R parotid swelling and tenderness  Lungs: CTA  Heart: RRR   Abd: Soft/+BS/ Non-tender  Ext: No edema  Neuro: Awake, alert    CENTRAL LINE: [ ] YES [ ] NO  LOCATION:   DATE INSERTED:  REMOVE: [ ] YES [ ] NO      VALENCIA: [ ] YES [ ] NO    DATE INSERTED:  REMOVE:  [ ] YES [ ] NO      A-LINE:  [ ] YES [ ] NO  LOCATION:   DATE INSERTED:  REMOVE:  [ ] YES [ ] NO  EXPLAIN:    CODE STATUS: [x ] full code  [ ] DNR  [ ] DNI  [ ] MOLST  Goals of care discussion: [x ] yes

## 2025-06-16 NOTE — PROGRESS NOTE ADULT - ASSESSMENT
CRNA at bedside  RN at bedside    Pt sitting for epidural- 1120   Test dose admin- 1125  Bolus dose admin- 1129  Continuous @12cc started at 1133 Pt is a 68 yo F ex smoker (quit 2013, 1/2 PPD x10 years) with h/o PE (1/2024), COPD, chronic diastolic CHF (grade I diastolic dysfunction), Afib,  L MCA aneurysm, E. coli UTI 3/2025, liver cyst, abdominal wall hematoma 3/2025 presents from home for complaints of SOB with increased work of breathing. Found to have blood gas 7.11/57/82/18/96% placed on BiPAP. Labs with Na 121, K 5.8, HCO3: 17, Cr 1.38. Pt admitted to ICU (6/8/2025) for hypercarbic respiratory failure, COPD exacerbation. Transitioned from BIPAP to AVAPS and weaned off NIV. Given steroids and nebs for COPD exacerbation. CT chest without PE but noted secretions in airway with some mucus plugging LLL with background of severe emphysema. Pt downgraded to medical floor 6/10 and then later on found to be confused, lethargic, in respiratory distress with wheeze, chest pain and abdominal pain (noted to be in urinary retention on bladder sono 550cc urine); transferred back to the ICU.  ICU dx: 1) acute hypercarbic respiratory failure/ COPD exacerbation (has required frequent management on NIV)  2) NANCY with hyponatremia 3) urinary rentention. This am breathing comfortably on nc O2. R parotid gland swollen and tender    Resp/Social: Currently breathing comfortably on nc O2; cont Steroids, DuoNebs, Singulair and Advair/ Place on NIV prn/ 6/15/2025 spoke to son who appears to agree with intubation if necessary and I explained once she is intubated later on depending on the course he would need to decide between extubation and palliative extubation if she was not doing well/ Pt is full code  ID: Cont Zithromax prophylaxis  CVS: Cont pt's antiHTN meds  HEME: Cont Eliquis  FEN: Po diet when off NIV  Endo: Follow Glu  Renal: Follow BUN/Cr and UO  Neuro: MS intact   Pt is a 70 yo F ex smoker (quit 2013, 1/2 PPD x10 years) with h/o PE (1/2024), COPD, chronic diastolic CHF (grade I diastolic dysfunction), Afib,  L MCA aneurysm, E. coli UTI 3/2025, liver cyst, abdominal wall hematoma 3/2025 presents from home for complaints of SOB with increased work of breathing. Found to have blood gas 7.11/57/82/18/96% placed on BiPAP. Labs with Na 121, K 5.8, HCO3: 17, Cr 1.38. Pt admitted to ICU (6/8/2025) for hypercarbic respiratory failure, COPD exacerbation. Transitioned from BIPAP to AVAPS and weaned off NIV. Given steroids and nebs for COPD exacerbation. CT chest without PE but noted secretions in airway with some mucus plugging LLL with background of severe emphysema. Pt downgraded to medical floor 6/10 and then later on found to be confused, lethargic, in respiratory distress with wheeze, chest pain and abdominal pain (noted to be in urinary retention on bladder sono 550cc urine); transferred back to the ICU.  ICU dx: 1) acute hypercarbic respiratory failure/ COPD exacerbation (has required frequent management on NIV)  2) NANCY with hyponatremia 3) urinary rentention. This am breathing comfortably on nc O2. R parotid gland swollen and tender    Resp/Social: Currently breathing comfortably on nc O2; cont Steroids, DuoNebs, Singulair and Advair/ Place on NIV prn/ 6/15/2025 spoke to son who appears to agree with intubation if necessary and I explained once she is intubated later on depending on the course he would need to decide between extubation and palliative extubation if she was not doing well/ Pt is full code  ID: Cont Zithromax prophylaxis  CVS: Cont pt's antiHTN meds  HEME: Cont Eliquis  FEN: Po diet when off NIV  Endo: Follow Glu  Renal: Follow BUN/Cr and UO

## 2025-06-17 LAB
ALBUMIN SERPL ELPH-MCNC: 2.6 G/DL — LOW (ref 3.3–5)
ALP SERPL-CCNC: 79 U/L — SIGNIFICANT CHANGE UP (ref 40–120)
ALT FLD-CCNC: 15 U/L — SIGNIFICANT CHANGE UP (ref 12–78)
ANION GAP SERPL CALC-SCNC: 8 MMOL/L — SIGNIFICANT CHANGE UP (ref 5–17)
AST SERPL-CCNC: 14 U/L — LOW (ref 15–37)
BASOPHILS # BLD AUTO: 0 K/UL — SIGNIFICANT CHANGE UP (ref 0–0.2)
BASOPHILS NFR BLD AUTO: 0 % — SIGNIFICANT CHANGE UP (ref 0–2)
BILIRUB SERPL-MCNC: 0.6 MG/DL — SIGNIFICANT CHANGE UP (ref 0.2–1.2)
BUN SERPL-MCNC: 49 MG/DL — HIGH (ref 7–23)
CALCIUM SERPL-MCNC: 9.4 MG/DL — SIGNIFICANT CHANGE UP (ref 8.5–10.1)
CHLORIDE SERPL-SCNC: 103 MMOL/L — SIGNIFICANT CHANGE UP (ref 96–108)
CO2 SERPL-SCNC: 24 MMOL/L — SIGNIFICANT CHANGE UP (ref 22–31)
CREAT SERPL-MCNC: 1.51 MG/DL — HIGH (ref 0.5–1.3)
EGFR: 37 ML/MIN/1.73M2 — LOW
EGFR: 37 ML/MIN/1.73M2 — LOW
ELLIPTOCYTES BLD QL SMEAR: SLIGHT — SIGNIFICANT CHANGE UP
EOSINOPHIL # BLD AUTO: 0 K/UL — SIGNIFICANT CHANGE UP (ref 0–0.5)
EOSINOPHIL NFR BLD AUTO: 0 % — SIGNIFICANT CHANGE UP (ref 0–6)
GLUCOSE SERPL-MCNC: 93 MG/DL — SIGNIFICANT CHANGE UP (ref 70–99)
HCT VFR BLD CALC: 35.7 % — SIGNIFICANT CHANGE UP (ref 34.5–45)
HGB BLD-MCNC: 10.1 G/DL — LOW (ref 11.5–15.5)
HYPOCHROMIA BLD QL: SIGNIFICANT CHANGE UP
LG PLATELETS BLD QL AUTO: SLIGHT — SIGNIFICANT CHANGE UP
LYMPHOCYTES # BLD AUTO: 18 % — SIGNIFICANT CHANGE UP (ref 13–44)
LYMPHOCYTES # BLD AUTO: 3.55 K/UL — HIGH (ref 1–3.3)
MAGNESIUM SERPL-MCNC: 2.4 MG/DL — SIGNIFICANT CHANGE UP (ref 1.6–2.6)
MANUAL SMEAR VERIFICATION: SIGNIFICANT CHANGE UP
MCHC RBC-ENTMCNC: 22.7 PG — LOW (ref 27–34)
MCHC RBC-ENTMCNC: 28.3 G/DL — LOW (ref 32–36)
MCV RBC AUTO: 80.4 FL — SIGNIFICANT CHANGE UP (ref 80–100)
MONOCYTES # BLD AUTO: 0.59 K/UL — SIGNIFICANT CHANGE UP (ref 0–0.9)
MONOCYTES NFR BLD AUTO: 3 % — SIGNIFICANT CHANGE UP (ref 2–14)
NEUTROPHILS # BLD AUTO: 15.56 K/UL — HIGH (ref 1.8–7.4)
NEUTROPHILS NFR BLD AUTO: 79 % — HIGH (ref 43–77)
NEUTS VAC BLD QL SMEAR: SLIGHT — SIGNIFICANT CHANGE UP
NRBC # BLD: 2 /100 WBCS — HIGH (ref 0–0)
NRBC BLD AUTO-RTO: SIGNIFICANT CHANGE UP /100 WBCS (ref 0–0)
NRBC BLD-RTO: 2 /100 WBCS — HIGH (ref 0–0)
PHOSPHATE SERPL-MCNC: 4.4 MG/DL — SIGNIFICANT CHANGE UP (ref 2.5–4.5)
PLAT MORPH BLD: NORMAL — SIGNIFICANT CHANGE UP
PLATELET # BLD AUTO: 306 K/UL — SIGNIFICANT CHANGE UP (ref 150–400)
POIKILOCYTOSIS BLD QL AUTO: SLIGHT — SIGNIFICANT CHANGE UP
POTASSIUM SERPL-MCNC: 5 MMOL/L — SIGNIFICANT CHANGE UP (ref 3.5–5.3)
POTASSIUM SERPL-SCNC: 5 MMOL/L — SIGNIFICANT CHANGE UP (ref 3.5–5.3)
PROT SERPL-MCNC: 7 GM/DL — SIGNIFICANT CHANGE UP (ref 6–8.3)
RBC # BLD: 4.44 M/UL — SIGNIFICANT CHANGE UP (ref 3.8–5.2)
RBC # FLD: 18.9 % — HIGH (ref 10.3–14.5)
RBC BLD AUTO: ABNORMAL
SMUDGE CELLS # BLD: PRESENT — SIGNIFICANT CHANGE UP
SODIUM SERPL-SCNC: 135 MMOL/L — SIGNIFICANT CHANGE UP (ref 135–145)
WBC # BLD: 19.7 K/UL — HIGH (ref 3.8–10.5)
WBC # FLD AUTO: 19.7 K/UL — HIGH (ref 3.8–10.5)

## 2025-06-17 PROCEDURE — 99233 SBSQ HOSP IP/OBS HIGH 50: CPT

## 2025-06-17 RX ORDER — VANCOMYCIN HCL IN 5 % DEXTROSE 1.5G/250ML
1000 PLASTIC BAG, INJECTION (ML) INTRAVENOUS ONCE
Refills: 0 | Status: COMPLETED | OUTPATIENT
Start: 2025-06-17 | End: 2025-06-17

## 2025-06-17 RX ORDER — AMPICILLIN SODIUM AND SULBACTAM SODIUM 1; .5 G/1; G/1
3 INJECTION, POWDER, FOR SOLUTION INTRAMUSCULAR; INTRAVENOUS EVERY 6 HOURS
Refills: 0 | Status: DISCONTINUED | OUTPATIENT
Start: 2025-06-17 | End: 2025-06-23

## 2025-06-17 RX ORDER — APIXABAN 2.5 MG/1
2.5 TABLET, FILM COATED ORAL EVERY 12 HOURS
Refills: 0 | Status: DISCONTINUED | OUTPATIENT
Start: 2025-06-17 | End: 2025-06-26

## 2025-06-17 RX ADMIN — Medication 650 MILLIGRAM(S): at 01:00

## 2025-06-17 RX ADMIN — Medication 650 MILLIGRAM(S): at 21:50

## 2025-06-17 RX ADMIN — AMLODIPINE BESYLATE 10 MILLIGRAM(S): 10 TABLET ORAL at 06:14

## 2025-06-17 RX ADMIN — LISINOPRIL 50 MILLIGRAM(S): 30 TABLET ORAL at 06:14

## 2025-06-17 RX ADMIN — APIXABAN 2.5 MILLIGRAM(S): 2.5 TABLET, FILM COATED ORAL at 11:44

## 2025-06-17 RX ADMIN — MONTELUKAST SODIUM 10 MILLIGRAM(S): 10 TABLET ORAL at 11:44

## 2025-06-17 RX ADMIN — IPRATROPIUM BROMIDE AND ALBUTEROL SULFATE 3 MILLILITER(S): .5; 2.5 SOLUTION RESPIRATORY (INHALATION) at 17:10

## 2025-06-17 RX ADMIN — Medication 1 DOSE(S): at 18:42

## 2025-06-17 RX ADMIN — AMPICILLIN SODIUM AND SULBACTAM SODIUM 200 GRAM(S): 1; .5 INJECTION, POWDER, FOR SOLUTION INTRAMUSCULAR; INTRAVENOUS at 17:24

## 2025-06-17 RX ADMIN — AMPICILLIN SODIUM AND SULBACTAM SODIUM 200 GRAM(S): 1; .5 INJECTION, POWDER, FOR SOLUTION INTRAMUSCULAR; INTRAVENOUS at 11:36

## 2025-06-17 RX ADMIN — Medication 650 MILLIGRAM(S): at 20:55

## 2025-06-17 RX ADMIN — Medication 250 MILLIGRAM(S): at 20:55

## 2025-06-17 RX ADMIN — METHYLPREDNISOLONE ACETATE 40 MILLIGRAM(S): 80 INJECTION, SUSPENSION INTRA-ARTICULAR; INTRALESIONAL; INTRAMUSCULAR; SOFT TISSUE at 06:12

## 2025-06-17 RX ADMIN — IPRATROPIUM BROMIDE AND ALBUTEROL SULFATE 3 MILLILITER(S): .5; 2.5 SOLUTION RESPIRATORY (INHALATION) at 05:48

## 2025-06-17 RX ADMIN — Medication 1 DOSE(S): at 06:15

## 2025-06-17 RX ADMIN — Medication 40 MILLIGRAM(S): at 06:14

## 2025-06-17 RX ADMIN — Medication 250 MILLIGRAM(S): at 11:44

## 2025-06-17 RX ADMIN — Medication 1 APPLICATION(S): at 06:15

## 2025-06-17 RX ADMIN — DEXTROMETHORPHAN HBR, GUAIFENESIN 200 MILLIGRAM(S): 200 LIQUID ORAL at 14:00

## 2025-06-17 RX ADMIN — APIXABAN 2.5 MILLIGRAM(S): 2.5 TABLET, FILM COATED ORAL at 21:31

## 2025-06-17 RX ADMIN — Medication 650 MILLIGRAM(S): at 00:04

## 2025-06-17 NOTE — CHART NOTE - NSCHARTNOTEFT_GEN_A_CORE
Assessment: 69 year old female with pmhx of Asthma (on prn supp O2), COPD (prev smoker quit 20 years ago), HTN, HFpEF, CVA with L sided deficits (as per son), A Fib, PE (off Eliquis and Aspirin since March 2025 due to abdominal wall hematoma), and Liver mass. Pt admitted with asthma exacerbation.   Per critical care note on 06/16, Pt downgraded to medical floor 6/10 and then later on found to be confused, lethargic, in respiratory distress with wheeze, chest pain and abdominal pain (noted to be in urinary retention on bladder sono 550cc urine); transferred back to the ICU.  ICU dx: 1) acute hypercarbic respiratory failure/ COPD exacerbation (has required frequent management on NIV)  2) NANCY with hyponatremia 3) urinary rentention. This am breathing comfortably on nc O2. R parotid gland swollen and tender    Pt visited at bedside. Unable to participate during interview, pt noted with confusion not related to language barrier.     Factors impacting intake: [ ] none [ ] nausea  [ ] vomiting [ ] diarrhea [ ] constipation  [ ]chewing problems [ ] swallowing issues  [X] other: Acute illness     Diet Prescription:   Intake:     Admission wt:  Current Weight: Weight (kg): 55.5 (06-10 @ 21:23)  % Weight Change:  Edema:     Pertinent Medications: MEDICATIONS  (STANDING):  albuterol/ipratropium for Nebulization 3 milliLiter(s) Nebulizer every 6 hours  amLODIPine   Tablet 10 milliGRAM(s) Oral daily  ampicillin/sulbactam  IVPB 3 Gram(s) IV Intermittent every 6 hours  apixaban 2.5 milliGRAM(s) Oral every 12 hours  atenolol  Tablet 50 milliGRAM(s) Oral daily  azithromycin   Tablet 250 milliGRAM(s) Oral daily  chlorhexidine 2% Cloths 1 Application(s) Topical <User Schedule>  fluticasone propionate/ salmeterol 500-50 MICROgram(s) Diskus 1 Dose(s) Inhalation two times a day  methylPREDNISolone sodium succinate Injectable   IV Push   montelukast 10 milliGRAM(s) Oral daily  pantoprazole    Tablet 40 milliGRAM(s) Oral before breakfast    MEDICATIONS  (PRN):  acetaminophen     Tablet .. 650 milliGRAM(s) Oral every 6 hours PRN Temp greater or equal to 38C (100.4F), Mild Pain (1 - 3)  albuterol/ipratropium for Nebulization 3 milliLiter(s) Nebulizer every 6 hours PRN Shortness of Breath and/or Wheezing  aluminum hydroxide/magnesium hydroxide/simethicone Suspension 30 milliLiter(s) Oral every 4 hours PRN Dyspepsia  guaiFENesin Oral Liquid (Sugar-Free) 200 milliGRAM(s) Oral every 6 hours PRN Cough    Pertinent Labs: 06-17 Na135 mmol/L Glu 93 mg/dL K+ 5.0 mmol/L Cr  1.51 mg/dL[H] BUN 49 mg/dL[H] 06-17 Phos 4.4 mg/dL 06-17 Alb 2.6 g/dL[L]     CAPILLARY BLOOD GLUCOSE        Skin:     Estimated Needs:   [ ] no change since previous assessment  [ ] recalculated:     Previous Nutrition Diagnosis:   [ ] Inadequate Energy Intake [ ]Inadequate Oral Intake [ ] Excessive Energy Intake   [ ] Underweight [ ] Increased Nutrient Needs [ ] Overweight/Obesity   [ ] Altered GI Function [ ] Unintended Weight Loss [ ] Food & Nutrition Related Knowledge Deficit [ ] Malnutrition     Nutrition Diagnosis is [ ] ongoing  [ ] resolved [ ] not applicable     New Nutrition Diagnosis: [ ] not applicable       Interventions:   Recommend  [ ] Change Diet To:  [ ] Nutrition Supplement  [ ] Nutrition Support  [ ] Other:     Monitoring and Evaluation:   [ ] PO intake [ x ] Tolerance to diet prescription [ x ] weights [ x ] labs[ x ] follow up per protocol  [ ] other: Assessment: 69 year old female with pmhx of Asthma (on prn supp O2), COPD (prev smoker quit 20 years ago), HTN, HFpEF, CVA with L sided deficits (as per son), A Fib, PE (off Eliquis and Aspirin since March 2025 due to abdominal wall hematoma), and Liver mass. Pt admitted with asthma exacerbation.   Per critical care note on 06/16, Pt downgraded to medical floor 6/10 and then later on found to be confused, lethargic, in respiratory distress with wheeze, chest pain and abdominal pain (noted to be in urinary retention on bladder sono 550cc urine); transferred back to the ICU.  ICU dx: 1) acute hypercarbic respiratory failure/ COPD exacerbation (has required frequent management on NIV)  2) NANCY with hyponatremia 3) urinary rentention. This am breathing comfortably on nc O2. R parotid gland swollen and tender    Pt visited at bedside. Unable to participate during interview, pt noted with confusion not related to language barrier.     Factors impacting intake: [ ] none [ ] nausea  [ ] vomiting [ ] diarrhea [ ] constipation  [ ]chewing problems [ ] swallowing issues  [X] other: Acute illness     Diet Prescription: Diet, Soft and Bite Sized (06-14-25 @ 11:39) [Active]    Intake: 0-25% per flow sheet     Admission wt: (06/08) 56.8 kg   Current Weight: Weight (kg): (6/16) 63 kg   % Weight Change: ~11% wt gain x 1 week -- most likely due to fluid shifts. pt noted with edema throughout admission   Edema: 2+ Edema: R Neck    Pertinent Medications: MEDICATIONS  (STANDING):  albuterol/ipratropium for Nebulization 3 milliLiter(s) Nebulizer every 6 hours  amLODIPine   Tablet 10 milliGRAM(s) Oral daily  ampicillin/sulbactam  IVPB 3 Gram(s) IV Intermittent every 6 hours  apixaban 2.5 milliGRAM(s) Oral every 12 hours  atenolol  Tablet 50 milliGRAM(s) Oral daily  azithromycin   Tablet 250 milliGRAM(s) Oral daily  chlorhexidine 2% Cloths 1 Application(s) Topical <User Schedule>  fluticasone propionate/ salmeterol 500-50 MICROgram(s) Diskus 1 Dose(s) Inhalation two times a day  methylPREDNISolone sodium succinate Injectable   IV Push   montelukast 10 milliGRAM(s) Oral daily  pantoprazole    Tablet 40 milliGRAM(s) Oral before breakfast    MEDICATIONS  (PRN):  acetaminophen     Tablet .. 650 milliGRAM(s) Oral every 6 hours PRN Temp greater or equal to 38C (100.4F), Mild Pain (1 - 3)  albuterol/ipratropium for Nebulization 3 milliLiter(s) Nebulizer every 6 hours PRN Shortness of Breath and/or Wheezing  aluminum hydroxide/magnesium hydroxide/simethicone Suspension 30 milliLiter(s) Oral every 4 hours PRN Dyspepsia  guaiFENesin Oral Liquid (Sugar-Free) 200 milliGRAM(s) Oral every 6 hours PRN Cough    Pertinent Labs: 06-17 Na135 mmol/L Glu 93 mg/dL K+ 5.0 mmol/L Cr  1.51 mg/dL[H] BUN 49 mg/dL[H] 06-17 Phos 4.4 mg/dL 06-17 Alb 2.6 g/dL[L]    CAPILLARY BLOOD GLUCOSE    Skin: No pressure injuries documented in flow sheets     Estimated Needs:   [X] no change since previous assessment (06/11)    Previous Nutrition Diagnosis:   [X] Inadequate Protein Energy Intake  Etiology: acute illness  Signs/ Symptoms: <50% meal intake  Goal: pt to >75% protein-energy needs via tolerated route (not being met)      Nutrition Diagnosis is [X] ongoing  [ ] resolved [ ] not applicable     New Nutrition Diagnosis: [X] not applicable       Interventions:   Recommend  [X] Continue Soft and Bite Sized texture as ordered and as tolerated  [X] Recommend Ensure Plus High Protein x 2/day (provides 700 kcal, 40 g protein)   [X] Hypophosphatemia throughout admission, monitor need for Phos binder  [X] Antibiotic regimen in place, recommend probiotics per MD discretion   [X] Pantoprazole Rx noted, monitor B12, calcium and magnesium and replenish PRN     Monitoring and Evaluation:   [X] PO intake [ x ] Tolerance to diet prescription [ x ] weights [ x ] labs[ x ] follow up per protocol  [ ] other:

## 2025-06-17 NOTE — PROGRESS NOTE ADULT - SUBJECTIVE AND OBJECTIVE BOX
HPI:  Pt is a 68 yo F ex smoker (quit , 1/2 PPD x10 years) with h/o PE (2024), COPD, chronic diastolic CHF (grade I diastolic dysfunction), Afib,  L MCA aneurysm, E. coli UTI 3/2025, liver cyst, abdominal wall hematoma 3/2025 presents from home for complaints of SOB with increased work of breathing. Found to have blood gas 7.11/57/82/18/96% placed on BiPAP. Labs with Na 121, K 5.8, HCO3: 17, Cr 1.38. Pt admitted to ICU (2025) for hypercarbic respiratory failure, COPD exacerbation. Transitioned from BIPAP to AVAPS and weaned off NIV. Given steroids and nebs for COPD exacerbation. CT chest without PE but noted secretions in airway with some mucus plugging LLL with background of severe emphysema. Pt downgraded to medical floor 6/10 and then later on found to be confused, lethargic, in respiratory distress with wheeze, chest pain and abdominal pain (noted to be in urinary retention on bladder sono 550cc urine); transferred back to the ICU.  ICU dx: 1) acute hypercarbic respiratory failure/ COPD exacerbation (has required frequent management on NIV)  2) NANCY with hyponatremia 3) urinary rentention. This am breathing comfortably on nc O2. R parotid gland swollen and tender with spreading of swelling to R face      ## Labs:  CBC:                        10.1   19.70 )-----------( 306      ( 2025 04:24 )             35.7     Chem:  -    135  |  103  |  49[H]  ----------------------------<  93  5.0   |  24  |  1.51[H]    Ca    9.4      2025 04:24  Phos  4.4     06-  Mg     2.4     06-17    TPro  7.0  /  Alb  2.6[L]  /  TBili  0.6  /  DBili  x   /  AST  14[L]  /  ALT  15  /  AlkPhos  79  06-17    Coags:          ## Imaging:    ## Medications:  ampicillin/sulbactam  IVPB 3 Gram(s) IV Intermittent every 6 hours  azithromycin   Tablet 250 milliGRAM(s) Oral daily    amLODIPine   Tablet 10 milliGRAM(s) Oral daily  atenolol  Tablet 50 milliGRAM(s) Oral daily    albuterol/ipratropium for Nebulization 3 milliLiter(s) Nebulizer every 6 hours  albuterol/ipratropium for Nebulization 3 milliLiter(s) Nebulizer every 6 hours PRN  fluticasone propionate/ salmeterol 500-50 MICROgram(s) Diskus 1 Dose(s) Inhalation two times a day  guaiFENesin Oral Liquid (Sugar-Free) 200 milliGRAM(s) Oral every 6 hours PRN  montelukast 10 milliGRAM(s) Oral daily    methylPREDNISolone sodium succinate Injectable   IV Push     apixaban 2.5 milliGRAM(s) Oral every 12 hours    aluminum hydroxide/magnesium hydroxide/simethicone Suspension 30 milliLiter(s) Oral every 4 hours PRN  pantoprazole    Tablet 40 milliGRAM(s) Oral before breakfast    acetaminophen     Tablet .. 650 milliGRAM(s) Oral every 6 hours PRN      ## Vitals:  T(C): 36.5 (25 @ 12:00), Max: 37.2 (25 @ 23:17)  HR: 89 (25 @ 13:00) (67 - 95)  BP: 104/56 (25 @ 13:00) (90/58 - 179/68)  BP(mean): 70 (25 @ 13:00) (66 - 113)  RR: 28 (25 @ 13:00) (14 - 32)  SpO2: 96% (25 @ 13:00) (90% - 100%)  Wt(kg): --  Vent:   AB-16 @ 07:01  -   @ 07:00  --------------------------------------------------------  IN: 580 mL / OUT: 950 mL / NET: -370 mL     @ 07:01  -   @ 14:28  --------------------------------------------------------  IN: 340 mL / OUT: 0 mL / NET: 340 mL          ## P/E:  Gen: lying comfortably in bed in no apparent distress  Face: (+) nc 02/ R parotid swelling, tenderness and R face swelling  Lungs: CTA  Heart: RRR   Abd: Soft/+BS/ Non-tender  Ext: No edema  Neuro: AAo x3    CENTRAL LINE: [ ] YES [ ] NO  LOCATION:   DATE INSERTED:  REMOVE: [ ] YES [ ] NO      VALENCIA: [ ] YES [ ] NO    DATE INSERTED:  REMOVE:  [ ] YES [ ] NO      A-LINE:  [ ] YES [ ] NO  LOCATION:   DATE INSERTED:  REMOVE:  [ ] YES [ ] NO  EXPLAIN:    CODE STATUS: [x ] full code  [ ] DNR  [ ] DNI  [ ] MOLST  Goals of care discussion: [x ] yes

## 2025-06-17 NOTE — PROGRESS NOTE ADULT - ASSESSMENT
Pt is a 70 yo F ex smoker (quit 2013, 1/2 PPD x10 years) with h/o PE (1/2024), COPD, chronic diastolic CHF (grade I diastolic dysfunction), Afib,  L MCA aneurysm, E. coli UTI 3/2025, liver cyst, abdominal wall hematoma 3/2025 presents from home for complaints of SOB with increased work of breathing. Found to have blood gas 7.11/57/82/18/96% placed on BiPAP. Labs with Na 121, K 5.8, HCO3: 17, Cr 1.38. Pt admitted to ICU (6/8/2025) for hypercarbic respiratory failure, COPD exacerbation. Transitioned from BIPAP to AVAPS and weaned off NIV. Given steroids and nebs for COPD exacerbation. CT chest without PE but noted secretions in airway with some mucus plugging LLL with background of severe emphysema. Pt downgraded to medical floor 6/10 and then later on found to be confused, lethargic, in respiratory distress with wheeze, chest pain and abdominal pain (noted to be in urinary retention on bladder sono 550cc urine); transferred back to the ICU.  ICU dx: 1) acute hypercarbic respiratory failure/ COPD exacerbation (has required frequent management on NIV)  2) NANCY with hyponatremia 3) urinary rentention. This am breathing comfortably on nc O2. R parotid gland swollen and tender with spreading of swelling to R face    Resp/Social: Currently breathing comfortably on nc O2; cont Steroids, DuoNebs, Singulair and Advair/ Place on NIV prn/ 6/15/2025 spoke to son who appears to agree with intubation if necessary and I explained once she is intubated later on depending on the course he would need to decide between extubation and palliative extubation if she was not doing well/ Son updated/ Pt is full code  ID: Cont Zithromax prophylaxis/ R parotiditis/R face cellulitis; start Unasyn  CVS: Cont pt's antiHTN meds  HEME: Cont Eliquis  FEN: Po diet when off NIV  Endo: Follow Glu  Renal: Follow BUN/Cr and UO; Cr decreasing

## 2025-06-18 LAB
ALBUMIN SERPL ELPH-MCNC: 2.2 G/DL — LOW (ref 3.3–5)
ALP SERPL-CCNC: 68 U/L — SIGNIFICANT CHANGE UP (ref 40–120)
ALT FLD-CCNC: 14 U/L — SIGNIFICANT CHANGE UP (ref 12–78)
ANION GAP SERPL CALC-SCNC: 5 MMOL/L — SIGNIFICANT CHANGE UP (ref 5–17)
AST SERPL-CCNC: 12 U/L — LOW (ref 15–37)
BASOPHILS # BLD AUTO: 0.02 K/UL — SIGNIFICANT CHANGE UP (ref 0–0.2)
BASOPHILS NFR BLD AUTO: 0.1 % — SIGNIFICANT CHANGE UP (ref 0–2)
BILIRUB SERPL-MCNC: 0.4 MG/DL — SIGNIFICANT CHANGE UP (ref 0.2–1.2)
BUN SERPL-MCNC: 52 MG/DL — HIGH (ref 7–23)
CALCIUM SERPL-MCNC: 9 MG/DL — SIGNIFICANT CHANGE UP (ref 8.5–10.1)
CHLORIDE SERPL-SCNC: 103 MMOL/L — SIGNIFICANT CHANGE UP (ref 96–108)
CO2 SERPL-SCNC: 28 MMOL/L — SIGNIFICANT CHANGE UP (ref 22–31)
CREAT SERPL-MCNC: 1.58 MG/DL — HIGH (ref 0.5–1.3)
EGFR: 35 ML/MIN/1.73M2 — LOW
EGFR: 35 ML/MIN/1.73M2 — LOW
EOSINOPHIL # BLD AUTO: 0 K/UL — SIGNIFICANT CHANGE UP (ref 0–0.5)
EOSINOPHIL NFR BLD AUTO: 0 % — SIGNIFICANT CHANGE UP (ref 0–6)
GLUCOSE BLDC GLUCOMTR-MCNC: 186 MG/DL — HIGH (ref 70–99)
GLUCOSE SERPL-MCNC: 133 MG/DL — HIGH (ref 70–99)
HCT VFR BLD CALC: 27.5 % — LOW (ref 34.5–45)
HGB BLD-MCNC: 8 G/DL — LOW (ref 11.5–15.5)
IMM GRANULOCYTES NFR BLD AUTO: 1 % — HIGH (ref 0–0.9)
LYMPHOCYTES # BLD AUTO: 1.34 K/UL — SIGNIFICANT CHANGE UP (ref 1–3.3)
LYMPHOCYTES # BLD AUTO: 5.5 % — LOW (ref 13–44)
MAGNESIUM SERPL-MCNC: 2.3 MG/DL — SIGNIFICANT CHANGE UP (ref 1.6–2.6)
MCHC RBC-ENTMCNC: 23.5 PG — LOW (ref 27–34)
MCHC RBC-ENTMCNC: 29.1 G/DL — LOW (ref 32–36)
MCV RBC AUTO: 80.9 FL — SIGNIFICANT CHANGE UP (ref 80–100)
MONOCYTES # BLD AUTO: 0.88 K/UL — SIGNIFICANT CHANGE UP (ref 0–0.9)
MONOCYTES NFR BLD AUTO: 3.6 % — SIGNIFICANT CHANGE UP (ref 2–14)
MRSA PCR RESULT.: DETECTED
NEUTROPHILS # BLD AUTO: 21.72 K/UL — HIGH (ref 1.8–7.4)
NEUTROPHILS NFR BLD AUTO: 89.8 % — HIGH (ref 43–77)
NRBC BLD AUTO-RTO: 0 /100 WBCS — SIGNIFICANT CHANGE UP (ref 0–0)
PHOSPHATE SERPL-MCNC: 4.5 MG/DL — SIGNIFICANT CHANGE UP (ref 2.5–4.5)
PLATELET # BLD AUTO: 237 K/UL — SIGNIFICANT CHANGE UP (ref 150–400)
POTASSIUM SERPL-MCNC: 4.6 MMOL/L — SIGNIFICANT CHANGE UP (ref 3.5–5.3)
POTASSIUM SERPL-SCNC: 4.6 MMOL/L — SIGNIFICANT CHANGE UP (ref 3.5–5.3)
PROT SERPL-MCNC: 6.3 GM/DL — SIGNIFICANT CHANGE UP (ref 6–8.3)
RBC # BLD: 3.4 M/UL — LOW (ref 3.8–5.2)
RBC # FLD: 18.2 % — HIGH (ref 10.3–14.5)
S AUREUS DNA NOSE QL NAA+PROBE: DETECTED
SODIUM SERPL-SCNC: 136 MMOL/L — SIGNIFICANT CHANGE UP (ref 135–145)
WBC # BLD: 24.19 K/UL — HIGH (ref 3.8–10.5)
WBC # FLD AUTO: 24.19 K/UL — HIGH (ref 3.8–10.5)

## 2025-06-18 PROCEDURE — 99222 1ST HOSP IP/OBS MODERATE 55: CPT

## 2025-06-18 PROCEDURE — G0545: CPT

## 2025-06-18 PROCEDURE — 99233 SBSQ HOSP IP/OBS HIGH 50: CPT

## 2025-06-18 RX ORDER — ASPIRIN 325 MG
1 TABLET ORAL
Refills: 0 | DISCHARGE

## 2025-06-18 RX ORDER — VANCOMYCIN HCL IN 5 % DEXTROSE 1.5G/250ML
500 PLASTIC BAG, INJECTION (ML) INTRAVENOUS ONCE
Refills: 0 | Status: COMPLETED | OUTPATIENT
Start: 2025-06-18 | End: 2025-06-18

## 2025-06-18 RX ORDER — CYST/ALA/Q10/PHOS.SER/DHA/BROC 100-20-50
0 POWDER (GRAM) ORAL
Refills: 0 | DISCHARGE

## 2025-06-18 RX ORDER — FLUTICASONE PROPIONATE 50 UG/1
1 SPRAY, METERED NASAL
Refills: 0 | DISCHARGE

## 2025-06-18 RX ORDER — BUDESONIDE AND FORMOTEROL FUMARATE DIHYDRATE 80; 4.5 UG/1; UG/1
2 AEROSOL RESPIRATORY (INHALATION)
Refills: 0 | DISCHARGE

## 2025-06-18 RX ADMIN — IPRATROPIUM BROMIDE AND ALBUTEROL SULFATE 3 MILLILITER(S): .5; 2.5 SOLUTION RESPIRATORY (INHALATION) at 00:17

## 2025-06-18 RX ADMIN — Medication 1 DOSE(S): at 06:26

## 2025-06-18 RX ADMIN — AMPICILLIN SODIUM AND SULBACTAM SODIUM 200 GRAM(S): 1; .5 INJECTION, POWDER, FOR SOLUTION INTRAMUSCULAR; INTRAVENOUS at 23:16

## 2025-06-18 RX ADMIN — AMPICILLIN SODIUM AND SULBACTAM SODIUM 200 GRAM(S): 1; .5 INJECTION, POWDER, FOR SOLUTION INTRAMUSCULAR; INTRAVENOUS at 06:17

## 2025-06-18 RX ADMIN — Medication 250 MILLIGRAM(S): at 11:54

## 2025-06-18 RX ADMIN — LISINOPRIL 50 MILLIGRAM(S): 30 TABLET ORAL at 06:17

## 2025-06-18 RX ADMIN — AMLODIPINE BESYLATE 10 MILLIGRAM(S): 10 TABLET ORAL at 06:16

## 2025-06-18 RX ADMIN — Medication 100 MILLIGRAM(S): at 20:01

## 2025-06-18 RX ADMIN — APIXABAN 2.5 MILLIGRAM(S): 2.5 TABLET, FILM COATED ORAL at 09:55

## 2025-06-18 RX ADMIN — MONTELUKAST SODIUM 10 MILLIGRAM(S): 10 TABLET ORAL at 11:54

## 2025-06-18 RX ADMIN — Medication 40 MILLIGRAM(S): at 06:17

## 2025-06-18 RX ADMIN — DEXTROMETHORPHAN HBR, GUAIFENESIN 200 MILLIGRAM(S): 200 LIQUID ORAL at 22:37

## 2025-06-18 RX ADMIN — METHYLPREDNISOLONE ACETATE 20 MILLIGRAM(S): 80 INJECTION, SUSPENSION INTRA-ARTICULAR; INTRALESIONAL; INTRAMUSCULAR; SOFT TISSUE at 06:19

## 2025-06-18 RX ADMIN — AMPICILLIN SODIUM AND SULBACTAM SODIUM 200 GRAM(S): 1; .5 INJECTION, POWDER, FOR SOLUTION INTRAMUSCULAR; INTRAVENOUS at 18:01

## 2025-06-18 RX ADMIN — Medication 1 APPLICATION(S): at 06:20

## 2025-06-18 RX ADMIN — Medication 1 DOSE(S): at 18:01

## 2025-06-18 RX ADMIN — IPRATROPIUM BROMIDE AND ALBUTEROL SULFATE 3 MILLILITER(S): .5; 2.5 SOLUTION RESPIRATORY (INHALATION) at 17:01

## 2025-06-18 RX ADMIN — IPRATROPIUM BROMIDE AND ALBUTEROL SULFATE 3 MILLILITER(S): .5; 2.5 SOLUTION RESPIRATORY (INHALATION) at 05:54

## 2025-06-18 RX ADMIN — APIXABAN 2.5 MILLIGRAM(S): 2.5 TABLET, FILM COATED ORAL at 21:18

## 2025-06-18 RX ADMIN — AMPICILLIN SODIUM AND SULBACTAM SODIUM 200 GRAM(S): 1; .5 INJECTION, POWDER, FOR SOLUTION INTRAMUSCULAR; INTRAVENOUS at 11:53

## 2025-06-18 RX ADMIN — AMPICILLIN SODIUM AND SULBACTAM SODIUM 200 GRAM(S): 1; .5 INJECTION, POWDER, FOR SOLUTION INTRAMUSCULAR; INTRAVENOUS at 00:06

## 2025-06-18 RX ADMIN — IPRATROPIUM BROMIDE AND ALBUTEROL SULFATE 3 MILLILITER(S): .5; 2.5 SOLUTION RESPIRATORY (INHALATION) at 11:14

## 2025-06-18 NOTE — PROGRESS NOTE ADULT - SUBJECTIVE AND OBJECTIVE BOX
HPI:  Pt is a 68 yo F ex smoker (quit , 1/2 PPD x10 years) with h/o PE (2024), COPD, chronic diastolic CHF (grade I diastolic dysfunction), Afib,  L MCA aneurysm, E. coli UTI 3/2025, liver cyst, abdominal wall hematoma 3/2025 presents from home for complaints of SOB with increased work of breathing. Found to have blood gas 7.11/57/82/18/96% placed on BiPAP. Labs with Na 121, K 5.8, HCO3: 17, Cr 1.38. Pt admitted to ICU (2025) for hypercarbic respiratory failure, COPD exacerbation. Transitioned from BIPAP to AVAPS and weaned off NIV. Given steroids and nebs for COPD exacerbation. CT chest without PE but noted secretions in airway with some mucus plugging LLL with background of severe emphysema. Pt downgraded to medical floor 6/10 and then later on found to be confused, lethargic, in respiratory distress with wheeze, chest pain and abdominal pain (noted to be in urinary retention on bladder sono 550cc urine); transferred back to the ICU.  ICU dx: 1) acute hypercarbic respiratory failure/ COPD exacerbation (has required frequent management on NIV)  2) NANCY with hyponatremia 3) urinary rentention. R parotid gland and R face less swollen       ## Labs:  CBC:                        8.0    24.19 )-----------( 237      ( 2025 03:00 )             27.5     Chem:  -18    136  |  103  |  52[H]  ----------------------------<  133[H]  4.6   |  28  |  1.58[H]    Ca    9.0      2025 03:00  Phos  4.5     06-18  Mg     2.3     06-18    TPro  6.3  /  Alb  2.2[L]  /  TBili  0.4  /  DBili  x   /  AST  12[L]  /  ALT  14  /  AlkPhos  68  06-18    Coags:          ## Imaging:    ## Medications:  ampicillin/sulbactam  IVPB 3 Gram(s) IV Intermittent every 6 hours  azithromycin   Tablet 250 milliGRAM(s) Oral daily  vancomycin  IVPB 500 milliGRAM(s) IV Intermittent once    amLODIPine   Tablet 10 milliGRAM(s) Oral daily  atenolol  Tablet 50 milliGRAM(s) Oral daily    albuterol/ipratropium for Nebulization 3 milliLiter(s) Nebulizer every 6 hours PRN  albuterol/ipratropium for Nebulization 3 milliLiter(s) Nebulizer every 6 hours  fluticasone propionate/ salmeterol 500-50 MICROgram(s) Diskus 1 Dose(s) Inhalation two times a day  guaiFENesin Oral Liquid (Sugar-Free) 200 milliGRAM(s) Oral every 6 hours PRN  montelukast 10 milliGRAM(s) Oral daily    methylPREDNISolone sodium succinate Injectable 20 milliGRAM(s) IV Push daily  methylPREDNISolone sodium succinate Injectable   IV Push     apixaban 2.5 milliGRAM(s) Oral every 12 hours    aluminum hydroxide/magnesium hydroxide/simethicone Suspension 30 milliLiter(s) Oral every 4 hours PRN  pantoprazole    Tablet 40 milliGRAM(s) Oral before breakfast    acetaminophen     Tablet .. 650 milliGRAM(s) Oral every 6 hours PRN      ## Vitals:  T(C): 35.9 (25 @ 07:16), Max: 36.8 (25 @ 19:30)  HR: 76 (25 @ 11:28) (59 - 89)  BP: 117/49 (25 @ 11:00) (88/39 - 137/64)  BP(mean): 69 (25 @ 11:00) (54 - 84)  RR: 26 (25 @ 11:00) (17 - 28)  SpO2: 100% (25 @ 11:28) (92% - 100%)  Wt(kg): --  Vent:   AB-17 @ 07:01  -   @ 07:00  --------------------------------------------------------  IN: 1130 mL / OUT: 800 mL / NET: 330 mL          ## P/E:  Gen: lying comfortably in bed in no apparent distress  Face: (+) nc 02/ Less R parotid and R face swelling  Lungs: CTA  Heart: RRR   Abd: Soft/+BS/ Non-tender  Ext: No edema  Neuro: AAo x3    CENTRAL LINE: [ ] YES [ ] NO  LOCATION:   DATE INSERTED:  REMOVE: [ ] YES [ ] NO      VALENCIA: [ ] YES [ ] NO    DATE INSERTED:  REMOVE:  [ ] YES [ ] NO      A-LINE:  [ ] YES [ ] NO  LOCATION:   DATE INSERTED:  REMOVE:  [ ] YES [ ] NO  EXPLAIN:    CODE STATUS: [x ] full code  [ ] DNR  [ ] DNI  [ ] MOLST  Goals of care discussion: [x ] yes

## 2025-06-18 NOTE — CONSULT NOTE ADULT - SUBJECTIVE AND OBJECTIVE BOX
HPI:  *History obtained from chart review and son Maldonado.     69 year old woman with a PMHx of Asthma (on prn supp O2), COPD (prev smoker quit 20 years ago), HTN, HFpEF, CVA with L sided deficits (as per son), A Fib, PE (off Eliquis and Aspirin since March 2025 due to abdominal wall hematoma), and Liver mass (IR recommended for outpatient follow up w repeat imaging).     Pt presented to the ED with SOB that started the night prior. Per daughter in law, pt has been declining in her health, decreased appetite since her last hospital admission in March 2025. Has been bed bound last 3 years. In the ED, pt tripoding and was placed on BIPAP. Received nebs and steroids. Pt then developed episode of coffee ground vomiting and switch to high flow NC. Labs significant for wbc 11.8, hgb 8.6, Na 121, K: 5.8, Bicarb 20, Creat: 1.38, trop: 62, pH: 7.11, pCO2: 57.   Pt developed hypotension and received 1 L of IV fluids with some improvement. Admitted to the ICU for further management.        (08 Jun 2025 20:54)      PAST MEDICAL & SURGICAL HISTORY:  Brain aneurysm      Hypertension      Smoking      Atrial fibrillation      Asthma      Hyperlipidemia      CVA (cerebrovascular accident)      No significant past surgical history          Allergies    No Known Allergies    Intolerances        ANTIMICROBIALS:  ampicillin/sulbactam  IVPB 3 every 6 hours  azithromycin   Tablet 250 daily  vancomycin  IVPB 500 once      OTHER MEDS:  acetaminophen     Tablet .. 650 milliGRAM(s) Oral every 6 hours PRN  albuterol/ipratropium for Nebulization 3 milliLiter(s) Nebulizer every 6 hours PRN  albuterol/ipratropium for Nebulization 3 milliLiter(s) Nebulizer every 6 hours  aluminum hydroxide/magnesium hydroxide/simethicone Suspension 30 milliLiter(s) Oral every 4 hours PRN  amLODIPine   Tablet 10 milliGRAM(s) Oral daily  apixaban 2.5 milliGRAM(s) Oral every 12 hours  atenolol  Tablet 50 milliGRAM(s) Oral daily  chlorhexidine 2% Cloths 1 Application(s) Topical <User Schedule>  fluticasone propionate/ salmeterol 500-50 MICROgram(s) Diskus 1 Dose(s) Inhalation two times a day  guaiFENesin Oral Liquid (Sugar-Free) 200 milliGRAM(s) Oral every 6 hours PRN  methylPREDNISolone sodium succinate Injectable 20 milliGRAM(s) IV Push daily  methylPREDNISolone sodium succinate Injectable   IV Push   montelukast 10 milliGRAM(s) Oral daily  pantoprazole    Tablet 40 milliGRAM(s) Oral before breakfast      SOCIAL HISTORY:    Marital Status:    Occupation:   Lives with:     Substance Use (street drugs):   Tobacco Usage:    Alcohol Usage: Social EtOH    FAMILY HISTORY:  No pertinent family history in first degree relatives        ROS:  Unobtainable because:   All other systems negative     Constitutional: no fever, no chills, no weight loss, no night sweats  Eye: no eye pain, no redness, no vision changes  ENT:  no sore throat, no rhinorrhea  Cardiovascular:  no chest pain, no palpitation  Respiratory:  no SOB, no cough  GI:  no abd pain, no vomiting, no diarrhea  urinary: no dysuria, no hematuria, no flank pain  : no  discharge or bleeding  musculoskeletal:  no joint pain, no joint swelling  skin:  no rash  neurology:  no headache, no seizure, no change in mental status  psych: no anxiety, no depression     Physical Exam:    General:    NAD, non toxic  Head: atraumatic, normocephalic  Eyes: normal sclera and conjunctiva  ENT:   no oropharyngeal lesions, no LAD, neck supple  Cardio:    regular S1,S2, no murmur  Respiratory:   clear to auscultation b/l, no wheezing  abd:   soft, BS +, not tender, no hepatosplenomegaly  :     no CVAT, no suprapubic tenderness, no cordova  Musculoskeletal : no joint swelling, no edema  Skin:    no rash  vascular:  normal pulses  Neurologic:     no focal deficits  psych: normal affect, no suicidal ideation      Drug Dosing Weight  Height (cm): 149.9 (08 Jun 2025 17:35)  Weight (kg): 55.5 (10 Joseph 2025 21:23)  BMI (kg/m2): 24.7 (10 Joseph 2025 21:23)  BSA (m2): 1.5 (10 Joseph 2025 21:23)    Vital Signs Last 24 Hrs  T(F): 96.7 (06-18-25 @ 07:16), Max: 99.1 (06-11-25 @ 16:00)    Vital Signs Last 24 Hrs  HR: 76 (06-18-25 @ 11:28) (59 - 89)  BP: 117/49 (06-18-25 @ 11:00) (88/39 - 137/64)  RR: 26 (06-18-25 @ 11:00)  SpO2: 100% (06-18-25 @ 11:28) (92% - 100%)  Wt(kg): --                          8.0    24.19 )-----------( 237      ( 18 Jun 2025 03:00 )             27.5       06-18    136  |  103  |  52[H]  ----------------------------<  133[H]  4.6   |  28  |  1.58[H]    Ca    9.0      18 Jun 2025 03:00  Phos  4.5     06-18  Mg     2.3     06-18    TPro  6.3  /  Alb  2.2[L]  /  TBili  0.4  /  DBili  x   /  AST  12[L]  /  ALT  14  /  AlkPhos  68  06-18      Urinalysis Basic - ( 18 Jun 2025 03:00 )    Color: x / Appearance: x / SG: x / pH: x  Gluc: 133 mg/dL / Ketone: x  / Bili: x / Urobili: x   Blood: x / Protein: x / Nitrite: x   Leuk Esterase: x / RBC: x / WBC x   Sq Epi: x / Non Sq Epi: x / Bacteria: x        MICROBIOLOGY:  v              RADIOLOGY:       HPI:  *History obtained from medical chart and the ICU team.    Patient is a 69 year old woman with a PMHx of Asthma (on prn supp O2), COPD (prev smoker quit 20 years ago), HTN, HFpEF, CVA with L sided deficits (as per son), A Fib, PE (off Eliquis and Aspirin since March 2025 due to abdominal wall hematoma), and Liver mass (IR recommended for outpatient follow up w repeat imaging).     Pt presented to the ED with SOB that started the night prior. Per daughter in law, pt has been declining in her health, decreased appetite since her last hospital admission in March 2025. Has been bed bound last 3 years. In the ED, pt tripoding and was placed on BIPAP. Received nebs and steroids. Pt then developed episode of coffee ground vomiting and switch to high flow NC. Labs significant for wbc 11.8, hgb 8.6, Na 121, K: 5.8, Bicarb 20, Creat: 1.38, trop: 62, pH: 7.11, pCO2: 57.   Pt developed hypotension and received 1 L of IV fluids with some improvement. Admitted to the ICU for further management.    (08 Jun 2025 20:54)    hospital course-  Found to have blood gas 7.11/57/82/18/96% placed on BiPAP and was  admitted to ICU (6/8/2025) for hypercarbic respiratory failure, COPD exacerbation. Transitioned from BIPAP to AVAPS and weaned off NIV. Given steroids and nebs for COPD exacerbation. CT chest without PE but noted secretions in airway with some mucus plugging LLL with background of severe emphysema. Pt downgraded to medical floor 6/10 and then later on found to be confused, lethargic, in respiratory distress with wheeze, chest pain and abdominal pain (noted to be in urinary retention on bladder sono 550cc urine); transferred back to the ICU.     Infectious Disease consult is requested today 6/18 for right parotitis vs facial cellulitis    Patient seen and examined in ICU.  she is awake, alert  c/o pian in the right parotid gland region.  denies any fever   denies any sob at rest  has thick cough  denies any chest pain  denies any GI complaints    PAST MEDICAL & SURGICAL HISTORY:  Brain aneurysm      Hypertension      Smoking      Atrial fibrillation      Asthma      Hyperlipidemia      CVA (cerebrovascular accident)      No significant past surgical history          Allergies    No Known Drug Allergies        ANTIMICROBIALS:  ampicillin/sulbactam  IVPB 3 every 6 hours  azithromycin   Tablet 250 daily  vancomycin  IVPB 500 once      OTHER MEDS:  acetaminophen     Tablet .. 650 milliGRAM(s) Oral every 6 hours PRN  albuterol/ipratropium for Nebulization 3 milliLiter(s) Nebulizer every 6 hours PRN  albuterol/ipratropium for Nebulization 3 milliLiter(s) Nebulizer every 6 hours  aluminum hydroxide/magnesium hydroxide/simethicone Suspension 30 milliLiter(s) Oral every 4 hours PRN  amLODIPine   Tablet 10 milliGRAM(s) Oral daily  apixaban 2.5 milliGRAM(s) Oral every 12 hours  atenolol  Tablet 50 milliGRAM(s) Oral daily  chlorhexidine 2% Cloths 1 Application(s) Topical <User Schedule>  fluticasone propionate/ salmeterol 500-50 MICROgram(s) Diskus 1 Dose(s) Inhalation two times a day  guaiFENesin Oral Liquid (Sugar-Free) 200 milliGRAM(s) Oral every 6 hours PRN  methylPREDNISolone sodium succinate Injectable 20 milliGRAM(s) IV Push daily  methylPREDNISolone sodium succinate Injectable   IV Push   montelukast 10 milliGRAM(s) Oral daily  pantoprazole    Tablet 40 milliGRAM(s) Oral before breakfast      SOCIAL HISTORY:    Tobacco Usage:  former smoker      FAMILY HISTORY:  No pertinent family history in first degree relatives        ROS:  Unobtainable because:   All other systems negative     Constitutional: no fever, no chills, no weight loss, no night sweats  Eye: no eye pain, no redness, no vision changes  ENT:  no sore throat, no rhinorrhea, right parotid area pain and swelling  Cardiovascular:  no chest pain, no palpitation  Respiratory:  had  SOB, + cough  GI:  no abd pain, no vomiting, no diarrhea  urinary: no dysuria, no hematuria, no flank pain  : no  discharge or bleeding  musculoskeletal:  no joint pain, no joint swelling  skin:  no rash  neurology:  no headache    Physical Exam:    General:    NAD, non toxic  Head: atraumatic, normocephalic  Eyes: normal sclera and conjunctiva  ENT:   no oropharyngeal lesions,  right parotid glan region edematous and tender to touch, no erythema, no discharge, no wounds, not warm to touch  Cardio:    regular S1,S2, no murmur  Respiratory:   coarse cough, No wheezing, no crackles, scattered rhonchi   abd:   soft, BS +, not tender, no distention  :     no CVAT, no suprapubic tenderness  Musculoskeletal : no joint swelling, no edema  Skin:    no rash  vascular:  normal pulses  Neurologic:     awake, alert, can follow commands and answer most questions  psych: normal affect      Drug Dosing Weight  Height (cm): 149.9 (08 Jun 2025 17:35)  Weight (kg): 55.5 (10 Joseph 2025 21:23)  BMI (kg/m2): 24.7 (10 Joseph 2025 21:23)  BSA (m2): 1.5 (10 Joseph 2025 21:23)    Vital Signs Last 24 Hrs  T(F): 96.7 (06-18-25 @ 07:16), Max: 99.1 (06-11-25 @ 16:00)    Vital Signs Last 24 Hrs  HR: 76 (06-18-25 @ 11:28) (59 - 89)  BP: 117/49 (06-18-25 @ 11:00) (88/39 - 137/64)  RR: 26 (06-18-25 @ 11:00)  SpO2: 100% (06-18-25 @ 11:28) (92% - 100%)  Wt(kg): --                          8.0    24.19 )-----------( 237      ( 18 Jun 2025 03:00 )             27.5       06-18    136  |  103  |  52[H]  ----------------------------<  133[H]  4.6   |  28  |  1.58[H]    Ca    9.0      18 Jun 2025 03:00  Phos  4.5     06-18  Mg     2.3     06-18    TPro  6.3  /  Alb  2.2[L]  /  TBili  0.4  /  DBili  x   /  AST  12[L]  /  ALT  14  /  AlkPhos  68  06-18      Urinalysis Basic - ( 18 Jun 2025 03:00 )    Color: x / Appearance: x / SG: x / pH: x  Gluc: 133 mg/dL / Ketone: x  / Bili: x / Urobili: x   Blood: x / Protein: x / Nitrite: x   Leuk Esterase: x / RBC: x / WBC x   Sq Epi: x / Non Sq Epi: x / Bacteria: x        MICROBIOLOGY:      RADIOLOGY:    < from: CT Angio Chest PE Protocol w/ IV Cont (06.09.25 @ 14:53) >  IMPRESSION:    1. No pulmonary embolism.    2. Small airway mucous plugging/mucoid impaction in the left lower lobe.   Minimal adjacent airspace disease could represent atelectasis and/or   pneumonia. There is underlying severe emphysema.    3. A 5 cm right hepatic lobe cystic mass is unchanged compared to   3/6/2025, larger compared to 1/21/2024. Again the differential diagnosis   includes a biliary cystadenoma.    --- End of Report ---      TOM GORDON MD  This document has been electronically signed. Jun 9 2025  3:58PM    < end of copied text >    < from: Xray Chest 1 View- PORTABLE-Routine (Xray Chest 1 View- PORTABLE-Routine .) (06.14.25 @ 13:24) >    ACC: [id number] EXAM:  XR CHEST PORTABLE ROUTINE 1V   ORDERED BY: JONO VIVEROS     PROCEDURE DATE:  06/14/2025          INTERPRETATION:  DATE OF STUDY: 6/14/25    PRIOR: 6/10/25    CLINICAL INDICATION: Assess for hypoxemia    TECHNIQUE: AP radiograph of the chest.    FINDINGS:  Heart size is not accurately assessed on this AP projection.  No sizable pleural effusion.  Increasing haziness at both lung bases likely due to progression of   atelectasis and/or pneumonia.  No pneumothorax.    IMPRESSION:    Progression of bibasilar atelectasis and/or pneumonia.    --- End of Report ---            RENE HELLER MD; Attending Radiologist  This document has been electronically signed. Jun 16 2025  3:29PM    < end of copied text >

## 2025-06-18 NOTE — PROGRESS NOTE ADULT - ASSESSMENT
Pt is a 70 yo F ex smoker (quit 2013, 1/2 PPD x10 years) with h/o PE (1/2024), COPD, chronic diastolic CHF (grade I diastolic dysfunction), Afib,  L MCA aneurysm, E. coli UTI 3/2025, liver cyst, abdominal wall hematoma 3/2025 presents from home for complaints of SOB with increased work of breathing. Found to have blood gas 7.11/57/82/18/96% placed on BiPAP. Labs with Na 121, K 5.8, HCO3: 17, Cr 1.38. Pt admitted to ICU (6/8/2025) for hypercarbic respiratory failure, COPD exacerbation. Transitioned from BIPAP to AVAPS and weaned off NIV. Given steroids and nebs for COPD exacerbation. CT chest without PE but noted secretions in airway with some mucus plugging LLL with background of severe emphysema. Pt downgraded to medical floor 6/10 and then later on found to be confused, lethargic, in respiratory distress with wheeze, chest pain and abdominal pain (noted to be in urinary retention on bladder sono 550cc urine); transferred back to the ICU.  ICU dx: 1) acute hypercarbic respiratory failure/ COPD exacerbation (has required frequent management on NIV)  2) NANCY with hyponatremia 3) urinary rentention. R parotid gland  and R face less swollen     Resp/Social: This am breathing comfortably on nc O2; cont Steroids, DuoNebs, Singulair and Advair/ Place on NIV prn/ 6/15/2025 spoke to son who appears to agree with intubation if necessary and I explained once she is intubated later on depending on the course he would need to decide between extubation and palliative extubation if she was not doing well/ Son updated/ Pt is full code  ID: Cont Zithromax prophylaxis/ R parotiditis/R face cellulitis cont warm compresses and Abx as per ID  HEME: Cont Eliquis  FEN: Po diet when off NIV  Endo: Follow Glu  Renal: Follow BUN/Cr and UO; Cr slight increase today  Pt to remain in the ICU still with tenuous resp status

## 2025-06-18 NOTE — CONSULT NOTE ADULT - CONSULT REASON
Respiratory distress
respiratory failure, NANCY, COPD, HF, liver mass, debility, GOC
right facial ? cellulitis vs ? parotitis
respiratory distress

## 2025-06-18 NOTE — CONSULT NOTE ADULT - ASSESSMENT
69F ex smoker (quit 2013, 1/2 PPD x10 years) PMH HTN, chronic diastolic CHF (grade I diastolic dysfunction), Afib on Eliquis (by records), hx of PE (1/2024), COPD (previously on symbicort and spiriva, changed to duonebs q 6 hrs as there was question if she was able to coordinate use of her hand held inhaler per outpatient records, never intubated), L MCA aneurysm,  e-coli UTI 3/2025, liver cyst, abdominal wall hematoma 3/2025 presents from home for complaints of SOB with increased work of breathing. Found to have blood gas 7.11/57/82/18/96% placed on BiPAP. Labs with Na 121, K 5.8, HCO3: 17, Cr 1.38. Admitted to ICU for hypercarbic respiratory failure, COPD exacerbation. Transitioned from BIPAP to AVAPS and weaned off NIV. Given steroids and nebs for COPD exacerbation. CT chest without PE but noted secretions in airway with some mucus plugging LLL with background of severe emphysema. Downgraded to medical floor today 6/10. Pulmonary consulted for respiratory distress and worsening wheeze on medical floor. Pt found to be confused, lethargic, in respiratory distress with wheeze, chest pain and abdominal pain. Noted to be in urinary retention (bladder sono 550cc urine).     DX: acute hypercarbic respiratory failure, acute COPD exacerbation with underlying severe COPD, respiratory acidosis, metabolic acidosis, NANCY, urinary rentention, hyponatremia, hyperkalemia, acute metabolic encephalopathy       Patient readmitted to ICU for further management / care. Plan below per pulmonologist:    AVAPS reinitiated due to respiratory distress and worsening hypercarbia pCO2: 36 -> 55; repeat ABG  Nebs and Steroids stat then atc  Start hypertonic saline nebs for secretion mobilization and noted mucus plugging on CT chest  Azithromycin for antiinflammatory action  NPO for overnight, assess respiratory status in AM  Repeat bloodwork with potassium WNL, will follow up in AM  Troponin negative, patient appears comfortable now, BNP elevated which is consistent with h/o HF  Follow up with need to continue eliquis given history of PE  SQH for DVT prophylaxis + SCD  PPI for GI prophylaxis  Full code    d/w pulmonology attending MD    
A/P-  69 year old woman with a PMHx of Asthma (on prn supp O2), COPD (prev smoker quit 20 years ago), HTN, HFpEF, CVA with L sided deficits (as per son), A Fib, PE (off Eliquis and Aspirin since March 2025 due to abdominal wall hematoma), and Liver mass (IR recommended for outpatient follow up w repeat imaging).     Pt presented to the ED with SOB that started the night prior. Per daughter in law, pt has been declining in her health, decreased appetite since her last hospital admission in March 2025. Has been bed bound last 3 years. In the ED, pt tripoding and was placed on BIPAP. Received nebs and steroids. Pt then developed episode of coffee ground vomiting and switch to high flow NC. Labs significant for wbc 11.8, hgb 8.6, Na 121, K: 5.8, Bicarb 20, Creat: 1.38, trop: 62, pH: 7.11, pCO2: 57.   Pt developed hypotension and received 1 L of IV fluids with some improvement. Admitted to the ICU for further management.    (08 Jun 2025 20:54)    hospital course-  Found to have blood gas 7.11/57/82/18/96% placed on BiPAP and was  admitted to ICU (6/8/2025) for hypercarbic respiratory failure, COPD exacerbation. Transitioned from BIPAP to AVAPS and weaned off NIV. Given steroids and nebs for COPD exacerbation. CT chest without PE but noted secretions in airway with some mucus plugging LLL with background of severe emphysema. Pt downgraded to medical floor 6/10 and then later on found to be confused, lethargic, in respiratory distress with wheeze, chest pain and abdominal pain (noted to be in urinary retention on bladder sono 550cc urine); transferred back to the ICU.     Afebrile  +leukocytosis 24 K ( on steroids)  no culture data available  MRSA PCR- detected      Impression-  right parotitis  leukocytosis   copd exacerbation  bibasilar atelectasis  vs pneumonia  HTN  CVA    parotitis could be from viral (mumps)  or bacterial source or obstruction  most common viral source is mumps    plan-  trend wbc and temp  apply warm compress to the right parotid region  can continue with vanco that was initiated by ICU team, keep trough <15, -600 day #2  check mumps serology  can also continue the unasyn that was initiated by medicine team pending further results.  if no improvement advise to get CT of the right parotid/mastoid region  monitor for aspiration precautions   trend wbc (is on steroids)  rest of the medical management as per medicine/ICU  team.    All labs and imaging and chart notes reviewed.     All above discussed with patient and patient verbalizes full understanding of all above and agrees with above plan of care.    d/w medical team.    Thank you for this consultation.    Pham Holguin MD  Infectious Disease Attending    for any questions please do not hesitate to contact me either via teams or by calling [phone]            
69F ex smoker (quit 2013, 1/2 PPD x10 years) PMH HTN, chronic diastolic CHF (grade I diastolic dysfunction), Afib on Eliquis (by records), hx of PE (1/2024), COPD (previously on symbicort and spiriva, changed to duonebs q 6 hrs as there was question if she was able to coordinate use of her hand held inhaler per outpatient records, never intubated), L MCA aneurysm,  e-coli UTI 3/2025, liver cyst, abdominal wall hematoma 3/2025 presents from home for complaints of SOB with increased work of breathing. Found to have blood gas 7.11/57/82/18/96% placed on BiPAP. Labs with Na 121, K 5.8, HCO3: 17, Cr 1.38. Admitted to ICU for hypercarbic respiratory failure, COPD exacerbation. Transitioned from BIPAP to AVAPS and weaned off NIV. Given steroids and nebs for COPD exacerbation. CT chest without PE but noted secretions in airway with some mucus plugging LLL with background of severe emphysema. Downgraded to medical floor today 6/10. Pulmonary consulted for respiratory distress and worsening wheeze on medical floor. Pt found to be confused, lethargic, in respiratory distress with wheeze, chest pain and abdominal pain. Noted to be in urinary retention (bladder sono 550cc urine).     DX: acute hypercarbic respiratory failure, acute COPD exacerbation with underlying severe COPD, respiratory acidosis, metabolic acidosis, NANCY, urinary rentention, hyponatremia, hyperkalemia, acute metabolic encephalopathy     - NIV was discontinued during the day  - re-initiated back on AVAPS due to respiratory distress and worsening hypercarbia pCO2: 36 -> 55  - given stat duonebs + solumedrol 40mg  - cont with duonebs q6 hrs, start hypertonic saline nebs for secretion mobilization and noted mucus plugging on CT chest  - cont with solumedrol 40mg IV q12  - repeat ABG on AVAPS  - can cont azithromycin for COPD exacerbation (antiinflammatory properties)  - urinary retention, straight cathed with 550cc output  - monitor urine output, if retaining again will likely need cordova placement  - mildly hyperkalemic on blood work this AM, repeat chemistry to evaluate electrolytes  - with complaints of chest pain will check troponin again, proBNP level with hx of diastolic heart failure  - CXR ordered  - hx of PE and a-fib (no current PE noted on CT chest on this admission): was reported on chart records to be on eliquis (discharged on eliquis on most recent admission 3/2025) however med reconciliation does not indicate pt on eliquis. will need to verify with [facility].  - d/w respiratory therapist, hospital medicine (attending and PA), bedside nurse, ICU NP  - pt to be transferred back to ICU for airway monitoring  
69 year old female PMH of COPD with oxygen use PRN, asthma, CVA reportedly with left sided deficits, atrial fib and PE not on AC since March when she had abdominal wall hematoma, known liver mass HFpEF, HTN presented to the ED for shortness of breath.  Patient reportedly was tripoding started on NIV and admitted to ICU.  Patient was downgraded for a short period before being readmitted to ICU for respiratory distress.  Patient intermittently on NIV.  Palliative consulted for GOC.

## 2025-06-19 LAB
ALBUMIN SERPL ELPH-MCNC: 2.2 G/DL — LOW (ref 3.3–5)
ALP SERPL-CCNC: 67 U/L — SIGNIFICANT CHANGE UP (ref 40–120)
ALT FLD-CCNC: 15 U/L — SIGNIFICANT CHANGE UP (ref 12–78)
ANION GAP SERPL CALC-SCNC: 7 MMOL/L — SIGNIFICANT CHANGE UP (ref 5–17)
AST SERPL-CCNC: 9 U/L — LOW (ref 15–37)
BASOPHILS # BLD AUTO: 0.02 K/UL — SIGNIFICANT CHANGE UP (ref 0–0.2)
BASOPHILS NFR BLD AUTO: 0.1 % — SIGNIFICANT CHANGE UP (ref 0–2)
BILIRUB SERPL-MCNC: 0.4 MG/DL — SIGNIFICANT CHANGE UP (ref 0.2–1.2)
BUN SERPL-MCNC: 41 MG/DL — HIGH (ref 7–23)
CALCIUM SERPL-MCNC: 8.9 MG/DL — SIGNIFICANT CHANGE UP (ref 8.5–10.1)
CHLORIDE SERPL-SCNC: 106 MMOL/L — SIGNIFICANT CHANGE UP (ref 96–108)
CO2 SERPL-SCNC: 25 MMOL/L — SIGNIFICANT CHANGE UP (ref 22–31)
CREAT SERPL-MCNC: 1.29 MG/DL — SIGNIFICANT CHANGE UP (ref 0.5–1.3)
EGFR: 45 ML/MIN/1.73M2 — LOW
EGFR: 45 ML/MIN/1.73M2 — LOW
EOSINOPHIL # BLD AUTO: 0 K/UL — SIGNIFICANT CHANGE UP (ref 0–0.5)
EOSINOPHIL NFR BLD AUTO: 0 % — SIGNIFICANT CHANGE UP (ref 0–6)
GLUCOSE SERPL-MCNC: 106 MG/DL — HIGH (ref 70–99)
HCT VFR BLD CALC: 27.2 % — LOW (ref 34.5–45)
HGB BLD-MCNC: 7.9 G/DL — LOW (ref 11.5–15.5)
IMM GRANULOCYTES NFR BLD AUTO: 1.1 % — HIGH (ref 0–0.9)
LYMPHOCYTES # BLD AUTO: 1.83 K/UL — SIGNIFICANT CHANGE UP (ref 1–3.3)
LYMPHOCYTES # BLD AUTO: 9.4 % — LOW (ref 13–44)
MAGNESIUM SERPL-MCNC: 2.2 MG/DL — SIGNIFICANT CHANGE UP (ref 1.6–2.6)
MCHC RBC-ENTMCNC: 23.2 PG — LOW (ref 27–34)
MCHC RBC-ENTMCNC: 29 G/DL — LOW (ref 32–36)
MCV RBC AUTO: 80 FL — SIGNIFICANT CHANGE UP (ref 80–100)
MONOCYTES # BLD AUTO: 0.85 K/UL — SIGNIFICANT CHANGE UP (ref 0–0.9)
MONOCYTES NFR BLD AUTO: 4.4 % — SIGNIFICANT CHANGE UP (ref 2–14)
NEUTROPHILS # BLD AUTO: 16.47 K/UL — HIGH (ref 1.8–7.4)
NEUTROPHILS NFR BLD AUTO: 85 % — HIGH (ref 43–77)
NRBC BLD AUTO-RTO: 0 /100 WBCS — SIGNIFICANT CHANGE UP (ref 0–0)
PHOSPHATE SERPL-MCNC: 3.4 MG/DL — SIGNIFICANT CHANGE UP (ref 2.5–4.5)
PLATELET # BLD AUTO: 246 K/UL — SIGNIFICANT CHANGE UP (ref 150–400)
POTASSIUM SERPL-MCNC: 4.3 MMOL/L — SIGNIFICANT CHANGE UP (ref 3.5–5.3)
POTASSIUM SERPL-SCNC: 4.3 MMOL/L — SIGNIFICANT CHANGE UP (ref 3.5–5.3)
PROCALCITONIN SERPL-MCNC: 0.12 NG/ML — HIGH (ref 0.02–0.1)
PROT SERPL-MCNC: 6.1 GM/DL — SIGNIFICANT CHANGE UP (ref 6–8.3)
RBC # BLD: 3.4 M/UL — LOW (ref 3.8–5.2)
RBC # FLD: 18.9 % — HIGH (ref 10.3–14.5)
SODIUM SERPL-SCNC: 138 MMOL/L — SIGNIFICANT CHANGE UP (ref 135–145)
VANCOMYCIN FLD-MCNC: 19.1 UG/ML — SIGNIFICANT CHANGE UP
WBC # BLD: 19.38 K/UL — HIGH (ref 3.8–10.5)
WBC # FLD AUTO: 19.38 K/UL — HIGH (ref 3.8–10.5)

## 2025-06-19 PROCEDURE — 99233 SBSQ HOSP IP/OBS HIGH 50: CPT

## 2025-06-19 PROCEDURE — G0545: CPT

## 2025-06-19 PROCEDURE — 99232 SBSQ HOSP IP/OBS MODERATE 35: CPT

## 2025-06-19 RX ORDER — MELATONIN 5 MG
3 TABLET ORAL AT BEDTIME
Refills: 0 | Status: DISCONTINUED | OUTPATIENT
Start: 2025-06-19 | End: 2025-06-26

## 2025-06-19 RX ADMIN — IPRATROPIUM BROMIDE AND ALBUTEROL SULFATE 3 MILLILITER(S): .5; 2.5 SOLUTION RESPIRATORY (INHALATION) at 17:13

## 2025-06-19 RX ADMIN — MONTELUKAST SODIUM 10 MILLIGRAM(S): 10 TABLET ORAL at 12:10

## 2025-06-19 RX ADMIN — Medication 1 APPLICATION(S): at 06:07

## 2025-06-19 RX ADMIN — AMPICILLIN SODIUM AND SULBACTAM SODIUM 200 GRAM(S): 1; .5 INJECTION, POWDER, FOR SOLUTION INTRAMUSCULAR; INTRAVENOUS at 17:16

## 2025-06-19 RX ADMIN — Medication 3 MILLIGRAM(S): at 21:07

## 2025-06-19 RX ADMIN — AMPICILLIN SODIUM AND SULBACTAM SODIUM 200 GRAM(S): 1; .5 INJECTION, POWDER, FOR SOLUTION INTRAMUSCULAR; INTRAVENOUS at 05:17

## 2025-06-19 RX ADMIN — Medication 1 DOSE(S): at 05:21

## 2025-06-19 RX ADMIN — Medication 1 DOSE(S): at 17:16

## 2025-06-19 RX ADMIN — AMLODIPINE BESYLATE 10 MILLIGRAM(S): 10 TABLET ORAL at 05:16

## 2025-06-19 RX ADMIN — IPRATROPIUM BROMIDE AND ALBUTEROL SULFATE 3 MILLILITER(S): .5; 2.5 SOLUTION RESPIRATORY (INHALATION) at 00:25

## 2025-06-19 RX ADMIN — AMPICILLIN SODIUM AND SULBACTAM SODIUM 200 GRAM(S): 1; .5 INJECTION, POWDER, FOR SOLUTION INTRAMUSCULAR; INTRAVENOUS at 12:11

## 2025-06-19 RX ADMIN — METHYLPREDNISOLONE ACETATE 20 MILLIGRAM(S): 80 INJECTION, SUSPENSION INTRA-ARTICULAR; INTRALESIONAL; INTRAMUSCULAR; SOFT TISSUE at 05:17

## 2025-06-19 RX ADMIN — AMPICILLIN SODIUM AND SULBACTAM SODIUM 200 GRAM(S): 1; .5 INJECTION, POWDER, FOR SOLUTION INTRAMUSCULAR; INTRAVENOUS at 23:12

## 2025-06-19 RX ADMIN — Medication 250 MILLIGRAM(S): at 12:10

## 2025-06-19 RX ADMIN — LISINOPRIL 50 MILLIGRAM(S): 30 TABLET ORAL at 05:16

## 2025-06-19 RX ADMIN — Medication 40 MILLIGRAM(S): at 05:16

## 2025-06-19 RX ADMIN — IPRATROPIUM BROMIDE AND ALBUTEROL SULFATE 3 MILLILITER(S): .5; 2.5 SOLUTION RESPIRATORY (INHALATION) at 05:20

## 2025-06-19 RX ADMIN — IPRATROPIUM BROMIDE AND ALBUTEROL SULFATE 3 MILLILITER(S): .5; 2.5 SOLUTION RESPIRATORY (INHALATION) at 23:16

## 2025-06-19 RX ADMIN — APIXABAN 2.5 MILLIGRAM(S): 2.5 TABLET, FILM COATED ORAL at 09:39

## 2025-06-19 RX ADMIN — APIXABAN 2.5 MILLIGRAM(S): 2.5 TABLET, FILM COATED ORAL at 21:07

## 2025-06-19 RX ADMIN — IPRATROPIUM BROMIDE AND ALBUTEROL SULFATE 3 MILLILITER(S): .5; 2.5 SOLUTION RESPIRATORY (INHALATION) at 11:29

## 2025-06-19 NOTE — PROGRESS NOTE ADULT - NS ATTEST RISK PROBLEM GEN_ALL_CORE FT
1) acute hypercarbic respiratory failure/ COPD exacerbation (has required frequent management on NIV)  2) NANCY 3) Anemia
1) acute hypercarbic respiratory failure/ COPD exacerbation (has required frequent management on NIV)  2) NANCY with hyponatremia 3) urinary rentention 4) R parotiditis
1) acute hypercarbic respiratory failure/ COPD exacerbation (has required frequent management on NIV)  2) NANCY with hyponatremia 3) urinary rentention 4) R parotiditis.
1) acute hypercarbic respiratory failure/ COPD exacerbation (has required frequent management on NIV)  2) NANCY with hyponatremia 3) urinary rentention 4) Parotiditis

## 2025-06-19 NOTE — PROGRESS NOTE ADULT - SUBJECTIVE AND OBJECTIVE BOX
[facility]  INFECTIOUS DISEASES   [address] [place] [address]  Tel: [phone]     Fax: [phone]  ==============================================================================  MD Himanshu Stewart, DO Amisha Reza, SEBASTIÁN Merchant M.D  ==============================================================================      MARKIE HERNANDEZ  MRN-[id number]  69y ([birth year])      Interval History:    ROS:    [ ] Unobtainable because:  [ ] All other systems negative except as noted    Constitutional: no fever, no chills  Head: no trauma  Eyes: no vision changes, no eye pain  ENT:  no sore throat, no rhinorrhea  Cardiovascular:  no chest pain, no palpitation  Respiratory:  no SOB, no cough  GI:  no abd pain, no vomiting, no diarrhea  urinary: no dysuria, no hematuria, no flank pain  musculoskeletal:  no joint pain, no joint swelling  skin:  no rash  neurology:  no headache, no seizure, no change in mental status  psych: no anxiety, no depression         Allergies  No Known Allergies        ANTIMICROBIALS:  ampicillin/sulbactam  IVPB 3 every 6 hours  azithromycin   Tablet 250 daily        Physical Exam:  Vital Signs Last 24 Hrs  T(C): 36.4 (19 Jun 2025 08:00), Max: 37.1 (18 Jun 2025 23:17)  T(F): 97.5 (19 Jun 2025 08:00), Max: 98.7 (18 Jun 2025 23:17)  HR: 74 (19 Jun 2025 12:01) (65 - 87)  BP: 139/60 (19 Jun 2025 11:00) (84/74 - 150/64)  BP(mean): 84 (19 Jun 2025 11:00) (63 - 87)  RR: 25 (19 Jun 2025 11:00) (20 - 26)  SpO2: 99% (19 Jun 2025 12:01) (91% - 100%)    Parameters below as of 19 Jun 2025 11:52  Patient On (Oxygen Delivery Method): nasal cannula        06-18-25 @ 07:01  -  06-19-25 @ 07:00  --------------------------------------------------------  IN: 860 mL / OUT: 1100 mL / NET: -240 mL    06-19-25 @ 07:01  -  06-19-25 @ 12:34  --------------------------------------------------------  IN: 120 mL / OUT: 0 mL / NET: 120 mL      General:    NAD,  non toxic  Head: atraumatic, normocephalic  Eye: normal sclera and conjunctiva  ENT:    no oral lesions, neck supple  Cardio:     regular S1, S2,  no murmur  Respiratory:    clear b/l,    no wheezing  abd:     soft,   BS +,   no tenderness  :   no CVAT,  no suprapubic tenderness,   no  cordova  Musculoskeletal:   no joint swelling,   no edema  vascular: no central lines, +PIV   Skin:    no rash  Neurologic:     no focal deficit  psych: normal affect    WBC Count: 19.38 K/uL (06-19 @ 03:40)  WBC Count: 24.19 K/uL (06-18 @ 03:00)  WBC Count: 19.70 K/uL (06-17 @ 04:24)  WBC Count: 11.52 K/uL (06-15 @ 03:30)  WBC Count: 10.39 K/uL (06-14 @ 03:43)  WBC Count: 11.39 K/uL (06-13 @ 04:00)                            7.9    19.38 )-----------( 246      ( 19 Jun 2025 03:40 )             27.2       06-19    138  |  106  |  41[H]  ----------------------------<  106[H]  4.3   |  25  |  1.29    Ca    8.9      19 Jun 2025 03:40  Phos  3.4     06-19  Mg     2.2     06-19    TPro  6.1  /  Alb  2.2[L]  /  TBili  0.4  /  DBili  x   /  AST  9[L]  /  ALT  15  /  AlkPhos  67  06-19      Urinalysis Basic - ( 19 Jun 2025 03:40 )    Color: x / Appearance: x / SG: x / pH: x  Gluc: 106 mg/dL / Ketone: x  / Bili: x / Urobili: x   Blood: x / Protein: x / Nitrite: x   Leuk Esterase: x / RBC: x / WBC x   Sq Epi: x / Non Sq Epi: x / Bacteria: x          Creatinine Trend: 1.29<--, 1.58<--, 1.51<--, 1.66<--, 1.92<--, 1.70<--      MICROBIOLOGY:  Vancomycin Level, Random: 19.1 ug/mL (06-19-25 @ 03:40)  v                      Procalcitonin: 0.12 (06-19-25 @ 03:40)  Procalcitonin: 0.05 (06-15-25 @ 03:30)          RADIOLOGY:   [facility]  INFECTIOUS DISEASES   [address] [place] [address]  Tel: [phone]     Fax: [phone]  ==============================================================================  MD Himanshu Stewart, SEBASTIÁN Olivares M.D  ==============================================================================      MARKIE HERNANDEZ  [id number]  69y ([birth year])      Interval History:  Patient seen and examined today  in ICU.  sitting in chair  family member at bedside    patient denies any fever  states her right facial parotid area pain is less  she has been applying the warm compresses    still has cough but less       ROS:    [ ] Unobtainable because:  [ x] All other systems negative except as noted    Constitutional: no fever, no chills  Head: no trauma  Eyes: no vision changes, no eye pain  ENT:  no sore throat, no rhinorrhea  Cardiovascular:  no chest pain, no palpitation  Respiratory:  no SOB, + cough  GI:  no abd pain, no vomiting, no diarrhea  urinary: no dysuria, no hematuria, no flank pain  musculoskeletal:  no joint pain, no joint swelling  skin:  no rash  neurology:  no headache        Allergies  No Known Drug Allergies        ANTIMICROBIALS:  ampicillin/sulbactam  IVPB 3 every 6 hours  azithromycin   Tablet 250 daily        Physical Exam:  Vital Signs Last 24 Hrs  T(C): 36.4 (19 Jun 2025 08:00), Max: 37.1 (18 Jun 2025 23:17)  T(F): 97.5 (19 Jun 2025 08:00), Max: 98.7 (18 Jun 2025 23:17)  HR: 74 (19 Jun 2025 12:01) (65 - 87)  BP: 139/60 (19 Jun 2025 11:00) (84/74 - 150/64)  BP(mean): 84 (19 Jun 2025 11:00) (63 - 87)  RR: 25 (19 Jun 2025 11:00) (20 - 26)  SpO2: 99% (19 Jun 2025 12:01) (91% - 100%)    Parameters below as of 19 Jun 2025 11:52  Patient On (Oxygen Delivery Method): nasal cannula        06-18-25 @ 07:01  -  06-19-25 @ 07:00  --------------------------------------------------------  IN: 860 mL / OUT: 1100 mL / NET: -240 mL    06-19-25 @ 07:01  -  06-19-25 @ 12:34  --------------------------------------------------------  IN: 120 mL / OUT: 0 mL / NET: 120 mL      Head: atraumatic, normocephalic  Eyes: normal sclera and conjunctiva  ENT:   no oropharyngeal lesions,  right parotid gland region edematous but softer to touch today and less  tender to touch, no erythema, no discharge, no wounds, not warm to touch  Cardio:    regular S1,S2, no murmur  Respiratory:   coarse cough, No wheezing, no crackles, scattered rhonchi   abd:   soft, BS +, not tender, no distention  :     no CVAT, no suprapubic tenderness  Musculoskeletal : no joint swelling, no edema  Skin:    no rash  vascular:  normal pulses  Neurologic:     awake, alert, can follow commands and answer most questions  psych: normal affect      WBC Count: 19.38 K/uL (06-19 @ 03:40)  WBC Count: 24.19 K/uL (06-18 @ 03:00)  WBC Count: 19.70 K/uL (06-17 @ 04:24)  WBC Count: 11.52 K/uL (06-15 @ 03:30)  WBC Count: 10.39 K/uL (06-14 @ 03:43)  WBC Count: 11.39 K/uL (06-13 @ 04:00)                            7.9    19.38 )-----------( 246      ( 19 Jun 2025 03:40 )             27.2       06-19    138  |  106  |  41[H]  ----------------------------<  106[H]  4.3   |  25  |  1.29    Ca    8.9      19 Jun 2025 03:40  Phos  3.4     06-19  Mg     2.2     06-19    TPro  6.1  /  Alb  2.2[L]  /  TBili  0.4  /  DBili  x   /  AST  9[L]  /  ALT  15  /  AlkPhos  67  06-19      Urinalysis Basic - ( 19 Jun 2025 03:40 )    Color: x / Appearance: x / SG: x / pH: x  Gluc: 106 mg/dL / Ketone: x  / Bili: x / Urobili: x   Blood: x / Protein: x / Nitrite: x   Leuk Esterase: x / RBC: x / WBC x   Sq Epi: x / Non Sq Epi: x / Bacteria: x          Creatinine Trend: 1.29<--, 1.58<--, 1.51<--, 1.66<--, 1.92<--, 1.70<--      MICROBIOLOGY:  Vancomycin Level, Random: 19.1 ug/mL (06-19-25 @ 03:40)      Procalcitonin: 0.12 (06-19-25 @ 03:40)  Procalcitonin: 0.05 (06-15-25 @ 03:30)          RADIOLOGY:

## 2025-06-19 NOTE — PROGRESS NOTE ADULT - ASSESSMENT
Pt is a 68 yo F ex smoker (quit 2013, 1/2 PPD x10 years) with h/o PE (1/2024), COPD, chronic diastolic CHF (grade I diastolic dysfunction), Afib,  L MCA aneurysm, E. coli UTI 3/2025, liver cyst, abdominal wall hematoma 3/2025 presents from home for complaints of SOB with increased work of breathing. Found to have blood gas 7.11/57/82/18/96% placed on BiPAP. Labs with Na 121, K 5.8, HCO3: 17, Cr 1.38. Pt admitted to ICU (6/8/2025) for hypercarbic respiratory failure, COPD exacerbation. Transitioned from BIPAP to AVAPS and weaned off NIV. Given steroids and nebs for COPD exacerbation. CT chest without PE but noted secretions in airway with some mucus plugging LLL with background of severe emphysema. Pt downgraded to medical floor 6/10 and then later on found to be confused, lethargic, in respiratory distress with wheeze, chest pain and abdominal pain (noted to be in urinary retention on bladder sono 550cc urine); transferred back to the ICU.  ICU dx: 1) acute hypercarbic respiratory failure/ COPD exacerbation (has required frequent management on NIV)  2) NANCY with hyponatremia 3) urinary rentention. R face less swollen. Pt requiring less BiPAP     Resp/Social: This am breathing comfortably on nc O2; cont Steroids, DuoNebs, Singulair and Advair/ Place on NIV prn/ 6/15/2025 spoke to son who appears to agree with intubation if necessary and I explained once she is intubated later on depending on the course he would need to decide between extubation and palliative extubation if she was not doing well/ Son updated/ Pt is full code  ID: Cont Zithromax prophylaxis/ R parotiditis/R face cellulitis cont warm compresses and Abx as per ID  HEME: Cont Eliquis  FEN: Po diet when off NIV  Endo: Follow Glu  Renal: Follow BUN/Cr and UO; Cr decreased today  Pt to remain in the ICU still with tenuous resp status; possible transfer to [facility] in the next day or so

## 2025-06-19 NOTE — PROGRESS NOTE ADULT - ASSESSMENT
A/P-  69 year old woman with a PMHx of Asthma (on prn supp O2), COPD (prev smoker quit 20 years ago), HTN, HFpEF, CVA with L sided deficits (as per son), A Fib, PE (off Eliquis and Aspirin since March 2025 due to abdominal wall hematoma), and Liver mass (IR recommended for outpatient follow up w repeat imaging).     Pt presented to the ED with SOB that started the night prior. Per daughter in law, pt has been declining in her health, decreased appetite since her last hospital admission in March 2025. Has been bed bound last 3 years. In the ED, pt tripoding and was placed on BIPAP. Received nebs and steroids. Pt then developed episode of coffee ground vomiting and switch to high flow NC. Labs significant for wbc 11.8, hgb 8.6, Na 121, K: 5.8, Bicarb 20, Creat: 1.38, trop: 62, pH: 7.11, pCO2: 57.   Pt developed hypotension and received 1 L of IV fluids with some improvement. Admitted to the ICU for further management.    (08 Jun 2025 20:54)    hospital course-  Found to have blood gas 7.11/57/82/18/96% placed on BiPAP and was  admitted to ICU (6/8/2025) for hypercarbic respiratory failure, COPD exacerbation. Transitioned from BIPAP to AVAPS and weaned off NIV. Given steroids and nebs for COPD exacerbation. CT chest without PE but noted secretions in airway with some mucus plugging LLL with background of severe emphysema. Pt downgraded to medical floor 6/10 and then later on found to be confused, lethargic, in respiratory distress with wheeze, chest pain and abdominal pain (noted to be in urinary retention on bladder sono 550cc urine); transferred back to the ICU.     Afebrile  +leukocytosis 24 K ( on steroids)  no culture data available  MRSA PCR- detected    6/19-  Afebrile  Leukocytosis trending down  to 19k    Impression-  right parotitis  leukocytosis   copd exacerbation  bibasilar atelectasis  vs pneumonia  HTN  CVA  parotitis- could be from viral  or bacterial source or obstruction      plan-  trend wbc and temp  apply warm compress to the right parotid region  can continue with vanco that was initiated by ICU team, keep trough <15, -600 day #3  check mumps serology  can also continue the unasyn that was initiated by medicine team pending further results.  if no improvement advise to get CT of the right parotid/mastoid region  monitor for aspiration precautions   trend wbc (is on steroids)  rest of the medical management as per medicine/ICU  team.    All labs and imaging and chart notes reviewed.     All above discussed with patient and patient verbalizes full understanding of all above and agrees with above plan of care.    d/w medical team.        Pham Holguin MD  Infectious Disease Attending    for any questions please do not hesitate to contact me either via teams or by calling [phone]

## 2025-06-19 NOTE — PROGRESS NOTE ADULT - SUBJECTIVE AND OBJECTIVE BOX
HPI:  Pt is a 68 yo F ex smoker (quit , 1/2 PPD x10 years) with h/o PE (2024), COPD, chronic diastolic CHF (grade I diastolic dysfunction), Afib,  L MCA aneurysm, E. coli UTI 3/2025, liver cyst, abdominal wall hematoma 3/2025 presents from home for complaints of SOB with increased work of breathing. Found to have blood gas 7.11/57/82/18/96% placed on BiPAP. Labs with Na 121, K 5.8, HCO3: 17, Cr 1.38. Pt admitted to ICU (2025) for hypercarbic respiratory failure, COPD exacerbation. Transitioned from BIPAP to AVAPS and weaned off NIV. Given steroids and nebs for COPD exacerbation. CT chest without PE but noted secretions in airway with some mucus plugging LLL with background of severe emphysema. Pt downgraded to medical floor 6/10 and then later on found to be confused, lethargic, in respiratory distress with wheeze, chest pain and abdominal pain (noted to be in urinary retention on bladder sono 550cc urine); transferred back to the ICU.  ICU dx: 1) acute hypercarbic respiratory failure/ COPD exacerbation (has required frequent management on NIV)  2) NANCY with hyponatremia 3) urinary rentention. R face less swollen. Pt requiring less BiPAP     ## Labs:  CBC:                        7.9    19.38 )-----------( 246      ( 2025 03:40 )             27.2     Chem:  -    138  |  106  |  41[H]  ----------------------------<  106[H]  4.3   |  25  |  1.29    Ca    8.9      2025 03:40  Phos  3.4     -  Mg     2.2     -    TPro  6.1  /  Alb  2.2[L]  /  TBili  0.4  /  DBili  x   /  AST  9[L]  /  ALT  15  /  AlkPhos  67      Coags:          ## Imaging:    ## Medications:  ampicillin/sulbactam  IVPB 3 Gram(s) IV Intermittent every 6 hours  azithromycin   Tablet 250 milliGRAM(s) Oral daily    amLODIPine   Tablet 10 milliGRAM(s) Oral daily  atenolol  Tablet 50 milliGRAM(s) Oral daily    albuterol/ipratropium for Nebulization 3 milliLiter(s) Nebulizer every 6 hours  albuterol/ipratropium for Nebulization 3 milliLiter(s) Nebulizer every 6 hours PRN  fluticasone propionate/ salmeterol 500-50 MICROgram(s) Diskus 1 Dose(s) Inhalation two times a day  guaiFENesin Oral Liquid (Sugar-Free) 200 milliGRAM(s) Oral every 6 hours PRN  montelukast 10 milliGRAM(s) Oral daily    methylPREDNISolone sodium succinate Injectable 20 milliGRAM(s) IV Push daily  methylPREDNISolone sodium succinate Injectable   IV Push     apixaban 2.5 milliGRAM(s) Oral every 12 hours    aluminum hydroxide/magnesium hydroxide/simethicone Suspension 30 milliLiter(s) Oral every 4 hours PRN  pantoprazole    Tablet 40 milliGRAM(s) Oral before breakfast    acetaminophen     Tablet .. 650 milliGRAM(s) Oral every 6 hours PRN  melatonin 3 milliGRAM(s) Oral at bedtime      ## Vitals:  T(C): 36.6 (25 @ 20:00), Max: 37.1 (25 @ 23:17)  HR: 76 (25 @ 20:00) (70 - 87)  BP: 123/57 (25 @ 20:00) (112/46 - 150/64)  BP(mean): 78 (25 @ 20:00) (58 - 87)  RR: 25 (25 @ 20:00) (22 - 30)  SpO2: 98% (25 @ 20:00) (91% - 100%)  Wt(kg): --  Vent:   AB-18 @ 07:01  -   @ 07:00  --------------------------------------------------------  IN: 860 mL / OUT: 1100 mL / NET: -240 mL     @ 07:01  -   @ 22:11  --------------------------------------------------------  IN: 120 mL / OUT: 400 mL / NET: -280 mL          ## P/E:  Gen: sitting comfortably in chair in no apparent distress  Face: (+) nc 02/ R parotid still swollen and R face swelling gone  Lungs: CTA  Heart: RRR   Abd: Soft/+BS/ Non-tender  Ext: No edema  Neuro: AAo x3    CENTRAL LINE: [ ] YES [ ] NO  LOCATION:   DATE INSERTED:  REMOVE: [ ] YES [ ] NO      [place]: [ ] YES [ ] NO    DATE INSERTED:  REMOVE:  [ ] YES [ ] NO      A-LINE:  [ ] YES [ ] NO  LOCATION:   DATE INSERTED:  REMOVE:  [ ] YES [ ] NO  EXPLAIN:    CODE STATUS: [x ] full code  [ ] DNR  [ ] DNI  [ ] MOLST  Goals of care discussion: [x ] yes

## 2025-06-20 LAB
ALBUMIN SERPL ELPH-MCNC: 2 G/DL — LOW (ref 3.3–5)
ALP SERPL-CCNC: 58 U/L — SIGNIFICANT CHANGE UP (ref 40–120)
ALT FLD-CCNC: 15 U/L — SIGNIFICANT CHANGE UP (ref 12–78)
ANION GAP SERPL CALC-SCNC: 6 MMOL/L — SIGNIFICANT CHANGE UP (ref 5–17)
AST SERPL-CCNC: 13 U/L — LOW (ref 15–37)
BASOPHILS # BLD AUTO: 0.01 K/UL — SIGNIFICANT CHANGE UP (ref 0–0.2)
BASOPHILS NFR BLD AUTO: 0.1 % — SIGNIFICANT CHANGE UP (ref 0–2)
BILIRUB SERPL-MCNC: 0.3 MG/DL — SIGNIFICANT CHANGE UP (ref 0.2–1.2)
BUN SERPL-MCNC: 33 MG/DL — HIGH (ref 7–23)
CALCIUM SERPL-MCNC: 8.5 MG/DL — SIGNIFICANT CHANGE UP (ref 8.5–10.1)
CHLORIDE SERPL-SCNC: 107 MMOL/L — SIGNIFICANT CHANGE UP (ref 96–108)
CO2 SERPL-SCNC: 25 MMOL/L — SIGNIFICANT CHANGE UP (ref 22–31)
CREAT SERPL-MCNC: 1.24 MG/DL — SIGNIFICANT CHANGE UP (ref 0.5–1.3)
EGFR: 47 ML/MIN/1.73M2 — LOW
EGFR: 47 ML/MIN/1.73M2 — LOW
EOSINOPHIL # BLD AUTO: 0.01 K/UL — SIGNIFICANT CHANGE UP (ref 0–0.5)
EOSINOPHIL NFR BLD AUTO: 0.1 % — SIGNIFICANT CHANGE UP (ref 0–6)
GLUCOSE SERPL-MCNC: 99 MG/DL — SIGNIFICANT CHANGE UP (ref 70–99)
HCT VFR BLD CALC: 24.6 % — LOW (ref 34.5–45)
HGB BLD-MCNC: 7.1 G/DL — LOW (ref 11.5–15.5)
IMM GRANULOCYTES NFR BLD AUTO: 1.1 % — HIGH (ref 0–0.9)
LYMPHOCYTES # BLD AUTO: 1.83 K/UL — SIGNIFICANT CHANGE UP (ref 1–3.3)
LYMPHOCYTES # BLD AUTO: 16 % — SIGNIFICANT CHANGE UP (ref 13–44)
MAGNESIUM SERPL-MCNC: 2.3 MG/DL — SIGNIFICANT CHANGE UP (ref 1.6–2.6)
MCHC RBC-ENTMCNC: 23.4 PG — LOW (ref 27–34)
MCHC RBC-ENTMCNC: 28.9 G/DL — LOW (ref 32–36)
MCV RBC AUTO: 80.9 FL — SIGNIFICANT CHANGE UP (ref 80–100)
MONOCYTES # BLD AUTO: 0.62 K/UL — SIGNIFICANT CHANGE UP (ref 0–0.9)
MONOCYTES NFR BLD AUTO: 5.4 % — SIGNIFICANT CHANGE UP (ref 2–14)
NEUTROPHILS # BLD AUTO: 8.82 K/UL — HIGH (ref 1.8–7.4)
NEUTROPHILS NFR BLD AUTO: 77.3 % — HIGH (ref 43–77)
NRBC BLD AUTO-RTO: 0 /100 WBCS — SIGNIFICANT CHANGE UP (ref 0–0)
PHOSPHATE SERPL-MCNC: 4.1 MG/DL — SIGNIFICANT CHANGE UP (ref 2.5–4.5)
PLATELET # BLD AUTO: 230 K/UL — SIGNIFICANT CHANGE UP (ref 150–400)
POTASSIUM SERPL-MCNC: 4.5 MMOL/L — SIGNIFICANT CHANGE UP (ref 3.5–5.3)
POTASSIUM SERPL-SCNC: 4.5 MMOL/L — SIGNIFICANT CHANGE UP (ref 3.5–5.3)
PROCALCITONIN SERPL-MCNC: 0.08 NG/ML — SIGNIFICANT CHANGE UP (ref 0.02–0.1)
PROT SERPL-MCNC: 5.5 GM/DL — LOW (ref 6–8.3)
RBC # BLD: 3.04 M/UL — LOW (ref 3.8–5.2)
RBC # FLD: 19 % — HIGH (ref 10.3–14.5)
SODIUM SERPL-SCNC: 138 MMOL/L — SIGNIFICANT CHANGE UP (ref 135–145)
VANCOMYCIN FLD-MCNC: 11.6 UG/ML — SIGNIFICANT CHANGE UP
WBC # BLD: 11.41 K/UL — HIGH (ref 3.8–10.5)
WBC # FLD AUTO: 11.41 K/UL — HIGH (ref 3.8–10.5)

## 2025-06-20 PROCEDURE — 99232 SBSQ HOSP IP/OBS MODERATE 35: CPT

## 2025-06-20 PROCEDURE — 99233 SBSQ HOSP IP/OBS HIGH 50: CPT

## 2025-06-20 PROCEDURE — G0545: CPT

## 2025-06-20 RX ORDER — POLYETHYLENE GLYCOL 3350 17 G/17G
17 POWDER, FOR SOLUTION ORAL EVERY 24 HOURS
Refills: 0 | Status: DISCONTINUED | OUTPATIENT
Start: 2025-06-20 | End: 2025-06-26

## 2025-06-20 RX ORDER — SENNA 187 MG
2 TABLET ORAL AT BEDTIME
Refills: 0 | Status: DISCONTINUED | OUTPATIENT
Start: 2025-06-20 | End: 2025-06-26

## 2025-06-20 RX ORDER — TIOTROPIUM BROMIDE INHALATION SPRAY 3.12 UG/1
2 SPRAY, METERED RESPIRATORY (INHALATION) DAILY
Refills: 0 | Status: DISCONTINUED | OUTPATIENT
Start: 2025-06-20 | End: 2025-06-24

## 2025-06-20 RX ORDER — VANCOMYCIN HCL IN 5 % DEXTROSE 1.5G/250ML
500 PLASTIC BAG, INJECTION (ML) INTRAVENOUS EVERY 24 HOURS
Refills: 0 | Status: DISCONTINUED | OUTPATIENT
Start: 2025-06-20 | End: 2025-06-22

## 2025-06-20 RX ADMIN — Medication 100 MILLIGRAM(S): at 14:11

## 2025-06-20 RX ADMIN — METHYLPREDNISOLONE ACETATE 20 MILLIGRAM(S): 80 INJECTION, SUSPENSION INTRA-ARTICULAR; INTRALESIONAL; INTRAMUSCULAR; SOFT TISSUE at 05:24

## 2025-06-20 RX ADMIN — IPRATROPIUM BROMIDE AND ALBUTEROL SULFATE 3 MILLILITER(S): .5; 2.5 SOLUTION RESPIRATORY (INHALATION) at 17:12

## 2025-06-20 RX ADMIN — IPRATROPIUM BROMIDE AND ALBUTEROL SULFATE 3 MILLILITER(S): .5; 2.5 SOLUTION RESPIRATORY (INHALATION) at 05:18

## 2025-06-20 RX ADMIN — AMPICILLIN SODIUM AND SULBACTAM SODIUM 200 GRAM(S): 1; .5 INJECTION, POWDER, FOR SOLUTION INTRAMUSCULAR; INTRAVENOUS at 17:37

## 2025-06-20 RX ADMIN — TIOTROPIUM BROMIDE INHALATION SPRAY 2 PUFF(S): 3.12 SPRAY, METERED RESPIRATORY (INHALATION) at 14:12

## 2025-06-20 RX ADMIN — Medication 1 LOZENGE: at 17:37

## 2025-06-20 RX ADMIN — Medication 3 MILLIGRAM(S): at 21:26

## 2025-06-20 RX ADMIN — MONTELUKAST SODIUM 10 MILLIGRAM(S): 10 TABLET ORAL at 11:43

## 2025-06-20 RX ADMIN — POLYETHYLENE GLYCOL 3350 17 GRAM(S): 17 POWDER, FOR SOLUTION ORAL at 14:12

## 2025-06-20 RX ADMIN — APIXABAN 2.5 MILLIGRAM(S): 2.5 TABLET, FILM COATED ORAL at 21:31

## 2025-06-20 RX ADMIN — Medication 250 MILLIGRAM(S): at 11:43

## 2025-06-20 RX ADMIN — Medication 1 DOSE(S): at 05:26

## 2025-06-20 RX ADMIN — Medication 2 TABLET(S): at 21:26

## 2025-06-20 RX ADMIN — AMPICILLIN SODIUM AND SULBACTAM SODIUM 200 GRAM(S): 1; .5 INJECTION, POWDER, FOR SOLUTION INTRAMUSCULAR; INTRAVENOUS at 11:43

## 2025-06-20 RX ADMIN — IPRATROPIUM BROMIDE AND ALBUTEROL SULFATE 3 MILLILITER(S): .5; 2.5 SOLUTION RESPIRATORY (INHALATION) at 11:12

## 2025-06-20 RX ADMIN — AMLODIPINE BESYLATE 10 MILLIGRAM(S): 10 TABLET ORAL at 05:22

## 2025-06-20 RX ADMIN — Medication 1 APPLICATION(S): at 04:23

## 2025-06-20 RX ADMIN — AMPICILLIN SODIUM AND SULBACTAM SODIUM 200 GRAM(S): 1; .5 INJECTION, POWDER, FOR SOLUTION INTRAMUSCULAR; INTRAVENOUS at 05:25

## 2025-06-20 RX ADMIN — APIXABAN 2.5 MILLIGRAM(S): 2.5 TABLET, FILM COATED ORAL at 11:43

## 2025-06-20 RX ADMIN — LISINOPRIL 50 MILLIGRAM(S): 30 TABLET ORAL at 05:22

## 2025-06-20 RX ADMIN — Medication 40 MILLIGRAM(S): at 05:25

## 2025-06-20 NOTE — PROGRESS NOTE ADULT - SUBJECTIVE AND OBJECTIVE BOX
CHIEF COMPLAINT:    Interval Events:    REVIEW OF SYSTEMS:  Constitutional: [ ] fevers [ ] chills [ ] weight loss [ ] weight gain  HEENT: [ ] dry eyes [ ] eye irritation [ ] postnasal drip [ ] nasal congestion  CV: [ ] chest pain [ ] orthopnea [ ] palpitations [ ] murmur  Resp: [ ] cough [ ] shortness of breath [ ] dyspnea [ ] wheezing [ ] sputum [ ] hemoptysis  GI: [ ] nausea [ ] vomiting [ ] diarrhea [ ] constipation [ ] abd pain [ ] dysphagia   : [ ] dysuria [ ] nocturia [ ] hematuria [ ] increased urinary frequency  Musculoskeletal: [ ] back pain [ ] myalgias [ ] arthralgias [ ] fracture  Skin: [ ] rash [ ] itch  Neurological: [ ] headache [ ] dizziness [ ] syncope [ ] weakness [ ] numbness  Hematologic/Lymphatic: [ ] anemia [ ] bleeding problem  Allergic/Immunologic: [ ] itchy eyes [ ] nasal discharge [ ] hives [ ] angioedema  [ ] All other systems negative  [ ] Unable to assess ROS because ________    OBJECTIVE:  ICU Vital Signs Last 24 Hrs  T(C): 36.3 (20 Jun 2025 05:00), Max: 36.6 (19 Jun 2025 20:00)  T(F): 97.3 (20 Jun 2025 05:00), Max: 97.8 (19 Jun 2025 20:00)  HR: 66 (20 Jun 2025 07:00) (65 - 94)  BP: 120/53 (20 Jun 2025 07:00) (76/57 - 150/64)  BP(mean): 74 (20 Jun 2025 07:00) (58 - 87)  ABP: --  ABP(mean): --  RR: 19 (20 Jun 2025 07:00) (19 - 30)  SpO2: 99% (20 Jun 2025 07:00) (93% - 100%)    O2 Parameters below as of 20 Jun 2025 06:00  Patient On (Oxygen Delivery Method): nasal cannula  O2 Flow (L/min): 2            06-19 @ 07:01  -  06-20 @ 07:00  --------------------------------------------------------  IN: 220 mL / OUT: 1000 mL / NET: -780 mL      CAPILLARY BLOOD GLUCOSE      POCT Blood Glucose.: 186 mg/dL (18 Jun 2025 11:39)      PHYSICAL EXAM:  General:   HEENT:   Neck:   Respiratory:   Cardiovascular:   Abdomen:   Extremities:   Skin:   Neurological:  Psychiatry:    LINES:    HOSPITAL MEDICATIONS:  MEDICATIONS  (STANDING):  albuterol/ipratropium for Nebulization 3 milliLiter(s) Nebulizer every 6 hours  amLODIPine   Tablet 10 milliGRAM(s) Oral daily  ampicillin/sulbactam  IVPB 3 Gram(s) IV Intermittent every 6 hours  apixaban 2.5 milliGRAM(s) Oral every 12 hours  atenolol  Tablet 50 milliGRAM(s) Oral daily  azithromycin   Tablet 250 milliGRAM(s) Oral daily  chlorhexidine 2% Cloths 1 Application(s) Topical <User Schedule>  fluticasone propionate/ salmeterol 500-50 MICROgram(s) Diskus 1 Dose(s) Inhalation two times a day  melatonin 3 milliGRAM(s) Oral at bedtime  montelukast 10 milliGRAM(s) Oral daily  pantoprazole    Tablet 40 milliGRAM(s) Oral before breakfast    MEDICATIONS  (PRN):  acetaminophen     Tablet .. 650 milliGRAM(s) Oral every 6 hours PRN Temp greater or equal to 38C (100.4F), Mild Pain (1 - 3)  albuterol/ipratropium for Nebulization 3 milliLiter(s) Nebulizer every 6 hours PRN Shortness of Breath and/or Wheezing  aluminum hydroxide/magnesium hydroxide/simethicone Suspension 30 milliLiter(s) Oral every 4 hours PRN Dyspepsia  guaiFENesin Oral Liquid (Sugar-Free) 200 milliGRAM(s) Oral every 6 hours PRN Cough      LABS:                        7.1    11.41 )-----------( 230      ( 20 Jun 2025 02:20 )             24.6     Hgb Trend: 7.1<--, 7.9<--, 8.0<--, 10.1<--, 9.5<--  06-20    138  |  107  |  33[H]  ----------------------------<  99  4.5   |  25  |  1.24    Ca    8.5      20 Jun 2025 02:20  Phos  4.1     06-20  Mg     2.3     06-20    TPro  5.5[L]  /  Alb  2.0[L]  /  TBili  0.3  /  DBili  x   /  AST  13[L]  /  ALT  15  /  AlkPhos  58  06-20      Urinalysis Basic - ( 20 Jun 2025 02:20 )    Color: x / Appearance: x / SG: x / pH: x  Gluc: 99 mg/dL / Ketone: x  / Bili: x / Urobili: x   Blood: x / Protein: x / Nitrite: x   Leuk Esterase: x / RBC: x / WBC x   Sq Epi: x / Non Sq Epi: x / Bacteria: x            MICROBIOLOGY:     RADIOLOGY:  [ ] Reviewed and interpreted by me CHIEF COMPLAINT:    Interval Events:  has not used BiPAP for the last 2 days  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 to sit in a chair she was somewhat anxious/SOB    REVIEW OF SYSTEMS:  Constitutional: [- ] fevers [ -] chills [ ] weight loss [ ] weight gain  HEENT: [ ] dry eyes [ ] eye irritation [ ] postnasal drip [ ] nasal congestion  CV: [- ] chest pain [- ] orthopnea [- ] palpitations [ ] murmur  Resp: [- ] cough [- ] shortness of breath [- ] dyspnea [ -] wheezing [- ] sputum [ ] hemoptysis  GI: [ -] nausea [- ] vomiting [- ] diarrhea [- ] constipation [ -] abd pain [ ] dysphagia   : [ ] dysuria [ ] nocturia [ ] hematuria [ ] increased urinary frequency  Musculoskeletal: [- ] back pain [- ] myalgias [- ] arthralgias [ ] fracture  Skin: [ ] rash [ ] itch  Neurological: [- ] headache [ -] dizziness [ -] syncope [- ] weakness [ ] numbness  Hematologic/Lymphatic: [ ] anemia [ ] bleeding problem  Allergic/Immunologic: [ ] itchy eyes [ ] nasal discharge [ ] hives [ ] angioedema  [x ] All other systems negative    OBJECTIVE:  ICU Vital Signs Last 24 Hrs  T(C): 36.3 (20 Jun 2025 05:00), Max: 36.6 (19 Jun 2025 20:00)  T(F): 97.3 (20 Jun 2025 05:00), Max: 97.8 (19 Jun 2025 20:00)  HR: 66 (20 Jun 2025 07:00) (65 - 94)  BP: 120/53 (20 Jun 2025 07:00) (76/57 - 150/64)  BP(mean): 74 (20 Jun 2025 07:00) (58 - 87)  ABP: --  ABP(mean): --  RR: 19 (20 Jun 2025 07:00) (19 - 30)  SpO2: 99% (20 Jun 2025 07:00) (93% - 100%)    O2 Parameters below as of 20 Jun 2025 06:00  Patient On (Oxygen Delivery Method): nasal cannula  O2 Flow (L/min): 2            06-19 @ 07:01  -  06-20 @ 07:00  --------------------------------------------------------  IN: 220 mL / OUT: 1000 mL / NET: -780 mL      CAPILLARY BLOOD GLUCOSE      POCT Blood Glucose.: 186 mg/dL (18 Jun 2025 11:39)      PHYSICAL EXAM:  General: NAD, non toxic appearing  HEENT: dry MM, EOMI,  Neck:   Respiratory:   Cardiovascular:   Abdomen:   Extremities:   Skin:   Neurological:  Psychiatry:    LINES:    HOSPITAL MEDICATIONS:  MEDICATIONS  (STANDING):  albuterol/ipratropium for Nebulization 3 milliLiter(s) Nebulizer every 6 hours  amLODIPine   Tablet 10 milliGRAM(s) Oral daily  ampicillin/sulbactam  IVPB 3 Gram(s) IV Intermittent every 6 hours  apixaban 2.5 milliGRAM(s) Oral every 12 hours  atenolol  Tablet 50 milliGRAM(s) Oral daily  azithromycin   Tablet 250 milliGRAM(s) Oral daily  chlorhexidine 2% Cloths 1 Application(s) Topical <User Schedule>  fluticasone propionate/ salmeterol 500-50 MICROgram(s) Diskus 1 Dose(s) Inhalation two times a day  melatonin 3 milliGRAM(s) Oral at bedtime  montelukast 10 milliGRAM(s) Oral daily  pantoprazole    Tablet 40 milliGRAM(s) Oral before breakfast    MEDICATIONS  (PRN):  acetaminophen     Tablet .. 650 milliGRAM(s) Oral every 6 hours PRN Temp greater or equal to 38C (100.4F), Mild Pain (1 - 3)  albuterol/ipratropium for Nebulization 3 milliLiter(s) Nebulizer every 6 hours PRN Shortness of Breath and/or Wheezing  aluminum hydroxide/magnesium hydroxide/simethicone Suspension 30 milliLiter(s) Oral every 4 hours PRN Dyspepsia  guaiFENesin Oral Liquid (Sugar-Free) 200 milliGRAM(s) Oral every 6 hours PRN Cough      LABS:                        7.1    11.41 )-----------( 230      ( 20 Jun 2025 02:20 )             24.6     Hgb Trend: 7.1<--, 7.9<--, 8.0<--, 10.1<--, 9.5<--  06-20    138  |  107  |  33[H]  ----------------------------<  99  4.5   |  25  |  1.24    Ca    8.5      20 Jun 2025 02:20  Phos  4.1     06-20  Mg     2.3     06-20    TPro  5.5[L]  /  Alb  2.0[L]  /  TBili  0.3  /  DBili  x   /  AST  13[L]  /  ALT  15  /  AlkPhos  58  06-20      Urinalysis Basic - ( 20 Jun 2025 02:20 )    Color: x / Appearance: x / SG: x / pH: x  Gluc: 99 mg/dL / Ketone: x  / Bili: x / Urobili: x   Blood: x / Protein: x / Nitrite: x   Leuk Esterase: x / RBC: x / WBC x   Sq Epi: x / Non Sq Epi: x / Bacteria: x            MICROBIOLOGY:     RADIOLOGY:  [ ] Reviewed and interpreted by me CHIEF COMPLAINT:    Interval Events:  has not used BiPAP for the last 2 days  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 to sit in a chair she was somewhat anxious/SOB    REVIEW OF SYSTEMS:  Constitutional: [- ] fevers [ -] chills [ ] weight loss [ ] weight gain  HEENT: [ ] dry eyes [ ] eye irritation [ ] postnasal drip [ ] nasal congestion  CV: [- ] chest pain [- ] orthopnea [- ] palpitations [ ] murmur  Resp: [- ] cough [- ] shortness of breath [- ] dyspnea [ -] wheezing [- ] sputum [ ] hemoptysis  GI: [ -] nausea [- ] vomiting [- ] diarrhea [- ] constipation [ -] abd pain [ ] dysphagia   : [ ] dysuria [ ] nocturia [ ] hematuria [ ] increased urinary frequency  Musculoskeletal: [- ] back pain [- ] myalgias [- ] arthralgias [ ] fracture  Skin: [ ] rash [ ] itch  Neurological: [- ] headache [ -] dizziness [ -] syncope [- ] weakness [ ] numbness  Hematologic/Lymphatic: [ ] anemia [ ] bleeding problem  Allergic/Immunologic: [ ] itchy eyes [ ] nasal discharge [ ] hives [ ] angioedema  [x ] All other systems negative    OBJECTIVE:  ICU Vital Signs Last 24 Hrs  T(C): 36.3 (20 Jun 2025 05:00), Max: 36.6 (19 Jun 2025 20:00)  T(F): 97.3 (20 Jun 2025 05:00), Max: 97.8 (19 Jun 2025 20:00)  HR: 66 (20 Jun 2025 07:00) (65 - 94)  BP: 120/53 (20 Jun 2025 07:00) (76/57 - 150/64)  BP(mean): 74 (20 Jun 2025 07:00) (58 - 87)  ABP: --  ABP(mean): --  RR: 19 (20 Jun 2025 07:00) (19 - 30)  SpO2: 99% (20 Jun 2025 07:00) (93% - 100%)    O2 Parameters below as of 20 Jun 2025 06:00  Patient On (Oxygen Delivery Method): nasal cannula  O2 Flow (L/min): 2            06-19 @ 07:01  -  06-20 @ 07:00  --------------------------------------------------------  IN: 220 mL / OUT: 1000 mL / NET: -780 mL      CAPILLARY BLOOD GLUCOSE      POCT Blood Glucose.: 186 mg/dL (18 Jun 2025 11:39)      PHYSICAL EXAM:  General: NAD, non toxic appearing  HEENT: dry MM, EOMI, swelling/hardness over R parotid  Neck: supple  Respiratory: CTA b/l  Cardiovascular: s1s2 RRR  Abdomen: soft, non tender, non distended  Extremities: warm, no edema or clubbing  Skin: intact  Neurological: no focal deficits   Psychiatry: Matheus chan     LINES:  [facility] MEDICATIONS:  MEDICATIONS  (STANDING):  albuterol/ipratropium for Nebulization 3 milliLiter(s) Nebulizer every 6 hours  amLODIPine   Tablet 10 milliGRAM(s) Oral daily  ampicillin/sulbactam  IVPB 3 Gram(s) IV Intermittent every 6 hours  apixaban 2.5 milliGRAM(s) Oral every 12 hours  atenolol  Tablet 50 milliGRAM(s) Oral daily  azithromycin   Tablet 250 milliGRAM(s) Oral daily  chlorhexidine 2% Cloths 1 Application(s) Topical <User Schedule>  fluticasone propionate/ salmeterol 500-50 MICROgram(s) Diskus 1 Dose(s) Inhalation two times a day  melatonin 3 milliGRAM(s) Oral at bedtime  montelukast 10 milliGRAM(s) Oral daily  pantoprazole    Tablet 40 milliGRAM(s) Oral before breakfast    MEDICATIONS  (PRN):  acetaminophen     Tablet .. 650 milliGRAM(s) Oral every 6 hours PRN Temp greater or equal to 38C (100.4F), Mild Pain (1 - 3)  albuterol/ipratropium for Nebulization 3 milliLiter(s) Nebulizer every 6 hours PRN Shortness of Breath and/or Wheezing  aluminum hydroxide/magnesium hydroxide/simethicone Suspension 30 milliLiter(s) Oral every 4 hours PRN Dyspepsia  guaiFENesin Oral Liquid (Sugar-Free) 200 milliGRAM(s) Oral every 6 hours PRN Cough      LABS:                        7.1    11.41 )-----------( 230      ( 20 Jun 2025 02:20 )             24.6     Hgb Trend: 7.1<--, 7.9<--, 8.0<--, 10.1<--, 9.5<--  06-20    138  |  107  |  33[H]  ----------------------------<  99  4.5   |  25  |  1.24    Ca    8.5      20 Jun 2025 02:20  Phos  4.1     06-20  Mg     2.3     06-20    TPro  5.5[L]  /  Alb  2.0[L]  /  TBili  0.3  /  DBili  x   /  AST  13[L]  /  ALT  15  /  AlkPhos  58  06-20      Urinalysis Basic - ( 20 Jun 2025 02:20 )    Color: x / Appearance: x / SG: x / pH: x  Gluc: 99 mg/dL / Ketone: x  / Bili: x / Urobili: x   Blood: x / Protein: x / Nitrite: x   Leuk Esterase: x / RBC: x / WBC x   Sq Epi: x / Non Sq Epi: x / Bacteria: x            MICROBIOLOGY:     RADIOLOGY:  [ ] Reviewed and interpreted by me

## 2025-06-20 NOTE — PROGRESS NOTE ADULT - SUBJECTIVE AND OBJECTIVE BOX
[facility]  INFECTIOUS DISEASES   [address] [place] [address]  Tel: [phone]     Fax: [phone]  ==============================================================================  MD Himanshu Stewart, DO Amisha Reza, SEBASTIÁN Merchant M.D  ==============================================================================      MARKIE HERNANDEZ  [id number]  69y ([birth year])      Interval History:    ROS:    [ ] Unobtainable because:  [ ] All other systems negative except as noted    Constitutional: no fever, no chills  Head: no trauma  Eyes: no vision changes, no eye pain  ENT:  no sore throat, no rhinorrhea  Cardiovascular:  no chest pain, no palpitation  Respiratory:  no SOB, no cough  GI:  no abd pain, no vomiting, no diarrhea  urinary: no dysuria, no hematuria, no flank pain  musculoskeletal:  no joint pain, no joint swelling  skin:  no rash  neurology:  no headache, no seizure, no change in mental status  psych: no anxiety, no depression         Allergies  No Known Allergies        ANTIMICROBIALS:  ampicillin/sulbactam  IVPB 3 every 6 hours  azithromycin   Tablet 250 daily        Physical Exam:  Vital Signs Last 24 Hrs  T(C): 36.1 (20 Jun 2025 08:15), Max: 36.6 (19 Jun 2025 20:00)  T(F): 97 (20 Jun 2025 08:15), Max: 97.8 (19 Jun 2025 20:00)  HR: 79 (20 Jun 2025 09:00) (65 - 94)  BP: 124/55 (20 Jun 2025 09:00) (76/57 - 139/60)  BP(mean): 76 (20 Jun 2025 09:00) (58 - 84)  RR: 25 (20 Jun 2025 09:00) (18 - 30)  SpO2: 97% (20 Jun 2025 09:00) (93% - 100%)    Parameters below as of 20 Jun 2025 07:15  Patient On (Oxygen Delivery Method): nasal cannula        06-19-25 @ 07:01  -  06-20-25 @ 07:00  --------------------------------------------------------  IN: 220 mL / OUT: 1000 mL / NET: -780 mL      General:    NAD,  non toxic  Head: atraumatic, normocephalic  Eye: normal sclera and conjunctiva  ENT:    no oral lesions, neck supple  Cardio:     regular S1, S2,  no murmur  Respiratory:    clear b/l,    no wheezing  abd:     soft,   BS +,   no tenderness  :   no CVAT,  no suprapubic tenderness,   no  cordova  Musculoskeletal:   no joint swelling,   no edema  vascular: no central lines, +PIV   Skin:    no rash  Neurologic:     no focal deficit  psych: normal affect    WBC Count: 11.41 K/uL (06-20 @ 02:20)  WBC Count: 19.38 K/uL (06-19 @ 03:40)  WBC Count: 24.19 K/uL (06-18 @ 03:00)  WBC Count: 19.70 K/uL (06-17 @ 04:24)  WBC Count: 11.52 K/uL (06-15 @ 03:30)  WBC Count: 10.39 K/uL (06-14 @ 03:43)                            7.1    11.41 )-----------( 230      ( 20 Jun 2025 02:20 )             24.6       06-20    138  |  107  |  33[H]  ----------------------------<  99  4.5   |  25  |  1.24    Ca    8.5      20 Jun 2025 02:20  Phos  4.1     06-20  Mg     2.3     06-20    TPro  5.5[L]  /  Alb  2.0[L]  /  TBili  0.3  /  DBili  x   /  AST  13[L]  /  ALT  15  /  AlkPhos  58  06-20      Urinalysis Basic - ( 20 Jun 2025 02:20 )    Color: x / Appearance: x / SG: x / pH: x  Gluc: 99 mg/dL / Ketone: x  / Bili: x / Urobili: x   Blood: x / Protein: x / Nitrite: x   Leuk Esterase: x / RBC: x / WBC x   Sq Epi: x / Non Sq Epi: x / Bacteria: x          Creatinine Trend: 1.24<--, 1.29<--, 1.58<--, 1.51<--, 1.66<--, 1.92<--      MICROBIOLOGY:  Vancomycin Level, Random: 11.6 ug/mL (06-20-25 @ 02:20)  v                      Procalcitonin: 0.08 (06-20-25 @ 02:20)  Procalcitonin: 0.12 (06-19-25 @ 03:40)  Procalcitonin: 0.05 (06-15-25 @ 03:30)          RADIOLOGY:   [facility]  INFECTIOUS DISEASES   [address] [place] [address]  Tel: [phone]     Fax: [phone]  ==============================================================================  MD Himanshu Stewart, DO Amisha Reza, SEBASTIÁN Merchant M.D  ==============================================================================      MARKIE HERNANDEZ  [id number]  69y ([birth year])      Interval History:    Patient seen and examined in ICU.  awake, alert, sitting in chair    denies any fever  denies any sib  has cough but less    ROS:    [ ] Unobtainable because:  [x ] All other systems negative except as noted    Constitutional: no fever, no chills  Head: no trauma  Eyes: no vision changes, no eye pain  ENT:  no sore throat, no rhinorrhea, right parotid region swelling and mild pain  Cardiovascular:  no chest pain, no palpitation  Respiratory:  no SOB, less cough  GI:  no abd pain, no vomiting, no diarrhea  urinary: no dysuria, no hematuria, no flank pain  musculoskeletal:  no joint pain, no joint swelling  skin:  no rash  neurology:  no headache    Allergies  No Known Drug Allergies        ANTIMICROBIALS:  ampicillin/sulbactam  IVPB 3 every 6 hours  azithromycin   Tablet 250 daily        Physical Exam:  Vital Signs Last 24 Hrs  T(C): 36.1 (20 Jun 2025 08:15), Max: 36.6 (19 Jun 2025 20:00)  T(F): 97 (20 Jun 2025 08:15), Max: 97.8 (19 Jun 2025 20:00)  HR: 79 (20 Jun 2025 09:00) (65 - 94)  BP: 124/55 (20 Jun 2025 09:00) (76/57 - 139/60)  BP(mean): 76 (20 Jun 2025 09:00) (58 - 84)  RR: 25 (20 Jun 2025 09:00) (18 - 30)  SpO2: 97% (20 Jun 2025 09:00) (93% - 100%)    Parameters below as of 20 Jun 2025 07:15  Patient On (Oxygen Delivery Method): nasal cannula        06-19-25 @ 07:01  -  06-20-25 @ 07:00  --------------------------------------------------------  IN: 220 mL / OUT: 1000 mL / NET: -780 mL    Head: atraumatic, normocephalic  Eyes: normal sclera and conjunctiva  ENT:   no oropharyngeal lesions,  right parotid gland region less edematous but slightly harder to touch today no erythema, no discharge, no wounds, not warm to touch  Cardio:    regular S1,S2, no murmur  Respiratory:   coarse cough, No wheezing, no crackles, scattered rhonchi   abd:   soft, BS +, not tender, no distention  :     no CVAT, no suprapubic tenderness  Musculoskeletal : no joint swelling, no edema  Skin:    no rash  vascular:  normal pulses  Neurologic:     awake, alert, can follow commands and answer most questions  psych: normal affect      WBC Count: 11.41 K/uL (06-20 @ 02:20)  WBC Count: 19.38 K/uL (06-19 @ 03:40)  WBC Count: 24.19 K/uL (06-18 @ 03:00)  WBC Count: 19.70 K/uL (06-17 @ 04:24)  WBC Count: 11.52 K/uL (06-15 @ 03:30)  WBC Count: 10.39 K/uL (06-14 @ 03:43)                            7.1    11.41 )-----------( 230      ( 20 Jun 2025 02:20 )             24.6       06-20    138  |  107  |  33[H]  ----------------------------<  99  4.5   |  25  |  1.24    Ca    8.5      20 Jun 2025 02:20  Phos  4.1     06-20  Mg     2.3     06-20    TPro  5.5[L]  /  Alb  2.0[L]  /  TBili  0.3  /  DBili  x   /  AST  13[L]  /  ALT  15  /  AlkPhos  58  06-20      Urinalysis Basic - ( 20 Jun 2025 02:20 )    Color: x / Appearance: x / SG: x / pH: x  Gluc: 99 mg/dL / Ketone: x  / Bili: x / Urobili: x   Blood: x / Protein: x / Nitrite: x   Leuk Esterase: x / RBC: x / WBC x   Sq Epi: x / Non Sq Epi: x / Bacteria: x    Creatinine Trend: 1.24<--, 1.29<--, 1.58<--, 1.51<--, 1.66<--, 1.92<--      MICROBIOLOGY:  Vancomycin Level, Random: 11.6 ug/mL (06-20-25 @ 02:20)    Procalcitonin: 0.08 (06-20-25 @ 02:20)  Procalcitonin: 0.12 (06-19-25 @ 03:40)  Procalcitonin: 0.05 (06-15-25 @ 03:30)      RADIOLOGY:

## 2025-06-20 NOTE — PROGRESS NOTE ADULT - ASSESSMENT
69F w/ COPD, PE, HFpEF, Afib, liver mass. Presents w/ SOB. Admitted to ICU initially for COPD exacerbation and acute hypercapnic respiratory failure requiring NIV and LLL mucous plugging and sent to the medical floors. Later that day she was found to be in respiratory distress and lethargic and transferred back to ICU. She has been requiring NIV intermittently for SOB and WOB. She has also developed R facial swelling, concerning for parotitis. Respiratory status improving overall and less BiPAP use.     #Neuro - awake and alert, likely has some degree of anxiety contributing to WOB and SOB  #CV - HS stable, continue amlodipine  #Pulm - COPD exacerbation s/p steroids, significant improvement overall and minimal use of BiPAP; remains on 2LNC, can be weaned as tolerated, maintain SpO2 >88%; continue advair, add spiriva and switch duonebs to PRN; BiPAP PRN  #ID- concern for parotitis w/ R facial swelling and leukocytosis, currently on ampicillin for mumps, vancomycin for ???MRSA nares +; mumps serologies are pending; if no improvement in swelling consider imaging; add cepacol in case of sialolithiases   #Renal/metabolic - renal function stable, monitor I/Os and electrolytes   #GI- PO diet, add bowel regimen since pt has not had BM for days; continue PPI  #Heme - stable anemia   #Endo - monitor BG  #PPx - apixiban  #Dispo- stable for transfer to medical floors; prognosis very guarded; full code for now, palliative care  69F w/ COPD, PE, HFpEF, Afib, liver mass. Presents w/ SOB. Admitted to ICU initially for COPD exacerbation and acute hypercapnic respiratory failure requiring NIV and LLL mucous plugging and sent to the medical floors. Later that day she was found to be in respiratory distress and lethargic and transferred back to ICU. She has been requiring NIV intermittently for SOB and WOB. She has also developed R facial swelling, concerning for parotitis. Respiratory status improving overall and less BiPAP use.     #Neuro - awake and alert, likely has some degree of anxiety contributing to WOB and SOB  #CV - HS stable, continue amlodipine  #Pulm - COPD exacerbation s/p steroids, significant improvement overall and minimal use of BiPAP; remains on 2LNC, can be weaned as tolerated, maintain SpO2 >88%; continue advair, add spiriva and switch duonebs to PRN; BiPAP PRN  #ID- concern for parotitis w/ R facial swelling and leukocytosis, currently on ampicillin for mumps, vancomycin for ???MRSA nares +; mumps serologies are pending; if no improvement in swelling consider imaging; add cepacol in case of sialolithiases   #Renal/metabolic - renal function stable, monitor I/Os and electrolytes   #GI- PO diet, add bowel regimen since pt has not had BM for days; continue PPI  #Heme - stable anemia; known underlying liver mass to be worked up when more stable as outpt  #Endo - monitor BG  #PPx - apixiban  #Dispo- stable for transfer to medical floors; prognosis very guarded; full code for now, palliative care following

## 2025-06-20 NOTE — CHART NOTE - NSCHARTNOTEFT_GEN_A_CORE
ICU DOWN GRADE NOTE      Patient is a 69y old  Female who presents with a chief complaint of Asthma Exacerbation (20 Jun 2025 10:39)      HPI:  *History obtained from chart review and son Maldonado.     69 year old woman with a PMHx of Asthma (on prn supp O2), COPD (prev smoker quit 20 years ago), HTN, HFpEF, CVA with L sided deficits (as per son), A Fib, PE (off Eliquis and Aspirin since March 2025 due to abdominal wall hematoma), and Liver mass (IR recommended for outpatient follow up w repeat imaging).     Pt presented to the ED with SOB that started the night prior. Per daughter in law, pt has been declining in her health, decreased appetite since her last hospital admission in March 2025. Has been bed bound last 3 years. In the ED, pt tripoding and was placed on BIPAP. Received nebs and steroids. Pt then developed episode of coffee ground vomiting and switch to high flow NC. Labs significant for wbc 11.8, hgb 8.6, Na 121, K: 5.8, Bicarb 20, Creat: 1.38, trop: 62, pH: 7.11, pCO2: 57.   Pt developed hypotension and received 1 L of IV fluids with some improvement. Admitted to the ICU for further management.        (08 Jun 2025 20:54)      INTERVAL HPI/OVERNIGHT EVENTS: Awake, alert at baseline MS. Has not required NIV x 2 days, improvement in anxiety but still episodic. NC during daytime hours. OOB to chair.       MEDICATIONS:  acetaminophen     Tablet .. 650 milliGRAM(s) Oral every 6 hours PRN  albuterol/ipratropium for Nebulization 3 milliLiter(s) Nebulizer every 6 hours PRN  aluminum hydroxide/magnesium hydroxide/simethicone Suspension 30 milliLiter(s) Oral every 4 hours PRN  amLODIPine   Tablet 10 milliGRAM(s) Oral daily  ampicillin/sulbactam  IVPB 3 Gram(s) IV Intermittent every 6 hours  apixaban 2.5 milliGRAM(s) Oral every 12 hours  atenolol  Tablet 50 milliGRAM(s) Oral daily  azithromycin   Tablet 250 milliGRAM(s) Oral daily  benzocaine/menthol Lozenge 1 Lozenge Oral two times a day  chlorhexidine 2% Cloths 1 Application(s) Topical <User Schedule>  fluticasone propionate/ salmeterol 500-50 MICROgram(s) Diskus 1 Dose(s) Inhalation two times a day  guaiFENesin Oral Liquid (Sugar-Free) 200 milliGRAM(s) Oral every 6 hours PRN  melatonin 3 milliGRAM(s) Oral at bedtime  montelukast 10 milliGRAM(s) Oral daily  pantoprazole    Tablet 40 milliGRAM(s) Oral before breakfast  polyethylene glycol 3350 17 Gram(s) Oral every 24 hours  senna 2 Tablet(s) Oral at bedtime  tiotropium 2.5 MICROgram(s) Inhaler 2 Puff(s) Inhalation daily  vancomycin  IVPB 500 milliGRAM(s) IV Intermittent every 24 hours      T(C): 36.1 (06-20-25 @ 08:15), Max: 36.6 (06-19-25 @ 20:00)  HR: 72 (06-20-25 @ 12:00) (65 - 94)  BP: 119/55 (06-20-25 @ 12:00) (76/57 - 137/59)  RR: 27 (06-20-25 @ 12:00) (18 - 30)  SpO2: 95% (06-20-25 @ 12:00) (93% - 100%)  Wt(kg): --Vital Signs Last 24 Hrs  T(C): 36.1 (20 Jun 2025 08:15), Max: 36.6 (19 Jun 2025 20:00)  T(F): 97 (20 Jun 2025 08:15), Max: 97.8 (19 Jun 2025 20:00)  HR: 72 (20 Jun 2025 12:00) (65 - 94)  BP: 119/55 (20 Jun 2025 12:00) (76/57 - 137/59)  BP(mean): 74 (20 Jun 2025 12:00) (58 - 82)  RR: 27 (20 Jun 2025 12:00) (18 - 30)  SpO2: 95% (20 Jun 2025 12:00) (93% - 100%)    Parameters below as of 20 Jun 2025 11:54  Patient On (Oxygen Delivery Method): nasal cannula          Consultant(s) Notes Reviewed:  [x ] YES  [ ] NO  Care Discussed with Consultants/Other Providers [ x] YES  [ ] NO    LABS:                        7.1    11.41 )-----------( 230      ( 20 Jun 2025 02:20 )             24.6     06-20    138  |  107  |  33[H]  ----------------------------<  99  4.5   |  25  |  1.24    Ca    8.5      20 Jun 2025 02:20  Phos  4.1     06-20  Mg     2.3     06-20    TPro  5.5[L]  /  Alb  2.0[L]  /  TBili  0.3  /  DBili  x   /  AST  13[L]  /  ALT  15  /  AlkPhos  58  06-20      Urinalysis Basic - ( 20 Jun 2025 02:20 )    Color: x / Appearance: x / SG: x / pH: x  Gluc: 99 mg/dL / Ketone: x  / Bili: x / Urobili: x   Blood: x / Protein: x / Nitrite: x   Leuk Esterase: x / RBC: x / WBC x   Sq Epi: x / Non Sq Epi: x / Bacteria: x      CAPILLARY BLOOD GLUCOSE            Urinalysis Basic - ( 20 Jun 2025 02:20 )    Color: x / Appearance: x / SG: x / pH: x  Gluc: 99 mg/dL / Ketone: x  / Bili: x / Urobili: x   Blood: x / Protein: x / Nitrite: x   Leuk Esterase: x / RBC: x / WBC x   Sq Epi: x / Non Sq Epi: x / Bacteria: x        RADIOLOGY & ADDITIONAL TESTS:    Imaging Personally Reviewed:  [x ] YES  [ ] NO      Assessment/ To follow up:     69F w/ COPD, PE, HFpEF, Afib, liver mass. Presents w/ SOB. Admitted to ICU initially for COPD exacerbation and acute hypercapnic respiratory failure requiring NIV and LLL mucous plugging and sent to the medical floors. Later that day she was found to be in respiratory distress and lethargic and transferred back to ICU. She has been requiring NIV intermittently for SOB and WOB. She has also developed R facial swelling, concerning for parotitis. Respiratory status improving overall and less BiPAP use.     #Neuro - awake and alert, likely has some degree of anxiety contributing to WOB and SOB  #CV - HS stable, continue amlodipine  #Pulm - COPD exacerbation s/p steroids, significant improvement overall and minimal use of BiPAP; remains on 2LNC, can be weaned as tolerated, maintain SpO2 >88%; continue advair, add spiriva and switch duonebs to PRN; BiPAP PRN  #ID- concern for parotitis w/ R facial swelling and leukocytosis, currently on ampicillin for mumps, vancomycin for ???MRSA nares +; mumps serologies are pending; if no improvement in swelling consider imaging; add cepacol in case of sialolithiases. ID following F/U reccs  #Renal/metabolic - renal function stable, monitor I/Os and electrolytes   #GI- PO diet, add bowel regimen since pt has not had BM for days; continue PPI  #Heme - stable anemia; known underlying liver mass to be worked up when more stable as outpt  #Endo - monitor BG  #PPx - apixiban for PHX PE  #Dispo- no longer requires ICU level of care- stable for transfer to medical floors; prognosis very guarded; full code for now, palliative care following  - Plan discussed with ICU MD Cisneros, Sign out with Hospitalist MD Yu.

## 2025-06-20 NOTE — PROGRESS NOTE ADULT - ASSESSMENT
69F ex smoker (quit 2013, 1/2 PPD x10 years) PMH HTN, chronic diastolic CHF (grade I diastolic dysfunction), Afib on Eliquis (by records), hx of PE (1/2024), COPD (previously on symbicort and spiriva, changed to duonebs q 6 hrs as there was question if she was able to coordinate use of her hand held inhaler per outpatient records, never intubated), L MCA aneurysm,  e-coli UTI 3/2025, liver cyst, abdominal wall hematoma 3/2025 presents from home for complaints of SOB with increased work of breathing. Found to have blood gas 7.11/57/82/18/96% placed on BiPAP. Labs with Na 121, K 5.8, HCO3: 17, Cr 1.38. Admitted to ICU for hypercarbic respiratory failure, COPD exacerbation. Transitioned from BIPAP to AVAPS and weaned off NIV. Given steroids and nebs for COPD exacerbation. CT chest without PE but noted secretions in airway with some mucus plugging LLL with background of severe emphysema. Downgraded to medical floor 6/10 but with respiratory distress and worsening wheeze, lethargy pos downgrade requiring upgrade back to ICU for respiratory distress/hypercarbic respiratory failure requiring NIV support. Noted to be in urinary retention (bladder sono 550cc urine). She has been requiring NIV intermittently for SOB and WOB. She also developed R facial swelling, concerning for parotitis. Respiratory status and mental status improved. Downgraded to medical service today 6/20.     DX: acute hypercarbic respiratory failure, acute COPD exacerbation with underlying severe COPD, parotitis, respiratory acidosis, metabolic acidosis, NANCY, urinary rentention, acute metabolic encephalopathy     - on 2L NC  - keep O2 sat > 88%  - has not used bipap last 2 nights  - check AM VBG to assess for any evidence of hypercarbia off NIV  - on vanco/unasyn for parotitis  - on azithromycin for severe COPD prophylaxis  - NANCY resolved, Cr normalized   - cont bronchodilator therapy with advair and spiriva with prn duonebs for COPD  - completed a course or steroid taper for COPD exacerbation  - cont apixaban for a-fib  - will follow

## 2025-06-20 NOTE — PROGRESS NOTE ADULT - ASSESSMENT
A/P-  69 year old woman with a PMHx of Asthma (on prn supp O2), COPD (prev smoker quit 20 years ago), HTN, HFpEF, CVA with L sided deficits (as per son), A Fib, PE (off Eliquis and Aspirin since March 2025 due to abdominal wall hematoma), and Liver mass (IR recommended for outpatient follow up w repeat imaging).     Pt presented to the ED with SOB that started the night prior. Per daughter in law, pt has been declining in her health, decreased appetite since her last hospital admission in March 2025. Has been bed bound last 3 years. In the ED, pt tripoding and was placed on BIPAP. Received nebs and steroids. Pt then developed episode of coffee ground vomiting and switch to high flow NC. Labs significant for wbc 11.8, hgb 8.6, Na 121, K: 5.8, Bicarb 20, Creat: 1.38, trop: 62, pH: 7.11, pCO2: 57.   Pt developed hypotension and received 1 L of IV fluids with some improvement. Admitted to the ICU for further management.    (08 Jun 2025 20:54)    hospital course-  Found to have blood gas 7.11/57/82/18/96% placed on BiPAP and was  admitted to ICU (6/8/2025) for hypercarbic respiratory failure, COPD exacerbation. Transitioned from BIPAP to AVAPS and weaned off NIV. Given steroids and nebs for COPD exacerbation. CT chest without PE but noted secretions in airway with some mucus plugging LLL with background of severe emphysema. Pt downgraded to medical floor 6/10 and then later on found to be confused, lethargic, in respiratory distress with wheeze, chest pain and abdominal pain (noted to be in urinary retention on bladder sono 550cc urine); transferred back to the ICU.     Afebrile  +leukocytosis 24 K ( on steroids)  no culture data available  MRSA PCR- detected    6/19-  Afebrile  Leukocytosis trending down  to 19k    6/20-  Afebrile  leukocytosis decreased to 11K  MRSA PCR-Detected  right parotid possible sialolithiasis (parotid stone) vs parotitis    Impression-  right parotitis vs Sialolithiasis   leukocytosis - almost resolved  copd exacerbation  bibasilar atelectasis  vs pneumonia  HTN  CVA    plan-  trend wbc and temp  apply warm compress to the right parotid region  I suspect sialolithiasis more likely than parotitis based on today's exam and advise gland massage and agents that stimulate saliva flow ( d/w ICU doc as well)  can continue with vanco that was initiated by ICU team, keep trough <15, -600 day #4  can also continue the unasyn that was initiated by medicine team pending further results.  f/u mumps serology   if no improvement advise to get CT of the right parotid/mastoid region  monitor for aspiration precautions   rest of the medical management as per medicine/ICU  team.    All labs and imaging and chart notes reviewed.     All above discussed with patient and patient verbalizes full understanding of all above and agrees with above plan of care.    d/w ICU team.    Pham Holguin MD  Infectious Disease Attending    for any questions please do not hesitate to contact me either via teams or by calling [phone]

## 2025-06-20 NOTE — PROGRESS NOTE ADULT - SUBJECTIVE AND OBJECTIVE BOX
24 hr events:  downgraded from ICU to medical service  on 2L NC      ## ROS:  [ ] unable to obtain  CONSTITUTIONAL: No fever, weight loss, or fatigue  EYES: No eye pain, visual disturbances, or discharge  ENMT:  No difficulty hearing, tinnitus, vertigo; No sinus or throat pain  NECK: No pain or stiffness  RESPIRATORY: No cough, wheezing, chills or hemoptysis; No shortness of breath  CARDIOVASCULAR: No chest pain, palpitations, dizziness, or leg swelling  GASTROINTESTINAL: No abdominal or epigastric pain. No nausea, vomiting, or hematemesis; No diarrhea or constipation. No melena or hematochezia.  GENITOURINARY: No dysuria, frequency, hematuria, or incontinence  NEUROLOGICAL: No headaches, memory loss, loss of strength, numbness, or tremors  SKIN: No itching, burning, rashes, or lesions   LYMPH NODES: No enlarged glands  ENDOCRINE: No heat or cold intolerance; No hair loss  MUSCULOSKELETAL: No joint pain or swelling; No muscle, back, or extremity pain  PSYCHIATRIC: No depression, anxiety, mood swings, or difficulty sleeping  HEME/LYMPH: No easy bruising, or bleeding gums  ALLERGY AND IMMUNOLOGIC: No hives or eczema    ## Labs:  CBC:                        7.1    11.41 )-----------( 230      ( 2025 02:20 )             24.6     Chem:  -    138  |  107  |  33[H]  ----------------------------<  99  4.5   |  25  |  1.24    Ca    8.5      2025 02:20  Phos  4.1     06-20  Mg     2.3     06-20    TPro  5.5[L]  /  Alb  2.0[L]  /  TBili  0.3  /  DBili  x   /  AST  13[L]  /  ALT  15  /  AlkPhos  58  06-20    Coags:          ## Imaging:    ## Medications:  ampicillin/sulbactam  IVPB 3 Gram(s) IV Intermittent every 6 hours  azithromycin   Tablet 250 milliGRAM(s) Oral daily  vancomycin  IVPB 500 milliGRAM(s) IV Intermittent every 24 hours    amLODIPine   Tablet 10 milliGRAM(s) Oral daily  atenolol  Tablet 50 milliGRAM(s) Oral daily    albuterol/ipratropium for Nebulization 3 milliLiter(s) Nebulizer every 6 hours PRN  fluticasone propionate/ salmeterol 500-50 MICROgram(s) Diskus 1 Dose(s) Inhalation two times a day  guaiFENesin Oral Liquid (Sugar-Free) 200 milliGRAM(s) Oral every 6 hours PRN  montelukast 10 milliGRAM(s) Oral daily  tiotropium 2.5 MICROgram(s) Inhaler 2 Puff(s) Inhalation daily      apixaban 2.5 milliGRAM(s) Oral every 12 hours    aluminum hydroxide/magnesium hydroxide/simethicone Suspension 30 milliLiter(s) Oral every 4 hours PRN  pantoprazole    Tablet 40 milliGRAM(s) Oral before breakfast  polyethylene glycol 3350 17 Gram(s) Oral every 24 hours  senna 2 Tablet(s) Oral at bedtime    acetaminophen     Tablet .. 650 milliGRAM(s) Oral every 6 hours PRN  melatonin 3 milliGRAM(s) Oral at bedtime      ## Vitals:  T(C): 36.3 (25 @ 16:20), Max: 37 (25 @ 15:00)  HR: 70 (25 @ 16:20) (59 - 94)  BP: 133/77 (25 @ 16:20) (76/57 - 139/58)  BP(mean): 95 (25 @ 16:20) (64 - 112)  RR: 19 (25 @ 16:20) (17 - 30)  SpO2: 96% (25 @ 16:20) (93% - 100%)  Wt(kg): --  Vent:   AB-19 @ 07:01  -   @ 07:00  --------------------------------------------------------  IN: 220 mL / OUT: 1000 mL / NET: -780 mL     @ 07:01  -   @ 20:33  --------------------------------------------------------  IN: 450 mL / OUT: 400 mL / NET: 50 mL          ## P/E:  Gen: lying comfortably in bed in no apparent distress  HEENT: PERRL, EOMI  Resp: CTA B/L no c/r/w  CVS: S1S2 no m/r/g  Abd: soft NT/ND +BS  Ext: no c/c/e  Neuro: A&Ox3    CENTRAL LINE: [ ] YES [ ] NO  LOCATION:   DATE INSERTED:  REMOVE: [ ] YES [ ] NO      [place]: [ ] YES [ ] NO    DATE INSERTED:  REMOVE:  [ ] YES [ ] NO      A-LINE:  [ ] YES [ ] NO  LOCATION:   DATE INSERTED:  REMOVE:  [ ] YES [ ] NO  EXPLAIN:    GLOBAL ISSUE/BEST PRACTICE:  Analgesia:  Sedation:  HOB elevation: yes  Stress ulcer prophylaxis:  VTE prophylaxis:  Oral Care:  Glycemic control:  Nutrition:    CODE STATUS: [ ] full code  [ ] DNR  [ ] DNI  [ ] MOLST  Goals of care discussion: [ ] yes  24 hr events:  downgraded from ICU to medical service  on 2L NC  R submandibular swelling    ## ROS:  no fever, no chills  no HA, no dizziness  no visual changes, no auditory changes  no sore throat, no sinus congestion + R facial pain  no SOB, no cough  no chest pain, no palpitations  no abdominal pain, no N/V/D  no dysuria, no hematuria  no myalgias, no arthralgias  no swelling  no rashes, no pruritis          ## Labs:  CBC:                        7.1    11.41 )-----------( 230      ( 20 Jun 2025 02:20 )             24.6     Chem:  06-20    138  |  107  |  33[H]  ----------------------------<  99  4.5   |  25  |  1.24    Ca    8.5      20 Jun 2025 02:20  Phos  4.1     06-20  Mg     2.3     06-20    TPro  5.5[L]  /  Alb  2.0[L]  /  TBili  0.3  /  DBili  x   /  AST  13[L]  /  ALT  15  /  AlkPhos  58  06-20      MICRO  FluA/FluB/RSV/COVID PCR (06.08.25 @ 18:17)    SARS-CoV-2 Result: NotDetec: For Emergency Use Authorization   Influenza A Result: NotDetec: For Emergency Use Authorization   Influenza B Result: NotDetec: For Emergency Use Authorization   Resp Syn Virus Result: NotDetec: For Emergency Use Authorization   Source Respiratory: Nasopharyngeal              ## Imaging:  < from: Xray Chest 1 View- PORTABLE-Routine (Xray Chest 1 View- PORTABLE-Routine .) (06.14.25 @ 13:24) >  Progression of bibasilar atelectasis and/or pneumonia.    ------------  < from: CT Angio Chest PE Protocol w/ IV Cont (06.09.25 @ 14:53) >  IMPRESSION:    1. No pulmonary embolism.    2. Small airway mucous plugging/mucoid impaction in the left lower lobe.   Minimal adjacent airspace disease could represent atelectasis and/or   pneumonia. There is underlying severe emphysema.    3. A 5 cm right hepatic lobe cystic mass is unchanged compared to   3/6/2025, larger compared to 1/21/2024. Again the differential diagnosis   includes a biliary cystadenoma.    < end of copied text >      ## Medications:  ampicillin/sulbactam  IVPB 3 Gram(s) IV Intermittent every 6 hours  azithromycin   Tablet 250 milliGRAM(s) Oral daily  vancomycin  IVPB 500 milliGRAM(s) IV Intermittent every 24 hours    amLODIPine   Tablet 10 milliGRAM(s) Oral daily  atenolol  Tablet 50 milliGRAM(s) Oral daily    albuterol/ipratropium for Nebulization 3 milliLiter(s) Nebulizer every 6 hours PRN  fluticasone propionate/ salmeterol 500-50 MICROgram(s) Diskus 1 Dose(s) Inhalation two times a day  guaiFENesin Oral Liquid (Sugar-Free) 200 milliGRAM(s) Oral every 6 hours PRN  montelukast 10 milliGRAM(s) Oral daily  tiotropium 2.5 MICROgram(s) Inhaler 2 Puff(s) Inhalation daily      apixaban 2.5 milliGRAM(s) Oral every 12 hours    aluminum hydroxide/magnesium hydroxide/simethicone Suspension 30 milliLiter(s) Oral every 4 hours PRN  pantoprazole    Tablet 40 milliGRAM(s) Oral before breakfast  polyethylene glycol 3350 17 Gram(s) Oral every 24 hours  senna 2 Tablet(s) Oral at bedtime    acetaminophen     Tablet .. 650 milliGRAM(s) Oral every 6 hours PRN  melatonin 3 milliGRAM(s) Oral at bedtime      ## Vitals:  T(C): 36.3 (06-20-25 @ 16:20), Max: 37 (06-20-25 @ 15:00)  HR: 70 (06-20-25 @ 16:20) (59 - 94)  BP: 133/77 (06-20-25 @ 16:20) (76/57 - 139/58)  BP(mean): 95 (06-20-25 @ 16:20) (64 - 112)  RR: 19 (06-20-25 @ 16:20) (17 - 30)  SpO2: 96% (06-20-25 @ 16:20) (93% - 100%)        06-19 @ 07:01  -  06-20 @ 07:00  --------------------------------------------------------  IN: 220 mL / OUT: 1000 mL / NET: -780 mL    06-20 @ 07:01  -  06-20 @ 20:33  --------------------------------------------------------  IN: 450 mL / OUT: 400 mL / NET: 50 mL          ## P/E:  Gen: lying comfortably in bed in no apparent distress, elderly female  HEENT: mildred wilson, EOMI, R submandibular swelling  Resp: CTA B/L , no wheeze, no rhonchi  CVS: S1S2 regular rate  Abd: soft NT/ND +BS  Ext: no c/c/e  Neuro: A&Ox3        CODE STATUS: [x ] full code  [ ] DNR  [ ] DNI  [ ] [facility]  Goals of care discussion: [x ] yes

## 2025-06-21 LAB
ALBUMIN SERPL ELPH-MCNC: 2.1 G/DL — LOW (ref 3.3–5)
ALP SERPL-CCNC: 65 U/L — SIGNIFICANT CHANGE UP (ref 40–120)
ALT FLD-CCNC: 19 U/L — SIGNIFICANT CHANGE UP (ref 12–78)
ANION GAP SERPL CALC-SCNC: 6 MMOL/L — SIGNIFICANT CHANGE UP (ref 5–17)
AST SERPL-CCNC: 15 U/L — SIGNIFICANT CHANGE UP (ref 15–37)
BASOPHILS # BLD AUTO: 0.02 K/UL — SIGNIFICANT CHANGE UP (ref 0–0.2)
BASOPHILS NFR BLD AUTO: 0.1 % — SIGNIFICANT CHANGE UP (ref 0–2)
BILIRUB SERPL-MCNC: 0.3 MG/DL — SIGNIFICANT CHANGE UP (ref 0.2–1.2)
BUN SERPL-MCNC: 29 MG/DL — HIGH (ref 7–23)
CALCIUM SERPL-MCNC: 8.3 MG/DL — LOW (ref 8.5–10.1)
CHLORIDE SERPL-SCNC: 107 MMOL/L — SIGNIFICANT CHANGE UP (ref 96–108)
CO2 SERPL-SCNC: 24 MMOL/L — SIGNIFICANT CHANGE UP (ref 22–31)
CREAT SERPL-MCNC: 1.21 MG/DL — SIGNIFICANT CHANGE UP (ref 0.5–1.3)
EGFR: 48 ML/MIN/1.73M2 — LOW
EGFR: 48 ML/MIN/1.73M2 — LOW
EOSINOPHIL # BLD AUTO: 0.06 K/UL — SIGNIFICANT CHANGE UP (ref 0–0.5)
EOSINOPHIL NFR BLD AUTO: 0.4 % — SIGNIFICANT CHANGE UP (ref 0–6)
GLUCOSE SERPL-MCNC: 97 MG/DL — SIGNIFICANT CHANGE UP (ref 70–99)
HCT VFR BLD CALC: 28.5 % — LOW (ref 34.5–45)
HGB BLD-MCNC: 8.2 G/DL — LOW (ref 11.5–15.5)
IMM GRANULOCYTES NFR BLD AUTO: 1.2 % — HIGH (ref 0–0.9)
LYMPHOCYTES # BLD AUTO: 15.8 % — SIGNIFICANT CHANGE UP (ref 13–44)
LYMPHOCYTES # BLD AUTO: 2.11 K/UL — SIGNIFICANT CHANGE UP (ref 1–3.3)
MAGNESIUM SERPL-MCNC: 2.2 MG/DL — SIGNIFICANT CHANGE UP (ref 1.6–2.6)
MCHC RBC-ENTMCNC: 23.8 PG — LOW (ref 27–34)
MCHC RBC-ENTMCNC: 28.8 G/DL — LOW (ref 32–36)
MCV RBC AUTO: 82.8 FL — SIGNIFICANT CHANGE UP (ref 80–100)
MONOCYTES # BLD AUTO: 0.73 K/UL — SIGNIFICANT CHANGE UP (ref 0–0.9)
MONOCYTES NFR BLD AUTO: 5.5 % — SIGNIFICANT CHANGE UP (ref 2–14)
NEUTROPHILS # BLD AUTO: 10.26 K/UL — HIGH (ref 1.8–7.4)
NEUTROPHILS NFR BLD AUTO: 77 % — SIGNIFICANT CHANGE UP (ref 43–77)
NRBC BLD AUTO-RTO: 0 /100 WBCS — SIGNIFICANT CHANGE UP (ref 0–0)
PHOSPHATE SERPL-MCNC: 3.8 MG/DL — SIGNIFICANT CHANGE UP (ref 2.5–4.5)
PLATELET # BLD AUTO: 225 K/UL — SIGNIFICANT CHANGE UP (ref 150–400)
POTASSIUM SERPL-MCNC: 4.7 MMOL/L — SIGNIFICANT CHANGE UP (ref 3.5–5.3)
POTASSIUM SERPL-SCNC: 4.7 MMOL/L — SIGNIFICANT CHANGE UP (ref 3.5–5.3)
PROT SERPL-MCNC: 5.7 GM/DL — LOW (ref 6–8.3)
RBC # BLD: 3.44 M/UL — LOW (ref 3.8–5.2)
RBC # FLD: 18.7 % — HIGH (ref 10.3–14.5)
SODIUM SERPL-SCNC: 137 MMOL/L — SIGNIFICANT CHANGE UP (ref 135–145)
VANCOMYCIN TROUGH SERPL-MCNC: 6.8 UG/ML — LOW (ref 10–20)
WBC # BLD: 13.34 K/UL — HIGH (ref 3.8–10.5)
WBC # FLD AUTO: 13.34 K/UL — HIGH (ref 3.8–10.5)

## 2025-06-21 PROCEDURE — 99233 SBSQ HOSP IP/OBS HIGH 50: CPT

## 2025-06-21 RX ORDER — BISACODYL 5 MG
5 TABLET, DELAYED RELEASE (ENTERIC COATED) ORAL EVERY 12 HOURS
Refills: 0 | Status: DISCONTINUED | OUTPATIENT
Start: 2025-06-21 | End: 2025-06-26

## 2025-06-21 RX ADMIN — AMPICILLIN SODIUM AND SULBACTAM SODIUM 200 GRAM(S): 1; .5 INJECTION, POWDER, FOR SOLUTION INTRAMUSCULAR; INTRAVENOUS at 00:24

## 2025-06-21 RX ADMIN — Medication 3 MILLIGRAM(S): at 21:28

## 2025-06-21 RX ADMIN — AMLODIPINE BESYLATE 10 MILLIGRAM(S): 10 TABLET ORAL at 05:55

## 2025-06-21 RX ADMIN — TIOTROPIUM BROMIDE INHALATION SPRAY 2 PUFF(S): 3.12 SPRAY, METERED RESPIRATORY (INHALATION) at 12:16

## 2025-06-21 RX ADMIN — APIXABAN 2.5 MILLIGRAM(S): 2.5 TABLET, FILM COATED ORAL at 09:36

## 2025-06-21 RX ADMIN — Medication 1 LOZENGE: at 05:55

## 2025-06-21 RX ADMIN — Medication 1 DOSE(S): at 05:56

## 2025-06-21 RX ADMIN — Medication 250 MILLIGRAM(S): at 12:17

## 2025-06-21 RX ADMIN — Medication 1 LOZENGE: at 17:56

## 2025-06-21 RX ADMIN — POLYETHYLENE GLYCOL 3350 17 GRAM(S): 17 POWDER, FOR SOLUTION ORAL at 15:00

## 2025-06-21 RX ADMIN — AMPICILLIN SODIUM AND SULBACTAM SODIUM 200 GRAM(S): 1; .5 INJECTION, POWDER, FOR SOLUTION INTRAMUSCULAR; INTRAVENOUS at 17:55

## 2025-06-21 RX ADMIN — Medication 2 TABLET(S): at 21:28

## 2025-06-21 RX ADMIN — MONTELUKAST SODIUM 10 MILLIGRAM(S): 10 TABLET ORAL at 12:17

## 2025-06-21 RX ADMIN — Medication 40 MILLIGRAM(S): at 07:45

## 2025-06-21 RX ADMIN — IPRATROPIUM BROMIDE AND ALBUTEROL SULFATE 3 MILLILITER(S): .5; 2.5 SOLUTION RESPIRATORY (INHALATION) at 14:37

## 2025-06-21 RX ADMIN — Medication 1 DOSE(S): at 17:55

## 2025-06-21 RX ADMIN — AMPICILLIN SODIUM AND SULBACTAM SODIUM 200 GRAM(S): 1; .5 INJECTION, POWDER, FOR SOLUTION INTRAMUSCULAR; INTRAVENOUS at 05:56

## 2025-06-21 RX ADMIN — AMPICILLIN SODIUM AND SULBACTAM SODIUM 200 GRAM(S): 1; .5 INJECTION, POWDER, FOR SOLUTION INTRAMUSCULAR; INTRAVENOUS at 12:17

## 2025-06-21 RX ADMIN — Medication 100 MILLIGRAM(S): at 15:00

## 2025-06-21 RX ADMIN — LISINOPRIL 50 MILLIGRAM(S): 30 TABLET ORAL at 05:55

## 2025-06-21 RX ADMIN — APIXABAN 2.5 MILLIGRAM(S): 2.5 TABLET, FILM COATED ORAL at 21:27

## 2025-06-21 NOTE — PROGRESS NOTE ADULT - ASSESSMENT
69F w/ COPD, PE, HFpEF, Afib, liver mass. Presents w/ SOB. Admitted to ICU initially for COPD exacerbation and acute hypercapnic respiratory failure requiring NIV and LLL mucous plugging and sent to the medical floors. Later that day she was found to be in respiratory distress and lethargic and transferred back to ICU. She has been requiring NIV intermittently for SOB and WOB. She has also developed R facial swelling, concerning for parotitis. Respiratory status improving overall and less BiPAP use.     # Acute on Chronic Respiratory Failure with Hypoxia and Hypercarbia - required NIV intermittently.  Titrate O2 as tolerated.  Full code per d/w family.  # Acute COPD Exacerbation - improved with minimal use of BIPAP.  O2.  Continue Advair, Spiriva, Nebs, Steroids.  # Constipation - + BM.  Contnue Laxatives.  # H/o PE - continue AC  # Chronic Diastolic CHF - appears to be euvolemic.  Monitor volume status.  # Chronic Afib - continue rate control.  continue anticoagulation.   # Dementia - supportive care, at baseline per son.  # Liver Mass- will need outpatient f/u, pending GOC  # Inpatient DVT Proph - on anticoagulation     Plan of care d/w son "MD Serge" at [phone]  Time spent by me managing the patient including but not limited to reviewing the chart, discussion with consultants, the IDR team (nurse//care manager/ACP), physical exam and assessment and plan is 55 mins  69F w/ COPD, PE, HFpEF, Afib, liver mass. Presents w/ SOB. Admitted to ICU initially for COPD exacerbation and acute hypercapnic respiratory failure requiring NIV and LLL mucous plugging and sent to the medical floors. Later that day she was found to be in respiratory distress and lethargic and transferred back to ICU. She has been requiring NIV intermittently for SOB and WOB. She has also developed R facial swelling, concerning for parotitis. Respiratory status improving overall and less BiPAP use.     # Acute on Chronic Respiratory Failure with Hypoxia and Hypercarbia - required NIV intermittently.  Titrate O2 as tolerated.  Full code per d/w family.  # Acute COPD Exacerbation - improved with minimal use of BIPAP.  O2.  Continue Advair, Spiriva, Nebs, Steroids.  # Constipation - + BM.  Contnue Laxatives.  # H/o PE - continue AC  # Chronic Diastolic CHF - appears to be euvolemic.  Monitor volume status.  # Chronic Afib - continue rate control.  continue anticoagulation.   # Dementia - supportive care, at baseline per son.  # Liver Mass- will need outpatient f/u, pending GOC  # Inpatient DVT Proph - on anticoagulation     Palliative evaluation    Plan of care d/w son "MD Serge" at [phone]  Time spent by me managing the patient including but not limited to reviewing the chart, discussion with consultants, the IDR team (nurse//care manager/ACP), physical exam and assessment and plan is 55 mins  69F w/ COPD, PE, HFpEF, Afib, liver mass. Presents w/ SOB. Admitted to ICU initially for COPD exacerbation and acute hypercapnic respiratory failure requiring NIV and LLL mucous plugging and sent to the medical floors. Later that day she was found to be in respiratory distress and lethargic and transferred back to ICU. She has been requiring NIV intermittently for SOB and WOB. She has also developed R facial swelling, concerning for parotitis. Respiratory status improving overall and less BiPAP use.     # Acute on Chronic Respiratory Failure with Hypoxia and Hypercarbia - required NIV intermittently.  Titrate O2 as tolerated.  Full code per d/w family.  # Acute COPD Exacerbation - improved with minimal use of BIPAP.  O2.  Continue Advair, Spiriva, Nebs, Steroids.  Short course of Zithromax.    # ? Mumps / Parotitis - On Unasyn / Vancomycin  # Constipation - + BM.  Contnue Laxatives.  # H/o PE - continue AC  # Chronic Diastolic CHF - appears to be euvolemic.  Monitor volume status.  # Chronic Afib - continue rate control.  continue anticoagulation.   # Dementia - supportive care, at baseline per son.  # Liver Mass- will need outpatient f/u, pending GOC  # Inpatient DVT Proph - on anticoagulation     Palliative evaluation    Plan of care d/w son "MD Valentine" at [phone]  Time spent by me managing the patient including but not limited to reviewing the chart, discussion with consultants, the IDR team (nurse//care manager/ACP), physical exam and assessment and plan is 55 mins

## 2025-06-21 NOTE — PROGRESS NOTE ADULT - SUBJECTIVE AND OBJECTIVE BOX
Patient: MARKIE HERNANDEZ [id number] 69y Female                            Hospitalist Attending Note    Offers no complaints.  + BM per RN.   Son reports pt at baseline.      ____________________PHYSICAL EXAM:  GENERAL:  NAD, Alert, interactive, confused.    HEENT: NCAT  CARDIOVASCULAR:  S1, S2  LUNGS: CTAB  ABDOMEN:  soft, (-) tenderness, (-) distension, (+) bowel sounds, (-) guarding, (-) rebound (-) rigidity  EXTREMITIES:  no cyanosis / clubbing / edema.   ____________________       VITALS:  Vital Signs Last 24 Hrs  T(C): 36.4 (21 Jun 2025 11:18), Max: 37 (20 Jun 2025 15:00)  T(F): 97.5 (21 Jun 2025 11:18), Max: 98.6 (20 Jun 2025 15:00)  HR: 65 (21 Jun 2025 11:18) (59 - 86)  BP: 127/76 (21 Jun 2025 11:18) (116/71 - 147/72)  BP(mean): 95 (20 Jun 2025 16:20) (82 - 95)  RR: 18 (21 Jun 2025 11:18) (17 - 25)  SpO2: 96% (21 Jun 2025 11:18) (94% - 100%)    Parameters below as of 21 Jun 2025 05:00  Patient On (Oxygen Delivery Method): nasal cannula  O2 Flow (L/min): 2   Daily     Daily   CAPILLARY BLOOD GLUCOSE        I&O's Summary    20 Jun 2025 07:01  -  21 Jun 2025 07:00  --------------------------------------------------------  IN: 450 mL / OUT: 400 mL / NET: 50 mL        HISTORY:  PAST MEDICAL & SURGICAL HISTORY:  Brain aneurysm      Hypertension      Smoking      Atrial fibrillation      Asthma      Hyperlipidemia      CVA (cerebrovascular accident)      No significant past surgical history      Allergies    No Known Allergies    Intolerances       LABS:                        8.2    13.34 )-----------( 225      ( 21 Jun 2025 08:00 )             28.5     06-21    137  |  107  |  29[H]  ----------------------------<  97  4.7   |  24  |  1.21    Ca    8.3[L]      21 Jun 2025 08:00  Phos  3.8     06-21  Mg     2.2     06-21    TPro  5.7[L]  /  Alb  2.1[L]  /  TBili  0.3  /  DBili  x   /  AST  15  /  ALT  19  /  AlkPhos  65  06-21      LIVER FUNCTIONS - ( 21 Jun 2025 08:00 )  Alb: 2.1 g/dL / Pro: 5.7 gm/dL / ALK PHOS: 65 U/L / ALT: 19 U/L / AST: 15 U/L / GGT: x           Urinalysis Basic - ( 21 Jun 2025 08:00 )    Color: x / Appearance: x / SG: x / pH: x  Gluc: 97 mg/dL / Ketone: x  / Bili: x / Urobili: x   Blood: x / Protein: x / Nitrite: x   Leuk Esterase: x / RBC: x / WBC x   Sq Epi: x / Non Sq Epi: x / Bacteria: x              MEDICATIONS:  MEDICATIONS  (STANDING):  amLODIPine   Tablet 10 milliGRAM(s) Oral daily  ampicillin/sulbactam  IVPB 3 Gram(s) IV Intermittent every 6 hours  apixaban 2.5 milliGRAM(s) Oral every 12 hours  atenolol  Tablet 50 milliGRAM(s) Oral daily  azithromycin   Tablet 250 milliGRAM(s) Oral daily  benzocaine/menthol Lozenge 1 Lozenge Oral two times a day  chlorhexidine 2% Cloths 1 Application(s) Topical <User Schedule>  fluticasone propionate/ salmeterol 500-50 MICROgram(s) Diskus 1 Dose(s) Inhalation two times a day  melatonin 3 milliGRAM(s) Oral at bedtime  montelukast 10 milliGRAM(s) Oral daily  pantoprazole    Tablet 40 milliGRAM(s) Oral before breakfast  polyethylene glycol 3350 17 Gram(s) Oral every 24 hours  senna 2 Tablet(s) Oral at bedtime  tiotropium 2.5 MICROgram(s) Inhaler 2 Puff(s) Inhalation daily  vancomycin  IVPB 500 milliGRAM(s) IV Intermittent every 24 hours    MEDICATIONS  (PRN):  acetaminophen     Tablet .. 650 milliGRAM(s) Oral every 6 hours PRN Temp greater or equal to 38C (100.4F), Mild Pain (1 - 3)  albuterol/ipratropium for Nebulization 3 milliLiter(s) Nebulizer every 6 hours PRN Shortness of Breath and/or Wheezing  aluminum hydroxide/magnesium hydroxide/simethicone Suspension 30 milliLiter(s) Oral every 4 hours PRN Dyspepsia  bisacodyl 5 milliGRAM(s) Oral every 12 hours PRN Constipation  guaiFENesin Oral Liquid (Sugar-Free) 200 milliGRAM(s) Oral every 6 hours PRN Cough

## 2025-06-22 LAB
ANION GAP SERPL CALC-SCNC: 6 MMOL/L — SIGNIFICANT CHANGE UP (ref 5–17)
BUN SERPL-MCNC: 21 MG/DL — SIGNIFICANT CHANGE UP (ref 7–23)
CALCIUM SERPL-MCNC: 7.8 MG/DL — LOW (ref 8.5–10.1)
CHLORIDE SERPL-SCNC: 104 MMOL/L — SIGNIFICANT CHANGE UP (ref 96–108)
CO2 SERPL-SCNC: 25 MMOL/L — SIGNIFICANT CHANGE UP (ref 22–31)
CREAT SERPL-MCNC: 1.18 MG/DL — SIGNIFICANT CHANGE UP (ref 0.5–1.3)
EGFR: 50 ML/MIN/1.73M2 — LOW
EGFR: 50 ML/MIN/1.73M2 — LOW
GLUCOSE SERPL-MCNC: 88 MG/DL — SIGNIFICANT CHANGE UP (ref 70–99)
HCT VFR BLD CALC: 29.4 % — LOW (ref 34.5–45)
HGB BLD-MCNC: 8.2 G/DL — LOW (ref 11.5–15.5)
MCHC RBC-ENTMCNC: 22.9 PG — LOW (ref 27–34)
MCHC RBC-ENTMCNC: 27.9 G/DL — LOW (ref 32–36)
MCV RBC AUTO: 82.1 FL — SIGNIFICANT CHANGE UP (ref 80–100)
NRBC BLD AUTO-RTO: 0 /100 WBCS — SIGNIFICANT CHANGE UP (ref 0–0)
PLATELET # BLD AUTO: 195 K/UL — SIGNIFICANT CHANGE UP (ref 150–400)
POTASSIUM SERPL-MCNC: 4.6 MMOL/L — SIGNIFICANT CHANGE UP (ref 3.5–5.3)
POTASSIUM SERPL-SCNC: 4.6 MMOL/L — SIGNIFICANT CHANGE UP (ref 3.5–5.3)
RBC # BLD: 3.58 M/UL — LOW (ref 3.8–5.2)
RBC # FLD: 19 % — HIGH (ref 10.3–14.5)
SODIUM SERPL-SCNC: 135 MMOL/L — SIGNIFICANT CHANGE UP (ref 135–145)
WBC # BLD: 12.16 K/UL — HIGH (ref 3.8–10.5)
WBC # FLD AUTO: 12.16 K/UL — HIGH (ref 3.8–10.5)

## 2025-06-22 PROCEDURE — 99233 SBSQ HOSP IP/OBS HIGH 50: CPT

## 2025-06-22 RX ORDER — VANCOMYCIN HCL IN 5 % DEXTROSE 1.5G/250ML
750 PLASTIC BAG, INJECTION (ML) INTRAVENOUS EVERY 24 HOURS
Refills: 0 | Status: DISCONTINUED | OUTPATIENT
Start: 2025-06-22 | End: 2025-06-24

## 2025-06-22 RX ORDER — TOUCHLESS CARE ZINC OXIDE PROTECTANT 20; 25 G/100G; G/100G
1 SPRAY TOPICAL DAILY
Refills: 0 | Status: DISCONTINUED | OUTPATIENT
Start: 2025-06-22 | End: 2025-06-26

## 2025-06-22 RX ADMIN — APIXABAN 2.5 MILLIGRAM(S): 2.5 TABLET, FILM COATED ORAL at 22:15

## 2025-06-22 RX ADMIN — Medication 1 LOZENGE: at 05:42

## 2025-06-22 RX ADMIN — TIOTROPIUM BROMIDE INHALATION SPRAY 2 PUFF(S): 3.12 SPRAY, METERED RESPIRATORY (INHALATION) at 11:34

## 2025-06-22 RX ADMIN — Medication 2 TABLET(S): at 21:18

## 2025-06-22 RX ADMIN — APIXABAN 2.5 MILLIGRAM(S): 2.5 TABLET, FILM COATED ORAL at 10:44

## 2025-06-22 RX ADMIN — Medication 40 MILLIGRAM(S): at 08:05

## 2025-06-22 RX ADMIN — AMPICILLIN SODIUM AND SULBACTAM SODIUM 200 GRAM(S): 1; .5 INJECTION, POWDER, FOR SOLUTION INTRAMUSCULAR; INTRAVENOUS at 11:25

## 2025-06-22 RX ADMIN — MONTELUKAST SODIUM 10 MILLIGRAM(S): 10 TABLET ORAL at 11:24

## 2025-06-22 RX ADMIN — AMPICILLIN SODIUM AND SULBACTAM SODIUM 200 GRAM(S): 1; .5 INJECTION, POWDER, FOR SOLUTION INTRAMUSCULAR; INTRAVENOUS at 17:26

## 2025-06-22 RX ADMIN — Medication 1 APPLICATION(S): at 06:11

## 2025-06-22 RX ADMIN — Medication 650 MILLIGRAM(S): at 12:37

## 2025-06-22 RX ADMIN — LISINOPRIL 50 MILLIGRAM(S): 30 TABLET ORAL at 05:42

## 2025-06-22 RX ADMIN — Medication 1 DOSE(S): at 05:42

## 2025-06-22 RX ADMIN — AMPICILLIN SODIUM AND SULBACTAM SODIUM 200 GRAM(S): 1; .5 INJECTION, POWDER, FOR SOLUTION INTRAMUSCULAR; INTRAVENOUS at 01:08

## 2025-06-22 RX ADMIN — AMLODIPINE BESYLATE 10 MILLIGRAM(S): 10 TABLET ORAL at 05:42

## 2025-06-22 RX ADMIN — AMPICILLIN SODIUM AND SULBACTAM SODIUM 200 GRAM(S): 1; .5 INJECTION, POWDER, FOR SOLUTION INTRAMUSCULAR; INTRAVENOUS at 05:42

## 2025-06-22 RX ADMIN — Medication 250 MILLIGRAM(S): at 11:24

## 2025-06-22 RX ADMIN — Medication 3 MILLIGRAM(S): at 21:19

## 2025-06-22 RX ADMIN — Medication 1 DOSE(S): at 17:21

## 2025-06-22 RX ADMIN — Medication 250 MILLIGRAM(S): at 12:30

## 2025-06-22 RX ADMIN — Medication 1 LOZENGE: at 17:26

## 2025-06-22 RX ADMIN — Medication 650 MILLIGRAM(S): at 11:24

## 2025-06-22 NOTE — PHYSICAL THERAPY INITIAL EVALUATION ADULT - BED MOBILITY TRAINING, PT EVAL
Dejon in bed mobility- supine<>sit, rolling side<>side observing proper body mechanics, proper positioning, body alignment and precautions.
(0) independent

## 2025-06-22 NOTE — PHYSICAL THERAPY INITIAL EVALUATION ADULT - GENERAL OBSERVATIONS, REHAB EVAL
Pt recd in garvey's position nad c son Juan at bedside. Pt on O2 at 2L via n/c and doing mouth  breathing. Son provided Turkish translation as pt has difficulty talking and ,gets R06.02 Shortness of Breath c talking with cognitive deficits and keeps eyes closed most of the session . Son requested that physical therapy sessions be done in morning  in his presence b/w 10 AM- 1PM as he works PM shift in [facility]. He requested that he be called  and given 40 min to come  for PT session- [phone]. He is aware that in his absence his mom would decline PT session. Noted partial R eye opening- per son complication of eye surgery  Pt note dto get fatigued with minimal activities and talking . Took numerous rest breaks throughout session

## 2025-06-22 NOTE — PROGRESS NOTE ADULT - ASSESSMENT
69F w/ COPD, PE, HFpEF, Afib, liver mass admitted to ICU acute hypercapnic respiratory failure due to COPD exacerbation and LLL mucous plugging requiring NIV, transferred to medical floor then upgraded again to ICU for NIV. Also developed R facial swelling concerning for parotits vs. sialothiasis, improving on IV antibiotics and ID following. Respiratory status stable off BIPAP.    # Acute on Chronic Respiratory Failure with Hypoxia and Hypercarbia - on 2L NC satting 96%, titrate to room air as tolerated. obtain VBG to assess CO2 retention off BIPAP as requested by pulm.   # Acute COPD Exacerbation - improved with minimal use of BIPAP.  O2.  Continue Advair, Spiriva, Nebs, Steroids.  Short course of Zithromax.    # ? Mumps / Parotitis - On Unasyn / Vancomycin  # Constipation - + BM.  Contnue Laxatives.  # H/o PE - continue AC  # Chronic Diastolic CHF - appears to be euvolemic.  Monitor volume status.  # Chronic Afib - continue rate control.  continue anticoagulation.   # Dementia - supportive care, at baseline per son.  # Liver Mass- will need outpatient f/u, pending GOC  # Inpatient DVT Proph - on anticoagulation     Palliative evaluation    Plan of care d/w son "MD Valentine" at [phone]  Time spent by me managing the patient including but not limited to reviewing the chart, discussion with consultants, the IDR team (nurse//care manager/ACP), physical exam and assessment and plan is 55 mins  69F w/ COPD, PE, HFpEF, Afib, liver mass admitted to ICU acute hypercapnic respiratory failure due to COPD exacerbation and LLL mucous plugging requiring NIV, transferred to medical floor then upgraded again to ICU for NIV. Also developed R facial swelling concerning for parotits vs. sialothiasis, improving on IV antibiotics and ID following. Respiratory status stable off BIPAP.    # IMPROVED Acute on Chronic Respiratory Failure with Hypoxia and Hypercarbia - on 2L NC satting 96%, titrate to room air as tolerated. Per son obtain VBG to assess CO2 retention off BIPAP as requested by pulm.   # Acute COPD Exacerbation - improved with minimal use of BIPAP.  O2.  Continue Advair, Spiriva, Nebs, Steroids.  Short course of Zithromax.    # ? Mumps / Parotitis vs. sialolithiasis - leukocytosis improving. On Unasyn / Vancomycin. ID reccs appreciated  # Constipation - + BM.  Contnue Laxatives PRN  # H/o PE - continue AC  # Chronic Diastolic CHF - appears to be euvolemic.  Monitor volume status.  # Chronic Afib - continue rate control.  continue anticoagulation.   # Dementia - supportive care, at baseline per son.  # Liver Mass- will need outpatient f/u  # Inpatient DVT Proph - on anticoagulation     Palliative evaluation    Plan of care d/w son at bedside. looking forward to taking patient home under homecare. follow up with VBG to ensure CO2 is in acceptable range and follow up with  ID re: transitioning to po antibitoics  patient requires pulm f/u for CPAP and/or BIPAP at home.

## 2025-06-22 NOTE — PHYSICAL THERAPY INITIAL EVALUATION ADULT - GAIT TRAINING, PT EVAL
Pt will be able to ambulate independently using assistive device up to 20 ft or more,  safely observing proper gait, posture and prevent falls.

## 2025-06-22 NOTE — PHYSICAL THERAPY INITIAL EVALUATION ADULT - ADDITIONAL COMMENTS
Lives in a  with 3 steps to enter. Pt has O2 concentrator at home for Home O2 2l. Pt has [facility] home health aide 75hr/week . She is never left alone and family always there to assist. Pt is carried in w/c on steps

## 2025-06-22 NOTE — PHYSICAL THERAPY INITIAL EVALUATION ADULT - TRANSFER TRAINING, PT EVAL
Dejon in  transfer ability bed to chair and vice versa using appropriate assistive device  and prevent falls.

## 2025-06-22 NOTE — PHYSICAL THERAPY INITIAL EVALUATION ADULT - AMBULATION SKILLS, REHAB EVAL
Ambulation fluctuates - ATtimes pt can walk 8-10 steps slowly c CS , other times needs 2 person assist for bed to chair xfer and  cannot walk . On average she  can take 4-5 steps c 2 person assist using rolling walker/needs device and assist

## 2025-06-22 NOTE — PROGRESS NOTE ADULT - SUBJECTIVE AND OBJECTIVE BOX
PHYSICAL EXAM:    Vital Signs Last 24 Hrs  T(F): 98.3 (22 Jun 2025 05:00), Max: 98.3 (22 Jun 2025 05:00)  HR: 65 (22 Jun 2025 08:00) (57 - 90)  BP: 126/69 (22 Jun 2025 05:00) (120/70 - 127/76)  RR: 17 (22 Jun 2025 05:00) (17 - 18)  SpO2: 95% (22 Jun 2025 08:00) (94% - 98%)  I&O's Summary    21 Jun 2025 07:01  -  22 Jun 2025 07:00  --------------------------------------------------------  IN: 0 mL / OUT: 500 mL / NET: -500 mL        GENERAL: NAD  HEENT: NCAT  CHEST/LUNG: No increased WOB, Clear to percussion bilaterally; No rales, rhonchi, wheezing  HEART: Regular rate and rhythm; No murmurs  ABDOMEN: Soft, Nontender, Nondistended; Bowel sounds present  MUSCULOSKELETAL/EXTREMITIES:  2+ Peripheral Pulses, No LE edema  PSYCH: Appropriate affect, Alert & Oriented    LABS:                        8.2    12.16 )-----------( 195      ( 22 Jun 2025 07:45 )             29.4       06-22    135  |  104  |  21  ----------------------------<  88  4.6   |  25  |  1.18    Ca    7.8      22 Jun 2025 07:45  Phos  3.8     06-21  Mg     2.2     06-21    TPro  5.7  /  Alb  2.1  /  TBili  0.3  /  DBili  x   /  AST  15  /  ALT  19  /  AlkPhos  65  06-21                                  Urinalysis Basic - ( 22 Jun 2025 07:45 )    Color: x / Appearance: x / SG: x / pH: x  Gluc: 88 mg/dL / Ketone: x  / Bili: x / Urobili: x   Blood: x / Protein: x / Nitrite: x   Leuk Esterase: x / RBC: x / WBC x   Sq Epi: x / Non Sq Epi: x / Bacteria: x            MEDICATIONS  (STANDING):  amLODIPine   Tablet 10 milliGRAM(s) Oral daily  ampicillin/sulbactam  IVPB 3 Gram(s) IV Intermittent every 6 hours  apixaban 2.5 milliGRAM(s) Oral every 12 hours  atenolol  Tablet 50 milliGRAM(s) Oral daily  azithromycin   Tablet 250 milliGRAM(s) Oral daily  benzocaine/menthol Lozenge 1 Lozenge Oral two times a day  chlorhexidine 2% Cloths 1 Application(s) Topical <User Schedule>  fluticasone propionate/ salmeterol 500-50 MICROgram(s) Diskus 1 Dose(s) Inhalation two times a day  melatonin 3 milliGRAM(s) Oral at bedtime  montelukast 10 milliGRAM(s) Oral daily  pantoprazole    Tablet 40 milliGRAM(s) Oral before breakfast  polyethylene glycol 3350 17 Gram(s) Oral every 24 hours  senna 2 Tablet(s) Oral at bedtime  tiotropium 2.5 MICROgram(s) Inhaler 2 Puff(s) Inhalation daily  vancomycin  IVPB 750 milliGRAM(s) IV Intermittent every 24 hours    MEDICATIONS  (PRN):  acetaminophen     Tablet .. 650 milliGRAM(s) Oral every 6 hours PRN Temp greater or equal to 38C (100.4F), Mild Pain (1 - 3)  albuterol/ipratropium for Nebulization 3 milliLiter(s) Nebulizer every 6 hours PRN Shortness of Breath and/or Wheezing  aluminum hydroxide/magnesium hydroxide/simethicone Suspension 30 milliLiter(s) Oral every 4 hours PRN Dyspepsia  bisacodyl 5 milliGRAM(s) Oral every 12 hours PRN Constipation  guaiFENesin Oral Liquid (Sugar-Free) 200 milliGRAM(s) Oral every 6 hours PRN Cough      Care Discussed with Consultants/Other Providers: Yes   extensive discussion with son [facility] language  reporting patient is complaining of discomfort at primafit insertion and also "burning" in rectal area  has had soft stools since initiation of bowel regimen  per nursing patient mainly eats food from home.      PHYSICAL EXAM:    Vital Signs Last 24 Hrs  T(F): 98.3 (22 Jun 2025 05:00), Max: 98.3 (22 Jun 2025 05:00)  HR: 65 (22 Jun 2025 08:00) (57 - 90)  BP: 126/69 (22 Jun 2025 05:00) (120/70 - 127/76)  RR: 17 (22 Jun 2025 05:00) (17 - 18)  SpO2: 95% (22 Jun 2025 08:00) (94% - 98%)  I&O's Summary    21 Jun 2025 07:01  -  22 Jun 2025 07:00  --------------------------------------------------------  IN: 0 mL / OUT: 500 mL / NET: -500 mL        GENERAL: NAD  HEENT: NCAT  CHEST/LUNG: No increased WOB, Clear to percussion bilaterally; No rales, rhonchi, wheezing  HEART: Regular rate and rhythm; No murmurs  ABDOMEN: Soft, Nontender, Nondistended; Bowel sounds present  MUSCULOSKELETAL/EXTREMITIES:  2+ Peripheral Pulses, No LE edema  PSYCH: Appropriate affect, Alert & Oriented    LABS:                        8.2    12.16 )-----------( 195      ( 22 Jun 2025 07:45 )             29.4       06-22    135  |  104  |  21  ----------------------------<  88  4.6   |  25  |  1.18    Ca    7.8      22 Jun 2025 07:45  Phos  3.8     06-21  Mg     2.2     06-21    TPro  5.7  /  Alb  2.1  /  TBili  0.3  /  DBili  x   /  AST  15  /  ALT  19  /  AlkPhos  65  06-21                                  Urinalysis Basic - ( 22 Jun 2025 07:45 )    Color: x / Appearance: x / SG: x / pH: x  Gluc: 88 mg/dL / Ketone: x  / Bili: x / Urobili: x   Blood: x / Protein: x / Nitrite: x   Leuk Esterase: x / RBC: x / WBC x   Sq Epi: x / Non Sq Epi: x / Bacteria: x            MEDICATIONS  (STANDING):  amLODIPine   Tablet 10 milliGRAM(s) Oral daily  ampicillin/sulbactam  IVPB 3 Gram(s) IV Intermittent every 6 hours  apixaban 2.5 milliGRAM(s) Oral every 12 hours  atenolol  Tablet 50 milliGRAM(s) Oral daily  azithromycin   Tablet 250 milliGRAM(s) Oral daily  benzocaine/menthol Lozenge 1 Lozenge Oral two times a day  chlorhexidine 2% Cloths 1 Application(s) Topical <User Schedule>  fluticasone propionate/ salmeterol 500-50 MICROgram(s) Diskus 1 Dose(s) Inhalation two times a day  melatonin 3 milliGRAM(s) Oral at bedtime  montelukast 10 milliGRAM(s) Oral daily  pantoprazole    Tablet 40 milliGRAM(s) Oral before breakfast  polyethylene glycol 3350 17 Gram(s) Oral every 24 hours  senna 2 Tablet(s) Oral at bedtime  tiotropium 2.5 MICROgram(s) Inhaler 2 Puff(s) Inhalation daily  vancomycin  IVPB 750 milliGRAM(s) IV Intermittent every 24 hours    MEDICATIONS  (PRN):  acetaminophen     Tablet .. 650 milliGRAM(s) Oral every 6 hours PRN Temp greater or equal to 38C (100.4F), Mild Pain (1 - 3)  albuterol/ipratropium for Nebulization 3 milliLiter(s) Nebulizer every 6 hours PRN Shortness of Breath and/or Wheezing  aluminum hydroxide/magnesium hydroxide/simethicone Suspension 30 milliLiter(s) Oral every 4 hours PRN Dyspepsia  bisacodyl 5 milliGRAM(s) Oral every 12 hours PRN Constipation  guaiFENesin Oral Liquid (Sugar-Free) 200 milliGRAM(s) Oral every 6 hours PRN Cough      Care Discussed with Consultants/Other Providers: Yes

## 2025-06-22 NOTE — PHYSICAL THERAPY INITIAL EVALUATION ADULT - NSPTDISCHREC_GEN_A_CORE
Recommend [facility] but son reported unpleasant experiences with [facility]- wants Home PT only - pt has extensive family support/Home PT

## 2025-06-23 LAB
ANION GAP SERPL CALC-SCNC: 7 MMOL/L — SIGNIFICANT CHANGE UP (ref 5–17)
BASE EXCESS BLDV CALC-SCNC: 1.2 MMOL/L — SIGNIFICANT CHANGE UP (ref -2–3)
BLOOD GAS COMMENTS, VENOUS: SIGNIFICANT CHANGE UP
BUN SERPL-MCNC: 15 MG/DL — SIGNIFICANT CHANGE UP (ref 7–23)
CALCIUM SERPL-MCNC: 8.6 MG/DL — SIGNIFICANT CHANGE UP (ref 8.5–10.1)
CHLORIDE BLDV-SCNC: 103 MMOL/L — SIGNIFICANT CHANGE UP (ref 98–107)
CHLORIDE SERPL-SCNC: 104 MMOL/L — SIGNIFICANT CHANGE UP (ref 96–108)
CO2 BLDV-SCNC: 27 MMOL/L — HIGH (ref 22–26)
CO2 SERPL-SCNC: 24 MMOL/L — SIGNIFICANT CHANGE UP (ref 22–31)
CREAT SERPL-MCNC: 1.19 MG/DL — SIGNIFICANT CHANGE UP (ref 0.5–1.3)
EGFR: 49 ML/MIN/1.73M2 — LOW
EGFR: 49 ML/MIN/1.73M2 — LOW
GAS PNL BLDV: 132 MMOL/L — LOW (ref 136–145)
GAS PNL BLDV: SIGNIFICANT CHANGE UP
GAS PNL BLDV: SIGNIFICANT CHANGE UP
GLUCOSE BLDV-MCNC: 89 MG/DL — SIGNIFICANT CHANGE UP (ref 65–95)
GLUCOSE SERPL-MCNC: 92 MG/DL — SIGNIFICANT CHANGE UP (ref 70–99)
HCO3 BLDV-SCNC: 26 MMOL/L — SIGNIFICANT CHANGE UP (ref 22–28)
HCT VFR BLD CALC: 28 % — LOW (ref 34.5–45)
HCT VFR BLDA CALC: 26 % — LOW (ref 37–47)
HGB BLD CALC-MCNC: 8.5 G/DL — LOW (ref 11.7–16.1)
HGB BLD-MCNC: 8.2 G/DL — LOW (ref 11.5–15.5)
LACTATE BLDV-MCNC: 0.7 MMOL/L — SIGNIFICANT CHANGE UP (ref 0.56–1.39)
MCHC RBC-ENTMCNC: 24.1 PG — LOW (ref 27–34)
MCHC RBC-ENTMCNC: 29.3 G/DL — LOW (ref 32–36)
MCV RBC AUTO: 82.4 FL — SIGNIFICANT CHANGE UP (ref 80–100)
NRBC BLD AUTO-RTO: 0 /100 WBCS — SIGNIFICANT CHANGE UP (ref 0–0)
PCO2 BLDV: 41 MMHG — LOW (ref 42–55)
PH BLDV: 7.41 — SIGNIFICANT CHANGE UP (ref 7.32–7.43)
PLATELET # BLD AUTO: 204 K/UL — SIGNIFICANT CHANGE UP (ref 150–400)
PO2 BLDV: 34 MMHG — SIGNIFICANT CHANGE UP (ref 25–45)
POTASSIUM BLDV-SCNC: 5 MMOL/L — SIGNIFICANT CHANGE UP (ref 3.5–5.1)
POTASSIUM SERPL-MCNC: 4.4 MMOL/L — SIGNIFICANT CHANGE UP (ref 3.5–5.3)
POTASSIUM SERPL-SCNC: 4.4 MMOL/L — SIGNIFICANT CHANGE UP (ref 3.5–5.3)
RBC # BLD: 3.4 M/UL — LOW (ref 3.8–5.2)
RBC # FLD: 19.4 % — HIGH (ref 10.3–14.5)
SAO2 % BLDV: 57.9 % — LOW (ref 94–98)
SODIUM SERPL-SCNC: 135 MMOL/L — SIGNIFICANT CHANGE UP (ref 135–145)
VANCOMYCIN TROUGH SERPL-MCNC: 15.5 UG/ML — SIGNIFICANT CHANGE UP (ref 10–20)
WBC # BLD: 10.64 K/UL — HIGH (ref 3.8–10.5)
WBC # FLD AUTO: 10.64 K/UL — HIGH (ref 3.8–10.5)

## 2025-06-23 PROCEDURE — 99232 SBSQ HOSP IP/OBS MODERATE 35: CPT

## 2025-06-23 PROCEDURE — G0545: CPT

## 2025-06-23 PROCEDURE — 99233 SBSQ HOSP IP/OBS HIGH 50: CPT | Mod: FS

## 2025-06-23 RX ORDER — CALCIUM CARBONATE/VITAMIN D3 500MG-5MCG
1 TABLET ORAL
Refills: 0 | DISCHARGE

## 2025-06-23 RX ORDER — SERTRALINE 100 MG/1
1 TABLET, FILM COATED ORAL
Refills: 0 | DISCHARGE

## 2025-06-23 RX ORDER — BUDESONIDE 0.25 MG/2ML
2 SUSPENSION RESPIRATORY (INHALATION)
Refills: 0 | DISCHARGE

## 2025-06-23 RX ORDER — ROFLUMILAST 250 UG/1
1 TABLET ORAL
Refills: 0 | DISCHARGE

## 2025-06-23 RX ADMIN — Medication 250 MILLIGRAM(S): at 13:45

## 2025-06-23 RX ADMIN — AMPICILLIN SODIUM AND SULBACTAM SODIUM 200 GRAM(S): 1; .5 INJECTION, POWDER, FOR SOLUTION INTRAMUSCULAR; INTRAVENOUS at 11:30

## 2025-06-23 RX ADMIN — AMLODIPINE BESYLATE 10 MILLIGRAM(S): 10 TABLET ORAL at 05:09

## 2025-06-23 RX ADMIN — Medication 1 APPLICATION(S): at 05:10

## 2025-06-23 RX ADMIN — Medication 650 MILLIGRAM(S): at 22:02

## 2025-06-23 RX ADMIN — Medication 2 TABLET(S): at 22:00

## 2025-06-23 RX ADMIN — Medication 1 DOSE(S): at 18:20

## 2025-06-23 RX ADMIN — MONTELUKAST SODIUM 10 MILLIGRAM(S): 10 TABLET ORAL at 11:30

## 2025-06-23 RX ADMIN — TIOTROPIUM BROMIDE INHALATION SPRAY 2 PUFF(S): 3.12 SPRAY, METERED RESPIRATORY (INHALATION) at 12:05

## 2025-06-23 RX ADMIN — LISINOPRIL 50 MILLIGRAM(S): 30 TABLET ORAL at 05:09

## 2025-06-23 RX ADMIN — APIXABAN 2.5 MILLIGRAM(S): 2.5 TABLET, FILM COATED ORAL at 22:01

## 2025-06-23 RX ADMIN — Medication 1 LOZENGE: at 17:04

## 2025-06-23 RX ADMIN — Medication 1 DOSE(S): at 05:10

## 2025-06-23 RX ADMIN — APIXABAN 2.5 MILLIGRAM(S): 2.5 TABLET, FILM COATED ORAL at 11:30

## 2025-06-23 RX ADMIN — Medication 250 MILLIGRAM(S): at 11:30

## 2025-06-23 RX ADMIN — POLYETHYLENE GLYCOL 3350 17 GRAM(S): 17 POWDER, FOR SOLUTION ORAL at 13:46

## 2025-06-23 RX ADMIN — AMPICILLIN SODIUM AND SULBACTAM SODIUM 200 GRAM(S): 1; .5 INJECTION, POWDER, FOR SOLUTION INTRAMUSCULAR; INTRAVENOUS at 00:18

## 2025-06-23 RX ADMIN — TOUCHLESS CARE ZINC OXIDE PROTECTANT 1 APPLICATION(S): 20; 25 SPRAY TOPICAL at 13:45

## 2025-06-23 RX ADMIN — AMPICILLIN SODIUM AND SULBACTAM SODIUM 200 GRAM(S): 1; .5 INJECTION, POWDER, FOR SOLUTION INTRAMUSCULAR; INTRAVENOUS at 05:09

## 2025-06-23 RX ADMIN — Medication 40 MILLIGRAM(S): at 09:00

## 2025-06-23 RX ADMIN — Medication 650 MILLIGRAM(S): at 23:02

## 2025-06-23 RX ADMIN — Medication 3 MILLIGRAM(S): at 22:00

## 2025-06-23 NOTE — PROGRESS NOTE ADULT - ASSESSMENT
69F w/ COPD, PE, HFpEF, Afib, liver mass admitted to ICU acute hypercapnic respiratory failure due to COPD exacerbation and LLL mucous plugging requiring NIV, transferred to medical floor then upgraded again to ICU for NIV. Also developed R facial swelling concerning for parotits vs. sialothiasis, improving on IV antibiotics and ID following. Respiratory status stable off BIPAP.    # IMPROVED Acute on Chronic Respiratory Failure with Hypoxia and Hypercarbia - on 2L NC satting 96%, titrate to room air as tolerated. Per son obtain VBG to assess CO2 retention off BIPAP as requested by pulm.   # Acute COPD Exacerbation - improved with minimal use of BIPAP.  O2.  Continue Advair, Spiriva, Nebs, Steroids.  Short course of Zithromax.    # ? Mumps / Parotitis vs. sialolithiasis - leukocytosis improving. On Unasyn / Vancomycin and PO AZT. ID recs appreciated  # Constipation - + BM.  Contnue Laxatives PRN  # H/o PE - continue AC  # Chronic Diastolic CHF - appears to be euvolemic.  Monitor volume status.  # Chronic Afib - continue rate control.  continue anticoagulation.   # Dementia - supportive care, at baseline per son.  # Liver Mass- will need outpatient f/u  # Inpatient DVT Proph - on anticoagulation     Palliative evaluation    Dispo- Pending DME (CPAP machine, wheelchair, and walker)   Updated patient's son, Mandy ([phone]) on 6/23.

## 2025-06-23 NOTE — CHART NOTE - NSCHARTNOTEFT_GEN_A_CORE
Pt is being discharged on mechanical ventilation, patient has chronic respiratory failure secondary to COPD. BIPAP in [facility] has been considered and ruled out as it could not properly control patients CO2 levels, patient remained hypercapnic, recent ABG's show PCO2 levels at 55mmhg on 6/10/2025. Therefore, patient will require guaranteed augmented targeted tidal volumes which is not available on any homecare PAP devices, mechanical ventilation will be ordered to minimize further clinical decline and high risk for hospital readmission.

## 2025-06-23 NOTE — PROGRESS NOTE ADULT - SUBJECTIVE AND OBJECTIVE BOX
Patient is a 69y old  Female who presents with a chief complaint of Asthma Exacerbation (23 Jun 2025 11:02)    INTERVAL HPI/OVERNIGHT EVENTS: No acute events overnight. HD stable.     MEDICATIONS  (STANDING):  amLODIPine   Tablet 10 milliGRAM(s) Oral daily  ampicillin/sulbactam  IVPB 3 Gram(s) IV Intermittent every 6 hours  apixaban 2.5 milliGRAM(s) Oral every 12 hours  atenolol  Tablet 50 milliGRAM(s) Oral daily  azithromycin   Tablet 250 milliGRAM(s) Oral daily  benzocaine/menthol Lozenge 1 Lozenge Oral two times a day  chlorhexidine 2% Cloths 1 Application(s) Topical <User Schedule>  fluticasone propionate/ salmeterol 500-50 MICROgram(s) Diskus 1 Dose(s) Inhalation two times a day  melatonin 3 milliGRAM(s) Oral at bedtime  montelukast 10 milliGRAM(s) Oral daily  pantoprazole    Tablet 40 milliGRAM(s) Oral before breakfast  polyethylene glycol 3350 17 Gram(s) Oral every 24 hours  senna 2 Tablet(s) Oral at bedtime  tiotropium 2.5 MICROgram(s) Inhaler 2 Puff(s) Inhalation daily  vancomycin  IVPB 750 milliGRAM(s) IV Intermittent every 24 hours  zinc oxide 40% Paste 1 Application(s) Topical daily    MEDICATIONS  (PRN):  acetaminophen     Tablet .. 650 milliGRAM(s) Oral every 6 hours PRN Temp greater or equal to 38C (100.4F), Mild Pain (1 - 3)  albuterol/ipratropium for Nebulization 3 milliLiter(s) Nebulizer every 6 hours PRN Shortness of Breath and/or Wheezing  aluminum hydroxide/magnesium hydroxide/simethicone Suspension 30 milliLiter(s) Oral every 4 hours PRN Dyspepsia  bisacodyl 5 milliGRAM(s) Oral every 12 hours PRN Constipation  guaiFENesin Oral Liquid (Sugar-Free) 200 milliGRAM(s) Oral every 6 hours PRN Cough      Allergies    No Known Allergies    Intolerances        REVIEW OF SYSTEMS: all negative with exception of above    Vital Signs Last 24 Hrs  T(C): 36.5 (23 Jun 2025 10:36), Max: 36.8 (22 Jun 2025 17:07)  T(F): 97.7 (23 Jun 2025 10:36), Max: 98.3 (23 Jun 2025 05:00)  HR: 68 (23 Jun 2025 10:36) (66 - 91)  BP: 141/78 (23 Jun 2025 10:36) (114/71 - 141/78)  BP(mean): --  RR: 18 (23 Jun 2025 10:36) (17 - 18)  SpO2: 94% (23 Jun 2025 10:36) (94% - 98%)    Parameters below as of 23 Jun 2025 10:36  Patient On (Oxygen Delivery Method): nasal cannula  O2 Flow (L/min): 2      PHYSICAL EXAM:  GENERAL: NAD, well-groomed, well-developed  HEAD:  Atraumatic, Normocephalic  EYES: EOMI, PERRLA, conjunctiva and sclera clear  ENMT: No tonsillar erythema, exudates, or enlargement; Moist mucous membranes, Good dentition, No lesions  NECK: Supple, No JVD, Normal thyroid  NERVOUS SYSTEM:  Alert & Oriented X3, Good concentration; Motor Strength 5/5 B/L upper and lower extremities; DTRs 2+ intact and symmetric  CHEST/LUNG: Clear to percussion bilaterally; No rales, rhonchi, wheezing, or rubs  HEART: Regular rate and rhythm; No murmurs, rubs, or gallops  ABDOMEN: Soft, Nontender, Nondistended; Bowel sounds present  EXTREMITIES:  2+ Peripheral Pulses, No clubbing, cyanosis, or edema  LYMPH: No lymphadenopathy noted  SKIN: No rashes or lesions    LABS:                        8.2    10.64 )-----------( 204      ( 23 Jun 2025 07:05 )             28.0     06-23    135  |  104  |  15  ----------------------------<  92  4.4   |  24  |  1.19    Ca    8.6      23 Jun 2025 07:05        Urinalysis Basic - ( 23 Jun 2025 07:05 )    Color: x / Appearance: x / SG: x / pH: x  Gluc: 92 mg/dL / Ketone: x  / Bili: x / Urobili: x   Blood: x / Protein: x / Nitrite: x   Leuk Esterase: x / RBC: x / WBC x   Sq Epi: x / Non Sq Epi: x / Bacteria: x      CAPILLARY BLOOD GLUCOSE          RADIOLOGY & ADDITIONAL TESTS:    Imaging Personally Reviewed:  [ ] YES  [ ] NO    Consultant(s) Notes Reviewed:  [ ] YES  [ ] NO    Care Discussed with Consultants/Other Providers [ ] YES  [ ] NO

## 2025-06-23 NOTE — PROGRESS NOTE ADULT - SUBJECTIVE AND OBJECTIVE BOX
INTERVAL HPI/OVERNIGHT EVENTS: transferred out of icu to medical floor on NC    SUBJECTIVE: Patient seen and examined at bedside.     ROS: All negative except as listed above.    VITAL SIGNS:  ICU Vital Signs Last 24 Hrs  T(C): 36.8 (23 Jun 2025 05:00), Max: 36.8 (22 Jun 2025 17:07)  T(F): 98.3 (23 Jun 2025 05:00), Max: 98.3 (23 Jun 2025 05:00)  HR: 91 (23 Jun 2025 05:00) (65 - 91)  BP: 116/72 (23 Jun 2025 05:00) (114/71 - 143/77)  BP(mean): --  ABP: --  ABP(mean): --  RR: 17 (23 Jun 2025 05:00) (17 - 17)  SpO2: 97% (23 Jun 2025 05:00) (93% - 98%)    O2 Parameters below as of 23 Jun 2025 05:00  Patient On (Oxygen Delivery Method): nasal cannula  O2 Flow (L/min): 2        06-22 @ 07:01  -  06-23 @ 07:00  --------------------------------------------------------  IN: 0 mL / OUT: 800 mL / NET: -800 mL      CAPILLARY BLOOD GLUCOSE          ECG: reviewed.    PHYSICAL EXAM:    Gen: lying comfortably in bed in no apparent distress  HEENT: PERRL, EOMI  Resp: CTA B/L no c/r/w  CVS: S1S2 no m/r/g  Abd: soft NT/ND +BS  Ext: no c/c/e  Neuro: A&Ox3    MEDICATIONS:  MEDICATIONS  (STANDING):  amLODIPine   Tablet 10 milliGRAM(s) Oral daily  ampicillin/sulbactam  IVPB 3 Gram(s) IV Intermittent every 6 hours  apixaban 2.5 milliGRAM(s) Oral every 12 hours  atenolol  Tablet 50 milliGRAM(s) Oral daily  azithromycin   Tablet 250 milliGRAM(s) Oral daily  benzocaine/menthol Lozenge 1 Lozenge Oral two times a day  chlorhexidine 2% Cloths 1 Application(s) Topical <User Schedule>  fluticasone propionate/ salmeterol 500-50 MICROgram(s) Diskus 1 Dose(s) Inhalation two times a day  melatonin 3 milliGRAM(s) Oral at bedtime  montelukast 10 milliGRAM(s) Oral daily  pantoprazole    Tablet 40 milliGRAM(s) Oral before breakfast  polyethylene glycol 3350 17 Gram(s) Oral every 24 hours  senna 2 Tablet(s) Oral at bedtime  tiotropium 2.5 MICROgram(s) Inhaler 2 Puff(s) Inhalation daily  vancomycin  IVPB 750 milliGRAM(s) IV Intermittent every 24 hours  zinc oxide 40% Paste 1 Application(s) Topical daily    MEDICATIONS  (PRN):  acetaminophen     Tablet .. 650 milliGRAM(s) Oral every 6 hours PRN Temp greater or equal to 38C (100.4F), Mild Pain (1 - 3)  albuterol/ipratropium for Nebulization 3 milliLiter(s) Nebulizer every 6 hours PRN Shortness of Breath and/or Wheezing  aluminum hydroxide/magnesium hydroxide/simethicone Suspension 30 milliLiter(s) Oral every 4 hours PRN Dyspepsia  bisacodyl 5 milliGRAM(s) Oral every 12 hours PRN Constipation  guaiFENesin Oral Liquid (Sugar-Free) 200 milliGRAM(s) Oral every 6 hours PRN Cough      ALLERGIES:  Allergies    No Known Allergies    Intolerances        LABS:                        8.2    12.16 )-----------( 195      ( 22 Jun 2025 07:45 )             29.4     06-22    135  |  104  |  21  ----------------------------<  88  4.6   |  25  |  1.18    Ca    7.8[L]      22 Jun 2025 07:45  Phos  3.8     06-21  Mg     2.2     06-21    TPro  5.7[L]  /  Alb  2.1[L]  /  TBili  0.3  /  DBili  x   /  AST  15  /  ALT  19  /  AlkPhos  65  06-21      Urinalysis Basic - ( 22 Jun 2025 07:45 )    Color: x / Appearance: x / SG: x / pH: x  Gluc: 88 mg/dL / Ketone: x  / Bili: x / Urobili: x   Blood: x / Protein: x / Nitrite: x   Leuk Esterase: x / RBC: x / WBC x   Sq Epi: x / Non Sq Epi: x / Bacteria: x              RADIOLOGY & ADDITIONAL TESTS: Reviewed.   INTERVAL HPI/OVERNIGHT EVENTS: transferred out of icu to medical floor on NC    SUBJECTIVE: Patient seen and examined at bedside. Pt is comfortable on 4LNC. Denies any sob or chest pain.    ROS: All negative except as listed above.    VITAL SIGNS:  ICU Vital Signs Last 24 Hrs  T(C): 36.8 (23 Jun 2025 05:00), Max: 36.8 (22 Jun 2025 17:07)  T(F): 98.3 (23 Jun 2025 05:00), Max: 98.3 (23 Jun 2025 05:00)  HR: 91 (23 Jun 2025 05:00) (65 - 91)  BP: 116/72 (23 Jun 2025 05:00) (114/71 - 143/77)  BP(mean): --  ABP: --  ABP(mean): --  RR: 17 (23 Jun 2025 05:00) (17 - 17)  SpO2: 97% (23 Jun 2025 05:00) (93% - 98%)    O2 Parameters below as of 23 Jun 2025 05:00  Patient On (Oxygen Delivery Method): nasal cannula  O2 Flow (L/min): 2        06-22 @ 07:01  -  06-23 @ 07:00  --------------------------------------------------------  IN: 0 mL / OUT: 800 mL / NET: -800 mL      CAPILLARY BLOOD GLUCOSE          ECG: reviewed.    PHYSICAL EXAM:    Gen: lying comfortably in bed in no apparent distress  HEENT: PERRL, EOMI  Resp: CTA B/L no c/r/w  CVS: S1S2 no m/r/g  Abd: soft NT/ND +BS  Ext: no c/c/e  Neuro: A&Ox3    MEDICATIONS:  MEDICATIONS  (STANDING):  amLODIPine   Tablet 10 milliGRAM(s) Oral daily  ampicillin/sulbactam  IVPB 3 Gram(s) IV Intermittent every 6 hours  apixaban 2.5 milliGRAM(s) Oral every 12 hours  atenolol  Tablet 50 milliGRAM(s) Oral daily  azithromycin   Tablet 250 milliGRAM(s) Oral daily  benzocaine/menthol Lozenge 1 Lozenge Oral two times a day  chlorhexidine 2% Cloths 1 Application(s) Topical <User Schedule>  fluticasone propionate/ salmeterol 500-50 MICROgram(s) Diskus 1 Dose(s) Inhalation two times a day  melatonin 3 milliGRAM(s) Oral at bedtime  montelukast 10 milliGRAM(s) Oral daily  pantoprazole    Tablet 40 milliGRAM(s) Oral before breakfast  polyethylene glycol 3350 17 Gram(s) Oral every 24 hours  senna 2 Tablet(s) Oral at bedtime  tiotropium 2.5 MICROgram(s) Inhaler 2 Puff(s) Inhalation daily  vancomycin  IVPB 750 milliGRAM(s) IV Intermittent every 24 hours  zinc oxide 40% Paste 1 Application(s) Topical daily    MEDICATIONS  (PRN):  acetaminophen     Tablet .. 650 milliGRAM(s) Oral every 6 hours PRN Temp greater or equal to 38C (100.4F), Mild Pain (1 - 3)  albuterol/ipratropium for Nebulization 3 milliLiter(s) Nebulizer every 6 hours PRN Shortness of Breath and/or Wheezing  aluminum hydroxide/magnesium hydroxide/simethicone Suspension 30 milliLiter(s) Oral every 4 hours PRN Dyspepsia  bisacodyl 5 milliGRAM(s) Oral every 12 hours PRN Constipation  guaiFENesin Oral Liquid (Sugar-Free) 200 milliGRAM(s) Oral every 6 hours PRN Cough      ALLERGIES:  Allergies    No Known Allergies    Intolerances        LABS:                        8.2    12.16 )-----------( 195      ( 22 Jun 2025 07:45 )             29.4     06-22    135  |  104  |  21  ----------------------------<  88  4.6   |  25  |  1.18    Ca    7.8[L]      22 Jun 2025 07:45  Phos  3.8     06-21  Mg     2.2     06-21    TPro  5.7[L]  /  Alb  2.1[L]  /  TBili  0.3  /  DBili  x   /  AST  15  /  ALT  19  /  AlkPhos  65  06-21      Urinalysis Basic - ( 22 Jun 2025 07:45 )    Color: x / Appearance: x / SG: x / pH: x  Gluc: 88 mg/dL / Ketone: x  / Bili: x / Urobili: x   Blood: x / Protein: x / Nitrite: x   Leuk Esterase: x / RBC: x / WBC x   Sq Epi: x / Non Sq Epi: x / Bacteria: x              RADIOLOGY & ADDITIONAL TESTS: Reviewed.

## 2025-06-23 NOTE — PROGRESS NOTE ADULT - SUBJECTIVE AND OBJECTIVE BOX
[facility]  INFECTIOUS DISEASES   [address] [place] [address]  Tel: [phone]     Fax: [phone]  ==============================================================================  MD Himanshu Stewart, DO Amisha Reza, SEBASTIÁN Merchant M.D  ==============================================================================      MARKIE HERNANDEZ  MRN-[id number]  69y ([birth year])      Interval History:    ROS:    [ ] Unobtainable because:  [ ] All other systems negative except as noted    Constitutional: no fever, no chills  Head: no trauma  Eyes: no vision changes, no eye pain  ENT:  no sore throat, no rhinorrhea  Cardiovascular:  no chest pain, no palpitation  Respiratory:  no SOB, no cough  GI:  no abd pain, no vomiting, no diarrhea  urinary: no dysuria, no hematuria, no flank pain  musculoskeletal:  no joint pain, no joint swelling  skin:  no rash  neurology:  no headache, no seizure, no change in mental status  psych: no anxiety, no depression         Allergies  No Known Allergies        ANTIMICROBIALS:  ampicillin/sulbactam  IVPB 3 every 6 hours  azithromycin   Tablet 250 daily  vancomycin  IVPB 750 every 24 hours        Physical Exam:  Vital Signs Last 24 Hrs  T(C): 36.5 (23 Jun 2025 10:36), Max: 36.8 (22 Jun 2025 17:07)  T(F): 97.7 (23 Jun 2025 10:36), Max: 98.3 (23 Jun 2025 05:00)  HR: 68 (23 Jun 2025 10:36) (66 - 91)  BP: 141/78 (23 Jun 2025 10:36) (114/71 - 143/77)  BP(mean): --  RR: 18 (23 Jun 2025 10:36) (17 - 18)  SpO2: 94% (23 Jun 2025 10:36) (93% - 98%)    Parameters below as of 23 Jun 2025 10:36  Patient On (Oxygen Delivery Method): nasal cannula  O2 Flow (L/min): 2      06-22-25 @ 07:01  -  06-23-25 @ 07:00  --------------------------------------------------------  IN: 0 mL / OUT: 800 mL / NET: -800 mL    06-23-25 @ 07:01  -  06-23-25 @ 11:02  --------------------------------------------------------  IN: 150 mL / OUT: 0 mL / NET: 150 mL      General:    NAD,  non toxic  Head: atraumatic, normocephalic  Eye: normal sclera and conjunctiva  ENT:    no oral lesions, neck supple  Cardio:     regular S1, S2,  no murmur  Respiratory:    clear b/l,    no wheezing  abd:     soft,   BS +,   no tenderness  :   no CVAT,  no suprapubic tenderness,   no  cordova  Musculoskeletal:   no joint swelling,   no edema  vascular: no central lines, +PIV   Skin:    no rash  Neurologic:     no focal deficit  psych: normal affect    WBC Count: 10.64 K/uL (06-23 @ 07:05)  WBC Count: 12.16 K/uL (06-22 @ 07:45)  WBC Count: 13.34 K/uL (06-21 @ 08:00)  WBC Count: 11.41 K/uL (06-20 @ 02:20)  WBC Count: 19.38 K/uL (06-19 @ 03:40)  WBC Count: 24.19 K/uL (06-18 @ 03:00)  WBC Count: 19.70 K/uL (06-17 @ 04:24)                            8.2    10.64 )-----------( 204      ( 23 Jun 2025 07:05 )             28.0       06-23    135  |  104  |  15  ----------------------------<  92  4.4   |  24  |  1.19    Ca    8.6      23 Jun 2025 07:05        Urinalysis Basic - ( 23 Jun 2025 07:05 )    Color: x / Appearance: x / SG: x / pH: x  Gluc: 92 mg/dL / Ketone: x  / Bili: x / Urobili: x   Blood: x / Protein: x / Nitrite: x   Leuk Esterase: x / RBC: x / WBC x   Sq Epi: x / Non Sq Epi: x / Bacteria: x          Creatinine Trend: 1.19<--, 1.18<--, 1.21<--, 1.24<--, 1.29<--, 1.58<--  Blood Gas Venous - Lactate: 0.70 mmol/L (06-23-25 @ 05:00)      MICROBIOLOGY:  v                      Procalcitonin: 0.08 (06-20-25 @ 02:20)  Procalcitonin: 0.12 (06-19-25 @ 03:40)          RADIOLOGY:   [facility]  INFECTIOUS DISEASES   [address] [place] [address]  Tel: [phone]     Fax: [phone]  ==============================================================================  MD Himanshu Stewart, DO Amisha Reza, SEBASTIÁN Merchant M.D  ==============================================================================      MARKIE HERNANDEZ  MRN-[id number]  69y ([birth year])      Interval History:    Patient seen and examined today on Med-surg floor  she is awake, alert  still has  some cough but less than before  states  her right parotid area swelling is less swollen and  less painful as well      ROS:    [ ] Unobtainable because:  [ x] All other systems negative except as noted    Constitutional: no fever, no chills  Head: no trauma  Eyes: no vision changes, no eye pain  ENT:  no sore throat, no rhinorrhea  Cardiovascular:  no chest pain, no palpitation  Respiratory:  no SOB, less cough  GI:  no abd pain, no vomiting, no diarrhea  urinary: no dysuria, no hematuria, no flank pain  musculoskeletal:  no joint pain, no joint swelling  skin:  no rash  neurology:  no headache        Allergies  No Known Drug Allergies    ANTIMICROBIALS:  ampicillin/sulbactam  IVPB 3 every 6 hours  azithromycin   Tablet 250 daily  vancomycin  IVPB 750 every 24 hours        Physical Exam:  Vital Signs Last 24 Hrs  T(C): 36.5 (23 Jun 2025 10:36), Max: 36.8 (22 Jun 2025 17:07)  T(F): 97.7 (23 Jun 2025 10:36), Max: 98.3 (23 Jun 2025 05:00)  HR: 68 (23 Jun 2025 10:36) (66 - 91)  BP: 141/78 (23 Jun 2025 10:36) (114/71 - 143/77)  BP(mean): --  RR: 18 (23 Jun 2025 10:36) (17 - 18)  SpO2: 94% (23 Jun 2025 10:36) (93% - 98%)    Parameters below as of 23 Jun 2025 10:36  Patient On (Oxygen Delivery Method): nasal cannula  O2 Flow (L/min): 2      06-22-25 @ 07:01  -  06-23-25 @ 07:00  --------------------------------------------------------  IN: 0 mL / OUT: 800 mL / NET: -800 mL    06-23-25 @ 07:01  -  06-23-25 @ 11:02  --------------------------------------------------------  IN: 150 mL / OUT: 0 mL / NET: 150 mL    Head: atraumatic, normocephalic  Eyes: normal sclera and conjunctiva  ENT:   no oropharyngeal lesions,  right parotid gland region much  less edematous and much softer to touch,  no wounds, not warm to touch, no erythema  Cardio:    regular S1,S2, no murmur  Respiratory:   still has some coarse cough but better  breath sounds b/l , No wheezing, no crackles, scattered rhonchi   abd:   soft, BS +, not tender, no distention  :     no CVAT, no suprapubic tenderness  Musculoskeletal : no joint swelling, no edema  Skin:    no rash  vascular:  normal pulses  Neurologic:     awake, alert, can follow commands and answer most questions  psych: normal affect        WBC Count: 10.64 K/uL (06-23 @ 07:05)  WBC Count: 12.16 K/uL (06-22 @ 07:45)  WBC Count: 13.34 K/uL (06-21 @ 08:00)  WBC Count: 11.41 K/uL (06-20 @ 02:20)  WBC Count: 19.38 K/uL (06-19 @ 03:40)  WBC Count: 24.19 K/uL (06-18 @ 03:00)  WBC Count: 19.70 K/uL (06-17 @ 04:24)                            8.2    10.64 )-----------( 204      ( 23 Jun 2025 07:05 )             28.0       06-23    135  |  104  |  15  ----------------------------<  92  4.4   |  24  |  1.19    Ca    8.6      23 Jun 2025 07:05        Urinalysis Basic - ( 23 Jun 2025 07:05 )    Color: x / Appearance: x / SG: x / pH: x  Gluc: 92 mg/dL / Ketone: x  / Bili: x / Urobili: x   Blood: x / Protein: x / Nitrite: x   Leuk Esterase: x / RBC: x / WBC x   Sq Epi: x / Non Sq Epi: x / Bacteria: x          Creatinine Trend: 1.19<--, 1.18<--, 1.21<--, 1.24<--, 1.29<--, 1.58<--  Blood Gas Venous - Lactate: 0.70 mmol/L (06-23-25 @ 05:00)      MICROBIOLOGY:    Procalcitonin: 0.08 (06-20-25 @ 02:20)  Procalcitonin: 0.12 (06-19-25 @ 03:40)    RADIOLOGY:

## 2025-06-23 NOTE — PROGRESS NOTE ADULT - ASSESSMENT
A/P-  69 year old woman with a PMHx of Asthma (on prn supp O2), COPD (prev smoker quit 20 years ago), HTN, HFpEF, CVA with L sided deficits (as per son), A Fib, PE (off Eliquis and Aspirin since March 2025 due to abdominal wall hematoma), and Liver mass (IR recommended for outpatient follow up w repeat imaging).     Pt presented to the ED with SOB that started the night prior. Per daughter in law, pt has been declining in her health, decreased appetite since her last hospital admission in March 2025. Has been bed bound last 3 years. In the ED, pt tripoding and was placed on BIPAP. Received nebs and steroids. Pt then developed episode of coffee ground vomiting and switch to high flow NC. Labs significant for wbc 11.8, hgb 8.6, Na 121, K: 5.8, Bicarb 20, Creat: 1.38, trop: 62, pH: 7.11, pCO2: 57.   Pt developed hypotension and received 1 L of IV fluids with some improvement. Admitted to the ICU for further management.    (08 Jun 2025 20:54)    hospital course-  Found to have blood gas 7.11/57/82/18/96% placed on BiPAP and was  admitted to ICU (6/8/2025) for hypercarbic respiratory failure, COPD exacerbation. Transitioned from BIPAP to AVAPS and weaned off NIV. Given steroids and nebs for COPD exacerbation. CT chest without PE but noted secretions in airway with some mucus plugging LLL with background of severe emphysema. Pt downgraded to medical floor 6/10 and then later on found to be confused, lethargic, in respiratory distress with wheeze, chest pain and abdominal pain (noted to be in urinary retention on bladder sono 550cc urine); transferred back to the ICU.     Afebrile  +leukocytosis 24 K ( on steroids)  no culture data available  MRSA PCR- detected    6/19-  Afebrile  Leukocytosis trending down  to 19k    6/20-  Afebrile  leukocytosis decreased to 11K  MRSA PCR-Detected  right parotid possible sialolithiasis (parotid stone) vs parotitis    6/23-  downgraded to medicine floor  Afebrile  leukocytosis decreased to 10K  MRSA PCR-Detected  right parotid possible sialolithiasis (parotid stone) vs parotitis- much improved    Impression-  right parotitis vs Sialolithiasis   leukocytosis - almost resolved  copd exacerbation  bibasilar atelectasis  vs pneumonia  HTN  CVA    plan-  trend wbc and temp  continue to apply warm compress to the right parotid region  can continue with vanco that was initiated by ICU team, keep trough <15, -600 day #6  d/c dutch bustos  has completed 7 days of  unasyn today  d/c unasyn now.  f/u mumps serology   monitor for aspiration precautions   rest of the medical management as per medicine team.    All labs and imaging and chart notes reviewed.     All above discussed with patient and patient verbalizes full understanding of all above and agrees with above plan of care.    d/w Dr.Desai Pham Holguin MD  Infectious Disease Attending    for any questions please do not hesitate to contact me either via teams or by calling [phone]

## 2025-06-23 NOTE — PROGRESS NOTE ADULT - NS ATTEND AMEND GEN_ALL_CORE FT
69F w/ COPD, PE, HFpEF, Afib, liver mass. Presents w/ SOB. Admitted to ICU initially for COPD exacerbation and acute hypercapnic respiratory failure requiring NIV and LLL mucous plugging and sent to the medical floors. Later that day she was found to be in respiratory distress and lethargic and transferred back to ICU. She has been requiring NIV intermittently for SOB and WOB. She has also developed R facial swelling, concerning for parotitis. Respiratory status improving overall and less BiPAP use. Transferred to medical floors and doing well overall    - currently on 2LNC, maintain SpO2 >88%  - VBG ok this morning  - pt not using BiPAP regularly, but can keep bedside for PRN use  - continue vancomycin and unasyn for parotitis - ID managing abx  - continue spiriva and advair for COPD maintenance  - azithromycin for severe COPD   - duonebs PRN  - s/p steroids for COPD exacerbation  - pt should have pulmonary follow-up upon d/c   - pulmonay signing off, please call back with any further questions or concerns

## 2025-06-23 NOTE — PROGRESS NOTE ADULT - ASSESSMENT
69F ex smoker (quit 2013, 1/2 PPD x10 years) PMH HTN, chronic diastolic CHF (grade I diastolic dysfunction), Afib on Eliquis (by records), hx of PE (1/2024), COPD (previously on symbicort and spiriva, changed to duonebs q 6 hrs as there was question if she was able to coordinate use of her hand held inhaler per outpatient records, never intubated), L MCA aneurysm,  e-coli UTI 3/2025, liver cyst, abdominal wall hematoma 3/2025 presents from home for complaints of SOB with increased work of breathing. Found to have blood gas 7.11/57/82/18/96% placed on BiPAP. Labs with Na 121, K 5.8, HCO3: 17, Cr 1.38. Admitted to ICU for hypercarbic respiratory failure, COPD exacerbation. Transitioned from BIPAP to AVAPS and weaned off NIV. Given steroids and nebs for COPD exacerbation. CT chest without PE but noted secretions in airway with some mucus plugging LLL with background of severe emphysema. Downgraded to medical floor 6/10 but with respiratory distress and worsening wheeze, lethargy pos downgrade requiring upgrade back to ICU for respiratory distress/hypercarbic respiratory failure requiring NIV support. Noted to be in urinary retention (bladder sono 550cc urine). She has been requiring NIV intermittently for SOB and WOB. She also developed R facial swelling, concerning for parotitis. Respiratory status and mental status improved. Downgraded to medical service today 6/20.     DX: acute hypercarbic respiratory failure, acute COPD exacerbation with underlying severe COPD, parotitis, respiratory acidosis, metabolic acidosis, NANCY, urinary rentention, acute metabolic encephalopathy     - on 2L NC  - keep O2 sat > 88%  - check AM VBG  - cont NIV qhs and prn for chronic hypercapnia  - on vanco/unasyn for parotitis  - on azithromycin for severe COPD prophylaxis  - cont bronchodilator therapy with advair and spiriva with prn duonebs for COPD  - completed a course or steroid taper for COPD exacerbation  -rest of mnmgt per primary team    note incomplete 69F ex smoker (quit 2013, 1/2 PPD x10 years) PMH HTN, chronic diastolic CHF (grade I diastolic dysfunction), Afib on Eliquis (by records), hx of PE (1/2024), COPD (previously on symbicort and spiriva, changed to duonebs q 6 hrs as there was question if she was able to coordinate use of her hand held inhaler per outpatient records, never intubated), L MCA aneurysm,  e-coli UTI 3/2025, liver cyst, abdominal wall hematoma 3/2025 presents from home for complaints of SOB with increased work of breathing. Found to have blood gas 7.11/57/82/18/96% placed on BiPAP. Labs with Na 121, K 5.8, HCO3: 17, Cr 1.38. Admitted to ICU for hypercarbic respiratory failure, COPD exacerbation. Transitioned from BIPAP to AVAPS and weaned off NIV. Given steroids and nebs for COPD exacerbation. CT chest without PE but noted secretions in airway with some mucus plugging LLL with background of severe emphysema. Downgraded to medical floor 6/10 but with respiratory distress and worsening wheeze, lethargy pos downgrade requiring upgrade back to ICU for respiratory distress/hypercarbic respiratory failure requiring NIV support. Noted to be in urinary retention (bladder sono 550cc urine). She has been requiring NIV intermittently for SOB and WOB. She also developed R facial swelling, concerning for parotitis. Respiratory status and mental status improved. Downgraded to medical service today 6/20.     DX: acute hypercarbic respiratory failure, acute COPD exacerbation with underlying severe COPD, parotitis, respiratory acidosis, metabolic acidosis, NANCY, urinary rentention, acute metabolic encephalopathy     - on 4LNC keep O2 sat > 88%  - vBG ok this AM  - pt not hypercapnia. cont NIV prn for SOB  - on vanco/unasyn for parotitis which is improving  - on azithromycin for severe COPD prophylaxis  - cont bronchodilator therapy with advair and spiriva with prn duonebs for COPD  - completed a course or steroid taper for COPD exacerbation  -rest of mnmgt per primary team    d/w dr magdaleno 69F ex smoker (quit 2013, 1/2 PPD x10 years) PMH HTN, chronic diastolic CHF (grade I diastolic dysfunction), Afib on Eliquis (by records), hx of PE (1/2024), COPD (previously on symbicort and spiriva, changed to duonebs q 6 hrs as there was question if she was able to coordinate use of her hand held inhaler per outpatient records, never intubated), L MCA aneurysm,  e-coli UTI 3/2025, liver cyst, abdominal wall hematoma 3/2025 presents from home for complaints of SOB with increased work of breathing. Found to have blood gas 7.11/57/82/18/96% placed on BiPAP. Labs with Na 121, K 5.8, HCO3: 17, Cr 1.38. Admitted to ICU for hypercarbic respiratory failure, COPD exacerbation. Transitioned from BIPAP to AVAPS and weaned off NIV. Given steroids and nebs for COPD exacerbation. CT chest without PE but noted secretions in airway with some mucus plugging LLL with background of severe emphysema. Downgraded to medical floor 6/10 but with respiratory distress and worsening wheeze, lethargy pos downgrade requiring upgrade back to ICU for respiratory distress/hypercarbic respiratory failure requiring NIV support. Noted to be in urinary retention (bladder sono 550cc urine). She has been requiring NIV intermittently for SOB and WOB. She also developed R facial swelling, concerning for parotitis. Respiratory status and mental status improved. Downgraded to medical service today 6/20.     DX: acute hypercarbic respiratory failure, acute COPD exacerbation with underlying severe COPD, parotitis, respiratory acidosis, metabolic acidosis, NANCY, urinary rentention, acute metabolic encephalopathy     - on 4LNC keep O2 sat > 88%  - vBG ok this AM  - pt not hypercapnia. cont NIV prn for SOB  - on vanco/unasyn for parotitis which is improving  - on azithromycin for severe COPD prophylaxis  - cont bronchodilator therapy with advair and spiriva with prn duonebs for COPD  - completed a course or steroid taper for COPD exacerbation  -rest of mnmgt per primary team    Pt is being discharged on mechanical ventilation, patient has chronic respiratory failure secondary to COPD. BIPAP in AVAPS has been considered and ruled out as it could not properly control patients CO2 levels, patient remained hypercapnic, recent ABG's show PCO2 levels at 55mmhg on 6/10/2025. Therefore, patient will require guaranteed augmented targeted tidal volumes which is not available on any homecare PAP devices, mechanical ventilation will be ordered to minimize further clinical decline and high risk for hospital readmission.    d/w dr magdaleno

## 2025-06-24 LAB
ALBUMIN SERPL ELPH-MCNC: 1.9 G/DL — LOW (ref 3.3–5)
ALP SERPL-CCNC: 71 U/L — SIGNIFICANT CHANGE UP (ref 40–120)
ALT FLD-CCNC: 16 U/L — SIGNIFICANT CHANGE UP (ref 12–78)
ANION GAP SERPL CALC-SCNC: 7 MMOL/L — SIGNIFICANT CHANGE UP (ref 5–17)
AST SERPL-CCNC: 14 U/L — LOW (ref 15–37)
BILIRUB SERPL-MCNC: 0.4 MG/DL — SIGNIFICANT CHANGE UP (ref 0.2–1.2)
BUN SERPL-MCNC: 18 MG/DL — SIGNIFICANT CHANGE UP (ref 7–23)
CALCIUM SERPL-MCNC: 8.3 MG/DL — LOW (ref 8.5–10.1)
CHLORIDE SERPL-SCNC: 102 MMOL/L — SIGNIFICANT CHANGE UP (ref 96–108)
CK MB BLD-MCNC: 4.6 % — HIGH (ref 0–3.5)
CK MB CFR SERPL CALC: 1.1 NG/ML — SIGNIFICANT CHANGE UP (ref 0.5–3.6)
CK SERPL-CCNC: 24 U/L — LOW (ref 26–192)
CO2 SERPL-SCNC: 24 MMOL/L — SIGNIFICANT CHANGE UP (ref 22–31)
CREAT SERPL-MCNC: 1.41 MG/DL — HIGH (ref 0.5–1.3)
EGFR: 40 ML/MIN/1.73M2 — LOW
EGFR: 40 ML/MIN/1.73M2 — LOW
GLUCOSE BLDC GLUCOMTR-MCNC: 130 MG/DL — HIGH (ref 70–99)
GLUCOSE SERPL-MCNC: 92 MG/DL — SIGNIFICANT CHANGE UP (ref 70–99)
HCT VFR BLD CALC: 26 % — LOW (ref 34.5–45)
HGB BLD-MCNC: 7.6 G/DL — LOW (ref 11.5–15.5)
MCHC RBC-ENTMCNC: 24.1 PG — LOW (ref 27–34)
MCHC RBC-ENTMCNC: 29.2 G/DL — LOW (ref 32–36)
MCV RBC AUTO: 82.3 FL — SIGNIFICANT CHANGE UP (ref 80–100)
MUMPS VIRUS RNA, QUALITATIVE REAL-TIME PCR, RESULT: SIGNIFICANT CHANGE UP
MUV IGM FLD QL IA: <0.8 AU — SIGNIFICANT CHANGE UP (ref 0–0.79)
MUV IGM FLD QL IA: <0.8 AU — SIGNIFICANT CHANGE UP (ref 0–0.79)
NRBC BLD AUTO-RTO: 0 /100 WBCS — SIGNIFICANT CHANGE UP (ref 0–0)
PLATELET # BLD AUTO: 184 K/UL — SIGNIFICANT CHANGE UP (ref 150–400)
POTASSIUM SERPL-MCNC: 4.2 MMOL/L — SIGNIFICANT CHANGE UP (ref 3.5–5.3)
POTASSIUM SERPL-SCNC: 4.2 MMOL/L — SIGNIFICANT CHANGE UP (ref 3.5–5.3)
PROT SERPL-MCNC: 5.6 GM/DL — LOW (ref 6–8.3)
RBC # BLD: 3.16 M/UL — LOW (ref 3.8–5.2)
RBC # FLD: 19.1 % — HIGH (ref 10.3–14.5)
SODIUM SERPL-SCNC: 133 MMOL/L — LOW (ref 135–145)
TROPONIN I, HIGH SENSITIVITY RESULT: 20.6 NG/L — SIGNIFICANT CHANGE UP
VANCOMYCIN FLD-MCNC: 26.5 UG/ML
WBC # BLD: 9.1 K/UL — SIGNIFICANT CHANGE UP (ref 3.8–10.5)
WBC # FLD AUTO: 9.1 K/UL — SIGNIFICANT CHANGE UP (ref 3.8–10.5)

## 2025-06-24 PROCEDURE — 99232 SBSQ HOSP IP/OBS MODERATE 35: CPT

## 2025-06-24 PROCEDURE — 99233 SBSQ HOSP IP/OBS HIGH 50: CPT | Mod: FS

## 2025-06-24 PROCEDURE — 93010 ELECTROCARDIOGRAM REPORT: CPT

## 2025-06-24 PROCEDURE — 99497 ADVNCD CARE PLAN 30 MIN: CPT

## 2025-06-24 PROCEDURE — 71045 X-RAY EXAM CHEST 1 VIEW: CPT | Mod: 26

## 2025-06-24 RX ORDER — VANCOMYCIN HCL IN 5 % DEXTROSE 1.5G/250ML
500 PLASTIC BAG, INJECTION (ML) INTRAVENOUS EVERY 24 HOURS
Refills: 0 | Status: DISCONTINUED | OUTPATIENT
Start: 2025-06-25 | End: 2025-06-26

## 2025-06-24 RX ORDER — IPRATROPIUM BROMIDE AND ALBUTEROL SULFATE .5; 2.5 MG/3ML; MG/3ML
3 SOLUTION RESPIRATORY (INHALATION) EVERY 6 HOURS
Refills: 0 | Status: DISCONTINUED | OUTPATIENT
Start: 2025-06-24 | End: 2025-06-26

## 2025-06-24 RX ADMIN — MONTELUKAST SODIUM 10 MILLIGRAM(S): 10 TABLET ORAL at 11:30

## 2025-06-24 RX ADMIN — IPRATROPIUM BROMIDE AND ALBUTEROL SULFATE 3 MILLILITER(S): .5; 2.5 SOLUTION RESPIRATORY (INHALATION) at 17:21

## 2025-06-24 RX ADMIN — Medication 650 MILLIGRAM(S): at 22:26

## 2025-06-24 RX ADMIN — Medication 1 LOZENGE: at 05:48

## 2025-06-24 RX ADMIN — IPRATROPIUM BROMIDE AND ALBUTEROL SULFATE 3 MILLILITER(S): .5; 2.5 SOLUTION RESPIRATORY (INHALATION) at 13:46

## 2025-06-24 RX ADMIN — Medication 4 MILLILITER(S): at 17:21

## 2025-06-24 RX ADMIN — LISINOPRIL 50 MILLIGRAM(S): 30 TABLET ORAL at 05:49

## 2025-06-24 RX ADMIN — Medication 2 TABLET(S): at 21:26

## 2025-06-24 RX ADMIN — IPRATROPIUM BROMIDE AND ALBUTEROL SULFATE 3 MILLILITER(S): .5; 2.5 SOLUTION RESPIRATORY (INHALATION) at 23:39

## 2025-06-24 RX ADMIN — Medication 1 APPLICATION(S): at 05:49

## 2025-06-24 RX ADMIN — Medication 4 MILLILITER(S): at 13:48

## 2025-06-24 RX ADMIN — Medication 1 DOSE(S): at 17:07

## 2025-06-24 RX ADMIN — IPRATROPIUM BROMIDE AND ALBUTEROL SULFATE 3 MILLILITER(S): .5; 2.5 SOLUTION RESPIRATORY (INHALATION) at 09:40

## 2025-06-24 RX ADMIN — Medication 3 MILLIGRAM(S): at 21:27

## 2025-06-24 RX ADMIN — TIOTROPIUM BROMIDE INHALATION SPRAY 2 PUFF(S): 3.12 SPRAY, METERED RESPIRATORY (INHALATION) at 11:29

## 2025-06-24 RX ADMIN — Medication 1 LOZENGE: at 17:09

## 2025-06-24 RX ADMIN — APIXABAN 2.5 MILLIGRAM(S): 2.5 TABLET, FILM COATED ORAL at 21:50

## 2025-06-24 RX ADMIN — Medication 40 MILLIGRAM(S): at 05:48

## 2025-06-24 RX ADMIN — Medication 250 MILLIGRAM(S): at 11:31

## 2025-06-24 RX ADMIN — AMLODIPINE BESYLATE 10 MILLIGRAM(S): 10 TABLET ORAL at 05:49

## 2025-06-24 RX ADMIN — Medication 650 MILLIGRAM(S): at 21:26

## 2025-06-24 RX ADMIN — TOUCHLESS CARE ZINC OXIDE PROTECTANT 1 APPLICATION(S): 20; 25 SPRAY TOPICAL at 11:29

## 2025-06-24 RX ADMIN — Medication 1 DOSE(S): at 05:49

## 2025-06-24 RX ADMIN — APIXABAN 2.5 MILLIGRAM(S): 2.5 TABLET, FILM COATED ORAL at 11:30

## 2025-06-24 RX ADMIN — Medication 4 MILLILITER(S): at 23:38

## 2025-06-24 NOTE — CHART NOTE - NSCHARTNOTEFT_GEN_A_CORE
Assessment: 69 year old female with pmhx of Asthma (on prn supp O2), COPD (prev smoker quit 20 years ago), HTN, HFpEF, CVA with L sided deficits (as per son), A Fib, PE (off Eliquis and Aspirin since March 2025 due to abdominal wall hematoma), and Liver mass. Pt admitted with asthma exacerbation.   Per critical care note on 06/16, Pt downgraded to medical floor 6/10 and then later on found to be confused, lethargic, in respiratory distress with wheeze, chest pain and abdominal pain (noted to be in urinary retention on bladder sono 550cc urine); transferred back to the ICU.  ICU dx: 1) acute hypercarbic respiratory failure/ COPD exacerbation (has required frequent management on NIV)  2) NANCY with hyponatremia 3) urinary rentention. This am breathing comfortably on nc O2. R parotid gland swollen and tender  (06/20) Pt downgraded to medical floor.   Pt remains medically active.     Pt visited at bedside. Pts son present at the time of visit, son states pt with fair PO intake and followed a regular diet at home. Pt has compromised dentition. Reports tolerating soft and bite sized texture as well. Son reports pt weighs ~ 130 lbs x 1 month. Pt currently weighs ~122 lbs. Wt trend reflects ~6% wt loss x 1 month. Pt made aware RD remains available.    Factors impacting intake: [ ] none [ ] nausea  [ ] vomiting [X] diarrhea [ ] constipation  [ ]chewing problems [ ] swallowing issues  [ ] other:     Diet Prescription: Diet, Soft and Bite Sized (06-14-25 @ 11:39) [Active]    Intake: 0-25% per flow sheet     Admission wt: (06/08) 56.8 kg   Current Weight: (06/20) 53.6 kg   % Weight Change: ~5% wt loss x 2 weeks   Edema: 1+ Edema: B/L Foot     Nutrition Focused Physical Exam Findings:   Moderate Muscle Mass Depletion: Clavicle, Temple  Moderate Subcutaneous Fat Depletion: Orbital, Triceps     Pertinent Medications: MEDICATIONS  (STANDING):  albuterol/ipratropium for Nebulization 3 milliLiter(s) Nebulizer every 6 hours  amLODIPine   Tablet 10 milliGRAM(s) Oral daily  apixaban 2.5 milliGRAM(s) Oral every 12 hours  atenolol  Tablet 50 milliGRAM(s) Oral daily  azithromycin   Tablet 250 milliGRAM(s) Oral daily  benzocaine/menthol Lozenge 1 Lozenge Oral two times a day  chlorhexidine 2% Cloths 1 Application(s) Topical <User Schedule>  fluticasone propionate/ salmeterol 500-50 MICROgram(s) Diskus 1 Dose(s) Inhalation two times a day  melatonin 3 milliGRAM(s) Oral at bedtime  montelukast 10 milliGRAM(s) Oral daily  pantoprazole    Tablet 40 milliGRAM(s) Oral before breakfast  polyethylene glycol 3350 17 Gram(s) Oral every 24 hours  senna 2 Tablet(s) Oral at bedtime  sodium chloride 3%  Inhalation 4 milliLiter(s) Inhalation every 6 hours  zinc oxide 40% Paste 1 Application(s) Topical daily    MEDICATIONS  (PRN):  acetaminophen     Tablet .. 650 milliGRAM(s) Oral every 6 hours PRN Temp greater or equal to 38C (100.4F), Mild Pain (1 - 3)  aluminum hydroxide/magnesium hydroxide/simethicone Suspension 30 milliLiter(s) Oral every 4 hours PRN Dyspepsia  bisacodyl 5 milliGRAM(s) Oral every 12 hours PRN Constipation  guaiFENesin Oral Liquid (Sugar-Free) 200 milliGRAM(s) Oral every 6 hours PRN Cough    Pertinent Labs: 06-24 Na133 mmol/L[L] Glu 92 mg/dL K+ 4.2 mmol/L Cr  1.41 mg/dL[H] BUN 18 mg/dL 06-21 Phos 3.8 mg/dL 06-24 Alb 1.9 g/dL[L]    CAPILLARY BLOOD GLUCOSE      POCT Blood Glucose.: 130 mg/dL (24 Jun 2025 09:36)    Skin: No pressure injuries documented in flow sheets     Estimated Needs:   [X] no change since previous assessment (06/11)  [ ] recalculated:     Previous Nutrition Diagnosis:   [X] Inadequate Protein Energy Intake  Etiology: acute illness  Signs/ Symptoms: <50% meal intake  Goal: pt to >75% protein-energy needs via tolerated route (not being met)    Nutrition Diagnosis is [X] ongoing  [ ] resolved [ ] not applicable     New Nutrition Diagnosis:   [X] Severe Malnutrition in the setting of Acute illness   Etiology: Inadequate protein/energy intake in the setting of Asthma/ COPD exacerbation   Signs/ Symptoms: < 50% estimated nutrient needs for > 5 days, moderate muscle mass and moderate subcutaneous fat depletion  Goals: Pt to consume 50% or greater of estimated nutrient needs for duration of hospital course        Interventions:   Recommend  [X] Continue Soft and Bite Size Texture and odered and as tolerated   [X] Recommend Ensure Plus High Protein x 2/day (provides 700 kcal, 40 g protein)   [X] Recommend multivitamin to optimize micronutrient intake   [X] Pantoprazole Rx noted, monitor B12, calcium and magnesium and replenish PRN     Monitoring and Evaluation:   [X] PO intake [ x ] Tolerance to diet prescription [ x ] weights [ x ] labs[ x ] follow up per protocol  [ ] other: Assessment: 69 year old female with pmhx of Asthma (on prn supp O2), COPD (prev smoker quit 20 years ago), HTN, HFpEF, CVA with L sided deficits (as per son), A Fib, PE (off Eliquis and Aspirin since March 2025 due to abdominal wall hematoma), and Liver mass. Pt admitted with asthma exacerbation.   Per critical care note on 06/16, Pt downgraded to medical floor 6/10 and then later on found to be confused, lethargic, in respiratory distress with wheeze, chest pain and abdominal pain (noted to be in urinary retention on bladder sono 550cc urine); transferred back to the ICU.  ICU dx: 1) acute hypercarbic respiratory failure/ COPD exacerbation (has required frequent management on NIV)  2) NANCY with hyponatremia 3) urinary rentention. This am breathing comfortably on nc O2. R parotid gland swollen and tender  (06/20) Pt downgraded to medical floor.   Pt remains medically active.     Pt visited at bedside. Pts son present at the time of visit, son states pt with fair PO intake and followed a regular diet at home. Pt has compromised dentition. Reports tolerating soft and bite sized texture as well. Son reports pt weighs ~ 130 lbs x 1 month. Pt currently weighs ~122 lbs. Wt trend reflects ~6% wt loss x 1 month. Pt made aware RD remains available.    Factors impacting intake: [ ] none [ ] nausea  [ ] vomiting [X] diarrhea [ ] constipation  [ ]chewing problems [ ] swallowing issues  [ ] other:     Diet Prescription: Diet, Soft and Bite Sized (06-14-25 @ 11:39) [Active]    Intake: 0-25% per flow sheet     Admission wt: (06/08) 56.8 kg   Current Weight: (06/20) 53.6 kg   % Weight Change: ~5% wt loss x 2 weeks   Edema: 1+ Edema: B/L Foot     Nutrition Focused Physical Exam Findings:   Moderate Muscle Mass Depletion: Clavicle, Temple  Moderate Subcutaneous Fat Depletion: Orbital, Triceps     Pertinent Medications: MEDICATIONS  (STANDING):  albuterol/ipratropium for Nebulization 3 milliLiter(s) Nebulizer every 6 hours  amLODIPine   Tablet 10 milliGRAM(s) Oral daily  apixaban 2.5 milliGRAM(s) Oral every 12 hours  atenolol  Tablet 50 milliGRAM(s) Oral daily  azithromycin   Tablet 250 milliGRAM(s) Oral daily  benzocaine/menthol Lozenge 1 Lozenge Oral two times a day  chlorhexidine 2% Cloths 1 Application(s) Topical <User Schedule>  fluticasone propionate/ salmeterol 500-50 MICROgram(s) Diskus 1 Dose(s) Inhalation two times a day  melatonin 3 milliGRAM(s) Oral at bedtime  montelukast 10 milliGRAM(s) Oral daily  pantoprazole    Tablet 40 milliGRAM(s) Oral before breakfast  polyethylene glycol 3350 17 Gram(s) Oral every 24 hours  senna 2 Tablet(s) Oral at bedtime  sodium chloride 3%  Inhalation 4 milliLiter(s) Inhalation every 6 hours  zinc oxide 40% Paste 1 Application(s) Topical daily    MEDICATIONS  (PRN):  acetaminophen     Tablet .. 650 milliGRAM(s) Oral every 6 hours PRN Temp greater or equal to 38C (100.4F), Mild Pain (1 - 3)  aluminum hydroxide/magnesium hydroxide/simethicone Suspension 30 milliLiter(s) Oral every 4 hours PRN Dyspepsia  bisacodyl 5 milliGRAM(s) Oral every 12 hours PRN Constipation  guaiFENesin Oral Liquid (Sugar-Free) 200 milliGRAM(s) Oral every 6 hours PRN Cough    Pertinent Labs: 06-24 Na133 mmol/L[L] Glu 92 mg/dL K+ 4.2 mmol/L Cr  1.41 mg/dL[H] BUN 18 mg/dL 06-21 Phos 3.8 mg/dL 06-24 Alb 1.9 g/dL[L]    CAPILLARY BLOOD GLUCOSE      POCT Blood Glucose.: 130 mg/dL (24 Jun 2025 09:36)    Skin: No pressure injuries documented in flow sheets     Estimated Needs:   [X] no change since previous assessment (06/11)  [ ] recalculated:     Previous Nutrition Diagnosis:   [X] Inadequate Protein Energy Intake  Etiology: acute illness  Signs/ Symptoms: <50% meal intake  Goal: pt to >75% protein-energy needs via tolerated route (not being met)    Nutrition Diagnosis is [X] ongoing  [ ] resolved [ ] not applicable     New Nutrition Diagnosis:   [X] Severe Malnutrition in the setting of Acute illness   Etiology: Inadequate protein/energy intake in the setting of Asthma/ COPD exacerbation   Signs/ Symptoms: < 50% estimated nutrient needs for > 5 days, moderate muscle mass and moderate subcutaneous fat depletion  Goals: Pt to consume 50% or greater of estimated nutrient needs for duration of hospital course        Interventions:   Recommend  [X] Continue Soft and Bite Size Texture and ordered and as tolerated ( no therapeutic diet at this time to try to optimize PO intake)  --> CHF dx noted, monitor need low sodium diet   [X] Recommend Ensure Plus High Protein x 2/day (provides 700 kcal, 40 g protein)   [X] Recommend multivitamin to optimize micronutrient intake   [X] Pantoprazole Rx noted, monitor B12, calcium and magnesium and replenish PRN     Monitoring and Evaluation:   [X] PO intake [ x ] Tolerance to diet prescription [ x ] daily weights [ x ] labs[ x ] follow up per protocol  [ ] other:

## 2025-06-24 NOTE — DIETITIAN NUTRITION RISK NOTIFICATION - TREATMENT: THE FOLLOWING DIET HAS BEEN RECOMMENDED
Diet, Soft and Bite Sized:   Supplement Feeding Modality:  Oral  Ensure Plus High Protein Cans or Servings Per Day:  1       Frequency:  Two Times a day (06-24-25 @ 16:33) [Pending Verification By Attending]  Diet, Soft and Bite Sized (06-14-25 @ 11:39) [Active]

## 2025-06-24 NOTE — RAPID RESPONSE TEAM SUMMARY - NSMEDICATIONSRRT_GEN_ALL_CORE
Oxygen at 6l can be taper down  duoneb nebulizer treatment x 1  ekg stat, cardiac enzymes stat, chest x ray stat.

## 2025-06-24 NOTE — PROGRESS NOTE ADULT - ASSESSMENT
69F w/ COPD, PE, HFpEF, Afib, liver mass admitted to ICU acute hypercapnic respiratory failure due to COPD exacerbation and LLL mucous plugging requiring NIV, transferred to medical floor then upgraded again to ICU for NIV. Also developed R facial swelling concerning for parotits vs. sialothiasis, improving on IV antibiotics and ID following. Respiratory status stable off BIPAP.    #  Acute on Chronic Respiratory Failure with Hypoxia and Hypercarbia - RRT called on 6/24 for hypoxia, now requiring NC 6L, titrate to room air as tolerated. Per son obtain VBG to assess CO2 retention off BIPAP as requested by pulm.   # Acute COPD Exacerbation - improved with minimal use of BIPAP.  O2.  Continue Advair, Spiriva, Nebs, Steroids.  Short course of Zithromax.    # ? Mumps / Parotitis vs. sialolithiasis - leukocytosis improving. S/p Unasyn, hold Vancomycin given elevated Vanc trough and PO AZT. ID recs appreciated  # Constipation - + BM.  Contnue Laxatives PRN  # H/o PE - continue AC  # Chronic Diastolic CHF - appears to be euvolemic.  Monitor volume status.  # Chronic Afib - continue rate control.  continue anticoagulation.   # Dementia - supportive care, at baseline per son.  # Liver Mass- will need outpatient f/u  # Inpatient DVT Ppx - on anticoagulation     Palliative evaluation    Dispo- Pending DME (NIV, wheelchair, and walker)   Updated patient's son, Mandy ([phone]) on 6/24.

## 2025-06-24 NOTE — PHARMACOTHERAPY INTERVENTION NOTE - NSPHARMCOMMASP
ASP - Duration of therapy
ASP - Dose optimization/Non-Renal dose adjustment
ASP - IV to PO
ASP - Therapy recommended/ Alternative therapy
ASP - Dose optimization/Non-Renal dose adjustment
ASP - Dose optimization/Non-Renal dose adjustment

## 2025-06-24 NOTE — PROGRESS NOTE ADULT - SUBJECTIVE AND OBJECTIVE BOX
Patient is a 69y old  Female who presents with a chief complaint of Asthma Exacerbation (23 Jun 2025 13:49)    INTERVAL HPI/OVERNIGHT EVENTS: RRT called for hypoxia and patient uptitrated from NC 3L to 6L.     MEDICATIONS  (STANDING):  amLODIPine   Tablet 10 milliGRAM(s) Oral daily  apixaban 2.5 milliGRAM(s) Oral every 12 hours  atenolol  Tablet 50 milliGRAM(s) Oral daily  azithromycin   Tablet 250 milliGRAM(s) Oral daily  benzocaine/menthol Lozenge 1 Lozenge Oral two times a day  chlorhexidine 2% Cloths 1 Application(s) Topical <User Schedule>  fluticasone propionate/ salmeterol 500-50 MICROgram(s) Diskus 1 Dose(s) Inhalation two times a day  melatonin 3 milliGRAM(s) Oral at bedtime  montelukast 10 milliGRAM(s) Oral daily  pantoprazole    Tablet 40 milliGRAM(s) Oral before breakfast  polyethylene glycol 3350 17 Gram(s) Oral every 24 hours  senna 2 Tablet(s) Oral at bedtime  tiotropium 2.5 MICROgram(s) Inhaler 2 Puff(s) Inhalation daily  zinc oxide 40% Paste 1 Application(s) Topical daily    MEDICATIONS  (PRN):  acetaminophen     Tablet .. 650 milliGRAM(s) Oral every 6 hours PRN Temp greater or equal to 38C (100.4F), Mild Pain (1 - 3)  albuterol/ipratropium for Nebulization 3 milliLiter(s) Nebulizer every 6 hours PRN Shortness of Breath and/or Wheezing  aluminum hydroxide/magnesium hydroxide/simethicone Suspension 30 milliLiter(s) Oral every 4 hours PRN Dyspepsia  bisacodyl 5 milliGRAM(s) Oral every 12 hours PRN Constipation  guaiFENesin Oral Liquid (Sugar-Free) 200 milliGRAM(s) Oral every 6 hours PRN Cough      Allergies    No Known Allergies    Intolerances        REVIEW OF SYSTEMS: all negative with exception of above    Vital Signs Last 24 Hrs  T(C): 36.6 (24 Jun 2025 10:59), Max: 36.7 (24 Jun 2025 00:26)  T(F): 97.8 (24 Jun 2025 10:59), Max: 98 (24 Jun 2025 00:26)  HR: 72 (24 Jun 2025 10:59) (66 - 76)  BP: 114/57 (24 Jun 2025 10:59) (99/60 - 129/69)  BP(mean): --  RR: 19 (24 Jun 2025 10:59) (16 - 19)  SpO2: 89% (24 Jun 2025 10:59) (89% - 97%)    Parameters below as of 24 Jun 2025 05:28  Patient On (Oxygen Delivery Method): nasal cannula        PHYSICAL EXAM:  GENERAL: moderate respiratory distress  CHEST/LUNG: No increased WOB, Clear to percussion bilaterally; No rales, rhonchi, wheezing  HEART: Regular rate and rhythm; No murmurs  ABDOMEN: Soft, Nontender, Nondistended; Bowel sounds present  MUSCULOSKELETAL/EXTREMITIES:  2+ Peripheral Pulses, No LE edema  PSYCH: Appropriate affect, Alert & Oriented      LABS:                        7.6    9.10  )-----------( 184      ( 24 Jun 2025 06:38 )             26.0     06-24    133[L]  |  102  |  18  ----------------------------<  92  4.2   |  24  |  1.41[H]    Ca    8.3[L]      24 Jun 2025 06:38    TPro  5.6[L]  /  Alb  1.9[L]  /  TBili  0.4  /  DBili  x   /  AST  14[L]  /  ALT  16  /  AlkPhos  71  06-24      Urinalysis Basic - ( 24 Jun 2025 06:38 )    Color: x / Appearance: x / SG: x / pH: x  Gluc: 92 mg/dL / Ketone: x  / Bili: x / Urobili: x   Blood: x / Protein: x / Nitrite: x   Leuk Esterase: x / RBC: x / WBC x   Sq Epi: x / Non Sq Epi: x / Bacteria: x      CAPILLARY BLOOD GLUCOSE      POCT Blood Glucose.: 130 mg/dL (24 Jun 2025 09:36)      RADIOLOGY & ADDITIONAL TESTS:    Imaging Personally Reviewed:  [ ] YES  [ ] NO    Consultant(s) Notes Reviewed:  [ ] YES  [ ] NO    Care Discussed with Consultants/Other Providers [ ] YES  [ ] NO

## 2025-06-24 NOTE — PROGRESS NOTE ADULT - CONVERSATION DETAILS
Goals of care - I had a lengthy conversation with pt's son  We discussed the comorbid conditions, hospital care, and prognosis.  We also discussed advanced directives - made aware of limited prognosis if patient were to be intubated or resuscitated, in consideration of age and comorbid conditions.  He wished to make no decision on advanced directives - that all interventions be pursued.  Therefore, pt is "full code".      Total time spent on patient care discussion = 20 minutes.
Spoke with patient's son, Brenda at bedside while patient being cleaned up.  Reintroduced self and palliative as well as our role in the care team.  He said patient wants to go home and he feels she will do much better there.  Agreed that people often do better at home, but expressed concern that she has a chronic lung condition that will not go away and puts her at risk for more issues to develop.  Explained that she has had a long admission and functional status is not great and given acute and chronic issues she will come out of this admission weaker with lower reserves.  She has not required intubation this admission but discussed at some point the NIV will not be sufficient.  If she was intubated very likely will be hard to get her off the vent support which would mean after a short course of oral intubation either trach/PEG with NH placement or palliative extubation.  He said he would not want her to go to a NH.  Explained that trach/PEG would require that support and she would not be able to talk or eat by mouth with those supports.  Expressed concern that patient will come back to the hospital.  He is hopeful for patient to return home soon is aware of hypoxic etopside and that she has that from time to time.  He does not think he would want her on machines, but does not want to think about it at this time.  Explained that this is not impact her care at present but would spare her from interventions likely to offer limited benefit in the setting of chronic lung issues with declining functional status.  Also discussed she will likely need more continuous oxygen when at home and that her ability to shower, walk and eat will be lower related to symptoms of dyspnea and that it is not mind over matter but related to her severe lung disease.  Provided contact information for geriatrics and palliative medicine at [address] [place] [address] and encouraged he call and make an appointment if able to get her out of the house as they will be able to help manage symptoms in the hope of improving quality of life.  He has a home visiting doctor as well.  For now wishes to make no decisions.  Encouraged further conversation among family.  Palliative to sign off at this time.  Please reconsult as needed.  Thank you.

## 2025-06-24 NOTE — CHART NOTE - NSCHARTNOTEFT_GEN_A_CORE
Hospitalist NP Note    Patient will require a wheel chair Due to patients deconditioning and generalized weakness secondary to  COPD, PE, HFpEF, Afib, liver mass was admitted to ICU with acute hypercapnic respiratory failure due to COPD exacerbation and LLL mucous plugging requiring NIV. Patient will require a standard wheel chair, Use of a Henri wheel chair will significantly improve patient's ability to participate in MRADL's and will be used on a regular basis in the home. The patient's mobility limitation cannot be resolved by the use of a walker or cane. Patient and family agreed to use the Henri wheelchair in the home on a daily basis and willing to assist to propel the wheelchair at any given time. Patient's home provides adequate access between rooms for the use of the manual wheelchair. Patient or the care giver has ability to safely propel Henri wheelchair.

## 2025-06-24 NOTE — PROGRESS NOTE ADULT - ASSESSMENT
69F ex smoker (quit 2013, 1/2 PPD x10 years) PMH HTN, chronic diastolic CHF (grade I diastolic dysfunction), Afib on Eliquis (by records), hx of PE (1/2024), COPD (previously on symbicort and spiriva, changed to duonebs q 6 hrs as there was question if she was able to coordinate use of her hand held inhaler per outpatient records, never intubated), L MCA aneurysm,  e-coli UTI 3/2025, liver cyst, abdominal wall hematoma 3/2025 presents from home for complaints of SOB with increased work of breathing. Found to have blood gas 7.11/57/82/18/96% placed on BiPAP. Labs with Na 121, K 5.8, HCO3: 17, Cr 1.38. Admitted to ICU for hypercarbic respiratory failure, COPD exacerbation. Transitioned from BIPAP to AVAPS and weaned off NIV. Given steroids and nebs for COPD exacerbation. CT chest without PE but noted secretions in airway with some mucus plugging LLL with background of severe emphysema. Downgraded to medical floor 6/10 but with respiratory distress and worsening wheeze, lethargy pos downgrade requiring upgrade back to ICU for respiratory distress/hypercarbic respiratory failure requiring NIV support. Noted to be in urinary retention (bladder sono 550cc urine). She has been requiring NIV intermittently for SOB and WOB. She also developed R facial swelling, concerning for parotitis. Respiratory status and mental status improved. Downgraded to medical service today 6/20.     DX: acute hypercarbic respiratory failure, acute COPD exacerbation with underlying severe COPD, parotitis, respiratory acidosis, metabolic acidosis, NANCY, urinary rentention, acute metabolic encephalopathy       - RRT for worsening hypoxemia this AM. Now on 6LNC  - hypoxemia likely from atelectasis from being immobile with persistent junky cough. Restart standing duonebs and 3% nebs for secretion mobilization with aerobika  - PT and oob as able to mitigate atelectasis   - at risk of further respiratory failure with severe copd and frailty. Continue GOC.   - consider swallow eval to ensure pt isnt aspirating   - on vanco/unasyn for parotitis which is improving  -rest of mnmgt per primary team    d/w dr magdaleno

## 2025-06-24 NOTE — RAPID RESPONSE TEAM SUMMARY - NSSITUATIONBACKGROUNDRRT_GEN_ALL_CORE
69F w/ COPD, PE, HFpEF, Afib, liver mass admitted to ICU acute hypercapnic respiratory failure due to COPD exacerbation and LLL mucous plugging requiring NIV, transferred to medical floor then upgraded again to ICU for NIV. Also developed R facial swelling concerning for parotits vs. sialothiasis, improving on IV antibiotics and ID following. Respiratory status stable off BIPAP.  RRT Called for hypoxia, patients sat 88% Patient is alert and oriented able to make her needs known, on Oxygen at 6l sating 100%.  Patient stated had chest pain and it resolved.

## 2025-06-24 NOTE — PROGRESS NOTE ADULT - NS ATTEND AMEND GEN_ALL_CORE FT
69F w/ COPD, PE, HFpEF, Afib, liver mass. Presents w/ SOB. Admitted to ICU initially for COPD exacerbation and acute hypercapnic respiratory failure requiring NIV and LLL mucous plugging and sent to the medical floors. Later that day she was found to be in respiratory distress and lethargic and transferred back to ICU. She has been requiring NIV intermittently for SOB and WOB. She has also developed R facial swelling, concerning for parotitis. Respiratory status improving overall and less BiPAP use. Transferred to medical floors and was doing well. This morning developed worsening hypoxemia and RRT called. Now on 6LNC    On evaluation, pt is awake and alert, noted to have wet cough, with poor inspiratory effort    - currently on 6LNC, SpO2 ~95%, maintain SpO2 >88%  - worsening hypoxemia may be due to aspiration - noted to have wet deep cough; she is deconditioned and very frail and may not be able to cough well and clear her airways due to deconditioning  - start duonebs + hypertonic saline nebs q6 ATC  - suggest getting swallow evaluation to ensure she is not aspirating  - can use BiPAP PRN   - s/p vancomycin and unasyn for parotitis - ID managing abx  - continue advair for COPD maintenance  - azithromycin for severe COPD   - s/p steroids for COPD exacerbation  - GOC ongoing

## 2025-06-24 NOTE — PHARMACOTHERAPY INTERVENTION NOTE - COMMENTS
As per policy vancomycin dose adjusted.     Pt on vancomycin 500mg Q24h with calculated AUC of 346. Therapeutic AUC range is 400-600. Vancomycin dose adjusted to 750mg Q24h which would provide an AUC of 520.
Recommended initiating empiric ampicillin/sulbactam for potential parotitis. 
As per policy vancomycin dose adjusted.     Pt on vancomycin 750mg Q24h with calculated AUC of 803. Therapeutic AUC range is 400-600. Vancomycin dose adjusted to 500mg Q24h which would provide an AUC of 535.
As per policy, azithromycin transitioned from IV to PO as patient currently tolerating a regular diet. 
As per policy, vancomycin 500mg x1 ordered. Vancomycin to be dosed by level due to CrCl = 29 mL/min. Vancomycin level ordered for further dosing guidance. 
Recommended to discontinue the azithromycin order after the PM dose since today is day 5 of azithromycin therapy.    Red Mcclain, PharmD, BCIDP  Clinical Pharmacy Specialist, Infectious Diseases  Supervisor, Tele-Antimicrobial Stewardship Program (Tele-ASP)  Available on [employer] Teams

## 2025-06-24 NOTE — PROGRESS NOTE ADULT - SUBJECTIVE AND OBJECTIVE BOX
INTERVAL HPI/OVERNIGHT EVENTS: RRT this AM for worsening hypoxemia. Pulm asked to reeval    SUBJECTIVE: Patient seen and examined at bedside. Pt appears more uncomfortable satting 95% on 6LNC with junky cough    ROS: All negative except as listed above.    VITAL SIGNS:  ICU Vital Signs Last 24 Hrs  T(C): 36.6 (24 Jun 2025 10:59), Max: 36.7 (24 Jun 2025 00:26)  T(F): 97.8 (24 Jun 2025 10:59), Max: 98 (24 Jun 2025 00:26)  HR: 72 (24 Jun 2025 10:59) (66 - 76)  BP: 114/57 (24 Jun 2025 10:59) (99/60 - 129/69)  BP(mean): --  ABP: --  ABP(mean): --  RR: 19 (24 Jun 2025 10:59) (16 - 19)  SpO2: 89% (24 Jun 2025 10:59) (89% - 97%)    O2 Parameters below as of 24 Jun 2025 05:28  Patient On (Oxygen Delivery Method): nasal cannula          06-23 @ 07:01 - 06-24 @ 07:00  --------------------------------------------------------  IN: 650 mL / OUT: 750 mL / NET: -100 mL    06-24 @ 07:01 - 06-24 @ 11:48  --------------------------------------------------------  IN: 100 mL / OUT: 0 mL / NET: 100 mL      CAPILLARY BLOOD GLUCOSE      POCT Blood Glucose.: 130 mg/dL (24 Jun 2025 09:36)      ECG: reviewed.    PHYSICAL EXAM:    Gen: frail and weak in bed but in NAD  HEENT: PERRL, EOMI  Resp: diminished bs bilat  CVS: S1S2 no m/r/g  Abd: soft NT/ND +BS  Ext: no c/c/e  Neuro: A&Ox3      MEDICATIONS:  MEDICATIONS  (STANDING):  amLODIPine   Tablet 10 milliGRAM(s) Oral daily  apixaban 2.5 milliGRAM(s) Oral every 12 hours  atenolol  Tablet 50 milliGRAM(s) Oral daily  azithromycin   Tablet 250 milliGRAM(s) Oral daily  benzocaine/menthol Lozenge 1 Lozenge Oral two times a day  chlorhexidine 2% Cloths 1 Application(s) Topical <User Schedule>  fluticasone propionate/ salmeterol 500-50 MICROgram(s) Diskus 1 Dose(s) Inhalation two times a day  melatonin 3 milliGRAM(s) Oral at bedtime  montelukast 10 milliGRAM(s) Oral daily  pantoprazole    Tablet 40 milliGRAM(s) Oral before breakfast  polyethylene glycol 3350 17 Gram(s) Oral every 24 hours  senna 2 Tablet(s) Oral at bedtime  tiotropium 2.5 MICROgram(s) Inhaler 2 Puff(s) Inhalation daily  zinc oxide 40% Paste 1 Application(s) Topical daily    MEDICATIONS  (PRN):  acetaminophen     Tablet .. 650 milliGRAM(s) Oral every 6 hours PRN Temp greater or equal to 38C (100.4F), Mild Pain (1 - 3)  albuterol/ipratropium for Nebulization 3 milliLiter(s) Nebulizer every 6 hours PRN Shortness of Breath and/or Wheezing  aluminum hydroxide/magnesium hydroxide/simethicone Suspension 30 milliLiter(s) Oral every 4 hours PRN Dyspepsia  bisacodyl 5 milliGRAM(s) Oral every 12 hours PRN Constipation  guaiFENesin Oral Liquid (Sugar-Free) 200 milliGRAM(s) Oral every 6 hours PRN Cough      ALLERGIES:  Allergies    No Known Allergies    Intolerances        LABS:                        7.6    9.10  )-----------( 184      ( 24 Jun 2025 06:38 )             26.0     06-24    133[L]  |  102  |  18  ----------------------------<  92  4.2   |  24  |  1.41[H]    Ca    8.3[L]      24 Jun 2025 06:38    TPro  5.6[L]  /  Alb  1.9[L]  /  TBili  0.4  /  DBili  x   /  AST  14[L]  /  ALT  16  /  AlkPhos  71  06-24      Urinalysis Basic - ( 24 Jun 2025 06:38 )    Color: x / Appearance: x / SG: x / pH: x  Gluc: 92 mg/dL / Ketone: x  / Bili: x / Urobili: x   Blood: x / Protein: x / Nitrite: x   Leuk Esterase: x / RBC: x / WBC x   Sq Epi: x / Non Sq Epi: x / Bacteria: x      ABG:      vBG:    Micro:        RADIOLOGY & ADDITIONAL TESTS: Reviewed.

## 2025-06-25 LAB
ALBUMIN SERPL ELPH-MCNC: 2 G/DL — LOW (ref 3.3–5)
ALP SERPL-CCNC: 70 U/L — SIGNIFICANT CHANGE UP (ref 40–120)
ALT FLD-CCNC: 16 U/L — SIGNIFICANT CHANGE UP (ref 12–78)
ANION GAP SERPL CALC-SCNC: 5 MMOL/L — SIGNIFICANT CHANGE UP (ref 5–17)
AST SERPL-CCNC: 11 U/L — LOW (ref 15–37)
BILIRUB SERPL-MCNC: 0.5 MG/DL — SIGNIFICANT CHANGE UP (ref 0.2–1.2)
BLD GP AB SCN SERPL QL: SIGNIFICANT CHANGE UP
BUN SERPL-MCNC: 15 MG/DL — SIGNIFICANT CHANGE UP (ref 7–23)
CALCIUM SERPL-MCNC: 8.4 MG/DL — LOW (ref 8.5–10.1)
CHLORIDE SERPL-SCNC: 104 MMOL/L — SIGNIFICANT CHANGE UP (ref 96–108)
CO2 SERPL-SCNC: 25 MMOL/L — SIGNIFICANT CHANGE UP (ref 22–31)
CREAT SERPL-MCNC: 1.19 MG/DL — SIGNIFICANT CHANGE UP (ref 0.5–1.3)
EGFR: 49 ML/MIN/1.73M2 — LOW
EGFR: 49 ML/MIN/1.73M2 — LOW
GLUCOSE SERPL-MCNC: 111 MG/DL — HIGH (ref 70–99)
HCT VFR BLD CALC: 21.9 % — LOW (ref 34.5–45)
HCT VFR BLD CALC: 22.5 % — LOW (ref 34.5–45)
HGB BLD-MCNC: 6.6 G/DL — CRITICAL LOW (ref 11.5–15.5)
HGB BLD-MCNC: 6.8 G/DL — CRITICAL LOW (ref 11.5–15.5)
MCHC RBC-ENTMCNC: 24.1 PG — LOW (ref 27–34)
MCHC RBC-ENTMCNC: 24.2 PG — LOW (ref 27–34)
MCHC RBC-ENTMCNC: 30.1 G/DL — LOW (ref 32–36)
MCHC RBC-ENTMCNC: 30.2 G/DL — LOW (ref 32–36)
MCV RBC AUTO: 79.9 FL — LOW (ref 80–100)
MCV RBC AUTO: 80.1 FL — SIGNIFICANT CHANGE UP (ref 80–100)
NRBC BLD AUTO-RTO: 0 /100 WBCS — SIGNIFICANT CHANGE UP (ref 0–0)
NRBC BLD AUTO-RTO: 0 /100 WBCS — SIGNIFICANT CHANGE UP (ref 0–0)
PLATELET # BLD AUTO: 159 K/UL — SIGNIFICANT CHANGE UP (ref 150–400)
PLATELET # BLD AUTO: 173 K/UL — SIGNIFICANT CHANGE UP (ref 150–400)
POTASSIUM SERPL-MCNC: 4 MMOL/L — SIGNIFICANT CHANGE UP (ref 3.5–5.3)
POTASSIUM SERPL-SCNC: 4 MMOL/L — SIGNIFICANT CHANGE UP (ref 3.5–5.3)
PROT SERPL-MCNC: 5.4 GM/DL — LOW (ref 6–8.3)
RBC # BLD: 2.74 M/UL — LOW (ref 3.8–5.2)
RBC # BLD: 2.81 M/UL — LOW (ref 3.8–5.2)
RBC # FLD: 19.1 % — HIGH (ref 10.3–14.5)
RBC # FLD: 19.3 % — HIGH (ref 10.3–14.5)
SODIUM SERPL-SCNC: 134 MMOL/L — LOW (ref 135–145)
WBC # BLD: 8.26 K/UL — SIGNIFICANT CHANGE UP (ref 3.8–10.5)
WBC # BLD: 8.37 K/UL — SIGNIFICANT CHANGE UP (ref 3.8–10.5)
WBC # FLD AUTO: 8.26 K/UL — SIGNIFICANT CHANGE UP (ref 3.8–10.5)
WBC # FLD AUTO: 8.37 K/UL — SIGNIFICANT CHANGE UP (ref 3.8–10.5)

## 2025-06-25 PROCEDURE — 99233 SBSQ HOSP IP/OBS HIGH 50: CPT

## 2025-06-25 PROCEDURE — 99233 SBSQ HOSP IP/OBS HIGH 50: CPT | Mod: FS

## 2025-06-25 PROCEDURE — 99231 SBSQ HOSP IP/OBS SF/LOW 25: CPT

## 2025-06-25 PROCEDURE — 99497 ADVNCD CARE PLAN 30 MIN: CPT

## 2025-06-25 PROCEDURE — 74174 CTA ABD&PLVS W/CONTRAST: CPT | Mod: 26

## 2025-06-25 RX ADMIN — APIXABAN 2.5 MILLIGRAM(S): 2.5 TABLET, FILM COATED ORAL at 21:45

## 2025-06-25 RX ADMIN — Medication 3 MILLIGRAM(S): at 21:45

## 2025-06-25 RX ADMIN — MONTELUKAST SODIUM 10 MILLIGRAM(S): 10 TABLET ORAL at 11:03

## 2025-06-25 RX ADMIN — Medication 4 MILLILITER(S): at 11:15

## 2025-06-25 RX ADMIN — Medication 1 DOSE(S): at 17:04

## 2025-06-25 RX ADMIN — LISINOPRIL 50 MILLIGRAM(S): 30 TABLET ORAL at 05:49

## 2025-06-25 RX ADMIN — APIXABAN 2.5 MILLIGRAM(S): 2.5 TABLET, FILM COATED ORAL at 11:03

## 2025-06-25 RX ADMIN — POLYETHYLENE GLYCOL 3350 17 GRAM(S): 17 POWDER, FOR SOLUTION ORAL at 13:43

## 2025-06-25 RX ADMIN — Medication 1 LOZENGE: at 17:03

## 2025-06-25 RX ADMIN — Medication 2 TABLET(S): at 21:45

## 2025-06-25 RX ADMIN — Medication 1 DOSE(S): at 05:49

## 2025-06-25 RX ADMIN — Medication 40 MILLIGRAM(S): at 05:52

## 2025-06-25 RX ADMIN — IPRATROPIUM BROMIDE AND ALBUTEROL SULFATE 3 MILLILITER(S): .5; 2.5 SOLUTION RESPIRATORY (INHALATION) at 11:14

## 2025-06-25 RX ADMIN — Medication 100 MILLIGRAM(S): at 11:03

## 2025-06-25 RX ADMIN — Medication 1 APPLICATION(S): at 05:50

## 2025-06-25 RX ADMIN — TOUCHLESS CARE ZINC OXIDE PROTECTANT 1 APPLICATION(S): 20; 25 SPRAY TOPICAL at 11:05

## 2025-06-25 RX ADMIN — Medication 250 MILLIGRAM(S): at 11:03

## 2025-06-25 RX ADMIN — Medication 4 MILLILITER(S): at 05:21

## 2025-06-25 RX ADMIN — Medication 1 LOZENGE: at 05:49

## 2025-06-25 RX ADMIN — IPRATROPIUM BROMIDE AND ALBUTEROL SULFATE 3 MILLILITER(S): .5; 2.5 SOLUTION RESPIRATORY (INHALATION) at 17:00

## 2025-06-25 RX ADMIN — Medication 4 MILLILITER(S): at 18:00

## 2025-06-25 RX ADMIN — IPRATROPIUM BROMIDE AND ALBUTEROL SULFATE 3 MILLILITER(S): .5; 2.5 SOLUTION RESPIRATORY (INHALATION) at 05:20

## 2025-06-25 NOTE — SWALLOW BEDSIDE ASSESSMENT ADULT - H & P REVIEW
"69 year old woman with a PMHx of Asthma (on prn supp O2), COPD (prev smoker quit 20 years ago), HTN, HFpEF, CVA with L sided deficits (as per son), A Fib, PE (off Eliquis and Aspirin since March 2025 due to abdominal wall hematoma), and Liver mass (IR recommended for outpatient follow up w repeat imaging). Pt presented to the ED with SOB that started the night prior. Per daughter in law, pt has been declining in her health, decreased appetite since her last hospital admission in March 2025. Has been bed bound last 3 years. In the ED, pt tripoding and was placed on BIPAP. Received nebs and steroids. Pt then developed episode of coffee ground vomiting and switch to high flow NC. Labs significant for wbc 11.8, hgb 8.6, Na 121, K: 5.8, Bicarb 20, Creat: 1.38, trop: 62, pH: 7.11, pCO2: 57."/yes

## 2025-06-25 NOTE — CHART NOTE - NSCHARTNOTESELECT_GEN_ALL_CORE
Event Note
Event Note
Nutrition Services
POCUS/Event Note
Transfer Note
Home NIV/Event Note
Nutrition Services
Transfer Note

## 2025-06-25 NOTE — SWALLOW BEDSIDE ASSESSMENT ADULT - SWALLOW EVAL: DIAGNOSIS
patient presented with oropharyngeal dysphagia for puree, minced & moist, easy to chew, moderately thick liquid, mildly thick liquid and thin liquid. oral phase marked by adequate labial seal/utensil stripping, reduced oral containment for thin liquids resulting in anterior loss, mildly prolonged mastication/bolus formation, fair A-P transport with mild lingual stasis for easy to chew which was cleared with cued liquid wash. +initiation of pharyngeal swallow trigger with laryngeal excursion to palpation. Noted inconsistent cough during evaluation, unclear if related to baseline cough or PO trials. Given pt significant swallow history and current medical condition, pt would benefit from modified textures.

## 2025-06-25 NOTE — GOALS OF CARE CONVERSATION - ADVANCED CARE PLANNING - CONVERSATION DETAILS
69F ex smoker (quit 2013, 1/2 PPD x10 years) PMH HTN, chronic diastolic CHF (grade I diastolic dysfunction), Afib on Eliquis (by records), hx of PE (1/2024), COPD (previously on symbicort and spiriva, changed to duonebs q 6 hrs as there was question if she was able to coordinate use of her hand held inhaler per outpatient records, never intubated), L MCA aneurysm,  e-coli UTI 3/2025, liver cyst, abdominal wall hematoma 3/2025 presents from home for complaints of SOB with increased work of breathing. Found to have blood gas 7.11/57/82/18/96% placed on BiPAP. Labs with Na 121, K 5.8, HCO3: 17, Cr 1.38. Admitted to ICU for hypercarbic respiratory failure, COPD exacerbation. Transitioned from BIPAP to AVAPS and weaned off NIV. Given steroids and nebs for COPD exacerbation. CT chest without PE but noted secretions in airway with some mucus plugging LLL with background of severe emphysema. Downgraded to medical floor 6/10 but with respiratory distress and worsening wheeze, lethargy pos downgrade requiring upgrade back to ICU for respiratory distress/hypercarbic respiratory failure requiring NIV support. Noted to be in urinary retention (bladder sono 550cc urine). She has been requiring NIV intermittently for SOB and WOB. She also developed R facial swelling, concerning for parotitis. Respiratory status and mental status improved. Downgraded to medical service today 6/20.  Spoke with son regarding blood transfusion consent and he stated that he don't want intubation and resuscitation and explained to him on MOLST Form and son signed for DNR/ DNI, No tube feeding and dialysis. if needed can give antibiotic.  MOLST Form completed and witnessed by staff.

## 2025-06-25 NOTE — PROGRESS NOTE ADULT - ASSESSMENT
Assessment: 69F w/ COPD, PE, HFpEF, Afib, liver mass. Presents w/ SOB. Admitted to ICU initially for COPD exacerbation and acute hypercapnic respiratory failure requiring NIV and LLL mucous plugging and sent to the medical floors. Later that day she was found to be in respiratory distress and lethargic and transferred back to ICU. She has been requiring NIV intermittently for SOB and WOB. She has also developed R facial swelling, concerning for parotitis. Respiratory status improving overall and less BiPAP use. Transferred to medical floors and was doing well. This morning developed worsening hypoxemia and RRT called. Now remains on 6LNC    Recs:   - currently on 6LNC, SpO2 ~95%, wean as tolerates to maintain SpO2 >88%  - worsening hypoxemia may be due to aspiration - noted to have wet deep cough; she is deconditioned and very frail and may not be able to cough well and clear her airways due to deconditioning  - duonebs + hypertonic saline nebs q6 ATC  - suggest getting repeat swallow evaluation to ensure she is not aspirating  - can use BiPAP PRN   - s/p vancomycin and unasyn for parotitis - ID managing abx  - continue advair for COPD maintenance  - azithromycin for severe COPD   - s/p steroids for COPD exacerbation  - GOC ongoing; full code     d/w dr. magdaleno    Assessment: 69F w/ COPD, PE, HFpEF, Afib, liver mass. Presents w/ SOB. Admitted to ICU initially for COPD exacerbation and acute hypercapnic respiratory failure requiring NIV and LLL mucous plugging and sent to the medical floors. Later that day she was found to be in respiratory distress and lethargic and transferred back to ICU. She has been requiring NIV intermittently for SOB and WOB. She has also developed R facial swelling, concerning for parotitis. Respiratory status improving overall and less BiPAP use. Transferred to medical floors and was doing well. This morning developed worsening hypoxemia and RRT called. Now remains on 6LNC    Recs:   - currently on 4LNC, SpO2 ~95%, wean as tolerates to maintain SpO2 >88%  - worsening hypoxemia may be due to aspiration - noted to have wet deep cough; she is deconditioned and very frail and may not be able to cough well and clear her airways due to deconditioning  - duonebs + hypertonic saline nebs q6 ATC  - suggest getting repeat swallow evaluation to ensure she is not aspirating  - can use BiPAP PRN   - s/p vancomycin and unasyn for parotitis - ID managing abx  - continue advair for COPD maintenance  - azithromycin for severe COPD   - s/p steroids for COPD exacerbation  - appreciate palliative care recs; now DNR/DNI    d/w dr. magdaleno

## 2025-06-25 NOTE — PROGRESS NOTE ADULT - NS ATTEND AMEND GEN_ALL_CORE FT
69F w/ COPD, PE, HFpEF, Afib, liver mass. Presents w/ SOB. Admitted to ICU initially for COPD exacerbation and acute hypercapnic respiratory failure requiring NIV and LLL mucous plugging and sent to the medical floors. Later that day she was found to be in respiratory distress and lethargic and transferred back to ICU. She has been requiring NIV intermittently for SOB and WOB. She has also developed R facial swelling, concerning for parotitis. Respiratory status improving overall and less BiPAP use. Transferred to medical floors and was doing well. Yesterday RRT called for worsening hypoxemia, upt o 6LNC from 3LNC. Pulmonary asked to see pt again    On evaluation, pt is awake and alert, less junky sounding, although cough is still wet     - currently on 4LNC, SpO2 ~95%, maintain SpO2 >88%  - consider aspiration as cause of worsening hypoxemia in combination w/ weakness/deconditioning leading to poor cough and clearance of airways   - continue duonebs + hypertonic saline nebs q6 ATC  - suggest getting swallow evaluation to ensure she is not aspirating  - can use BiPAP PRN   - s/p vancomycin and unasyn for parotitis - ID managing abx  - continue advair for COPD maintenance  - azithromycin for severe COPD   - s/p steroids for COPD exacerbation  - pt is now DNR/DNI

## 2025-06-25 NOTE — CHART NOTE - NSCHARTNOTEFT_GEN_A_CORE
House- Medicine NP:    Called by RN to obtain consent for blood transfusion.   Patient is a 69y old  Female who presents with a chief complaint of Asthma Exacerbation (25 Jun 2025 08:45)                          6.8    8.26  )-----------( 173      ( 25 Jun 2025 06:22 )             22.5     Discussed with patient's son regarding the need for blood transfusion. Risk and benefits discussed. Risks including fever, chills/rigors, high or low blood pressure, respiratory distress (wheezing/hypoxia), urticaria/rash/edema, nausea, pain, bleeding, darkened urine, lower back pain, severe allergic reaction and death was discussed. Verbalizes the understanding and consent obtained. Witnessed by staff.

## 2025-06-25 NOTE — PROGRESS NOTE ADULT - ASSESSMENT
69F w/ COPD, PE, HFpEF, Afib, liver mass admitted to ICU acute hypercapnic respiratory failure due to COPD exacerbation and LLL mucous plugging requiring NIV, transferred to medical floor then upgraded again to ICU for NIV. Also developed R facial swelling concerning for parotits vs. sialothiasis, improving on IV antibiotics and ID following. Respiratory status stable off BIPAP.    # Anemia- Will transfuse 2U PRBC. FOBT and CTA A/P ordered to r/o acute bleed  # Acute on Chronic Respiratory Failure with Hypoxia and Hypercarbia - RRT called on 6/24 for hypoxia, titrate to room air as tolerated. Per son obtain VBG to assess CO2 retention off BIPAP as requested by pulm.   # Acute COPD Exacerbation - improved with minimal use of BIPAP.  O2.  Continue Advair, Spiriva, Nebs, Steroids.  Short course of Zithromax.    # ? Mumps / Parotitis vs. sialolithiasis - leukocytosis improving. S/p abx. ID recs appreciated  # Constipation - + BM.  Contnue Laxatives PRN  # H/o PE - continue AC  # Chronic Diastolic CHF - appears to be euvolemic.  Monitor volume status.  # Chronic Afib - continue rate control.  continue anticoagulation.   # Dementia - supportive care, at baseline per son.  # Liver Mass- will need outpatient f/u  # Inpatient DVT Ppx - on anticoagulation     Palliative evaluation    Dispo- Pending DME (NIV, wheelchair, and walker)   Updated patient's son, Brenda ([phone]).

## 2025-06-25 NOTE — PROGRESS NOTE ADULT - SUBJECTIVE AND OBJECTIVE BOX
Patient is a 69y old  Female who presents with a chief complaint of Asthma Exacerbation (25 Jun 2025 08:45)    INTERVAL HPI/OVERNIGHT EVENTS: No acute events overnight. HD stable.     MEDICATIONS  (STANDING):  albuterol/ipratropium for Nebulization 3 milliLiter(s) Nebulizer every 6 hours  amLODIPine   Tablet 10 milliGRAM(s) Oral daily  apixaban 2.5 milliGRAM(s) Oral every 12 hours  atenolol  Tablet 50 milliGRAM(s) Oral daily  azithromycin   Tablet 250 milliGRAM(s) Oral daily  benzocaine/menthol Lozenge 1 Lozenge Oral two times a day  chlorhexidine 2% Cloths 1 Application(s) Topical <User Schedule>  fluticasone propionate/ salmeterol 500-50 MICROgram(s) Diskus 1 Dose(s) Inhalation two times a day  melatonin 3 milliGRAM(s) Oral at bedtime  montelukast 10 milliGRAM(s) Oral daily  pantoprazole    Tablet 40 milliGRAM(s) Oral before breakfast  polyethylene glycol 3350 17 Gram(s) Oral every 24 hours  senna 2 Tablet(s) Oral at bedtime  sodium chloride 3%  Inhalation 4 milliLiter(s) Inhalation every 6 hours  vancomycin  IVPB 500 milliGRAM(s) IV Intermittent every 24 hours  zinc oxide 40% Paste 1 Application(s) Topical daily    MEDICATIONS  (PRN):  acetaminophen     Tablet .. 650 milliGRAM(s) Oral every 6 hours PRN Temp greater or equal to 38C (100.4F), Mild Pain (1 - 3)  aluminum hydroxide/magnesium hydroxide/simethicone Suspension 30 milliLiter(s) Oral every 4 hours PRN Dyspepsia  bisacodyl 5 milliGRAM(s) Oral every 12 hours PRN Constipation  guaiFENesin Oral Liquid (Sugar-Free) 200 milliGRAM(s) Oral every 6 hours PRN Cough      Allergies    No Known Allergies    Intolerances        REVIEW OF SYSTEMS: all negative with exception of above    Vital Signs Last 24 Hrs  T(C): 36.7 (25 Jun 2025 11:20), Max: 36.7 (24 Jun 2025 17:00)  T(F): 98.1 (25 Jun 2025 11:20), Max: 98.1 (25 Jun 2025 11:20)  HR: 84 (25 Jun 2025 11:35) (75 - 88)  BP: 118/66 (25 Jun 2025 11:20) (94/51 - 118/66)  BP(mean): 83 (25 Jun 2025 11:20) (83 - 83)  RR: 20 (25 Jun 2025 11:20) (17 - 20)  SpO2: 96% (25 Jun 2025 11:35) (96% - 100%)    Parameters below as of 25 Jun 2025 11:35  Patient On (Oxygen Delivery Method): nasal cannula w/ humidification    PHYSICAL EXAM:  GENERAL: NAD, well-groomed  NERVOUS SYSTEM:  Alert & Oriented X3, Good concentration; Motor Strength 5/5 B/L upper and lower extremities; DTRs 2+ intact and symmetric  CHEST/LUNG: Clear to percussion bilaterally; No rales, rhonchi, wheezing, or rubs  HEART: Regular rate and rhythm; No murmurs, rubs, or gallops  ABDOMEN: Soft, Nontender, Nondistended; Bowel sounds present  EXTREMITIES:  2+ Peripheral Pulses, No clubbing, cyanosis, or edema    LABS:                        6.6    8.37  )-----------( 159      ( 25 Jun 2025 11:04 )             21.9     06-25    134[L]  |  104  |  15  ----------------------------<  111[H]  4.0   |  25  |  1.19    Ca    8.4[L]      25 Jun 2025 06:22    TPro  5.4[L]  /  Alb  2.0[L]  /  TBili  0.5  /  DBili  x   /  AST  11[L]  /  ALT  16  /  AlkPhos  70  06-25      Urinalysis Basic - ( 25 Jun 2025 06:22 )    Color: x / Appearance: x / SG: x / pH: x  Gluc: 111 mg/dL / Ketone: x  / Bili: x / Urobili: x   Blood: x / Protein: x / Nitrite: x   Leuk Esterase: x / RBC: x / WBC x   Sq Epi: x / Non Sq Epi: x / Bacteria: x      CAPILLARY BLOOD GLUCOSE          RADIOLOGY & ADDITIONAL TESTS:    Imaging Personally Reviewed:  [ ] YES  [ ] NO    Consultant(s) Notes Reviewed:  [ ] YES  [ ] NO    Care Discussed with Consultants/Other Providers [ ] YES  [ ] NO

## 2025-06-25 NOTE — PROGRESS NOTE ADULT - SUBJECTIVE AND OBJECTIVE BOX
INTERVAL HPI/OVERNIGHT EVENTS: remains on 6L    SUBJECTIVE: Patient seen and examined at bedside.   ROS: All negative except as listed above.    VITAL SIGNS:  ICU Vital Signs Last 24 Hrs  T(C): 36.4 (25 Jun 2025 04:50), Max: 36.7 (24 Jun 2025 17:00)  T(F): 97.6 (25 Jun 2025 04:50), Max: 98 (24 Jun 2025 17:00)  HR: 76 (25 Jun 2025 05:24) (72 - 80)  BP: 100/62 (25 Jun 2025 04:50) (94/51 - 114/57)  BP(mean): --  ABP: --  ABP(mean): --  RR: 18 (25 Jun 2025 04:50) (17 - 19)  SpO2: 96% (25 Jun 2025 05:24) (89% - 99%)    O2 Parameters below as of 25 Jun 2025 05:24  Patient On (Oxygen Delivery Method): nasal cannula w/ humidification      Plateau pressure:   P/F ratio:     06-24 @ 07:01  -  06-25 @ 07:00  --------------------------------------------------------  IN: 250 mL / OUT: 450 mL / NET: -200 mL      CAPILLARY BLOOD GLUCOSE      POCT Blood Glucose.: 130 mg/dL (24 Jun 2025 09:36)      ECG: reviewed.    PHYSICAL EXAM:  Gen: frail and weak in bed but in NAD  HEENT: PERRL, EOMI  Resp: diminished bs bilat  CVS: S1S2 no m/r/g  Abd: soft NT/ND +BS  Ext: no c/c/e  Neuro: A&Ox3    MEDICATIONS:  MEDICATIONS  (STANDING):  albuterol/ipratropium for Nebulization 3 milliLiter(s) Nebulizer every 6 hours  amLODIPine   Tablet 10 milliGRAM(s) Oral daily  apixaban 2.5 milliGRAM(s) Oral every 12 hours  atenolol  Tablet 50 milliGRAM(s) Oral daily  azithromycin   Tablet 250 milliGRAM(s) Oral daily  benzocaine/menthol Lozenge 1 Lozenge Oral two times a day  chlorhexidine 2% Cloths 1 Application(s) Topical <User Schedule>  fluticasone propionate/ salmeterol 500-50 MICROgram(s) Diskus 1 Dose(s) Inhalation two times a day  melatonin 3 milliGRAM(s) Oral at bedtime  montelukast 10 milliGRAM(s) Oral daily  pantoprazole    Tablet 40 milliGRAM(s) Oral before breakfast  polyethylene glycol 3350 17 Gram(s) Oral every 24 hours  senna 2 Tablet(s) Oral at bedtime  sodium chloride 3%  Inhalation 4 milliLiter(s) Inhalation every 6 hours  vancomycin  IVPB 500 milliGRAM(s) IV Intermittent every 24 hours  zinc oxide 40% Paste 1 Application(s) Topical daily    MEDICATIONS  (PRN):  acetaminophen     Tablet .. 650 milliGRAM(s) Oral every 6 hours PRN Temp greater or equal to 38C (100.4F), Mild Pain (1 - 3)  aluminum hydroxide/magnesium hydroxide/simethicone Suspension 30 milliLiter(s) Oral every 4 hours PRN Dyspepsia  bisacodyl 5 milliGRAM(s) Oral every 12 hours PRN Constipation  guaiFENesin Oral Liquid (Sugar-Free) 200 milliGRAM(s) Oral every 6 hours PRN Cough      ALLERGIES:  Allergies    No Known Allergies    Intolerances        LABS:                        6.8    8.26  )-----------( 173      ( 25 Jun 2025 06:22 )             22.5     06-25    134[L]  |  104  |  15  ----------------------------<  111[H]  4.0   |  25  |  1.19    Ca    8.4[L]      25 Jun 2025 06:22    TPro  5.4[L]  /  Alb  2.0[L]  /  TBili  0.5  /  DBili  x   /  AST  11[L]  /  ALT  16  /  AlkPhos  70  06-25      Urinalysis Basic - ( 25 Jun 2025 06:22 )    Color: x / Appearance: x / SG: x / pH: x  Gluc: 111 mg/dL / Ketone: x  / Bili: x / Urobili: x   Blood: x / Protein: x / Nitrite: x   Leuk Esterase: x / RBC: x / WBC x   Sq Epi: x / Non Sq Epi: x / Bacteria: x      ABG:      vBG:    Micro:        RADIOLOGY & ADDITIONAL TESTS: Reviewed. INTERVAL HPI/OVERNIGHT EVENTS: remains on 4L    SUBJECTIVE: Patient seen and examined at bedside.   ROS: All negative except as listed above.    VITAL SIGNS:  ICU Vital Signs Last 24 Hrs  T(C): 36.4 (25 Jun 2025 04:50), Max: 36.7 (24 Jun 2025 17:00)  T(F): 97.6 (25 Jun 2025 04:50), Max: 98 (24 Jun 2025 17:00)  HR: 76 (25 Jun 2025 05:24) (72 - 80)  BP: 100/62 (25 Jun 2025 04:50) (94/51 - 114/57)  BP(mean): --  ABP: --  ABP(mean): --  RR: 18 (25 Jun 2025 04:50) (17 - 19)  SpO2: 96% (25 Jun 2025 05:24) (89% - 99%)    O2 Parameters below as of 25 Jun 2025 05:24  Patient On (Oxygen Delivery Method): nasal cannula w/ humidification      Plateau pressure:   P/F ratio:     06-24 @ 07:01  -  06-25 @ 07:00  --------------------------------------------------------  IN: 250 mL / OUT: 450 mL / NET: -200 mL      CAPILLARY BLOOD GLUCOSE      POCT Blood Glucose.: 130 mg/dL (24 Jun 2025 09:36)      ECG: reviewed.    PHYSICAL EXAM:  Gen: frail and weak in bed but in NAD  HEENT: PERRL, EOMI  Resp: diminished bs bilat  CVS: S1S2 no m/r/g  Abd: soft NT/ND +BS  Ext: no c/c/e  Neuro: A&Ox3    MEDICATIONS:  MEDICATIONS  (STANDING):  albuterol/ipratropium for Nebulization 3 milliLiter(s) Nebulizer every 6 hours  amLODIPine   Tablet 10 milliGRAM(s) Oral daily  apixaban 2.5 milliGRAM(s) Oral every 12 hours  atenolol  Tablet 50 milliGRAM(s) Oral daily  azithromycin   Tablet 250 milliGRAM(s) Oral daily  benzocaine/menthol Lozenge 1 Lozenge Oral two times a day  chlorhexidine 2% Cloths 1 Application(s) Topical <User Schedule>  fluticasone propionate/ salmeterol 500-50 MICROgram(s) Diskus 1 Dose(s) Inhalation two times a day  melatonin 3 milliGRAM(s) Oral at bedtime  montelukast 10 milliGRAM(s) Oral daily  pantoprazole    Tablet 40 milliGRAM(s) Oral before breakfast  polyethylene glycol 3350 17 Gram(s) Oral every 24 hours  senna 2 Tablet(s) Oral at bedtime  sodium chloride 3%  Inhalation 4 milliLiter(s) Inhalation every 6 hours  vancomycin  IVPB 500 milliGRAM(s) IV Intermittent every 24 hours  zinc oxide 40% Paste 1 Application(s) Topical daily    MEDICATIONS  (PRN):  acetaminophen     Tablet .. 650 milliGRAM(s) Oral every 6 hours PRN Temp greater or equal to 38C (100.4F), Mild Pain (1 - 3)  aluminum hydroxide/magnesium hydroxide/simethicone Suspension 30 milliLiter(s) Oral every 4 hours PRN Dyspepsia  bisacodyl 5 milliGRAM(s) Oral every 12 hours PRN Constipation  guaiFENesin Oral Liquid (Sugar-Free) 200 milliGRAM(s) Oral every 6 hours PRN Cough      ALLERGIES:  Allergies    No Known Allergies    Intolerances        LABS:                        6.8    8.26  )-----------( 173      ( 25 Jun 2025 06:22 )             22.5     06-25    134[L]  |  104  |  15  ----------------------------<  111[H]  4.0   |  25  |  1.19    Ca    8.4[L]      25 Jun 2025 06:22    TPro  5.4[L]  /  Alb  2.0[L]  /  TBili  0.5  /  DBili  x   /  AST  11[L]  /  ALT  16  /  AlkPhos  70  06-25      Urinalysis Basic - ( 25 Jun 2025 06:22 )    Color: x / Appearance: x / SG: x / pH: x  Gluc: 111 mg/dL / Ketone: x  / Bili: x / Urobili: x   Blood: x / Protein: x / Nitrite: x   Leuk Esterase: x / RBC: x / WBC x   Sq Epi: x / Non Sq Epi: x / Bacteria: x      ABG:      vBG:    Micro:        RADIOLOGY & ADDITIONAL TESTS: Reviewed.

## 2025-06-25 NOTE — PROGRESS NOTE ADULT - NUTRITIONAL ASSESSMENT
This patient has been assessed with a concern for Malnutrition and has been determined to have a diagnosis/diagnoses of Severe protein-calorie malnutrition.    This patient is being managed with:   Diet Soft and Bite Sized-  Moderately Thick Liquids (MODTHICKLIQS)  Supplement Feeding Modality:  Oral  Ensure Plus High Protein Cans or Servings Per Day:  1       Frequency:  Two Times a day  Entered: Jun 25 2025 12:54PM    The following pending diet order is being considered for treatment of Severe protein-calorie malnutrition:  Diet Soft and Bite Sized-  Moderately Thick Liquids (MODTHICKLIQS)  Entered: Jun 25 2025  1:39PM

## 2025-06-25 NOTE — SWALLOW BEDSIDE ASSESSMENT ADULT - SLP PERTINENT HISTORY OF CURRENT PROBLEM
Pt admitted to ICU d/t Asthma Exacerbation. Downgraded to GMF 6/10/25 however, later that day she was found to be in respiratory distress and lethargic and transferred back to ICU. Pt then downgraded to GMF 6/20/25.  Hospital course complicated by RRT 6/24/25 d/t hypoxia.

## 2025-06-26 ENCOUNTER — TRANSCRIPTION ENCOUNTER (OUTPATIENT)
Age: 70
End: 2025-06-26

## 2025-06-26 VITALS
HEART RATE: 80 BPM | OXYGEN SATURATION: 96 % | RESPIRATION RATE: 18 BRPM | TEMPERATURE: 99 F | SYSTOLIC BLOOD PRESSURE: 141 MMHG | DIASTOLIC BLOOD PRESSURE: 82 MMHG

## 2025-06-26 LAB
ALBUMIN SERPL ELPH-MCNC: 1.9 G/DL — LOW (ref 3.3–5)
ALP SERPL-CCNC: 75 U/L — SIGNIFICANT CHANGE UP (ref 40–120)
ALT FLD-CCNC: 15 U/L — SIGNIFICANT CHANGE UP (ref 12–78)
ANION GAP SERPL CALC-SCNC: 5 MMOL/L — SIGNIFICANT CHANGE UP (ref 5–17)
AST SERPL-CCNC: 9 U/L — LOW (ref 15–37)
BILIRUB SERPL-MCNC: 0.6 MG/DL — SIGNIFICANT CHANGE UP (ref 0.2–1.2)
BUN SERPL-MCNC: 11 MG/DL — SIGNIFICANT CHANGE UP (ref 7–23)
CALCIUM SERPL-MCNC: 8.3 MG/DL — LOW (ref 8.5–10.1)
CHLORIDE SERPL-SCNC: 106 MMOL/L — SIGNIFICANT CHANGE UP (ref 96–108)
CO2 SERPL-SCNC: 24 MMOL/L — SIGNIFICANT CHANGE UP (ref 22–31)
CREAT SERPL-MCNC: 1.13 MG/DL — SIGNIFICANT CHANGE UP (ref 0.5–1.3)
EGFR: 52 ML/MIN/1.73M2 — LOW
EGFR: 52 ML/MIN/1.73M2 — LOW
GLUCOSE SERPL-MCNC: 104 MG/DL — HIGH (ref 70–99)
HCT VFR BLD CALC: 29 % — LOW (ref 34.5–45)
HGB BLD-MCNC: 8.9 G/DL — LOW (ref 11.5–15.5)
MCHC RBC-ENTMCNC: 25 PG — LOW (ref 27–34)
MCHC RBC-ENTMCNC: 30.7 G/DL — LOW (ref 32–36)
MCV RBC AUTO: 81.5 FL — SIGNIFICANT CHANGE UP (ref 80–100)
NRBC BLD AUTO-RTO: 0 /100 WBCS — SIGNIFICANT CHANGE UP (ref 0–0)
PLATELET # BLD AUTO: 149 K/UL — LOW (ref 150–400)
POTASSIUM SERPL-MCNC: 4.2 MMOL/L — SIGNIFICANT CHANGE UP (ref 3.5–5.3)
POTASSIUM SERPL-SCNC: 4.2 MMOL/L — SIGNIFICANT CHANGE UP (ref 3.5–5.3)
PROT SERPL-MCNC: 5.7 GM/DL — LOW (ref 6–8.3)
RBC # BLD: 3.56 M/UL — LOW (ref 3.8–5.2)
RBC # FLD: 18 % — HIGH (ref 10.3–14.5)
SODIUM SERPL-SCNC: 135 MMOL/L — SIGNIFICANT CHANGE UP (ref 135–145)
VANCOMYCIN TROUGH SERPL-MCNC: 14.7 UG/ML — SIGNIFICANT CHANGE UP (ref 10–20)
WBC # BLD: 7.36 K/UL — SIGNIFICANT CHANGE UP (ref 3.8–10.5)
WBC # FLD AUTO: 7.36 K/UL — SIGNIFICANT CHANGE UP (ref 3.8–10.5)

## 2025-06-26 PROCEDURE — 99232 SBSQ HOSP IP/OBS MODERATE 35: CPT

## 2025-06-26 PROCEDURE — 99239 HOSP IP/OBS DSCHRG MGMT >30: CPT

## 2025-06-26 PROCEDURE — 99233 SBSQ HOSP IP/OBS HIGH 50: CPT | Mod: FS

## 2025-06-26 PROCEDURE — G0545: CPT

## 2025-06-26 RX ORDER — AMLODIPINE BESYLATE 10 MG/1
1 TABLET ORAL
Qty: 0 | Refills: 0 | DISCHARGE
Start: 2025-06-26

## 2025-06-26 RX ORDER — IPRATROPIUM BROMIDE AND ALBUTEROL SULFATE .5; 2.5 MG/3ML; MG/3ML
3 SOLUTION RESPIRATORY (INHALATION)
Qty: 0 | Refills: 0 | DISCHARGE
Start: 2025-06-26

## 2025-06-26 RX ORDER — APIXABAN 2.5 MG/1
1 TABLET, FILM COATED ORAL
Qty: 60 | Refills: 0
Start: 2025-06-26 | End: 2025-07-25

## 2025-06-26 RX ORDER — OFLOXACIN 3 MG/ML
0 SOLUTION OPHTHALMIC
Qty: 0 | Refills: 0 | DISCHARGE

## 2025-06-26 RX ORDER — LISINOPRIL 30 MG/1
1 TABLET ORAL
Qty: 0 | Refills: 0 | DISCHARGE
Start: 2025-06-26

## 2025-06-26 RX ORDER — ALBUTEROL SULFATE 2.5 MG/3ML
3 VIAL, NEBULIZER (ML) INHALATION
Refills: 0 | DISCHARGE

## 2025-06-26 RX ORDER — MONTELUKAST SODIUM 10 MG/1
1 TABLET ORAL
Qty: 0 | Refills: 0 | DISCHARGE
Start: 2025-06-26

## 2025-06-26 RX ORDER — APIXABAN 2.5 MG/1
1 TABLET, FILM COATED ORAL
Refills: 0 | DISCHARGE

## 2025-06-26 RX ADMIN — Medication 4 MILLILITER(S): at 17:48

## 2025-06-26 RX ADMIN — Medication 1 LOZENGE: at 06:04

## 2025-06-26 RX ADMIN — MONTELUKAST SODIUM 10 MILLIGRAM(S): 10 TABLET ORAL at 11:26

## 2025-06-26 RX ADMIN — Medication 100 MILLIGRAM(S): at 12:00

## 2025-06-26 RX ADMIN — Medication 4 MILLILITER(S): at 00:16

## 2025-06-26 RX ADMIN — Medication 40 MILLIGRAM(S): at 08:18

## 2025-06-26 RX ADMIN — Medication 1 DOSE(S): at 17:06

## 2025-06-26 RX ADMIN — Medication 4 MILLILITER(S): at 11:03

## 2025-06-26 RX ADMIN — Medication 1 APPLICATION(S): at 06:03

## 2025-06-26 RX ADMIN — APIXABAN 2.5 MILLIGRAM(S): 2.5 TABLET, FILM COATED ORAL at 11:26

## 2025-06-26 RX ADMIN — AMLODIPINE BESYLATE 10 MILLIGRAM(S): 10 TABLET ORAL at 06:04

## 2025-06-26 RX ADMIN — LISINOPRIL 50 MILLIGRAM(S): 30 TABLET ORAL at 06:04

## 2025-06-26 RX ADMIN — TOUCHLESS CARE ZINC OXIDE PROTECTANT 1 APPLICATION(S): 20; 25 SPRAY TOPICAL at 11:23

## 2025-06-26 RX ADMIN — Medication 4 MILLILITER(S): at 05:11

## 2025-06-26 RX ADMIN — Medication 250 MILLIGRAM(S): at 11:26

## 2025-06-26 RX ADMIN — IPRATROPIUM BROMIDE AND ALBUTEROL SULFATE 3 MILLILITER(S): .5; 2.5 SOLUTION RESPIRATORY (INHALATION) at 11:01

## 2025-06-26 RX ADMIN — IPRATROPIUM BROMIDE AND ALBUTEROL SULFATE 3 MILLILITER(S): .5; 2.5 SOLUTION RESPIRATORY (INHALATION) at 05:10

## 2025-06-26 RX ADMIN — IPRATROPIUM BROMIDE AND ALBUTEROL SULFATE 3 MILLILITER(S): .5; 2.5 SOLUTION RESPIRATORY (INHALATION) at 00:15

## 2025-06-26 RX ADMIN — IPRATROPIUM BROMIDE AND ALBUTEROL SULFATE 3 MILLILITER(S): .5; 2.5 SOLUTION RESPIRATORY (INHALATION) at 17:47

## 2025-06-26 RX ADMIN — Medication 1 DOSE(S): at 06:04

## 2025-06-26 NOTE — DISCHARGE NOTE PROVIDER - NSDCCPCAREPLAN_GEN_ALL_CORE_FT
PRINCIPAL DISCHARGE DIAGNOSIS  Diagnosis: Asthma exacerbation in COPD  Assessment and Plan of Treatment:       SECONDARY DISCHARGE DIAGNOSES  Diagnosis: Acute respiratory failure with hypercapnia  Assessment and Plan of Treatment: due to COPD and asthma    Diagnosis: Toxic metabolic encephalopathy  Assessment and Plan of Treatment: due to parotitis    Diagnosis: Toxic parotitis  Assessment and Plan of Treatment:     Diagnosis: Chronic diastolic congestive heart failure  Assessment and Plan of Treatment:     Diagnosis: Chronic atrial fibrillation  Assessment and Plan of Treatment:

## 2025-06-26 NOTE — PROGRESS NOTE ADULT - TIME BILLING
Medical management as above, review of results/records, discussion with patient and primary team, documentation.
Medical management as above, review of results/records, discussion with patient and primary team, documentation.
review of chart, records, blood work, vitals, medications, imaging, direct patient care.
review of chart, blood work, vitals, medications, imaging, direct patient care.
review of labs, imaging  review of clinical documents  face to face encounter   coordination of care with primary team  A/P and documentation of encounter
Medical management as above, review of results/records, discussion with patient and primary team, documentation.
Medical management as above, review of results/records, discussion with patient and primary team, documentation.

## 2025-06-26 NOTE — PROGRESS NOTE ADULT - NS ATTEND AMEND GEN_ALL_CORE FT
pt seen and examined at bedside    s/p 1 unit pRBC yesterday for anemia Hb 6.6  Hb 8.9 today post transfusion  no gross bleeding noted  on 3L NC  no distress  made DNR/DNI by family      69F ex smoker (quit 2013, 1/2 PPD x10 years) PMH HTN, chronic diastolic CHF (grade I diastolic dysfunction), Afib on Eliquis (by records), hx of PE (1/2024), COPD (previously on symbicort and spiriva, changed to duonebs q 6 hrs as there was question if she was able to coordinate use of her hand held inhaler per outpatient records, never intubated), L MCA aneurysm,  e-coli UTI 3/2025, liver cyst, abdominal wall hematoma 3/2025 presents from home for complaints of SOB with increased work of breathing. Found to have blood gas 7.11/57/82/18/96% placed on BiPAP. Labs with Na 121, K 5.8, HCO3: 17, Cr 1.38. Admitted to ICU for hypercarbic respiratory failure, COPD exacerbation. Transitioned from BIPAP to AVAPS and weaned off NIV. Given steroids and nebs for COPD exacerbation. CT chest without PE but noted secretions in airway with some mucus plugging LLL with background of severe emphysema. Downgraded to medical floor 6/10 but with respiratory distress and worsening wheeze, lethargy post downgrade requiring upgrade back to ICU for respiratory distress/hypercarbic respiratory failure requiring NIV support. Noted to be in urinary retention (bladder sono 550cc urine). She has been requiring NIV intermittently for SOB and WOB. She also developed R facial swelling, concerning for parotitis. Respiratory status and mental status improved. Downgraded to medical service 6/20. RRT 6/24 for worsening hypoxemia, O2 increased from 3L to 6LNC pulmonary reconsulted for hypoxemia.     DX: acute hypoxic / hypercarbic respiratory failure, acute COPD exacerbation with underlying severe COPD, parotitis, impaired airway clearance, respiratory acidosis, metabolic acidosis, NANCY, urinary rentention, acute metabolic encephalopathy, anemia    - now on 3L NC  - goal to maintain O2 sat > 88%  - suspect ?aspiration vs mucus plugging with impaired airway clearance and generalized weakness for recurrent hypoxemia  - continue duonebs + hypertonic saline nebs q6 to help with secretion mobilization  - s/p steroids for COPD exacerbation  - cont advair for for COPD, holding spiriva while on standing duonebs  - on discharge can give advair + spiriva with prn duonebs  - s/p vancomycin and unasyn for parotitis   - transfused 1 unit pRBC yesterday. Hb improved today to 8.6. no gross bleeding noted or reported  - remains on eliquis for a-fib, management per primary team  - made DNR/DNI by family  - d/w medicine NP pt seen and examined at bedside    s/p 1 unit pRBC yesterday for anemia Hb 6.6  Hb 8.9 today post transfusion  no gross bleeding noted  on 3L NC  no distress  made DNR/DNI by family      69F ex smoker (quit 2013, 1/2 PPD x10 years) PMH HTN, chronic diastolic CHF (grade I diastolic dysfunction), Afib on Eliquis (by records), hx of PE (1/2024), COPD (previously on symbicort and spiriva, changed to duonebs q 6 hrs as there was question if she was able to coordinate use of her hand held inhaler per outpatient records, never intubated), L MCA aneurysm,  e-coli UTI 3/2025, liver cyst, abdominal wall hematoma 3/2025 presents from home for complaints of SOB with increased work of breathing. Found to have blood gas 7.11/57/82/18/96% placed on BiPAP. Labs with Na 121, K 5.8, HCO3: 17, Cr 1.38. Admitted to ICU for hypercarbic respiratory failure, COPD exacerbation. Transitioned from BIPAP to AVAPS and weaned off NIV. Given steroids and nebs for COPD exacerbation. CT chest without PE but noted secretions in airway with some mucus plugging LLL with background of severe emphysema. Downgraded to medical floor 6/10 but with respiratory distress and worsening wheeze, lethargy post downgrade requiring upgrade back to ICU for respiratory distress/hypercarbic respiratory failure requiring NIV support. Noted to be in urinary retention (bladder sono 550cc urine). She has been requiring NIV intermittently for SOB and WOB. She also developed R facial swelling, concerning for parotitis. Respiratory status and mental status improved. Downgraded to medical service 6/20. RRT 6/24 for worsening hypoxemia, O2 increased from 3L to 6LNC pulmonary reconsulted for hypoxemia.     DX: acute hypoxic / hypercarbic respiratory failure, acute COPD exacerbation with underlying severe COPD, parotitis, impaired airway clearance, respiratory acidosis, metabolic acidosis, NANCY, urinary rentention, acute metabolic encephalopathy, anemia    - now on 3L NC  - goal to maintain O2 sat > 88%  - cont avaps at night, has not required NIV during the day for work of breathing that has improved  - suspect ?aspiration vs mucus plugging with impaired airway clearance and generalized weakness for recurrent hypoxemia  - continue duonebs + hypertonic saline nebs q6 to help with secretion mobilization  - s/p steroids for COPD exacerbation  - cont advair for COPD, holding spiriva while on standing duonebs  - on discharge can give advair + spiriva with prn duonebs  - s/p vancomycin and unasyn for parotitis   - transfused 1 unit pRBC yesterday. Hb improved today to 8.6. no gross bleeding noted or reported  - remains on eliquis for a-fib, management per primary team  - made DNR/DNI by family  - d/w medicine NP

## 2025-06-26 NOTE — PROGRESS NOTE ADULT - REASON FOR ADMISSION
Asthma Exacerbation

## 2025-06-26 NOTE — DISCHARGE NOTE PROVIDER - NSDCMRMEDTOKEN_GEN_ALL_CORE_FT
Advair Diskus 250 mcg-50 mcg/inh inhalation powder: 1 inhaler(s) inhaled 2 times a day  amLODIPine 10 mg oral tablet: 1 tab(s) orally once a day  apixaban 2.5 mg oral tablet: 1 tab(s) orally every 12 hours  aspirin 81 mg oral delayed release tablet: 1 tab(s) orally once a day  atenolol 50 mg oral tablet: 1 tab(s) orally once a day  atorvastatin 20 mg oral tablet: 1 tab(s) orally once a day (at bedtime)  budesonide 0.25 mg/2 mL inhalation suspension: 2 milliliter(s) by nebulizer every 6 hours  Calcium 600+D oral tablet: 1 tab(s) orally 2 times a day  fluticasone 50 mcg/inh nasal spray: 1 spray(s) in each nostril once a day  ipratropium-albuterol 0.5 mg-2.5 mg/3 mL inhalation solution: 3 milliliter(s) inhaled every 6 hours  montelukast 10 mg oral tablet: 1 tab(s) orally once a day  Multiple Vitamins oral tablet: 1 tab(s) orally once a day  pantoprazole 40 mg oral delayed release tablet: 1 tab(s) orally once a day (before a meal)  Roflumilast 500 mcg oral tablet: 1 tab(s) orally once a day  sertraline 25 mg oral tablet: 1 tab(s) orally once a day  Symbicort 160 mcg-4.5 mcg/inh inhalation aerosol: 2 puff(s) inhaled once a day  tamsulosin 0.4 mg oral capsule: 1 cap(s) orally once a day (at bedtime)  tiotropium 2.5 mcg/inh inhalation aerosol: 2 puff(s) inhaled once a day

## 2025-06-26 NOTE — PROGRESS NOTE ADULT - SUBJECTIVE AND OBJECTIVE BOX
[facility]  INFECTIOUS DISEASES   [address] [place] [address]  Tel: [phone]     Fax: [phone]  ==============================================================================  MD Himanshu Stewart, DO Amisha Reza, SEBASTIÁN Merchant M.D  ==============================================================================      MARKIE HERNANDEZ  MRN-[id number]  69y ([birth year])      Interval History:    ROS:    [ ] Unobtainable because:  [ ] All other systems negative except as noted    Constitutional: no fever, no chills  Head: no trauma  Eyes: no vision changes, no eye pain  ENT:  no sore throat, no rhinorrhea  Cardiovascular:  no chest pain, no palpitation  Respiratory:  no SOB, no cough  GI:  no abd pain, no vomiting, no diarrhea  urinary: no dysuria, no hematuria, no flank pain  musculoskeletal:  no joint pain, no joint swelling  skin:  no rash  neurology:  no headache, no seizure, no change in mental status  psych: no anxiety, no depression         Allergies  No Known Allergies        ANTIMICROBIALS:  azithromycin   Tablet 250 daily  vancomycin  IVPB 500 every 24 hours        Physical Exam:  Vital Signs Last 24 Hrs  T(C): 36.3 (26 Jun 2025 11:08), Max: 37.5 (25 Jun 2025 17:00)  T(F): 97.3 (26 Jun 2025 11:08), Max: 99.5 (25 Jun 2025 17:00)  HR: 64 (26 Jun 2025 11:08) (64 - 88)  BP: 113/63 (26 Jun 2025 11:08) (101/61 - 127/73)  BP(mean): 73 (25 Jun 2025 23:29) (73 - 83)  RR: 16 (26 Jun 2025 11:08) (16 - 20)  SpO2: 94% (26 Jun 2025 11:08) (94% - 100%)    Parameters below as of 26 Jun 2025 11:08  Patient On (Oxygen Delivery Method): room air        06-25-25 @ 07:01  -  06-26-25 @ 07:00  --------------------------------------------------------  IN: 360 mL / OUT: 700 mL / NET: -340 mL    06-26-25 @ 07:01  -  06-26-25 @ 11:18  --------------------------------------------------------  IN: 360 mL / OUT: 500 mL / NET: -140 mL      General:    NAD,  non toxic  Head: atraumatic, normocephalic  Eye: normal sclera and conjunctiva  ENT:    no oral lesions, neck supple  Cardio:     regular S1, S2,  no murmur  Respiratory:    clear b/l,    no wheezing  abd:     soft,   BS +,   no tenderness  :   no CVAT,  no suprapubic tenderness,   no  cordova  Musculoskeletal:   no joint swelling,   no edema  vascular: no central lines, +PIV   Skin:    no rash  Neurologic:     no focal deficit  psych: normal affect    WBC Count: 7.36 K/uL (06-26 @ 06:20)  WBC Count: 8.37 K/uL (06-25 @ 11:04)  WBC Count: 8.26 K/uL (06-25 @ 06:22)  WBC Count: 9.10 K/uL (06-24 @ 06:38)  WBC Count: 10.64 K/uL (06-23 @ 07:05)  WBC Count: 12.16 K/uL (06-22 @ 07:45)  WBC Count: 13.34 K/uL (06-21 @ 08:00)                            8.9    7.36  )-----------( 149      ( 26 Jun 2025 06:20 )             29.0       06-26    135  |  106  |  11  ----------------------------<  104[H]  4.2   |  24  |  1.13    Ca    8.3[L]      26 Jun 2025 06:20    TPro  5.7[L]  /  Alb  1.9[L]  /  TBili  0.6  /  DBili  x   /  AST  9[L]  /  ALT  15  /  AlkPhos  75  06-26      Urinalysis Basic - ( 26 Jun 2025 06:20 )    Color: x / Appearance: x / SG: x / pH: x  Gluc: 104 mg/dL / Ketone: x  / Bili: x / Urobili: x   Blood: x / Protein: x / Nitrite: x   Leuk Esterase: x / RBC: x / WBC x   Sq Epi: x / Non Sq Epi: x / Bacteria: x          Creatinine Trend: 1.13<--, 1.19<--, 1.41<--, 1.19<--, 1.18<--, 1.21<--      MICROBIOLOGY:  v                      Procalcitonin: 0.08 (06-20-25 @ 02:20)          RADIOLOGY:   [facility]  INFECTIOUS DISEASES   [address] [place] [address]  Tel: [phone]     Fax: [phone]  ==============================================================================  MD Himanshu Stewart, SEBASTIÁN Olivares M.D  ==============================================================================      MARKIE HERNANDEZ  MRN-[id number]  69y ([birth year])      Interval History:    Patient seen and examined today.  her son is also at her bedside.    patient is awake and alert.  she states she feels better    states her right facial swelling has gone away and no longer has any pain there.  denies any fever or chills  denies any sob    she is on 3 L NC     ROS:    [ ] Unobtainable because:  [ x] All other systems negative except as noted    Constitutional: no fever, no chills  Head: no trauma  Eyes: no vision changes, no eye pain  ENT:  no sore throat, no rhinorrhea  Cardiovascular:  no chest pain, no palpitation  Respiratory:  no SOB, no cough  GI:  no abd pain, no vomiting, no diarrhea  urinary: no dysuria, no hematuria, no flank pain  musculoskeletal:  no joint pain, no joint swelling  skin:  no rash  neurology:  no headache        Allergies  No Known Drug Allergies        ANTIMICROBIALS:  azithromycin   Tablet 250 daily  vancomycin  IVPB 500 every 24 hours        Physical Exam:  Vital Signs Last 24 Hrs  T(C): 36.3 (26 Jun 2025 11:08), Max: 37.5 (25 Jun 2025 17:00)  T(F): 97.3 (26 Jun 2025 11:08), Max: 99.5 (25 Jun 2025 17:00)  HR: 64 (26 Jun 2025 11:08) (64 - 88)  BP: 113/63 (26 Jun 2025 11:08) (101/61 - 127/73)  BP(mean): 73 (25 Jun 2025 23:29) (73 - 83)  RR: 16 (26 Jun 2025 11:08) (16 - 20)  SpO2: 94% (26 Jun 2025 11:08) (94% - 100%)    Parameters below as of 26 Jun 2025 11:08  Patient On (Oxygen Delivery Method): room air        06-25-25 @ 07:01  -  06-26-25 @ 07:00  --------------------------------------------------------  IN: 360 mL / OUT: 700 mL / NET: -340 mL    06-26-25 @ 07:01  -  06-26-25 @ 11:18  --------------------------------------------------------  IN: 360 mL / OUT: 500 mL / NET: -140 mL      Head: atraumatic, normocephalic  Eyes: normal sclera and conjunctiva  ENT:   no oropharyngeal lesions,  right parotid gland region edema has resolved, no tenderness  Cardio:    regular S1,S2, no murmur  Respiratory:   has better  breath sounds b/l , No wheezing, no crackles  abd:   soft, BS +, not tender, no distention  :     no CVAT, no suprapubic tenderness  Musculoskeletal : no joint swelling, no edema  Skin:    no rash  vascular:  normal pulses  Neurologic:     awake, alert, can follow commands and answer most questions  psych: normal affect      WBC Count: 7.36 K/uL (06-26 @ 06:20)  WBC Count: 8.37 K/uL (06-25 @ 11:04)  WBC Count: 8.26 K/uL (06-25 @ 06:22)  WBC Count: 9.10 K/uL (06-24 @ 06:38)  WBC Count: 10.64 K/uL (06-23 @ 07:05)  WBC Count: 12.16 K/uL (06-22 @ 07:45)  WBC Count: 13.34 K/uL (06-21 @ 08:00)                            8.9    7.36  )-----------( 149      ( 26 Jun 2025 06:20 )             29.0       06-26    135  |  106  |  11  ----------------------------<  104[H]  4.2   |  24  |  1.13    Ca    8.3[L]      26 Jun 2025 06:20    TPro  5.7[L]  /  Alb  1.9[L]  /  TBili  0.6  /  DBili  x   /  AST  9[L]  /  ALT  15  /  AlkPhos  75  06-26      Urinalysis Basic - ( 26 Jun 2025 06:20 )    Color: x / Appearance: x / SG: x / pH: x  Gluc: 104 mg/dL / Ketone: x  / Bili: x / Urobili: x   Blood: x / Protein: x / Nitrite: x   Leuk Esterase: x / RBC: x / WBC x   Sq Epi: x / Non Sq Epi: x / Bacteria: x          Creatinine Trend: 1.13<--, 1.19<--, 1.41<--, 1.19<--, 1.18<--, 1.21<--      MICROBIOLOGY:    Procalcitonin: 0.08 (06-20-25 @ 02:20)          RADIOLOGY:   [facility]  INFECTIOUS DISEASES   [address] [place] [address]  Tel: [phone]     Fax: [phone]  ==============================================================================  MD Himanshu Stewart, SEBASTIÁN Olivares M.D  ==============================================================================      MARKIE HERNANDEZ  MRN-[id number]  69y ([birth year])      Interval History:    Patient seen and examined today.  her son is also at her bedside.    patient is awake and alert.  she states she feels better    states her right facial swelling has gone away and no longer has any pain there.  denies any fever or chills  denies any sob    she is on 3 L NC     ROS:    [ ] Unobtainable because:  [ x] All other systems negative except as noted    Constitutional: no fever, no chills  Head: no trauma  Eyes: no vision changes, no eye pain  ENT:  no sore throat, no rhinorrhea  Cardiovascular:  no chest pain, no palpitation  Respiratory:  no SOB, no cough  GI:  no abd pain, no vomiting, no diarrhea  urinary: no dysuria, no hematuria, no flank pain  musculoskeletal:  no joint pain, no joint swelling  skin:  no rash  neurology:  no headache        Allergies  No Known Drug Allergies        ANTIMICROBIALS:  azithromycin   Tablet 250 daily  vancomycin  IVPB 500 every 24 hours        Physical Exam:  Vital Signs Last 24 Hrs  T(C): 36.3 (26 Jun 2025 11:08), Max: 37.5 (25 Jun 2025 17:00)  T(F): 97.3 (26 Jun 2025 11:08), Max: 99.5 (25 Jun 2025 17:00)  HR: 64 (26 Jun 2025 11:08) (64 - 88)  BP: 113/63 (26 Jun 2025 11:08) (101/61 - 127/73)  BP(mean): 73 (25 Jun 2025 23:29) (73 - 83)  RR: 16 (26 Jun 2025 11:08) (16 - 20)  SpO2: 94% (26 Jun 2025 11:08) (94% - 100%)    Parameters below as of 26 Jun 2025 11:08  Patient On (Oxygen Delivery Method): room air        06-25-25 @ 07:01  -  06-26-25 @ 07:00  --------------------------------------------------------  IN: 360 mL / OUT: 700 mL / NET: -340 mL    06-26-25 @ 07:01  -  06-26-25 @ 11:18  --------------------------------------------------------  IN: 360 mL / OUT: 500 mL / NET: -140 mL      Head: atraumatic, normocephalic  Eyes: normal sclera and conjunctiva  ENT:   no oropharyngeal lesions,  right parotid gland region edema has resolved, no tenderness  Cardio:    regular S1,S2, no murmur  Respiratory:   has better  breath sounds b/l , No wheezing, no crackles  abd:   soft, BS +, not tender, no distention  :     no CVAT, no suprapubic tenderness  Musculoskeletal : no joint swelling, no edema  Skin:    no rash  vascular:  normal pulses  Neurologic:     awake, alert, can follow commands and answer most questions  psych: normal affect      WBC Count: 7.36 K/uL (06-26 @ 06:20)  WBC Count: 8.37 K/uL (06-25 @ 11:04)  WBC Count: 8.26 K/uL (06-25 @ 06:22)  WBC Count: 9.10 K/uL (06-24 @ 06:38)  WBC Count: 10.64 K/uL (06-23 @ 07:05)  WBC Count: 12.16 K/uL (06-22 @ 07:45)  WBC Count: 13.34 K/uL (06-21 @ 08:00)                            8.9    7.36  )-----------( 149      ( 26 Jun 2025 06:20 )             29.0       06-26    135  |  106  |  11  ----------------------------<  104[H]  4.2   |  24  |  1.13    Ca    8.3[L]      26 Jun 2025 06:20    TPro  5.7[L]  /  Alb  1.9[L]  /  TBili  0.6  /  DBili  x   /  AST  9[L]  /  ALT  15  /  AlkPhos  75  06-26      Urinalysis Basic - ( 26 Jun 2025 06:20 )    Color: x / Appearance: x / SG: x / pH: x  Gluc: 104 mg/dL / Ketone: x  / Bili: x / Urobili: x   Blood: x / Protein: x / Nitrite: x   Leuk Esterase: x / RBC: x / WBC x   Sq Epi: x / Non Sq Epi: x / Bacteria: x          Creatinine Trend: 1.13<--, 1.19<--, 1.41<--, 1.19<--, 1.18<--, 1.21<--      MICROBIOLOGY:    Procalcitonin: 0.08 (06-20-25 @ 02:20)    Mumps Virus RNA, Qualitative Real-Time PCR (06.20.25 @ 08:00)    Mumps Virus RNA, Qualitative Real-Time PCR, Result: NotDetec: REFERENCE RANGE: NOT DETECTED  This test was developed and its analytical performance  characteristics have been determined by [facility].  It has not been cleared or approved by FDA. This assay has  been validated pursuant to the CLIA regulations and is  used for clinical purposes.  Test performed by [facility]                    [id number] [place] [address]                    Phone: [phone]  Medical Director: Josefina Dsouza MD,PHD,WOJCIECH  Test Reported by [employer] [facility],  [facility],  [address] [place] [address]  Yony Souza M.D., Ph.D., Director of Laboratories  [phone], [facility] [id number]    Mumps IgM Antibody: <0.80: Negative         < 0.80                                 Borderline  0.80 - 1.20                                 Positive         > 1.20  Note: The presence of IgM specific antibody should be  interpreted in conjunction with the patients clinical  history and exposure risk when an acute infection is  suspected.  Performed At:  [address] [id number]  Jhony Shelby MD Ph:[phone] (06.20.25 @ 02:20)              RADIOLOGY:   complains of pain/discomfort

## 2025-06-26 NOTE — DISCHARGE NOTE PROVIDER - ATTENDING DISCHARGE PHYSICAL EXAMINATION:
Vital Signs Last 24 Hrs  T(F): 97.3 (26 Jun 2025 11:08), Max: 99.5 (25 Jun 2025 17:00)  HR: 74 (26 Jun 2025 11:31) (64 - 84)  BP: 113/63 (26 Jun 2025 11:08) (101/61 - 127/73)  RR: 16 (26 Jun 2025 11:08) (16 - 19)  SpO2: 97% (26 Jun 2025 11:31) (94% - 98%)    Physical Exam:  Constitutional: NAD, awake and alert  Respiratory: cta b/l no wheezing no rhonchi  Cardiovascular: +s1/s2 no edema b/l le  Gastrointestinal: soft nt nd bs+  Vascular: 2+ peripheral pulses  Neurological: A/O x 3, no focal deficits

## 2025-06-26 NOTE — PROGRESS NOTE ADULT - SUBJECTIVE AND OBJECTIVE BOX
INTERVAL HPI/OVERNIGHT EVENTS: s/p 2PRBCs; no acute bleed   SUBJECTIVE: Patient seen and examined at bedside.   ROS: All negative except as listed above.    VITAL SIGNS:  Vital Signs Last 24 Hrs  T(C): 36.9 (26 Jun 2025 04:42), Max: 37.5 (25 Jun 2025 17:00)  T(F): 98.5 (26 Jun 2025 04:42), Max: 99.5 (25 Jun 2025 17:00)  HR: 68 (26 Jun 2025 05:10) (64 - 88)  BP: 127/73 (26 Jun 2025 04:42) (101/61 - 127/73)  BP(mean): 73 (25 Jun 2025 23:29) (73 - 83)  ABP: --  ABP(mean): --  RR: 19 (26 Jun 2025 04:42) (19 - 20)  SpO2: 95% (26 Jun 2025 05:10) (94% - 100%)    O2 Parameters below as of 26 Jun 2025 05:10  Patient On (Oxygen Delivery Method): nasal cannula            Plateau pressure:   P/F ratio:     06-25 @ 07:01  -  06-26 @ 07:00  --------------------------------------------------------  IN: 360 mL / OUT: 700 mL / NET: -340 mL      CAPILLARY BLOOD GLUCOSE      POCT Blood Glucose.: 130 mg/dL (24 Jun 2025 09:36)      ECG: reviewed.    PHYSICAL EXAM:  Gen: frail and weak in bed but in NAD  HEENT: PERRL, EOMI  Resp: diminished bs bilat  CVS: S1S2 no m/r/g  Abd: soft NT/ND +BS  Ext: no c/c/e  Neuro: A&Ox3    MEDICATIONS:  MEDICATIONS  (STANDING):  albuterol/ipratropium for Nebulization 3 milliLiter(s) Nebulizer every 6 hours  amLODIPine   Tablet 10 milliGRAM(s) Oral daily  apixaban 2.5 milliGRAM(s) Oral every 12 hours  atenolol  Tablet 50 milliGRAM(s) Oral daily  azithromycin   Tablet 250 milliGRAM(s) Oral daily  benzocaine/menthol Lozenge 1 Lozenge Oral two times a day  chlorhexidine 2% Cloths 1 Application(s) Topical <User Schedule>  fluticasone propionate/ salmeterol 500-50 MICROgram(s) Diskus 1 Dose(s) Inhalation two times a day  melatonin 3 milliGRAM(s) Oral at bedtime  montelukast 10 milliGRAM(s) Oral daily  pantoprazole    Tablet 40 milliGRAM(s) Oral before breakfast  polyethylene glycol 3350 17 Gram(s) Oral every 24 hours  senna 2 Tablet(s) Oral at bedtime  sodium chloride 3%  Inhalation 4 milliLiter(s) Inhalation every 6 hours  vancomycin  IVPB 500 milliGRAM(s) IV Intermittent every 24 hours  zinc oxide 40% Paste 1 Application(s) Topical daily    MEDICATIONS  (PRN):  acetaminophen     Tablet .. 650 milliGRAM(s) Oral every 6 hours PRN Temp greater or equal to 38C (100.4F), Mild Pain (1 - 3)  aluminum hydroxide/magnesium hydroxide/simethicone Suspension 30 milliLiter(s) Oral every 4 hours PRN Dyspepsia  bisacodyl 5 milliGRAM(s) Oral every 12 hours PRN Constipation  guaiFENesin Oral Liquid (Sugar-Free) 200 milliGRAM(s) Oral every 6 hours PRN Cough      ALLERGIES:  Allergies    No Known Allergies    Intolerances        LABS:                        8.9    7.36  )-----------( 149      ( 26 Jun 2025 06:20 )             29.0     06-25    134[L]  |  104  |  15  ----------------------------<  111[H]  4.0   |  25  |  1.19    Ca    8.4[L]      25 Jun 2025 06:22    TPro  5.4[L]  /  Alb  2.0[L]  /  TBili  0.5  /  DBili  x   /  AST  11[L]  /  ALT  16  /  AlkPhos  70  06-25      Urinalysis Basic - ( 25 Jun 2025 06:22 )    Color: x / Appearance: x / SG: x / pH: x  Gluc: 111 mg/dL / Ketone: x  / Bili: x / Urobili: x   Blood: x / Protein: x / Nitrite: x   Leuk Esterase: x / RBC: x / WBC x   Sq Epi: x / Non Sq Epi: x / Bacteria: x      ABG:      vBG:    Micro:        RADIOLOGY & ADDITIONAL TESTS: Reviewed. INTERVAL HPI/OVERNIGHT EVENTS: s/p 1PRBCs; no acute bleed   SUBJECTIVE: Patient seen and examined at bedside.   ROS: All negative except as listed above.    VITAL SIGNS:  Vital Signs Last 24 Hrs  T(C): 36.9 (26 Jun 2025 04:42), Max: 37.5 (25 Jun 2025 17:00)  T(F): 98.5 (26 Jun 2025 04:42), Max: 99.5 (25 Jun 2025 17:00)  HR: 68 (26 Jun 2025 05:10) (64 - 88)  BP: 127/73 (26 Jun 2025 04:42) (101/61 - 127/73)  BP(mean): 73 (25 Jun 2025 23:29) (73 - 83)  RR: 19 (26 Jun 2025 04:42) (19 - 20)  SpO2: 95% (26 Jun 2025 05:10) (94% - 100%)    O2 Parameters below as of 26 Jun 2025 05:10  Patient On (Oxygen Delivery Method): nasal cannula            06-25 @ 07:01  -  06-26 @ 07:00  --------------------------------------------------------  IN: 360 mL / OUT: 700 mL / NET: -340 mL      CAPILLARY BLOOD GLUCOSE  POCT Blood Glucose.: 130 mg/dL (24 Jun 2025 09:36)          PHYSICAL EXAM:  Gen: frail and weak in bed but in NAD  HEENT: NC/AT  Resp: diminished bs bilat  CVS: S1S2 no m/r/g  Abd: soft NT/ND +BS  Ext: no c/c/e  Neuro: A&Ox3          MEDICATIONS  (STANDING):  albuterol/ipratropium for Nebulization 3 milliLiter(s) Nebulizer every 6 hours  amLODIPine   Tablet 10 milliGRAM(s) Oral daily  apixaban 2.5 milliGRAM(s) Oral every 12 hours  atenolol  Tablet 50 milliGRAM(s) Oral daily  azithromycin   Tablet 250 milliGRAM(s) Oral daily  benzocaine/menthol Lozenge 1 Lozenge Oral two times a day  chlorhexidine 2% Cloths 1 Application(s) Topical <User Schedule>  fluticasone propionate/ salmeterol 500-50 MICROgram(s) Diskus 1 Dose(s) Inhalation two times a day  melatonin 3 milliGRAM(s) Oral at bedtime  montelukast 10 milliGRAM(s) Oral daily  pantoprazole    Tablet 40 milliGRAM(s) Oral before breakfast  polyethylene glycol 3350 17 Gram(s) Oral every 24 hours  senna 2 Tablet(s) Oral at bedtime  sodium chloride 3%  Inhalation 4 milliLiter(s) Inhalation every 6 hours  vancomycin  IVPB 500 milliGRAM(s) IV Intermittent every 24 hours  zinc oxide 40% Paste 1 Application(s) Topical daily    MEDICATIONS  (PRN):  acetaminophen     Tablet .. 650 milliGRAM(s) Oral every 6 hours PRN Temp greater or equal to 38C (100.4F), Mild Pain (1 - 3)  aluminum hydroxide/magnesium hydroxide/simethicone Suspension 30 milliLiter(s) Oral every 4 hours PRN Dyspepsia  bisacodyl 5 milliGRAM(s) Oral every 12 hours PRN Constipation  guaiFENesin Oral Liquid (Sugar-Free) 200 milliGRAM(s) Oral every 6 hours PRN Cough        Allergies  No Known Allergies            LABS:                        8.9    7.36  )-----------( 149      ( 26 Jun 2025 06:20 )             29.0     06-25    134[L]  |  104  |  15  ----------------------------<  111[H]  4.0   |  25  |  1.19    Ca    8.4[L]      25 Jun 2025 06:22    TPro  5.4[L]  /  Alb  2.0[L]  /  TBili  0.5  /  DBili  x   /  AST  11[L]  /  ALT  16  /  AlkPhos  70  06-25                RADIOLOGY & ADDITIONAL TESTS: Reviewed.  < from: Xray Chest 1 View- PORTABLE-Urgent (Xray Chest 1 View- PORTABLE-Urgent .) (06.24.25 @ 10:57) >  IMPRESSION: Heart size, mediastinal and hilar contours are unchanged and   within normal limits for the patient's stated age. Of note are stable   prominent pulmonary arteries and pulmonary hypertension cannot be   excluded.There has been improvement in aeration of the left lower lung   zone with multiple bibasilar linear opacities compatible with probable   subsegmental atelectasis. There may be patchy opacities within the right   lower lung zone unchanged. A right lower lobe pneumonia cannot be   excluded. No large effusions or pneumothorax. Marked degenerative change   of the visualized axial skeleton.

## 2025-06-26 NOTE — DISCHARGE NOTE NURSING/CASE MANAGEMENT/SOCIAL WORK - PATIENT PORTAL LINK FT
You can access the FollowMyHealth Patient Portal offered by [facility] by registering at the following website: [email or web address]. By joining [employer]’s FollowMyHealth portal, you will also be able to view your health information using other applications (apps) compatible with our system.

## 2025-06-26 NOTE — DISCHARGE NOTE PROVIDER - HOSPITAL COURSE
A 69-year-old female with COPD, PE, HFpEF, Afib, and a liver mass was admitted to the ICU for acute hypercapnic respiratory failure secondary to a COPD exacerbation and left lower lobe mucous plugging, requiring NIV. After transferring to the medical floor, she returned to the ICU for NIV support due to worsening respiratory status. She also developed right facial swelling, possibly parotitis or sialothiasis, which improved with IV antibiotics. Her respiratory status stabilized off BIPAP, and she is now being treated for anemia with a planned PRBC transfusion, along with further workup for a possible GI bleed. Her COPD exacerbation is improving, and she continues on standard COPD medications. Infectious Disease is following her facial swelling. She remains on anticoagulation for her history of PE and Afib, and her chronic CHF is currently stable. Her dementia appears to be at baseline, and her liver mass will require outpatient follow-up.

## 2025-06-26 NOTE — PROGRESS NOTE ADULT - ASSESSMENT
A/P-  69 year old woman with a PMHx of Asthma (on prn supp O2), COPD (prev smoker quit 20 years ago), HTN, HFpEF, CVA with L sided deficits (as per son), A Fib, PE (off Eliquis and Aspirin since March 2025 due to abdominal wall hematoma), and Liver mass (IR recommended for outpatient follow up w repeat imaging).     Pt presented to the ED with SOB that started the night prior. Per daughter in law, pt has been declining in her health, decreased appetite since her last hospital admission in March 2025. Has been bed bound last 3 years. In the ED, pt tripoding and was placed on BIPAP. Received nebs and steroids. Pt then developed episode of coffee ground vomiting and switch to high flow NC. Labs significant for wbc 11.8, hgb 8.6, Na 121, K: 5.8, Bicarb 20, Creat: 1.38, trop: 62, pH: 7.11, pCO2: 57.   Pt developed hypotension and received 1 L of IV fluids with some improvement. Admitted to the ICU for further management.    (08 Jun 2025 20:54)    hospital course-  Found to have blood gas 7.11/57/82/18/96% placed on BiPAP and was  admitted to ICU (6/8/2025) for hypercarbic respiratory failure, COPD exacerbation. Transitioned from BIPAP to AVAPS and weaned off NIV. Given steroids and nebs for COPD exacerbation. CT chest without PE but noted secretions in airway with some mucus plugging LLL with background of severe emphysema. Pt downgraded to medical floor 6/10 and then later on found to be confused, lethargic, in respiratory distress with wheeze, chest pain and abdominal pain (noted to be in urinary retention on bladder sono 550cc urine); transferred back to the ICU.     Afebrile  +leukocytosis 24 K ( on steroids)  no culture data available  MRSA PCR- detected    6/19-  Afebrile  Leukocytosis trending down  to 19k    6/20-  Afebrile  leukocytosis decreased to 11K  MRSA PCR-Detected  right parotid possible sialolithiasis (parotid stone) vs parotitis    6/23-  downgraded to medicine floor  Afebrile  leukocytosis decreased to 10K  MRSA PCR-Detected  right parotid possible sialolithiasis (parotid stone) vs parotitis- much improved    6/26-  Afebrile  leukocytosis resolved  MRSA PCR-Detected  right parotid possible sialolithiasis (parotid stone) vs parotitis- Has resolved    Impression-  right parotitis vs Sialolithiasis -resolved  leukocytosis -  resolved  copd exacerbation  bibasilar atelectasis  vs pneumonia - treated  HTN  CVA    plan-  completed course of unasyn  3 days ago.  pls d/c vanco as well.  d/c abx today  O2 management as per pulmonary team.  rest of the medical management as per medicine team.    All labs and imaging and chart notes reviewed.     All above discussed with patient and patient's son and they  verbalize full understanding of all above and agrees with above plan of care.    d/w Dr.Desai Pham Holguin MD  Infectious Disease Attending    for any questions please do not hesitate to contact me either via teams or by calling [phone]             A/P-  69 year old woman with a PMHx of Asthma (on prn supp O2), COPD (prev smoker quit 20 years ago), HTN, HFpEF, CVA with L sided deficits (as per son), A Fib, PE (off Eliquis and Aspirin since March 2025 due to abdominal wall hematoma), and Liver mass (IR recommended for outpatient follow up w repeat imaging).     Pt presented to the ED with SOB that started the night prior. Per daughter in law, pt has been declining in her health, decreased appetite since her last hospital admission in March 2025. Has been bed bound last 3 years. In the ED, pt tripoding and was placed on BIPAP. Received nebs and steroids. Pt then developed episode of coffee ground vomiting and switch to high flow NC. Labs significant for wbc 11.8, hgb 8.6, Na 121, K: 5.8, Bicarb 20, Creat: 1.38, trop: 62, pH: 7.11, pCO2: 57.   Pt developed hypotension and received 1 L of IV fluids with some improvement. Admitted to the ICU for further management.    (08 Jun 2025 20:54)    hospital course-  Found to have blood gas 7.11/57/82/18/96% placed on BiPAP and was  admitted to ICU (6/8/2025) for hypercarbic respiratory failure, COPD exacerbation. Transitioned from BIPAP to AVAPS and weaned off NIV. Given steroids and nebs for COPD exacerbation. CT chest without PE but noted secretions in airway with some mucus plugging LLL with background of severe emphysema. Pt downgraded to medical floor 6/10 and then later on found to be confused, lethargic, in respiratory distress with wheeze, chest pain and abdominal pain (noted to be in urinary retention on bladder sono 550cc urine); transferred back to the ICU.     Afebrile  +leukocytosis 24 K ( on steroids)  no culture data available  MRSA PCR- detected    6/19-  Afebrile  Leukocytosis trending down  to 19k    6/20-  Afebrile  leukocytosis decreased to 11K  MRSA PCR-Detected  right parotid possible sialolithiasis (parotid stone) vs parotitis    6/23-  downgraded to medicine floor  Afebrile  leukocytosis decreased to 10K  MRSA PCR-Detected  right parotid possible sialolithiasis (parotid stone) vs parotitis- much improved    6/26-  Afebrile  leukocytosis resolved  MRSA PCR-Detected  right parotid possible sialolithiasis (parotid stone) vs parotitis- Has resolved  mumps IGM and RNA both negative    Impression-  right parotitis vs Sialolithiasis -resolved  leukocytosis -  resolved  copd exacerbation  bibasilar atelectasis  vs pneumonia - treated  HTN  CVA    plan-  completed course of unasyn  3 days ago.  pls d/c vanco as well.  d/c abx today  O2 management as per pulmonary team.  rest of the medical management as per medicine team.    All labs and imaging and chart notes reviewed.     All above discussed with patient and patient's son and they  verbalize full understanding of all above and agrees with above plan of care.    d/w Dr.Desai Pham Holguin MD  Infectious Disease Attending    for any questions please do not hesitate to contact me either via teams or by calling [phone]

## 2025-06-26 NOTE — PROGRESS NOTE ADULT - ASSESSMENT
Assessment: 69F w/ COPD, PE, HFpEF, Afib, liver mass. Presents w/ SOB. Admitted to ICU initially for COPD exacerbation and acute hypercapnic respiratory failure requiring NIV and LLL mucous plugging and sent to the medical floors. Later that day she was found to be in respiratory distress and lethargic and transferred back to ICU. She has been requiring NIV intermittently for SOB and WOB. She has also developed R facial swelling, concerning for parotitis. Respiratory status improving overall and less BiPAP use. Transferred to medical floors and was doing well. Yesterday RRT called for worsening hypoxemia.    Recs:   - on evaluation, pt is awake and alert, less junky sounding, although cough is still wet   - currently on 4LNC, SpO2 ~95%, maintain SpO2 >88%  - consider aspiration as cause of worsening hypoxemia in combination w/ weakness/deconditioning leading to poor cough and clearance of airways   - continue duonebs + hypertonic saline nebs q6 ATC  - swallow evaluation appreciated   - biPAP PRN   - s/p vancomycin and unasyn for parotitis - ID managing abx appreciate recs   - continue advair for COPD maintenance  - azithromycin for severe COPD   - s/p steroid course for COPD exacerbation  - pt is now DNR/DNI.   - anemia, s/p 2PRBC overnight   - rest of management per primary team     d/w dr delcid

## 2025-06-26 NOTE — DISCHARGE NOTE NURSING/CASE MANAGEMENT/SOCIAL WORK - FINANCIAL ASSISTANCE
[facility] provides services at a reduced cost to those who are determined to be eligible through [facility]’s financial assistance program. Information regarding [facility]’s financial assistance program can be found by going to [email or web address] or by calling [phone].

## 2025-06-26 NOTE — DISCHARGE NOTE PROVIDER - DETAILS OF MALNUTRITION DIAGNOSIS/DIAGNOSES
This patient has been assessed with a concern for Malnutrition and was treated during this hospitalization for the following Nutrition diagnosis/diagnoses:     -  06/24/2025: Severe protein-calorie malnutrition

## 2025-07-01 DIAGNOSIS — E87.5 HYPERKALEMIA: ICD-10-CM

## 2025-07-01 DIAGNOSIS — Z86.711 PERSONAL HISTORY OF PULMONARY EMBOLISM: ICD-10-CM

## 2025-07-01 DIAGNOSIS — Z11.52 ENCOUNTER FOR SCREENING FOR COVID-19: ICD-10-CM

## 2025-07-01 DIAGNOSIS — K76.9 LIVER DISEASE, UNSPECIFIED: ICD-10-CM

## 2025-07-01 DIAGNOSIS — Z87.891 PERSONAL HISTORY OF NICOTINE DEPENDENCE: ICD-10-CM

## 2025-07-01 DIAGNOSIS — J43.9 EMPHYSEMA, UNSPECIFIED: ICD-10-CM

## 2025-07-01 DIAGNOSIS — I48.20 CHRONIC ATRIAL FIBRILLATION, UNSPECIFIED: ICD-10-CM

## 2025-07-01 DIAGNOSIS — R11.10 VOMITING, UNSPECIFIED: ICD-10-CM

## 2025-07-01 DIAGNOSIS — N17.9 ACUTE KIDNEY FAILURE, UNSPECIFIED: ICD-10-CM

## 2025-07-01 DIAGNOSIS — J96.02 ACUTE RESPIRATORY FAILURE WITH HYPERCAPNIA: ICD-10-CM

## 2025-07-01 DIAGNOSIS — J96.22 ACUTE AND CHRONIC RESPIRATORY FAILURE WITH HYPERCAPNIA: ICD-10-CM

## 2025-07-01 DIAGNOSIS — F03.90 UNSPECIFIED DEMENTIA, UNSPECIFIED SEVERITY, WITHOUT BEHAVIORAL DISTURBANCE, PSYCHOTIC DISTURBANCE, MOOD DISTURBANCE, AND ANXIETY: ICD-10-CM

## 2025-07-01 DIAGNOSIS — Z74.01 BED CONFINEMENT STATUS: ICD-10-CM

## 2025-07-01 DIAGNOSIS — I50.32 CHRONIC DIASTOLIC (CONGESTIVE) HEART FAILURE: ICD-10-CM

## 2025-07-01 DIAGNOSIS — J44.1 CHRONIC OBSTRUCTIVE PULMONARY DISEASE WITH (ACUTE) EXACERBATION: ICD-10-CM

## 2025-07-01 DIAGNOSIS — D64.9 ANEMIA, UNSPECIFIED: ICD-10-CM

## 2025-07-01 DIAGNOSIS — J98.11 ATELECTASIS: ICD-10-CM

## 2025-07-01 DIAGNOSIS — Z66 DO NOT RESUSCITATE: ICD-10-CM

## 2025-07-01 DIAGNOSIS — G92.8 OTHER TOXIC ENCEPHALOPATHY: ICD-10-CM

## 2025-07-01 DIAGNOSIS — J45.901 UNSPECIFIED ASTHMA WITH (ACUTE) EXACERBATION: ICD-10-CM

## 2025-07-01 DIAGNOSIS — Z99.81 DEPENDENCE ON SUPPLEMENTAL OXYGEN: ICD-10-CM

## 2025-07-01 DIAGNOSIS — I69.398 OTHER SEQUELAE OF CEREBRAL INFARCTION: ICD-10-CM

## 2025-07-01 DIAGNOSIS — I95.9 HYPOTENSION, UNSPECIFIED: ICD-10-CM

## 2025-07-01 DIAGNOSIS — R33.9 RETENTION OF URINE, UNSPECIFIED: ICD-10-CM

## 2025-07-01 DIAGNOSIS — Z22.322 CARRIER OR SUSPECTED CARRIER OF METHICILLIN RESISTANT STAPHYLOCOCCUS AUREUS: ICD-10-CM

## 2025-07-01 DIAGNOSIS — E87.1 HYPO-OSMOLALITY AND HYPONATREMIA: ICD-10-CM

## 2025-07-01 DIAGNOSIS — E87.4 MIXED DISORDER OF ACID-BASE BALANCE: ICD-10-CM

## 2025-07-01 DIAGNOSIS — E43 UNSPECIFIED SEVERE PROTEIN-CALORIE MALNUTRITION: ICD-10-CM

## 2025-07-01 DIAGNOSIS — K59.00 CONSTIPATION, UNSPECIFIED: ICD-10-CM

## 2025-07-01 DIAGNOSIS — K11.20 SIALOADENITIS, UNSPECIFIED: ICD-10-CM

## 2025-07-01 DIAGNOSIS — I11.0 HYPERTENSIVE HEART DISEASE WITH HEART FAILURE: ICD-10-CM

## 2025-07-01 DIAGNOSIS — J96.21 ACUTE AND CHRONIC RESPIRATORY FAILURE WITH HYPOXIA: ICD-10-CM
